# Patient Record
Sex: MALE | Race: WHITE | NOT HISPANIC OR LATINO | Employment: OTHER | URBAN - METROPOLITAN AREA
[De-identification: names, ages, dates, MRNs, and addresses within clinical notes are randomized per-mention and may not be internally consistent; named-entity substitution may affect disease eponyms.]

---

## 2020-11-06 ENCOUNTER — OFFICE VISIT (OUTPATIENT)
Dept: URGENT CARE | Facility: CLINIC | Age: 75
End: 2020-11-06
Payer: MEDICARE

## 2020-11-06 VITALS
OXYGEN SATURATION: 95 % | SYSTOLIC BLOOD PRESSURE: 132 MMHG | DIASTOLIC BLOOD PRESSURE: 68 MMHG | TEMPERATURE: 98.9 F | HEART RATE: 66 BPM | RESPIRATION RATE: 16 BRPM | HEIGHT: 72 IN | BODY MASS INDEX: 23.7 KG/M2 | WEIGHT: 175 LBS

## 2020-11-06 DIAGNOSIS — S16.1XXA STRAIN OF NECK MUSCLE, INITIAL ENCOUNTER: ICD-10-CM

## 2020-11-06 DIAGNOSIS — S39.012A STRAIN OF LUMBAR REGION, INITIAL ENCOUNTER: ICD-10-CM

## 2020-11-06 PROCEDURE — 99204 OFFICE O/P NEW MOD 45 MIN: CPT | Performed by: FAMILY MEDICINE

## 2020-11-06 RX ORDER — POTASSIUM CHLORIDE 600 MG/1
8 CAPSULE, EXTENDED RELEASE ORAL
COMMUNITY
End: 2021-01-26 | Stop reason: SDUPTHER

## 2020-11-06 RX ORDER — OSIMERTINIB 40 1/1
TABLET, FILM COATED ORAL
COMMUNITY
End: 2020-11-25

## 2020-11-06 RX ORDER — LEVOTHYROXINE SODIUM 125 UG/1
CAPSULE ORAL
COMMUNITY
Start: 2020-10-25 | End: 2020-11-25

## 2020-11-06 RX ORDER — TIZANIDINE 4 MG/1
4 TABLET ORAL EVERY 6 HOURS PRN
Qty: 30 TAB | Refills: 0 | Status: SHIPPED | OUTPATIENT
Start: 2020-11-06 | End: 2021-02-23

## 2020-11-06 RX ORDER — VERAPAMIL HYDROCHLORIDE 120 MG/1
120 TABLET, FILM COATED ORAL 3 TIMES DAILY
COMMUNITY
End: 2020-11-25

## 2020-11-06 RX ORDER — PREDNISONE 5 MG/1
10 TABLET ORAL DAILY
COMMUNITY
End: 2020-12-22 | Stop reason: SDUPTHER

## 2020-11-06 SDOH — HEALTH STABILITY: MENTAL HEALTH: HOW OFTEN DO YOU HAVE A DRINK CONTAINING ALCOHOL?: 2-4 TIMES A MONTH
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
## 2020-11-25 ENCOUNTER — OFFICE VISIT (OUTPATIENT)
Dept: INTERNAL MEDICINE | Facility: IMAGING CENTER | Age: 75
End: 2020-11-25
Payer: MEDICARE

## 2020-11-25 VITALS
BODY MASS INDEX: 23.1 KG/M2 | DIASTOLIC BLOOD PRESSURE: 64 MMHG | HEART RATE: 65 BPM | TEMPERATURE: 97.8 F | SYSTOLIC BLOOD PRESSURE: 128 MMHG | HEIGHT: 71 IN | OXYGEN SATURATION: 95 % | RESPIRATION RATE: 14 BRPM | WEIGHT: 165 LBS

## 2020-11-25 DIAGNOSIS — Z85.820 HISTORY OF MALIGNANT MELANOMA: ICD-10-CM

## 2020-11-25 DIAGNOSIS — Z86.19 HISTORY OF SHINGLES: ICD-10-CM

## 2020-11-25 DIAGNOSIS — Z86.73 HISTORY OF STROKE: ICD-10-CM

## 2020-11-25 DIAGNOSIS — Z93.3 COLOSTOMY PRESENT (HCC): ICD-10-CM

## 2020-11-25 DIAGNOSIS — E27.40 ADRENAL INSUFFICIENCY (HCC): ICD-10-CM

## 2020-11-25 DIAGNOSIS — K57.30 DIVERTICULOSIS OF COLON: ICD-10-CM

## 2020-11-25 DIAGNOSIS — Z85.118 HISTORY OF LUNG CANCER: ICD-10-CM

## 2020-11-25 DIAGNOSIS — M54.32 SCIATICA, LEFT SIDE: ICD-10-CM

## 2020-11-25 DIAGNOSIS — Z79.899 LONG TERM USE OF DRUG: ICD-10-CM

## 2020-11-25 DIAGNOSIS — N18.30 STAGE 3 CHRONIC KIDNEY DISEASE, UNSPECIFIED WHETHER STAGE 3A OR 3B CKD: ICD-10-CM

## 2020-11-25 DIAGNOSIS — I65.23 ATHEROSCLEROSIS OF BOTH CAROTID ARTERIES: ICD-10-CM

## 2020-11-25 DIAGNOSIS — N40.1 BPH WITH OBSTRUCTION/LOWER URINARY TRACT SYMPTOMS: ICD-10-CM

## 2020-11-25 DIAGNOSIS — Z86.79 HISTORY OF ATRIAL TACHYCARDIA: ICD-10-CM

## 2020-11-25 DIAGNOSIS — N13.8 BPH WITH OBSTRUCTION/LOWER URINARY TRACT SYMPTOMS: ICD-10-CM

## 2020-11-25 DIAGNOSIS — Z86.39 HISTORY OF VITAMIN D DEFICIENCY: ICD-10-CM

## 2020-11-25 DIAGNOSIS — R97.20 ELEVATED PSA: ICD-10-CM

## 2020-11-25 DIAGNOSIS — E03.9 HYPOTHYROIDISM (ACQUIRED): ICD-10-CM

## 2020-11-25 PROBLEM — N40.0 BPH (BENIGN PROSTATIC HYPERPLASIA): Status: ACTIVE | Noted: 2020-11-25

## 2020-11-25 PROBLEM — K21.9 GERD (GASTROESOPHAGEAL REFLUX DISEASE): Status: ACTIVE | Noted: 2020-11-25

## 2020-11-25 PROBLEM — E55.9 VITAMIN D DEFICIENCY: Status: ACTIVE | Noted: 2020-11-25

## 2020-11-25 PROBLEM — C34.90 LUNG CANCER (HCC): Status: ACTIVE | Noted: 2020-11-25

## 2020-11-25 PROBLEM — G47.33 OBSTRUCTIVE SLEEP APNEA: Status: ACTIVE | Noted: 2020-11-25

## 2020-11-25 PROBLEM — N40.0 BPH (BENIGN PROSTATIC HYPERPLASIA): Status: RESOLVED | Noted: 2020-11-25 | Resolved: 2020-11-25

## 2020-11-25 PROBLEM — E78.5 HYPERLIPIDEMIA: Status: ACTIVE | Noted: 2020-11-25

## 2020-11-25 PROCEDURE — 99204 OFFICE O/P NEW MOD 45 MIN: CPT | Performed by: INTERNAL MEDICINE

## 2020-11-25 RX ORDER — VERAPAMIL HYDROCHLORIDE 120 MG/1
120 TABLET, FILM COATED ORAL 2 TIMES DAILY
COMMUNITY
Start: 2020-10-05 | End: 2020-12-22 | Stop reason: SDUPTHER

## 2020-11-25 RX ORDER — OSIMERTINIB 80 1/1
80 TABLET, FILM COATED ORAL DAILY
COMMUNITY

## 2020-11-25 RX ORDER — AZELASTINE 1 MG/ML
1 SPRAY, METERED NASAL 2 TIMES DAILY PRN
COMMUNITY
Start: 2020-08-20 | End: 2021-02-23

## 2020-11-25 RX ORDER — ESZOPICLONE 1 MG/1
1 TABLET, FILM COATED ORAL NIGHTLY PRN
COMMUNITY
End: 2021-02-10

## 2020-11-25 RX ORDER — MULTIVITAMIN WITH IRON
250 TABLET ORAL DAILY
COMMUNITY
End: 2021-02-23

## 2020-11-25 RX ORDER — TRAZODONE HYDROCHLORIDE 50 MG/1
50 TABLET ORAL NIGHTLY PRN
COMMUNITY
Start: 2020-09-02 | End: 2020-12-22 | Stop reason: SDUPTHER

## 2020-11-25 RX ORDER — PREDNISONE 10 MG/1
10 TABLET ORAL DAILY
COMMUNITY
Start: 2020-10-06 | End: 2020-11-25

## 2020-11-25 RX ORDER — OMEPRAZOLE 20 MG/1
20 CAPSULE, DELAYED RELEASE ORAL PRN
COMMUNITY
End: 2021-06-20

## 2020-11-25 RX ORDER — SILDENAFIL 100 MG/1
100 TABLET, FILM COATED ORAL
COMMUNITY
End: 2022-11-02

## 2020-11-25 RX ORDER — LEVOTHYROXINE SODIUM 137 UG/1
137 CAPSULE ORAL DAILY
COMMUNITY
End: 2021-01-26

## 2020-11-25 RX ORDER — POTASSIUM CHLORIDE 600 MG/1
1 TABLET, FILM COATED, EXTENDED RELEASE ORAL DAILY
COMMUNITY
Start: 2020-11-10 | End: 2020-11-25

## 2020-11-25 SDOH — HEALTH STABILITY: MENTAL HEALTH: HOW OFTEN DO YOU HAVE A DRINK CONTAINING ALCOHOL?: 2-3 TIMES A WEEK
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
## 2020-12-08 ENCOUNTER — NON-PROVIDER VISIT (OUTPATIENT)
Dept: INTERNAL MEDICINE | Facility: IMAGING CENTER | Age: 75
End: 2020-12-08
Payer: MEDICARE

## 2020-12-08 ENCOUNTER — HOSPITAL ENCOUNTER (OUTPATIENT)
Facility: MEDICAL CENTER | Age: 75
End: 2020-12-08
Attending: FAMILY MEDICINE
Payer: MEDICARE

## 2020-12-08 ENCOUNTER — HOSPITAL ENCOUNTER (OUTPATIENT)
Facility: MEDICAL CENTER | Age: 75
End: 2020-12-08
Attending: UROLOGY
Payer: MEDICARE

## 2020-12-08 ENCOUNTER — HOSPITAL ENCOUNTER (OUTPATIENT)
Facility: MEDICAL CENTER | Age: 75
End: 2020-12-08
Attending: INTERNAL MEDICINE
Payer: MEDICARE

## 2020-12-08 DIAGNOSIS — Z01.89 ENCOUNTER FOR ROUTINE LABORATORY TESTING: ICD-10-CM

## 2020-12-08 PROCEDURE — 83615 LACTATE (LD) (LDH) ENZYME: CPT

## 2020-12-08 PROCEDURE — 80053 COMPREHEN METABOLIC PANEL: CPT

## 2020-12-08 PROCEDURE — 80061 LIPID PANEL: CPT

## 2020-12-08 PROCEDURE — 84443 ASSAY THYROID STIM HORMONE: CPT

## 2020-12-08 PROCEDURE — 83036 HEMOGLOBIN GLYCOSYLATED A1C: CPT

## 2020-12-08 PROCEDURE — 80053 COMPREHEN METABOLIC PANEL: CPT | Mod: 91

## 2020-12-08 PROCEDURE — 84439 ASSAY OF FREE THYROXINE: CPT

## 2020-12-08 PROCEDURE — 85025 COMPLETE CBC W/AUTO DIFF WBC: CPT | Mod: 91

## 2020-12-08 PROCEDURE — 82607 VITAMIN B-12: CPT

## 2020-12-08 PROCEDURE — 84153 ASSAY OF PSA TOTAL: CPT

## 2020-12-08 PROCEDURE — 81003 URINALYSIS AUTO W/O SCOPE: CPT

## 2020-12-08 PROCEDURE — 82746 ASSAY OF FOLIC ACID SERUM: CPT

## 2020-12-08 PROCEDURE — 85025 COMPLETE CBC W/AUTO DIFF WBC: CPT

## 2020-12-09 LAB
ALBUMIN SERPL BCP-MCNC: 4.1 G/DL (ref 3.2–4.9)
ALBUMIN SERPL BCP-MCNC: 4.1 G/DL (ref 3.2–4.9)
ALBUMIN/GLOB SERPL: 1.5 G/DL
ALBUMIN/GLOB SERPL: 1.6 G/DL
ALP SERPL-CCNC: 59 U/L (ref 30–99)
ALP SERPL-CCNC: 61 U/L (ref 30–99)
ALT SERPL-CCNC: 6 U/L (ref 2–50)
ALT SERPL-CCNC: 8 U/L (ref 2–50)
ANION GAP SERPL CALC-SCNC: 5 MMOL/L (ref 7–16)
ANION GAP SERPL CALC-SCNC: 9 MMOL/L (ref 7–16)
APPEARANCE UR: CLEAR
AST SERPL-CCNC: 10 U/L (ref 12–45)
AST SERPL-CCNC: 11 U/L (ref 12–45)
BASOPHILS # BLD AUTO: 0.5 % (ref 0–1.8)
BASOPHILS # BLD AUTO: 0.5 % (ref 0–1.8)
BASOPHILS # BLD: 0.03 K/UL (ref 0–0.12)
BASOPHILS # BLD: 0.03 K/UL (ref 0–0.12)
BILIRUB SERPL-MCNC: 0.9 MG/DL (ref 0.1–1.5)
BILIRUB SERPL-MCNC: 0.9 MG/DL (ref 0.1–1.5)
BILIRUB UR QL STRIP.AUTO: NEGATIVE
BUN SERPL-MCNC: 23 MG/DL (ref 8–22)
BUN SERPL-MCNC: 23 MG/DL (ref 8–22)
CALCIUM SERPL-MCNC: 9.4 MG/DL (ref 8.5–10.5)
CALCIUM SERPL-MCNC: 9.6 MG/DL (ref 8.5–10.5)
CHLORIDE SERPL-SCNC: 105 MMOL/L (ref 96–112)
CHLORIDE SERPL-SCNC: 106 MMOL/L (ref 96–112)
CHOLEST SERPL-MCNC: 217 MG/DL (ref 100–199)
CO2 SERPL-SCNC: 27 MMOL/L (ref 20–33)
CO2 SERPL-SCNC: 29 MMOL/L (ref 20–33)
COLOR UR: YELLOW
CREAT SERPL-MCNC: 1.14 MG/DL (ref 0.5–1.4)
CREAT SERPL-MCNC: 1.25 MG/DL (ref 0.5–1.4)
EOSINOPHIL # BLD AUTO: 0.07 K/UL (ref 0–0.51)
EOSINOPHIL # BLD AUTO: 0.09 K/UL (ref 0–0.51)
EOSINOPHIL NFR BLD: 1.1 % (ref 0–6.9)
EOSINOPHIL NFR BLD: 1.5 % (ref 0–6.9)
ERYTHROCYTE [DISTWIDTH] IN BLOOD BY AUTOMATED COUNT: 45.9 FL (ref 35.9–50)
ERYTHROCYTE [DISTWIDTH] IN BLOOD BY AUTOMATED COUNT: 48.5 FL (ref 35.9–50)
EST. AVERAGE GLUCOSE BLD GHB EST-MCNC: 108 MG/DL
FOLATE SERPL-MCNC: 8.2 NG/ML
GLOBULIN SER CALC-MCNC: 2.6 G/DL (ref 1.9–3.5)
GLOBULIN SER CALC-MCNC: 2.7 G/DL (ref 1.9–3.5)
GLUCOSE SERPL-MCNC: 82 MG/DL (ref 65–99)
GLUCOSE SERPL-MCNC: 86 MG/DL (ref 65–99)
GLUCOSE UR STRIP.AUTO-MCNC: NEGATIVE MG/DL
HBA1C MFR BLD: 5.4 % (ref 0–5.6)
HCT VFR BLD AUTO: 44.9 % (ref 42–52)
HCT VFR BLD AUTO: 45.6 % (ref 42–52)
HDLC SERPL-MCNC: 59 MG/DL
HGB BLD-MCNC: 15 G/DL (ref 14–18)
HGB BLD-MCNC: 15.3 G/DL (ref 14–18)
IMM GRANULOCYTES # BLD AUTO: 0.03 K/UL (ref 0–0.11)
IMM GRANULOCYTES # BLD AUTO: 0.04 K/UL (ref 0–0.11)
IMM GRANULOCYTES NFR BLD AUTO: 0.5 % (ref 0–0.9)
IMM GRANULOCYTES NFR BLD AUTO: 0.6 % (ref 0–0.9)
KETONES UR STRIP.AUTO-MCNC: NEGATIVE MG/DL
LDH SERPL L TO P-CCNC: 214 U/L (ref 107–266)
LDLC SERPL CALC-MCNC: 131 MG/DL
LEUKOCYTE ESTERASE UR QL STRIP.AUTO: NEGATIVE
LYMPHOCYTES # BLD AUTO: 1.39 K/UL (ref 1–4.8)
LYMPHOCYTES # BLD AUTO: 1.47 K/UL (ref 1–4.8)
LYMPHOCYTES NFR BLD: 23.2 % (ref 22–41)
LYMPHOCYTES NFR BLD: 23.6 % (ref 22–41)
MCH RBC QN AUTO: 32.3 PG (ref 27–33)
MCH RBC QN AUTO: 33.3 PG (ref 27–33)
MCHC RBC AUTO-ENTMCNC: 33.4 G/DL (ref 33.7–35.3)
MCHC RBC AUTO-ENTMCNC: 33.6 G/DL (ref 33.7–35.3)
MCV RBC AUTO: 96.8 FL (ref 81.4–97.8)
MCV RBC AUTO: 99.1 FL (ref 81.4–97.8)
MICRO URNS: NORMAL
MONOCYTES # BLD AUTO: 0.63 K/UL (ref 0–0.85)
MONOCYTES # BLD AUTO: 0.65 K/UL (ref 0–0.85)
MONOCYTES NFR BLD AUTO: 10.4 % (ref 0–13.4)
MONOCYTES NFR BLD AUTO: 10.5 % (ref 0–13.4)
NEUTROPHILS # BLD AUTO: 3.82 K/UL (ref 1.82–7.42)
NEUTROPHILS # BLD AUTO: 3.98 K/UL (ref 1.82–7.42)
NEUTROPHILS NFR BLD: 63.8 % (ref 44–72)
NEUTROPHILS NFR BLD: 63.8 % (ref 44–72)
NITRITE UR QL STRIP.AUTO: NEGATIVE
NRBC # BLD AUTO: 0 K/UL
NRBC # BLD AUTO: 0 K/UL
NRBC BLD-RTO: 0 /100 WBC
NRBC BLD-RTO: 0 /100 WBC
PH UR STRIP.AUTO: 6 [PH] (ref 5–8)
PLATELET # BLD AUTO: 208 K/UL (ref 164–446)
PLATELET # BLD AUTO: 219 K/UL (ref 164–446)
PMV BLD AUTO: 10.1 FL (ref 9–12.9)
PMV BLD AUTO: 10.3 FL (ref 9–12.9)
POTASSIUM SERPL-SCNC: 3.6 MMOL/L (ref 3.6–5.5)
POTASSIUM SERPL-SCNC: 3.9 MMOL/L (ref 3.6–5.5)
PROT SERPL-MCNC: 6.7 G/DL (ref 6–8.2)
PROT SERPL-MCNC: 6.8 G/DL (ref 6–8.2)
PROT UR QL STRIP: NEGATIVE MG/DL
PSA SERPL-MCNC: 7.45 NG/ML (ref 0–4)
RBC # BLD AUTO: 4.6 M/UL (ref 4.7–6.1)
RBC # BLD AUTO: 4.64 M/UL (ref 4.7–6.1)
RBC UR QL AUTO: NEGATIVE
SODIUM SERPL-SCNC: 139 MMOL/L (ref 135–145)
SODIUM SERPL-SCNC: 142 MMOL/L (ref 135–145)
SP GR UR STRIP.AUTO: 1.02
T4 FREE SERPL-MCNC: 1.91 NG/DL (ref 0.93–1.7)
T4 SERPL-MCNC: 7.5 UG/DL (ref 4–12)
TRIGL SERPL-MCNC: 133 MG/DL (ref 0–149)
TSH SERPL DL<=0.005 MIU/L-ACNC: 3.56 UIU/ML (ref 0.38–5.33)
UROBILINOGEN UR STRIP.AUTO-MCNC: 0.2 MG/DL
VIT B12 SERPL-MCNC: 461 PG/ML (ref 211–911)
WBC # BLD AUTO: 6 K/UL (ref 4.8–10.8)
WBC # BLD AUTO: 6.2 K/UL (ref 4.8–10.8)

## 2020-12-22 RX ORDER — TRAZODONE HYDROCHLORIDE 50 MG/1
100 TABLET ORAL
Qty: 180 TAB | Refills: 3 | Status: SHIPPED | OUTPATIENT
Start: 2020-12-22 | End: 2021-08-31

## 2020-12-22 RX ORDER — PREDNISONE 5 MG/1
10 TABLET ORAL DAILY
Qty: 180 TAB | Refills: 3 | Status: ON HOLD | OUTPATIENT
Start: 2020-12-22 | End: 2021-06-23

## 2020-12-22 RX ORDER — VERAPAMIL HYDROCHLORIDE 120 MG/1
120 TABLET, FILM COATED ORAL 2 TIMES DAILY
Qty: 180 TAB | Refills: 3 | Status: SHIPPED | OUTPATIENT
Start: 2020-12-22 | End: 2021-06-30

## 2020-12-23 DIAGNOSIS — R97.20 ELEVATED PSA: ICD-10-CM

## 2020-12-23 NOTE — PROGRESS NOTES
Due to elevated PSA at 7.45 will repeat in 3 months with percent free and total. He states his last level was around 6 but states a couple years ago he believes it was as high as 9-10.

## 2021-01-06 ENCOUNTER — HOSPITAL ENCOUNTER (OUTPATIENT)
Dept: LAB | Facility: MEDICAL CENTER | Age: 76
End: 2021-01-06
Attending: INTERNAL MEDICINE
Payer: MEDICARE

## 2021-01-06 PROCEDURE — 83735 ASSAY OF MAGNESIUM: CPT | Mod: GA

## 2021-01-06 PROCEDURE — 82306 VITAMIN D 25 HYDROXY: CPT | Mod: GA

## 2021-01-06 PROCEDURE — 84100 ASSAY OF PHOSPHORUS: CPT

## 2021-01-06 PROCEDURE — 80053 COMPREHEN METABOLIC PANEL: CPT

## 2021-01-07 LAB
25(OH)D3 SERPL-MCNC: 62 NG/ML (ref 30–100)
ALBUMIN SERPL BCP-MCNC: 4.2 G/DL (ref 3.2–4.9)
ALBUMIN/GLOB SERPL: 1.9 G/DL
ALP SERPL-CCNC: 70 U/L (ref 30–99)
ALT SERPL-CCNC: <5 U/L (ref 2–50)
ANION GAP SERPL CALC-SCNC: 12 MMOL/L (ref 7–16)
AST SERPL-CCNC: 12 U/L (ref 12–45)
BILIRUB SERPL-MCNC: 1.1 MG/DL (ref 0.1–1.5)
BUN SERPL-MCNC: 22 MG/DL (ref 8–22)
CALCIUM SERPL-MCNC: 9.4 MG/DL (ref 8.5–10.5)
CHLORIDE SERPL-SCNC: 105 MMOL/L (ref 96–112)
CO2 SERPL-SCNC: 24 MMOL/L (ref 20–33)
CREAT SERPL-MCNC: 1.23 MG/DL (ref 0.5–1.4)
GLOBULIN SER CALC-MCNC: 2.2 G/DL (ref 1.9–3.5)
GLUCOSE SERPL-MCNC: 93 MG/DL (ref 65–99)
MAGNESIUM SERPL-MCNC: 1.9 MG/DL (ref 1.5–2.5)
PHOSPHATE SERPL-MCNC: 3.4 MG/DL (ref 2.5–4.5)
POTASSIUM SERPL-SCNC: 4 MMOL/L (ref 3.6–5.5)
PROT SERPL-MCNC: 6.4 G/DL (ref 6–8.2)
SODIUM SERPL-SCNC: 141 MMOL/L (ref 135–145)

## 2021-01-14 ENCOUNTER — HOSPITAL ENCOUNTER (OUTPATIENT)
Dept: RADIOLOGY | Facility: MEDICAL CENTER | Age: 76
End: 2021-01-14
Payer: MEDICARE

## 2021-01-14 ENCOUNTER — APPOINTMENT (OUTPATIENT)
Dept: RADIOLOGY | Facility: MEDICAL CENTER | Age: 76
End: 2021-01-14
Attending: INTERNAL MEDICINE
Payer: MEDICARE

## 2021-01-14 DIAGNOSIS — C79.52 SECONDARY MALIGNANT NEOPLASM OF BONE AND BONE MARROW (HCC): ICD-10-CM

## 2021-01-14 DIAGNOSIS — C34.91 PRIMARY LUNG CANCER WITH METASTASIS FROM LUNG TO OTHER SITE, RIGHT (HCC): ICD-10-CM

## 2021-01-14 DIAGNOSIS — C79.51 SECONDARY MALIGNANT NEOPLASM OF BONE AND BONE MARROW (HCC): ICD-10-CM

## 2021-01-14 PROCEDURE — 700117 HCHG RX CONTRAST REV CODE 255: Performed by: INTERNAL MEDICINE

## 2021-01-14 PROCEDURE — A9576 INJ PROHANCE MULTIPACK: HCPCS | Performed by: INTERNAL MEDICINE

## 2021-01-14 PROCEDURE — 72158 MRI LUMBAR SPINE W/O & W/DYE: CPT

## 2021-01-14 PROCEDURE — 72197 MRI PELVIS W/O & W/DYE: CPT

## 2021-01-14 RX ADMIN — GADOTERIDOL 15 ML: 279.3 INJECTION, SOLUTION INTRAVENOUS at 08:57

## 2021-01-15 ENCOUNTER — TELEPHONE (OUTPATIENT)
Dept: INTERNAL MEDICINE | Facility: IMAGING CENTER | Age: 76
End: 2021-01-15

## 2021-01-15 DIAGNOSIS — Z23 NEED FOR VACCINATION: ICD-10-CM

## 2021-01-15 RX ORDER — CIPROFLOXACIN HYDROCHLORIDE 3.5 MG/ML
1 SOLUTION/ DROPS TOPICAL 3 TIMES DAILY
Qty: 5 ML | Refills: 0 | Status: SHIPPED | OUTPATIENT
Start: 2021-01-15 | End: 2021-02-10

## 2021-01-15 RX ORDER — CEPHALEXIN 500 MG/1
500 CAPSULE ORAL 3 TIMES DAILY
Qty: 21 CAP | Refills: 0 | Status: SHIPPED | OUTPATIENT
Start: 2021-01-15 | End: 2021-01-22

## 2021-01-15 NOTE — TELEPHONE ENCOUNTER
Patient reports stye with redness inner canthus of right eye x 5 days.  He has been doing warm compresses BID.  Unfortunately, he now has redness and puffiness beneath his right eye.  Eye drops & oral antibiotics per Dr Rogers.

## 2021-01-17 PROCEDURE — 99285 EMERGENCY DEPT VISIT HI MDM: CPT

## 2021-01-17 ASSESSMENT — FIBROSIS 4 INDEX: FIB4 SCORE: 1.99

## 2021-01-18 ENCOUNTER — HOSPITAL ENCOUNTER (EMERGENCY)
Facility: MEDICAL CENTER | Age: 76
End: 2021-01-18
Attending: EMERGENCY MEDICINE
Payer: MEDICARE

## 2021-01-18 ENCOUNTER — APPOINTMENT (OUTPATIENT)
Dept: RADIOLOGY | Facility: MEDICAL CENTER | Age: 76
End: 2021-01-18
Attending: EMERGENCY MEDICINE
Payer: MEDICARE

## 2021-01-18 VITALS
DIASTOLIC BLOOD PRESSURE: 83 MMHG | HEIGHT: 72 IN | BODY MASS INDEX: 22.31 KG/M2 | SYSTOLIC BLOOD PRESSURE: 126 MMHG | WEIGHT: 164.68 LBS | HEART RATE: 78 BPM | TEMPERATURE: 98.3 F | RESPIRATION RATE: 17 BRPM | OXYGEN SATURATION: 97 %

## 2021-01-18 DIAGNOSIS — R10.31 RIGHT LOWER QUADRANT ABDOMINAL PAIN: ICD-10-CM

## 2021-01-18 DIAGNOSIS — R10.11 RIGHT UPPER QUADRANT ABDOMINAL PAIN: ICD-10-CM

## 2021-01-18 PROBLEM — K80.20 CHOLELITHIASIS: Status: ACTIVE | Noted: 2021-01-18

## 2021-01-18 LAB
ALBUMIN SERPL BCP-MCNC: 4 G/DL (ref 3.2–4.9)
ALBUMIN/GLOB SERPL: 1.5 G/DL
ALP SERPL-CCNC: 120 U/L (ref 30–99)
ALT SERPL-CCNC: 29 U/L (ref 2–50)
ANION GAP SERPL CALC-SCNC: 12 MMOL/L (ref 7–16)
AST SERPL-CCNC: 118 U/L (ref 12–45)
BASOPHILS # BLD AUTO: 0.2 % (ref 0–1.8)
BASOPHILS # BLD: 0.02 K/UL (ref 0–0.12)
BILIRUB SERPL-MCNC: 0.9 MG/DL (ref 0.1–1.5)
BUN SERPL-MCNC: 24 MG/DL (ref 8–22)
CALCIUM SERPL-MCNC: 8.9 MG/DL (ref 8.4–10.2)
CHLORIDE SERPL-SCNC: 108 MMOL/L (ref 96–112)
CO2 SERPL-SCNC: 26 MMOL/L (ref 20–33)
CREAT SERPL-MCNC: 1.22 MG/DL (ref 0.5–1.4)
EKG IMPRESSION: NORMAL
EOSINOPHIL # BLD AUTO: 0.06 K/UL (ref 0–0.51)
EOSINOPHIL NFR BLD: 0.6 % (ref 0–6.9)
ERYTHROCYTE [DISTWIDTH] IN BLOOD BY AUTOMATED COUNT: 44.7 FL (ref 35.9–50)
GLOBULIN SER CALC-MCNC: 2.6 G/DL (ref 1.9–3.5)
GLUCOSE SERPL-MCNC: 103 MG/DL (ref 65–99)
HCT VFR BLD AUTO: 42 % (ref 42–52)
HGB BLD-MCNC: 14.2 G/DL (ref 14–18)
IMM GRANULOCYTES # BLD AUTO: 0.04 K/UL (ref 0–0.11)
IMM GRANULOCYTES NFR BLD AUTO: 0.4 % (ref 0–0.9)
LYMPHOCYTES # BLD AUTO: 0.91 K/UL (ref 1–4.8)
LYMPHOCYTES NFR BLD: 8.8 % (ref 22–41)
MCH RBC QN AUTO: 32.3 PG (ref 27–33)
MCHC RBC AUTO-ENTMCNC: 33.8 G/DL (ref 33.7–35.3)
MCV RBC AUTO: 95.5 FL (ref 81.4–97.8)
MONOCYTES # BLD AUTO: 0.88 K/UL (ref 0–0.85)
MONOCYTES NFR BLD AUTO: 8.5 % (ref 0–13.4)
NEUTROPHILS # BLD AUTO: 8.41 K/UL (ref 1.82–7.42)
NEUTROPHILS NFR BLD: 81.5 % (ref 44–72)
NRBC # BLD AUTO: 0 K/UL
NRBC BLD-RTO: 0 /100 WBC
PLATELET # BLD AUTO: 200 K/UL (ref 164–446)
PMV BLD AUTO: 9.3 FL (ref 9–12.9)
POTASSIUM SERPL-SCNC: 3.9 MMOL/L (ref 3.6–5.5)
PROT SERPL-MCNC: 6.6 G/DL (ref 6–8.2)
RBC # BLD AUTO: 4.4 M/UL (ref 4.7–6.1)
SODIUM SERPL-SCNC: 146 MMOL/L (ref 135–145)
WBC # BLD AUTO: 10.3 K/UL (ref 4.8–10.8)

## 2021-01-18 PROCEDURE — 85025 COMPLETE CBC W/AUTO DIFF WBC: CPT

## 2021-01-18 PROCEDURE — 700111 HCHG RX REV CODE 636 W/ 250 OVERRIDE (IP): Performed by: EMERGENCY MEDICINE

## 2021-01-18 PROCEDURE — 74177 CT ABD & PELVIS W/CONTRAST: CPT

## 2021-01-18 PROCEDURE — 36415 COLL VENOUS BLD VENIPUNCTURE: CPT

## 2021-01-18 PROCEDURE — 93005 ELECTROCARDIOGRAM TRACING: CPT | Performed by: EMERGENCY MEDICINE

## 2021-01-18 PROCEDURE — 76705 ECHO EXAM OF ABDOMEN: CPT

## 2021-01-18 PROCEDURE — 96374 THER/PROPH/DIAG INJ IV PUSH: CPT

## 2021-01-18 PROCEDURE — 96375 TX/PRO/DX INJ NEW DRUG ADDON: CPT

## 2021-01-18 PROCEDURE — 700117 HCHG RX CONTRAST REV CODE 255: Performed by: EMERGENCY MEDICINE

## 2021-01-18 PROCEDURE — 80053 COMPREHEN METABOLIC PANEL: CPT

## 2021-01-18 RX ORDER — HYDROCODONE BITARTRATE AND ACETAMINOPHEN 5; 325 MG/1; MG/1
1 TABLET ORAL EVERY 4 HOURS PRN
Qty: 14 TAB | Refills: 0 | Status: SHIPPED | OUTPATIENT
Start: 2021-01-18 | End: 2021-01-25

## 2021-01-18 RX ORDER — ONDANSETRON 2 MG/ML
4 INJECTION INTRAMUSCULAR; INTRAVENOUS ONCE
Status: COMPLETED | OUTPATIENT
Start: 2021-01-18 | End: 2021-01-18

## 2021-01-18 RX ORDER — ONDANSETRON 4 MG/1
4 TABLET, ORALLY DISINTEGRATING ORAL EVERY 6 HOURS PRN
Qty: 10 TAB | Refills: 0 | Status: SHIPPED | OUTPATIENT
Start: 2021-01-18 | End: 2021-01-18 | Stop reason: SDUPTHER

## 2021-01-18 RX ORDER — ONDANSETRON 4 MG/1
4 TABLET, ORALLY DISINTEGRATING ORAL EVERY 6 HOURS PRN
Qty: 10 TAB | Refills: 0 | Status: SHIPPED | OUTPATIENT
Start: 2021-01-18 | End: 2021-02-23

## 2021-01-18 RX ORDER — MORPHINE SULFATE 4 MG/ML
4 INJECTION, SOLUTION INTRAMUSCULAR; INTRAVENOUS ONCE
Status: COMPLETED | OUTPATIENT
Start: 2021-01-18 | End: 2021-01-18

## 2021-01-18 RX ORDER — AMOXICILLIN 250 MG
1 CAPSULE ORAL DAILY
Qty: 30 TAB | Refills: 0 | Status: SHIPPED | OUTPATIENT
Start: 2021-01-18 | End: 2021-02-23

## 2021-01-18 RX ORDER — HYDROMORPHONE HYDROCHLORIDE 1 MG/ML
1 INJECTION, SOLUTION INTRAMUSCULAR; INTRAVENOUS; SUBCUTANEOUS ONCE
Status: COMPLETED | OUTPATIENT
Start: 2021-01-18 | End: 2021-01-18

## 2021-01-18 RX ORDER — ALUMINA, MAGNESIA, AND SIMETHICONE 2400; 2400; 240 MG/30ML; MG/30ML; MG/30ML
10 SUSPENSION ORAL 4 TIMES DAILY PRN
Qty: 560 ML | Refills: 0 | Status: SHIPPED | OUTPATIENT
Start: 2021-01-18 | End: 2021-02-10

## 2021-01-18 RX ADMIN — MORPHINE SULFATE 4 MG: 4 INJECTION INTRAVENOUS at 00:36

## 2021-01-18 RX ADMIN — IOHEXOL 100 ML: 350 INJECTION, SOLUTION INTRAVENOUS at 01:18

## 2021-01-18 RX ADMIN — HYDROMORPHONE HYDROCHLORIDE 1 MG: 1 INJECTION, SOLUTION INTRAMUSCULAR; INTRAVENOUS; SUBCUTANEOUS at 03:06

## 2021-01-18 RX ADMIN — ONDANSETRON 4 MG: 2 INJECTION INTRAMUSCULAR; INTRAVENOUS at 00:35

## 2021-01-18 ASSESSMENT — ENCOUNTER SYMPTOMS
FEVER: 0
ABDOMINAL PAIN: 1
NAUSEA: 1
VOMITING: 1

## 2021-01-18 NOTE — ED NOTES
IV started without problem. Blood drawn and sent to lab. ERP to bedside.  Tech at bedside for EKG.  Son at bedside for support.  Plan of care updated and support given.

## 2021-01-18 NOTE — ED NOTES
Medications given as directed. Plan of care updated, warm blankets and support given.   Son remains at bedside.

## 2021-01-18 NOTE — ED NOTES
IV removed without problem. PT & son given d/c instructions. Questions answered and support given. RX called to pharmacy.  VSS.

## 2021-01-18 NOTE — ED PROVIDER NOTES
ED Provider Note    CHIEF COMPLAINT  No chief complaint on file.      HPI  Bartolo Berrios [facility] is a 75 y.o. male who presents with multiple chronic medical problems including adrenal insufficiency, CKD, hemicolectomy and colostomy here with nausea vomiting and abdominal distention.  Patient reports he had multiple episodes of vomiting today, nonbloody nonbilious.  He reports the pain started diffusely but is since become more pronounced in his right upper and right lower quadrant.  Patient reports he is passing flatus but has not had a bowel movement today.  He reports his last movement did not have any blood or melena.  Patient denies any associated dysuria urgency or frequency.  He denies any fevers.  Patient reports he recently moved to [place].  He reports that he walks around 4 miles a day and towards the end starts to have some fatigue but is able to finish his walks.  Denies any associated chest pain.  Patient denies any pleuritic component of his pain.  Patient denies any fevers or chills.    REVIEW OF SYSTEMS  Review of Systems   Constitutional: Negative for fever.   Cardiovascular: Negative for chest pain.   Gastrointestinal: Positive for abdominal pain, nausea and vomiting.   Genitourinary: Negative for dysuria, frequency and urgency.       See HPI for further details. All other systems are negative.     PAST MEDICAL HISTORY   has a past medical history of Adrenal insufficiency (HCC), secondary (11/25/2020), Atherosclerosis of both carotid arteries, mild (11/25/2020), BPH (benign prostatic hyperplasia) (11/25/2020), CKD (chronic kidney disease) stage 3, GFR 30-59 ml/min (11/25/2020), Colostomy present (HCC) (11/25/2020), Diverticulosis of colon (11/25/2020), GERD (gastroesophageal reflux disease) (11/25/2020), History of atrial tachycardia (11/25/2020), History of malignant melanoma, April 2016 (11/25/2020), History of shingles (11/25/2020), History of stroke, subcortical  infarct on MRI April 2019 (11/25/2020), Hyperlipidemia (11/25/2020), Hypothyroid (11/25/2020), Lung cancer (HCC), adenocarcinoma Aug. 2019 (11/25/2020), and Obstructive sleep apnea (11/25/2020).    SOCIAL HISTORY  Social History     Tobacco Use   • Smoking status: Never Smoker   • Smokeless tobacco: Never Used   Substance and Sexual Activity   • Alcohol use: Yes     Alcohol/week: 1.8 oz     Types: 3 Glasses of wine per week     Frequency: 2-3 times a week   • Drug use: Not on file   • Sexual activity: Not on file       SURGICAL HISTORY  patient denies any surgical history    CURRENT MEDICATIONS  Home Medications    **Home medications have not yet been reviewed for this encounter**         ALLERGIES  Allergies   Allergen Reactions   • Gramineae Pollens Itching and Shortness of Breath     Itchy eyes, red face   • Cat Hair Extract    • Horse Allergy Hives   • Other Environmental    • Pollen Extract        PHYSICAL EXAM  Vitals:    01/17/21 2350   BP: 143/81   Pulse: 66   Resp: 14   Temp: 36.8 °C (98.3 °F)       Physical Exam   Constitutional: He is oriented to person, place, and time. He appears well-developed and well-nourished.   HENT:   Head: Normocephalic and atraumatic.   Eyes: Pupils are equal, round, and reactive to light. Conjunctivae are normal.   Neck: Normal range of motion. Neck supple.   Cardiovascular: Normal rate and regular rhythm. Exam reveals no gallop and no friction rub.   No murmur heard.  Pulmonary/Chest: Effort normal and breath sounds normal. No respiratory distress. He has no wheezes.   Abdominal: Soft. Bowel sounds are normal. He exhibits no distension. There is abdominal tenderness. There is no rebound.   Tenderness of right upper and right lower quadrant   Neurological: He is alert and oriented to person, place, and time.   Skin: Skin is warm and dry.   Psychiatric: He has a normal mood and affect. His behavior is normal.       DIAGNOSTIC STUDIES / PROCEDURES    LABS  Results for orders  placed or performed during the hospital encounter of 01/18/21   CBC WITH DIFFERENTIAL   Result Value Ref Range    WBC 10.3 4.8 - 10.8 K/uL    RBC 4.40 (L) 4.70 - 6.10 M/uL    Hemoglobin 14.2 14.0 - 18.0 g/dL    Hematocrit 42.0 42.0 - 52.0 %    MCV 95.5 81.4 - 97.8 fL    MCH 32.3 27.0 - 33.0 pg    MCHC 33.8 33.7 - 35.3 g/dL    RDW 44.7 35.9 - 50.0 fL    Platelet Count 200 164 - 446 K/uL    MPV 9.3 9.0 - 12.9 fL    Neutrophils-Polys 81.50 (H) 44.00 - 72.00 %    Lymphocytes 8.80 (L) 22.00 - 41.00 %    Monocytes 8.50 0.00 - 13.40 %    Eosinophils 0.60 0.00 - 6.90 %    Basophils 0.20 0.00 - 1.80 %    Immature Granulocytes 0.40 0.00 - 0.90 %    Nucleated RBC 0.00 /100 WBC    Neutrophils (Absolute) 8.41 (H) 1.82 - 7.42 K/uL    Lymphs (Absolute) 0.91 (L) 1.00 - 4.80 K/uL    Monos (Absolute) 0.88 (H) 0.00 - 0.85 K/uL    Eos (Absolute) 0.06 0.00 - 0.51 K/uL    Baso (Absolute) 0.02 0.00 - 0.12 K/uL    Immature Granulocytes (abs) 0.04 0.00 - 0.11 K/uL    NRBC (Absolute) 0.00 K/uL   CMP   Result Value Ref Range    Sodium 146 (H) 135 - 145 mmol/L    Potassium 3.9 3.6 - 5.5 mmol/L    Chloride 108 96 - 112 mmol/L    Co2 26 20 - 33 mmol/L    Anion Gap 12.0 7.0 - 16.0    Glucose 103 (H) 65 - 99 mg/dL    Bun 24 (H) 8 - 22 mg/dL    Creatinine 1.22 0.50 - 1.40 mg/dL    Calcium 8.9 8.4 - 10.2 mg/dL    AST(SGOT) 118 (H) 12 - 45 U/L    ALT(SGPT) 29 2 - 50 U/L    Alkaline Phosphatase 120 (H) 30 - 99 U/L    Total Bilirubin 0.9 0.1 - 1.5 mg/dL    Albumin 4.0 3.2 - 4.9 g/dL    Total Protein 6.6 6.0 - 8.2 g/dL    Globulin 2.6 1.9 - 3.5 g/dL    A-G Ratio 1.5 g/dL   ESTIMATED GFR   Result Value Ref Range    GFR If African American >60 >60 mL/min/1.73 m 2    GFR If Non African American 58 (A) >60 mL/min/1.73 m 2   EKG   Result Value Ref Range    Report       [facility] Emergency Dept.    Test Date:  2021-01-18  Pt Name:    [facility]          Department: [facility]  MRN:        [id number]                      Room:        -ROOM 8  Gender:     Male                         Technician: [id number]  :        [id number]                   Requested By:SUSAN HO  Order #:    [id number]                    Reading MD: Ebony Moreno MD    Measurements  Intervals                                Axis  Rate:       78                           P:          25  NV:         247                          QRS:        48  QRSD:       132                          T:          12  QT:         427  QTc:        487    Interpretive Statements  EKG is sinus rhythm with multiple PACs, prolonged QRS consistent with right  bundle branch block morphology, no ST elevation or depression consistent with  acute regional ischemia  Electronically Signed On 2021 0:32:14 PST by Ebony Moreno MD           RADIOLOGY  US-RUQ   Final Result         1.  Echogenic liver compatible with fatty change versus fibrosis.   2.  Cholelithiasis without additional sonographic findings of acute cholecystitis.   3.  Gallbladder polyp or tumefactive sludge ball.      CT-ABDOMEN-PELVIS WITH   Final Result         1.  Fluid-filled distended small bowel in the right lower quadrant, appearance suggests ileus and/or enteritis.   2.  Diverticulosis   3.  Cholelithiasis   4.  Cardiomegaly   5.  Atherosclerosis and atherosclerotic coronary artery disease.          COURSE & MEDICAL DECISION MAKING  Pertinent Labs & Imaging studies reviewed. (See chart for details)    Patient here with abdominal tenderness, nausea and vomiting in the setting of multiple abdominal surgeries.  I believe that bowel obstruction is possible versus surgical pathology otherwise.  We will start with CT scan and if negative will check ultrasound, I did check a bedside ultrasound which did show a gallstone which is known but there was no obvious gallbladder wall thickening.  Given morphine and Zofran for pain.   Patient with obvious tenderness to palpation of his abdomen and right upper quadrant, I believe  cardiopulmonary etiology of patient's symptoms are considered less likely.  Patient CT reveals evidence of enteritis.  Patient with some mild ongoing pain.  Will give Toradol for patient's pain.   Ultrasound reveals evidence of gallstones without evidence of infection.  Will have patient follow-up with his general surgeon for this.  I discussed this with patient.  We will send patient home with a short course of narcotics for his pain and Zofran for his nausea.  I will also give senna docusate to ensure patient remains with normal bowel movements.  Patient vitals are reassuring.     The patient will return for worsening symptoms and is stable at the time of discharge. The patient verbalizes understanding and will comply.    FINAL IMPRESSION    1. Right lower quadrant abdominal pain    2. Right upper quadrant abdominal pain               Electronically signed by: Josh Beck M.D., 1/17/2021 11:56 PM

## 2021-01-18 NOTE — DISCHARGE INSTRUCTIONS
Your CT today showed findings consistent with enteritis which is inflammation of the bowel, this is usually self-limited and should improve over time.  If it worsens or is unbearable or simply not improving please return to the emergency department.  I will give you a short course of pain medication, a stool softener and antinausea medicine to go home with, it is important that if you continue to vomit or your symptoms worsen to return to the emergency department.  If you do not have a bowel movement in the next 24 hours please take the magnesium citrate solution.  Please follow-up with your primary care physician early next week.

## 2021-01-18 NOTE — ED TRIAGE NOTES
"Pt presents with c/o    No chief complaint on file.      /81   Pulse 66   Temp 36.8 °C (98.3 °F) (Temporal)   Resp 14   Ht 1.816 m (5' 11.5\")   Wt 74.7 kg (164 lb 10.9 oz)   BMI 22.65 kg/m²     "

## 2021-01-26 RX ORDER — LEVOTHYROXINE SODIUM 125 UG/1
125 CAPSULE ORAL
Qty: 90 CAP | Refills: 3 | Status: SHIPPED | OUTPATIENT
Start: 2021-01-26 | End: 2022-04-19

## 2021-01-26 RX ORDER — POTASSIUM CHLORIDE 600 MG/1
16 CAPSULE, EXTENDED RELEASE ORAL DAILY
Qty: 180 CAP | Refills: 3 | Status: SHIPPED | OUTPATIENT
Start: 2021-01-26 | End: 2021-11-16

## 2021-02-03 ENCOUNTER — IMMUNIZATION (OUTPATIENT)
Dept: FAMILY PLANNING/WOMEN'S HEALTH CLINIC | Facility: IMMUNIZATION CENTER | Age: 76
End: 2021-02-03
Payer: MEDICARE

## 2021-02-03 DIAGNOSIS — Z23 ENCOUNTER FOR VACCINATION: Primary | ICD-10-CM

## 2021-02-03 PROCEDURE — 91300 PFIZER SARS-COV-2 VACCINE: CPT

## 2021-02-03 PROCEDURE — 0001A PFIZER SARS-COV-2 VACCINE: CPT

## 2021-02-10 ENCOUNTER — OFFICE VISIT (OUTPATIENT)
Dept: INTERNAL MEDICINE | Facility: IMAGING CENTER | Age: 76
End: 2021-02-10
Payer: MEDICARE

## 2021-02-10 VITALS
HEART RATE: 45 BPM | HEIGHT: 71 IN | BODY MASS INDEX: 22.96 KG/M2 | SYSTOLIC BLOOD PRESSURE: 120 MMHG | DIASTOLIC BLOOD PRESSURE: 60 MMHG | TEMPERATURE: 98.2 F | WEIGHT: 164 LBS | OXYGEN SATURATION: 97 % | RESPIRATION RATE: 14 BRPM

## 2021-02-10 DIAGNOSIS — Z85.118 HISTORY OF LUNG CANCER: ICD-10-CM

## 2021-02-10 DIAGNOSIS — Z86.79 HISTORY OF ATRIAL TACHYCARDIA: ICD-10-CM

## 2021-02-10 DIAGNOSIS — R00.1 BRADYCARDIA: ICD-10-CM

## 2021-02-10 DIAGNOSIS — R06.02 SHORTNESS OF BREATH: ICD-10-CM

## 2021-02-10 PROCEDURE — 93000 ELECTROCARDIOGRAM COMPLETE: CPT | Performed by: INTERNAL MEDICINE

## 2021-02-10 PROCEDURE — 99214 OFFICE O/P EST MOD 30 MIN: CPT | Performed by: INTERNAL MEDICINE

## 2021-02-10 ASSESSMENT — FIBROSIS 4 INDEX: FIB4 SCORE: 8

## 2021-02-10 NOTE — PROGRESS NOTES
Established Patient Note   HPI:        Kevin comes in today complaining of shortness of breath with or without exertion for past 3 months. He states he will have occasional dizziness. No chest pains.    Past Medical History:   Diagnosis Date   • Adrenal insufficiency (HCC), secondary 11/25/2020   • Atherosclerosis of both carotid arteries, mild 11/25/2020   • BPH (benign prostatic hyperplasia) 11/25/2020   • Cholelithiasis 1/18/2021    US abdomen Jan. 2021   • CKD (chronic kidney disease) stage 3, GFR 30-59 ml/min 11/25/2020   • Colostomy present (HCC) 11/25/2020   • Diverticulosis of colon 11/25/2020   • GERD (gastroesophageal reflux disease) 11/25/2020   • History of atrial tachycardia 11/25/2020    Ablation 2017   • History of malignant melanoma, April 2016 11/25/2020   • History of shingles 11/25/2020   • History of stroke, subcortical infarct on MRI April 2019 11/25/2020   • Hyperlipidemia 11/25/2020   • Hypothyroid 11/25/2020   • Lung cancer (HCC), adenocarcinoma Aug. 2019 11/25/2020    Metastasis to L5 (biopsy Dec. 2019)   • Obstructive sleep apnea 11/25/2020       Current Outpatient Medications   Medication Sig Dispense Refill   • Potassium Chloride CR (MICRO-K) 8 MEQ Cap CR capsule Take 2 Caps by mouth every day. 180 Cap 3   • TIROSINT 125 MCG Cap Take 125 mcg by mouth Every morning on an empty stomach. 90 Cap 3   • senna-docusate (PERICOLACE OR SENOKOT S) 8.6-50 MG Tab Take 1 Tab by mouth every day. 30 Tab 0   • ondansetron (ZOFRAN ODT) 4 MG TABLET DISPERSIBLE Take 1 Tab by mouth every 6 hours as needed for Nausea. 10 Tab 0   • verapamil (ISOPTIN) 120 MG Tab Take 1 Tab by mouth 2 Times a Day. 180 Tab 3   • predniSONE (DELTASONE) 5 MG Tab Take 2 Tabs by mouth every day. 180 Tab 3   • traZODone (DESYREL) 50 MG Tab Take 2 Tabs by mouth every bedtime. 180 Tab 3   • Psyllium 95 % Pack Take 1 Packet by mouth every day.     • sildenafil citrate (VIAGRA) 100 MG tablet Take 100 mg by mouth 1 time daily as needed for  "Erectile Dysfunction.     • Cholecalciferol 1000 UNIT Cap Take 1,000 Units by mouth every day.     • omeprazole (PRILOSEC) 20 MG delayed-release capsule Take 20 mg by mouth as needed.     • TAGRISSO 80 MG Tab Take 80 mg by mouth every day.     • Magnesium 250 MG Tab Take 250 mg by mouth every day.     • Diclofenac Sodium (VOLTAREN) 1 % Gel Apply 4 g to skin as directed 3 times a day as needed. 100 g 0   • tizanidine (ZANAFLEX) 4 MG Tab Take 1 Tab by mouth every 6 hours as needed. 30 Tab 0   • azelastine (ASTELIN) 137 MCG/SPRAY nasal spray Administer 1 Spray into affected nostril(S) 2 times a day as needed.       No current facility-administered medications for this visit.         Allergies   Allergen Reactions   • Gramineae Pollens Itching and Shortness of Breath     Itchy eyes, red face   • Cat Hair Extract    • Horse Allergy Hives   • Other Environmental    • Pollen Extract          Social History     Tobacco Use   • Smoking status: Never Smoker   • Smokeless tobacco: Never Used   Substance Use Topics   • Alcohol use: Yes     Alcohol/week: 1.8 oz     Types: 3 Glasses of wine per week   • Drug use: Yes     Comment: THC edibles       History reviewed. No pertinent surgical history.     ROS    Ambulatory Vitals  /60 (BP Location: Left arm, Patient Position: Sitting, BP Cuff Size: Adult)   Pulse (!) 45   Temp 36.8 °C (98.2 °F) (Temporal)   Resp 14   Ht 1.803 m (5' 11\")   Wt 74.4 kg (164 lb)   SpO2 97%   BMI 22.87 kg/m²     Physical Exam   Constitutional: He is well-developed, well-nourished, and in no distress. No distress.   Cardiovascular:   Bradycardia with premature beats.   Pulmonary/Chest: Effort normal and breath sounds normal. He has no wheezes. He has no rales.   Musculoskeletal:         General: No edema.   Neurological: He is alert. No cranial nerve deficit. Gait normal. Coordination normal.   Skin: He is not diaphoretic.     EKG: Sinus rhythm averaging 50-60 bpm but with PACs present along " with RBBB.    O2 saturation remained 94-96% with ambulation in office.    Assessment and Plan:     1. Shortness of breath  DX-CHEST-2 VIEWS    EC-ECHOCARDIOGRAM REST/STRESS W/O CONT    REFERRAL TO CARDIOLOGY   2. Bradycardia  EC-ECHOCARDIOGRAM REST/STRESS W/O CONT    REFERRAL TO CARDIOLOGY   3. History of atrial tachycardia  EC-ECHOCARDIOGRAM REST/STRESS W/O CONT    REFERRAL TO CARDIOLOGY   4. History of lung cancer       He states he had PET/CT done at [facility] in January but there is currently no report. Will order stress echocardiogram to evaluate his symptoms and refer to cardiologist due to frequent PACs.    Rian Puente M.D.

## 2021-02-23 ENCOUNTER — PRE-ADMISSION TESTING (OUTPATIENT)
Dept: ADMISSIONS | Facility: MEDICAL CENTER | Age: 76
End: 2021-02-23
Attending: SURGERY
Payer: MEDICARE

## 2021-02-23 DIAGNOSIS — Z01.812 PRE-OPERATIVE LABORATORY EXAMINATION: ICD-10-CM

## 2021-02-23 LAB
ANION GAP SERPL CALC-SCNC: 9 MMOL/L (ref 7–16)
BUN SERPL-MCNC: 22 MG/DL (ref 8–22)
CALCIUM SERPL-MCNC: 9.3 MG/DL (ref 8.4–10.2)
CHLORIDE SERPL-SCNC: 107 MMOL/L (ref 96–112)
CO2 SERPL-SCNC: 27 MMOL/L (ref 20–33)
CREAT SERPL-MCNC: 1.25 MG/DL (ref 0.5–1.4)
ERYTHROCYTE [DISTWIDTH] IN BLOOD BY AUTOMATED COUNT: 47.4 FL (ref 35.9–50)
GLUCOSE SERPL-MCNC: 109 MG/DL (ref 65–99)
HCT VFR BLD AUTO: 43 % (ref 42–52)
HGB BLD-MCNC: 14.4 G/DL (ref 14–18)
MCH RBC QN AUTO: 32.5 PG (ref 27–33)
MCHC RBC AUTO-ENTMCNC: 33.5 G/DL (ref 33.7–35.3)
MCV RBC AUTO: 97.1 FL (ref 81.4–97.8)
PLATELET # BLD AUTO: 223 K/UL (ref 164–446)
PMV BLD AUTO: 9.7 FL (ref 9–12.9)
POTASSIUM SERPL-SCNC: 3.9 MMOL/L (ref 3.6–5.5)
RBC # BLD AUTO: 4.43 M/UL (ref 4.7–6.1)
SODIUM SERPL-SCNC: 143 MMOL/L (ref 135–145)
WBC # BLD AUTO: 7.5 K/UL (ref 4.8–10.8)

## 2021-02-23 PROCEDURE — 85027 COMPLETE CBC AUTOMATED: CPT

## 2021-02-23 PROCEDURE — U0003 INFECTIOUS AGENT DETECTION BY NUCLEIC ACID (DNA OR RNA); SEVERE ACUTE RESPIRATORY SYNDROME CORONAVIRUS 2 (SARS-COV-2) (CORONAVIRUS DISEASE [COVID-19]), AMPLIFIED PROBE TECHNIQUE, MAKING USE OF HIGH THROUGHPUT TECHNOLOGIES AS DESCRIBED BY CMS-2020-01-R: HCPCS

## 2021-02-23 PROCEDURE — 36415 COLL VENOUS BLD VENIPUNCTURE: CPT

## 2021-02-23 PROCEDURE — 80048 BASIC METABOLIC PNL TOTAL CA: CPT

## 2021-02-23 PROCEDURE — C9803 HOPD COVID-19 SPEC COLLECT: HCPCS

## 2021-02-23 PROCEDURE — U0005 INFEC AGEN DETEC AMPLI PROBE: HCPCS

## 2021-02-23 RX ORDER — POTASSIUM CHLORIDE 600 MG/1
16 TABLET, FILM COATED, EXTENDED RELEASE ORAL
COMMUNITY
Start: 2021-01-28 | End: 2021-02-23

## 2021-02-23 RX ORDER — FAMOTIDINE 20 MG/1
20 TABLET, FILM COATED ORAL DAILY
COMMUNITY
Start: 2021-02-11 | End: 2022-09-09 | Stop reason: SDUPTHER

## 2021-02-23 RX ORDER — TAMSULOSIN HYDROCHLORIDE 0.4 MG/1
0.4 CAPSULE ORAL
COMMUNITY
Start: 2021-01-26 | End: 2022-11-02

## 2021-02-23 ASSESSMENT — FIBROSIS 4 INDEX: FIB4 SCORE: 8

## 2021-02-24 ENCOUNTER — IMMUNIZATION (OUTPATIENT)
Dept: FAMILY PLANNING/WOMEN'S HEALTH CLINIC | Facility: IMMUNIZATION CENTER | Age: 76
End: 2021-02-24
Attending: INTERNAL MEDICINE
Payer: MEDICARE

## 2021-02-24 DIAGNOSIS — Z23 ENCOUNTER FOR VACCINATION: Primary | ICD-10-CM

## 2021-02-24 LAB
SARS-COV-2 RNA RESP QL NAA+PROBE: NOTDETECTED
SPECIMEN SOURCE: NORMAL

## 2021-02-24 PROCEDURE — 0002A PFIZER SARS-COV-2 VACCINE: CPT

## 2021-02-24 PROCEDURE — 91300 PFIZER SARS-COV-2 VACCINE: CPT

## 2021-02-24 NOTE — OR NURSING
"Preadmit appointment: \" Preparing for your Procedure information\" sheet given to patient with verbal and written instructions. Patient instructed to continue prescribed medications through the day before surgery, instructed to take the following medications the day of surgery per anesthesia protocol: ANTACID ANTI-GAS, PEPCID, PRILOSEC, DELTASONE, VERAPAMIL    Labs ordered and collected 02/23/21: Covid, BMP, CBC.                                                                    ANTA          Verbal and written instructions on self isolation prior to surgery and covid symptoms to watch for given to patient, pt advised to notify MD if any symptoms develop        "

## 2021-02-26 ENCOUNTER — HOSPITAL ENCOUNTER (OUTPATIENT)
Facility: MEDICAL CENTER | Age: 76
End: 2021-02-26
Attending: SURGERY | Admitting: SURGERY
Payer: MEDICARE

## 2021-02-26 ENCOUNTER — ANESTHESIA (OUTPATIENT)
Dept: SURGERY | Facility: MEDICAL CENTER | Age: 76
End: 2021-02-26
Payer: MEDICARE

## 2021-02-26 ENCOUNTER — ANESTHESIA EVENT (OUTPATIENT)
Dept: SURGERY | Facility: MEDICAL CENTER | Age: 76
End: 2021-02-26
Payer: MEDICARE

## 2021-02-26 VITALS
OXYGEN SATURATION: 97 % | BODY MASS INDEX: 22.19 KG/M2 | SYSTOLIC BLOOD PRESSURE: 150 MMHG | HEIGHT: 72 IN | TEMPERATURE: 96.8 F | HEART RATE: 68 BPM | DIASTOLIC BLOOD PRESSURE: 89 MMHG | RESPIRATION RATE: 16 BRPM | WEIGHT: 163.8 LBS

## 2021-02-26 DIAGNOSIS — G89.18 POST-OPERATIVE PAIN: ICD-10-CM

## 2021-02-26 LAB — PATHOLOGY CONSULT NOTE: NORMAL

## 2021-02-26 PROCEDURE — 501571 HCHG TROCAR, SEPARATOR 12X100: Performed by: SURGERY

## 2021-02-26 PROCEDURE — 160009 HCHG ANES TIME/MIN: Performed by: SURGERY

## 2021-02-26 PROCEDURE — 501570 HCHG TROCAR, SEPARATOR: Performed by: SURGERY

## 2021-02-26 PROCEDURE — 700101 HCHG RX REV CODE 250: Performed by: ANESTHESIOLOGY

## 2021-02-26 PROCEDURE — A9270 NON-COVERED ITEM OR SERVICE: HCPCS | Performed by: ANESTHESIOLOGY

## 2021-02-26 PROCEDURE — 700111 HCHG RX REV CODE 636 W/ 250 OVERRIDE (IP): Performed by: ANESTHESIOLOGY

## 2021-02-26 PROCEDURE — 160046 HCHG PACU - 1ST 60 MINS PHASE II: Performed by: SURGERY

## 2021-02-26 PROCEDURE — 160035 HCHG PACU - 1ST 60 MINS PHASE I: Performed by: SURGERY

## 2021-02-26 PROCEDURE — 700105 HCHG RX REV CODE 258: Performed by: SURGERY

## 2021-02-26 PROCEDURE — A9270 NON-COVERED ITEM OR SERVICE: HCPCS | Performed by: SURGERY

## 2021-02-26 PROCEDURE — 501568 HCHG TROCAR, BLUNTPORT 12MM: Performed by: SURGERY

## 2021-02-26 PROCEDURE — 160036 HCHG PACU - EA ADDL 30 MINS PHASE I: Performed by: SURGERY

## 2021-02-26 PROCEDURE — 700102 HCHG RX REV CODE 250 W/ 637 OVERRIDE(OP): Performed by: ANESTHESIOLOGY

## 2021-02-26 PROCEDURE — 500800 HCHG LAPAROSCOPIC J/L HOOK: Performed by: SURGERY

## 2021-02-26 PROCEDURE — 160002 HCHG RECOVERY MINUTES (STAT): Performed by: SURGERY

## 2021-02-26 PROCEDURE — 160048 HCHG OR STATISTICAL LEVEL 1-5: Performed by: SURGERY

## 2021-02-26 PROCEDURE — 700101 HCHG RX REV CODE 250: Performed by: SURGERY

## 2021-02-26 PROCEDURE — 88304 TISSUE EXAM BY PATHOLOGIST: CPT

## 2021-02-26 PROCEDURE — 501838 HCHG SUTURE GENERAL: Performed by: SURGERY

## 2021-02-26 PROCEDURE — 502571 HCHG PACK, LAP CHOLE: Performed by: SURGERY

## 2021-02-26 PROCEDURE — 160041 HCHG SURGERY MINUTES - EA ADDL 1 MIN LEVEL 4: Performed by: SURGERY

## 2021-02-26 PROCEDURE — 160025 RECOVERY II MINUTES (STATS): Performed by: SURGERY

## 2021-02-26 PROCEDURE — 160029 HCHG SURGERY MINUTES - 1ST 30 MINS LEVEL 4: Performed by: SURGERY

## 2021-02-26 RX ORDER — LIDOCAINE HYDROCHLORIDE 20 MG/ML
INJECTION, SOLUTION EPIDURAL; INFILTRATION; INTRACAUDAL; PERINEURAL PRN
Status: DISCONTINUED | OUTPATIENT
Start: 2021-02-26 | End: 2021-02-26 | Stop reason: SURG

## 2021-02-26 RX ORDER — CEFAZOLIN SODIUM 1 G/3ML
INJECTION, POWDER, FOR SOLUTION INTRAMUSCULAR; INTRAVENOUS PRN
Status: DISCONTINUED | OUTPATIENT
Start: 2021-02-26 | End: 2021-02-26 | Stop reason: SURG

## 2021-02-26 RX ORDER — BUPIVACAINE HYDROCHLORIDE AND EPINEPHRINE 5; 5 MG/ML; UG/ML
INJECTION, SOLUTION EPIDURAL; INTRACAUDAL; PERINEURAL
Status: DISCONTINUED | OUTPATIENT
Start: 2021-02-26 | End: 2021-02-26 | Stop reason: HOSPADM

## 2021-02-26 RX ORDER — SODIUM CHLORIDE, SODIUM LACTATE, POTASSIUM CHLORIDE, CALCIUM CHLORIDE 600; 310; 30; 20 MG/100ML; MG/100ML; MG/100ML; MG/100ML
INJECTION, SOLUTION INTRAVENOUS CONTINUOUS
Status: ACTIVE | OUTPATIENT
Start: 2021-02-26 | End: 2021-02-26

## 2021-02-26 RX ORDER — LABETALOL HYDROCHLORIDE 5 MG/ML
INJECTION, SOLUTION INTRAVENOUS
Status: DISCONTINUED
Start: 2021-02-26 | End: 2021-02-26 | Stop reason: HOSPADM

## 2021-02-26 RX ORDER — ONDANSETRON 2 MG/ML
INJECTION INTRAMUSCULAR; INTRAVENOUS PRN
Status: DISCONTINUED | OUTPATIENT
Start: 2021-02-26 | End: 2021-02-26 | Stop reason: HOSPADM

## 2021-02-26 RX ORDER — ONDANSETRON 4 MG/1
4 TABLET, FILM COATED ORAL EVERY 4 HOURS PRN
Qty: 5 TABLET | Refills: 1 | Status: SHIPPED | OUTPATIENT
Start: 2021-02-26 | End: 2021-03-01

## 2021-02-26 RX ORDER — LABETALOL HYDROCHLORIDE 5 MG/ML
10 INJECTION, SOLUTION INTRAVENOUS
Status: CANCELLED | OUTPATIENT
Start: 2021-02-26

## 2021-02-26 RX ORDER — OXYCODONE HCL 5 MG/5 ML
10 SOLUTION, ORAL ORAL
Status: COMPLETED | OUTPATIENT
Start: 2021-02-26 | End: 2021-02-26

## 2021-02-26 RX ORDER — HYDROMORPHONE HYDROCHLORIDE 1 MG/ML
0.2 INJECTION, SOLUTION INTRAMUSCULAR; INTRAVENOUS; SUBCUTANEOUS
Status: DISCONTINUED | OUTPATIENT
Start: 2021-02-26 | End: 2021-02-26 | Stop reason: HOSPADM

## 2021-02-26 RX ORDER — HYDROMORPHONE HYDROCHLORIDE 1 MG/ML
0.1 INJECTION, SOLUTION INTRAMUSCULAR; INTRAVENOUS; SUBCUTANEOUS
Status: DISCONTINUED | OUTPATIENT
Start: 2021-02-26 | End: 2021-02-26 | Stop reason: HOSPADM

## 2021-02-26 RX ORDER — DEXAMETHASONE SODIUM PHOSPHATE 4 MG/ML
INJECTION, SOLUTION INTRA-ARTICULAR; INTRALESIONAL; INTRAMUSCULAR; INTRAVENOUS; SOFT TISSUE PRN
Status: DISCONTINUED | OUTPATIENT
Start: 2021-02-26 | End: 2021-02-26 | Stop reason: SURG

## 2021-02-26 RX ORDER — ONDANSETRON 2 MG/ML
4 INJECTION INTRAMUSCULAR; INTRAVENOUS
Status: DISCONTINUED | OUTPATIENT
Start: 2021-02-26 | End: 2021-02-26 | Stop reason: HOSPADM

## 2021-02-26 RX ORDER — MEPERIDINE HYDROCHLORIDE 25 MG/ML
12.5 INJECTION INTRAMUSCULAR; INTRAVENOUS; SUBCUTANEOUS
Status: DISCONTINUED | OUTPATIENT
Start: 2021-02-26 | End: 2021-02-26 | Stop reason: HOSPADM

## 2021-02-26 RX ORDER — DIPHENHYDRAMINE HYDROCHLORIDE 50 MG/ML
12.5 INJECTION INTRAMUSCULAR; INTRAVENOUS
Status: DISCONTINUED | OUTPATIENT
Start: 2021-02-26 | End: 2021-02-26 | Stop reason: HOSPADM

## 2021-02-26 RX ORDER — OXYCODONE HYDROCHLORIDE AND ACETAMINOPHEN 5; 325 MG/1; MG/1
1 TABLET ORAL EVERY 4 HOURS PRN
Qty: 24 TABLET | Refills: 0 | Status: SHIPPED | OUTPATIENT
Start: 2021-02-26 | End: 2021-03-03

## 2021-02-26 RX ORDER — HYDROMORPHONE HYDROCHLORIDE 1 MG/ML
0.4 INJECTION, SOLUTION INTRAMUSCULAR; INTRAVENOUS; SUBCUTANEOUS
Status: DISCONTINUED | OUTPATIENT
Start: 2021-02-26 | End: 2021-02-26 | Stop reason: HOSPADM

## 2021-02-26 RX ORDER — LABETALOL HYDROCHLORIDE 5 MG/ML
10 INJECTION, SOLUTION INTRAVENOUS
Status: COMPLETED | OUTPATIENT
Start: 2021-02-26 | End: 2021-02-26

## 2021-02-26 RX ORDER — OXYCODONE HCL 5 MG/5 ML
5 SOLUTION, ORAL ORAL
Status: COMPLETED | OUTPATIENT
Start: 2021-02-26 | End: 2021-02-26

## 2021-02-26 RX ORDER — HALOPERIDOL 5 MG/ML
1 INJECTION INTRAMUSCULAR
Status: DISCONTINUED | OUTPATIENT
Start: 2021-02-26 | End: 2021-02-26 | Stop reason: HOSPADM

## 2021-02-26 RX ADMIN — POVIDONE IODINE 15 ML: 100 SOLUTION TOPICAL at 06:49

## 2021-02-26 RX ADMIN — PROPOFOL 120 MG: 10 INJECTION, EMULSION INTRAVENOUS at 07:39

## 2021-02-26 RX ADMIN — FENTANYL CITRATE 50 MCG: 50 INJECTION, SOLUTION INTRAMUSCULAR; INTRAVENOUS at 08:36

## 2021-02-26 RX ADMIN — LABETALOL HYDROCHLORIDE 10 MG: 5 INJECTION, SOLUTION INTRAVENOUS at 08:53

## 2021-02-26 RX ADMIN — ONDANSETRON 4 MG: 2 INJECTION INTRAMUSCULAR; INTRAVENOUS at 07:39

## 2021-02-26 RX ADMIN — HYDROMORPHONE HYDROCHLORIDE 0.4 MG: 1 INJECTION, SOLUTION INTRAMUSCULAR; INTRAVENOUS; SUBCUTANEOUS at 08:58

## 2021-02-26 RX ADMIN — DEXAMETHASONE SODIUM PHOSPHATE 8 MG: 4 INJECTION, SOLUTION INTRAMUSCULAR; INTRAVENOUS at 07:39

## 2021-02-26 RX ADMIN — LIDOCAINE HYDROCHLORIDE 100 MG: 20 INJECTION, SOLUTION EPIDURAL; INFILTRATION; INTRACAUDAL; PERINEURAL at 07:39

## 2021-02-26 RX ADMIN — CEFAZOLIN 2 G: 1 INJECTION, POWDER, FOR SOLUTION INTRAVENOUS at 07:31

## 2021-02-26 RX ADMIN — FENTANYL CITRATE 50 MCG: 50 INJECTION, SOLUTION INTRAMUSCULAR; INTRAVENOUS at 08:48

## 2021-02-26 RX ADMIN — SODIUM CHLORIDE, POTASSIUM CHLORIDE, SODIUM LACTATE AND CALCIUM CHLORIDE: 600; 310; 30; 20 INJECTION, SOLUTION INTRAVENOUS at 06:49

## 2021-02-26 RX ADMIN — OXYCODONE HYDROCHLORIDE 10 MG: 5 SOLUTION ORAL at 08:37

## 2021-02-26 RX ADMIN — ROCURONIUM BROMIDE 70 MG: 10 INJECTION, SOLUTION INTRAVENOUS at 07:39

## 2021-02-26 ASSESSMENT — PAIN DESCRIPTION - PAIN TYPE
TYPE: SURGICAL PAIN
TYPE: SURGICAL PAIN

## 2021-02-26 ASSESSMENT — FIBROSIS 4 INDEX: FIB4 SCORE: 7.17

## 2021-02-26 NOTE — ANESTHESIA TIME REPORT
Anesthesia Start and Stop Event Times     Date Time Event    2/26/2021 0725 Ready for Procedure     0730 Anesthesia Start        Responsible Staff  02/26/21    Name Role Begin End    Jovanni Santiago M.D. Anesth 0730         Preop Diagnosis (Free Text):  Pre-op Diagnosis     CHOLELITHIASIS WITH CHRONIC CHOLECYSTITIS WITHOUT OBSTRUCTION        Preop Diagnosis (Codes):    Post op Diagnosis  Cholecystitis      Premium Reason  Non-Premium    Comments:

## 2021-02-26 NOTE — ANESTHESIA POSTPROCEDURE EVALUATION
Patient: Bartolo Berrios [facility]    Procedure Summary     Date: 02/26/21 Room / Location:  OR  / SURGERY [facility]    Anesthesia Start: 0730 Anesthesia Stop: 0843    Procedure: CHOLECYSTECTOMY, LAPAROSCOPIC (Abdomen) Diagnosis: (CHOLELITHIASIS WITH CHRONIC CHOLECYSTITIS WITHOUT OBSTRUCTION)    Surgeons: Davey Oneal M.D. Responsible Provider: Jovanni Santiago M.D.    Anesthesia Type: general ASA Status: 3          Final Anesthesia Type: general  Last vitals  BP   Blood Pressure : (!) 201/102    Temp   36.1 °C (97 °F)    Pulse   60   Resp   (!) 30    SpO2   100 %      Anesthesia Post Evaluation    No complications documented.     Nurse Pain Score: 0 (NPRS)

## 2021-02-26 NOTE — ANESTHESIA POSTPROCEDURE EVALUATION
Patient: Bartolo Berrios [facility]    Procedure Summary     Date: 02/26/21 Room / Location:  OR  / SURGERY [facility]    Anesthesia Start: 0730 Anesthesia Stop: 0843    Procedure: CHOLECYSTECTOMY, LAPAROSCOPIC (Abdomen) Diagnosis: (CHOLELITHIASIS WITH CHRONIC CHOLECYSTITIS WITHOUT OBSTRUCTION)    Surgeons: Davey Oneal M.D. Responsible Provider: Jovanni Santiago M.D.    Anesthesia Type: general ASA Status: 3          Final Anesthesia Type: general  Last vitals  BP   Blood Pressure : (!) 201/102    Temp   36.1 °C (97 °F)    Pulse   60   Resp   (!) 30    SpO2   100 %      Anesthesia Post Evaluation    Patient location during evaluation: PACU  Patient participation: complete - patient participated  Level of consciousness: awake and alert    Airway patency: patent  Anesthetic complications: no  Cardiovascular status: hemodynamically stable  Respiratory status: acceptable  Hydration status: balanced    PONV: none          No complications documented.     Nurse Pain Score: 0 (NPRS)

## 2021-02-26 NOTE — OP REPORT
DATE OF SERVICE:  02/26/2021     SURGEON:  Davey Oneal MD     ASSISTANT:  Evelyn Russo MD     PREOPERATIVE DIAGNOSIS:  Chronic cholecystitis.     POSTOPERATIVE DIAGNOSIS:  Chronic cholecystitis.     PROCEDURE PERFORMED:  Laparoscopic cholecystectomy.     ANESTHESIA:  General endotracheal anesthesia.     ANESTHESIOLOGIST:  Jovanni Santiago MD     INDICATIONS:  A 75-year-old man with evidence by history, physical, and   imaging of chronic cholecystitis.  A cholecystectomy is indicated.     PROCEDURE:  The procedure was discussed in detail with the patient including   laparoscopic approach, potential for converting to an open procedure,   associated risk of bleeding, infection, abscess, and hematoma.  I also   discussed the risk of postoperative bile leak, common duct stricture, common   duct transection, and the significance of these complications.  He understood   all the above and wished to proceed.  He was placed under anesthesia by Dr. Santiago.  His abdomen was prepped and draped with chlorhexidine prep and   sterile drapes.  After a timeout, altered entry technique was used.  This was   because the patient had a midline incision from a previous Brittanie's   procedure and subsequent takedown.  A Veress needle was placed in the   subxiphoid area.  Low pressure was confirmed and CO2 insufflation was used to   achieve a pneumoperitoneum of 50 mmHg.  A 5 mm port was then placed in the   right upper quadrant subcostally.  The 5 mm scope was then inserted.  Midline   adhesions were indeed noted.  The Veress needle was converted to a 12 mm port   and it was noted to be in the appropriate location.  A 12 mm port was then   placed off the midline to the right near the umbilicus and a second 5 mm port   was placed subcostally.  The gallbladder was identified and retracted   superiorly and laterally.  There were noted to have significant adhesions.    These were carefully taken down.  The base of the gallbladder  was carefully   dissected and the cystic duct was identified.  It could be seen to clearly   exit the gallbladder and retract laterally along the gallbladder.  In similar   fashion, cystic artery was identified and dissected away from surrounding   connective tissue.  A critical view was obtained and subsequent to this, 3   clips were placed proximally, 1 distally and the duct was divided sharply with   scissors.  In a similar fashion, cystic artery was clipped 3 times proximally   and once distally and divided sharply with scissors.  The gallbladder was   then dissected off the liver bed and placed in a bag retrieval device and   removed.  Hemostasis was assured with cautery.  Peritoneal cavity was   irrigated copiously.  There was a small amount of bleeding from the liver bed,   which was controlled with cautery and Georgina.  Ports were removed.    Pneumoperitoneum was released.  The fascia at the periumbilical incision was   approximated with a 0 Vicryl stitch using the Endoclose device.  The remaining   ports were removed and the skin and all incisions were approximated with 4-0   Vicryl sutures.  Dermabond was placed.  The patient tolerated the procedure   well without apparent complication.  Final counts were reported as correct. Wound class is class 2.        ______________________________  MD PRINCE PYLE/HOANG    DD:  02/26/2021 08:27  DT:  02/26/2021 08:52    Job#:  [id number]

## 2021-02-26 NOTE — DISCHARGE INSTRUCTIONS
ACTIVITY: Rest and take it easy for the first 24 hours.  A responsible adult is recommended to remain with you during that time.  It is normal to feel sleepy.  We encourage you to not do anything that requires balance, judgment or coordination.    MILD FLU-LIKE SYMPTOMS ARE NORMAL. YOU MAY EXPERIENCE GENERALIZED MUSCLE ACHES, THROAT IRRITATION, HEADACHE AND/OR SOME NAUSEA.    FOR 24 HOURS DO NOT:  Drive, operate machinery or run household appliances.  Drink beer or alcoholic beverages.   Make important decisions or sign legal documents.    SPECIAL INSTRUCTIONS: Ice to abdomen for comfort    DIET: To avoid nausea, slowly advance diet as tolerated, avoiding spicy or greasy foods for the first day.  Add more substantial food to your diet according to your physician's instructions.  Babies can be fed formula or breast milk as soon as they are hungry.  INCREASE FLUIDS AND FIBER TO AVOID CONSTIPATION.    SURGICAL DRESSING/BATHING: May shower day 1 post op.  Do not submerge or rub sites    FOLLOW-UP APPOINTMENT:  A follow-up appointment should be arranged with your doctor in 7-10 days; call to schedule.    You should CALL YOUR PHYSICIAN if you develop:  Fever greater than 101 degrees F.  Pain not relieved by medication, or persistent nausea or vomiting.  Excessive bleeding (blood soaking through dressing) or unexpected drainage from the wound.  Extreme redness or swelling around the incision site, drainage of pus or foul smelling drainage.  Inability to urinate or empty your bladder within 8 hours.  Problems with breathing or chest pain.    You should call 911 if you develop problems with breathing or chest pain.  If you are unable to contact your doctor or surgical center, you should go to the nearest emergency room or urgent care center.  Physician's telephone #: [phone]    If any questions arise, call your doctor.  If your doctor is not available, please feel free to call the Surgical Center at Surgical Dept  Numbers:[phone]}. The Contact Center is open Monday through Friday 7AM to 5PM and may speak to a nurse at [phone], or toll free at [phone].     A registered nurse may call you a few days after your surgery to see how you are doing after your procedure.    MEDICATIONS: Resume taking daily medication.  Take prescribed pain medication with food.  If no medication is prescribed, you may take non-aspirin pain medication if needed.  PAIN MEDICATION CAN BE VERY CONSTIPATING.  Take a stool softener or laxative such as senokot, pericolace, or milk of magnesia if needed.    Prescription given for NA.  Last pain medication given a [id number].    If your physician has prescribed pain medication that includes Acetaminophen (Tylenol), do not take additional Acetaminophen (Tylenol) while taking the prescribed medication.    Depression / Suicide Risk    As you are discharged from this Critical access hospital facility, it is important to learn how to keep safe from harming yourself.    Recognize the warning signs:  · Abrupt changes in personality, positive or negative- including increase in energy   · Giving away possessions  · Change in eating patterns- significant weight changes-  positive or negative  · Change in sleeping patterns- unable to sleep or sleeping all the time   · Unwillingness or inability to communicate  · Depression  · Unusual sadness, discouragement and loneliness  · Talk of wanting to die  · Neglect of personal appearance   · Rebelliousness- reckless behavior  · Withdrawal from people/activities they love  · Confusion- inability to concentrate     If you or a loved one observes any of these behaviors or has concerns about self-harm, here's what you can do:  · Talk about it- your feelings and reasons for harming yourself  · Remove any means that you might use to hurt yourself (examples: pills, rope, extension cords, firearm)  · Get professional help from the community (Mental Health, Substance Abuse, psychological  counseling)  · Do not be alone:Call your Safe Contact- someone whom you trust who will be there for you.  · Call your local CRISIS HOTLINE 177-6464 or 754-612-9787  · Call your local Children's Mobile Crisis Response Team [place] (781) 506-2313 or www.ZoeMob  · Call the toll free National Suicide Prevention Hotlines   · National Suicide Prevention Lifeline 520-959-ZCDT (1746)  · National Hope Line Network 800-SUICIDE (217-4168)

## 2021-02-26 NOTE — OR NURSING
0826 Arrived from OR s/p CHOLECYSTECTOMY, LAPAROSCOPIC VSS  Restless.  Complains of pain.    0845 BP remains high.  Spoke with Dr. Santiago.  Orders given.    0900 Starting to feel some relief.  Attempting to use urinal.    0914  Spoke with Mariella, wife.    0919 OOB to BR with assistance.  Unsuccessful with urinal.    0945 Patient was unsuccessful at urinating.  Bladder scan done:  590 ml.  Spoke with Dr. Oneal.  May straight cath if unable to void.      0955  Patient states he wants to try again.  Walked to  with assistance.  BP still elevated.  Spoke with Dr. Santiago.  Repeat order for Labetalol given.    1011 Updated wife.  Patient walked to  again.

## 2021-02-26 NOTE — OR SURGEON
Immediate Post OP Note    PreOp Diagnosis: chronic cholecystits    PostOp Diagnosis: chronic cholecystits    Procedure(s):  CHOLECYSTECTOMY, LAPAROSCOPIC - Wound Class: Contaminated    Surgeon(s):  JORGE ALBERTO Chacko M.D.    Anesthesiologist/Type of Anesthesia:  Anesthesiologist: Jovanni Santiago M.D./General    Surgical Staff:  Circulator: Miguel Morales R.N.; Heather C Cogan, R.N.  Scrub Person: Dickson Quiroz; Norma Uribe    Specimens removed if any:  ID Type Source Tests Collected by Time Destination   A :  Tissue Gallbladder PATHOLOGY SPECIMEN Davey Oneal M.D. 2/26/2021  7:57 AM        Estimated Blood Loss: 25 cc    Findings: adhesions    Complications: none        2/26/2021 8:24 AM Davey Oneal M.D.

## 2021-02-26 NOTE — OR NURSING
0611: Brought patient back to pre-op and assumed care.  0651: Patient allergies and NPO status verified, home medication reconciliation completed and belongings secured. Patient verbalizes understanding of pain scale, expected course of stay and plan of care. Surgical site verified with patient. IV access established. Sequentials placed on legs.

## 2021-02-26 NOTE — ANESTHESIA PROCEDURE NOTES
Airway    Date/Time: 2/26/2021 7:40 AM  Performed by: Jovanni Santiago M.D.  Authorized by: Jovanni Santiago M.D.     Location:  OR  Urgency:  Elective  Indications for Airway Management:  Anesthesia      Spontaneous Ventilation: absent    Sedation Level:  Deep  Preoxygenated: Yes    Patient Position:  Sniffing  Final Airway Type:  Endotracheal airway  Final Endotracheal Airway:  ETT  Cuffed: Yes    Technique Used for Successful ETT Placement:  Direct laryngoscopy    Insertion Site:  Oral  Blade Type:  Afy  Laryngoscope Blade/Videolaryngoscope Blade Size:  3  ETT Size (mm):  7.0  Measured from:  Teeth  ETT to Teeth (cm):  21  Placement Verified by: auscultation and capnometry    Cormack-Lehane Classification:  Grade I - full view of glottis  Number of Attempts at Approach:  1

## 2021-02-26 NOTE — ANESTHESIA PREPROCEDURE EVALUATION
Relevant Problems   ANESTHESIA   (+) Obstructive sleep apnea      PULMONARY   (+) History of shingles      NEURO   (+) History of atrial tachycardia   (+) History of malignant melanoma, April 2016   (+) History of shingles   (+) History of stroke, subcortical infarct on MRI April 2019      CARDIAC   (+) Atherosclerosis of both carotid arteries, mild      GI   (+) GERD (gastroesophageal reflux disease)         (+) CKD (chronic kidney disease) stage 3, GFR 30-59 ml/min      ENDO   (+) Hypothyroid       Physical Exam    Airway   Mallampati: II  TM distance: >3 FB  Neck ROM: full       Cardiovascular - normal exam  Rhythm: regular  Rate: normal  (-) murmur     Dental - normal exam           Pulmonary - normal exam  Breath sounds clear to auscultation     Abdominal    Neurological - normal exam                 Anesthesia Plan    ASA 3       Plan - general               Induction: intravenous    Postoperative Plan: Postoperative administration of opioids is intended.    Pertinent diagnostic labs and testing reviewed    Informed Consent:    Anesthetic plan and risks discussed with patient.    Use of blood products discussed with: patient whom consented to blood products.

## 2021-03-01 PROBLEM — K80.20 CHOLELITHIASIS: Status: RESOLVED | Noted: 2021-01-18 | Resolved: 2021-03-01

## 2021-06-01 DIAGNOSIS — Z85.118 HISTORY OF LUNG CANCER: ICD-10-CM

## 2021-06-01 DIAGNOSIS — Z85.820 HISTORY OF MALIGNANT MELANOMA: ICD-10-CM

## 2021-06-01 DIAGNOSIS — R74.01 ELEVATED AST (SGOT): ICD-10-CM

## 2021-06-01 DIAGNOSIS — R74.8 ELEVATED SERUM ALKALINE PHOSPHATASE LEVEL: ICD-10-CM

## 2021-06-02 ENCOUNTER — HOSPITAL ENCOUNTER (OUTPATIENT)
Facility: MEDICAL CENTER | Age: 76
End: 2021-06-02
Attending: INTERNAL MEDICINE
Payer: MEDICARE

## 2021-06-02 ENCOUNTER — NON-PROVIDER VISIT (OUTPATIENT)
Dept: INTERNAL MEDICINE | Facility: IMAGING CENTER | Age: 76
End: 2021-06-02
Payer: MEDICARE

## 2021-06-02 DIAGNOSIS — R74.01 ELEVATED AST (SGOT): ICD-10-CM

## 2021-06-02 DIAGNOSIS — R97.20 ELEVATED PSA: ICD-10-CM

## 2021-06-02 DIAGNOSIS — R74.8 ELEVATED SERUM ALKALINE PHOSPHATASE LEVEL: ICD-10-CM

## 2021-06-02 DIAGNOSIS — Z85.118 HISTORY OF LUNG CANCER: ICD-10-CM

## 2021-06-02 DIAGNOSIS — Z85.820 HISTORY OF MALIGNANT MELANOMA: ICD-10-CM

## 2021-06-02 DIAGNOSIS — Z01.89 ENCOUNTER FOR ROUTINE LABORATORY TESTING: ICD-10-CM

## 2021-06-02 LAB
ALBUMIN SERPL BCP-MCNC: 4.1 G/DL (ref 3.2–4.9)
ALBUMIN/GLOB SERPL: 1.5 G/DL
ALP SERPL-CCNC: 58 U/L (ref 30–99)
ALT SERPL-CCNC: 6 U/L (ref 2–50)
ANION GAP SERPL CALC-SCNC: 11 MMOL/L (ref 7–16)
AST SERPL-CCNC: 13 U/L (ref 12–45)
BASOPHILS # BLD AUTO: 0.6 % (ref 0–1.8)
BASOPHILS # BLD: 0.04 K/UL (ref 0–0.12)
BILIRUB SERPL-MCNC: 1.5 MG/DL (ref 0.1–1.5)
BUN SERPL-MCNC: 20 MG/DL (ref 8–22)
CALCIUM SERPL-MCNC: 9.2 MG/DL (ref 8.5–10.5)
CHLORIDE SERPL-SCNC: 109 MMOL/L (ref 96–112)
CO2 SERPL-SCNC: 23 MMOL/L (ref 20–33)
CREAT SERPL-MCNC: 1.41 MG/DL (ref 0.5–1.4)
EOSINOPHIL # BLD AUTO: 0.13 K/UL (ref 0–0.51)
EOSINOPHIL NFR BLD: 2.1 % (ref 0–6.9)
ERYTHROCYTE [DISTWIDTH] IN BLOOD BY AUTOMATED COUNT: 46.8 FL (ref 35.9–50)
GLOBULIN SER CALC-MCNC: 2.8 G/DL (ref 1.9–3.5)
GLUCOSE SERPL-MCNC: 86 MG/DL (ref 65–99)
HCT VFR BLD AUTO: 44.2 % (ref 42–52)
HGB BLD-MCNC: 15.4 G/DL (ref 14–18)
IMM GRANULOCYTES # BLD AUTO: 0.03 K/UL (ref 0–0.11)
IMM GRANULOCYTES NFR BLD AUTO: 0.5 % (ref 0–0.9)
LYMPHOCYTES # BLD AUTO: 1.66 K/UL (ref 1–4.8)
LYMPHOCYTES NFR BLD: 26.6 % (ref 22–41)
MCH RBC QN AUTO: 33.7 PG (ref 27–33)
MCHC RBC AUTO-ENTMCNC: 34.8 G/DL (ref 33.7–35.3)
MCV RBC AUTO: 96.7 FL (ref 81.4–97.8)
MONOCYTES # BLD AUTO: 0.7 K/UL (ref 0–0.85)
MONOCYTES NFR BLD AUTO: 11.2 % (ref 0–13.4)
NEUTROPHILS # BLD AUTO: 3.67 K/UL (ref 1.82–7.42)
NEUTROPHILS NFR BLD: 59 % (ref 44–72)
NRBC # BLD AUTO: 0 K/UL
NRBC BLD-RTO: 0 /100 WBC
PLATELET # BLD AUTO: 234 K/UL (ref 164–446)
PMV BLD AUTO: 10.2 FL (ref 9–12.9)
POTASSIUM SERPL-SCNC: 3.9 MMOL/L (ref 3.6–5.5)
PROT SERPL-MCNC: 6.9 G/DL (ref 6–8.2)
RBC # BLD AUTO: 4.57 M/UL (ref 4.7–6.1)
SODIUM SERPL-SCNC: 143 MMOL/L (ref 135–145)
WBC # BLD AUTO: 6.2 K/UL (ref 4.8–10.8)

## 2021-06-02 PROCEDURE — 85025 COMPLETE CBC W/AUTO DIFF WBC: CPT

## 2021-06-02 PROCEDURE — 84154 ASSAY OF PSA FREE: CPT

## 2021-06-02 PROCEDURE — 80053 COMPREHEN METABOLIC PANEL: CPT

## 2021-06-02 PROCEDURE — 84153 ASSAY OF PSA TOTAL: CPT

## 2021-06-04 LAB
PSA FREE MFR SERPL: 13 %
PSA FREE SERPL-MCNC: 1.5 NG/ML
PSA SERPL-MCNC: 11.5 NG/ML (ref 0–4)

## 2021-06-07 DIAGNOSIS — Z86.79 HISTORY OF ATRIAL TACHYCARDIA: ICD-10-CM

## 2021-06-07 DIAGNOSIS — I65.23 ATHEROSCLEROSIS OF BOTH CAROTID ARTERIES: ICD-10-CM

## 2021-06-07 DIAGNOSIS — R06.02 SHORTNESS OF BREATH: ICD-10-CM

## 2021-06-20 ENCOUNTER — HOSPITAL ENCOUNTER (INPATIENT)
Facility: MEDICAL CENTER | Age: 76
LOS: 1 days | DRG: 392 | End: 2021-06-23
Attending: EMERGENCY MEDICINE | Admitting: STUDENT IN AN ORGANIZED HEALTH CARE EDUCATION/TRAINING PROGRAM
Payer: MEDICARE

## 2021-06-20 ENCOUNTER — APPOINTMENT (OUTPATIENT)
Dept: RADIOLOGY | Facility: MEDICAL CENTER | Age: 76
DRG: 392 | End: 2021-06-20
Attending: EMERGENCY MEDICINE
Payer: MEDICARE

## 2021-06-20 ENCOUNTER — APPOINTMENT (OUTPATIENT)
Dept: RADIOLOGY | Facility: MEDICAL CENTER | Age: 76
DRG: 392 | End: 2021-06-20
Payer: MEDICARE

## 2021-06-20 DIAGNOSIS — R19.00 INTRAABDOMINAL MASS: ICD-10-CM

## 2021-06-20 DIAGNOSIS — R53.1 GENERALIZED WEAKNESS: ICD-10-CM

## 2021-06-20 DIAGNOSIS — R51.9 ACUTE NONINTRACTABLE HEADACHE, UNSPECIFIED HEADACHE TYPE: ICD-10-CM

## 2021-06-20 DIAGNOSIS — R06.02 SHORTNESS OF BREATH: ICD-10-CM

## 2021-06-20 DIAGNOSIS — R55 NEAR SYNCOPE: ICD-10-CM

## 2021-06-20 DIAGNOSIS — R06.09 DYSPNEA ON EXERTION: ICD-10-CM

## 2021-06-20 PROBLEM — I48.91 AF (ATRIAL FIBRILLATION) (HCC): Status: ACTIVE | Noted: 2021-06-20

## 2021-06-20 LAB
ALBUMIN SERPL BCP-MCNC: 4.1 G/DL (ref 3.2–4.9)
ALBUMIN/GLOB SERPL: 1.6 G/DL
ALP SERPL-CCNC: 59 U/L (ref 30–99)
ALT SERPL-CCNC: 6 U/L (ref 2–50)
ANION GAP SERPL CALC-SCNC: 10 MMOL/L (ref 7–16)
AST SERPL-CCNC: 13 U/L (ref 12–45)
BASOPHILS # BLD AUTO: 0.2 % (ref 0–1.8)
BASOPHILS # BLD: 0.01 K/UL (ref 0–0.12)
BILIRUB SERPL-MCNC: 1 MG/DL (ref 0.1–1.5)
BUN SERPL-MCNC: 24 MG/DL (ref 8–22)
CALCIUM SERPL-MCNC: 9 MG/DL (ref 8.4–10.2)
CHLORIDE SERPL-SCNC: 107 MMOL/L (ref 96–112)
CO2 SERPL-SCNC: 23 MMOL/L (ref 20–33)
CREAT SERPL-MCNC: 1.58 MG/DL (ref 0.5–1.4)
EKG IMPRESSION: NORMAL
EOSINOPHIL # BLD AUTO: 0 K/UL (ref 0–0.51)
EOSINOPHIL NFR BLD: 0 % (ref 0–6.9)
ERYTHROCYTE [DISTWIDTH] IN BLOOD BY AUTOMATED COUNT: 45.2 FL (ref 35.9–50)
GLOBULIN SER CALC-MCNC: 2.6 G/DL (ref 1.9–3.5)
GLUCOSE SERPL-MCNC: 102 MG/DL (ref 65–99)
HCT VFR BLD AUTO: 41.6 % (ref 42–52)
HGB BLD-MCNC: 14.2 G/DL (ref 14–18)
IMM GRANULOCYTES # BLD AUTO: 0.03 K/UL (ref 0–0.11)
IMM GRANULOCYTES NFR BLD AUTO: 0.5 % (ref 0–0.9)
LYMPHOCYTES # BLD AUTO: 0.69 K/UL (ref 1–4.8)
LYMPHOCYTES NFR BLD: 11.1 % (ref 22–41)
MCH RBC QN AUTO: 33.4 PG (ref 27–33)
MCHC RBC AUTO-ENTMCNC: 34.1 G/DL (ref 33.7–35.3)
MCV RBC AUTO: 97.9 FL (ref 81.4–97.8)
MONOCYTES # BLD AUTO: 0.61 K/UL (ref 0–0.85)
MONOCYTES NFR BLD AUTO: 9.8 % (ref 0–13.4)
NEUTROPHILS # BLD AUTO: 4.86 K/UL (ref 1.82–7.42)
NEUTROPHILS NFR BLD: 78.4 % (ref 44–72)
NRBC # BLD AUTO: 0 K/UL
NRBC BLD-RTO: 0 /100 WBC
NT-PROBNP SERPL IA-MCNC: 182 PG/ML (ref 0–125)
PLATELET # BLD AUTO: 216 K/UL (ref 164–446)
PMV BLD AUTO: 9 FL (ref 9–12.9)
POTASSIUM SERPL-SCNC: 4.3 MMOL/L (ref 3.6–5.5)
PROT SERPL-MCNC: 6.7 G/DL (ref 6–8.2)
RBC # BLD AUTO: 4.25 M/UL (ref 4.7–6.1)
SODIUM SERPL-SCNC: 140 MMOL/L (ref 135–145)
TROPONIN T SERPL-MCNC: 11 NG/L (ref 6–19)
TROPONIN T SERPL-MCNC: 12 NG/L (ref 6–19)
TSH SERPL DL<=0.005 MIU/L-ACNC: 4.02 UIU/ML (ref 0.38–5.33)
WBC # BLD AUTO: 6.2 K/UL (ref 4.8–10.8)

## 2021-06-20 PROCEDURE — U0005 INFEC AGEN DETEC AMPLI PROBE: HCPCS

## 2021-06-20 PROCEDURE — 84443 ASSAY THYROID STIM HORMONE: CPT

## 2021-06-20 PROCEDURE — 99285 EMERGENCY DEPT VISIT HI MDM: CPT

## 2021-06-20 PROCEDURE — G0378 HOSPITAL OBSERVATION PER HR: HCPCS

## 2021-06-20 PROCEDURE — 93005 ELECTROCARDIOGRAM TRACING: CPT

## 2021-06-20 PROCEDURE — 84484 ASSAY OF TROPONIN QUANT: CPT

## 2021-06-20 PROCEDURE — 71045 X-RAY EXAM CHEST 1 VIEW: CPT

## 2021-06-20 PROCEDURE — 85025 COMPLETE CBC W/AUTO DIFF WBC: CPT

## 2021-06-20 PROCEDURE — U0003 INFECTIOUS AGENT DETECTION BY NUCLEIC ACID (DNA OR RNA); SEVERE ACUTE RESPIRATORY SYNDROME CORONAVIRUS 2 (SARS-COV-2) (CORONAVIRUS DISEASE [COVID-19]), AMPLIFIED PROBE TECHNIQUE, MAKING USE OF HIGH THROUGHPUT TECHNOLOGIES AS DESCRIBED BY CMS-2020-01-R: HCPCS

## 2021-06-20 PROCEDURE — A9270 NON-COVERED ITEM OR SERVICE: HCPCS | Performed by: STUDENT IN AN ORGANIZED HEALTH CARE EDUCATION/TRAINING PROGRAM

## 2021-06-20 PROCEDURE — 80053 COMPREHEN METABOLIC PANEL: CPT

## 2021-06-20 PROCEDURE — 74177 CT ABD & PELVIS W/CONTRAST: CPT | Mod: ME

## 2021-06-20 PROCEDURE — 93005 ELECTROCARDIOGRAM TRACING: CPT | Performed by: EMERGENCY MEDICINE

## 2021-06-20 PROCEDURE — 99220 PR INITIAL OBSERVATION CARE,LEVL III: CPT | Performed by: STUDENT IN AN ORGANIZED HEALTH CARE EDUCATION/TRAINING PROGRAM

## 2021-06-20 PROCEDURE — 700117 HCHG RX CONTRAST REV CODE 255: Performed by: EMERGENCY MEDICINE

## 2021-06-20 PROCEDURE — 83880 ASSAY OF NATRIURETIC PEPTIDE: CPT

## 2021-06-20 PROCEDURE — 700102 HCHG RX REV CODE 250 W/ 637 OVERRIDE(OP): Performed by: STUDENT IN AN ORGANIZED HEALTH CARE EDUCATION/TRAINING PROGRAM

## 2021-06-20 RX ORDER — ACETAMINOPHEN 325 MG/1
650 TABLET ORAL EVERY 6 HOURS PRN
Status: DISCONTINUED | OUTPATIENT
Start: 2021-06-20 | End: 2021-06-23 | Stop reason: HOSPADM

## 2021-06-20 RX ORDER — LABETALOL HYDROCHLORIDE 5 MG/ML
10 INJECTION, SOLUTION INTRAVENOUS EVERY 4 HOURS PRN
Status: DISCONTINUED | OUTPATIENT
Start: 2021-06-20 | End: 2021-06-23 | Stop reason: HOSPADM

## 2021-06-20 RX ORDER — ONDANSETRON 4 MG/1
4 TABLET, ORALLY DISINTEGRATING ORAL EVERY 4 HOURS PRN
Status: DISCONTINUED | OUTPATIENT
Start: 2021-06-20 | End: 2021-06-23 | Stop reason: HOSPADM

## 2021-06-20 RX ORDER — ENALAPRILAT 1.25 MG/ML
1.25 INJECTION INTRAVENOUS EVERY 6 HOURS PRN
Status: DISCONTINUED | OUTPATIENT
Start: 2021-06-20 | End: 2021-06-23 | Stop reason: HOSPADM

## 2021-06-20 RX ORDER — BISACODYL 10 MG
10 SUPPOSITORY, RECTAL RECTAL
Status: DISCONTINUED | OUTPATIENT
Start: 2021-06-20 | End: 2021-06-23 | Stop reason: HOSPADM

## 2021-06-20 RX ORDER — ONDANSETRON 2 MG/ML
4 INJECTION INTRAMUSCULAR; INTRAVENOUS EVERY 4 HOURS PRN
Status: DISCONTINUED | OUTPATIENT
Start: 2021-06-20 | End: 2021-06-23 | Stop reason: HOSPADM

## 2021-06-20 RX ORDER — MULTIVITAMIN WITH IRON
250 TABLET ORAL DAILY
COMMUNITY
End: 2021-08-31

## 2021-06-20 RX ORDER — TRAZODONE HYDROCHLORIDE 50 MG/1
100 TABLET ORAL
Status: DISCONTINUED | OUTPATIENT
Start: 2021-06-20 | End: 2021-06-23 | Stop reason: HOSPADM

## 2021-06-20 RX ORDER — PREDNISONE 10 MG/1
10 TABLET ORAL DAILY
Status: DISCONTINUED | OUTPATIENT
Start: 2021-06-21 | End: 2021-06-22

## 2021-06-20 RX ORDER — POLYETHYLENE GLYCOL 3350 17 G/17G
1 POWDER, FOR SOLUTION ORAL
Status: DISCONTINUED | OUTPATIENT
Start: 2021-06-20 | End: 2021-06-23 | Stop reason: HOSPADM

## 2021-06-20 RX ORDER — LEVOTHYROXINE SODIUM 0.12 MG/1
125 TABLET ORAL
Status: DISCONTINUED | OUTPATIENT
Start: 2021-06-21 | End: 2021-06-23 | Stop reason: HOSPADM

## 2021-06-20 RX ORDER — VERAPAMIL HYDROCHLORIDE 80 MG/1
120 TABLET ORAL 2 TIMES DAILY
Status: DISCONTINUED | OUTPATIENT
Start: 2021-06-20 | End: 2021-06-23 | Stop reason: HOSPADM

## 2021-06-20 RX ORDER — FAMOTIDINE 20 MG/1
20 TABLET, FILM COATED ORAL 2 TIMES DAILY
Status: DISCONTINUED | OUTPATIENT
Start: 2021-06-20 | End: 2021-06-22

## 2021-06-20 RX ORDER — TAMSULOSIN HYDROCHLORIDE 0.4 MG/1
0.4 CAPSULE ORAL EVERY EVENING
Status: DISCONTINUED | OUTPATIENT
Start: 2021-06-20 | End: 2021-06-23 | Stop reason: HOSPADM

## 2021-06-20 RX ADMIN — ACETAMINOPHEN 650 MG: 325 TABLET, FILM COATED ORAL at 23:39

## 2021-06-20 RX ADMIN — VERAPAMIL HYDROCHLORIDE 120 MG: 80 TABLET ORAL at 23:15

## 2021-06-20 RX ADMIN — FAMOTIDINE 20 MG: 20 TABLET ORAL at 23:15

## 2021-06-20 RX ADMIN — IOHEXOL 100 ML: 350 INJECTION, SOLUTION INTRAVENOUS at 20:06

## 2021-06-20 RX ADMIN — TAMSULOSIN HYDROCHLORIDE 0.4 MG: 0.4 CAPSULE ORAL at 23:15

## 2021-06-20 RX ADMIN — TRAZODONE HYDROCHLORIDE 100 MG: 50 TABLET ORAL at 23:15

## 2021-06-20 ASSESSMENT — COGNITIVE AND FUNCTIONAL STATUS - GENERAL
DAILY ACTIVITIY SCORE: 24
MOBILITY SCORE: 24
SUGGESTED CMS G CODE MODIFIER MOBILITY: CH
SUGGESTED CMS G CODE MODIFIER DAILY ACTIVITY: CH

## 2021-06-20 ASSESSMENT — LIFESTYLE VARIABLES
ALCOHOL_USE: YES
TOTAL SCORE: 0
AVERAGE NUMBER OF DAYS PER WEEK YOU HAVE A DRINK CONTAINING ALCOHOL: 3
HOW MANY TIMES IN THE PAST YEAR HAVE YOU HAD 5 OR MORE DRINKS IN A DAY: 0
EVER FELT BAD OR GUILTY ABOUT YOUR DRINKING: NO
TOTAL SCORE: 0
EVER HAD A DRINK FIRST THING IN THE MORNING TO STEADY YOUR NERVES TO GET RID OF A HANGOVER: NO
CONSUMPTION TOTAL: NEGATIVE
ON A TYPICAL DAY WHEN YOU DRINK ALCOHOL HOW MANY DRINKS DO YOU HAVE: 1
TOTAL SCORE: 0
HAVE YOU EVER FELT YOU SHOULD CUT DOWN ON YOUR DRINKING: NO
HAVE PEOPLE ANNOYED YOU BY CRITICIZING YOUR DRINKING: NO

## 2021-06-20 ASSESSMENT — ENCOUNTER SYMPTOMS
NAUSEA: 0
VOMITING: 0
DIARRHEA: 0
LOSS OF CONSCIOUSNESS: 1
CHILLS: 1
COUGH: 0
PALPITATIONS: 1
FEVER: 0
ABDOMINAL PAIN: 0
CONSTIPATION: 0
DIZZINESS: 1
SHORTNESS OF BREATH: 1

## 2021-06-20 ASSESSMENT — PATIENT HEALTH QUESTIONNAIRE - PHQ9
2. FEELING DOWN, DEPRESSED, IRRITABLE, OR HOPELESS: NOT AT ALL
SUM OF ALL RESPONSES TO PHQ9 QUESTIONS 1 AND 2: 0
1. LITTLE INTEREST OR PLEASURE IN DOING THINGS: NOT AT ALL

## 2021-06-20 ASSESSMENT — FIBROSIS 4 INDEX: FIB4 SCORE: 1.68

## 2021-06-21 ENCOUNTER — APPOINTMENT (OUTPATIENT)
Dept: CARDIOLOGY | Facility: MEDICAL CENTER | Age: 76
DRG: 392 | End: 2021-06-21
Attending: STUDENT IN AN ORGANIZED HEALTH CARE EDUCATION/TRAINING PROGRAM
Payer: MEDICARE

## 2021-06-21 PROBLEM — C79.9 METASTASIS (HCC): Status: ACTIVE | Noted: 2021-06-21

## 2021-06-21 LAB
ANION GAP SERPL CALC-SCNC: 9 MMOL/L (ref 7–16)
BASOPHILS # BLD AUTO: 0.3 % (ref 0–1.8)
BASOPHILS # BLD: 0.02 K/UL (ref 0–0.12)
BUN SERPL-MCNC: 22 MG/DL (ref 8–22)
CALCIUM SERPL-MCNC: 8.4 MG/DL (ref 8.4–10.2)
CHLORIDE SERPL-SCNC: 105 MMOL/L (ref 96–112)
CO2 SERPL-SCNC: 22 MMOL/L (ref 20–33)
CORTIS SERPL-MCNC: 0.2 UG/DL (ref 0–23)
CREAT SERPL-MCNC: 1.29 MG/DL (ref 0.5–1.4)
EOSINOPHIL # BLD AUTO: 0.03 K/UL (ref 0–0.51)
EOSINOPHIL NFR BLD: 0.5 % (ref 0–6.9)
ERYTHROCYTE [DISTWIDTH] IN BLOOD BY AUTOMATED COUNT: 45 FL (ref 35.9–50)
GLUCOSE SERPL-MCNC: 134 MG/DL (ref 65–99)
HCT VFR BLD AUTO: 39.7 % (ref 42–52)
HGB BLD-MCNC: 13.4 G/DL (ref 14–18)
IMM GRANULOCYTES # BLD AUTO: 0.03 K/UL (ref 0–0.11)
IMM GRANULOCYTES NFR BLD AUTO: 0.5 % (ref 0–0.9)
INR PPP: 0.96 (ref 0.87–1.13)
LV EJECT FRACT  99904: 65
LV EJECT FRACT MOD 2C 99903: 65.5
LV EJECT FRACT MOD 4C 99902: 61.86
LV EJECT FRACT MOD BP 99901: 63.19
LYMPHOCYTES # BLD AUTO: 1.42 K/UL (ref 1–4.8)
LYMPHOCYTES NFR BLD: 22.3 % (ref 22–41)
MCH RBC QN AUTO: 32.8 PG (ref 27–33)
MCHC RBC AUTO-ENTMCNC: 33.8 G/DL (ref 33.7–35.3)
MCV RBC AUTO: 97.1 FL (ref 81.4–97.8)
MONOCYTES # BLD AUTO: 0.73 K/UL (ref 0–0.85)
MONOCYTES NFR BLD AUTO: 11.5 % (ref 0–13.4)
NEUTROPHILS # BLD AUTO: 4.14 K/UL (ref 1.82–7.42)
NEUTROPHILS NFR BLD: 64.9 % (ref 44–72)
NRBC # BLD AUTO: 0 K/UL
NRBC BLD-RTO: 0 /100 WBC
PLATELET # BLD AUTO: 213 K/UL (ref 164–446)
PMV BLD AUTO: 9.4 FL (ref 9–12.9)
POTASSIUM SERPL-SCNC: 3.3 MMOL/L (ref 3.6–5.5)
PROTHROMBIN TIME: 12.5 SEC (ref 12–14.6)
RBC # BLD AUTO: 4.09 M/UL (ref 4.7–6.1)
SARS-COV-2 RNA RESP QL NAA+PROBE: NOTDETECTED
SODIUM SERPL-SCNC: 136 MMOL/L (ref 135–145)
SPECIMEN SOURCE: NORMAL
TROPONIN T SERPL-MCNC: 12 NG/L (ref 6–19)
WBC # BLD AUTO: 6.4 K/UL (ref 4.8–10.8)

## 2021-06-21 PROCEDURE — A9270 NON-COVERED ITEM OR SERVICE: HCPCS | Performed by: STUDENT IN AN ORGANIZED HEALTH CARE EDUCATION/TRAINING PROGRAM

## 2021-06-21 PROCEDURE — 82533 TOTAL CORTISOL: CPT

## 2021-06-21 PROCEDURE — 84484 ASSAY OF TROPONIN QUANT: CPT

## 2021-06-21 PROCEDURE — 99226 PR SUBSEQUENT OBSERVATION CARE,LEVEL III: CPT | Performed by: STUDENT IN AN ORGANIZED HEALTH CARE EDUCATION/TRAINING PROGRAM

## 2021-06-21 PROCEDURE — G0378 HOSPITAL OBSERVATION PER HR: HCPCS

## 2021-06-21 PROCEDURE — 93306 TTE W/DOPPLER COMPLETE: CPT | Mod: 26 | Performed by: INTERNAL MEDICINE

## 2021-06-21 PROCEDURE — 80048 BASIC METABOLIC PNL TOTAL CA: CPT

## 2021-06-21 PROCEDURE — 85610 PROTHROMBIN TIME: CPT

## 2021-06-21 PROCEDURE — 93306 TTE W/DOPPLER COMPLETE: CPT

## 2021-06-21 PROCEDURE — 85025 COMPLETE CBC W/AUTO DIFF WBC: CPT

## 2021-06-21 PROCEDURE — 700111 HCHG RX REV CODE 636 W/ 250 OVERRIDE (IP): Performed by: STUDENT IN AN ORGANIZED HEALTH CARE EDUCATION/TRAINING PROGRAM

## 2021-06-21 PROCEDURE — 700102 HCHG RX REV CODE 250 W/ 637 OVERRIDE(OP): Performed by: HOSPITALIST

## 2021-06-21 PROCEDURE — 700102 HCHG RX REV CODE 250 W/ 637 OVERRIDE(OP): Performed by: STUDENT IN AN ORGANIZED HEALTH CARE EDUCATION/TRAINING PROGRAM

## 2021-06-21 PROCEDURE — A9270 NON-COVERED ITEM OR SERVICE: HCPCS | Performed by: HOSPITALIST

## 2021-06-21 RX ORDER — DIPHENHYDRAMINE HYDROCHLORIDE 50 MG/ML
25 INJECTION INTRAMUSCULAR; INTRAVENOUS ONCE
Status: ACTIVE | OUTPATIENT
Start: 2021-06-21 | End: 2021-06-22

## 2021-06-21 RX ORDER — POTASSIUM CHLORIDE 20 MEQ/1
20 TABLET, EXTENDED RELEASE ORAL ONCE
Status: COMPLETED | OUTPATIENT
Start: 2021-06-21 | End: 2021-06-21

## 2021-06-21 RX ORDER — POTASSIUM CHLORIDE 20 MEQ/1
20 TABLET, EXTENDED RELEASE ORAL EVERY 4 HOURS
Status: COMPLETED | OUTPATIENT
Start: 2021-06-21 | End: 2021-06-21

## 2021-06-21 RX ORDER — PROCHLORPERAZINE EDISYLATE 5 MG/ML
10 INJECTION INTRAMUSCULAR; INTRAVENOUS ONCE
Status: ACTIVE | OUTPATIENT
Start: 2021-06-21 | End: 2021-06-22

## 2021-06-21 RX ORDER — OMEPRAZOLE 20 MG/1
20 CAPSULE, DELAYED RELEASE ORAL DAILY
Status: DISCONTINUED | OUTPATIENT
Start: 2021-06-21 | End: 2021-06-23 | Stop reason: HOSPADM

## 2021-06-21 RX ORDER — KETOROLAC TROMETHAMINE 30 MG/ML
15 INJECTION, SOLUTION INTRAMUSCULAR; INTRAVENOUS ONCE
Status: ACTIVE | OUTPATIENT
Start: 2021-06-21 | End: 2021-06-22

## 2021-06-21 RX ADMIN — VERAPAMIL HYDROCHLORIDE 120 MG: 80 TABLET ORAL at 17:29

## 2021-06-21 RX ADMIN — FAMOTIDINE 20 MG: 20 TABLET ORAL at 09:42

## 2021-06-21 RX ADMIN — LEVOTHYROXINE SODIUM 125 MCG: 0.12 TABLET ORAL at 05:41

## 2021-06-21 RX ADMIN — TAMSULOSIN HYDROCHLORIDE 0.4 MG: 0.4 CAPSULE ORAL at 17:29

## 2021-06-21 RX ADMIN — POTASSIUM CHLORIDE 20 MEQ: 1500 TABLET, EXTENDED RELEASE ORAL at 09:42

## 2021-06-21 RX ADMIN — VERAPAMIL HYDROCHLORIDE 120 MG: 80 TABLET ORAL at 05:38

## 2021-06-21 RX ADMIN — ACETAMINOPHEN 650 MG: 325 TABLET, FILM COATED ORAL at 14:25

## 2021-06-21 RX ADMIN — POTASSIUM CHLORIDE 20 MEQ: 1500 TABLET, EXTENDED RELEASE ORAL at 14:03

## 2021-06-21 RX ADMIN — OMEPRAZOLE 20 MG: 20 CAPSULE, DELAYED RELEASE ORAL at 11:43

## 2021-06-21 RX ADMIN — TRAZODONE HYDROCHLORIDE 100 MG: 50 TABLET ORAL at 21:43

## 2021-06-21 RX ADMIN — ACETAMINOPHEN, ASPIRIN AND CAFFEINE 1 TABLET: 250; 250; 65 TABLET, FILM COATED ORAL at 14:03

## 2021-06-21 RX ADMIN — ACETAMINOPHEN, ASPIRIN AND CAFFEINE 1 TABLET: 250; 250; 65 TABLET, FILM COATED ORAL at 21:43

## 2021-06-21 RX ADMIN — PREDNISONE 10 MG: 10 TABLET ORAL at 05:41

## 2021-06-21 RX ADMIN — ACETAMINOPHEN 650 MG: 325 TABLET, FILM COATED ORAL at 21:43

## 2021-06-21 RX ADMIN — FAMOTIDINE 20 MG: 20 TABLET ORAL at 21:43

## 2021-06-21 RX ADMIN — POTASSIUM CHLORIDE 20 MEQ: 1500 TABLET, EXTENDED RELEASE ORAL at 06:03

## 2021-06-21 ASSESSMENT — ENCOUNTER SYMPTOMS
VOMITING: 0
MYALGIAS: 0
DIZZINESS: 1
PALPITATIONS: 0
DEPRESSION: 0
SHORTNESS OF BREATH: 1
BRUISES/BLEEDS EASILY: 0
COUGH: 0
NAUSEA: 0
ABDOMINAL PAIN: 1
FEVER: 0
BLURRED VISION: 0

## 2021-06-21 ASSESSMENT — PAIN DESCRIPTION - PAIN TYPE
TYPE: ACUTE PAIN

## 2021-06-21 NOTE — ASSESSMENT & PLAN NOTE
CT abdomen pelvis noted with new hypodense mass likely peritoneal in location just inferior to the liver.  CT scan finding discussed with Dr. Frias, Dr. Leo  Plan to get  biopsy, MRI brain  IR Dr. Mueller consulted will be doing CT guided biopsy at [facility] tomorrow    6/22: Pending MRI brain and biopsy.

## 2021-06-21 NOTE — ASSESSMENT & PLAN NOTE
History adrenal insufficiency.  Continue home steroids.    6/22: I spoke to his physician Dr Avendaño (Endocrinologist), to see if it were appropriate to start the patient on stress dose steroids however he mentioned that this is unlikely and he would recommend only continuing prednisone 10 mg daily as his home dose for now.

## 2021-06-21 NOTE — ED TRIAGE NOTES
Presents accompanied by his spouse.  Pt reports experiencing exertional dyspnea, lightheadedness, and frontal headache persisting for the past 10 days after returning from a trip from [place].  He sojourned there for approximately 3 months.   Chief Complaint   Patient presents with   • Shortness of Breath   • Lightheadedness   • Headache     /79   Pulse 60   Temp 36.2 °C (97.1 °F) (Temporal)   Resp 18   Ht 1.829 m (6')   Wt 71 kg (156 lb 8.4 oz)   SpO2 97%   BMI 21.23 kg/m²

## 2021-06-21 NOTE — ASSESSMENT & PLAN NOTE
Patient had a syncopal episode is had multiple episodes of lightheadedness.  Symptoms are worse with exertion and with standing.  Patient does not notice the symptoms coming on.  Suspect may be a cardiac etiology versus orthostatic hypotension versus adrenal insufficiency symptoms  Admit to telemetry  Trend troponin  Echocardiogram ordered    6/22: Echocardiogram grossly normal. Pending MRI of brain.

## 2021-06-21 NOTE — PROGRESS NOTES
Hospital Medicine Daily Progress Note    Date of Service  6/21/2021    Chief Complaint  76 y.o. male admitted 6/20/2021 with syncope     Hospital Course  76 y.o. male who presented 6/20/2021 with a history of melanoma, lung adenocarcinoma, CKD 3, diverticulosis who presents with shortness of breath, headache and syncope.  Patient reports that he has had the symptoms for almost a year but they have worsened over the last 10 days.  He reports that it is making difficult to perform routine tasks such as golfing, walking around the house.  He reports that at his baseline he normally works out, goes golfing.  Now he reports that even when he swings a golf club he begins to feel lightheaded and weak and nearly passes out.  He reports that once his week he did pass out.  The wife denies that he had any episodes of shaking/seizure-like activity.  He reports that his symptoms are often associated with exercise and standing.  He denies fevers, chest pain, abdominal pain, burning with urination.  He does report headaches, shortness of breath with exertion, diarrhea, urinary difficulty.  He reports he has a history of adrenal insufficiency and takes 10 mg of prednisone daily.  For the patient's history of melanoma and lung adenocarcinoma he follows with oncologist at [facility], [place], [place], and locally with Dr. Rodgers    Interval Problem Update  6/21/2021    Vitals remained stable.  No events on telemetry  VIC improving.  KCl replaced for hypokalemia  Echocardiogram pending.    CT abdomen pelvis noted with new hypodense mass likely peritoneal in location just inferior to the liver.  CT scan finding discussed with Dr. Frias, Dr. Leo  Plan to get  biopsy, MRI brain  CT angios PE if kidney function continue to improve  IR Dr. Mueller consulted will be doing CT guided biopsy at [facility] tomorrow    Updates given and plan of care discussed with patient's spouse who was at  bedside.        Consultants/Specialty  Oncology Dr. Altagracia Meza    Code Status  Full Code    Disposition  home    Review of Systems  Review of Systems   Constitutional: Negative for fever.   HENT: Negative for hearing loss.    Eyes: Negative for blurred vision.   Respiratory: Positive for shortness of breath. Negative for cough.    Cardiovascular: Negative for chest pain and palpitations.   Gastrointestinal: Positive for abdominal pain. Negative for nausea and vomiting.   Genitourinary: Negative for dysuria.   Musculoskeletal: Negative for myalgias.   Skin: Negative for rash.   Neurological: Positive for dizziness.   Endo/Heme/Allergies: Does not bruise/bleed easily.   Psychiatric/Behavioral: Negative for depression.        Physical Exam  Temp:  [36.2 °C (97.1 °F)-36.7 °C (98 °F)] 36.6 °C (97.8 °F)  Pulse:  [47-67] 53  Resp:  [14-19] 18  BP: (110-185)/(52-99) 133/73  SpO2:  [94 %-98 %] 97 %    Physical Exam  Constitutional:       Appearance: Normal appearance.   HENT:      Head: Normocephalic.      Right Ear: Tympanic membrane normal.      Mouth/Throat:      Mouth: Mucous membranes are dry.   Eyes:      Pupils: Pupils are equal, round, and reactive to light.   Cardiovascular:      Rate and Rhythm: Regular rhythm. Tachycardia present.      Pulses: Normal pulses.      Heart sounds: Normal heart sounds.   Pulmonary:      Effort: Pulmonary effort is normal. No respiratory distress.      Breath sounds: Normal breath sounds.   Abdominal:      General: Abdomen is flat. There is no distension.      Palpations: Abdomen is soft.   Musculoskeletal:         General: No swelling or tenderness.      Cervical back: No rigidity.   Skin:     General: Skin is warm.      Coloration: Skin is not jaundiced.   Neurological:      General: No focal deficit present.      Mental Status: He is alert and oriented to person, place, and time.      Cranial Nerves: No cranial nerve deficit.         Fluids  No intake or output data in the  24 hours ending 06/21/21 1509    Laboratory  Recent Labs     06/20/21  1901 06/21/21  0320   WBC 6.2 6.4   RBC 4.25* 4.09*   HEMOGLOBIN 14.2 13.4*   HEMATOCRIT 41.6* 39.7*   MCV 97.9* 97.1   MCH 33.4* 32.8   MCHC 34.1 33.8   RDW 45.2 45.0   PLATELETCT 216 213   MPV 9.0 9.4     Recent Labs     06/20/21  1901 06/21/21  0320   SODIUM 140 136   POTASSIUM 4.3 3.3*   CHLORIDE 107 105   CO2 23 22   GLUCOSE 102* 134*   BUN 24* 22   CREATININE 1.58* 1.29   CALCIUM 9.0 8.4     Recent Labs     06/21/21  1410   INR 0.96               Imaging  CT-ABDOMEN-PELVIS WITH   Final Result      1.  New hyperdense 10 mm mass likely peritoneal in location just inferior to the liver      2.  Given history of melanoma this is potentially metastatic melanoma.      3.  No other acute finding      4.  Left renal scarring and cysts      5.  Prior cholecystectomy and probably appendectomy      6.  Aortic atherosclerotic plaque      DX-CHEST-PORTABLE (1 VIEW)   Final Result      No acute cardiopulmonary abnormality identified.      EC-ECHOCARDIOGRAM COMPLETE W/O CONT    (Results Pending)   IR-CONSULT AND TREAT    (Results Pending)   MR-BRAIN-W/O    (Results Pending)        Assessment/Plan  * Metastasis (HCC)  Assessment & Plan  History of melanoma, adenocarcinoma of lung status post resection  CT abdomen pelvis noted with new hypodense mass likely peritoneal in location just inferior to the liver.  CT scan finding discussed with Dr. Frias, Dr. Leo  Plan to get  biopsy, MRI brain  CT angios PE if kidney function continue to improve  IR Dr. Mueller consulted will be doing CT guided biopsy at [facility] tomorrow    AF (atrial fibrillation) (HCC)  Assessment & Plan  Patient reports a history of atrial fibrillation but reports it resolved after atrial ablation.   monitor on telemetry    Syncope  Assessment & Plan  Patient had a syncopal episode is had multiple episodes of lightheadedness.  Symptoms are worse with exertion and with standing.   Patient does not notice the symptoms coming on.  Suspect may be a cardiac etiology versus orthostatic hypotension versus adrenal insufficiency symptoms  Admit to telemetry  Trend troponin  Echocardiogram ordered    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)  Assessment & Plan  Continue home med tamsulosin.    History of malignant melanoma, April 2016- (present on admission)  Assessment & Plan  History of melanoma.  Abnormal CT patient will follow up with oncology.     Lung cancer (HCC), adenocarcinoma Aug. 2019- (present on admission)  Assessment & Plan  CT abdomen pelvis noted with new hypodense mass likely peritoneal in location just inferior to the liver.  CT scan finding discussed with Dr. Frias, Dr. Leo  Plan to get  biopsy, MRI brain  CT angios PE if kidney function continue to improve  IR Dr. Mueller consulted will be doing CT guided biopsy [facility] tomorrow    Adrenal insufficiency (HCC), secondary- (present on admission)  Assessment & Plan  History adrenal insufficiency.  Continue home steroids.    CKD (chronic kidney disease) stage 3, GFR 30-59 ml/min (HCC)- (present on admission)  Assessment & Plan  Creatinine 1.5 patient is at his baseline.  Minimize nephrotoxic agents.    Hypothyroid- (present on admission)  Assessment & Plan  Continue home medication levothyroxine 125 MCG.  TSH 4.020.       VTE prophylaxis: hold lovenox for biopsy in AM

## 2021-06-21 NOTE — ED PROVIDER NOTES
"ED Provider Note    CHIEF COMPLAINT  Chief Complaint   Patient presents with   • Shortness of Breath   • Lightheadedness   • Headache       HPI  Bartolo Berrios [facility] is a 76 y.o. male who presents for evaluation of near syncope, generalized weakness, exertion intolerance, chest pain, shortness of breath and abdominal pain with diarrhea.  This is been slowly progressive for the past 8 or 9 months but over the past 10 days has become so rapidly progressive and severe that he is unable to do even routine tasks around the house.  He states that he normally works out frequently and plays golf multiple times a week but he states even a single swing of a golf club will make him feel so lightheaded and weak that he nearly passes out.  He states that he has passed out one time in the past week.  He feels an intermittent light pressure in his chest.  His shortness of breath is persistent, it is constant and worsened with any sort of exertion.  He is also had generalized abdominal discomfort with frequent diarrhea.  The patient is a extensive and complicated past medical history including lung cancer and melanoma.  He had a brief period of time with a colostomy bag after being diagnosed with an intestinal perforation from his medications, that has been reversed.  His history is also significant for chronic adrenal insufficiency for which he takes 10 mg of prednisone on a daily basis, chronic kidney disease, diverticulosis, CVA, hypothyroidism and hyperlipidemia.  He has had a persistent headache over this timeframe as well.    REVIEW OF SYSTEMS  Positive for chest pain, dyspnea, abdominal pain, nausea, vomiting, diarrhea, headache. All other systems are negative.     PAST MEDICAL HISTORY  Past Medical History:   Diagnosis Date   • Adrenal insufficiency (HCC), secondary 11/25/2020   • Atherosclerosis of both carotid arteries, mild 11/25/2020   • BPH (benign prostatic hyperplasia) 11/25/2020   • Bronchitis     a\" very " "long time ago\"   • Cholelithiasis 1/18/2021    US abdomen Jan. 2021   • CKD (chronic kidney disease) stage 3, GFR 30-59 ml/min (HCC) 11/25/2020   • Colostomy present (HCC) 11/25/2020   • Diverticulosis of colon 11/25/2020   • GERD (gastroesophageal reflux disease) 11/25/2020   • High cholesterol    • History of atrial tachycardia 11/25/2020    Ablation 2017   • History of malignant melanoma, April 2016 11/25/2020   • History of shingles 11/25/2020   • History of stroke, subcortical infarct on MRI April 2019 11/25/2020   • Hyperlipidemia 11/25/2020   • Hypothyroid 11/25/2020   • Indigestion    • Lung cancer (HCC), adenocarcinoma Aug. 2019 11/25/2020    Metastasis to L5 (biopsy Dec. 2019)       FAMILY HISTORY  History reviewed. No pertinent family history.    SOCIAL HISTORY  Social History     Tobacco Use   • Smoking status: Never Smoker   • Smokeless tobacco: Never Used   Vaping Use   • Vaping Use: Never used   Substance Use Topics   • Alcohol use: Yes     Alcohol/week: 1.8 oz     Types: 3 Glasses of wine per week     Comment: Occasionally   • Drug use: Not Currently     Comment: THC edibles       SURGICAL HISTORY  Past Surgical History:   Procedure Laterality Date   • CECILE BY LAPAROSCOPY  2/26/2021    Procedure: CHOLECYSTECTOMY, LAPAROSCOPIC;  Surgeon: Davey Oneal M.D.;  Location: SURGERY [facility];  Service: General   • OTHER Right 2019    LOWER LOBE OF LUNG REMOVED   • OTHER Left 2017    GROIN   • OTHER Left 2016    SHIN       CURRENT MEDICATIONS  I personally reviewed the medication list in the charting documentation.     ALLERGIES  Allergies   Allergen Reactions   • Gramineae Pollens Itching and Shortness of Breath     Itchy eyes, red face   • Cat Hair Extract    • Horse Allergy Hives   • Other Environmental    • Pollen Extract        MEDICAL RECORD  I have reviewed patient's medical record and pertinent results are listed above.      PHYSICAL EXAM  VITAL SIGNS: /93   Pulse (!) 58   Temp 36.2 °C " (97.1 °F) (Temporal)   Resp 19   Ht 1.829 m (6')   Wt 71 kg (156 lb 8.4 oz)   SpO2 95%   BMI 21.23 kg/m²    Constitutional: Well appearing patient in no acute distress.  Awake and alert, not toxic nor ill in appearance.  HENT: Normocephalic, no obvious evidence of acute trauma.  Eyes: No scleral icterus. Normal conjunctiva   Neck: Comfortable movement without any obvious restriction in the range of motion.  Cardiovascular: Upon ascultation I appreciate a regular heart rhythm and a normal rate.   Thorax & Lungs: Normal nonlabored respirations.  Upon application of the stethoscope for auscultation I find there to be no associated chest wall tenderness.  I appreciate no wheezing, rhonchi or rales. There is normal air movement.    Abdomen: The abdomen is not visibly distended.  Mild left lower and mid abdominal tenderness, no rebound, no guarding  Skin: The exposed portions of skin reveal no obvious rash or other abnormalities.  Extremities/Musculoskeletal: No obvious sign of acute trauma. No asymmetric calf tenderness or edema.   Neurologic: Alert & oriented. No focal deficits observed.   Psychiatric: Normal affect appropriate for the clinical situation.    DIAGNOSTIC STUDIES / PROCEDURES    LABS/EKGs  Results for orders placed or performed during the hospital encounter of 06/20/21   CBC with Differential   Result Value Ref Range    WBC 6.2 4.8 - 10.8 K/uL    RBC 4.25 (L) 4.70 - 6.10 M/uL    Hemoglobin 14.2 14.0 - 18.0 g/dL    Hematocrit 41.6 (L) 42.0 - 52.0 %    MCV 97.9 (H) 81.4 - 97.8 fL    MCH 33.4 (H) 27.0 - 33.0 pg    MCHC 34.1 33.7 - 35.3 g/dL    RDW 45.2 35.9 - 50.0 fL    Platelet Count 216 164 - 446 K/uL    MPV 9.0 9.0 - 12.9 fL    Neutrophils-Polys 78.40 (H) 44.00 - 72.00 %    Lymphocytes 11.10 (L) 22.00 - 41.00 %    Monocytes 9.80 0.00 - 13.40 %    Eosinophils 0.00 0.00 - 6.90 %    Basophils 0.20 0.00 - 1.80 %    Immature Granulocytes 0.50 0.00 - 0.90 %    Nucleated RBC 0.00 /100 WBC    Neutrophils  (Absolute) 4.86 1.82 - 7.42 K/uL    Lymphs (Absolute) 0.69 (L) 1.00 - 4.80 K/uL    Monos (Absolute) 0.61 0.00 - 0.85 K/uL    Eos (Absolute) 0.00 0.00 - 0.51 K/uL    Baso (Absolute) 0.01 0.00 - 0.12 K/uL    Immature Granulocytes (abs) 0.03 0.00 - 0.11 K/uL    NRBC (Absolute) 0.00 K/uL   Comp Metabolic Panel   Result Value Ref Range    Sodium 140 135 - 145 mmol/L    Potassium 4.3 3.6 - 5.5 mmol/L    Chloride 107 96 - 112 mmol/L    Co2 23 20 - 33 mmol/L    Anion Gap 10.0 7.0 - 16.0    Glucose 102 (H) 65 - 99 mg/dL    Bun 24 (H) 8 - 22 mg/dL    Creatinine 1.58 (H) 0.50 - 1.40 mg/dL    Calcium 9.0 8.4 - 10.2 mg/dL    AST(SGOT) 13 12 - 45 U/L    ALT(SGPT) 6 2 - 50 U/L    Alkaline Phosphatase 59 30 - 99 U/L    Total Bilirubin 1.0 0.1 - 1.5 mg/dL    Albumin 4.1 3.2 - 4.9 g/dL    Total Protein 6.7 6.0 - 8.2 g/dL    Globulin 2.6 1.9 - 3.5 g/dL    A-G Ratio 1.6 g/dL   TROPONIN   Result Value Ref Range    Troponin T 12 6 - 19 ng/L   ESTIMATED GFR   Result Value Ref Range    GFR If  52 (A) >60 mL/min/1.73 m 2    GFR If Non  43 (A) >60 mL/min/1.73 m 2   proBrain Natriuretic Peptide, NT   Result Value Ref Range    NT-proBNP 182 (H) 0 - 125 pg/mL   TSH   Result Value Ref Range    TSH 4.020 0.380 - 5.330 uIU/mL   Troponin   Result Value Ref Range    Troponin T 11 6 - 19 ng/L   SARS-CoV-2 PCR (24 hour In-House): Collect NP swab in VTM    Specimen: Nasopharyngeal; Respirate   Result Value Ref Range    SARS-CoV-2 Source NP Swab    EKG   Result Value Ref Range    Report       [facility] Emergency Dept.    Test Date:  2021  Pt Name:    [facility]          Department: [facility]  MRN:        [id number]                      Room:  Gender:     Male                         Technician: NGUYEN  :        1945                   Requested By:ER TRIAGE PROTOCOL  Order #:    [id number]                    Reading MD: ARSALAN BHATIA MD    Measurements  Intervals                                 Axis  Rate:       60                           P:          51  OH:         226                          QRS:        69  QRSD:       140                          T:          48  QT:         455  QTc:        455    Interpretive Statements  12 Lead EKG interpreted by me to show: -- Rate 60 -- Rhythm: Normal sinus  rhythm  -- Axis: Normal -- OH and QRS Intervals: RBBB-- T waves: No acute changes --  ST  segments: No acute changes -- Ectopy: PAC. My impression of this EKG: Does  not  indicate acute ischemia at this time.  No significant change compar ed to  1/18/2021  Electronically Signed On 6- 21:12:00 PDT by ARSALAN BHATIA MD          RADIOLOGY  CT-ABDOMEN-PELVIS WITH   Final Result      1.  New hyperdense 10 mm mass likely peritoneal in location just inferior to the liver      2.  Given history of melanoma this is potentially metastatic melanoma.      3.  No other acute finding      4.  Left renal scarring and cysts      5.  Prior cholecystectomy and probably appendectomy      6.  Aortic atherosclerotic plaque      DX-CHEST-PORTABLE (1 VIEW)   Final Result      No acute cardiopulmonary abnormality identified.      EC-ECHOCARDIOGRAM COMPLETE W/O CONT    (Results Pending)         COURSE & MEDICAL DECISION MAKING  I have reviewed any medical record information, laboratory studies and radiographic results as noted above.   Differential diagnoses includes: Dehydration, electrolyte abnormalities, anemia, perforated viscus, thyroid dysregulation, lung mass, CHF and ACS    Encounter Summary: This is a very pleasant 76 y.o. male who unfortunately required evaluation in the emergency department today with progressive exertional weakness and near syncope, symptoms have been present for 8 months or so but over the past 10 days he has become unable to do even basic tasks, he has experienced one episode of syncope but states many times he has to go down to his knees prevent himself from passing out.  He has  been short of breath, he said chest pressure, he said abdominal pain and diarrhea.  He has a complicated medical history including lung cancer and melanoma, has subsequent adrenal insufficiency on chronic prednisone.  Also has some thyroid dysregulation.  He has a basically reassuring examination with some abdominal tenderness, he has a noted history of diverticulosis and does have tenderness in the left lower quadrant.  Will obtain blood work, imaging of his chest with a plain film and CT of his abdomen, he will be reevaluated ------ blood work is actually quite reassuring, no indication of significant anemia, his chronic renal insufficiency, otherwise unremarkable.  His CT scan unfortunately reveals a new intra-abdominal mass that is concerning for malignancy.  At this point, the patient has had a rapid deterioration in his state of health, his ability to be discharged safely at this point is compromised with he will be admitted to the hospital for further evaluation and care      DISPOSITION: Admit in guarded condition      FINAL IMPRESSION  1. Near syncope    2. Dyspnea on exertion    3. Acute nonintractable headache, unspecified headache type    4. Generalized weakness    5. Intraabdominal mass           This dictation was created using voice recognition software. The accuracy of the dictation is limited to the abilities of the software. I expect there may be some errors of grammar and possibly content. The nursing notes were reviewed and certain aspects of this information were incorporated into this note.    Electronically signed by: Aniceto Aquino M.D., 6/20/2021 7:33 PM

## 2021-06-21 NOTE — PROGRESS NOTES
Tele strip at 0309 shows SR.      Measurements from am strip were as follows:  MO=0.24  QRS=0.14  QT=0.52    Tele Shift Summary:    Rhythm : SB-SR  Rate : 48-91  Ectopy : Per CCT [place], pt had F PACs.     Telemetry monitoring strips placed in pt's chart.

## 2021-06-21 NOTE — ED NOTES
Medication history updated via interview and review of medication list with patient.    Mr. Forrest is on a non-formulary oral chemo medication Tagrisso that is not stocked by pharmacy.  Once oncology approves continuation in the hospital the patient can have his family bring a supply to the hospital to be identified, labeled, and administered.    No additional OTC medications or recent antibiotics reported.

## 2021-06-21 NOTE — PROGRESS NOTES
2 RN skin check complete  Device in place:    telebox  Skin assessed under devices:   intact  Confirmed pressure ulcers found on:   n/a  New potential pressure ulcers noted on:   n/a  wound consult placed:   n/a  The following interventions in place:   pillows for comfort      (R) Arm/Elbow/Hand generalized bruising  (L) Arm/Elbow/Hand generalized bruising

## 2021-06-21 NOTE — ASSESSMENT & PLAN NOTE
Patient reports a history of atrial fibrillation but reports it resolved after atrial ablation.   monitor on telemetry

## 2021-06-21 NOTE — DIETARY
Nutrition services: Day 0 of admit.  Bartolo Berrios [facility] is a 76 y.o. male with admitting DX of syncope    Consult received for MST of 3 on nutrition screen due to report of 24 to 33 lb wt loss x > 1 year. No report of poor PO.      Assessment:  Height: 182.9 cm (6')  Weight: 71 kg (156 lb 8.4 oz)(stand up scale)  Body mass index is 21.23 kg/m²., BMI classification: WNL  Diet/Intake: cardiac/50-75% at breakfast this morning.     Evaluation:   1. DX includes atrial fibrillation. HX of melanoma (2016), lung adenocarcinoma (2019), CKD3, diverticulosis, adrenal insufficiency.  2. RD met with pt, his wife and dtr in pt's room. Pt lost weight when he was treated for melanoma in 2016. He was not able to gain the weight back after his cancer treatments were completed. More recently he was treated for lung cancer in 2019. He has lost ~ 10 lb (6%) over the past year. Weight loss is not significant but worth noting. His goal is to weigh > 160 lbs.   3. Pt said that his PO has been less than normal x 5 years. Wife said that he is eating 60-70% of his meals. Pt's wife prepares high protein, high kcal smoothies at home with berries, kale, Greek yogurt, a lot of almond butter and some pumpkin seeds. Pt also eats 3 meals/day. Pt did not eat much of his lunch provided by dietary today. He was able to eat 1/2 of a [employer] type sandwich and some chips provided by his wife. He and his wife are aware of pt's sodium restriction. Wife apologized for providing some higher sodium foods. While sodium restriction may be beneficial for this pt, adequate PO intake is a priority. Per wife, pt does not care for supplements such as Boost or Ensure. He agreed to Chobani Smoothie TID with meals.  4. Labs: 6/21/21: potassium=3.3 (L), glucose=134 (H-on steroid)   5. Meds: potassium chloride, prednisone      Malnutrition Risk: pt does not meet criteria.    Recommendations/Plan:  1. Add Chobani Smoothies to meals TID.   2. Encourage intake of  >50%  3. Document intake of all meals as % taken in ADL's to provide interdisciplinary communication across all shifts.   4. Monitor weight.  5. Nutrition rep will continue to see patient for ongoing meal and snack preferences.     RD will follow.

## 2021-06-21 NOTE — ASSESSMENT & PLAN NOTE
History of melanoma.  Abnormal CT patient will follow up with oncology.     6/22: Pending brain MRI and biopsy

## 2021-06-21 NOTE — CARE PLAN
Problem: Pain - Standard  Goal: Alleviation of pain or a reduction in pain to the patient’s comfort goal  Outcome: Progressing  Pt complains of headache. Tylenol administered.    The patient is Watcher - Medium risk of patient condition declining or worsening         Progress made toward(s) clinical / shift goals:  patient resting comfortably.     Patient is not progressing towards the following goals:

## 2021-06-21 NOTE — ASSESSMENT & PLAN NOTE
History of melanoma, adenocarcinoma of lung status post resection  CT abdomen pelvis noted with new hypodense mass likely peritoneal in location just inferior to the liver.  CT scan finding discussed with Dr. Frias, Dr. Leo  Plan to get  biopsy, MRI brain  IR Dr. Mueller consulted will be doing CT guided biopsy at [facility] tomorrow    6/22: Pending MRI and biopsy.

## 2021-06-21 NOTE — H&P
Hospital Medicine History & Physical Note    Date of Service  6/20/2021    Primary Care Physician  Rian Puente M.D.    Consultants  none    Code Status  Full Code    Chief Complaint  Chief Complaint   Patient presents with   • Shortness of Breath   • Lightheadedness   • Headache       History of Presenting Illness  76 y.o. male who presented 6/20/2021 with a history of melanoma, lung adenocarcinoma, CKD 3, diverticulosis who presents with shortness of breath, headache and syncope.  Patient reports that he has had the symptoms for almost a year but they have worsened over the last 10 days.  He reports that it is making difficult to perform routine tasks such as golfing, walking around the house.  He reports that at his baseline he normally works out, goes golfing.  Now he reports that even when he swings a golf club he begins to feel lightheaded and weak and nearly passes out.  He reports that once his week he did pass out.  The wife denies that he had any episodes of shaking/seizure-like activity.  He reports that his symptoms are often associated with exercise and standing.  He denies fevers, chest pain, abdominal pain, burning with urination.  He does report headaches, shortness of breath with exertion, diarrhea, urinary difficulty.  He reports he has a history of adrenal insufficiency and takes 10 mg of prednisone daily.  For the patient's history of melanoma and lung adenocarcinoma he follows with oncologist at [facility], [facility], [place], [place], and locally with Dr. Rodgers.    Review of Systems  Review of Systems   Constitutional: Positive for chills and malaise/fatigue. Negative for fever.   Respiratory: Positive for shortness of breath. Negative for cough.    Cardiovascular: Positive for palpitations. Negative for chest pain and leg swelling.   Gastrointestinal: Negative for abdominal pain, constipation, diarrhea, nausea and vomiting.   Genitourinary: Positive for frequency. Negative for  dysuria.   Neurological: Positive for dizziness and loss of consciousness.   All other systems reviewed and are negative.      Past Medical History   has a past medical history of Adrenal insufficiency ([facility]), secondary (11/25/2020), Atherosclerosis of both carotid arteries, mild (11/25/2020), BPH (benign prostatic hyperplasia) (11/25/2020), Bronchitis, Cholelithiasis (1/18/2021), CKD (chronic kidney disease) stage 3, GFR 30-59 ml/min ([facility]) (11/25/2020), Colostomy present ([facility]) (11/25/2020), Diverticulosis of colon (11/25/2020), GERD (gastroesophageal reflux disease) (11/25/2020), High cholesterol, History of atrial tachycardia (11/25/2020), History of malignant melanoma, April 2016 (11/25/2020), History of shingles (11/25/2020), History of stroke, subcortical infarct on MRI April 2019 (11/25/2020), Hyperlipidemia (11/25/2020), Hypothyroid (11/25/2020), Indigestion, and Lung cancer ([facility]), adenocarcinoma Aug. 2019 (11/25/2020).    Surgical History   has a past surgical history that includes other (Left, 2016); other (Left, 2017); other (Right, 2019); and reid by laparoscopy (2/26/2021).     Family History  Family history reviewed nonpertinent to patient's syncope    Social History   reports that he has never smoked. He has never used smokeless tobacco. He reports current alcohol use of about 1.8 oz of alcohol per week. He reports previous drug use.    Allergies  Allergies   Allergen Reactions   • Gramineae Pollens Itching and Shortness of Breath     Itchy eyes, red face   • Cat Hair Extract    • Horse Allergy Hives   • Other Environmental    • Pollen Extract        Medications  Prior to Admission Medications   Prescriptions Last Dose Informant Patient Reported? Taking?   ANTACID ANTI-GAS MAX STRENGTH 400-400-40 MG/5ML Suspension   Yes No   Sig: TAKE 10 ML BY MOUTH FOUR TIMES DAILY AS NEEDED   CALCIUM GLUCEPTATE INJ   Yes No   Sig: Inject  as directed. EVERY THREE WEEKS   Potassium Chloride CR (MICRO-K) 8 MEQ Cap CR  capsule   No No   Sig: Take 2 Caps by mouth every day.   TAGRISSO 80 MG Tab   Yes No   Sig: Take 80 mg by mouth every day.   TIROSINT 125 MCG Cap   No No   Sig: Take 125 mcg by mouth Every morning on an empty stomach.   famotidine (PEPCID) 20 MG Tab   Yes No   omeprazole (PRILOSEC) 20 MG delayed-release capsule   Yes No   Sig: Take 20 mg by mouth as needed.   predniSONE (DELTASONE) 5 MG Tab   No No   Sig: Take 2 Tabs by mouth every day.   sildenafil citrate (VIAGRA) 100 MG tablet   Yes No   Sig: Take 100 mg by mouth 1 time daily as needed for Erectile Dysfunction.   tamsulosin (FLOMAX) 0.4 MG capsule   Yes No   Sig: Take 0.4 mg by mouth every day.   traZODone (DESYREL) 50 MG Tab   No No   Sig: Take 2 Tabs by mouth every bedtime.   verapamil (ISOPTIN) 120 MG Tab   No No   Sig: Take 1 Tab by mouth 2 Times a Day.      Facility-Administered Medications: None       Physical Exam  Temp:  [36.2 °C (97.1 °F)] 36.2 °C (97.1 °F)  Pulse:  [47-64] 47  Resp:  [14-19] 18  BP: (130-185)/(77-99) 185/99  SpO2:  [94 %-97 %] 96 %    Physical Exam  Vitals and nursing note reviewed.   Constitutional:       General: He is not in acute distress.     Appearance: Normal appearance.   HENT:      Head: Normocephalic and atraumatic.   Eyes:      General: No scleral icterus.        Right eye: No discharge.         Left eye: No discharge.   Cardiovascular:      Rate and Rhythm: Normal rate and regular rhythm.      Heart sounds: Normal heart sounds. No murmur heard.     Pulmonary:      Effort: Pulmonary effort is normal. No respiratory distress.      Breath sounds: No wheezing or rales.      Comments: Coarse breath sounds  Abdominal:      General: Abdomen is flat. Bowel sounds are normal. There is no distension.      Tenderness: There is no abdominal tenderness. There is no guarding.   Musculoskeletal:         General: No tenderness. Normal range of motion.      Right lower leg: No edema.      Left lower leg: No edema.   Skin:     General: Skin  is warm and dry.      Coloration: Skin is not jaundiced.   Neurological:      Mental Status: He is alert and oriented to person, place, and time.      Cranial Nerves: No cranial nerve deficit.   Psychiatric:         Mood and Affect: Mood normal.         Behavior: Behavior normal.         Judgment: Judgment normal.         Laboratory:  Recent Labs     06/20/21  1901   WBC 6.2   RBC 4.25*   HEMOGLOBIN 14.2   HEMATOCRIT 41.6*   MCV 97.9*   MCH 33.4*   MCHC 34.1   RDW 45.2   PLATELETCT 216   MPV 9.0     Recent Labs     06/20/21  1901   SODIUM 140   POTASSIUM 4.3   CHLORIDE 107   CO2 23   GLUCOSE 102*   BUN 24*   CREATININE 1.58*   CALCIUM 9.0     Recent Labs     06/20/21  1901   ALTSGPT 6   ASTSGOT 13   ALKPHOSPHAT 59   TBILIRUBIN 1.0   GLUCOSE 102*         Recent Labs     06/20/21  1901   NTPROBNP 182*         Recent Labs     06/20/21 1901   TROPONINT 12       Imaging:  CT-ABDOMEN-PELVIS WITH   Final Result      1.  New hyperdense 10 mm mass likely peritoneal in location just inferior to the liver      2.  Given history of melanoma this is potentially metastatic melanoma.      3.  No other acute finding      4.  Left renal scarring and cysts      5.  Prior cholecystectomy and probably appendectomy      6.  Aortic atherosclerotic plaque      DX-CHEST-PORTABLE (1 VIEW)   Final Result      No acute cardiopulmonary abnormality identified.      EC-ECHOCARDIOGRAM COMPLETE W/O CONT    (Results Pending)         Assessment/Plan:  I anticipate this patient is appropriate for observation status at this time.    * Syncope  Assessment & Plan  Patient had a syncopal episode is had multiple episodes of lightheadedness.  Symptoms are worse with exertion and with standing.  Patient does not notice the symptoms coming on.  Suspect may be a cardiac etiology versus orthostatic hypotension versus adrenal insufficiency symptoms  Admit to telemetry  Trend troponin  Echocardiogram ordered    AF (atrial fibrillation) ([facility])  Assessment &  Plan  Patient reports a history of atrial fibrillation but reports it resolved after atrial ablation.   monitor on telemetry    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)  Assessment & Plan  Continue home med tamsulosin.    History of malignant melanoma, April 2016- (present on admission)  Assessment & Plan  History of melanoma.  Abnormal CT patient will follow up with oncology.     Lung cancer (HCC), adenocarcinoma Aug. 2019- (present on admission)  Assessment & Plan  History of lung adenocarcinoma with mets.  CT showing possible mets and he will need a follow-up.  Results of CT were discussed with the patient.    Adrenal insufficiency (HCC), secondary- (present on admission)  Assessment & Plan  History adrenal insufficiency.  Continue home steroids.    CKD (chronic kidney disease) stage 3, GFR 30-59 ml/min ([facility])- (present on admission)  Assessment & Plan  Creatinine 1.5 patient is at his baseline.  Minimize nephrotoxic agents.    Hypothyroid- (present on admission)  Assessment & Plan  Continue home medication levothyroxine 125 MCG.  TSH 4.020.

## 2021-06-22 ENCOUNTER — APPOINTMENT (OUTPATIENT)
Dept: RADIOLOGY | Facility: MEDICAL CENTER | Age: 76
End: 2021-06-22
Attending: INTERNAL MEDICINE
Payer: MEDICARE

## 2021-06-22 ENCOUNTER — APPOINTMENT (OUTPATIENT)
Dept: RADIOLOGY | Facility: MEDICAL CENTER | Age: 76
DRG: 392 | End: 2021-06-22
Attending: INTERNAL MEDICINE
Payer: MEDICARE

## 2021-06-22 ENCOUNTER — APPOINTMENT (OUTPATIENT)
Dept: RADIOLOGY | Facility: MEDICAL CENTER | Age: 76
DRG: 392 | End: 2021-06-22
Attending: STUDENT IN AN ORGANIZED HEALTH CARE EDUCATION/TRAINING PROGRAM
Payer: MEDICARE

## 2021-06-22 PROBLEM — R06.02 SHORTNESS OF BREATH: Status: ACTIVE | Noted: 2021-06-22

## 2021-06-22 LAB
ALBUMIN SERPL BCP-MCNC: 3.7 G/DL (ref 3.2–4.9)
ALBUMIN/GLOB SERPL: 1.6 G/DL
ALP SERPL-CCNC: 54 U/L (ref 30–99)
ALT SERPL-CCNC: <5 U/L (ref 2–50)
ANION GAP SERPL CALC-SCNC: 10 MMOL/L (ref 7–16)
AST SERPL-CCNC: 10 U/L (ref 12–45)
BASOPHILS # BLD AUTO: 0.5 % (ref 0–1.8)
BASOPHILS # BLD: 0.03 K/UL (ref 0–0.12)
BILIRUB SERPL-MCNC: 1 MG/DL (ref 0.1–1.5)
BUN SERPL-MCNC: 24 MG/DL (ref 8–22)
CALCIUM SERPL-MCNC: 8.3 MG/DL (ref 8.4–10.2)
CHLORIDE SERPL-SCNC: 109 MMOL/L (ref 96–112)
CO2 SERPL-SCNC: 23 MMOL/L (ref 20–33)
CREAT SERPL-MCNC: 1.3 MG/DL (ref 0.5–1.4)
EOSINOPHIL # BLD AUTO: 0.06 K/UL (ref 0–0.51)
EOSINOPHIL NFR BLD: 1 % (ref 0–6.9)
ERYTHROCYTE [DISTWIDTH] IN BLOOD BY AUTOMATED COUNT: 45.8 FL (ref 35.9–50)
GLOBULIN SER CALC-MCNC: 2.3 G/DL (ref 1.9–3.5)
GLUCOSE SERPL-MCNC: 81 MG/DL (ref 65–99)
HCT VFR BLD AUTO: 41.3 % (ref 42–52)
HGB BLD-MCNC: 13.6 G/DL (ref 14–18)
IMM GRANULOCYTES # BLD AUTO: 0.02 K/UL (ref 0–0.11)
IMM GRANULOCYTES NFR BLD AUTO: 0.3 % (ref 0–0.9)
LYMPHOCYTES # BLD AUTO: 1.59 K/UL (ref 1–4.8)
LYMPHOCYTES NFR BLD: 27.5 % (ref 22–41)
MAGNESIUM SERPL-MCNC: 2.2 MG/DL (ref 1.5–2.5)
MCH RBC QN AUTO: 32.3 PG (ref 27–33)
MCHC RBC AUTO-ENTMCNC: 32.9 G/DL (ref 33.7–35.3)
MCV RBC AUTO: 98.1 FL (ref 81.4–97.8)
MONOCYTES # BLD AUTO: 0.79 K/UL (ref 0–0.85)
MONOCYTES NFR BLD AUTO: 13.7 % (ref 0–13.4)
NEUTROPHILS # BLD AUTO: 3.29 K/UL (ref 1.82–7.42)
NEUTROPHILS NFR BLD: 57 % (ref 44–72)
NRBC # BLD AUTO: 0 K/UL
NRBC BLD-RTO: 0 /100 WBC
PATHOLOGY CONSULT NOTE: NORMAL
PLATELET # BLD AUTO: 214 K/UL (ref 164–446)
PMV BLD AUTO: 9.4 FL (ref 9–12.9)
POTASSIUM SERPL-SCNC: 4.1 MMOL/L (ref 3.6–5.5)
PROT SERPL-MCNC: 6 G/DL (ref 6–8.2)
RBC # BLD AUTO: 4.21 M/UL (ref 4.7–6.1)
SODIUM SERPL-SCNC: 142 MMOL/L (ref 135–145)
WBC # BLD AUTO: 5.8 K/UL (ref 4.8–10.8)

## 2021-06-22 PROCEDURE — 700102 HCHG RX REV CODE 250 W/ 637 OVERRIDE(OP): Performed by: STUDENT IN AN ORGANIZED HEALTH CARE EDUCATION/TRAINING PROGRAM

## 2021-06-22 PROCEDURE — 99232 SBSQ HOSP IP/OBS MODERATE 35: CPT | Performed by: INTERNAL MEDICINE

## 2021-06-22 PROCEDURE — 700111 HCHG RX REV CODE 636 W/ 250 OVERRIDE (IP): Performed by: STUDENT IN AN ORGANIZED HEALTH CARE EDUCATION/TRAINING PROGRAM

## 2021-06-22 PROCEDURE — 700102 HCHG RX REV CODE 250 W/ 637 OVERRIDE(OP): Performed by: INTERNAL MEDICINE

## 2021-06-22 PROCEDURE — A9270 NON-COVERED ITEM OR SERVICE: HCPCS | Performed by: INTERNAL MEDICINE

## 2021-06-22 PROCEDURE — A9576 INJ PROHANCE MULTIPACK: HCPCS | Performed by: INTERNAL MEDICINE

## 2021-06-22 PROCEDURE — 700111 HCHG RX REV CODE 636 W/ 250 OVERRIDE (IP)

## 2021-06-22 PROCEDURE — 700111 HCHG RX REV CODE 636 W/ 250 OVERRIDE (IP): Performed by: INTERNAL MEDICINE

## 2021-06-22 PROCEDURE — A9270 NON-COVERED ITEM OR SERVICE: HCPCS | Performed by: STUDENT IN AN ORGANIZED HEALTH CARE EDUCATION/TRAINING PROGRAM

## 2021-06-22 PROCEDURE — 770020 HCHG ROOM/CARE - TELE (206)

## 2021-06-22 PROCEDURE — 80053 COMPREHEN METABOLIC PANEL: CPT

## 2021-06-22 PROCEDURE — 4410208 CT-BIOPSY-LIVER PERCUTANEOUS

## 2021-06-22 PROCEDURE — 0WBH3ZX EXCISION OF RETROPERITONEUM, PERCUTANEOUS APPROACH, DIAGNOSTIC: ICD-10-PCS | Performed by: RADIOLOGY

## 2021-06-22 PROCEDURE — 85025 COMPLETE CBC W/AUTO DIFF WBC: CPT

## 2021-06-22 PROCEDURE — 700105 HCHG RX REV CODE 258: Performed by: INTERNAL MEDICINE

## 2021-06-22 PROCEDURE — 700117 HCHG RX CONTRAST REV CODE 255: Performed by: INTERNAL MEDICINE

## 2021-06-22 PROCEDURE — 83735 ASSAY OF MAGNESIUM: CPT

## 2021-06-22 PROCEDURE — 88305 TISSUE EXAM BY PATHOLOGIST: CPT

## 2021-06-22 PROCEDURE — 700111 HCHG RX REV CODE 636 W/ 250 OVERRIDE (IP): Performed by: RADIOLOGY

## 2021-06-22 PROCEDURE — 96374 THER/PROPH/DIAG INJ IV PUSH: CPT

## 2021-06-22 PROCEDURE — 70553 MRI BRAIN STEM W/O & W/DYE: CPT | Mod: MG

## 2021-06-22 RX ORDER — HYDROCORTISONE 10 MG/1
20 TABLET ORAL 2 TIMES DAILY
Status: DISCONTINUED | OUTPATIENT
Start: 2021-06-22 | End: 2021-06-22

## 2021-06-22 RX ORDER — HYDROCORTISONE 10 MG/1
20 TABLET ORAL EVERY EVENING
Status: DISCONTINUED | OUTPATIENT
Start: 2021-06-22 | End: 2021-06-23

## 2021-06-22 RX ORDER — MIDAZOLAM HYDROCHLORIDE 1 MG/ML
.5-2 INJECTION INTRAMUSCULAR; INTRAVENOUS PRN
Status: ACTIVE | OUTPATIENT
Start: 2021-06-22 | End: 2021-06-22

## 2021-06-22 RX ORDER — ONDANSETRON 2 MG/ML
4 INJECTION INTRAMUSCULAR; INTRAVENOUS PRN
Status: ACTIVE | OUTPATIENT
Start: 2021-06-22 | End: 2021-06-22

## 2021-06-22 RX ORDER — MIDAZOLAM HYDROCHLORIDE 1 MG/ML
INJECTION INTRAMUSCULAR; INTRAVENOUS
Status: COMPLETED
Start: 2021-06-22 | End: 2021-06-22

## 2021-06-22 RX ORDER — FAMOTIDINE 20 MG/1
20 TABLET, FILM COATED ORAL 2 TIMES DAILY
Status: DISCONTINUED | OUTPATIENT
Start: 2021-06-22 | End: 2021-06-23 | Stop reason: HOSPADM

## 2021-06-22 RX ORDER — SODIUM CHLORIDE 9 MG/ML
500 INJECTION, SOLUTION INTRAVENOUS
Status: ACTIVE | OUTPATIENT
Start: 2021-06-22 | End: 2021-06-22

## 2021-06-22 RX ORDER — SODIUM CHLORIDE, SODIUM LACTATE, POTASSIUM CHLORIDE, CALCIUM CHLORIDE 600; 310; 30; 20 MG/100ML; MG/100ML; MG/100ML; MG/100ML
INJECTION, SOLUTION INTRAVENOUS CONTINUOUS
Status: DISCONTINUED | OUTPATIENT
Start: 2021-06-22 | End: 2021-06-23 | Stop reason: HOSPADM

## 2021-06-22 RX ORDER — HYDROCORTISONE 10 MG/1
40 TABLET ORAL EVERY MORNING
Status: DISCONTINUED | OUTPATIENT
Start: 2021-06-23 | End: 2021-06-23

## 2021-06-22 RX ORDER — MIDAZOLAM HYDROCHLORIDE 1 MG/ML
INJECTION INTRAMUSCULAR; INTRAVENOUS
Status: ACTIVE
Start: 2021-06-22 | End: 2021-06-23

## 2021-06-22 RX ORDER — NALOXONE HYDROCHLORIDE 0.4 MG/ML
INJECTION, SOLUTION INTRAMUSCULAR; INTRAVENOUS; SUBCUTANEOUS
Status: COMPLETED
Start: 2021-06-22 | End: 2021-06-23

## 2021-06-22 RX ADMIN — MIDAZOLAM HYDROCHLORIDE 2 MG: 1 INJECTION, SOLUTION INTRAMUSCULAR; INTRAVENOUS at 16:00

## 2021-06-22 RX ADMIN — MIDAZOLAM HYDROCHLORIDE 1 MG: 1 INJECTION, SOLUTION INTRAMUSCULAR; INTRAVENOUS at 16:07

## 2021-06-22 RX ADMIN — VERAPAMIL HYDROCHLORIDE 120 MG: 80 TABLET ORAL at 05:53

## 2021-06-22 RX ADMIN — HYDROCORTISONE SODIUM SUCCINATE 50 MG: 100 INJECTION, POWDER, FOR SOLUTION INTRAMUSCULAR; INTRAVENOUS at 14:47

## 2021-06-22 RX ADMIN — FAMOTIDINE 20 MG: 20 TABLET ORAL at 11:21

## 2021-06-22 RX ADMIN — FENTANYL CITRATE 25 MCG: 50 INJECTION, SOLUTION INTRAMUSCULAR; INTRAVENOUS at 16:07

## 2021-06-22 RX ADMIN — FENTANYL CITRATE 50 MCG: 50 INJECTION, SOLUTION INTRAMUSCULAR; INTRAVENOUS at 16:00

## 2021-06-22 RX ADMIN — TRAZODONE HYDROCHLORIDE 100 MG: 50 TABLET ORAL at 21:19

## 2021-06-22 RX ADMIN — VERAPAMIL HYDROCHLORIDE 120 MG: 80 TABLET ORAL at 17:50

## 2021-06-22 RX ADMIN — SODIUM CHLORIDE, POTASSIUM CHLORIDE, SODIUM LACTATE AND CALCIUM CHLORIDE: 600; 310; 30; 20 INJECTION, SOLUTION INTRAVENOUS at 11:22

## 2021-06-22 RX ADMIN — FAMOTIDINE 20 MG: 20 TABLET ORAL at 21:26

## 2021-06-22 RX ADMIN — TAMSULOSIN HYDROCHLORIDE 0.4 MG: 0.4 CAPSULE ORAL at 17:49

## 2021-06-22 RX ADMIN — GADOTERIDOL 15 ML: 279.3 INJECTION, SOLUTION INTRAVENOUS at 16:59

## 2021-06-22 RX ADMIN — HYDROCORTISONE 20 MG: 10 TABLET ORAL at 17:49

## 2021-06-22 RX ADMIN — PREDNISONE 10 MG: 10 TABLET ORAL at 05:53

## 2021-06-22 RX ADMIN — OMEPRAZOLE 20 MG: 20 CAPSULE, DELAYED RELEASE ORAL at 05:53

## 2021-06-22 RX ADMIN — LEVOTHYROXINE SODIUM 125 MCG: 0.12 TABLET ORAL at 05:53

## 2021-06-22 ASSESSMENT — ENCOUNTER SYMPTOMS
BRUISES/BLEEDS EASILY: 0
ABDOMINAL PAIN: 1
SHORTNESS OF BREATH: 1
PALPITATIONS: 0
NAUSEA: 0
DEPRESSION: 0
BLURRED VISION: 0
FEVER: 0
DIZZINESS: 1
VOMITING: 0
MYALGIAS: 0
COUGH: 0

## 2021-06-22 NOTE — PROGRESS NOTES
Telemetry Shift Summary    Rhythm SB/SR with 1st and BBB  HR Range 49-74  Ectopy r-fPAC, rPVC  Measurements 0.24/0.14/0.44        Normal Values  Rhythm SR  HR Range    Measurements 0.12-0.20 / 0.06-0.10  / 0.30-0.52

## 2021-06-22 NOTE — PROGRESS NOTES
Received report from RHODA Sharma. Pt is alert and oriented, no acute events overnight. Plan is for a MRI of the brain and a lung biopsy. Safety measures in place, care assumed.

## 2021-06-22 NOTE — PROGRESS NOTES
Hospital Medicine Daily Progress Note    Date of Service  6/22/2021    Chief Complaint  76 y.o. male admitted 6/20/2021 with syncope     Hospital Course  76 y.o. male who presented 6/20/2021 with a history of melanoma, lung adenocarcinoma, CKD 3, diverticulosis who presents with shortness of breath, headache and syncope.  Patient reports that he has had the symptoms for almost a year but they have worsened over the last 10 days.  He reports that it is making difficult to perform routine tasks such as golfing, walking around the house.  He reports that at his baseline he normally works out, goes golfing.  Now he reports that even when he swings a golf club he begins to feel lightheaded and weak and nearly passes out.  He reports that once his week he did pass out.  The wife denies that he had any episodes of shaking/seizure-like activity.  He reports that his symptoms are often associated with exercise and standing.  He denies fevers, chest pain, abdominal pain, burning with urination.  He does report headaches, shortness of breath with exertion, diarrhea, urinary difficulty.  He reports he has a history of adrenal insufficiency and takes 10 mg of prednisone daily.  For the patient's history of melanoma and lung adenocarcinoma he follows with oncologist at [facility], [facility], [place], [place], and locally with Dr. Rodgers    Interval Problem Update  6/22: Patient seen at bedside this morning.  Patient currently laying in bed comfortably.  We are pending MRI of the brain and biopsy.  As per nursing no other overnight events reported.        Consultants/Specialty  Oncology Dr. Frias  IR Dr. Meza    Code Status  Full Code    Disposition  Home once medically cleared.    Review of Systems  Review of Systems   Constitutional: Negative for fever.   HENT: Negative for hearing loss.    Eyes: Negative for blurred vision.   Respiratory: Positive for shortness of breath. Negative for cough.    Cardiovascular:  Negative for chest pain and palpitations.   Gastrointestinal: Positive for abdominal pain. Negative for nausea and vomiting.   Genitourinary: Negative for dysuria.   Musculoskeletal: Negative for myalgias.   Skin: Negative for rash.   Neurological: Positive for dizziness.   Endo/Heme/Allergies: Does not bruise/bleed easily.   Psychiatric/Behavioral: Negative for depression.        Physical Exam  Temp:  [36.4 °C (97.5 °F)-36.9 °C (98.4 °F)] 36.6 °C (97.8 °F)  Pulse:  [53-74] 53  Resp:  [18] 18  BP: (126-136)/(57-87) 135/67  SpO2:  [95 %-97 %] 95 %    Physical Exam  Constitutional:       General: He is not in acute distress.     Appearance: Normal appearance. He is not ill-appearing.   HENT:      Head: Normocephalic.      Right Ear: Tympanic membrane normal.      Mouth/Throat:      Mouth: Mucous membranes are dry.   Eyes:      General:         Right eye: No discharge.         Left eye: No discharge.      Pupils: Pupils are equal, round, and reactive to light.   Cardiovascular:      Rate and Rhythm: Regular rhythm. Tachycardia present.      Pulses: Normal pulses.      Heart sounds: Normal heart sounds.   Pulmonary:      Effort: Pulmonary effort is normal. No respiratory distress.      Breath sounds: Normal breath sounds.   Abdominal:      General: Abdomen is flat. There is no distension.      Palpations: Abdomen is soft.   Musculoskeletal:         General: No swelling or tenderness.      Cervical back: No rigidity.   Skin:     General: Skin is warm.      Coloration: Skin is not jaundiced.   Neurological:      General: No focal deficit present.      Mental Status: He is alert and oriented to person, place, and time.      Cranial Nerves: No cranial nerve deficit.   Psychiatric:         Mood and Affect: Mood normal.         Behavior: Behavior normal.         Thought Content: Thought content normal.         Judgment: Judgment normal.         Fluids  No intake or output data in the 24 hours ending 06/22/21  1442    Laboratory  Recent Labs     06/20/21  1901 06/21/21  0320 06/22/21  0431   WBC 6.2 6.4 5.8   RBC 4.25* 4.09* 4.21*   HEMOGLOBIN 14.2 13.4* 13.6*   HEMATOCRIT 41.6* 39.7* 41.3*   MCV 97.9* 97.1 98.1*   MCH 33.4* 32.8 32.3   MCHC 34.1 33.8 32.9*   RDW 45.2 45.0 45.8   PLATELETCT 216 213 214   MPV 9.0 9.4 9.4     Recent Labs     06/20/21  1901 06/21/21  0320 06/22/21  0431   SODIUM 140 136 142   POTASSIUM 4.3 3.3* 4.1   CHLORIDE 107 105 109   CO2 23 22 23   GLUCOSE 102* 134* 81   BUN 24* 22 24*   CREATININE 1.58* 1.29 1.30   CALCIUM 9.0 8.4 8.3*     Recent Labs     06/21/21  1410   INR 0.96               Imaging  EC-ECHOCARDIOGRAM COMPLETE W/O CONT   Final Result      CT-ABDOMEN-PELVIS WITH   Final Result      1.  New hyperdense 10 mm mass likely peritoneal in location just inferior to the liver      2.  Given history of melanoma this is potentially metastatic melanoma.      3.  No other acute finding      4.  Left renal scarring and cysts      5.  Prior cholecystectomy and probably appendectomy      6.  Aortic atherosclerotic plaque      DX-CHEST-PORTABLE (1 VIEW)   Final Result      No acute cardiopulmonary abnormality identified.      IR-CONSULT AND TREAT    (Results Pending)   MR-BRAIN-WITH & W/O    (Results Pending)        Assessment/Plan  * Metastasis (HCC)  Assessment & Plan  History of melanoma, adenocarcinoma of lung status post resection  CT abdomen pelvis noted with new hypodense mass likely peritoneal in location just inferior to the liver.  CT scan finding discussed with Dr. Felipa Davidson  Plan to get  biopsy, MRI brain  IR Dr. Mueller consulted will be doing CT guided biopsy at [facility] tomorrow    6/22: Pending MRI and biopsy.    Lung cancer (HCC), adenocarcinoma Aug. 2019- (present on admission)  Assessment & Plan  CT abdomen pelvis noted with new hypodense mass likely peritoneal in location just inferior to the liver.  CT scan finding discussed with Dr. Felipa Davidson  Plan to  get  biopsy, MRI brain  IR Dr. Mueller consulted will be doing CT guided biopsy at [facility] tomorrow    6/22: Pending MRI brain and biopsy.    Adrenal insufficiency ([facility]), secondary- (present on admission)  Assessment & Plan  History adrenal insufficiency.  Continue home steroids.    6/22: I spoke to his physician Dr Avendaño (Endocrinologist), to see if it were appropriate to start the patient on stress dose steroids however he mentioned that this is unlikely and he would recommend only continuing prednisone 10 mg daily as his home dose for now.    Shortness of breath  Assessment & Plan  Patient complaining of episodes of shortness of breath that have been going on for over a year.  Patient not requiring oxygen supplementation, on room air.  Patient is not tachycardic.  Chest x-ray did not report any acute abnormalities.  Patient would probably benefit from pulmonary function test that should be done outpatient.    AF (atrial fibrillation) ([facility])  Assessment & Plan  Patient reports a history of atrial fibrillation but reports it resolved after atrial ablation.   monitor on telemetry    Syncope  Assessment & Plan  Patient had a syncopal episode is had multiple episodes of lightheadedness.  Symptoms are worse with exertion and with standing.  Patient does not notice the symptoms coming on.  Suspect may be a cardiac etiology versus orthostatic hypotension versus adrenal insufficiency symptoms  Admit to telemetry  Trend troponin  Echocardiogram ordered    6/22: Echocardiogram grossly normal. Pending MRI of brain.    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)  Assessment & Plan  Continue home med tamsulosin.    History of malignant melanoma, April 2016- (present on admission)  Assessment & Plan  History of melanoma.  Abnormal CT patient will follow up with oncology.     6/22: Pending brain MRI and biopsy    CKD (chronic kidney disease) stage 3, GFR 30-59 ml/min ([facility])- (present on  admission)  Assessment & Plan  Creatinine 1.5 patient is at his baseline.  Minimize nephrotoxic agents.    Hypothyroid- (present on admission)  Assessment & Plan  Continue home medication levothyroxine 125 MCG.  TSH 4.020.       VTE prophylaxis: hold lovenox for biopsy

## 2021-06-22 NOTE — HOSPITAL COURSE
76 y.o. male who presented 6/20/2021 with a history of melanoma, lung adenocarcinoma, CKD 3, diverticulosis who presents with shortness of breath, headache and syncope.  Patient reports that he has had the symptoms for almost a year but they have worsened over the last 10 days.  He reports that it is making difficult to perform routine tasks such as golfing, walking around the house.  He reports that at his baseline he normally works out, goes golfing.  Now he reports that even when he swings a golf club he begins to feel lightheaded and weak and nearly passes out.  He reports that once his week he did pass out.  The wife denies that he had any episodes of shaking/seizure-like activity.  He reports that his symptoms are often associated with exercise and standing.  He denies fevers, chest pain, abdominal pain, burning with urination.  He does report headaches, shortness of breath with exertion, diarrhea, urinary difficulty.  He reports he has a history of adrenal insufficiency and takes 10 mg of prednisone daily.  For the patient's history of melanoma and lung adenocarcinoma he follows with oncologist at [facility], [facility], [place], [place], and locally with Dr. Rodgers.CT abdomen pelvis noted with new hypodense mass likely peritoneal in location just inferior to the liver.IR consulted for biopsy .

## 2021-06-22 NOTE — PROGRESS NOTES
Tele strip at 0300 shows SB-SR with 1st degree and BBB.      Measurements from am strip were as follows:  LA=0.24  QRS=0.14  QT=0.42    Tele Shift Summary:    Rhythm : SB-SR  Rate : 58-73  Ectopy : Per CCT Marce, pt had F PAC and R PAC.     Telemetry monitoring strips placed in pt's chart.

## 2021-06-22 NOTE — OR SURGEON
Immediate Post- Operative Note        PostOp Diagnosis: Abdominal mass. Hx melanoma and lung ca      Procedure(s): CT abdominal mass bx.      Estimated Blood Loss: Less than 5 ml        Complications: None            6/22/2021     1624 PM     Derrick Mueller M.D.

## 2021-06-22 NOTE — ASSESSMENT & PLAN NOTE
Patient complaining of episodes of shortness of breath that have been going on for over a year.  Patient not requiring oxygen supplementation, on room air.  Patient is not tachycardic.  Chest x-ray did not report any acute abnormalities.  Patient would probably benefit from pulmonary function test that should be done outpatient.

## 2021-06-22 NOTE — CONSULTS
Consult Note: Oncology    Date of consultation: 6/22/2021 10:03 AM    Referring provider: Sr Parsons  Reason for consultation: Newly diagnosed liver mass in a patient with metastatic melanoma and EGFR mutated lung carcinoma    History of presenting illness:      76 y.o. year old male with the following oncological history-    1. 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  · 11/2017 - started nivolumab treatment.  · 01/2018 - PD in the in-transit cutaneous lesions in the left upper thigh and the right lung  · 02/2018 - started the nivolumab / ipilimumab regimen  · 03/2018 - intralesional T-VEC was added to the nivolumab/ipilimumab combination; he received a total of 3 doses of T-VEC injection while on nivolumab / ipilimumab >> CR; Continued nivolumab treatment single agent  · 07/2018 - PET/CT scan showed slowly enlarging right middle lobe nodule measuring 20 mm.  · 08/17/2018 - biopsy of the lung nodule showed benign anthracotic lymph node, no neoplastic infiltrate. nivolumab treatment was held.  · 09/2018 - started infliximab treatment; The skin toxicity had improved, but occasional exacerbation occurred  · 11/2018 - treatment of mycophenylate was added while on infliximab and prednisone.  · 12/14/2018 - suffered bowel perforation possibly due to diverticulitis. He underwent exploratory lapartomy and sigmoidectomy (no melanoma  cells present)  · 04/30/2019 - underwent colostomy takedown surgery  · 08/20/2019 - underwent wedge resection of a growing right lower lobe mass; path showed 2.1 cm pulmonary adenocarcinoma, 0/12 LNs positive.  · 02/2020 - PD in the L5 spine; treated with stereotactic radiosurgery  · 04/2020 - PD in the T11 spine and sacral bones  · 04/20/2020 - started osimertinib treatment  Subsequent imaging showed good disease control.  Last seen in the office 2/2021.  He apparently went to [place] in the interim.  Last available imaging from 12/20/2020 and 1/20/2021 showed no gross evidence of disease.  Currently admitted with shortness of breath along with some abdominal pain and dizziness.  CT abdomen shows a 10 mm new hyperdense mass thought to be peritoneal inferior to the liver.  Labs shows low cortisol level.  MRI brain and CT chest has been ordered.  He has been compliant with osimertinib.  He is supposed to be on chronic prednisone at 10 mg. Patient has noticed worsening dyspnea on exertion, fatigue and few episodes of pre syncope .   ECHo ok, No Afib   Low cortisol of 0.2 despite being on prednisone 10mg     Past Medical History   has a past medical history of Adrenal insufficiency (HCC), secondary (11/25/2020), Atherosclerosis of both carotid arteries, mild (11/25/2020), BPH (benign prostatic hyperplasia) (11/25/2020), Bronchitis, Cholelithiasis (1/18/2021), CKD (chronic kidney disease) stage 3, GFR 30-59 ml/min (HCC) (11/25/2020), Colostomy present (HCC) (11/25/2020), Diverticulosis of colon (11/25/2020), GERD (gastroesophageal reflux disease) (11/25/2020), High cholesterol, History of atrial tachycardia (11/25/2020), History of malignant melanoma, April 2016 (11/25/2020), History of shingles (11/25/2020), History of stroke, subcortical infarct on MRI April 2019 (11/25/2020), Hyperlipidemia (11/25/2020), Hypothyroid (11/25/2020), Indigestion, and Lung cancer (HCC), adenocarcinoma Aug. 2019 (11/25/2020).     Surgical  History   has a past surgical history that includes other (Left, 2016); other (Left, 2017); other (Right, 2019); and reid by laparoscopy (2/26/2021).      Family History  Family history reviewed nonpertinent to patient's syncope     Social History   reports that he has never smoked. He has never used smokeless tobacco. He reports current alcohol use of about 1.8 oz of alcohol per week. He reports previous drug use  Allergies:  Gramineae pollens, Cat hair extract, Horse allergy, Other environmental, and Pollen extract    Medications:    Current Facility-Administered Medications   Medication Dose Route Frequency Provider Last Rate Last Admin   • omeprazole (PRILOSEC) capsule 20 mg  20 mg Oral DAILY Naresh Parsons M.D.   20 mg at 06/22/21 0507   • asa/apap/caffeine (EXCEDRIN) 250-250-65 MG per tablet 1 tablet  1 tablet Oral Q6HRS PRN Naresh Parsons M.D.   1 tablet at 06/21/21 2143   • ketorolac (TORADOL) injection 15 mg  15 mg Intravenous Once Josh Ramon D.O.       • diphenhydrAMINE (BENADRYL) injection 25 mg  25 mg Intravenous Once Josh Ramon D.O.       • prochlorperazine (COMPAZINE) injection 10 mg  10 mg Intravenous Once Josh Ramon D.O.       • polyethylene glycol/lytes (MIRALAX) PACKET 1 Packet  1 Packet Oral QDAY PRN Josh Ramon D.O.        And   • magnesium hydroxide (MILK OF MAGNESIA) suspension 30 mL  30 mL Oral QDAY PRN Josh Ramon D.O.        And   • bisacodyl (DULCOLAX) suppository 10 mg  10 mg Rectal QDAY PRN Josh Ramon D.O.       • [Held by provider] enoxaparin (LOVENOX) inj 40 mg  40 mg Subcutaneous DAILY Josh Ramon D.O.       • acetaminophen (Tylenol) tablet 650 mg  650 mg Oral Q6HRS PRN Josh Ramon D.O.   650 mg at 06/21/21 2143   • enalaprilat (VASOTEC) injection 1.25 mg  1.25 mg Intravenous Q6HRS PRN Josh Ramon D.O.       • labetalol (NORMODYNE/TRANDATE) injection 10 mg  10 mg Intravenous Q4HRS PRN Josh Ramon D.O.       • ondansetron (ZOFRAN) syringe/vial  injection 4 mg  4 mg Intravenous Q4HRS PRN Josh Ramon D.O.       • ondansetron (ZOFRAN ODT) dispertab 4 mg  4 mg Oral Q4HRS PRN Josh Ramon D.O.       • verapamil (ISOPTIN) tablet 120 mg  120 mg Oral BID Josh Ramon D.O.   120 mg at 06/22/21 0553   • traZODone (DESYREL) tablet 100 mg  100 mg Oral QHS Josh Ramon D.O.   100 mg at 06/21/21 2143   • levothyroxine (SYNTHROID) tablet 125 mcg  125 mcg Oral AM ES Josh Ramon D.O.   125 mcg at 06/22/21 0553   • tamsulosin (FLOMAX) capsule 0.4 mg  0.4 mg Oral Q EVENING Josh Ramon D.O.   0.4 mg at 06/21/21 1729   • predniSONE (DELTASONE) tablet 10 mg  10 mg Oral DAILY Josh Ramon D.O.   10 mg at 06/22/21 0553   • famotidine (PEPCID) tablet 20 mg  20 mg Oral BID CHARLEEN FordeO.   20 mg at 06/21/21 2143           Family History:   History reviewed. No pertinent family history.    Review of Systems:  All other review of systems are negative except what was mentioned above in the HPI.    Constitutional: No fever, chills, weight loss , + malaise/fatigue.    HEENT: No new auditory or visual complaints. No sore throat and neck pain.     Respiratory:No new cough, sputum production, +  shortness of breath and wheezing.    Cardiovascular: No new chest pain, palpitations, orthopnea and leg swelling.    Gastrointestinal: No heartburn, nausea, vomiting ,abdominal pain, hematochezia or melena     Genitourinary: Negative for dysuria, hematuria    Musculoskeletal: No new arthralgias or myalgias   Skin: Negative for rash and itching.    Neurological: Negative for focal weakness or headaches.  Positive for dizziness and syncopal event   Endo/Heme/Allergies: No abnormal bleed/bruise.    Psychiatric/Behavioral: No new depression/anxiety.    Physical Exam:  Vitals:   /87   Pulse 60   Temp 36.4 °C (97.5 °F) (Oral)   Resp 18   Ht 1.829 m (6')   Wt 71 kg (156 lb 8.4 oz)   SpO2 96%   BMI 21.23 kg/m²     General: Not in acute distress, alert and oriented  x 3  HEENT: No pallor, icterus. Oropharynx clear.   Neck: Supple, no palpable masses.  Lymph nodes: No palpable cervical, supraclavicular, axillary or inguinal lymphadenopathy.    CVS: regular rate and rhythm, no rubs or gallops  RESP: Clear to auscultate bilaterally, no wheezing or crackles.   ABD: Soft, non tender, non distended, positive bowel sounds, no palpable organomegaly  EXT: No edema or cyanosis  CNS: Alert and oriented x3, No focal deficits.  Skin- No rash      Labs:   Recent Labs     06/20/21  1901 06/21/21  0320 06/21/21  1410 06/22/21  0431   RBC 4.25* 4.09*  --  4.21*   HEMOGLOBIN 14.2 13.4*  --  13.6*   HEMATOCRIT 41.6* 39.7*  --  41.3*   PLATELETCT 216 213  --  214   PROTHROMBTM  --   --  12.5  --    INR  --   --  0.96  --      Lab Results   Component Value Date/Time    SODIUM 142 06/22/2021 04:31 AM    POTASSIUM 4.1 06/22/2021 04:31 AM    CHLORIDE 109 06/22/2021 04:31 AM    CO2 23 06/22/2021 04:31 AM    GLUCOSE 81 06/22/2021 04:31 AM    BUN 24 (H) 06/22/2021 04:31 AM    CREATININE 1.30 06/22/2021 04:31 AM        Assessment and Plan:    #1-syncope in the setting of adrenal insufficiency-most probably this is related to adrenal insufficiency as the  a.m. cortisol level is low despite being onn prednisone.  MRI brain currently pending.  Primary team also ruling out any cardiac etiology.  He will need steroid replacement with hydrocortisone,   Given his significant steroid sufficiency syndrome will give hydrocortisone 50 mg x 1.  Will also start p.o. 40 mg in the morning and 20 in the evening.  This can be subsequently dose adjusted as outpt    Important to get brain MRI as he also has some occasional vision complaints and is at high risk of developing brain metastasis from EGFR mutated lung carcinoma.    #2 metastatic melanoma-in remission since 2018 after undergoing immunotherapy with ipilimumab/nivolumab and T Vec.    #3 EGFR mutated lung carcinoma-currently on osimertinib.    Given the presence of  new small peritoneal nodule for [facility] biopsy is sheduled .He already have an upcoming PET scan appointment in the next few days. If stable he an be Dd after these . He should follow-up with Dr. Corral  Next week or so       He agreed and verbalized his agreement and understanding with the current plan.  I answered all questions and concerns he has at this time.              Thank you for allowing me to participate in his care.    Please note that this dictation was created using voice recognition software. I have made every reasonable attempt to correct obvious errors, but I expect that there are errors of grammar and possibly content that I did not discover before finalizing the note.      SIGNATURES:  Bernard Frias M.D.    CC:  Rian Puente M.D.  No ref. provider found

## 2021-06-22 NOTE — CARE PLAN
Problem: Pain - Standard  Goal: Alleviation of pain or a reduction in pain to the patient’s comfort goal  Outcome: Progressing  Contacting provider for any other possible pain control for headache.    The patient is Stable - Low risk of patient condition declining or worsening    Shift Goals  Clinical Goals: sleep   Patient Goals: MRI and biopsy complete  Family Goals: find answers    Progress made toward(s) clinical / shift goals:  pt comfortable in bed.     Patient is not progressing towards the following goals:

## 2021-06-22 NOTE — CARE PLAN
The patient is Stable - Low risk of patient condition declining or worsening    Shift Goals  Clinical Goals: complete diagnostics  Patient Goals: discharge  Family Goals: find answers    Progress made toward(s) clinical / shift goals:  pending times for MRI & biopsy    Patient is not progressing towards the following goals: still waiting for results      Problem: Pain - Standard  Goal: Alleviation of pain or a reduction in pain to the patient’s comfort goal  Outcome: Progressing     Problem: Knowledge Deficit - Standard  Goal: Patient and family/care givers will demonstrate understanding of plan of care, disease process/condition, diagnostic tests and medications  Outcome: Progressing     Problem: Fall Risk  Goal: Patient will remain free from falls  Outcome: Progressing

## 2021-06-23 ENCOUNTER — APPOINTMENT (OUTPATIENT)
Dept: RADIOLOGY | Facility: MEDICAL CENTER | Age: 76
DRG: 392 | End: 2021-06-23
Attending: INTERNAL MEDICINE
Payer: MEDICARE

## 2021-06-23 ENCOUNTER — PATIENT OUTREACH (OUTPATIENT)
Dept: HEALTH INFORMATION MANAGEMENT | Facility: OTHER | Age: 76
End: 2021-06-23

## 2021-06-23 VITALS
TEMPERATURE: 97.4 F | HEIGHT: 72 IN | HEART RATE: 60 BPM | DIASTOLIC BLOOD PRESSURE: 68 MMHG | RESPIRATION RATE: 18 BRPM | SYSTOLIC BLOOD PRESSURE: 132 MMHG | BODY MASS INDEX: 21.2 KG/M2 | WEIGHT: 156.53 LBS | OXYGEN SATURATION: 97 %

## 2021-06-23 LAB
ALBUMIN SERPL BCP-MCNC: 3.5 G/DL (ref 3.2–4.9)
ALBUMIN/GLOB SERPL: 1.5 G/DL
ALP SERPL-CCNC: 56 U/L (ref 30–99)
ALT SERPL-CCNC: <5 U/L (ref 2–50)
ANION GAP SERPL CALC-SCNC: 10 MMOL/L (ref 7–16)
AST SERPL-CCNC: 8 U/L (ref 12–45)
BASOPHILS # BLD AUTO: 0.1 % (ref 0–1.8)
BASOPHILS # BLD: 0.01 K/UL (ref 0–0.12)
BILIRUB SERPL-MCNC: 0.7 MG/DL (ref 0.1–1.5)
BUN SERPL-MCNC: 25 MG/DL (ref 8–22)
CALCIUM SERPL-MCNC: 7.9 MG/DL (ref 8.4–10.2)
CHLORIDE SERPL-SCNC: 106 MMOL/L (ref 96–112)
CO2 SERPL-SCNC: 22 MMOL/L (ref 20–33)
CREAT SERPL-MCNC: 1.44 MG/DL (ref 0.5–1.4)
D DIMER PPP IA.FEU-MCNC: 1.37 UG/ML (FEU) (ref 0–0.5)
EOSINOPHIL # BLD AUTO: 0 K/UL (ref 0–0.51)
EOSINOPHIL NFR BLD: 0 % (ref 0–6.9)
ERYTHROCYTE [DISTWIDTH] IN BLOOD BY AUTOMATED COUNT: 45 FL (ref 35.9–50)
GLOBULIN SER CALC-MCNC: 2.4 G/DL (ref 1.9–3.5)
GLUCOSE SERPL-MCNC: 125 MG/DL (ref 65–99)
HCT VFR BLD AUTO: 40.3 % (ref 42–52)
HGB BLD-MCNC: 13.7 G/DL (ref 14–18)
IMM GRANULOCYTES # BLD AUTO: 0.03 K/UL (ref 0–0.11)
IMM GRANULOCYTES NFR BLD AUTO: 0.3 % (ref 0–0.9)
LYMPHOCYTES # BLD AUTO: 0.75 K/UL (ref 1–4.8)
LYMPHOCYTES NFR BLD: 8.2 % (ref 22–41)
MAGNESIUM SERPL-MCNC: 2.3 MG/DL (ref 1.5–2.5)
MCH RBC QN AUTO: 32.7 PG (ref 27–33)
MCHC RBC AUTO-ENTMCNC: 34 G/DL (ref 33.7–35.3)
MCV RBC AUTO: 96.2 FL (ref 81.4–97.8)
MONOCYTES # BLD AUTO: 0.83 K/UL (ref 0–0.85)
MONOCYTES NFR BLD AUTO: 9.1 % (ref 0–13.4)
NEUTROPHILS # BLD AUTO: 7.5 K/UL (ref 1.82–7.42)
NEUTROPHILS NFR BLD: 82.3 % (ref 44–72)
NRBC # BLD AUTO: 0 K/UL
NRBC BLD-RTO: 0 /100 WBC
PLATELET # BLD AUTO: 212 K/UL (ref 164–446)
PMV BLD AUTO: 9.2 FL (ref 9–12.9)
POTASSIUM SERPL-SCNC: 4.1 MMOL/L (ref 3.6–5.5)
PROT SERPL-MCNC: 5.9 G/DL (ref 6–8.2)
RBC # BLD AUTO: 4.19 M/UL (ref 4.7–6.1)
SODIUM SERPL-SCNC: 138 MMOL/L (ref 135–145)
WBC # BLD AUTO: 9.1 K/UL (ref 4.8–10.8)

## 2021-06-23 PROCEDURE — 99239 HOSP IP/OBS DSCHRG MGMT >30: CPT | Performed by: INTERNAL MEDICINE

## 2021-06-23 PROCEDURE — A9540 TC99M MAA: HCPCS

## 2021-06-23 PROCEDURE — 700102 HCHG RX REV CODE 250 W/ 637 OVERRIDE(OP): Performed by: STUDENT IN AN ORGANIZED HEALTH CARE EDUCATION/TRAINING PROGRAM

## 2021-06-23 PROCEDURE — 71045 X-RAY EXAM CHEST 1 VIEW: CPT

## 2021-06-23 PROCEDURE — 85379 FIBRIN DEGRADATION QUANT: CPT

## 2021-06-23 PROCEDURE — A9270 NON-COVERED ITEM OR SERVICE: HCPCS | Performed by: INTERNAL MEDICINE

## 2021-06-23 PROCEDURE — A9270 NON-COVERED ITEM OR SERVICE: HCPCS | Performed by: STUDENT IN AN ORGANIZED HEALTH CARE EDUCATION/TRAINING PROGRAM

## 2021-06-23 PROCEDURE — 80053 COMPREHEN METABOLIC PANEL: CPT

## 2021-06-23 PROCEDURE — 700102 HCHG RX REV CODE 250 W/ 637 OVERRIDE(OP): Performed by: INTERNAL MEDICINE

## 2021-06-23 PROCEDURE — 83735 ASSAY OF MAGNESIUM: CPT

## 2021-06-23 PROCEDURE — 85025 COMPLETE CBC W/AUTO DIFF WBC: CPT

## 2021-06-23 RX ORDER — HYDROCORTISONE 10 MG/1
20 TABLET ORAL EVERY MORNING
Status: DISCONTINUED | OUTPATIENT
Start: 2021-06-24 | End: 2021-06-23 | Stop reason: HOSPADM

## 2021-06-23 RX ORDER — OMEPRAZOLE 20 MG/1
20 CAPSULE, DELAYED RELEASE ORAL DAILY
Qty: 30 CAPSULE | Refills: 0 | Status: SHIPPED | OUTPATIENT
Start: 2021-06-24 | End: 2021-06-23

## 2021-06-23 RX ORDER — HYDROCORTISONE 10 MG/1
10 TABLET ORAL EVERY EVENING
Qty: 21 TABLET | Refills: 0 | Status: ON HOLD | OUTPATIENT
Start: 2021-06-23 | End: 2021-10-15

## 2021-06-23 RX ORDER — HYDROCORTISONE 10 MG/1
10 TABLET ORAL EVERY EVENING
Status: DISCONTINUED | OUTPATIENT
Start: 2021-06-23 | End: 2021-06-23 | Stop reason: HOSPADM

## 2021-06-23 RX ORDER — HYDROCORTISONE 20 MG/1
20 TABLET ORAL EVERY MORNING
Qty: 21 TABLET | Refills: 0 | Status: SHIPPED | OUTPATIENT
Start: 2021-06-24 | End: 2021-08-31

## 2021-06-23 RX ADMIN — POLYETHYLENE GLYCOL 3350 1 PACKET: 17 POWDER, FOR SOLUTION ORAL at 09:01

## 2021-06-23 RX ADMIN — VERAPAMIL HYDROCHLORIDE 120 MG: 80 TABLET ORAL at 05:49

## 2021-06-23 RX ADMIN — LEVOTHYROXINE SODIUM 125 MCG: 0.12 TABLET ORAL at 05:49

## 2021-06-23 RX ADMIN — FAMOTIDINE 20 MG: 20 TABLET ORAL at 08:50

## 2021-06-23 RX ADMIN — OMEPRAZOLE 20 MG: 20 CAPSULE, DELAYED RELEASE ORAL at 05:49

## 2021-06-23 RX ADMIN — HYDROCORTISONE 40 MG: 10 TABLET ORAL at 05:51

## 2021-06-23 NOTE — PROGRESS NOTES
Report received from RHODA Vizcarra. Plan of care discussed at patient's bedside with pt and spouse. Whiteboard has been updated. Pt is resting comfortably in bed and declines any further needs at this time. Safety precautions in place, bed alarm on, and call light within reach.

## 2021-06-23 NOTE — DISCHARGE SUMMARY
"Discharge Summary    CHIEF COMPLAINT ON ADMISSION  Chief Complaint   Patient presents with   • Shortness of Breath   • Lightheadedness   • Headache       Reason for Admission  Shortness of Breath; Near Syncopal     Admission Date  6/20/2021    CODE STATUS  Full Code    HPI & HOSPITAL COURSE  As per chart review:  \"  76 y.o. male who presented 6/20/2021 with a history of melanoma, lung adenocarcinoma, CKD 3, diverticulosis who presents with shortness of breath, headache and syncope.  Patient reports that he has had the symptoms for almost a year but they have worsened over the last 10 days.  He reports that it is making difficult to perform routine tasks such as golfing, walking around the house.  He reports that at his baseline he normally works out, goes golfing.  Now he reports that even when he swings a golf club he begins to feel lightheaded and weak and nearly passes out.  He reports that once his week he did pass out.  The wife denies that he had any episodes of shaking/seizure-like activity.  He reports that his symptoms are often associated with exercise and standing.  He denies fevers, chest pain, abdominal pain, burning with urination.  He does report headaches, shortness of breath with exertion, diarrhea, urinary difficulty.  He reports he has a history of adrenal insufficiency and takes 10 mg of prednisone daily.  For the patient's history of melanoma and lung adenocarcinoma he follows with oncologist at [facility], [facility], [place], [place], and locally with Dr. Rodgers.CT abdomen pelvis noted with new hypodense mass likely peritoneal in location just inferior to the liver.IR consulted for biopsy .  \"    The patient was admitted for further management and care.  The patient was found to have an elevated D-dimer.  Due to the patient's creatinine we opted to do a VQ scan which was reported as normal.    The patient also underwent CT abdominal mass biopsy by IR, we are pending results.  The " patient will follow up with oncology as an outpatient for results.    The patient also had an MRI of the brain which did not report any acute abnormalities.    The patient also had an echocardiogram done, which was grossly normal.    The patient mentioned that his shortness of breath and his headaches have greatly improved during hospitalization.    Oncology was consulted during this hospitalization and they recommended switching his daily dose of prednisone of 10 mg to hydrocortisone, which he will be discharged on 20 mg in the morning and 10 mg at night and oncology will follow-up as an outpatient.    The VQ scan was done today and it was reported as normal, the patient is feeling better.  He is ready to be discharged home.  After speaking to oncology it is safe to discharge home with close follow-up.  It appears the patient has a PET scan scheduled for this Friday and follow-up appointment with oncology next Monday.      Therefore, he is discharged in fair and stable condition to home with close outpatient follow-up.    The patient met 2-midnight criteria for an inpatient stay at the time of discharge.    Discharge Date  6/23/21    FOLLOW UP ITEMS POST DISCHARGE  Patient will require close follow-up with PCP, oncology as outpatient.    DISCHARGE DIAGNOSES  Principal Problem:    Metastasis (HCC) POA: Unknown  Active Problems:    Lung cancer (HCC), adenocarcinoma Aug. 2019 POA: Yes      Overview: History of metastasis to L5 Dec. 2019    Adrenal insufficiency (HCC), secondary POA: Yes      Overview: Due to high dose steroid use when treated with immunotherapy for melanoma       2016    Hypothyroid POA: Yes    CKD (chronic kidney disease) stage 3, GFR 30-59 ml/min (HCC) POA: Yes    History of malignant melanoma, April 2016 POA: Yes      Overview: Treated with optiva and yourvoy but later treated with tvac.    BPH with obstruction/lower urinary tract symptoms POA: Yes    Syncope POA: Unknown    AF (atrial  fibrillation) (HCC) POA: Unknown    Shortness of breath POA: Unknown  Resolved Problems:    * No resolved hospital problems. *      FOLLOW UP  Future Appointments   Date Time Provider Department Center   6/25/2021  1:30 PM [address] 1 [id number] [address]   6/30/2021 10:00 AM Deng Carpenter M.D. [facility] None   6/30/2021  1:00 PM Rian Puente M.D. [facility] None   7/1/2021  9:30 AM [facility] MRI 1 MR [facility] [place]   8/5/2021  9:15 AM [facility] EXAM 12 ECHO [facility] [address]     Luzma Corral M.D.  [address] , [place] [address]  Go on 6/28/2021  Please go to your appointment on 6/28 at 4pm. Thank you.    Rian Puente M.D.  [address]  [place] [id number]  [phone]    In 1 week        MEDICATIONS ON DISCHARGE     Medication List      START taking these medications      Instructions   * hydrocortisone 10 MG Tabs  Commonly known as: CORTEF   Take 1 tablet by mouth every evening.  Dose: 10 mg     * hydrocortisone 20 MG Tabs  Start taking on: June 24, 2021  Commonly known as: CORTEF   Take 1 tablet by mouth every morning.  Dose: 20 mg         * This list has 2 medication(s) that are the same as other medications prescribed for you. Read the directions carefully, and ask your doctor or other care provider to review them with you.            CONTINUE taking these medications      Instructions   Antacid Anti-Gas Max Strength 400-400-40 MG/5ML Susp  Generic drug: mag hydrox-al hydrox-simeth   TAKE 10 ML BY MOUTH FOUR TIMES DAILY AS NEEDED     CALCIUM GLUCEPTATE INJ   Inject  as directed. EVERY THREE WEEKS     famotidine 20 MG Tabs  Commonly known as: PEPCID   Take 20 mg by mouth 2 times a day.  Dose: 20 mg     Magnesium 250 MG Tabs   Take 250 mg by mouth every day.  Dose: 250 mg     Potassium Chloride CR 8 MEQ Cpcr capsule  Commonly known as: MICRO-K   Take 2 Caps by mouth every day.  Dose: 16 mEq     sildenafil citrate 100 MG tablet  Commonly known as: VIAGRA   Take 100 mg by mouth 1 time daily as needed for  Erectile Dysfunction.  Dose: 100 mg     Tagrisso 80 MG Tabs  Generic drug: Osimertinib Mesylate   Take 80 mg by mouth every day.  Dose: 80 mg     tamsulosin 0.4 MG capsule  Commonly known as: FLOMAX   Take 0.4 mg by mouth every day.  Dose: 0.4 mg     Tirosint 125 MCG Caps  Generic drug: Levothyroxine Sodium   Take 125 mcg by mouth Every morning on an empty stomach.  Dose: 125 mcg     traZODone 50 MG Tabs  Commonly known as: DESYREL   Take 2 Tabs by mouth every bedtime.  Dose: 100 mg     verapamil 120 MG Tabs  Commonly known as: ISOPTIN   Take 1 Tab by mouth 2 Times a Day.  Dose: 120 mg        STOP taking these medications    predniSONE 5 MG Tabs  Commonly known as: DELTASONE            Allergies  Allergies   Allergen Reactions   • Gramineae Pollens Itching and Shortness of Breath     Itchy eyes, red face   • Cat Hair Extract    • Horse Allergy Hives   • Other Environmental    • Pollen Extract        DIET  Orders Placed This Encounter   Procedures   • Diet Order Diet: Cardiac     Standing Status:   Standing     Number of Occurrences:   1     Order Specific Question:   Diet:     Answer:   Cardiac [6]       ACTIVITY  As tolerated.  Weight bearing as tolerated    CONSULTATIONS  Oncology    PROCEDURES  CT abdominal mass bx done on 6/22/21.    NM-LUNG VENT/PERF IMAGING   Final Result      Normal exam      DX-CHEST-PORTABLE (1 VIEW)   Final Result      No acute cardiopulmonary abnormality identified.      MR-BRAIN-WITH & W/O   Final Result      1.  Diffuse atrophy.   2.  No acute stroke or evidence for hemorrhage.   3.  No mass or abnormal enhancement within the brain.      CT-NEEDLE BX-LIVER   Final Result      CT GUIDED INTRAPERITONEAL NODULE BIOPSY.      EC-ECHOCARDIOGRAM COMPLETE W/O CONT   Final Result      CT-ABDOMEN-PELVIS WITH   Final Result      1.  New hyperdense 10 mm mass likely peritoneal in location just inferior to the liver      2.  Given history of melanoma this is potentially metastatic melanoma.      3.   No other acute finding      4.  Left renal scarring and cysts      5.  Prior cholecystectomy and probably appendectomy      6.  Aortic atherosclerotic plaque      DX-CHEST-PORTABLE (1 VIEW)   Final Result      No acute cardiopulmonary abnormality identified.          LABORATORY  Lab Results   Component Value Date    SODIUM 138 06/23/2021    POTASSIUM 4.1 06/23/2021    CHLORIDE 106 06/23/2021    CO2 22 06/23/2021    GLUCOSE 125 (H) 06/23/2021    BUN 25 (H) 06/23/2021    CREATININE 1.44 (H) 06/23/2021        Lab Results   Component Value Date    WBC 9.1 06/23/2021    HEMOGLOBIN 13.7 (L) 06/23/2021    HEMATOCRIT 40.3 (L) 06/23/2021    PLATELETCT 212 06/23/2021        Total time of the discharge process exceeds 35 minutes.

## 2021-06-23 NOTE — PROGRESS NOTES
Telemetry Shift Summary    Rhythm SB/SR/SA with 1st and BBB  HR Range 54-90  Ectopy rPAC  Measurements 0.28/0.14/0.42        Normal Values  Rhythm SR  HR Range    Measurements 0.12-0.20 / 0.06-0.10  / 0.30-0.52

## 2021-06-23 NOTE — CARE PLAN
The patient is Stable - Low risk of patient condition declining or worsening    Shift Goals  Clinical Goals: Monitor for diagnostic results, rest  Patient Goals: Rest, discharge  Family Goals: find answers    Progress made toward(s) clinical / shift goals:  Pt's results from MRI and biopsy are still pending. Pt's other labs remain WNL. Pt was able to get rest with no acute overnight events.    Patient is not progressing towards the following goals:      Problem: Knowledge Deficit - Standard  Goal: Patient and family/care givers will demonstrate understanding of plan of care, disease process/condition, diagnostic tests and medications  Outcome: Progressing     POC was discussed with both pt and pt's spouse. Both verbalized understanding.    Problem: Fall Risk  Goal: Patient will remain free from falls  Outcome: Progressing     Pt has not experienced a syncopal event while admitted and has remained steady on his feet.

## 2021-06-23 NOTE — CARE PLAN
The patient is Stable - Low risk of patient condition declining or worsening    Shift Goals  Clinical Goals: symptom improvement  Patient Goals: discharge  Family Goals: discharge    Progress made toward(s) clinical / shift goals: No c/o feeling dizzy or near syncopal events    Patient is not progressing towards the following goals: still pending d/c      Problem: Pain - Standard  Goal: Alleviation of pain or a reduction in pain to the patient’s comfort goal  Outcome: Progressing     Problem: Knowledge Deficit - Standard  Goal: Patient and family/care givers will demonstrate understanding of plan of care, disease process/condition, diagnostic tests and medications  Outcome: Progressing     Problem: Fall Risk  Goal: Patient will remain free from falls  Outcome: Progressing

## 2021-06-23 NOTE — PROGRESS NOTES
Telemetry Shift Summary      Rhythm: SB/SR/SA w/ 1st AVB and BBB  HR Range: 56-81  Ectopy: F PAC  Measurements: .24/.16/.44    Normal Values:  Rhythm: SR  HR Range:   Measurements: 0.12-0.20/ 0.06-0.10/ 0.30-0.52

## 2021-06-23 NOTE — PROGRESS NOTES
Patient presented for a PERITONEAL MASS BIOPSY performed by Dr. DIAZ. Procedure site marked by MD using CT guidance. Patient was placed in PRONE position. Vital signs including CO2 capnography taken every 5 minutes and remained stable during procedure. (see IR narrator for readings). BANDAID dressing placed over procedure site. Report called to RHODA RÍOS. Patient transported via BED with RN escort to UNIT. Specimens hand delivered to lab.

## 2021-06-23 NOTE — DISCHARGE INSTRUCTIONS
Discharge Instructions    David's Disease    [place]'s disease, which is also called primary adrenal insufficiency, is a condition in which the adrenal glands do not make enough of the hormones cortisol and aldosterone.  The disease causes blood pressure to drop and causes potassium to build up to dangerous levels. If David's disease is not treated, it can suddenly get worse and become life-threatening. This is called an adrenal crisis (Addisonian crisis).  What are the causes?  This condition may be caused by:  · A disease in which the body's immune system damages the adrenal glands.  · An infection of the adrenal glands.  · Bleeding (hemorrhage) in the adrenal glands.  · A tumor.  What are the signs or symptoms?  Common symptoms of this condition include:  · Severe fatigue.  · Muscle weakness.  · Loss of appetite.  · Weight loss.  · Darkening of the skin.  · Low blood pressure (hypotension).  Other symptoms include:  · Nausea or vomiting.  · Diarrhea.  · Dizziness or fainting.  · Irritability  · Depression.  · Salt cravings.  · Low blood sugar (hypoglycemia).  · Irregular or no menstrual periods.  Symptoms usually develop slowly and get worse gradually.  How is this diagnosed?  This condition may be diagnosed based on your:  · Medical history.  · Symptoms.  · Lab test results. Lab tests include a measurement of your blood cortisol levels.  · Imaging tests results. You may have a CT scan of the adrenal glands.  How is this treated?  This condition cannot be cured, but it can be managed with medicines that replace cortisol and aldosterone. You may need to take these medicines:  · Several times a day, by mouth.  · Through an injection if you become so sick that you are unable to take these medicines by mouth or you are unable to keep them down.  Illness, stress, and surgery can increase your body's need for cortisol. It is very important that you talk with your health care provider and understand how to adjust  your medicine dosages if you become ill, stressed, or if you are going to have surgery.  Follow these instructions at home:  · Keep all follow-up visits as told by your health care provider. This is important.  Medicines  · Know how to increase your medicine dosage during periods of stress, mild illness, or surgery.  · Take over-the-counter and prescription medicines only as told by your health care provider.  General instructions    · When you travel, carry a needle, a syringe, and an injectable form of cortisol in case of an emergency.  · In case of an emergency, wear a medical alert bracelet or neck chain so that others understand that you have Lima's disease.  · Carry an ID card that states that you have Lima's disease. The card should include:  ? Instructions to inject a certain amount of medicine if you are severely hurt or cannot respond.  ? The name and phone number of your health care provider.  ? The name and phone number of your closest relative.  Contact a health care provider if:  · You get sick with another illness.  · You develop new symptoms.  Get help right away if:  · You have a severe infection or other illness.  · You have severe vomiting or diarrhea.  · You find it necessary to give yourself injectable medicine.  · You have symptoms of an Addisoniancrisis. These symptoms include:  ? Sudden, severe pain in the lower back, abdomen, or legs.  ? Severe vomiting and diarrhea.  ? Dehydration.  ? Low blood pressure.  ? Loss of consciousness.  Summary  · Lima's disease is the inability of the adrenal glands to make hormones that regulate everyday functions of the body.  · If untreated, this condition can lead to life-threatening problems.  · It is very important that you talk with your health care provider and understand how to adjust your medicine dosages if you become ill, stressed, or if you are going to have surgery.  · Take over-the-counter and prescription medicines only as told by your  health care provider.  · Keep all follow-up visits as told by your health care provider. This is important.  This information is not intended to replace advice given to you by your health care provider. Make sure you discuss any questions you have with your health care provider.  Document Released: 12/18/2006 Document Revised: 09/12/2019 Document Reviewed: 09/12/2019  [employer] Patient Education © 2020 [employer]      Discharged to home by car with relative. Discharged via wheelchair, hospital escort: Yes.  Special equipment needed: Not Applicable    Be sure to schedule a follow-up appointment with your primary care doctor or any specialists as instructed.     Discharge Plan:   Diet Plan: Discussed  Activity Level: Discussed  Confirmed Follow up Appointment: Patient to Call and Schedule Appointment  Confirmed Symptoms Management: Discussed  Medication Reconciliation Updated: Yes    I understand that a diet low in cholesterol, fat, and sodium is recommended for good health. Unless I have been given specific instructions below for another diet, I accept this instruction as my diet prescription.   Other diet: cardiac    Special Instructions: None    · Is patient discharged on Warfarin / Coumadin?   No     Depression / Suicide Risk    As you are discharged from this [facility] facility, it is important to learn how to keep safe from harming yourself.    Recognize the warning signs:  · Abrupt changes in personality, positive or negative- including increase in energy   · Giving away possessions  · Change in eating patterns- significant weight changes-  positive or negative  · Change in sleeping patterns- unable to sleep or sleeping all the time   · Unwillingness or inability to communicate  · Depression  · Unusual sadness, discouragement and loneliness  · Talk of wanting to die  · Neglect of personal appearance   · Rebelliousness- reckless behavior  · Withdrawal from people/activities they love  · Confusion-  inability to concentrate     If you or a loved one observes any of these behaviors or has concerns about self-harm, here's what you can do:  · Talk about it- your feelings and reasons for harming yourself  · Remove any means that you might use to hurt yourself (examples: pills, rope, extension cords, firearm)  · Get professional help from the community (Mental Health, Substance Abuse, psychological counseling)  · Do not be alone:Call your Safe Contact- someone whom you trust who will be there for you.  · Call your local CRISIS HOTLINE 478-4513 or 407-282-8105  · Call your local Children's Mobile Crisis Response Team [place] (182) 904-2198 or www.[place]  · Call the toll free National Suicide Prevention Hotlines   · National Suicide Prevention Lifeline 143-422-ZGEG (3706)  · [employer] Line Network 800-SUICIDE (316-8886)      - Discharge Instructions to Pt:  · Follow up with your Primary Care Physician in 3 to 5 days   · Follow up with your Oncologist  · Be compliant with your follow up appointments.  · Please be compliant with your medications, including new ones.  · A new medication has been given please take as advised.  · Keep blood pressure controlled and be compliant to keep systolic blood pressure <140.  · Please adhere to a healthy diet, that consist of low fat, low salt, low calorie.  · Weight loss and physical activity as tolerated is encouraged.   · Ambulate with assistance.  ·  If there any signs or symptoms of worsening or symptoms recurring, please call your Primary Care Physician or return to Emergency Department for further assessment.  · Avoid sudden changes in position, like standing up quickly.  · Do not drive until cleared by PCP.  · Refrain from drinking alcohol and smoking.     Recommendation to PCP:  · Would recommend a CBC, BMP in 3-5 days.  · Your pt will need close monitoring.  · If  failure to respond to therapy, we suggest having patient return for further care, or if  cannot be treated as an outpatient, return to ED if worsening of symptoms.  · Please make certain your pt follows up with specialist appointments  · Ensure patient adheres to diet and lifestyle modifications and reconcile.  · Please note the change to medication and reconcile.  · Patient without progression of symptoms. Prognosis and risk factors discussed in detail with patient and he understands.

## 2021-06-24 NOTE — PROGRESS NOTES
Telemetry Shift Summary    Rhythm SR with 1st degree and BBB  HR Range 66-72  Ectopy fPAC, rPVC  Measurements 0.24/0.14/0.40        Normal Values  Rhythm SR  HR Range    Measurements 0.12-0.20 / 0.06-0.10  / 0.30-0.52.      1515 Pt discharged to home. Discharge instructions provided to pt. Pt verbalizes understanding. Pt states all questions have been answered. Signed copy in chart. Prescriptions sent to pharmacy. Pt states that all personal belongings are in possession. Pt off unit via walking, escorted by nursing staff.

## 2021-06-25 ENCOUNTER — TELEPHONE (OUTPATIENT)
Dept: CARDIOLOGY | Facility: MEDICAL CENTER | Age: 76
End: 2021-06-25

## 2021-06-25 ENCOUNTER — HOSPITAL ENCOUNTER (OUTPATIENT)
Dept: RADIOLOGY | Facility: MEDICAL CENTER | Age: 76
End: 2021-06-25
Attending: INTERNAL MEDICINE
Payer: MEDICARE

## 2021-06-25 DIAGNOSIS — C77.9 MALIGNANT NEOPLASM METASTATIC TO LYMPH NODES, UNSPECIFIED LYMPH NODE REGION (HCC): ICD-10-CM

## 2021-06-25 DIAGNOSIS — C43.72 MALIGNANT MELANOMA OF LEFT LOWER EXTREMITY INCLUDING HIP (HCC): ICD-10-CM

## 2021-06-25 PROCEDURE — A9552 F18 FDG: HCPCS

## 2021-06-25 NOTE — TELEPHONE ENCOUNTER
Called patient to request records for NP appointment with [facility]. Called to confirm if this will be first time patient is seeing a cardiologist. No answer, left voicemail to call back.    Per chart review pt has cardio records under [facility]where.    Pending call back.

## 2021-06-30 ENCOUNTER — OFFICE VISIT (OUTPATIENT)
Dept: CARDIOLOGY | Facility: MEDICAL CENTER | Age: 76
End: 2021-06-30
Attending: INTERNAL MEDICINE
Payer: MEDICARE

## 2021-06-30 VITALS
HEART RATE: 69 BPM | SYSTOLIC BLOOD PRESSURE: 110 MMHG | HEIGHT: 71 IN | OXYGEN SATURATION: 98 % | RESPIRATION RATE: 12 BRPM | BODY MASS INDEX: 23.66 KG/M2 | WEIGHT: 169 LBS | DIASTOLIC BLOOD PRESSURE: 64 MMHG

## 2021-06-30 DIAGNOSIS — R42 LIGHTHEADEDNESS: ICD-10-CM

## 2021-06-30 PROBLEM — K80.20 CALCULUS OF GALLBLADDER WITHOUT CHOLECYSTITIS WITHOUT OBSTRUCTION: Status: ACTIVE | Noted: 2018-12-14

## 2021-06-30 PROBLEM — Z98.890 H/O CARDIAC RADIOFREQUENCY ABLATION: Status: ACTIVE | Noted: 2019-06-11

## 2021-06-30 PROBLEM — I63.9 CEREBRAL INFARCT (HCC): Status: ACTIVE | Noted: 2019-04-09

## 2021-06-30 PROBLEM — D48.9 NEOPLASM OF UNCERTAIN BEHAVIOR: Status: ACTIVE | Noted: 2018-02-08

## 2021-06-30 PROBLEM — E06.3 HYPOTHYROIDISM DUE TO HASHIMOTO'S THYROIDITIS: Status: ACTIVE | Noted: 2018-12-11

## 2021-06-30 PROBLEM — I95.1 ORTHOSTATIC HYPOTENSION: Status: ACTIVE | Noted: 2019-06-11

## 2021-06-30 PROBLEM — G89.29 CHRONIC PAIN: Status: ACTIVE | Noted: 2021-06-30

## 2021-06-30 PROBLEM — I49.1 PREMATURE ATRIAL CONTRACTIONS: Status: ACTIVE | Noted: 2019-09-19

## 2021-06-30 PROBLEM — K57.20 DIVERTICULITIS OF LARGE INTESTINE WITH PERFORATION WITHOUT BLEEDING: Status: ACTIVE | Noted: 2018-12-14

## 2021-06-30 PROBLEM — C34.90 ADENOCARCINOMA OF LUNG (HCC): Status: ACTIVE | Noted: 2019-08-26

## 2021-06-30 PROBLEM — N26.1 LEFT RENAL ATROPHY: Status: ACTIVE | Noted: 2018-12-14

## 2021-06-30 PROBLEM — Z92.3 HISTORY OF RADIATION THERAPY: Status: ACTIVE | Noted: 2020-01-30

## 2021-06-30 PROBLEM — G93.9 BRAIN LESION: Status: ACTIVE | Noted: 2019-06-26

## 2021-06-30 PROBLEM — G57.31 RIGHT PERONEAL NERVE PALSY: Status: ACTIVE | Noted: 2018-11-16

## 2021-06-30 PROBLEM — E03.8 HYPOTHYROIDISM DUE TO HASHIMOTO'S THYROIDITIS: Status: ACTIVE | Noted: 2018-12-11

## 2021-06-30 PROBLEM — M21.371 RIGHT FOOT DROP: Status: ACTIVE | Noted: 2018-11-16

## 2021-06-30 PROBLEM — Z79.52 LONG TERM (CURRENT) USE OF SYSTEMIC STEROIDS: Status: ACTIVE | Noted: 2018-12-11

## 2021-06-30 PROBLEM — R91.8 LUNG MASS: Status: ACTIVE | Noted: 2019-08-20

## 2021-06-30 PROBLEM — R07.82 INTERCOSTAL PAIN: Status: ACTIVE | Noted: 2019-02-10

## 2021-06-30 PROBLEM — Z23 NEED FOR IMMUNIZATION AGAINST INFLUENZA: Status: ACTIVE | Noted: 2018-11-01

## 2021-06-30 PROBLEM — N28.9 RENAL INSUFFICIENCY SYNDROME: Status: ACTIVE | Noted: 2021-06-30

## 2021-06-30 PROBLEM — I48.91 AF (ATRIAL FIBRILLATION) (HCC): Status: RESOLVED | Noted: 2021-06-20 | Resolved: 2021-06-30

## 2021-06-30 PROBLEM — L27.0 DRUG-INDUCED SKIN RASH: Status: ACTIVE | Noted: 2018-09-28

## 2021-06-30 PROBLEM — H90.3 SENSORINEURAL HEARING LOSS OF BOTH EARS: Status: ACTIVE | Noted: 2020-06-08

## 2021-06-30 PROCEDURE — 99204 OFFICE O/P NEW MOD 45 MIN: CPT | Performed by: INTERNAL MEDICINE

## 2021-06-30 RX ORDER — ATORVASTATIN CALCIUM 20 MG/1
20 TABLET, FILM COATED ORAL DAILY
Qty: 30 TABLET | Refills: 11 | Status: SHIPPED | OUTPATIENT
Start: 2021-06-30 | End: 2022-04-20

## 2021-06-30 ASSESSMENT — FIBROSIS 4 INDEX: FIB4 SCORE: 1.32

## 2021-06-30 NOTE — PROGRESS NOTES
"    Cardiology Initial Consultation Note    Date of note:    6/30/2021    Primary Care Provider: Rian Puente M.D.  Referring Provider: Rian Puente M.D.     Patient Name: Bartolo Forrest   YOB: 1945  MRN:              [id number]    Chief Complaint: Establish care, history of atrial tachycardia ablation    Bartolo Forrest is a 76 y.o. male  patient presented today to [facility].  He had history of melanoma, underwent immunotherapy, few years later he had lung cancer had resection, radiation to bone metastasis.  Fortunately both cancers are in remission now.  However he has adrenal insufficiency.  Recently admitted to the hospital with lightheadedness, syncope.  He was started on hydrocortisone, he feels better.  He has an appointment with endocrinologist next week.    ROS    No chest pain, shortness of breath.  All other systems reviewed and discussed using a comprehensive questionnaire and are negative.     Past medical history, family history, social history, allergies and labs are reviewed and updated as needed as documented below.    Past Medical History:   Diagnosis Date   • Adrenal insufficiency (HCC), secondary 11/25/2020   • Atherosclerosis of both carotid arteries, mild 11/25/2020   • BPH (benign prostatic hyperplasia) 11/25/2020   • Bronchitis     a\" very long time ago\"   • Cholelithiasis 1/18/2021    US abdomen Jan. 2021   • CKD (chronic kidney disease) stage 3, GFR 30-59 ml/min ([facility]) 11/25/2020   • Colostomy present ([facility]) 11/25/2020   • Diverticulosis of colon 11/25/2020   • GERD (gastroesophageal reflux disease) 11/25/2020   • High cholesterol    • History of atrial tachycardia 11/25/2020    Ablation 2017   • History of malignant melanoma, April 2016 11/25/2020   • History of shingles 11/25/2020   • History of stroke, subcortical infarct on MRI April 2019 11/25/2020   • Hyperlipidemia 11/25/2020   • Hypothyroid 11/25/2020   • Indigestion    • Lung cancer " (HCC), adenocarcinoma Aug. 2019 11/25/2020    Metastasis to L5 (biopsy Dec. 2019)         Past Surgical History:   Procedure Laterality Date   • CECILE BY LAPAROSCOPY  2/26/2021    Procedure: CHOLECYSTECTOMY, LAPAROSCOPIC;  Surgeon: Davey Oneal M.D.;  Location: SURGERY [facility];  Service: General   • OTHER Right 2019    LOWER LOBE OF LUNG REMOVED   • OTHER Left 2017    GROIN   • OTHER Left 2016    TAMAYO         Current Outpatient Medications   Medication Sig Dispense Refill   • atorvastatin (LIPITOR) 20 MG Tab Take 1 tablet by mouth every day. 30 tablet 11   • aspirin EC (ECOTRIN) 81 MG Tablet Delayed Response Take 1 tablet by mouth every day. 100 tablet 3   • hydrocortisone (CORTEF) 20 MG Tab Take 1 tablet by mouth every morning. 21 tablet 0   • hydrocortisone (CORTEF) 10 MG Tab Take 1 tablet by mouth every evening. 21 tablet 0   • Magnesium 250 MG Tab Take 250 mg by mouth every day.     • ANTACID ANTI-GAS MAX STRENGTH 400-400-40 MG/5ML Suspension TAKE 10 ML BY MOUTH FOUR TIMES DAILY AS NEEDED     • famotidine (PEPCID) 20 MG Tab Take 20 mg by mouth 2 times a day.     • tamsulosin (FLOMAX) 0.4 MG capsule Take 0.4 mg by mouth every day.     • CALCIUM GLUCEPTATE INJ Inject  as directed. EVERY THREE WEEKS     • Potassium Chloride CR (MICRO-K) 8 MEQ Cap CR capsule Take 2 Caps by mouth every day. 180 Cap 3   • TIROSINT 125 MCG Cap Take 125 mcg by mouth Every morning on an empty stomach. 90 Cap 3   • traZODone (DESYREL) 50 MG Tab Take 2 Tabs by mouth every bedtime. 180 Tab 3   • sildenafil citrate (VIAGRA) 100 MG tablet Take 100 mg by mouth 1 time daily as needed for Erectile Dysfunction.     • TAGRISSO 80 MG Tab Take 80 mg by mouth every day.       No current facility-administered medications for this visit.         Allergies   Allergen Reactions   • Gramineae Pollens Itching and Shortness of Breath     Itchy eyes, red face   • Cat Hair Extract    • Horse Allergy Hives   • Other Environmental    • Pollen Extract   "        History reviewed. No pertinent family history.      Social History     Socioeconomic History   • Marital status:      Spouse name: Not on file   • Number of children: Not on file   • Years of education: Not on file   • Highest education level: Not on file   Occupational History   • Not on file   Tobacco Use   • Smoking status: Never Smoker   • Smokeless tobacco: Never Used   Vaping Use   • Vaping Use: Never used   Substance and Sexual Activity   • Alcohol use: Yes     Alcohol/week: 1.8 oz     Types: 3 Glasses of wine per week     Comment: Occasionally   • Drug use: Not Currently     Comment: THC edibles   • Sexual activity: Not on file   Other Topics Concern   • Not on file   Social History Narrative   • Not on file     Social Determinants of Health     Financial Resource Strain:    • Difficulty of Paying Living Expenses:    Food Insecurity:    • Worried About Running Out of Food in the Last Year:    • Ran Out of Food in the Last Year:    Transportation Needs:    • Lack of Transportation (Medical):    • Lack of Transportation (Non-Medical):    Physical Activity:    • Days of Exercise per Week:    • Minutes of Exercise per Session:    Stress:    • Feeling of Stress :    Social Connections:    • Frequency of Communication with Friends and Family:    • Frequency of Social Gatherings with Friends and Family:    • Attends [employer] Services:    • Active Member of Clubs or Organizations:    • Attends Club or Organization Meetings:    • Marital Status:    Intimate Partner Violence:    • Fear of Current or Ex-Partner:    • Emotionally Abused:    • Physically Abused:    • Sexually Abused:          Physical Exam:  Ambulatory Vitals  /64 (BP Location: Left arm, Patient Position: Sitting, BP Cuff Size: Adult)   Pulse 69   Resp 12   Ht 1.803 m (5' 11\")   Wt 76.7 kg (169 lb)   SpO2 98%    Oxygen Therapy:  Pulse Oximetry: 98 %  BP Readings from Last 4 Encounters:   06/30/21 110/64   06/23/21 132/68 "   21 150/89   02/10/21 120/60       Weight/BMI: Body mass index is 23.57 kg/m².  Wt Readings from Last 4 Encounters:   21 76.7 kg (169 lb)   21 71 kg (156 lb 8.4 oz)   21 74.3 kg (163 lb 12.8 oz)   02/10/21 74.4 kg (164 lb)           General: Well appearing and in no apparent distress  Head: atrumatic  Eyes: No conjunctival pallor   ENT: normal external appearance of nose and ears  Neck: JVD absent, carotid bruits absent  Lungs: respiratory sounds  normal, additional breath sounds absent  Heart: Regular rhythm,   No palpable thrills on palpation, murmurs absent, no rubs,   Lower extremity edema absent.   Pedal pulses normal  Abdomen: soft, non tender, non distended.  Extremities/MSK: no clubbing, no cyanosis  Neurological: normal orientation, Gait normal   Psychiatric: Appropriate affect, intact judgement and insight  Skin: Warm extremities        Lab Data Review:  Lab Results   Component Value Date/Time    CHOLSTRLTOT 217 (H) 2020 09:00 AM     (H) 2020 09:00 AM    HDL 59 2020 09:00 AM    TRIGLYCERIDE 133 2020 09:00 AM       Lab Results   Component Value Date/Time    SODIUM 138 2021 01:50 AM    POTASSIUM 4.1 2021 01:50 AM    CHLORIDE 106 2021 01:50 AM    CO2 22 2021 01:50 AM    GLUCOSE 125 (H) 2021 01:50 AM    BUN 25 (H) 2021 01:50 AM    CREATININE 1.44 (H) 2021 01:50 AM     Lab Results   Component Value Date/Time    ALKPHOSPHAT 56 2021 01:50 AM    ASTSGOT 8 (L) 2021 01:50 AM    ALTSGPT <5 2021 01:50 AM    TBILIRUBIN 0.7 2021 01:50 AM      Lab Results   Component Value Date/Time    WBC 9.1 2021 01:50 AM     Prior cardiologist notes reviewed.  Atrial tachycardia ablation in 2017.  Echocardiogram done few days ago reviewed, personally interpreted shows normal ejection fraction.    Medical Decision Makin-year-old male patient with hypothyroidism, obstructive sleep apnea, hyperlipidemia,  adrenal insufficiency, history of lung cancer, malignant melanoma atherosclerosis, history of atrial tachycardia ablation, right bundle branch block here to establish care.    His current problem is lightheadedness, falls.  Recommend weaning off verapamil to improve his blood pressure.  If he has recurrence of tachycardia, will try metoprolol or low-dose verapamil.  Recommend talking with endocrinologist, if hydrocortisone not enough to control symptoms, might need to add mineralocorticoid.  Recommend aspirin, statin therapy, his ASCVD risk score is 20%.  Will start Lipitor 20 mg, baby aspirin 81 mg.  Goal LDL is less than 70, his most recent     Return in about 2 months (around 8/30/2021).    This note was dictated using Dragon speech recognition software.    Deng VALLADARES  Interventional cardiologist  [facility], [facility]  [address] [address]  Phone: [phone]  Fax: [phone]

## 2021-06-30 NOTE — PATIENT INSTRUCTIONS
Wean yourself off verapamil.  Your ASCVD risk is high 20%- we will need to get your LDL <70, start statin, aspirin therapy.

## 2021-07-01 ENCOUNTER — APPOINTMENT (OUTPATIENT)
Dept: RADIOLOGY | Facility: MEDICAL CENTER | Age: 76
End: 2021-07-01
Attending: INTERNAL MEDICINE
Payer: MEDICARE

## 2021-07-07 ENCOUNTER — HOSPITAL ENCOUNTER (OUTPATIENT)
Facility: MEDICAL CENTER | Age: 76
End: 2021-07-07
Attending: INTERNAL MEDICINE
Payer: MEDICARE

## 2021-07-07 ENCOUNTER — NON-PROVIDER VISIT (OUTPATIENT)
Dept: INTERNAL MEDICINE | Facility: IMAGING CENTER | Age: 76
End: 2021-07-07
Payer: MEDICARE

## 2021-07-07 LAB
25(OH)D3 SERPL-MCNC: 64 NG/ML (ref 30–100)
ALBUMIN SERPL BCP-MCNC: 3.9 G/DL (ref 3.2–4.9)
ALBUMIN/GLOB SERPL: 1.5 G/DL
ALP SERPL-CCNC: 62 U/L (ref 30–99)
ALT SERPL-CCNC: <5 U/L (ref 2–50)
ANION GAP SERPL CALC-SCNC: 11 MMOL/L (ref 7–16)
AST SERPL-CCNC: 18 U/L (ref 12–45)
BILIRUB SERPL-MCNC: 1.1 MG/DL (ref 0.1–1.5)
BUN SERPL-MCNC: 18 MG/DL (ref 8–22)
CA-I SERPL-SCNC: 1.2 MMOL/L (ref 1.1–1.3)
CALCIUM SERPL-MCNC: 8.5 MG/DL (ref 8.5–10.5)
CHLORIDE SERPL-SCNC: 110 MMOL/L (ref 96–112)
CO2 SERPL-SCNC: 24 MMOL/L (ref 20–33)
CREAT SERPL-MCNC: 1.29 MG/DL (ref 0.5–1.4)
FSH SERPL-ACNC: 19 MIU/ML (ref 1.5–12.4)
GLOBULIN SER CALC-MCNC: 2.6 G/DL (ref 1.9–3.5)
GLUCOSE SERPL-MCNC: 80 MG/DL (ref 65–99)
HCT VFR BLD AUTO: 42.8 % (ref 42–52)
HGB BLD-MCNC: 14.4 G/DL (ref 14–18)
LH SERPL-ACNC: 8.2 IU/L (ref 1.7–8.6)
MAGNESIUM SERPL-MCNC: 2 MG/DL (ref 1.5–2.5)
PHOSPHATE SERPL-MCNC: 2.2 MG/DL (ref 2.5–4.5)
POTASSIUM SERPL-SCNC: 3.4 MMOL/L (ref 3.6–5.5)
PROLACTIN SERPL-MCNC: 17.8 NG/ML (ref 2.1–17.7)
PROT SERPL-MCNC: 6.5 G/DL (ref 6–8.2)
PTH-INTACT SERPL-MCNC: 40.9 PG/ML (ref 14–72)
SODIUM SERPL-SCNC: 145 MMOL/L (ref 135–145)
T3 SERPL-MCNC: 63.9 NG/DL (ref 60–181)
T3FREE SERPL-MCNC: 1.89 PG/ML (ref 2–4.4)
T4 FREE SERPL-MCNC: 1.51 NG/DL (ref 0.93–1.7)
TESTOST SERPL-MCNC: 448 NG/DL (ref 175–781)
TSH SERPL DL<=0.005 MIU/L-ACNC: 4.35 UIU/ML (ref 0.38–5.33)
VIT B12 SERPL-MCNC: 246 PG/ML (ref 211–911)

## 2021-07-07 PROCEDURE — 84439 ASSAY OF FREE THYROXINE: CPT

## 2021-07-07 PROCEDURE — 83001 ASSAY OF GONADOTROPIN (FSH): CPT

## 2021-07-07 PROCEDURE — 85014 HEMATOCRIT: CPT

## 2021-07-07 PROCEDURE — 84443 ASSAY THYROID STIM HORMONE: CPT

## 2021-07-07 PROCEDURE — 84146 ASSAY OF PROLACTIN: CPT

## 2021-07-07 PROCEDURE — 82607 VITAMIN B-12: CPT

## 2021-07-07 PROCEDURE — 84403 ASSAY OF TOTAL TESTOSTERONE: CPT

## 2021-07-07 PROCEDURE — 80053 COMPREHEN METABOLIC PANEL: CPT

## 2021-07-07 PROCEDURE — 82306 VITAMIN D 25 HYDROXY: CPT

## 2021-07-07 PROCEDURE — 83002 ASSAY OF GONADOTROPIN (LH): CPT

## 2021-07-07 PROCEDURE — 84270 ASSAY OF SEX HORMONE GLOBUL: CPT

## 2021-07-07 PROCEDURE — 85018 HEMOGLOBIN: CPT

## 2021-07-07 PROCEDURE — 83970 ASSAY OF PARATHORMONE: CPT

## 2021-07-07 PROCEDURE — 84481 FREE ASSAY (FT-3): CPT

## 2021-07-07 PROCEDURE — 84480 ASSAY TRIIODOTHYRONINE (T3): CPT

## 2021-07-07 PROCEDURE — 83735 ASSAY OF MAGNESIUM: CPT

## 2021-07-07 PROCEDURE — 82330 ASSAY OF CALCIUM: CPT

## 2021-07-07 PROCEDURE — 84100 ASSAY OF PHOSPHORUS: CPT

## 2021-07-10 LAB — SHBG SERPL-SCNC: 63 NMOL/L (ref 11–80)

## 2021-08-31 ENCOUNTER — OFFICE VISIT (OUTPATIENT)
Dept: CARDIOLOGY | Facility: MEDICAL CENTER | Age: 76
End: 2021-08-31
Payer: MEDICARE

## 2021-08-31 VITALS
HEART RATE: 45 BPM | SYSTOLIC BLOOD PRESSURE: 110 MMHG | DIASTOLIC BLOOD PRESSURE: 62 MMHG | OXYGEN SATURATION: 96 % | RESPIRATION RATE: 14 BRPM | BODY MASS INDEX: 23.94 KG/M2 | WEIGHT: 171 LBS | HEIGHT: 71 IN

## 2021-08-31 DIAGNOSIS — R00.2 PALPITATIONS: ICD-10-CM

## 2021-08-31 DIAGNOSIS — I15.9 SECONDARY HYPERTENSION: ICD-10-CM

## 2021-08-31 DIAGNOSIS — E78.5 HYPERLIPIDEMIA, UNSPECIFIED HYPERLIPIDEMIA TYPE: ICD-10-CM

## 2021-08-31 LAB — EKG IMPRESSION: NORMAL

## 2021-08-31 PROCEDURE — 99213 OFFICE O/P EST LOW 20 MIN: CPT | Mod: 25 | Performed by: INTERNAL MEDICINE

## 2021-08-31 PROCEDURE — 93000 ELECTROCARDIOGRAM COMPLETE: CPT | Performed by: INTERNAL MEDICINE

## 2021-08-31 RX ORDER — LIOTHYRONINE SODIUM 5 UG/1
TABLET ORAL
COMMUNITY
Start: 2021-07-13 | End: 2022-04-19

## 2021-08-31 ASSESSMENT — FIBROSIS 4 INDEX: FIB4 SCORE: 2.96

## 2021-08-31 NOTE — PROGRESS NOTES
"      Cardiology Follow-up Consultation Note    Date of note:    8/31/2021    Primary Care Provider: Rian Puente M.D.    Name:             Bartolo Forrest   YOB: 1945  MRN:               [id number]    CC: Follow-up orthostatic hypotension    Patient ID/HPI:   76-year-old male patient here for early follow-up for blood pressure, orthostatic hypotension.  Last time we discussed dietary changes, water intake, verapamil was stopped.  With modifications he feels significantly better, shortness of breath improved, dizziness with standing improved significantly.  No particular complaints today.        Past Medical History:   Diagnosis Date   • Adrenal insufficiency (HCC), secondary 11/25/2020   • Atherosclerosis of both carotid arteries, mild 11/25/2020   • BPH (benign prostatic hyperplasia) 11/25/2020   • Bronchitis     a\" very long time ago\"   • Cholelithiasis 1/18/2021    US abdomen Jan. 2021   • CKD (chronic kidney disease) stage 3, GFR 30-59 ml/min ([facility]) 11/25/2020   • Colostomy present ([facility]) 11/25/2020   • Diverticulosis of colon 11/25/2020   • GERD (gastroesophageal reflux disease) 11/25/2020   • High cholesterol    • History of atrial tachycardia 11/25/2020    Ablation 2017   • History of malignant melanoma, April 2016 11/25/2020   • History of shingles 11/25/2020   • History of stroke, subcortical infarct on MRI April 2019 11/25/2020   • Hyperlipidemia 11/25/2020   • Hypothyroid 11/25/2020   • Indigestion    • Lung cancer (HCC), adenocarcinoma Aug. 2019 11/25/2020    Metastasis to L5 (biopsy Dec. 2019)         Past Surgical History:   Procedure Laterality Date   • CECILE BY LAPAROSCOPY  2/26/2021    Procedure: CHOLECYSTECTOMY, LAPAROSCOPIC;  Surgeon: Davey Oneal M.D.;  Location: SURGERY [facility];  Service: General   • OTHER Right 2019    LOWER LOBE OF LUNG REMOVED   • OTHER Left 2017    GROIN   • OTHER Left 2016    TAMAYO         Current Outpatient Medications   Medication Sig " Dispense Refill   • traZODone (DESYREL) 50 MG Tab TAKE 2 TABLETS BY MOUTH EVERY NIGHT AT BEDTIME 180 Tablet 3   • atorvastatin (LIPITOR) 20 MG Tab Take 1 tablet by mouth every day. 30 tablet 11   • aspirin EC (ECOTRIN) 81 MG Tablet Delayed Response Take 1 tablet by mouth every day. 100 tablet 3   • hydrocortisone (CORTEF) 10 MG Tab Take 1 tablet by mouth every evening. (Patient taking differently: Take 10 mg by mouth 2 times a day.) 21 tablet 0   • ANTACID ANTI-GAS MAX STRENGTH 400-400-40 MG/5ML Suspension TAKE 10 ML BY MOUTH FOUR TIMES DAILY AS NEEDED     • famotidine (PEPCID) 20 MG Tab Take 20 mg by mouth 2 times a day.     • tamsulosin (FLOMAX) 0.4 MG capsule Take 0.4 mg by mouth every day.     • CALCIUM GLUCEPTATE INJ Inject  as directed. EVERY THREE WEEKS     • Potassium Chloride CR (MICRO-K) 8 MEQ Cap CR capsule Take 2 Caps by mouth every day. 180 Cap 3   • TIROSINT 125 MCG Cap Take 125 mcg by mouth Every morning on an empty stomach. 90 Cap 3   • sildenafil citrate (VIAGRA) 100 MG tablet Take 100 mg by mouth 1 time daily as needed for Erectile Dysfunction.     • TAGRISSO 80 MG Tab Take 80 mg by mouth every day.     • liothyronine (CYTOMEL) 5 MCG Tab TAKE 2 TABLETS BY MOUTH EVERY DAY ON AN EMPTY STOMACH       No current facility-administered medications for this visit.         Allergies   Allergen Reactions   • Gramineae Pollens Itching and Shortness of Breath     Itchy eyes, red face   • Cat Hair Extract    • Horse Allergy Hives   • Other Environmental    • Pollen Extract          Social History     Socioeconomic History   • Marital status:      Spouse name: Not on file   • Number of children: Not on file   • Years of education: Not on file   • Highest education level: Not on file   Occupational History   • Not on file   Tobacco Use   • Smoking status: Never Smoker   • Smokeless tobacco: Never Used   Vaping Use   • Vaping Use: Never used   Substance and Sexual Activity   • Alcohol use: Yes      "Alcohol/week: 1.8 oz     Types: 3 Glasses of wine per week     Comment: Occasionally   • Drug use: Not Currently     Comment: THC edibles   • Sexual activity: Not on file   Other Topics Concern   • Not on file   Social History Narrative   • Not on file     Social Determinants of Health     Financial Resource Strain:    • Difficulty of Paying Living Expenses:    Food Insecurity:    • Worried About Running Out of Food in the Last Year:    • Ran Out of Food in the Last Year:    Transportation Needs:    • Lack of Transportation (Medical):    • Lack of Transportation (Non-Medical):    Physical Activity:    • Days of Exercise per Week:    • Minutes of Exercise per Session:    Stress:    • Feeling of Stress :    Social Connections:    • Frequency of Communication with Friends and Family:    • Frequency of Social Gatherings with Friends and Family:    • Attends [employer] Services:    • Active Member of Clubs or Organizations:    • Attends Club or Organization Meetings:    • Marital Status:    Intimate Partner Violence:    • Fear of Current or Ex-Partner:    • Emotionally Abused:    • Physically Abused:    • Sexually Abused:          Physical Exam:  Ambulatory Vitals  /62 (BP Location: Left arm, Patient Position: Sitting, BP Cuff Size: Adult)   Pulse (!) 45   Resp 14   Ht 1.803 m (5' 11\")   Wt 77.6 kg (171 lb)   SpO2 96%    Oxygen Therapy:  Pulse Oximetry: 96 %  BP Readings from Last 4 Encounters:   08/31/21 110/62   06/30/21 110/64   06/23/21 132/68   02/26/21 150/89       Weight/BMI: Body mass index is 23.85 kg/m².  Wt Readings from Last 4 Encounters:   08/31/21 77.6 kg (171 lb)   06/30/21 76.7 kg (169 lb)   06/20/21 71 kg (156 lb 8.4 oz)   02/26/21 74.3 kg (163 lb 12.8 oz)       General: Well appearing and in no apparent distress  Head: atrumatic  Eyes: No conjunctival pallor   ENT: normal external appearance of nose and ears  Neck: JVD absent, carotid bruits absent  Lungs: respiratory sounds  normal, " additional breath sounds absent  Heart: Irregular rhythm,   No palpable thrills on palpation, murmurs absent, no rubs,   Lower extremity edema absent.   Pedal pulses normal  Abdomen: soft, non tender, non distended.  Extremities/MSK: no clubbing, no cyanosis  Neurological: normal orientation, Gait normal   Psychiatric: Appropriate affect, intact judgement and insight  Skin: Warm extremities        Lab Data Review:  Lab Results   Component Value Date/Time    CHOLSTRLTOT 217 (H) 2020 09:00 AM     (H) 2020 09:00 AM    HDL 59 2020 09:00 AM    TRIGLYCERIDE 133 2020 09:00 AM       Lab Results   Component Value Date/Time    SODIUM 145 2021 10:00 AM    POTASSIUM 3.4 (L) 2021 10:00 AM    CHLORIDE 110 2021 10:00 AM    CO2 24 2021 10:00 AM    GLUCOSE 80 2021 10:00 AM    BUN 18 2021 10:00 AM    CREATININE 1.29 2021 10:00 AM     Lab Results   Component Value Date/Time    ALKPHOSPHAT 62 2021 10:00 AM    ASTSGOT 18 2021 10:00 AM    ALTSGPT <5 2021 10:00 AM    TBILIRUBIN 1.1 2021 10:00 AM      Lab Results   Component Value Date/Time    WBC 9.1 2021 01:50 AM     Prior cardiologist notes reviewed.  Atrial tachycardia ablation in 2017.  Echocardiogram 21shows normal ejection fraction.    EKG today shows sinus rhythm, supraventricular bigeminy, right bundle branch block     Medical Decision Makin-year-old male patient with hypothyroidism, obstructive sleep apnea, hyperlipidemia, adrenal insufficiency, history of lung cancer, malignant melanoma atherosclerosis, history of atrial tachycardia ablation, right bundle branch block here for follow-up.  He feels significantly better with less fatigue, shortness of breath, orthostatic hypotension after stopping verapamil.  His EKG showed supraventricular bigeminy but he is asymptomatic, heart rate is 80, given his orthostatic hypotension I am reluctant to start any medications for  this.  1 year follow-up with me.    Return in about 1 year (around 8/31/2022).      Deng VALLADARES  Interventional cardiologist  [facility], [facility]  [address] [address]  Phone: [phone]  Fax: [phone]

## 2021-09-07 ENCOUNTER — HOSPITAL ENCOUNTER (OUTPATIENT)
Dept: RADIOLOGY | Facility: MEDICAL CENTER | Age: 76
End: 2021-09-07
Attending: INTERNAL MEDICINE
Payer: MEDICARE

## 2021-09-07 DIAGNOSIS — C77.9 MALIGNANT NEOPLASM METASTATIC TO LYMPH NODES, UNSPECIFIED LYMPH NODE REGION (HCC): ICD-10-CM

## 2021-09-07 DIAGNOSIS — C79.52 SECONDARY MALIGNANT NEOPLASM OF BONE AND BONE MARROW (HCC): ICD-10-CM

## 2021-09-07 DIAGNOSIS — C79.51 SECONDARY MALIGNANT NEOPLASM OF BONE AND BONE MARROW (HCC): ICD-10-CM

## 2021-09-07 DIAGNOSIS — C34.91 PRIMARY LUNG CANCER WITH METASTASIS FROM LUNG TO OTHER SITE, RIGHT (HCC): ICD-10-CM

## 2021-09-07 PROCEDURE — A9552 F18 FDG: HCPCS

## 2021-09-08 ENCOUNTER — HOSPITAL ENCOUNTER (OUTPATIENT)
Facility: MEDICAL CENTER | Age: 76
End: 2021-09-08
Attending: INTERNAL MEDICINE
Payer: MEDICARE

## 2021-09-08 ENCOUNTER — NON-PROVIDER VISIT (OUTPATIENT)
Dept: INTERNAL MEDICINE | Facility: IMAGING CENTER | Age: 76
End: 2021-09-08
Payer: MEDICARE

## 2021-09-08 DIAGNOSIS — Z85.118 HISTORY OF LUNG CANCER: ICD-10-CM

## 2021-09-08 DIAGNOSIS — K57.30 DIVERTICULOSIS OF COLON: ICD-10-CM

## 2021-09-08 DIAGNOSIS — R97.20 ELEVATED PSA: ICD-10-CM

## 2021-09-08 DIAGNOSIS — N18.30 STAGE 3 CHRONIC KIDNEY DISEASE, UNSPECIFIED WHETHER STAGE 3A OR 3B CKD: ICD-10-CM

## 2021-09-08 DIAGNOSIS — Z01.89 ENCOUNTER FOR ROUTINE LABORATORY TESTING: ICD-10-CM

## 2021-09-08 DIAGNOSIS — I65.23 ATHEROSCLEROSIS OF BOTH CAROTID ARTERIES: ICD-10-CM

## 2021-09-08 PROCEDURE — 84439 ASSAY OF FREE THYROXINE: CPT

## 2021-09-08 PROCEDURE — 84153 ASSAY OF PSA TOTAL: CPT

## 2021-09-08 PROCEDURE — 84481 FREE ASSAY (FT-3): CPT

## 2021-09-08 PROCEDURE — 84480 ASSAY TRIIODOTHYRONINE (T3): CPT

## 2021-09-08 PROCEDURE — 84443 ASSAY THYROID STIM HORMONE: CPT

## 2021-09-08 PROCEDURE — 84154 ASSAY OF PSA FREE: CPT

## 2021-09-08 PROCEDURE — 81001 URINALYSIS AUTO W/SCOPE: CPT

## 2021-09-08 PROCEDURE — 80061 LIPID PANEL: CPT

## 2021-09-08 PROCEDURE — 85025 COMPLETE CBC W/AUTO DIFF WBC: CPT

## 2021-09-08 PROCEDURE — 82607 VITAMIN B-12: CPT

## 2021-09-09 LAB
APPEARANCE UR: CLEAR
BACTERIA #/AREA URNS HPF: NEGATIVE /HPF
BASOPHILS # BLD AUTO: 0.8 % (ref 0–1.8)
BASOPHILS # BLD: 0.04 K/UL (ref 0–0.12)
BILIRUB UR QL STRIP.AUTO: NEGATIVE
CAOX CRY #/AREA URNS HPF: ABNORMAL /HPF
CHOLEST SERPL-MCNC: 130 MG/DL (ref 100–199)
COLOR UR: YELLOW
EOSINOPHIL # BLD AUTO: 0.18 K/UL (ref 0–0.51)
EOSINOPHIL NFR BLD: 3.5 % (ref 0–6.9)
EPI CELLS #/AREA URNS HPF: NEGATIVE /HPF
ERYTHROCYTE [DISTWIDTH] IN BLOOD BY AUTOMATED COUNT: 45.7 FL (ref 35.9–50)
GLUCOSE UR STRIP.AUTO-MCNC: NEGATIVE MG/DL
HCT VFR BLD AUTO: 44.1 % (ref 42–52)
HDLC SERPL-MCNC: 56 MG/DL
HGB BLD-MCNC: 14.9 G/DL (ref 14–18)
HYALINE CASTS #/AREA URNS LPF: ABNORMAL /LPF
IMM GRANULOCYTES # BLD AUTO: 0.01 K/UL (ref 0–0.11)
IMM GRANULOCYTES NFR BLD AUTO: 0.2 % (ref 0–0.9)
KETONES UR STRIP.AUTO-MCNC: NEGATIVE MG/DL
LDLC SERPL CALC-MCNC: 54 MG/DL
LEUKOCYTE ESTERASE UR QL STRIP.AUTO: NEGATIVE
LYMPHOCYTES # BLD AUTO: 1.24 K/UL (ref 1–4.8)
LYMPHOCYTES NFR BLD: 24 % (ref 22–41)
MCH RBC QN AUTO: 33.4 PG (ref 27–33)
MCHC RBC AUTO-ENTMCNC: 33.8 G/DL (ref 33.7–35.3)
MCV RBC AUTO: 98.9 FL (ref 81.4–97.8)
MICRO URNS: ABNORMAL
MONOCYTES # BLD AUTO: 0.53 K/UL (ref 0–0.85)
MONOCYTES NFR BLD AUTO: 10.3 % (ref 0–13.4)
MUCOUS THREADS #/AREA URNS HPF: ABNORMAL /HPF
NEUTROPHILS # BLD AUTO: 3.17 K/UL (ref 1.82–7.42)
NEUTROPHILS NFR BLD: 61.2 % (ref 44–72)
NITRITE UR QL STRIP.AUTO: NEGATIVE
NRBC # BLD AUTO: 0 K/UL
NRBC BLD-RTO: 0 /100 WBC
PH UR STRIP.AUTO: 5.5 [PH] (ref 5–8)
PLATELET # BLD AUTO: 208 K/UL (ref 164–446)
PMV BLD AUTO: 10.6 FL (ref 9–12.9)
PROT UR QL STRIP: 30 MG/DL
RBC # BLD AUTO: 4.46 M/UL (ref 4.7–6.1)
RBC # URNS HPF: ABNORMAL /HPF
RBC UR QL AUTO: NEGATIVE
SP GR UR STRIP.AUTO: 1.02
T3 SERPL-MCNC: 126 NG/DL (ref 60–181)
T3FREE SERPL-MCNC: 3.77 PG/ML (ref 2–4.4)
T4 FREE SERPL-MCNC: 1.35 NG/DL (ref 0.93–1.7)
TRIGL SERPL-MCNC: 102 MG/DL (ref 0–149)
TSH SERPL DL<=0.005 MIU/L-ACNC: 1.28 UIU/ML (ref 0.38–5.33)
UROBILINOGEN UR STRIP.AUTO-MCNC: 0.2 MG/DL
VIT B12 SERPL-MCNC: 2718 PG/ML (ref 211–911)
WBC # BLD AUTO: 5.2 K/UL (ref 4.8–10.8)
WBC #/AREA URNS HPF: ABNORMAL /HPF

## 2021-09-10 LAB
PSA FREE MFR SERPL: 22 %
PSA FREE SERPL-MCNC: 1.4 NG/ML
PSA SERPL-MCNC: 6.3 NG/ML (ref 0–4)

## 2021-09-14 ENCOUNTER — HOSPITAL ENCOUNTER (OUTPATIENT)
Dept: RADIOLOGY | Facility: MEDICAL CENTER | Age: 76
End: 2021-09-14
Attending: INTERNAL MEDICINE
Payer: MEDICARE

## 2021-09-14 DIAGNOSIS — C34.91 PRIMARY LUNG CANCER WITH METASTASIS FROM LUNG TO OTHER SITE, RIGHT (HCC): ICD-10-CM

## 2021-09-14 DIAGNOSIS — C79.51 SECONDARY MALIGNANT NEOPLASM OF BONE AND BONE MARROW (HCC): ICD-10-CM

## 2021-09-14 DIAGNOSIS — C77.9 MALIGNANT NEOPLASM METASTATIC TO LYMPH NODES, UNSPECIFIED LYMPH NODE REGION (HCC): ICD-10-CM

## 2021-09-14 DIAGNOSIS — C79.52 SECONDARY MALIGNANT NEOPLASM OF BONE AND BONE MARROW (HCC): ICD-10-CM

## 2021-09-14 PROCEDURE — 700117 HCHG RX CONTRAST REV CODE 255: Performed by: INTERNAL MEDICINE

## 2021-09-14 PROCEDURE — 70553 MRI BRAIN STEM W/O & W/DYE: CPT | Mod: MH

## 2021-09-14 PROCEDURE — A9576 INJ PROHANCE MULTIPACK: HCPCS | Performed by: INTERNAL MEDICINE

## 2021-09-14 RX ADMIN — GADOTERIDOL 15 ML: 279.3 INJECTION, SOLUTION INTRAVENOUS at 10:29

## 2021-09-19 ENCOUNTER — HOSPITAL ENCOUNTER (OUTPATIENT)
Dept: RADIOLOGY | Facility: MEDICAL CENTER | Age: 76
End: 2021-09-19
Attending: INTERNAL MEDICINE
Payer: MEDICARE

## 2021-09-19 DIAGNOSIS — C43.72 MALIGNANT MELANOMA OF LEFT LOWER EXTREMITY INCLUDING HIP (HCC): ICD-10-CM

## 2021-09-19 DIAGNOSIS — C34.11 MALIGNANT NEOPLASM OF UPPER LOBE OF RIGHT LUNG (HCC): ICD-10-CM

## 2021-09-19 PROCEDURE — A9576 INJ PROHANCE MULTIPACK: HCPCS | Performed by: INTERNAL MEDICINE

## 2021-09-19 PROCEDURE — 700117 HCHG RX CONTRAST REV CODE 255: Performed by: INTERNAL MEDICINE

## 2021-09-19 PROCEDURE — 72197 MRI PELVIS W/O & W/DYE: CPT | Mod: MH

## 2021-09-19 RX ADMIN — GADOTERIDOL 15 ML: 279.3 INJECTION, SOLUTION INTRAVENOUS at 15:06

## 2021-09-27 ENCOUNTER — HOSPITAL ENCOUNTER (OUTPATIENT)
Facility: MEDICAL CENTER | Age: 76
End: 2021-09-27
Attending: INTERNAL MEDICINE
Payer: MEDICARE

## 2021-09-27 PROCEDURE — 84153 ASSAY OF PSA TOTAL: CPT

## 2021-09-27 PROCEDURE — 80053 COMPREHEN METABOLIC PANEL: CPT

## 2021-09-28 ENCOUNTER — APPOINTMENT (OUTPATIENT)
Dept: ADMISSIONS | Facility: MEDICAL CENTER | Age: 76
End: 2021-09-28
Payer: MEDICARE

## 2021-09-28 LAB
ALBUMIN SERPL BCP-MCNC: 4.3 G/DL (ref 3.2–4.9)
ALBUMIN/GLOB SERPL: 1.9 G/DL
ALP SERPL-CCNC: 84 U/L (ref 30–99)
ALT SERPL-CCNC: 14 U/L (ref 2–50)
ANION GAP SERPL CALC-SCNC: 13 MMOL/L (ref 7–16)
AST SERPL-CCNC: 21 U/L (ref 12–45)
BILIRUB SERPL-MCNC: 1 MG/DL (ref 0.1–1.5)
BUN SERPL-MCNC: 25 MG/DL (ref 8–22)
CALCIUM SERPL-MCNC: 9.6 MG/DL (ref 8.5–10.5)
CHLORIDE SERPL-SCNC: 105 MMOL/L (ref 96–112)
CO2 SERPL-SCNC: 24 MMOL/L (ref 20–33)
CREAT SERPL-MCNC: 1.16 MG/DL (ref 0.5–1.4)
GLOBULIN SER CALC-MCNC: 2.3 G/DL (ref 1.9–3.5)
GLUCOSE SERPL-MCNC: 125 MG/DL (ref 65–99)
POTASSIUM SERPL-SCNC: 4.1 MMOL/L (ref 3.6–5.5)
PROT SERPL-MCNC: 6.6 G/DL (ref 6–8.2)
PSA SERPL-MCNC: 5.9 NG/ML (ref 0–4)
SODIUM SERPL-SCNC: 142 MMOL/L (ref 135–145)

## 2021-10-04 ENCOUNTER — HOSPITAL ENCOUNTER (OUTPATIENT)
Dept: RADIATION ONCOLOGY | Facility: MEDICAL CENTER | Age: 76
End: 2021-10-31
Attending: RADIOLOGY
Payer: MEDICARE

## 2021-10-04 VITALS
TEMPERATURE: 97.4 F | HEART RATE: 73 BPM | HEIGHT: 72 IN | DIASTOLIC BLOOD PRESSURE: 90 MMHG | SYSTOLIC BLOOD PRESSURE: 167 MMHG | WEIGHT: 167 LBS | BODY MASS INDEX: 22.62 KG/M2 | OXYGEN SATURATION: 97 %

## 2021-10-04 DIAGNOSIS — C43.72 MALIGNANT MELANOMA OF LEFT LOWER LEG (HCC): ICD-10-CM

## 2021-10-04 DIAGNOSIS — Z23 NEED FOR IMMUNIZATION AGAINST INFLUENZA: ICD-10-CM

## 2021-10-04 DIAGNOSIS — C34.90 MALIGNANT NEOPLASM OF LUNG, UNSPECIFIED LATERALITY, UNSPECIFIED PART OF LUNG (HCC): ICD-10-CM

## 2021-10-04 PROCEDURE — 99214 OFFICE O/P EST MOD 30 MIN: CPT | Performed by: RADIOLOGY

## 2021-10-04 PROCEDURE — 99205 OFFICE O/P NEW HI 60 MIN: CPT | Performed by: RADIOLOGY

## 2021-10-04 RX ORDER — NEEDLES, FILTER 19GX1 1/2"
NEEDLE, DISPOSABLE MISCELLANEOUS
COMMUNITY
Start: 2021-09-13 | End: 2024-03-06

## 2021-10-04 RX ORDER — TESTOSTERONE CYPIONATE 200 MG/ML
INJECTION, SOLUTION INTRAMUSCULAR
Status: ON HOLD | COMMUNITY
Start: 2021-09-13 | End: 2021-10-15

## 2021-10-04 RX ORDER — SYRINGE WITH NEEDLE, 1 ML 25GX5/8"
SYRINGE, EMPTY DISPOSABLE MISCELLANEOUS
Status: ON HOLD | COMMUNITY
Start: 2021-09-13 | End: 2021-10-15

## 2021-10-04 ASSESSMENT — PAIN SCALES - GENERAL: PAINLEVEL: 6=MODERATE PAIN

## 2021-10-04 ASSESSMENT — FIBROSIS 4 INDEX: FIB4 SCORE: 2

## 2021-10-04 NOTE — CONSULTS
RADIATION ONCOLOGY CONSULT    DATE OF SERVICE: 10/4/2021    IDENTIFICATION: A 76 y.o. male with   Lung cancer (HCC), adenocarcinoma Aug. 2019  Staging form: Lung, AJCC 8th Edition  - Pathologic stage from 10/4/2021: Stage EDENILSON (pT1, pN0, cM1b) - Signed by Emanuel Villarreal M.D. on 10/4/2021  Type of lung cancer: Locally advanced or metastatic non-small cell lung cancer  EGFR mutation: Positive    He is here at the kind request of Dr. Corral    HISTORY OF PRESENT ILLNESS:   Patient is a 76-year-old male with history of metastatic melanoma as well as metastatic lung cancer.  His history dates back to 2016 when he was diagnosed with left lower leg melanoma and had 1 sentinel lymph node positive and denied further lymph node dissection and was found in September 2017 to have recurrent disease in the left inguinal and pelvic region and received nivolumab and later nivolumab and ipilimumab with intralesional T Vec being co-managed by Dr. Vu Phillips and Dr. Corral.  Patient subsequently developed a right lower lobe lung lesion and underwent resection August 20, 2019 with pathology revealing grade 2 adenocarcinoma 0/21 lymph nodes EGFR mutated.  Patient was followed with PET scan which showed L5 spinal lesion with biopsy December 27, 2019 revealing lung cancer adenocarcinoma EGFR positive.  Patient underwent SBRT 30 in 3 fractions completed 1/27/2020 at [facility] with Dr. Martinez.  In April 2020 the patient was found to progression disease in bones including T11 and sacrum and Tagrisso was started which yielded a complete response.  However PET December 16, 2020 returned activity to the left sacral lesion and then resolved on future pets however eventually PET activity returned September 7, 2021 MRI brain was normal.  Patient was presented in multidisciplinary melanoma tumor board with consensus to consider radiation with SBRT to the left sacral lesion in addition MRI pelvis September 19, 2021 showed an  adjacent left iliac bone lesion suspicious for metastasis.  Patient was seen with Dr. Martinez and plan was for repeat stereotactic body radiation therapy.  He is having pain 6 out of 10 in the lower back for which he uses occasional Norco for.  But denies any neuropathic symptoms.    PROBLEM LIST:  Patient Active Problem List   Diagnosis   • Hypothyroid   • CKD (chronic kidney disease) stage 3, GFR 30-59 ml/min (HCC)   • Obstructive sleep apnea   • GERD (gastroesophageal reflux disease)   • Hyperlipidemia   • Adrenal insufficiency (HCC), secondary   • Lung cancer (HCC), adenocarcinoma Aug. 2019   • History of stroke, subcortical infarct on MRI April 2019   • Diverticulosis of colon   • History of shingles   • Vitamin D deficiency   • History of malignant melanoma, April 2016   • History of atrial tachycardia   • BPH with obstruction/lower urinary tract symptoms   • Elevated PSA   • Atherosclerosis of both carotid arteries, mild   • Syncope   • Metastasis (HCC)   • Shortness of breath   • Anxiety   • Brain lesion   • Calculus of gallbladder without cholecystitis without obstruction   • Cerebral infarct (HCC)   • Chronic pain   • Cellulitis   • Diverticulitis of large intestine with perforation without bleeding   • Facial basal cell cancer   • Family history of coronary artery disease   • Family history of early CAD   • H/O cardiac radiofrequency ablation   • Hypertension   • Hypothyroidism due to Hashimoto's thyroiditis   • Left renal atrophy   • Long term (current) use of systemic steroids   • Lung mass   • Lymphedema of lower extremity   • Malignant melanoma of left lower leg (HCC)   • Need for immunization against influenza   • Neoplasm of uncertain behavior   • Orthostatic hypotension   • Palpitations   • Premature atrial contractions   • Adenocarcinoma of lung (HCC)   • Drug-induced skin rash   • Renal insufficiency syndrome   • Intercostal pain   • Right foot drop   • Right peroneal nerve palsy   • History of  "radiation therapy   • Sensorineural hearing loss of both ears   • 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;  Service: General   • OTHER Right 2019    LOWER LOBE OF LUNG REMOVED   • OTHER Left 2017    GROIN   • OTHER Left 2016    SHIN       CURRENT MEDICATIONS:  Current Outpatient Medications   Medication Sig Dispense Refill   • MAGNESIUM CITRATE PO Take  by mouth.     • traZODone (DESYREL) 50 MG Tab TAKE 2 TABLETS BY MOUTH EVERY NIGHT AT BEDTIME 180 Tablet 3   • liothyronine (CYTOMEL) 5 MCG Tab TAKE 2 TABLETS BY MOUTH EVERY DAY ON AN EMPTY STOMACH     • aspirin EC (ECOTRIN) 81 MG Tablet Delayed Response Take 1 tablet by mouth every day. 100 tablet 3   • hydrocortisone (CORTEF) 10 MG Tab Take 1 tablet by mouth every evening. (Patient taking differently: Take 10 mg by mouth 2 times a day.) 21 tablet 0   • ANTACID ANTI-GAS MAX STRENGTH 400-400-40 MG/5ML Suspension TAKE 10 ML BY MOUTH FOUR TIMES DAILY AS NEEDED     • famotidine (PEPCID) 20 MG Tab Take 20 mg by mouth 2 times a day.     • tamsulosin (FLOMAX) 0.4 MG capsule Take 0.4 mg by mouth every day.     • CALCIUM GLUCEPTATE INJ Inject  as directed. EVERY THREE WEEKS     • Potassium Chloride CR (MICRO-K) 8 MEQ Cap CR capsule Take 2 Caps by mouth every day. 180 Cap 3   • TIROSINT 125 MCG Cap Take 125 mcg by mouth Every morning on an empty stomach. 90 Cap 3   • sildenafil citrate (VIAGRA) 100 MG tablet Take 100 mg by mouth 1 time daily as needed for Erectile Dysfunction.     • TAGRISSO 80 MG Tab Take 80 mg by mouth every day.     • B-D 3CC LUER-JAY SYR 10PA7-1/2 18G X 1-1/2\" 3 ML Misc USE TO DRAW TESTOSTERONE EVERY 7 DAYS (Patient not taking: Reported on 10/4/2021)     • testosterone cypionate " (DEPO-TESTOSTERONE) 200 MG/ML Solution injection  (Patient not taking: Reported on 10/4/2021)     • atorvastatin (LIPITOR) 20 MG Tab Take 1 tablet by mouth every day. (Patient not taking: Reported on 10/4/2021) 30 tablet 11     No current facility-administered medications for this encounter.     PAIN:  Pain Score: 6=Moderate Pain  Pain Scale: 0-10  Pain Assessement: Initial  Pain Location, Orientation and Scale: Back: Lower : Acute : 6  What makes the pain better: Pt . Not currently on pain medication  What makes the pain worse: Movement     ALLERGIES:    Gramineae pollens, Cat hair extract, Horse allergy, Other environmental, and Pollen extract    FAMILY HISTORY:    family history includes Cancer in his brother, brother, father, mother, and sister.    SOCIAL HISTORY:     reports that he has never smoked. He has never used smokeless tobacco. He reports current alcohol use of about 1.8 oz of alcohol per week. He reports previous drug use.  Pt. Is a 76 year old male, he resides in [place] with his wife. He is a self employed executive.     REVIEW OF SYSTEMS:    A complete review of systems taken. Pertinent items in HPI.     PHYSICAL EXAM:    PERFORMANCE STATUS:  ECOG Performance Review 10/4/2021   ECOG Performance Status Fully active, able to carry on all pre-disease performance without restriction   Some recent data might be hidden     Karnofsky Score 10/4/2021   Karnofsky Score 100   Some recent data might be hidden     BP (!) 167/90 (BP Location: Right arm, Patient Position: Sitting, BP Cuff Size: Adult)   Pulse 73   Temp 36.3 °C (97.4 °F) (Temporal)   Ht 1.829 m (6')   Wt 75.8 kg (167 lb)   SpO2 97%   BMI 22.65 kg/m²   Physical Exam  Vitals reviewed.   HENT:      Head: Normocephalic and atraumatic.      Mouth/Throat:      Mouth: Mucous membranes are moist.   Eyes:      Pupils: Pupils are equal, round, and reactive to light.   Cardiovascular:      Rate and Rhythm: Normal rate.   Pulmonary:      Effort: Pulmonary  effort is normal.   Abdominal:      General: Abdomen is flat.   Musculoskeletal:         General: Normal range of motion.      Cervical back: Normal range of motion.   Skin:     General: Skin is warm.   Neurological:      General: No focal deficit present.      Mental Status: He is alert.   Psychiatric:         Mood and Affect: Mood normal.          LABORATORY DATA:   Lab Results   Component Value Date/Time    WBC 5.2 09/08/2021 09:30 AM    RBC 4.46 (L) 09/08/2021 09:30 AM    HEMOGLOBIN 14.9 09/08/2021 09:30 AM    HEMATOCRIT 44.1 09/08/2021 09:30 AM    MCV 98.9 (H) 09/08/2021 09:30 AM    MCH 33.4 (H) 09/08/2021 09:30 AM    MCHC 33.8 09/08/2021 09:30 AM    RDW 45.7 09/08/2021 09:30 AM    PLATELETCT 208 09/08/2021 09:30 AM    MPV 10.6 09/08/2021 09:30 AM    NEUTSPOLYS 61.20 09/08/2021 09:30 AM    LYMPHOCYTES 24.00 09/08/2021 09:30 AM    MONOCYTES 10.30 09/08/2021 09:30 AM    EOSINOPHILS 3.50 09/08/2021 09:30 AM    BASOPHILS 0.80 09/08/2021 09:30 AM      Lab Results   Component Value Date/Time    SODIUM 142 09/27/2021 04:41 PM    POTASSIUM 4.1 09/27/2021 04:41 PM    CHLORIDE 105 09/27/2021 04:41 PM    CO2 24 09/27/2021 04:41 PM    GLUCOSE 125 (H) 09/27/2021 04:41 PM    BUN 25 (H) 09/27/2021 04:41 PM    CREATININE 1.16 09/27/2021 04:41 PM           RADIOLOGY DATA:  MR-BRAIN-WITH & W/O    Result Date: 9/14/2021  1.  There is no intracranial metastasis. 2.  A few nonspecific subcortical T2 hyperintensities likely representing minimal chronic ischemic foci. 3.  Moderate cerebral volume loss. 4.  There has been no significant interval change.    MR-PELVIS-WITH & W/O AND SEQUENCES    Result Date: 9/19/2021  1.  Left sacral 20 mm sclerosis with rim edema and enhancement 2.  Left medial ileal 12 mm focus of sclerosis with rim edema and enhancement 3.  Both are highly suspicious for metastatic disease 4.  The appearance of sclerotic metastases is atypical for both melanoma and lung cancer unless there has been treatment and  "there is residual disease. Other etiology such as prostate cancer should be considered 5.  No other finding suspicious for metastatic disease 6.  Facet arthropathy lumbar spine 7.  Enlarged prostate 8.  Diffuse bladder wall thickening consistent with muscular hypertrophy    CT-PETCT-WHOLE BODY    Result Date: 9/7/2021  1. Mild increased uptake in the sclerotic lesion in the left sacrum compared to prior (SUV max 3.7, previously 2.5). This is suspicious for recurrent osseous metastatic lesion. 2. Unchanged mild diffuse uptake surrounding the right knee, likely inflammation. 3. No hypermetabolic pulmonary nodules seen.      IMPRESSION:    A 76 y.o. with  Lung cancer (HCC), adenocarcinoma Aug. 2019  Staging form: Lung, AJCC 8th Edition  - Pathologic stage from 10/4/2021: Stage EDENILSON (pT1, pN0, cM1b) - Signed by Emanuel Villarreal M.D. on 10/4/2021  Type of lung cancer: Locally advanced or metastatic non-small cell lung cancer  EGFR mutation: Positive    RECOMMENDATIONS:   Patient is going to have a CT-guided biopsy of the sacral lesion on October 15, 2021.  We will set him up for CT mapping of the radiation in the near future and will plan to start radiation to a dose of 30Gy in 3 fractions using stereotactic body radiation therapy set up given oligometastatic lung cancer.  We will plan to start treatment after pathology returns.  We discussed risks and benefits and patient is amenable to treatment.  Specifically mention the possibility of fatigue and bone flare possibly requiring steroids.    Thank you for the opportunity to participate in his care.  If any questions or comments, please do not hesitate in calling.    Orders Placed This Encounter   • REFERRAL TO ONCOLOGY NURSE NAVIGATOR   • B-D 3CC LUER-JAY SYR 51GQ4-7/2 18G X 1-1/2\" 3 ML Misc   • testosterone cypionate (DEPO-TESTOSTERONE) 200 MG/ML Solution injection   • MAGNESIUM CITRATE PO     CC: Dr. Corral, Dr. Phillips      "

## 2021-10-04 NOTE — NON-PROVIDER
Patient was seen today in clinic with Dr. Villarreal for consultation.  Vitals signs and weight were obtained and pain assessment was completed.  Allergies and medications were reviewed with the patient.     Vitals/Pain:  Vitals:    10/04/21 1313   BP: (!) 167/90   BP Location: Right arm   Patient Position: Sitting   BP Cuff Size: Adult   Pulse: 73   Temp: 36.3 °C (97.4 °F)   TempSrc: Temporal   SpO2: 97%   Weight: 75.8 kg (167 lb)   Height: 1.829 m (6')     PAIN:  Pain Score: 6=Moderate Pain  Pain Scale: 0-10  Pain Assessement: Initial  Pain Location, Orientation and Scale: Back: Lower : Acute : 6  What makes the pain better: Pt . not currently on pain medication  What makes the pain worse: Movement     Allergies:   Gramineae pollens, Cat hair extract, Horse allergy, Other environmental, and Pollen extract    Current Medications:  Current Outpatient Medications   Medication Sig Dispense Refill   • MAGNESIUM CITRATE PO Take  by mouth.     • traZODone (DESYREL) 50 MG Tab TAKE 2 TABLETS BY MOUTH EVERY NIGHT AT BEDTIME 180 Tablet 3   • liothyronine (CYTOMEL) 5 MCG Tab TAKE 2 TABLETS BY MOUTH EVERY DAY ON AN EMPTY STOMACH     • aspirin EC (ECOTRIN) 81 MG Tablet Delayed Response Take 1 tablet by mouth every day. 100 tablet 3   • hydrocortisone (CORTEF) 10 MG Tab Take 1 tablet by mouth every evening. (Patient taking differently: Take 10 mg by mouth 2 times a day.) 21 tablet 0   • ANTACID ANTI-GAS MAX STRENGTH 400-400-40 MG/5ML Suspension TAKE 10 ML BY MOUTH FOUR TIMES DAILY AS NEEDED     • famotidine (PEPCID) 20 MG Tab Take 20 mg by mouth 2 times a day.     • tamsulosin (FLOMAX) 0.4 MG capsule Take 0.4 mg by mouth every day.     • CALCIUM GLUCEPTATE INJ Inject  as directed. EVERY THREE WEEKS     • Potassium Chloride CR (MICRO-K) 8 MEQ Cap CR capsule Take 2 Caps by mouth every day. 180 Cap 3   • TIROSINT 125 MCG Cap Take 125 mcg by mouth Every morning on an empty stomach. 90 Cap 3   • sildenafil citrate (VIAGRA) 100 MG tablet  "Take 100 mg by mouth 1 time daily as needed for Erectile Dysfunction.     • TAGRISSO 80 MG Tab Take 80 mg by mouth every day.     • B-D 3CC LUER-JAY SYR 70ZB7-3/2 18G X 1-1/2\" 3 ML Misc USE TO DRAW TESTOSTERONE EVERY 7 DAYS (Patient not taking: Reported on 10/4/2021)     • testosterone cypionate (DEPO-TESTOSTERONE) 200 MG/ML Solution injection  (Patient not taking: Reported on 10/4/2021)     • atorvastatin (LIPITOR) 20 MG Tab Take 1 tablet by mouth every day. (Patient not taking: Reported on 10/4/2021) 30 tablet 11     No current facility-administered medications for this encounter.         PCP:  Cindi Rosenberg R.N.  "

## 2021-10-06 ENCOUNTER — HOSPITAL ENCOUNTER (OUTPATIENT)
Dept: RADIATION ONCOLOGY | Facility: MEDICAL CENTER | Age: 76
End: 2021-10-06
Payer: MEDICARE

## 2021-10-06 DIAGNOSIS — C79.51 BONE METASTASIS: ICD-10-CM

## 2021-10-06 PROCEDURE — 77290 THER RAD SIMULAJ FIELD CPLX: CPT | Performed by: RADIOLOGY

## 2021-10-06 PROCEDURE — 77290 THER RAD SIMULAJ FIELD CPLX: CPT | Mod: 26 | Performed by: RADIOLOGY

## 2021-10-06 PROCEDURE — 77263 THER RADIOLOGY TX PLNG CPLX: CPT | Performed by: RADIOLOGY

## 2021-10-06 PROCEDURE — 77334 RADIATION TREATMENT AID(S): CPT | Mod: 26 | Performed by: RADIOLOGY

## 2021-10-06 PROCEDURE — 77334 RADIATION TREATMENT AID(S): CPT | Performed by: RADIOLOGY

## 2021-10-06 PROCEDURE — 77470 SPECIAL RADIATION TREATMENT: CPT | Mod: 26 | Performed by: RADIOLOGY

## 2021-10-06 PROCEDURE — 77470 SPECIAL RADIATION TREATMENT: CPT | Performed by: RADIOLOGY

## 2021-10-06 NOTE — CT SIMULATION
PATIENT NAME Bartolo Berrios [facility]   PRIMARY PHYSICIAN Rian Puente [phone]   REFERRING PHYSICIAN No ref. provider found 1945     Lung cancer (HCC), adenocarcinoma Aug. 2019  Staging form: Lung, AJCC 8th Edition  - Pathologic stage from 10/4/2021: Stage EDENILSON (pT1, pN0, cM1b) - Signed by Emanuel Villarreal M.D. on 10/4/2021  Type of lung cancer: Locally advanced or metastatic non-small cell lung cancer  EGFR mutation: Positive         Treatment Planning CT Simulation      Order Questions     Question Answer Comment    Is this for a new course of treatment? Yes     Is this an Addendum? No     Implanted Device/Pacemaker No     Simulation Status Initial     Planned Start Date 10/13/2021     Treatment Site Bone     Laterality Axial     Treatment Technique SBRT     Other Technique(s)  sacrum     Treatment Pattern/Frequency Q OD 3 tx    Concurrent Chemotherapy Yes     Ordering Provider  tagrisso    CT Technique 3D     Slice Thickness 1mm     Scan Extent Chest     Bowel Preparation No     Treatment Device(s) Body Fix     Patient Attire Gown     Patient Position Supine     Patient Orientation Head First     Arm Position Up     Treatment Machine TB1 - STx     Treatment Image Guidance CBCT     Frequency (CBCT) Daily     Image Guidance Match Bone     Treatment Planning Image Fusion CT/PET/MR     Special Physics Consult Stereotactic     Other Orders Special Tx Procedure

## 2021-10-07 ENCOUNTER — PATIENT OUTREACH (OUTPATIENT)
Dept: OTHER | Facility: MEDICAL CENTER | Age: 76
End: 2021-10-07

## 2021-10-07 NOTE — PROGRESS NOTES
Call placed to patient, introduced self and explained role of cancer nurse navigator.  Pt denies current questions or concerns.  Has transportation, support and no current financial barrier.  Let him know of additional resources through [facility] and provided navigator's contact information.  Available as needed.

## 2021-10-08 PROCEDURE — 77370 RADIATION PHYSICS CONSULT: CPT | Performed by: RADIOLOGY

## 2021-10-13 PROCEDURE — 77334 RADIATION TREATMENT AID(S): CPT | Performed by: RADIOLOGY

## 2021-10-13 PROCEDURE — 77300 RADIATION THERAPY DOSE PLAN: CPT | Performed by: RADIOLOGY

## 2021-10-13 PROCEDURE — 77295 3-D RADIOTHERAPY PLAN: CPT | Performed by: RADIOLOGY

## 2021-10-13 PROCEDURE — 77334 RADIATION TREATMENT AID(S): CPT | Mod: 26 | Performed by: RADIOLOGY

## 2021-10-13 PROCEDURE — 77295 3-D RADIOTHERAPY PLAN: CPT | Mod: 26 | Performed by: RADIOLOGY

## 2021-10-13 PROCEDURE — 77300 RADIATION THERAPY DOSE PLAN: CPT | Mod: 26 | Performed by: RADIOLOGY

## 2021-10-14 NOTE — OR NURSING
COVID-19 Pre-surgery screening:  ?  1. Do you have an undiagnosed respiratory illness or symptoms such as coughing or sneezing, SOB, loss of taste or smell? (No)      2. Do you have an unexplained fever greater than 100.4 degrees Fahrenheit or 38 degrees Celsius? (No)     3. Have you had direct exposure to a patient who tested positive for Covid-19? (No)  ?  4. Have you traveled within the last 14 days? (No)  ?  Informed of visitor policy and mask use requirement    YES

## 2021-10-15 ENCOUNTER — HOSPITAL ENCOUNTER (OUTPATIENT)
Facility: MEDICAL CENTER | Age: 76
End: 2021-10-15
Attending: INTERNAL MEDICINE | Admitting: INTERNAL MEDICINE
Payer: MEDICARE

## 2021-10-15 ENCOUNTER — APPOINTMENT (OUTPATIENT)
Dept: RADIOLOGY | Facility: MEDICAL CENTER | Age: 76
End: 2021-10-15
Attending: INTERNAL MEDICINE
Payer: MEDICARE

## 2021-10-15 VITALS
WEIGHT: 163.14 LBS | SYSTOLIC BLOOD PRESSURE: 143 MMHG | HEIGHT: 72 IN | BODY MASS INDEX: 22.1 KG/M2 | TEMPERATURE: 96.5 F | RESPIRATION RATE: 16 BRPM | DIASTOLIC BLOOD PRESSURE: 65 MMHG | OXYGEN SATURATION: 98 % | HEART RATE: 60 BPM

## 2021-10-15 DIAGNOSIS — C34.11 MALIGNANT NEOPLASM OF UPPER LOBE OF RIGHT LUNG (HCC): ICD-10-CM

## 2021-10-15 DIAGNOSIS — C43.9 CUTANEOUS MELANOMA (HCC): ICD-10-CM

## 2021-10-15 LAB
ERYTHROCYTE [DISTWIDTH] IN BLOOD BY AUTOMATED COUNT: 44.2 FL (ref 35.9–50)
HCT VFR BLD AUTO: 42.1 % (ref 42–52)
HGB BLD-MCNC: 14.5 G/DL (ref 14–18)
INR PPP: 0.96 (ref 0.87–1.13)
MCH RBC QN AUTO: 32.7 PG (ref 27–33)
MCHC RBC AUTO-ENTMCNC: 34.4 G/DL (ref 33.7–35.3)
MCV RBC AUTO: 95 FL (ref 81.4–97.8)
PATHOLOGY CONSULT NOTE: NORMAL
PLATELET # BLD AUTO: 203 K/UL (ref 164–446)
PMV BLD AUTO: 9.9 FL (ref 9–12.9)
PROTHROMBIN TIME: 12.5 SEC (ref 12–14.6)
RBC # BLD AUTO: 4.43 M/UL (ref 4.7–6.1)
WBC # BLD AUTO: 7.1 K/UL (ref 4.8–10.8)

## 2021-10-15 PROCEDURE — 88307 TISSUE EXAM BY PATHOLOGIST: CPT

## 2021-10-15 PROCEDURE — 85027 COMPLETE CBC AUTOMATED: CPT

## 2021-10-15 PROCEDURE — 88311 DECALCIFY TISSUE: CPT

## 2021-10-15 PROCEDURE — 88342 IMHCHEM/IMCYTCHM 1ST ANTB: CPT

## 2021-10-15 PROCEDURE — 700111 HCHG RX REV CODE 636 W/ 250 OVERRIDE (IP): Performed by: RADIOLOGY

## 2021-10-15 PROCEDURE — A4550 SURGICAL TRAYS: HCPCS

## 2021-10-15 PROCEDURE — 85610 PROTHROMBIN TIME: CPT

## 2021-10-15 PROCEDURE — 88341 IMHCHEM/IMCYTCHM EA ADD ANTB: CPT

## 2021-10-15 PROCEDURE — 160002 HCHG RECOVERY MINUTES (STAT)

## 2021-10-15 PROCEDURE — 700111 HCHG RX REV CODE 636 W/ 250 OVERRIDE (IP)

## 2021-10-15 RX ORDER — SODIUM CHLORIDE 9 MG/ML
500 INJECTION, SOLUTION INTRAVENOUS
Status: DISCONTINUED | OUTPATIENT
Start: 2021-10-15 | End: 2021-10-15 | Stop reason: HOSPADM

## 2021-10-15 RX ORDER — OXYCODONE HYDROCHLORIDE 5 MG/1
10 TABLET ORAL
Status: DISCONTINUED | OUTPATIENT
Start: 2021-10-15 | End: 2021-10-15 | Stop reason: HOSPADM

## 2021-10-15 RX ORDER — SODIUM CHLORIDE 9 MG/ML
1000 INJECTION, SOLUTION INTRAVENOUS
Status: DISCONTINUED | OUTPATIENT
Start: 2021-10-15 | End: 2021-10-15 | Stop reason: HOSPADM

## 2021-10-15 RX ORDER — NALOXONE HYDROCHLORIDE 0.4 MG/ML
INJECTION, SOLUTION INTRAMUSCULAR; INTRAVENOUS; SUBCUTANEOUS
Status: DISCONTINUED
Start: 2021-10-15 | End: 2021-10-15 | Stop reason: HOSPADM

## 2021-10-15 RX ORDER — OXYCODONE HYDROCHLORIDE 5 MG/1
5 TABLET ORAL
Status: DISCONTINUED | OUTPATIENT
Start: 2021-10-15 | End: 2021-10-15 | Stop reason: HOSPADM

## 2021-10-15 RX ORDER — FLUMAZENIL 0.1 MG/ML
INJECTION INTRAVENOUS
Status: COMPLETED
Start: 2021-10-15 | End: 2021-10-15

## 2021-10-15 RX ORDER — ONDANSETRON 2 MG/ML
4 INJECTION INTRAMUSCULAR; INTRAVENOUS PRN
Status: DISCONTINUED | OUTPATIENT
Start: 2021-10-15 | End: 2021-10-15 | Stop reason: HOSPADM

## 2021-10-15 RX ORDER — ACETAMINOPHEN 500 MG
1000 TABLET ORAL
COMMUNITY
End: 2022-04-27

## 2021-10-15 RX ORDER — HYDROMORPHONE HYDROCHLORIDE 1 MG/ML
0.5 INJECTION, SOLUTION INTRAMUSCULAR; INTRAVENOUS; SUBCUTANEOUS
Status: DISCONTINUED | OUTPATIENT
Start: 2021-10-15 | End: 2021-10-15 | Stop reason: HOSPADM

## 2021-10-15 RX ORDER — LIDOCAINE HYDROCHLORIDE 10 MG/ML
INJECTION, SOLUTION EPIDURAL; INFILTRATION; INTRACAUDAL; PERINEURAL
Status: DISCONTINUED
Start: 2021-10-15 | End: 2021-10-15 | Stop reason: HOSPADM

## 2021-10-15 RX ORDER — MIDAZOLAM HYDROCHLORIDE 1 MG/ML
INJECTION INTRAMUSCULAR; INTRAVENOUS
Status: COMPLETED
Start: 2021-10-15 | End: 2021-10-15

## 2021-10-15 RX ORDER — HYDROCORTISONE 10 MG/1
20 TABLET ORAL 2 TIMES DAILY
COMMUNITY

## 2021-10-15 RX ORDER — MIDAZOLAM HYDROCHLORIDE 1 MG/ML
.5-2 INJECTION INTRAMUSCULAR; INTRAVENOUS PRN
Status: DISCONTINUED | OUTPATIENT
Start: 2021-10-15 | End: 2021-10-15 | Stop reason: HOSPADM

## 2021-10-15 RX ADMIN — MIDAZOLAM HYDROCHLORIDE 2 MG: 1 INJECTION, SOLUTION INTRAMUSCULAR; INTRAVENOUS at 12:23

## 2021-10-15 RX ADMIN — MIDAZOLAM HYDROCHLORIDE 2 MG: 1 INJECTION, SOLUTION INTRAMUSCULAR; INTRAVENOUS at 12:18

## 2021-10-15 RX ADMIN — FENTANYL CITRATE 50 MCG: 50 INJECTION INTRAMUSCULAR; INTRAVENOUS at 12:18

## 2021-10-15 RX ADMIN — FENTANYL CITRATE 50 MCG: 50 INJECTION, SOLUTION INTRAMUSCULAR; INTRAVENOUS at 12:18

## 2021-10-15 RX ADMIN — FENTANYL CITRATE 50 MCG: 50 INJECTION, SOLUTION INTRAMUSCULAR; INTRAVENOUS at 12:23

## 2021-10-15 ASSESSMENT — PAIN DESCRIPTION - PAIN TYPE
TYPE: ACUTE PAIN;SURGICAL PAIN
TYPE: ACUTE PAIN
TYPE: ACUTE PAIN;SURGICAL PAIN
TYPE: CHRONIC PAIN

## 2021-10-15 ASSESSMENT — FIBROSIS 4 INDEX: FIB4 SCORE: 2

## 2021-10-15 NOTE — OR NURSING
Patient arrived to unit at 1404. Patient is AOx4. Transferred to chair appropriately. Patient's pain is 0/10. Patient's dressing to lower back is CDI. Patient voiding appropriately. Tolerating liquids at this time. Discharge instructions discussed with the patient. Patient denies any further questions at this time. Patient discharged home to responsible adult at 1453.

## 2021-10-15 NOTE — DISCHARGE INSTRUCTIONS
ACTIVITY: Rest and take it easy for the first 24 hours.  A responsible adult is recommended to remain with you during that time.  It is normal to feel sleepy.  We encourage you to not do anything that requires balance, judgment or coordination.    MILD FLU-LIKE SYMPTOMS ARE NORMAL. YOU MAY EXPERIENCE GENERALIZED MUSCLE ACHES, THROAT IRRITATION, HEADACHE AND/OR SOME NAUSEA.    FOR 24 HOURS DO NOT:  Drive, operate machinery or run household appliances.  Drink beer or alcoholic beverages.   Make important decisions or sign legal documents.    SPECIAL INSTRUCTIONS:   Needle Biopsy, Care After  These instructions tell you how to care for yourself after your procedure. Your doctor may also give you more specific instructions. Call your doctor if you have any problems or questions.  What can I expect after the procedure?  After the procedure, it is common to have:  · Soreness.  · Bruising.  · Mild pain.  Follow these instructions at home:  · Return to your normal activities as told by your doctor. Ask your doctor what activities are safe for you.  · Take over-the-counter and prescription medicines only as told by your doctor.  · Wash your hands with soap and water before you change your bandage (dressing). If you cannot use soap and water, use hand .  · Follow instructions from your doctor about:  ? How to take care of your puncture site.  ? When and how to change your bandage.  ? When to remove your bandage.  · Check your puncture site every day for signs of infection. Watch for:  ? Redness, swelling, or pain.  ? Fluid or blood.   ? Pus or a bad smell.  ? Warmth.  · Do not take baths, swim, or use a hot tub until your doctor approves. Ask your doctor if you may take showers. You may only be allowed to take sponge baths.  · Keep all follow-up visits as told by your doctor. This is important.  Contact a doctor if you have:  · A fever.  · Redness, swelling, or pain at the puncture site, and it lasts longer than a  few days.  · Fluid, blood, or pus coming from the puncture site.  · Warmth coming from the puncture site.  Get help right away if:  · You have a lot of bleeding from the puncture site.  Summary  · After the procedure, it is common to have soreness, bruising, or mild pain at the puncture site.  · Check your puncture site every day for signs of infection, such as redness, swelling, or pain.  · Get help right away if you have severe bleeding from your puncture site.  This information is not intended to replace advice given to you by your health care provider. Make sure you discuss any questions you have with your health care provider.  Document Released: 11/30/2009 Document Revised: 12/31/2018 Document Reviewed: 12/31/2018  [employer] Patient Education © 2020 [employer]      DIET: To avoid nausea, slowly advance diet as tolerated, avoiding spicy or greasy foods for the first day.  Add more substantial food to your diet according to your physician's instructions.  INCREASE FLUIDS AND FIBER TO AVOID CONSTIPATION.    SURGICAL DRESSING/BATHING: Maintain dressing for 48 hours. Okay to remove and shower afterwards. Do not submerge in water (hot tubs, baths, swimming pools).     FOLLOW-UP APPOINTMENT:  A follow-up appointment should be arranged with your doctor in 1-2 weeks; call to schedule.    You should CALL YOUR PHYSICIAN if you develop:  Fever greater than 101 degrees F.  Pain not relieved by medication, or persistent nausea or vomiting.  Excessive bleeding (blood soaking through dressing) or unexpected drainage from the wound.  Extreme redness or swelling around the incision site, drainage of pus or foul smelling drainage.  Inability to urinate or empty your bladder within 8 hours.  Problems with breathing or chest pain.    You should call 911 if you develop problems with breathing or chest pain.  If you are unable to contact your doctor or surgical center, you should go to the nearest emergency room or urgent care  center.    Physician's telephone #: [phone] Dr Corral    If any questions arise, call your doctor.  If your doctor is not available, please feel free to call the Surgical Center at [phone]. The Contact Center is open Monday through Friday 7AM to 5PM and may speak to a nurse at [phone], or toll free at [phone].     A registered nurse may call you a few days after your surgery to see how you are doing after your procedure.    MEDICATIONS: Resume taking daily medication.  Take prescribed pain medication with food.  If no medication is prescribed, you may take non-aspirin pain medication if needed.  PAIN MEDICATION CAN BE VERY CONSTIPATING.  Take a stool softener or laxative such as senokot, pericolace, or milk of magnesia if needed.    If your physician has prescribed pain medication that includes Acetaminophen (Tylenol), do not take additional Acetaminophen (Tylenol) while taking the prescribed medication.    Depression / Suicide Risk    As you are discharged from this [facility] facility, it is important to learn how to keep safe from harming yourself.    Recognize the warning signs:  · Abrupt changes in personality, positive or negative- including increase in energy   · Giving away possessions  · Change in eating patterns- significant weight changes-  positive or negative  · Change in sleeping patterns- unable to sleep or sleeping all the time   · Unwillingness or inability to communicate  · Depression  · Unusual sadness, discouragement and loneliness  · Talk of wanting to die  · Neglect of personal appearance   · Rebelliousness- reckless behavior  · Withdrawal from people/activities they love  · Confusion- inability to concentrate     If you or a loved one observes any of these behaviors or has concerns about self-harm, here's what you can do:  · Talk about it- your feelings and reasons for harming yourself  · Remove any means that you might use to hurt yourself (examples: pills, rope,  extension cords, firearm)  · Get professional help from the community (Mental Health, Substance Abuse, psychological counseling)  · Do not be alone:Call your Safe Contact- someone whom you trust who will be there for you.  · Call your local CRISIS HOTLINE 551-2538 or 742-334-3194  · Call your local Children's Mobile Crisis Response Team [place] (615) 223-0305 or www.[employer]  · Call the toll free National Suicide Prevention Hotlines   · National Suicide Prevention Lifeline 238-724-VDLS (8197)  · National Hope Line Network 800-SUICIDE (710-8732)

## 2021-10-15 NOTE — OR SURGEON
Immediate Post- Operative Note        Findings: Blastic Lesion Left Sacrum    Procedure(s): CT guided Core Biopsy of Left Sacrum    Estimated Blood Loss: Less than 5 ml    Complications: None      10/15/2021     12:40 PM     Ruben Momin M.D.

## 2021-10-15 NOTE — PROGRESS NOTES
1248 Patient arrived to PACU.  Awake and alert, denies pain.  Sacral biopsy site is clean, dry and soft.  Updated on plan of care, verbalized understanding.  1300 Wife Mariella called and updated on patient status.  1315 patient sleeping at this time.  1345 Patient arouses to voice, denies pain.  Access site is clean, dry and soft.  1352 Report to Shanna DUONG.  1359 Patient transferred to Phase 2.

## 2021-10-18 ENCOUNTER — HOSPITAL ENCOUNTER (OUTPATIENT)
Dept: RADIATION ONCOLOGY | Facility: MEDICAL CENTER | Age: 76
End: 2021-10-18
Payer: MEDICARE

## 2021-10-20 ENCOUNTER — HOSPITAL ENCOUNTER (OUTPATIENT)
Dept: RADIATION ONCOLOGY | Facility: MEDICAL CENTER | Age: 76
End: 2021-10-20
Payer: MEDICARE

## 2021-10-20 DIAGNOSIS — C79.51 BONE METASTASIS: ICD-10-CM

## 2021-10-20 LAB
CHEMOTHERAPY INFUSION START DATE: NORMAL
CHEMOTHERAPY RECORDS: 10
CHEMOTHERAPY RECORDS: 3000
CHEMOTHERAPY RECORDS: NORMAL
CHEMOTHERAPY RX CANCER: NORMAL
DATE 1ST CHEMO CANCER: NORMAL
RAD ONC ARIA COURSE LAST TREATMENT DATE: NORMAL
RAD ONC ARIA COURSE TREATMENT ELAPSED DAYS: NORMAL
RAD ONC ARIA REFERENCE POINT DOSAGE GIVEN TO DATE: 10
RAD ONC ARIA REFERENCE POINT DOSAGE GIVEN TO DATE: 10.4
RAD ONC ARIA REFERENCE POINT ID: NORMAL
RAD ONC ARIA REFERENCE POINT ID: NORMAL
RAD ONC ARIA REFERENCE POINT SESSION DOSAGE GIVEN: 10
RAD ONC ARIA REFERENCE POINT SESSION DOSAGE GIVEN: 10.4

## 2021-10-20 PROCEDURE — 77280 THER RAD SIMULAJ FIELD SMPL: CPT | Mod: 26 | Performed by: RADIOLOGY

## 2021-10-20 PROCEDURE — 77435 SBRT MANAGEMENT: CPT | Performed by: RADIOLOGY

## 2021-10-20 PROCEDURE — 77373 STRTCTC BDY RAD THER TX DLVR: CPT | Performed by: RADIOLOGY

## 2021-10-20 PROCEDURE — 77280 THER RAD SIMULAJ FIELD SMPL: CPT | Performed by: RADIOLOGY

## 2021-10-20 RX ORDER — DEXAMETHASONE 4 MG/1
4 TABLET ORAL DAILY
Qty: 5 TABLET | Refills: 0 | Status: SHIPPED | OUTPATIENT
Start: 2021-10-20 | End: 2021-10-25

## 2021-10-20 NOTE — ON TREATMENT VISIT
"  ON TREATMENT  NOTE  RADIATION ONCOLOGY DEPARTMENT    Patient name:  Bartolo Berrios [facility]    Primary Physician:  Rian Puente M.D. MRN: [id number]  CSN: [id number]   Referring physician:  Luzma Corral M.D. : 1945, 76 y.o.     ENCOUNTER DATE:  10/20/21    DIAGNOSIS:  Visit Diagnoses     ICD-10-CM   1. Bone metastasis (HCC)  C79.51     Lung cancer (HCC), adenocarcinoma Aug. 2019  Staging form: Lung, AJCC 8th Edition  - Pathologic stage from 10/4/2021: Stage EDENILSON (pT1, pN0, cM1b) - Signed by Emanuel Villarreal M.D. on 10/4/2021  Type of lung cancer: Locally advanced or metastatic non-small cell lung cancer  EGFR mutation: Positive      TREATMENT SUMMARY:  [facility]ria Treatment Information        Some values may be hidden. Unless noted otherwise, only the newest values recorded on each date are displayed.         [facility]ria Treatment Summary   No data to display.             SUBJECTIVE:  No pain, pathology returned as lung carcinoma      VITAL SIGNS:  KPS: 100, Fully active, able to carry on all pre-disease performed without restriction (ECOG equivalent 0)     Pain Assessment 10/4/2021   Pain Score 6   Pain Loc Back   Some recent data might be hidden          PHYSICAL EXAM:    Physical Exam  Neurological:      General: No focal deficit present.          No flowsheet data found.    CURRENT MEDICATIONS:    Current Outpatient Medications:   •  dexamethasone (DECADRON) 4 MG Tab, Take 1 Tablet by mouth every day for 5 days., Disp: 5 Tablet, Rfl: 0  •  hydrocortisone (CORTEF) 10 MG Tab, Take 20 mg by mouth 2 times a day., Disp: , Rfl:   •  acetaminophen (TYLENOL) 500 MG Tab, Take 1,000 mg by mouth one time as needed for Mild Pain., Disp: , Rfl:   •  MAGNESIUM CITRATE PO, Take 1 Tablet by mouth every day., Disp: , Rfl:   •  B-D INTEGRA SYRINGE 25G X 5/8\" 3 ML Misc, USE TO INJECT TESTOSTERONE EVERY 7 DAYS, Disp: , Rfl:   •  traZODone (DESYREL) 50 MG Tab, TAKE 2 TABLETS BY MOUTH EVERY NIGHT AT BEDTIME, Disp: 180 Tablet, Rfl: " 3  •  liothyronine (CYTOMEL) 5 MCG Tab, TAKE 2 TABLETS BY MOUTH EVERY DAY ON AN EMPTY STOMACH, Disp: , Rfl:   •  atorvastatin (LIPITOR) 20 MG Tab, Take 1 tablet by mouth every day., Disp: 30 tablet, Rfl: 11  •  aspirin EC (ECOTRIN) 81 MG Tablet Delayed Response, Take 1 tablet by mouth every day., Disp: 100 tablet, Rfl: 3  •  famotidine (PEPCID) 20 MG Tab, Take 20 mg by mouth every day., Disp: , Rfl:   •  tamsulosin (FLOMAX) 0.4 MG capsule, Take 0.4 mg by mouth every day., Disp: , Rfl:   •  CALCIUM GLUCEPTATE INJ, Inject 1 Dose as directed every 21 days., Disp: , Rfl:   •  Potassium Chloride CR (MICRO-K) 8 MEQ Cap CR capsule, Take 2 Caps by mouth every day., Disp: 180 Cap, Rfl: 3  •  TIROSINT 125 MCG Cap, Take 125 mcg by mouth Every morning on an empty stomach., Disp: 90 Cap, Rfl: 3  •  sildenafil citrate (VIAGRA) 100 MG tablet, Take 100 mg by mouth 1 time daily as needed for Erectile Dysfunction., Disp: , Rfl:   •  TAGRISSO 80 MG Tab, Take 80 mg by mouth every day., Disp: , Rfl:     LABORATORY DATA:   Lab Results   Component Value Date/Time    SODIUM 142 09/27/2021 04:41 PM    POTASSIUM 4.1 09/27/2021 04:41 PM    CHLORIDE 105 09/27/2021 04:41 PM    CO2 24 09/27/2021 04:41 PM    GLUCOSE 125 (H) 09/27/2021 04:41 PM    BUN 25 (H) 09/27/2021 04:41 PM    CREATININE 1.16 09/27/2021 04:41 PM         Lab Results   Component Value Date/Time    WBC 7.1 10/15/2021 10:16 AM    HEMOGLOBIN 14.5 10/15/2021 10:16 AM    HEMATOCRIT 42.1 10/15/2021 10:16 AM    MCV 95.0 10/15/2021 10:16 AM    PLATELETCT 203 10/15/2021 10:16 AM        RADIOLOGY DATA:  CT-NEEDLE BX-DEEP BONE    Result Date: 10/15/2021  10/15/2021 12:02 PM HISTORY/REASON FOR EXAM:  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 Blastic lesion in left hemisacrum TECHNIQUE/EXAM DESCRIPTION AND NUMBER OF VIEWS:  CT-guided core biopsy of blastic lesion in the left hemisacrum Moderate sedation was provided. Pulse oximetry and continuous cardiac monitoring by the nurse was  performed throughout the exam. Intraservice time was 30 minutes. PROCEDURE: Following informed consent patient was prepped and draped in the usual fashion.  10 cc 1% lidocaine was utilized for local and subcutaneous anesthesia. Utilizing CT guidance a 17-gauge introducer needle was advanced via a posterior approach into a heterogeneous blastic lesion in the left side of the sacrum. Utilizing a 16-gauge bone biopsy cannula 3 core biopsies were obtained placed in formalin and sent to lab for analysis. Patient tolerated procedure well was sent to the floor in good condition.     1.  Successful CT-guided core biopsy of blastic lesion in the left side of the sacrum.      IMPRESSION:  Lung cancer (HCC), adenocarcinoma Aug. 2019  Staging form: Lung, AJCC 8th Edition  - Pathologic stage from 10/4/2021: Stage EDENILSON (pT1, pN0, cM1b) - Signed by Emanuel Villarreal M.D. on 10/4/2021  Type of lung cancer: Locally advanced or metastatic non-small cell lung cancer  EGFR mutation: Positive      PLAN:  No change in treatment plan    Disposition:  Treatment plan reviewed. Questions answered. Continue therapy outlined.     Emanuel Villarreal M.D.    Orders Placed This Encounter   • dexamethasone (DECADRON) 4 MG Tab

## 2021-10-22 ENCOUNTER — HOSPITAL ENCOUNTER (OUTPATIENT)
Dept: RADIATION ONCOLOGY | Facility: MEDICAL CENTER | Age: 76
End: 2021-10-22
Payer: MEDICARE

## 2021-10-22 LAB
CHEMOTHERAPY INFUSION START DATE: NORMAL
CHEMOTHERAPY RECORDS: 10
CHEMOTHERAPY RECORDS: 3000
CHEMOTHERAPY RECORDS: NORMAL
CHEMOTHERAPY RX CANCER: NORMAL
DATE 1ST CHEMO CANCER: NORMAL
RAD ONC ARIA COURSE LAST TREATMENT DATE: NORMAL
RAD ONC ARIA COURSE TREATMENT ELAPSED DAYS: NORMAL
RAD ONC ARIA REFERENCE POINT DOSAGE GIVEN TO DATE: 20
RAD ONC ARIA REFERENCE POINT DOSAGE GIVEN TO DATE: 20.8
RAD ONC ARIA REFERENCE POINT ID: NORMAL
RAD ONC ARIA REFERENCE POINT ID: NORMAL
RAD ONC ARIA REFERENCE POINT SESSION DOSAGE GIVEN: 10
RAD ONC ARIA REFERENCE POINT SESSION DOSAGE GIVEN: 10.4

## 2021-10-22 PROCEDURE — 77336 RADIATION PHYSICS CONSULT: CPT | Mod: XU | Performed by: RADIOLOGY

## 2021-10-22 PROCEDURE — 77373 STRTCTC BDY RAD THER TX DLVR: CPT | Performed by: RADIOLOGY

## 2021-10-22 PROCEDURE — 77280 THER RAD SIMULAJ FIELD SMPL: CPT | Mod: 26 | Performed by: RADIOLOGY

## 2021-10-22 PROCEDURE — 77280 THER RAD SIMULAJ FIELD SMPL: CPT | Performed by: RADIOLOGY

## 2021-10-22 RX ORDER — OXYCODONE HYDROCHLORIDE 5 MG/1
5 TABLET ORAL
Qty: 30 TABLET | Refills: 0 | Status: SHIPPED | OUTPATIENT
Start: 2021-10-22 | End: 2021-11-05

## 2021-10-22 NOTE — ADDENDUM NOTE
Encounter addended by: Emanuel Villarreal M.D. on: 10/22/2021 10:57 AM   Actions taken: Actions taken from a BestPractice Advisory, Order list changed, Diagnosis association updated

## 2021-10-25 ENCOUNTER — HOSPITAL ENCOUNTER (OUTPATIENT)
Dept: RADIATION ONCOLOGY | Facility: MEDICAL CENTER | Age: 76
End: 2021-10-25
Payer: MEDICARE

## 2021-10-25 LAB
CHEMOTHERAPY INFUSION START DATE: NORMAL
CHEMOTHERAPY RECORDS: 10
CHEMOTHERAPY RECORDS: 3000
CHEMOTHERAPY RECORDS: NORMAL
CHEMOTHERAPY RX CANCER: NORMAL
DATE 1ST CHEMO CANCER: NORMAL
RAD ONC ARIA COURSE LAST TREATMENT DATE: NORMAL
RAD ONC ARIA COURSE TREATMENT ELAPSED DAYS: NORMAL
RAD ONC ARIA REFERENCE POINT DOSAGE GIVEN TO DATE: 30
RAD ONC ARIA REFERENCE POINT DOSAGE GIVEN TO DATE: 31.19
RAD ONC ARIA REFERENCE POINT ID: NORMAL
RAD ONC ARIA REFERENCE POINT ID: NORMAL
RAD ONC ARIA REFERENCE POINT SESSION DOSAGE GIVEN: 10
RAD ONC ARIA REFERENCE POINT SESSION DOSAGE GIVEN: 10.4

## 2021-10-25 PROCEDURE — 77373 STRTCTC BDY RAD THER TX DLVR: CPT | Performed by: RADIOLOGY

## 2021-10-25 PROCEDURE — 77280 THER RAD SIMULAJ FIELD SMPL: CPT | Mod: 26 | Performed by: RADIOLOGY

## 2021-10-25 PROCEDURE — 77280 THER RAD SIMULAJ FIELD SMPL: CPT | Performed by: RADIOLOGY

## 2021-11-01 DIAGNOSIS — E03.9 HYPOTHYROIDISM, UNSPECIFIED TYPE: ICD-10-CM

## 2021-11-01 DIAGNOSIS — E78.2 HYPERLIPIDEMIA, MIXED: ICD-10-CM

## 2021-11-01 DIAGNOSIS — R80.9 PROTEINURIA, UNSPECIFIED TYPE: ICD-10-CM

## 2021-11-01 DIAGNOSIS — I65.23 ATHEROSCLEROSIS OF BOTH CAROTID ARTERIES: ICD-10-CM

## 2021-11-01 DIAGNOSIS — Z79.899 LONG TERM USE OF DRUG: ICD-10-CM

## 2021-11-01 DIAGNOSIS — E55.9 VITAMIN D DEFICIENCY: ICD-10-CM

## 2021-11-01 DIAGNOSIS — Z86.39 HISTORY OF NON ANEMIC VITAMIN B12 DEFICIENCY: ICD-10-CM

## 2021-11-02 NOTE — PROGRESS NOTES
Reviewed lab results with Don on phone. Discussed he remain on atorvastatin for his cholesterol since he does have documented vascular disease in carotid arteries on US in past. Discussed presence of small amount of protein in urinalysis; will recheck urinalysis in 6 months. Will check CMP, lipid panel, TSH, urinalysis in 6 months.

## 2021-11-16 RX ORDER — POTASSIUM CHLORIDE 600 MG/1
TABLET, FILM COATED, EXTENDED RELEASE ORAL
Qty: 180 TABLET | Refills: 3 | Status: SHIPPED | OUTPATIENT
Start: 2021-11-16 | End: 2022-07-07

## 2021-11-22 ENCOUNTER — HOSPITAL ENCOUNTER (OUTPATIENT)
Dept: RADIATION ONCOLOGY | Facility: MEDICAL CENTER | Age: 76
End: 2021-11-30
Attending: RADIOLOGY
Payer: MEDICARE

## 2021-11-22 VITALS
HEART RATE: 65 BPM | WEIGHT: 169 LBS | TEMPERATURE: 97.4 F | SYSTOLIC BLOOD PRESSURE: 138 MMHG | OXYGEN SATURATION: 96 % | DIASTOLIC BLOOD PRESSURE: 75 MMHG | BODY MASS INDEX: 22.92 KG/M2

## 2021-11-22 DIAGNOSIS — C79.51 BONE METASTASIS: ICD-10-CM

## 2021-11-22 PROCEDURE — 99212 OFFICE O/P EST SF 10 MIN: CPT | Performed by: RADIOLOGY

## 2021-11-22 ASSESSMENT — PAIN SCALES - GENERAL: PAINLEVEL: NO PAIN

## 2021-11-22 ASSESSMENT — FIBROSIS 4 INDEX: FIB4 SCORE: 2.05

## 2021-11-22 NOTE — PROGRESS NOTES
RADIATION ONCOLOGY FOLLOW-UP    DATE OF SERVICE: 11/22/2021    IDENTIFICATION:   A 76 y.o. male with  Lung cancer (HCC), adenocarcinoma Aug. 2019  Staging form: Lung, AJCC 8th Edition  - Pathologic stage from 10/4/2021: Stage EDENILSON (pT1, pN0, cM1b) - Signed by Emanuel Villarreal M.D. on 10/4/2021  Type of lung cancer: Locally advanced or metastatic non-small cell lung cancer  EGFR mutation: Positive      RADIATION SUMMARY:  [facility] Treatment Information        Some values may be hidden. Unless noted otherwise, only the newest values recorded on each date are displayed.         [facility] Treatment Summary 10/25/21   Course First Treatment Date 10/20/2021   Course Last Treatment Date 10/25/2021   SBRT_L_Sacral Plan from Course C1_SBRT_Pelvis   Fraction 3 of 3   Elapsed Course Days 5 @ [id number]   Prescribed Fraction Dose 1,000 cGy   Prescribed Total Dose 3,000 cGy   L_Sacral Reference Point from Course C1_SBRT_Pelvis   Elapsed Course Days 5 @ [id number]   Session Dose 1,000 cGy   Total Dose 3,000 cGy   L_Sacral CP Reference Point from Course C1_SBRT_Pelvis   Elapsed Course Days 5 @ [id number]   Session Dose 1,040 cGy   Total Dose 3,119 cGy             HISTORY OF PRESENT ILLNESS:   Patient is a 76-year-old male with history of metastatic melanoma as well as metastatic lung cancer.  His history dates back to 2016 when he was diagnosed with left lower leg melanoma and had 1 sentinel lymph node positive and denied further lymph node dissection and was found in September 2017 to have recurrent disease in the left inguinal and pelvic region and received nivolumab and later nivolumab and ipilimumab with intralesional T Vec being co-managed by Dr. Vu Phillips and Dr. Corral.  Patient subsequently developed a right lower lobe lung lesion and underwent resection August 20, 2019 with pathology revealing grade 2 adenocarcinoma 0/21 lymph nodes EGFR mutated.  Patient was followed with PET scan which showed L5 spinal lesion with biopsy  December 27, 2019 revealing lung cancer adenocarcinoma EGFR positive.  Patient underwent SBRT 30 in 3 fractions completed 1/27/2020 at [facility] with Dr. Martinez.  In April 2020 the patient was found to progression disease in bones including T11 and sacrum and Tagrisso was started which yielded a complete response.  However PET December 16, 2020 returned activity to the left sacral lesion and then resolved on future pets however eventually PET activity returned September 7, 2021 MRI brain was normal.  Patient was presented in multidisciplinary melanoma tumor board with consensus to consider radiation with SBRT to the left sacral lesion in addition MRI pelvis September 19, 2021 showed an adjacent left iliac bone lesion suspicious for metastasis.  Patient was seen with Dr. Martinez and plan was for repeat stereotactic body radiation therapy.  He is having pain 6 out of 10 in the lower back for which he uses occasional Norco for.  But denies any neuropathic symptoms.    Interval History:  Patient is doing well after his SBRT to his left pelvis bone lesions.  He had initial bone flare which responded well to oxycodone and dexamethasone.  His pain is subsided at this point.  He is back on his Tagrisso and is scheduled with PET CT scan for restaging with Dr. Corral.    PROBLEM LIST:  Patient Active Problem List   Diagnosis   • Hypothyroid   • CKD (chronic kidney disease) stage 3, GFR 30-59 ml/min (HCC)   • Obstructive sleep apnea   • GERD (gastroesophageal reflux disease)   • Hyperlipidemia   • Adrenal insufficiency (HCC), secondary   • Lung cancer (HCC), adenocarcinoma Aug. 2019   • History of stroke, subcortical infarct on MRI April 2019   • Diverticulosis of colon   • History of shingles   • Vitamin D deficiency   • History of malignant melanoma, April 2016   • History of atrial tachycardia   • BPH with obstruction/lower urinary tract symptoms   • Elevated PSA   • Atherosclerosis of both carotid  "arteries, mild   • Syncope   • Metastasis (HCC)   • Shortness of breath   • Anxiety   • Brain lesion   • Calculus of gallbladder without cholecystitis without obstruction   • Cerebral infarct (HCC)   • Chronic pain   • Cellulitis   • Diverticulitis of large intestine with perforation without bleeding   • Facial basal cell cancer   • Family history of coronary artery disease   • Family history of early CAD   • H/O cardiac radiofrequency ablation   • Hypertension   • Hypothyroidism due to Hashimoto's thyroiditis   • Left renal atrophy   • Long term (current) use of systemic steroids   • Lung mass   • Lymphedema of lower extremity   • Malignant melanoma of left lower leg (HCC)   • Need for immunization against influenza   • Neoplasm of uncertain behavior   • Orthostatic hypotension   • Palpitations   • Premature atrial contractions   • Adenocarcinoma of lung (HCC), stage 4 ([facility] Oct. 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     • acetaminophen (TYLENOL) 500 MG Tab Take 1,000 mg by mouth one time as needed for Mild Pain.     • MAGNESIUM CITRATE PO Take 1 Tablet by mouth every day.     • B-D INTEGRA SYRINGE 25G X 5/8\" 3 ML Misc USE TO INJECT TESTOSTERONE EVERY 7 DAYS     • traZODone (DESYREL) 50 MG Tab TAKE 2 TABLETS BY MOUTH EVERY NIGHT AT BEDTIME 180 Tablet 3   • liothyronine (CYTOMEL) 5 MCG Tab TAKE 2 TABLETS BY MOUTH EVERY DAY ON AN EMPTY STOMACH     • atorvastatin (LIPITOR) 20 " MG Tab Take 1 tablet by mouth every day. 30 tablet 11   • aspirin EC (ECOTRIN) 81 MG Tablet Delayed Response Take 1 tablet by mouth every day. 100 tablet 3   • famotidine (PEPCID) 20 MG Tab Take 20 mg by mouth every day.     • tamsulosin (FLOMAX) 0.4 MG capsule Take 0.4 mg by mouth every day.     • CALCIUM GLUCEPTATE INJ Inject 1 Dose as directed every 21 days.     • TIROSINT 125 MCG Cap Take 125 mcg by mouth Every morning on an empty stomach. 90 Cap 3   • sildenafil citrate (VIAGRA) 100 MG tablet Take 100 mg by mouth 1 time daily as needed for Erectile Dysfunction.     • TAGRISSO 80 MG Tab Take 80 mg by mouth every day.       No current facility-administered medications for this encounter.       ALLERGIES:  Gramineae pollens, Cat hair extract, Horse allergy, Other environmental, and Pollen extract    REVIEW OF SYSTEMS:  A review of systems for today's date of service was reviewed and uploaded into the electronic medical record.    PHYSICAL EXAM:  PERFORMANCE STATUS:  ECOG Performance Review 11/22/2021 10/4/2021   ECOG Performance Status Fully active, able to carry on all pre-disease performance without restriction Fully active, able to carry on all pre-disease performance without restriction   Some recent data might be hidden     Karnofsky Score 11/22/2021 10/4/2021   Karnofsky Score 100 100   Some recent data might be hidden     /75 (BP Location: Left arm, Patient Position: Sitting, BP Cuff Size: Adult)   Pulse 65   Temp 36.3 °C (97.4 °F) (Temporal)   Wt 76.7 kg (169 lb)   SpO2 96%   BMI 22.92 kg/m²   Physical Exam  Neurological:      General: No focal deficit present.         LABORATORY DATA:   Lab Results   Component Value Date/Time    WBC 7.1 10/15/2021 1016    WBC 5.2 09/08/2021 0930    WBC 9.1 06/23/2021 0150    HEMOGLOBIN 14.5 10/15/2021 1016    HEMOGLOBIN 14.9 09/08/2021 0930    HEMOGLOBIN 14.4 07/07/2021 1000    HEMATOCRIT 42.1 10/15/2021 1016    HEMATOCRIT 44.1 09/08/2021 0930    HEMATOCRIT  42.8 07/07/2021 1000    MCV 95.0 10/15/2021 1016    MCV 98.9 (H) 09/08/2021 0930    MCV 96.2 06/23/2021 0150    PLATELETCT 203 10/15/2021 1016    PLATELETCT 208 09/08/2021 0930    PLATELETCT 212 06/23/2021 0150    NEUTS 3.17 09/08/2021 0930    NEUTS 7.50 (H) 06/23/2021 0150    NEUTS 3.29 06/22/2021 0431      Lab Results   Component Value Date/Time    SODIUM 142 09/27/2021 1641    SODIUM 145 07/07/2021 1000    SODIUM 138 06/23/2021 0150    POTASSIUM 4.1 09/27/2021 1641    POTASSIUM 3.4 (L) 07/07/2021 1000    POTASSIUM 4.1 06/23/2021 0150    BUN 25 (H) 09/27/2021 1641    BUN 18 07/07/2021 1000    BUN 25 (H) 06/23/2021 0150    CREATININE 1.16 09/27/2021 1641    CREATININE 1.29 07/07/2021 1000    CREATININE 1.44 (H) 06/23/2021 0150    CALCIUM 9.6 09/27/2021 1641    CALCIUM 8.5 07/07/2021 1000    CALCIUM 7.9 (L) 06/23/2021 0150    ALBUMIN 4.3 09/27/2021 1641    ALBUMIN 3.9 07/07/2021 1000    ALBUMIN 3.5 06/23/2021 0150    ASTSGOT 21 09/27/2021 1641    ASTSGOT 18 07/07/2021 1000    ASTSGOT 8 (L) 06/23/2021 0150    ALKPHOSPHAT 84 09/27/2021 1641    ALKPHOSPHAT 62 07/07/2021 1000    ALKPHOSPHAT 56 06/23/2021 0150    IFNOTAFR >60 09/27/2021 1641    IFNOTAFR 54 (A) 07/07/2021 1000    IFNOTAFR 48 (A) 06/23/2021 0150       RADIOLOGY DATA:  No results found.    IMPRESSION:    A 76 y.o. with   Lung cancer (HCC), adenocarcinoma Aug. 2019  Staging form: Lung, AJCC 8th Edition  - Pathologic stage from 10/4/2021: Stage EDENILSON (pT1, pN0, cM1b) - Signed by Emanuel Villarreal M.D. on 10/4/2021  Type of lung cancer: Locally advanced or metastatic non-small cell lung cancer  EGFR mutation: Positive      CANCER STATUS:  No Evidence of Disease    RECOMMENDATIONS:   Patient recovering well from this surgery evaluation therapy to the bone lesions.  I have ordered repeat MRI pelvis for 2 months with telephone follow-up afterwards.    Thank you for the opportunity to participate in his care.  If any questions or comments, please do not hesitate in  calling.    Orders Placed This Encounter   • MR-PELVIS-WITH & W/O AND SEQUENCES

## 2021-11-22 NOTE — NON-PROVIDER
"Patient was seen today in clinic with Dr. Villarreal for follow-up.  Vitals signs and weight were obtained and pain assessment was completed.  Allergies and medications were reviewed with the patient.       Vitals/Pain:  Vitals:    11/22/21 1329   BP: 138/75   BP Location: Left arm   Patient Position: Sitting   BP Cuff Size: Adult   Pulse: 65   Temp: 36.3 °C (97.4 °F)   TempSrc: Temporal   SpO2: 96%   Weight: 76.7 kg (169 lb)   Pain Score: No pain    Allergies:   Gramineae pollens, Cat hair extract, Horse allergy, Other environmental, and Pollen extract    Current Medications:  Current Outpatient Medications   Medication Sig Dispense Refill   • potassium chloride (KLOR-CON) 8 MEQ tablet TAKE 2 TABLETS BY MOUTH EVERY  Tablet 3   • hydrocortisone (CORTEF) 10 MG Tab Take 20 mg by mouth 2 times a day.     • acetaminophen (TYLENOL) 500 MG Tab Take 1,000 mg by mouth one time as needed for Mild Pain.     • MAGNESIUM CITRATE PO Take 1 Tablet by mouth every day.     • B-D INTEGRA SYRINGE 25G X 5/8\" 3 ML Misc USE TO INJECT TESTOSTERONE EVERY 7 DAYS     • traZODone (DESYREL) 50 MG Tab TAKE 2 TABLETS BY MOUTH EVERY NIGHT AT BEDTIME 180 Tablet 3   • liothyronine (CYTOMEL) 5 MCG Tab TAKE 2 TABLETS BY MOUTH EVERY DAY ON AN EMPTY STOMACH     • atorvastatin (LIPITOR) 20 MG Tab Take 1 tablet by mouth every day. 30 tablet 11   • aspirin EC (ECOTRIN) 81 MG Tablet Delayed Response Take 1 tablet by mouth every day. 100 tablet 3   • famotidine (PEPCID) 20 MG Tab Take 20 mg by mouth every day.     • tamsulosin (FLOMAX) 0.4 MG capsule Take 0.4 mg by mouth every day.     • CALCIUM GLUCEPTATE INJ Inject 1 Dose as directed every 21 days.     • TIROSINT 125 MCG Cap Take 125 mcg by mouth Every morning on an empty stomach. 90 Cap 3   • sildenafil citrate (VIAGRA) 100 MG tablet Take 100 mg by mouth 1 time daily as needed for Erectile Dysfunction.     • TAGRISSO 80 MG Tab Take 80 mg by mouth every day.       No current facility-administered " medications for this encounter.         PCP:  Cindi Rosenberg R.N.

## 2021-12-01 ENCOUNTER — NON-PROVIDER VISIT (OUTPATIENT)
Dept: INTERNAL MEDICINE | Facility: IMAGING CENTER | Age: 76
End: 2021-12-01
Payer: MEDICARE

## 2021-12-01 ENCOUNTER — HOSPITAL ENCOUNTER (OUTPATIENT)
Facility: MEDICAL CENTER | Age: 76
End: 2021-12-01
Attending: INTERNAL MEDICINE
Payer: MEDICARE

## 2021-12-01 DIAGNOSIS — Z01.89 ENCOUNTER FOR ROUTINE LABORATORY TESTING: ICD-10-CM

## 2021-12-01 PROCEDURE — 85018 HEMOGLOBIN: CPT

## 2021-12-01 PROCEDURE — 84270 ASSAY OF SEX HORMONE GLOBUL: CPT

## 2021-12-01 PROCEDURE — 84403 ASSAY OF TOTAL TESTOSTERONE: CPT

## 2021-12-01 PROCEDURE — 80053 COMPREHEN METABOLIC PANEL: CPT

## 2021-12-02 LAB
ALBUMIN SERPL BCP-MCNC: 4.4 G/DL (ref 3.2–4.9)
ALBUMIN/GLOB SERPL: 2.1 G/DL
ALP SERPL-CCNC: 72 U/L (ref 30–99)
ALT SERPL-CCNC: 13 U/L (ref 2–50)
ANION GAP SERPL CALC-SCNC: 10 MMOL/L (ref 7–16)
AST SERPL-CCNC: 17 U/L (ref 12–45)
BILIRUB SERPL-MCNC: 1.4 MG/DL (ref 0.1–1.5)
BUN SERPL-MCNC: 27 MG/DL (ref 8–22)
CALCIUM SERPL-MCNC: 9.1 MG/DL (ref 8.5–10.5)
CHLORIDE SERPL-SCNC: 106 MMOL/L (ref 96–112)
CO2 SERPL-SCNC: 27 MMOL/L (ref 20–33)
CREAT SERPL-MCNC: 1.68 MG/DL (ref 0.5–1.4)
GLOBULIN SER CALC-MCNC: 2.1 G/DL (ref 1.9–3.5)
GLUCOSE SERPL-MCNC: 85 MG/DL (ref 65–99)
HGB BLD-MCNC: 14.4 G/DL (ref 14–18)
POTASSIUM SERPL-SCNC: 4.2 MMOL/L (ref 3.6–5.5)
PROT SERPL-MCNC: 6.5 G/DL (ref 6–8.2)
SODIUM SERPL-SCNC: 143 MMOL/L (ref 135–145)
TESTOST SERPL-MCNC: 385 NG/DL (ref 175–781)

## 2021-12-04 LAB — SHBG SERPL-SCNC: 83 NMOL/L (ref 11–80)

## 2021-12-30 ENCOUNTER — HOSPITAL ENCOUNTER (EMERGENCY)
Facility: MEDICAL CENTER | Age: 76
End: 2021-12-30
Attending: EMERGENCY MEDICINE
Payer: MEDICARE

## 2021-12-30 ENCOUNTER — APPOINTMENT (OUTPATIENT)
Dept: RADIOLOGY | Facility: MEDICAL CENTER | Age: 76
End: 2021-12-30
Attending: EMERGENCY MEDICINE
Payer: MEDICARE

## 2021-12-30 VITALS
SYSTOLIC BLOOD PRESSURE: 118 MMHG | RESPIRATION RATE: 18 BRPM | OXYGEN SATURATION: 96 % | WEIGHT: 165.34 LBS | DIASTOLIC BLOOD PRESSURE: 80 MMHG | BODY MASS INDEX: 22.4 KG/M2 | TEMPERATURE: 98.8 F | HEART RATE: 89 BPM | HEIGHT: 72 IN

## 2021-12-30 DIAGNOSIS — R05.9 COUGH: ICD-10-CM

## 2021-12-30 DIAGNOSIS — U07.1 COVID-19: ICD-10-CM

## 2021-12-30 LAB
FLUAV RNA SPEC QL NAA+PROBE: NEGATIVE
FLUBV RNA SPEC QL NAA+PROBE: NEGATIVE
RSV RNA SPEC QL NAA+PROBE: NEGATIVE
SARS-COV-2 RNA RESP QL NAA+PROBE: DETECTED
SPECIMEN SOURCE: ABNORMAL

## 2021-12-30 PROCEDURE — 700102 HCHG RX REV CODE 250 W/ 637 OVERRIDE(OP): Performed by: EMERGENCY MEDICINE

## 2021-12-30 PROCEDURE — 99284 EMERGENCY DEPT VISIT MOD MDM: CPT

## 2021-12-30 PROCEDURE — C9803 HOPD COVID-19 SPEC COLLECT: HCPCS | Performed by: EMERGENCY MEDICINE

## 2021-12-30 PROCEDURE — 71045 X-RAY EXAM CHEST 1 VIEW: CPT

## 2021-12-30 PROCEDURE — A9270 NON-COVERED ITEM OR SERVICE: HCPCS | Performed by: EMERGENCY MEDICINE

## 2021-12-30 PROCEDURE — 700111 HCHG RX REV CODE 636 W/ 250 OVERRIDE (IP): Performed by: EMERGENCY MEDICINE

## 2021-12-30 PROCEDURE — M0243 CASIRIVI AND IMDEVI INFUSION: HCPCS

## 2021-12-30 PROCEDURE — 0241U HCHG SARS-COV-2 COVID-19 NFCT DS RESP RNA 4 TRGT MIC: CPT

## 2021-12-30 RX ORDER — ACETAMINOPHEN 325 MG/1
650 TABLET ORAL ONCE
Status: COMPLETED | OUTPATIENT
Start: 2021-12-30 | End: 2021-12-30

## 2021-12-30 RX ADMIN — CASIRIVIMAB AND IMDEVIMAB 300 MG: 600; 600 INJECTION, SOLUTION, CONCENTRATE INTRAVENOUS at 14:30

## 2021-12-30 RX ADMIN — ACETAMINOPHEN 650 MG: 325 TABLET, FILM COATED ORAL at 15:28

## 2021-12-30 ASSESSMENT — FIBROSIS 4 INDEX: FIB4 SCORE: 1.72

## 2021-12-30 NOTE — ED NOTES
Pt ambulated into ED c/o fever and cough . Pt stated PCP sent pt to ED for Covid rule out. Symptoms started aprox 2 days ago and have progressively been getting worse.

## 2021-12-30 NOTE — ED PROVIDER NOTES
ED Provider Note    Scribed for Patel Harrington M.D. by Lianna Lindsay. 12/30/2021  1:03 PM    Primary care provider: Rian Puente M.D.  Means of arrival: Walk-in  History obtained from: Patient  History limited by: None    CHIEF COMPLAINT  Chief Complaint   Patient presents with    Cough     cough, headache, nasal congestion, chest heaviness started 2 days ago. Oncologist told him to come to ER     PPE Note: I personally donned full PPE for all patient encounters during this visit, including wearing an N95 respirator mask. Scribe remained outside the patient's room and did not have any contact with the patient for the duration of patient encounter.      HPI  Bartolo Berrios [facility] is a 76 y.o. male who presents to the Emergency Department for dry cough onset 2 days ago. He is additionally experiencing headache, nasal congestion, and chest tightness. He denies fever, chills, nausea, and vomiting. He has received both COVID-19 vaccinations and a booster. He reports he has been diagnosed with lung cancer and is scheduled for a PET scan.     REVIEW OF SYSTEMS  Pertinent positives include cough, headache, nasal congestion, and chest tightness.   Pertinent negatives include no fever, chills, nausea, and vomiting.    All other systems reviewed and negative. See [facility] for further details.     PAST MEDICAL HISTORY   has a past medical history of Adrenal insufficiency (HCC), secondary (11/25/2020), Atherosclerosis of both carotid arteries, mild (11/25/2020), BPH (benign prostatic hyperplasia) (11/25/2020), Bronchitis, Cholelithiasis (01/18/2021), CKD (chronic kidney disease) stage 3, GFR 30-59 ml/min (HCC) (11/25/2020), Colostomy present (HCC) (11/25/2020), Diverticulosis of colon (11/25/2020), GERD (gastroesophageal reflux disease) (11/25/2020), High cholesterol, History of atrial tachycardia (11/25/2020), History of malignant melanoma, April 2016 (11/25/2020), History of shingles (11/25/2020), History of stroke,  "subcortical infarct on MRI April 2019 (11/25/2020), Hyperlipidemia (11/25/2020), Hypothyroid (11/25/2020), Indigestion, and Lung cancer (HCC), adenocarcinoma Aug. 2019 (11/25/2020).    SURGICAL HISTORY   has a past surgical history that includes other (Left, 2016); other (Left, 2017); other (Right, 2019); and reid by laparoscopy (2/26/2021).    SOCIAL HISTORY  Social History     Tobacco Use    Smoking status: Never Smoker    Smokeless tobacco: Never Used   Vaping Use    Vaping Use: Never used   Substance Use Topics    Alcohol use: Yes     Alcohol/week: 1.8 oz     Types: 3 Glasses of wine per week     Comment: Occasionally    Drug use: Not Currently     Comment: THC edibles      Social History     Substance and Sexual Activity   Drug Use Not Currently    Comment: THC edibles       FAMILY HISTORY  Family History   Problem Relation Age of Onset    Cancer Mother         Lung- bone    Cancer Father         Colon?    Cancer Sister         Lung    Cancer Brother         Lung    Cancer Brother         Brain       CURRENT MEDICATIONS  Current Outpatient Medications   Medication Instructions    acetaminophen (TYLENOL) 1,000 mg, Oral, ONCE PRN    aspirin EC (ECOTRIN) 81 mg, Oral, DAILY    atorvastatin (LIPITOR) 20 mg, Oral, DAILY    B-D INTEGRA SYRINGE 25G X 5/8\" 3 ML Misc USE TO INJECT TESTOSTERONE EVERY 7 DAYS    CALCIUM GLUCEPTATE INJ 1 Dose, Injection, EVERY 21 DAYS    famotidine (PEPCID) 20 mg, Oral, DAILY    hydrocortisone (CORTEF) 20 mg, Oral, 2 TIMES DAILY    liothyronine (CYTOMEL) 5 MCG Tab TAKE 2 TABLETS BY MOUTH EVERY DAY ON AN EMPTY STOMACH    MAGNESIUM CITRATE PO 1 Tablet, Oral, DAILY    potassium chloride (KLOR-CON) 8 MEQ tablet TAKE 2 TABLETS BY MOUTH EVERY DAY    sildenafil citrate (VIAGRA) 100 mg, Oral, 1 TIME DAILY PRN    Tagrisso 80 mg, Oral, DAILY    tamsulosin (FLOMAX) 0.4 mg, Oral, EVERY DAY    Tirosint 125 mcg, Oral, EACH MORNING ON EMPTY STOMACH    traZODone (DESYREL) 50 MG Tab TAKE 2 TABLETS BY MOUTH " EVERY NIGHT AT BEDTIME       ALLERGIES  Allergies   Allergen Reactions    Gramineae Pollens Itching and Shortness of Breath     Itchy eyes, red face    Cat Hair Extract     Horse Allergy Hives    Other Environmental     Pollen Extract        PHYSICAL EXAM  VITAL SIGNS: /77   Pulse 98   Temp 37.8 °C (100.1 °F) (Temporal)   Resp 16   Ht 1.829 m (6')   Wt 75 kg (165 lb 5.5 oz)   SpO2 98%   BMI 22.42 kg/m²     Nursing note and vitals reviewed.  Constitutional: Well-developed and well-nourished. No distress.   HENT: Head is normocephalic and atraumatic. Oropharynx is clear and moist without exudate or erythema.   Eyes: Pupils are equal, round, and reactive to light. Conjunctiva are normal.   Cardiovascular: Normal rate and regular rhythm. No murmur heard. Normal radial pulses.  Pulmonary/Chest: Prominent dry cough. Breath sounds normal. No wheezes or rales.   Abdominal: Soft and non-tender. No distention    Musculoskeletal: Extremities exhibit normal range of motion without edema or tenderness.   Neurological: Awake, alert and oriented to person, place, and time. No focal deficits noted.  Skin: Skin is warm and dry. No rash.   Psychiatric: Normal mood and affect. Appropriate for clinical situation.    DIAGNOSTIC STUDIES / PROCEDURES    LABS  Results for orders placed or performed during the hospital encounter of 12/30/21   COV-2, FLU A/B, AND RSV BY PCR (2-4 HOURS CEPHEID): Collect NP swab in VTM    Specimen: Respirate   Result Value Ref Range    Influenza virus A RNA Negative Negative    Influenza virus B, PCR Negative Negative    RSV, PCR Negative Negative    SARS-CoV-2 by PCR DETECTED (AA)     SARS-CoV-2 Source NP Swab      All labs reviewed by me.    RADIOLOGY  DX-CHEST-PORTABLE (1 VIEW)    (Results Pending)     The radiologist's interpretation of all radiological studies have been reviewed by me.    COURSE & MEDICAL DECISION MAKING  Nursing notes, VS, PMSFHx reviewed in chart.     Review of past medical  records shows the patient has been diagnosed with lung cancer.     1:03 PM - Patient seen and examined at bedside. Ordered DX-Chest and COV-2, Flu A/B, and RSV by PCR to evaluate his symptoms. The differential diagnoses include but are not limited to: COVID-19, viral upper respiratory infection, pneumonia    2:20 PM - Patient was reevaluated at bedside. Discussed lab results with the patient and informed them about the plan to treat him with Regeneron. I discussed the plan for discharge after the Regeneron treatment. Patient verbalizes understanding and agreement to this plan of care.     Monoclonal antibody infusion EUA progress note    This patient is considered a candidate for monoclonal antibody infusion to treat/prevent high risk SARS-CoV-2 virus infection.      Criteria for use (treatment):  -Mild to moderate illness for less than 10 days and is high risk for progressing to severe COVID-19 and/or hospitalization.    -Not hospitalized.   -Does not require oxygen therapy due to COVID-19.    ->40 kg and 12 years old or greater    Criteria for use (post-exposure prophylaxis):  -High risk for progressing to severe COVID-19 and/or hospitalization  -Exposed (close contact per CDC) to COVID-19 positive individual OR high risk of exposure to COVID-19 because of positive individual in institutional setting   -Not fully vaccinated or not expected to mount an adequate immune response due to immunocompromising condition or immunosuppressive medication  ->40 kg and 12 years old or greater    Date of positive COVID-19 test (treatment) or known exposure (post-exposure prophylaxis): 12/30/2021 Treatment    Vaccinated for COVID-19 (yes/no): Yes     Symptoms of COVID-19 (if being used for treatment): cough, headache, nasal congestion, and chest tightness    High risk criteria:   -Age of 65 or greater  -Body mass index of 25 kg/m2or greater  -12-17 years of age and have  -BMI greater than or equal to 85% of their age and gender  based CDC growth chart  -Chronic kidney disease  -Diabetes  -Pregnancy  -Immunosuppressive disease  -Receiving immunosuppressive treatment  -Chronic lung disease  -65 years of age or greater  -Cardiovascular disease  -Hypertension  -Medical related technological dependence  -Sickle cell disease  -Congenital or acquired heart disease  -Neurodevelopmental disorder  -Other medical conditions or factors that place individual patients at high risk for progression to severe COVID-19 and authorization of REGEN-COV under the EUA is not limited to the medical conditions or factors listed above. Healthcare providers should consider the benefit-risk for an individual patient.    This patient has been given the EUA patient fact sheet and consents to receiving this medication.      The patient will return for new or worsening symptoms and is stable at the time of discharge.    The patient is referred to a primary physician for blood pressure management, diabetic screening, and for all other preventative health concerns.    DISPOSITION:  Patient will be discharged home in stable condition.    FOLLOW UP:  Rian Puente M.D.  [address] [facility] [id number] [phone]    Schedule an appointment as soon as possible for a visit       [facility], Emergency Dept  [address] [facility] [id number] [phone]    If symptoms worsen      OUTPATIENT MEDICATIONS:  New Prescriptions    No medications on file         FINAL IMPRESSION  1. Cough    2. COVID-19          Lianna MCDANIEL (Scribe), am scribing for, and in the presence of, Patel Harrington M.D..    Electronically signed by: Lianna Lindsay ([email or web address]), 12/30/2021    Patel MCDANIEL M.D. personally performed the services described in this documentation, as scribed by Lianna Lindsay in my presence, and it is both accurate and complete.    The note accurately reflects work and decisions made by me.  Patel Harrington M.D.  12/30/2021   4:13 PM

## 2021-12-30 NOTE — ED NOTES
No allergic reaction seen. Discharged to home with instructions to follow up with his doctor and/or return for worsening condition.

## 2022-01-11 ENCOUNTER — HOSPITAL ENCOUNTER (OUTPATIENT)
Dept: RADIOLOGY | Facility: MEDICAL CENTER | Age: 77
End: 2022-01-11
Attending: RADIOLOGY
Payer: MEDICARE

## 2022-01-11 DIAGNOSIS — C79.51 BONE METASTASIS: ICD-10-CM

## 2022-01-11 PROCEDURE — A9576 INJ PROHANCE MULTIPACK: HCPCS | Performed by: RADIOLOGY

## 2022-01-11 PROCEDURE — 700117 HCHG RX CONTRAST REV CODE 255: Performed by: RADIOLOGY

## 2022-01-11 PROCEDURE — 72197 MRI PELVIS W/O & W/DYE: CPT | Mod: MG

## 2022-01-11 RX ADMIN — GADOTERIDOL 15 ML: 279.3 INJECTION, SOLUTION INTRAVENOUS at 11:05

## 2022-01-13 ENCOUNTER — HOSPITAL ENCOUNTER (OUTPATIENT)
Dept: RADIATION ONCOLOGY | Facility: MEDICAL CENTER | Age: 77
End: 2022-01-31
Attending: RADIOLOGY
Payer: MEDICARE

## 2022-01-13 VITALS
WEIGHT: 162.7 LBS | OXYGEN SATURATION: 97 % | HEART RATE: 56 BPM | TEMPERATURE: 98.2 F | SYSTOLIC BLOOD PRESSURE: 153 MMHG | DIASTOLIC BLOOD PRESSURE: 81 MMHG | BODY MASS INDEX: 22.07 KG/M2

## 2022-01-13 PROCEDURE — 99212 OFFICE O/P EST SF 10 MIN: CPT | Performed by: RADIOLOGY

## 2022-01-13 ASSESSMENT — FIBROSIS 4 INDEX: FIB4 SCORE: 1.72

## 2022-01-13 ASSESSMENT — PAIN SCALES - GENERAL: PAINLEVEL: NO PAIN

## 2022-01-13 NOTE — NON-PROVIDER
"Patient was seen today in clinic with Dr. Villarreal for Follow Up.  Vitals signs and weight were obtained and pain assessment was completed.  Allergies and medications were reviewed with the patient.  Toxicities of treatment assessed.     Vitals/Pain:  Vitals:    01/13/22 1137   BP: 153/81   Pulse: (!) 56   Temp: 36.8 °C (98.2 °F)   SpO2: 97%   Weight: 73.8 kg (162 lb 11.2 oz)   Pain Score: No pain        Allergies:   Gramineae pollens, Cat hair extract, Horse allergy, Other environmental, and Pollen extract    Current Medications:  Current Outpatient Medications   Medication Sig Dispense Refill   • potassium chloride (KLOR-CON) 8 MEQ tablet TAKE 2 TABLETS BY MOUTH EVERY  Tablet 3   • hydrocortisone (CORTEF) 10 MG Tab Take 20 mg by mouth 2 times a day.     • acetaminophen (TYLENOL) 500 MG Tab Take 1,000 mg by mouth one time as needed for Mild Pain.     • MAGNESIUM CITRATE PO Take 1 Tablet by mouth every day.     • B-D INTEGRA SYRINGE 25G X 5/8\" 3 ML Misc USE TO INJECT TESTOSTERONE EVERY 7 DAYS     • traZODone (DESYREL) 50 MG Tab TAKE 2 TABLETS BY MOUTH EVERY NIGHT AT BEDTIME 180 Tablet 3   • liothyronine (CYTOMEL) 5 MCG Tab TAKE 2 TABLETS BY MOUTH EVERY DAY ON AN EMPTY STOMACH     • aspirin EC (ECOTRIN) 81 MG Tablet Delayed Response Take 1 tablet by mouth every day. 100 tablet 3   • famotidine (PEPCID) 20 MG Tab Take 20 mg by mouth every day.     • CALCIUM GLUCEPTATE INJ Inject 1 Dose as directed every 21 days.     • TIROSINT 125 MCG Cap Take 125 mcg by mouth Every morning on an empty stomach. 90 Cap 3   • sildenafil citrate (VIAGRA) 100 MG tablet Take 100 mg by mouth 1 time daily as needed for Erectile Dysfunction.     • TAGRISSO 80 MG Tab Take 80 mg by mouth every day.     • atorvastatin (LIPITOR) 20 MG Tab Take 1 tablet by mouth every day. (Patient not taking: Reported on 1/13/2022) 30 tablet 11   • tamsulosin (FLOMAX) 0.4 MG capsule Take 0.4 mg by mouth every day. (Patient not taking: Reported on 1/13/2022)   "     No current facility-administered medications for this encounter.         PCP:  Cindi Talbert, Rhett Ass't

## 2022-01-13 NOTE — PROGRESS NOTES
RADIATION ONCOLOGY FOLLOW-UP    DATE OF SERVICE: 1/13/2022    IDENTIFICATION:   A 76 y.o. male with     Lung cancer (HCC), adenocarcinoma Aug. 2019  Staging form: Lung, AJCC 8th Edition  - Pathologic stage from 10/4/2021: Stage EDENILSON (pT1, pN0, cM1b) - Signed by Emanuel Villarreal M.D. on 10/4/2021  Type of lung cancer: Locally advanced or metastatic non-small cell lung cancer  EGFR mutation: Positive      RADIATION SUMMARY:  [facility] Treatment Information        Some values may be hidden. Unless noted otherwise, only the newest values recorded on each date are displayed.         [facility] Treatment Summary 10/25/21   Course First Treatment Date 10/20/2021   Course Last Treatment Date 10/25/2021   SBRT_L_Sacral Plan from Course C1_SBRT_Pelvis   Fraction 3 of 3   Elapsed Course Days 5 @ [id number]   Prescribed Fraction Dose 1,000 cGy   Prescribed Total Dose 3,000 cGy   L_Sacral Reference Point from Course C1_SBRT_Pelvis   Elapsed Course Days 5 @ [id number]   Session Dose 1,000 cGy   Total Dose 3,000 cGy   L_Sacral CP Reference Point from Course C1_SBRT_Pelvis   Elapsed Course Days 5 @ [id number]   Session Dose 1,040 cGy   Total Dose 3,119 cGy             HISTORY OF PRESENT ILLNESS:   Patient is a 76-year-old male with history of metastatic melanoma as well as metastatic lung cancer.  His history dates back to 2016 when he was diagnosed with left lower leg melanoma and had 1 sentinel lymph node positive and denied further lymph node dissection and was found in September 2017 to have recurrent disease in the left inguinal and pelvic region and received nivolumab and later nivolumab and ipilimumab with intralesional T Vec being co-managed by Dr. Vu Phillips and Dr. Corral.  Patient subsequently developed a right lower lobe lung lesion and underwent resection August 20, 2019 with pathology revealing grade 2 adenocarcinoma 0/21 lymph nodes EGFR mutated.  Patient was followed with PET scan which showed L5 spinal lesion with biopsy  December 27, 2019 revealing lung cancer adenocarcinoma EGFR positive.  Patient underwent SBRT 30 in 3 fractions completed 1/27/2020 at [facility] with Dr. Martinez.  In April 2020 the patient was found to progression disease in bones including T11 and sacrum and Tagrisso was started which yielded a complete response.  However PET December 16, 2020 returned activity to the left sacral lesion and then resolved on future pets however eventually PET activity returned September 7, 2021 MRI brain was normal.  Patient was presented in multidisciplinary melanoma tumor board with consensus to consider radiation with SBRT to the left sacral lesion in addition MRI pelvis September 19, 2021 showed an adjacent left iliac bone lesion suspicious for metastasis.  Patient was seen with Dr. Martinez and plan was for repeat stereotactic body radiation therapy.  He is having pain 6 out of 10 in the lower back for which he uses occasional Norco for.  But denies any neuropathic symptoms.     11/22/21    Patient is doing well after his SBRT to his left pelvis bone lesions.  He had initial bone flare which responded well to oxycodone and dexamethasone.  His pain is subsided at this point.  He is back on his Tagrisso and is scheduled with PET CT scan for restaging with Dr. Corral.    Interval History:  Patient completed his stereotactic body radiation therapy to left sacrum October 25, 2021.  He had interval MRI pelvis January 11, 2022 which showed 2 cm left sacral bone lesion unchanged in appearance from comparison and again left ilium 1 cm lesion unchanged.  No new bone lesions identified.  Patient has continued on his Tagrisso and is scheduled for a PET CT scan next week.  Patient denies any pain.    PROBLEM LIST:  Patient Active Problem List   Diagnosis   • Hypothyroid   • CKD (chronic kidney disease) stage 3, GFR 30-59 ml/min ([facility])   • Obstructive sleep apnea   • GERD (gastroesophageal reflux disease)   •  Hyperlipidemia   • Adrenal insufficiency (HCC), secondary   • Lung cancer (HCC), adenocarcinoma Aug. 2019   • History of stroke, subcortical infarct on MRI April 2019   • Diverticulosis of colon   • History of shingles   • Vitamin D deficiency   • History of malignant melanoma, April 2016   • History of atrial tachycardia   • BPH with obstruction/lower urinary tract symptoms   • Elevated PSA   • Atherosclerosis of both carotid arteries, mild   • Syncope   • Metastasis (HCC)   • Shortness of breath   • Anxiety   • Brain lesion   • Calculus of gallbladder without cholecystitis without obstruction   • Cerebral infarct (HCC)   • Chronic pain   • Cellulitis   • Diverticulitis of large intestine with perforation without bleeding   • Facial basal cell cancer   • Family history of coronary artery disease   • Family history of early CAD   • H/O cardiac radiofrequency ablation   • Hypertension   • Hypothyroidism due to Hashimoto's thyroiditis   • Left renal atrophy   • Long term (current) use of systemic steroids   • Lung mass   • Lymphedema of lower extremity   • Malignant melanoma of left lower leg (HCC)   • Need for immunization against influenza   • Neoplasm of uncertain behavior   • Orthostatic hypotension   • Palpitations   • Premature atrial contractions   • Adenocarcinoma of lung (HCC), stage 4 ([facility] Oct. 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     • acetaminophen (TYLENOL) 500  "MG Tab Take 1,000 mg by mouth one time as needed for Mild Pain.     • MAGNESIUM CITRATE PO Take 1 Tablet by mouth every day.     • B-D INTEGRA SYRINGE 25G X 5/8\" 3 ML Misc USE TO INJECT TESTOSTERONE EVERY 7 DAYS     • traZODone (DESYREL) 50 MG Tab TAKE 2 TABLETS BY MOUTH EVERY NIGHT AT BEDTIME 180 Tablet 3   • liothyronine (CYTOMEL) 5 MCG Tab TAKE 2 TABLETS BY MOUTH EVERY DAY ON AN EMPTY STOMACH     • aspirin EC (ECOTRIN) 81 MG Tablet Delayed Response Take 1 tablet by mouth every day. 100 tablet 3   • famotidine (PEPCID) 20 MG Tab Take 20 mg by mouth every day.     • CALCIUM GLUCEPTATE INJ Inject 1 Dose as directed every 21 days.     • TIROSINT 125 MCG Cap Take 125 mcg by mouth Every morning on an empty stomach. 90 Cap 3   • sildenafil citrate (VIAGRA) 100 MG tablet Take 100 mg by mouth 1 time daily as needed for Erectile Dysfunction.     • TAGRISSO 80 MG Tab Take 80 mg by mouth every day.     • atorvastatin (LIPITOR) 20 MG Tab Take 1 tablet by mouth every day. (Patient not taking: Reported on 1/13/2022) 30 tablet 11   • tamsulosin (FLOMAX) 0.4 MG capsule Take 0.4 mg by mouth every day. (Patient not taking: Reported on 1/13/2022)       No current facility-administered medications for this encounter.       ALLERGIES:  Gramineae pollens, Cat hair extract, Horse allergy, Other environmental, and Pollen extract    REVIEW OF SYSTEMS:  A review of systems for today's date of service was reviewed and uploaded into the electronic medical record.    PHYSICAL EXAM:  PERFORMANCE STATUS:  ECOG Performance Review 11/22/2021 10/4/2021   ECOG Performance Status Fully active, able to carry on all pre-disease performance without restriction Fully active, able to carry on all pre-disease performance without restriction   Some recent data might be hidden     Karnofsky Score 11/22/2021 10/4/2021   Karnofsky Score 100 100   Some recent data might be hidden     /81   Pulse (!) 56   Temp 36.8 °C (98.2 °F)   Wt 73.8 kg (162 lb " 11.2 oz)   SpO2 97%   BMI 22.07 kg/m²   Physical Exam  Vitals reviewed.   HENT:      Head: Normocephalic.      Mouth/Throat:      Mouth: Mucous membranes are moist.   Eyes:      Pupils: Pupils are equal, round, and reactive to light.   Cardiovascular:      Rate and Rhythm: Normal rate.   Pulmonary:      Effort: Pulmonary effort is normal.   Abdominal:      General: Abdomen is flat.   Musculoskeletal:         General: Normal range of motion.      Cervical back: Normal range of motion.   Neurological:      General: No focal deficit present.      Mental Status: He is alert.   Psychiatric:         Mood and Affect: Mood normal.         LABORATORY DATA:   Lab Results   Component Value Date/Time    WBC 7.1 10/15/2021 1016    WBC 5.2 09/08/2021 0930    WBC 9.1 06/23/2021 0150    HEMOGLOBIN 14.4 12/01/2021 1330    HEMOGLOBIN 14.5 10/15/2021 1016    HEMOGLOBIN 14.9 09/08/2021 0930    HEMATOCRIT 42.1 10/15/2021 1016    HEMATOCRIT 44.1 09/08/2021 0930    HEMATOCRIT 42.8 07/07/2021 1000    MCV 95.0 10/15/2021 1016    MCV 98.9 (H) 09/08/2021 0930    MCV 96.2 06/23/2021 0150    PLATELETCT 203 10/15/2021 1016    PLATELETCT 208 09/08/2021 0930    PLATELETCT 212 06/23/2021 0150    NEUTS 3.17 09/08/2021 0930    NEUTS 7.50 (H) 06/23/2021 0150    NEUTS 3.29 06/22/2021 0431      Lab Results   Component Value Date/Time    SODIUM 143 12/01/2021 1330    SODIUM 142 09/27/2021 1641    SODIUM 145 07/07/2021 1000    POTASSIUM 4.2 12/01/2021 1330    POTASSIUM 4.1 09/27/2021 1641    POTASSIUM 3.4 (L) 07/07/2021 1000    BUN 27 (H) 12/01/2021 1330    BUN 25 (H) 09/27/2021 1641    BUN 18 07/07/2021 1000    CREATININE 1.68 (H) 12/01/2021 1330    CREATININE 1.16 09/27/2021 1641    CREATININE 1.29 07/07/2021 1000    CALCIUM 9.1 12/01/2021 1330    CALCIUM 9.6 09/27/2021 1641    CALCIUM 8.5 07/07/2021 1000    ALBUMIN 4.4 12/01/2021 1330    ALBUMIN 4.3 09/27/2021 1641    ALBUMIN 3.9 07/07/2021 1000    ASTSGOT 17 12/01/2021 1330    ASTSGOT 21 09/27/2021  1641    ASTSGOT 18 07/07/2021 1000    ALKPHOSPHAT 72 12/01/2021 1330    ALKPHOSPHAT 84 09/27/2021 1641    ALKPHOSPHAT 62 07/07/2021 1000    IFNOTAFR 40 (A) 12/01/2021 1330    IFNOTAFR >60 09/27/2021 1641    IFNOTAFR 54 (A) 07/07/2021 1000       RADIOLOGY DATA:  DX-CHEST-PORTABLE (1 VIEW)    Result Date: 12/30/2021 12/30/2021 2:10 PM HISTORY/REASON FOR EXAM: Cough. TECHNIQUE/EXAM DESCRIPTION AND NUMBER OF VIEWS: Single AP view of the chest. COMPARISON: June 23 FINDINGS: Lungs: Stable linear right basilar opacity most likely representing subsegmental atelectasis or scarring. Pleura:  No pleural space process is seen. Heart and mediastinum: There is stable cardiac silhouette enlargement.     Stable chest with cardiac silhouette enlargement and right basilar scarring or atelectasis    MR-PELVIS-WITH & W/O AND SEQUENCES    Result Date: 1/11/2022 1/11/2022 10:24 AM HISTORY/REASON FOR EXAM:  Follow-up bone metastases.. TECHNIQUE/EXAM DESCRIPTION: MRI of the pelvis with contrast. The study was performed on a Kay 1.5 Ana MRI scanner. T1 axial, T1 coronal, T2 fast spin-echo fat-suppressed axial, and T2 fast spin-echo fat-suppressed coronal images were obtained of the pelvis precontrast administration. Postcontrast T1 coronal and axial fat-suppressed images are obtained following intravenous administration of 15 mL ProHance. COMPARISON: 9/19/2021 FINDINGS: Osseous structures/Cartilaginous surfaces: There is again seen a left sacral bone lesion measures 2 cm in size and is characterized by low T1 and T2 signal with some faint surrounding edema and enhancement. This is unchanged in size from comparison. The previously seen similar appearing focus within the left ilium is barely perceptible on the current study and measures 10 mm in size. This is similar signal characteristics. There are no new bone lesions seen. There is degenerative change of the lumbar spine as well as mild degenerative change of the hips.  Miscellaneous: No evidence of soft tissue mass or evidence of inguinal adenopathy. No pelvic fluid collections.     1.  Again seen 2 cm left sacral bone lesion, unchanged in appearance from comparison. 2.  Again seen similar signal characteristics lesion within the left ilium measuring 1 cm in size, unchanged. 3.  No new bone lesions seen. 4.  Degenerative change of the hips and lumbar spine.      IMPRESSION:    A 76 y.o. with   Lung cancer (HCC), adenocarcinoma Aug. 2019  Staging form: Lung, AJCC 8th Edition  - Pathologic stage from 10/4/2021: Stage EDENILSON (pT1, pN0, cM1b) - Signed by Emanuel Villarreal M.D. on 10/4/2021  Type of lung cancer: Locally advanced or metastatic non-small cell lung cancer  EGFR mutation: Positive      CANCER STATUS:  Disease Stable    RECOMMENDATIONS:   I reviewed most recent MRI images with patient and feel that this is consistent with treatment change with central necrosis and will await for upcoming PET CT scan to ensure no increased uptake in the spots of disease that were radiated.  However typically after radiation we do see stable appearing size of the radiated areas.  Patient continues on his Tagrisso and will follow on an as-needed basis.    Thank you for the opportunity to participate in his care.  If any questions or comments, please do not hesitate in calling.    CC: Dr. Corral    No orders of the defined types were placed in this encounter.

## 2022-01-17 ENCOUNTER — HOSPITAL ENCOUNTER (OUTPATIENT)
Dept: RADIOLOGY | Facility: MEDICAL CENTER | Age: 77
End: 2022-01-17
Attending: INTERNAL MEDICINE
Payer: MEDICARE

## 2022-01-17 DIAGNOSIS — C43.72 MALIGNANT MELANOMA OF LEFT LOWER EXTREMITY INCLUDING HIP (HCC): ICD-10-CM

## 2022-01-17 DIAGNOSIS — C34.11 MALIGNANT NEOPLASM OF UPPER LOBE OF RIGHT LUNG (HCC): ICD-10-CM

## 2022-01-17 PROCEDURE — A9552 F18 FDG: HCPCS

## 2022-01-21 RX ORDER — AZELASTINE 1 MG/ML
1 SPRAY, METERED NASAL 2 TIMES DAILY PRN
Qty: 30 ML | Refills: 5 | Status: SHIPPED | OUTPATIENT
Start: 2022-01-21 | End: 2022-11-21

## 2022-03-01 ENCOUNTER — HOSPITAL ENCOUNTER (OUTPATIENT)
Facility: MEDICAL CENTER | Age: 77
End: 2022-03-01
Attending: INTERNAL MEDICINE
Payer: MEDICARE

## 2022-03-01 ENCOUNTER — NON-PROVIDER VISIT (OUTPATIENT)
Dept: INTERNAL MEDICINE | Facility: IMAGING CENTER | Age: 77
End: 2022-03-01
Payer: MEDICARE

## 2022-03-01 DIAGNOSIS — Z01.89 ENCOUNTER FOR ROUTINE LABORATORY TESTING: ICD-10-CM

## 2022-03-01 PROCEDURE — 84439 ASSAY OF FREE THYROXINE: CPT

## 2022-03-01 PROCEDURE — 84443 ASSAY THYROID STIM HORMONE: CPT

## 2022-03-01 PROCEDURE — 84480 ASSAY TRIIODOTHYRONINE (T3): CPT

## 2022-03-01 PROCEDURE — 80053 COMPREHEN METABOLIC PANEL: CPT

## 2022-03-02 LAB
ALBUMIN SERPL BCP-MCNC: 4.1 G/DL (ref 3.2–4.9)
ALBUMIN/GLOB SERPL: 1.5 G/DL
ALP SERPL-CCNC: 65 U/L (ref 30–99)
ALT SERPL-CCNC: 10 U/L (ref 2–50)
ANION GAP SERPL CALC-SCNC: 10 MMOL/L (ref 7–16)
AST SERPL-CCNC: 15 U/L (ref 12–45)
BILIRUB SERPL-MCNC: 1.6 MG/DL (ref 0.1–1.5)
BUN SERPL-MCNC: 20 MG/DL (ref 8–22)
CALCIUM SERPL-MCNC: 9.7 MG/DL (ref 8.5–10.5)
CHLORIDE SERPL-SCNC: 107 MMOL/L (ref 96–112)
CO2 SERPL-SCNC: 23 MMOL/L (ref 20–33)
CREAT SERPL-MCNC: 1.2 MG/DL (ref 0.5–1.4)
GLOBULIN SER CALC-MCNC: 2.7 G/DL (ref 1.9–3.5)
GLUCOSE SERPL-MCNC: 90 MG/DL (ref 65–99)
POTASSIUM SERPL-SCNC: 4.3 MMOL/L (ref 3.6–5.5)
PROT SERPL-MCNC: 6.8 G/DL (ref 6–8.2)
SODIUM SERPL-SCNC: 140 MMOL/L (ref 135–145)
T3 SERPL-MCNC: 202 NG/DL (ref 60–181)
T4 FREE SERPL-MCNC: 2.21 NG/DL (ref 0.93–1.7)
TSH SERPL DL<=0.005 MIU/L-ACNC: 0.09 UIU/ML (ref 0.38–5.33)

## 2022-04-13 ENCOUNTER — HOSPITAL ENCOUNTER (OUTPATIENT)
Dept: RADIOLOGY | Facility: MEDICAL CENTER | Age: 77
End: 2022-04-13
Attending: INTERNAL MEDICINE
Payer: MEDICARE

## 2022-04-13 DIAGNOSIS — C34.11 MALIGNANT NEOPLASM OF UPPER LOBE OF RIGHT LUNG (HCC): ICD-10-CM

## 2022-04-13 DIAGNOSIS — C43.72 MALIGNANT MELANOMA OF LEFT LOWER EXTREMITY INCLUDING HIP (HCC): ICD-10-CM

## 2022-04-13 PROCEDURE — 78816 PET IMAGE W/CT FULL BODY: CPT | Mod: MH

## 2022-04-19 ENCOUNTER — TELEPHONE (OUTPATIENT)
Dept: INTERNAL MEDICINE | Facility: IMAGING CENTER | Age: 77
End: 2022-04-19
Payer: MEDICARE

## 2022-04-19 RX ORDER — LEVOTHYROXINE SODIUM 125 UG/1
125 CAPSULE ORAL
Qty: 90 CAPSULE | Refills: 3 | COMMUNITY
Start: 2022-04-19

## 2022-04-20 ENCOUNTER — HOSPITAL ENCOUNTER (OUTPATIENT)
Facility: MEDICAL CENTER | Age: 77
End: 2022-04-20
Attending: INTERNAL MEDICINE
Payer: MEDICARE

## 2022-04-20 ENCOUNTER — NON-PROVIDER VISIT (OUTPATIENT)
Dept: INTERNAL MEDICINE | Facility: IMAGING CENTER | Age: 77
End: 2022-04-20
Payer: MEDICARE

## 2022-04-20 DIAGNOSIS — Z01.89 ENCOUNTER FOR ROUTINE LABORATORY TESTING: ICD-10-CM

## 2022-04-20 DIAGNOSIS — R97.20 ELEVATED PSA: ICD-10-CM

## 2022-04-20 DIAGNOSIS — E78.2 HYPERLIPIDEMIA, MIXED: ICD-10-CM

## 2022-04-20 DIAGNOSIS — R80.9 PROTEINURIA, UNSPECIFIED TYPE: ICD-10-CM

## 2022-04-20 DIAGNOSIS — I65.23 ATHEROSCLEROSIS OF BOTH CAROTID ARTERIES: ICD-10-CM

## 2022-04-20 DIAGNOSIS — E55.9 VITAMIN D DEFICIENCY: ICD-10-CM

## 2022-04-20 DIAGNOSIS — E03.9 HYPOTHYROIDISM, UNSPECIFIED TYPE: ICD-10-CM

## 2022-04-20 DIAGNOSIS — Z79.899 LONG TERM USE OF DRUG: ICD-10-CM

## 2022-04-20 DIAGNOSIS — Z86.39 HISTORY OF NON ANEMIC VITAMIN B12 DEFICIENCY: ICD-10-CM

## 2022-04-20 PROCEDURE — 82306 VITAMIN D 25 HYDROXY: CPT

## 2022-04-20 PROCEDURE — 84443 ASSAY THYROID STIM HORMONE: CPT

## 2022-04-20 PROCEDURE — 84480 ASSAY TRIIODOTHYRONINE (T3): CPT

## 2022-04-20 PROCEDURE — 82607 VITAMIN B-12: CPT

## 2022-04-20 PROCEDURE — 80061 LIPID PANEL: CPT

## 2022-04-20 PROCEDURE — 80053 COMPREHEN METABOLIC PANEL: CPT

## 2022-04-20 PROCEDURE — 81001 URINALYSIS AUTO W/SCOPE: CPT

## 2022-04-20 PROCEDURE — 84153 ASSAY OF PSA TOTAL: CPT

## 2022-04-20 RX ORDER — ATORVASTATIN CALCIUM 20 MG/1
20 TABLET, FILM COATED ORAL DAILY
Qty: 90 TABLET | Refills: 0 | Status: SHIPPED | OUTPATIENT
Start: 2022-04-20 | End: 2022-09-09

## 2022-04-21 LAB
25(OH)D3 SERPL-MCNC: 77 NG/ML (ref 30–100)
ALBUMIN SERPL BCP-MCNC: 4.3 G/DL (ref 3.2–4.9)
ALBUMIN/GLOB SERPL: 1.8 G/DL
ALP SERPL-CCNC: 69 U/L (ref 30–99)
ALT SERPL-CCNC: 15 U/L (ref 2–50)
ANION GAP SERPL CALC-SCNC: 8 MMOL/L (ref 7–16)
APPEARANCE UR: CLEAR
AST SERPL-CCNC: 24 U/L (ref 12–45)
BACTERIA #/AREA URNS HPF: NEGATIVE /HPF
BILIRUB SERPL-MCNC: 1.7 MG/DL (ref 0.1–1.5)
BILIRUB UR QL STRIP.AUTO: NEGATIVE
BUN SERPL-MCNC: 22 MG/DL (ref 8–22)
CALCIUM SERPL-MCNC: 8.9 MG/DL (ref 8.5–10.5)
CHLORIDE SERPL-SCNC: 108 MMOL/L (ref 96–112)
CHOLEST SERPL-MCNC: 152 MG/DL (ref 100–199)
CO2 SERPL-SCNC: 27 MMOL/L (ref 20–33)
COLOR UR: YELLOW
CREAT SERPL-MCNC: 1.31 MG/DL (ref 0.5–1.4)
EPI CELLS #/AREA URNS HPF: NEGATIVE /HPF
GFR SERPLBLD CREATININE-BSD FMLA CKD-EPI: 57 ML/MIN/1.73 M 2
GLOBULIN SER CALC-MCNC: 2.4 G/DL (ref 1.9–3.5)
GLUCOSE SERPL-MCNC: 82 MG/DL (ref 65–99)
GLUCOSE UR STRIP.AUTO-MCNC: NEGATIVE MG/DL
HDLC SERPL-MCNC: 68 MG/DL
HYALINE CASTS #/AREA URNS LPF: ABNORMAL /LPF
KETONES UR STRIP.AUTO-MCNC: NEGATIVE MG/DL
LDLC SERPL CALC-MCNC: 69 MG/DL
LEUKOCYTE ESTERASE UR QL STRIP.AUTO: NEGATIVE
MICRO URNS: ABNORMAL
NITRITE UR QL STRIP.AUTO: NEGATIVE
PH UR STRIP.AUTO: 6 [PH] (ref 5–8)
POTASSIUM SERPL-SCNC: 3.7 MMOL/L (ref 3.6–5.5)
PROT SERPL-MCNC: 6.7 G/DL (ref 6–8.2)
PROT UR QL STRIP: 30 MG/DL
PSA SERPL-MCNC: 4.45 NG/ML (ref 0–4)
RBC # URNS HPF: ABNORMAL /HPF
RBC UR QL AUTO: NEGATIVE
SODIUM SERPL-SCNC: 143 MMOL/L (ref 135–145)
SP GR UR STRIP.AUTO: 1.02
T3 SERPL-MCNC: 239 NG/DL (ref 60–181)
TRIGL SERPL-MCNC: 77 MG/DL (ref 0–149)
TSH SERPL DL<=0.005 MIU/L-ACNC: 0.05 UIU/ML (ref 0.38–5.33)
UROBILINOGEN UR STRIP.AUTO-MCNC: 0.2 MG/DL
VIT B12 SERPL-MCNC: 1527 PG/ML (ref 211–911)
WBC #/AREA URNS HPF: ABNORMAL /HPF

## 2022-04-27 ENCOUNTER — OFFICE VISIT (OUTPATIENT)
Dept: INTERNAL MEDICINE | Facility: IMAGING CENTER | Age: 77
End: 2022-04-27
Payer: MEDICARE

## 2022-04-27 VITALS
HEART RATE: 64 BPM | BODY MASS INDEX: 23.1 KG/M2 | SYSTOLIC BLOOD PRESSURE: 126 MMHG | RESPIRATION RATE: 14 BRPM | WEIGHT: 165 LBS | HEIGHT: 71 IN | TEMPERATURE: 98.3 F | DIASTOLIC BLOOD PRESSURE: 66 MMHG | OXYGEN SATURATION: 95 %

## 2022-04-27 DIAGNOSIS — R97.20 ELEVATED PSA: ICD-10-CM

## 2022-04-27 DIAGNOSIS — E29.1 ANDROGEN DEFICIENCY: ICD-10-CM

## 2022-04-27 DIAGNOSIS — Z86.39 HISTORY OF NON ANEMIC VITAMIN B12 DEFICIENCY: ICD-10-CM

## 2022-04-27 DIAGNOSIS — R80.9 PROTEINURIA, UNSPECIFIED TYPE: ICD-10-CM

## 2022-04-27 DIAGNOSIS — Z86.73 HISTORY OF STROKE: ICD-10-CM

## 2022-04-27 DIAGNOSIS — Z00.00 MEDICARE ANNUAL WELLNESS VISIT, SUBSEQUENT: ICD-10-CM

## 2022-04-27 DIAGNOSIS — Z86.79 HISTORY OF ATRIAL TACHYCARDIA: ICD-10-CM

## 2022-04-27 DIAGNOSIS — Z11.59 ENCOUNTER FOR HEPATITIS C SCREENING TEST FOR LOW RISK PATIENT: ICD-10-CM

## 2022-04-27 DIAGNOSIS — N18.31 STAGE 3A CHRONIC KIDNEY DISEASE: ICD-10-CM

## 2022-04-27 DIAGNOSIS — E03.9 HYPOTHYROIDISM (ACQUIRED): ICD-10-CM

## 2022-04-27 DIAGNOSIS — E78.00 HYPERCHOLESTEROLEMIA: ICD-10-CM

## 2022-04-27 DIAGNOSIS — Z86.39 HISTORY OF VITAMIN D DEFICIENCY: ICD-10-CM

## 2022-04-27 PROBLEM — E06.3 HYPOTHYROIDISM DUE TO HASHIMOTO'S THYROIDITIS: Status: RESOLVED | Noted: 2018-12-11 | Resolved: 2022-04-27

## 2022-04-27 PROBLEM — R06.02 SHORTNESS OF BREATH: Status: RESOLVED | Noted: 2021-06-22 | Resolved: 2022-04-27

## 2022-04-27 PROBLEM — K57.20 DIVERTICULITIS OF LARGE INTESTINE WITH PERFORATION WITHOUT BLEEDING: Status: RESOLVED | Noted: 2018-12-14 | Resolved: 2022-04-27

## 2022-04-27 PROBLEM — N28.9 RENAL INSUFFICIENCY SYNDROME: Status: RESOLVED | Noted: 2021-06-30 | Resolved: 2022-04-27

## 2022-04-27 PROBLEM — E03.8 HYPOTHYROIDISM DUE TO HASHIMOTO'S THYROIDITIS: Status: RESOLVED | Noted: 2018-12-11 | Resolved: 2022-04-27

## 2022-04-27 PROBLEM — K80.20 CALCULUS OF GALLBLADDER WITHOUT CHOLECYSTITIS WITHOUT OBSTRUCTION: Status: RESOLVED | Noted: 2018-12-14 | Resolved: 2022-04-27

## 2022-04-27 PROBLEM — R91.8 LUNG MASS: Status: RESOLVED | Noted: 2019-08-20 | Resolved: 2022-04-27

## 2022-04-27 PROCEDURE — G0439 PPPS, SUBSEQ VISIT: HCPCS | Performed by: INTERNAL MEDICINE

## 2022-04-27 RX ORDER — TESTOSTERONE CYPIONATE 200 MG/ML
120 INJECTION, SOLUTION INTRAMUSCULAR
COMMUNITY

## 2022-04-27 ASSESSMENT — ACTIVITIES OF DAILY LIVING (ADL): BATHING_REQUIRES_ASSISTANCE: 0

## 2022-04-27 ASSESSMENT — FIBROSIS 4 INDEX: FIB4 SCORE: 2.29

## 2022-04-27 ASSESSMENT — ENCOUNTER SYMPTOMS: GENERAL WELL-BEING: GOOD

## 2022-04-27 ASSESSMENT — PATIENT HEALTH QUESTIONNAIRE - PHQ9: CLINICAL INTERPRETATION OF PHQ2 SCORE: 0

## 2022-04-27 NOTE — PROGRESS NOTES
"Established Patient Note   HPI:        Kevin comes in today for annual medicare wellness and to follow up hypercholesterolemia. He has done recent lab work. Endocrinologist has been managing his thyroid medication and just lowered the tirosint from 7 days a week to 6 days a week. He states he will bruise easily on places he will bump such as hands; he is taking low dose aspirin daily.    Past Medical History:   Diagnosis Date   • Adrenal insufficiency (HCC), secondary 11/25/2020   • Atherosclerosis of both carotid arteries, mild 11/25/2020   • BPH (benign prostatic hyperplasia) 11/25/2020   • Bronchitis     a\" very long time ago\"   • CKD (chronic kidney disease) stage 3, GFR 30-59 ml/min ([facility]) 11/25/2020   • Colostomy present ([facility]) 11/25/2020   • Diverticulosis of colon 11/25/2020   • GERD (gastroesophageal reflux disease) 11/25/2020   • History of atrial tachycardia 11/25/2020    Ablation 2017   • History of malignant melanoma, April 2016 11/25/2020   • History of shingles 11/25/2020   • History of stroke, subcortical infarct on MRI April 2019 11/25/2020   • Hyperlipidemia 11/25/2020   • Hypothyroid 11/25/2020   • Indigestion    • Lung cancer (HCC), adenocarcinoma Aug. 2019 11/25/2020    Metastasis to L5 (biopsy Dec. 2019)       Current Outpatient Medications   Medication Sig Dispense Refill   • testosterone cypionate (DEPO-TESTOSTERONE) 200 MG/ML Solution injection Inject 125 mg into the shoulder, thigh, or buttocks every 7 days.     • aspirin EC (ECOTRIN) 81 MG Tablet Delayed Response Take 1 Tablet by mouth every 48 hours. 100 Tablet 3   • atorvastatin (LIPITOR) 20 MG Tab TAKE 1 TABLET BY MOUTH EVERY DAY 90 Tablet 0   • TIROSINT 125 MCG Cap Take 125 mcg by mouth every morning on an empty stomach. 6 days/week 90 Capsule 3   • azelastine (ASTELIN) 137 MCG/SPRAY nasal spray Administer 1 Spray into affected nostril(S) 2 times a day as needed. 30 mL 5   • potassium chloride (KLOR-CON) 8 MEQ tablet TAKE 2 TABLETS BY " "MOUTH EVERY  Tablet 3   • hydrocortisone (CORTEF) 10 MG Tab Take 20 mg by mouth 2 times a day.     • B-D INTEGRA SYRINGE 25G X 5/8\" 3 ML Misc USE TO INJECT TESTOSTERONE EVERY 7 DAYS     • traZODone (DESYREL) 50 MG Tab TAKE 2 TABLETS BY MOUTH EVERY NIGHT AT BEDTIME 180 Tablet 3   • famotidine (PEPCID) 20 MG Tab Take 20 mg by mouth every day.     • tamsulosin (FLOMAX) 0.4 MG capsule Take 0.4 mg by mouth every day.     • CALCIUM GLUCEPTATE INJ Inject 1 Dose as directed every 21 days.     • sildenafil citrate (VIAGRA) 100 MG tablet Take 100 mg by mouth 1 time daily as needed for Erectile Dysfunction.     • TAGRISSO 80 MG Tab Take 80 mg by mouth every day.       No current facility-administered medications for this visit.         Allergies   Allergen Reactions   • Gramineae Pollens Itching and Shortness of Breath     Itchy eyes, red face   • Cat Hair Extract    • Horse Allergy Hives   • Other Environmental    • Pollen Extract          Social History     Tobacco Use   • Smoking status: Never Smoker   • Smokeless tobacco: Never Used   Vaping Use   • Vaping Use: Never used   Substance Use Topics   • Alcohol use: Yes     Alcohol/week: 1.8 oz     Types: 3 Glasses of wine per week     Comment: Occasionally   • Drug use: Not Currently     Comment: THC edibles       Past Surgical History:   Procedure Laterality Date   • CECILE BY LAPAROSCOPY  2/26/2021    Procedure: CHOLECYSTECTOMY, LAPAROSCOPIC;  Surgeon: Davey Oneal M.D.;  Location: SURGERY [facility];  Service: General   • OTHER Right 2019    LOWER LOBE OF LUNG REMOVED   • OTHER Left 2017    GROIN   • OTHER Left 2016    SHIN        ROS  Depression Screening  Little interest or pleasure in doing things?  0 - not at all  Feeling down, depressed , or hopeless? 0 - not at all  Patient Health Questionnaire Score: 0     If depressive symptoms identified deferred to follow up visit unless specifically addressed in assessment and plan.    Interpretation of PHQ-9 Total " Score   Score Severity   1-4 No Depression   5-9 Mild Depression   10-14 Moderate Depression   15-19 Moderately Severe Depression   20-27 Severe Depression    Screening for Cognitive Impairment  Three Minute Recall (daughter, heaven, mountain) 3/3    Omkar clock face with all 12 numbers and set the hands to show 10 past 11.  Yes    Cognitive concerns identified deferred for follow up unless specifically addressed in assessment and plan.    Fall Risk Assessment  Has the patient had two or more falls in the last year or any fall with injury in the last year?  No    Safety Assessment  Throw rugs on floor.  No  Handrails on all stairs.  Yes  Good lighting in all hallways.  Yes  Difficulty hearing.  Yes  Patient counseled about all safety risks that were identified.    Functional Assessment ADLs  Are there any barriers preventing you from cooking for yourself or meeting nutritional needs?  No.    Are there any barriers preventing you from driving safely or obtaining transportation?  No.    Are there any barriers preventing you from using a telephone or calling for help?  No.    Are there any barriers preventing you from shopping?  No.    Are there any barriers preventing you from taking care of your own finances?  No.    Are there any barriers preventing you from managing your medications?  No.    Are there any barriers preventing you from showering, bathing or dressing yourself?  No.    Are you currently engaging in any exercise or physical activity?  Yes.     What is your perception of your health?  Good.      Health Maintenance Summary          Ordered - HEPATITIS C SCREENING (Once) Ordered on 4/27/2022    No completion history exists for this topic.          Overdue - IMM ZOSTER VACCINES (2 of 3) Overdue since 1/4/2013 11/09/2012  Imm Admin: Zoster Vaccine Live (ZVL) (Zostavax) - HISTORICAL DATA    01/19/2010  Imm Admin: Zoster Vaccine Live (ZVL) (Zostavax) - HISTORICAL DATA          Overdue - IMM PNEUMOCOCCAL  "VACCINE: 65+ Years (2 - PPSV23 or PCV20) Overdue since 5/8/2018 05/08/2017  Imm Admin: Pneumococcal Conjugate Vaccine (Prevnar/PCV-13)    01/19/2010  Imm Admin: Pneumococcal polysaccharide vaccine (PPSV-23)          Overdue - IMM DTaP/Tdap/Td Vaccine (2 - Td or Tdap) Overdue since 9/7/2019 09/07/2009  Imm Admin: Tdap Vaccine          COLORECTAL CANCER SCREENING (COLONOSCOPY - Every 10 Years) Next due on 11/15/2026    11/15/2016  COLONOSCOPY (Done - documented CareEverywhere)          IMM INFLUENZA (Series Information) Completed    11/11/2021  Imm Admin: Influenza, Unspecified - HISTORICAL DATA    10/06/2020  Imm Admin: Influenza Vaccine Adult HD    09/25/2019  Imm Admin: Influenza Seasonal Injectable - Historical Data    11/01/2018  Imm Admin: Influenza Seasonal Injectable - Historical Data    11/19/2016  Imm Admin: Influenza (IM) Preservative Free - HISTORICAL DATA    Only the first 5 history entries have been loaded, but more history exists.          COVID-19 Vaccine (Series Information) Completed    11/11/2021  Imm Admin: Pfizer SARS-CoV-2 Vaccine 12+    02/24/2021  Imm Admin: Pfizer SARS-CoV-2 Vaccine    02/03/2021  Imm Admin: Pfizer SARS-CoV-2 Vaccine          Annual Wellness Visit  Completed    04/27/2022  Visit Dx: Medicare annual wellness visit, subsequent          IMM HEP B VACCINE (Series Information) Aged Out    No completion history exists for this topic.          IMM MENINGOCOCCAL VACCINE (MCV4) (Series Information) Aged Out    No completion history exists for this topic.                Patient Care Team:  Rian Puente M.D. as PCP - General (Internal Medicine)  Emanuel Villarreal M.D. (Radiation Oncology)  Luzma Corral M.D. (Medical Oncology)     Ambulatory Vitals  /66 (BP Location: Left arm, Patient Position: Sitting, BP Cuff Size: Adult)   Pulse 64   Temp 36.8 °C (98.3 °F) (Temporal)   Resp 14   Ht 1.803 m (5' 11\")   Wt 74.8 kg (165 lb)   SpO2 95%   BMI 23.01 kg/m²     Physical " Exam  Constitutional:       Appearance: Normal appearance.   Cardiovascular:      Rate and Rhythm: Normal rate and regular rhythm.      Heart sounds: Normal heart sounds. No murmur heard.    No friction rub. No gallop.   Pulmonary:      Effort: Pulmonary effort is normal.      Breath sounds: Normal breath sounds. No wheezing or rales.           Component      Latest Ref Rng & Units 3/1/2022 4/20/2022   WBC      4.8 - 10.8 K/uL     RBC      4.70 - 6.10 M/uL     Hemoglobin      14.0 - 18.0 g/dL     Hematocrit      42.0 - 52.0 %     MCV      81.4 - 97.8 fL     MCH      27.0 - 33.0 pg     MCHC      33.7 - 35.3 g/dL     RDW      35.9 - 50.0 fL     Platelet Count      164 - 446 K/uL     MPV      9.0 - 12.9 fL     Neutrophils-Polys      44.00 - 72.00 %     Lymphocytes      22.00 - 41.00 %     Monocytes      0.00 - 13.40 %     Eosinophils      0.00 - 6.90 %     Basophils      0.00 - 1.80 %     Immature Granulocytes      0.00 - 0.90 %     Nucleated RBC      /100 WBC     Neutrophils (Absolute)      1.82 - 7.42 K/uL     Lymphs (Absolute)      1.00 - 4.80 K/uL     Monos (Absolute)      0.00 - 0.85 K/uL     Eos (Absolute)      0.00 - 0.51 K/uL     Baso (Absolute)      0.00 - 0.12 K/uL     Immature Granulocytes (abs)      0.00 - 0.11 K/uL     NRBC (Absolute)      K/uL     Sodium      135 - 145 mmol/L 140 143   Potassium      3.6 - 5.5 mmol/L 4.3 3.7   Chloride      96 - 112 mmol/L 107 108   Co2      20 - 33 mmol/L 23 27   Anion Gap      7.0 - 16.0 10.0 8.0   Glucose      65 - 99 mg/dL 90 82   Bun      8 - 22 mg/dL 20 22   Creatinine      0.50 - 1.40 mg/dL 1.20 1.31   Calcium      8.5 - 10.5 mg/dL 9.7 8.9   AST(SGOT)      12 - 45 U/L 15 24   ALT(SGPT)      2 - 50 U/L 10 15   Alkaline Phosphatase      30 - 99 U/L 65 69   Total Bilirubin      0.1 - 1.5 mg/dL 1.6 (H) 1.7 (H)   Albumin      3.2 - 4.9 g/dL 4.1 4.3   Total Protein      6.0 - 8.2 g/dL 6.8 6.7   Globulin      1.9 - 3.5 g/dL 2.7 2.4   A-G Ratio      g/dL 1.5 1.8   Color         Yellow   Character        Clear   Specific Gravity      <1.035  1.018   Ph      5.0 - 8.0  6.0   Glucose      Negative mg/dL  Negative   Ketones      Negative mg/dL  Negative   Protein      Negative mg/dL  30 (A)   Bilirubin      Negative  Negative   Urobilinogen, Urine      Negative  0.2   Nitrite      Negative  Negative   Leukocyte Esterase      Negative  Negative   Occult Blood      Negative  Negative   Micro Urine Req        Microscopic   WBC      /hpf  0-2 (A)   RBC      /hpf  0-2 (A)   Bacteria      None /hpf  Negative   Epithelial Cells      /hpf  Negative   Mucous Threads      /hpf     Ca Oxalate Crystal      /hpf     Hyaline Cast      /lpf  0-2   Influenza virus A RNA      Negative     Influenza virus B, PCR      Negative     RSV, PCR      Negative     SARS-CoV-2 by PCR           SARS-CoV-2 Source           Cholesterol,Tot      100 - 199 mg/dL  152   Triglycerides      0 - 149 mg/dL  77   HDL      >=40 mg/dL  68   LDL      <100 mg/dL  69   PSA Total      0.0 - 4.0 ng/mL     Free Psa      ng/mL     % Free Psa      %     GFR If African American      >60 mL/min/1.73 m 2 >60    GFR If Non African American      >60 mL/min/1.73 m 2 59 (A)    PT      12.0 - 14.6 sec     INR      0.87 - 1.13     Vitamin B12 -True Cobalamin      211 - 911 pg/mL  1527 (H)   T3      60.0 - 181.0 ng/dL 202.0 (H) 239.0 (H)   T3,Free      2.00 - 4.40 pg/mL     TSH      0.380 - 5.330 uIU/mL 0.090 (L) 0.050 (L)   Free T-4      0.93 - 1.70 ng/dL 2.21 (H)    Prostatic Specific Antigen Tot      0.00 - 4.00 ng/mL  4.45 (H)   Pathology Request           Testosterone,Total      175 - 781 ng/dL     Sex Hormone Bind Globulin      11 - 80 nmol/L     25-Hydroxy   Vitamin D 25      30 - 100 ng/mL  77   GFR (CKD-EPI)      >60 mL/min/1.73 m 2  57 (A)     Component Ref Range & Units 7 d ago 7 mo ago 1 yr ago   Prostatic Specific Antigen Tot 0.00 - 4.00 ng/mL 4.45 High   5.90 High  CM  7.45 High  CM      Component Ref Range & Units 7 d ago    (4/20/22) 1 mo ago   (3/1/22) 4 mo ago   (12/1/21) 7 mo ago   (9/27/21) 9 mo ago   (7/7/21) 10 mo ago   (6/23/21) 10 mo ago   (6/22/21)   Sodium 135 - 145 mmol/L 143  140  143  142  145  138  142    Potassium 3.6 - 5.5 mmol/L 3.7  4.3  4.2  4.1  3.4 Low   4.1  4.1    Chloride 96 - 112 mmol/L 108  107  106  105  110  106  109    Co2 20 - 33 mmol/L 27  23  27  24  24  22  23    Anion Gap 7.0 - 16.0 8.0  10.0  10.0  13.0  11.0  10.0  10.0    Glucose 65 - 99 mg/dL 82  90  85  125 High   80  125 High   81    Bun 8 - 22 mg/dL 22  20  27 High   25 High   18  25 High   24 High     Creatinine 0.50 - 1.40 mg/dL 1.31  1.20  1.68 High   1.16  1.29  1.44 High   1.30    Calcium 8.5 - 10.5 mg/dL 8.9  9.7  9.1  9.6  8.5  7.9 Low  R  8.3 Low  R    AST(SGOT) 12 - 45 U/L 24  15  17  21  18  8 Low   10 Low     ALT(SGPT) 2 - 50 U/L 15  10  13  14  <5  <5  <5    Alkaline Phosphatase 30 - 99 U/L 69  65  72  84  62  56  54    Total Bilirubin 0.1 - 1.5 mg/dL 1.7 High   1.6 High   1.4  1.0  1.1  0.7  1.0    Albumin 3.2 - 4.9 g/dL 4.3  4.1  4.4  4.3  3.9  3.5  3.7    Total Protein 6.0 - 8.2 g/dL 6.7  6.8  6.5  6.6  6.5  5.9 Low   6.0    Globulin 1.9 - 3.5 g/dL 2.4  2.7  2.1  2.3  2.6  2.4  2.3    A-G Ratio g/dL 1.8  1.5  2.1  1.9  1.5  1.5  1.6      Component Ref Range & Units 7 d ago 7 mo ago 1 yr ago   Cholesterol,Tot 100 - 199 mg/dL 152  130  217 High     Triglycerides 0 - 149 mg/dL 77  102  133    HDL >=40 mg/dL 68  56  59    LDL <100 mg/dL 69  54  131 High       Component Ref Range & Units 7 d ago 1 mo ago 7 mo ago 9 mo ago 10 mo ago 1 yr ago   TSH 0.380 - 5.330 uIU/mL 0.050 Low   0.090 Low  CM  1.280 CM  4.350 CM  4.020 CM  3.560 CM     Component Ref Range & Units 7 d ago 1 mo ago 7 mo ago 9 mo ago   T3 60.0 - 181.0 ng/dL 239.0 High   202.0 High   126.0  63.9        Assessment and Plan:     1. Medicare annual wellness visit, subsequent     2. Hypothyroidism (acquired)  TSH    TRIIDOTHYRONINE    TSH 0.050 uIU/ml with T3 239 ng/dl  taking tirosint 125 mcg qam   3. Hypercholesterolemia  Comp Metabolic Panel    Lipid Profile    LDL-C 69 mg/dl taking atorvastatin 20 mg qd   4. Elevated PSA  PROSTATE SPECIFIC AG DIAGNOSTIC    Comp Metabolic Panel    URINALYSIS    PSA level at 4.45 ng/ml actually better than one year ago that measured 7.45 ng/ml   5. Stage 3a chronic kidney disease (HCC)  CBC WITH DIFFERENTIAL    Comp Metabolic Panel    URINALYSIS    PROTEIN/CREAT RATIO URINE    eGFR (CKD-EPI) with serum creatinine level same as 10 months ago   6. History of stroke, subcortical infarct on MRI April 2019  aspirin EC (ECOTRIN) 81 MG Tablet Delayed Response   7. History of atrial tachycardia     8. Androgen deficiency  CBC WITH DIFFERENTIAL    Testosterone, Free & Total, Adult Male (w/SHBG)   9. History of non anemic vitamin B12 deficiency  VITAMIN B12    CBC WITH DIFFERENTIAL   10. History of vitamin D deficiency  VITAMIN D,25 HYDROXY   11. Encounter for hepatitis C screening test for low risk patient  HEP C VIRUS ANTIBODY   12. Proteinuria, unspecified type  PROTEIN/CREAT RATIO URINE     Due to bruising will have him decrease aspirin 81 mg from daily to every other day; he is moderate to high risk due to prior infarct seen on MRI. Check CBC, CMP, lipid panel, TSH/T3, free and total testosterone, PSA, urinalysis with random protein/creatinine ratio, B12, vitamin D.    Rian Puente M.D.

## 2022-06-28 ENCOUNTER — APPOINTMENT (RX ONLY)
Dept: URBAN - METROPOLITAN AREA CLINIC 4 | Facility: CLINIC | Age: 77
Setting detail: DERMATOLOGY
End: 2022-06-28

## 2022-06-28 DIAGNOSIS — L82.0 INFLAMED SEBORRHEIC KERATOSIS: ICD-10-CM

## 2022-06-28 DIAGNOSIS — L82.1 OTHER SEBORRHEIC KERATOSIS: ICD-10-CM

## 2022-06-28 PROCEDURE — ? COUNSELING

## 2022-06-28 PROCEDURE — 99202 OFFICE O/P NEW SF 15 MIN: CPT

## 2022-06-28 PROCEDURE — ? ADDITIONAL NOTES

## 2022-06-28 ASSESSMENT — LOCATION DETAILED DESCRIPTION DERM
LOCATION DETAILED: RIGHT CENTRAL FRONTAL SCALP
LOCATION DETAILED: LEFT CENTRAL PARIETAL SCALP

## 2022-06-28 ASSESSMENT — LOCATION SIMPLE DESCRIPTION DERM
LOCATION SIMPLE: RIGHT SCALP
LOCATION SIMPLE: SCALP

## 2022-06-28 ASSESSMENT — LOCATION ZONE DERM: LOCATION ZONE: SCALP

## 2022-06-28 NOTE — PROCEDURE: ADDITIONAL NOTES
Detail Level: Simple
Additional Notes: Will treat at f/u [place].
Render Risk Assessment In Note?: no

## 2022-06-28 NOTE — PROCEDURE: MIPS QUALITY
Quality 110: Preventive Care And Screening: Influenza Immunization: Influenza Immunization Administered during Influenza season
Detail Level: Detailed
Quality 111:Pneumonia Vaccination Status For Older Adults: Pneumococcal vaccine was not administered on or after patient’s 60th birthday and before the end of the measurement period, reason not otherwise specified
Quality 226: Preventive Care And Screening: Tobacco Use: Screening And Cessation Intervention: Patient screened for tobacco use and is an ex/non-smoker
Quality 130: Documentation Of Current Medications In The Medical Record: Current Medications Documented
Quality 431: Preventive Care And Screening: Unhealthy Alcohol Use - Screening: Patient not identified as an unhealthy alcohol user when screened for unhealthy alcohol use using a systematic screening method

## 2022-07-06 PROBLEM — S62.011K: Status: ACTIVE | Noted: 2022-07-06

## 2022-07-06 PROBLEM — M19.031 CMC DJD(CARPOMETACARPAL DEGENERATIVE JOINT DISEASE), LOCALIZED PRIMARY, RIGHT: Status: ACTIVE | Noted: 2022-07-06

## 2022-07-07 RX ORDER — POTASSIUM CHLORIDE 600 MG/1
TABLET, FILM COATED, EXTENDED RELEASE ORAL
Qty: 180 TABLET | Refills: 3 | Status: SHIPPED | OUTPATIENT
Start: 2022-07-07 | End: 2023-06-26 | Stop reason: SDUPTHER

## 2022-07-26 ENCOUNTER — APPOINTMENT (RX ONLY)
Dept: URBAN - METROPOLITAN AREA CLINIC 4 | Facility: CLINIC | Age: 77
Setting detail: DERMATOLOGY
End: 2022-07-26

## 2022-07-26 DIAGNOSIS — D49.2 NEOPLASM OF UNSPECIFIED BEHAVIOR OF BONE, SOFT TISSUE, AND SKIN: ICD-10-CM

## 2022-07-26 DIAGNOSIS — L57.0 ACTINIC KERATOSIS: ICD-10-CM

## 2022-07-26 PROCEDURE — ? LIQUID NITROGEN

## 2022-07-26 PROCEDURE — 11102 TANGNTL BX SKIN SINGLE LES: CPT

## 2022-07-26 PROCEDURE — ? COUNSELING

## 2022-07-26 PROCEDURE — 17000 DESTRUCT PREMALG LESION: CPT | Mod: 59

## 2022-07-26 PROCEDURE — ? BIOPSY BY SHAVE METHOD

## 2022-07-26 ASSESSMENT — LOCATION SIMPLE DESCRIPTION DERM
LOCATION SIMPLE: LEFT SCALP
LOCATION SIMPLE: SCALP

## 2022-07-26 ASSESSMENT — LOCATION DETAILED DESCRIPTION DERM: LOCATION DETAILED: LEFT CENTRAL FRONTAL SCALP

## 2022-07-26 ASSESSMENT — LOCATION ZONE DERM: LOCATION ZONE: SCALP

## 2022-07-26 NOTE — PROCEDURE: LIQUID NITROGEN
Detail Level: Detailed
Duration Of Freeze Thaw-Cycle (Seconds): 3
Show Applicator Variable?: Yes
Number Of Freeze-Thaw Cycles: 1 freeze-thaw cycle
Render Post-Care Instructions In Note?: no
Consent: The patient's consent was obtained including but not limited to risks of crusting, scabbing, blistering, scarring, darker or lighter pigmentary change, recurrence, incomplete removal and infection.
Post-Care Instructions: I reviewed with the patient in detail post-care instructions. Patient is to wear sunprotection, and avoid picking at any of the treated lesions. Pt may apply Vaseline to crusted or scabbing areas.

## 2022-08-09 ENCOUNTER — NON-PROVIDER VISIT (OUTPATIENT)
Dept: INTERNAL MEDICINE | Facility: IMAGING CENTER | Age: 77
End: 2022-08-09
Payer: MEDICARE

## 2022-08-09 ENCOUNTER — HOSPITAL ENCOUNTER (OUTPATIENT)
Facility: MEDICAL CENTER | Age: 77
End: 2022-08-09
Attending: INTERNAL MEDICINE
Payer: MEDICARE

## 2022-08-09 DIAGNOSIS — Z01.89 ENCOUNTER FOR ROUTINE LABORATORY TESTING: ICD-10-CM

## 2022-08-09 PROCEDURE — 84403 ASSAY OF TOTAL TESTOSTERONE: CPT

## 2022-08-09 PROCEDURE — 80053 COMPREHEN METABOLIC PANEL: CPT

## 2022-08-09 PROCEDURE — 84481 FREE ASSAY (FT-3): CPT

## 2022-08-09 PROCEDURE — 84443 ASSAY THYROID STIM HORMONE: CPT

## 2022-08-09 PROCEDURE — 84439 ASSAY OF FREE THYROXINE: CPT

## 2022-08-09 PROCEDURE — 84270 ASSAY OF SEX HORMONE GLOBUL: CPT

## 2022-08-09 PROCEDURE — 85025 COMPLETE CBC W/AUTO DIFF WBC: CPT

## 2022-08-10 LAB
ALBUMIN SERPL BCP-MCNC: 4 G/DL (ref 3.2–4.9)
ALBUMIN/GLOB SERPL: 1.4 G/DL
ALP SERPL-CCNC: 67 U/L (ref 30–99)
ALT SERPL-CCNC: 12 U/L (ref 2–50)
ANION GAP SERPL CALC-SCNC: 9 MMOL/L (ref 7–16)
AST SERPL-CCNC: 22 U/L (ref 12–45)
BASOPHILS # BLD AUTO: 0.4 % (ref 0–1.8)
BASOPHILS # BLD: 0.04 K/UL (ref 0–0.12)
BILIRUB SERPL-MCNC: 1.5 MG/DL (ref 0.1–1.5)
BUN SERPL-MCNC: 20 MG/DL (ref 8–22)
CALCIUM SERPL-MCNC: 9 MG/DL (ref 8.5–10.5)
CHLORIDE SERPL-SCNC: 106 MMOL/L (ref 96–112)
CO2 SERPL-SCNC: 27 MMOL/L (ref 20–33)
CREAT SERPL-MCNC: 1.25 MG/DL (ref 0.5–1.4)
EOSINOPHIL # BLD AUTO: 0.07 K/UL (ref 0–0.51)
EOSINOPHIL NFR BLD: 0.6 % (ref 0–6.9)
ERYTHROCYTE [DISTWIDTH] IN BLOOD BY AUTOMATED COUNT: 48.4 FL (ref 35.9–50)
GFR SERPLBLD CREATININE-BSD FMLA CKD-EPI: 60 ML/MIN/1.73 M 2
GLOBULIN SER CALC-MCNC: 2.9 G/DL (ref 1.9–3.5)
GLUCOSE SERPL-MCNC: 84 MG/DL (ref 65–99)
HCT VFR BLD AUTO: 47.5 % (ref 42–52)
HGB BLD-MCNC: 16.1 G/DL (ref 14–18)
IMM GRANULOCYTES # BLD AUTO: 0.06 K/UL (ref 0–0.11)
IMM GRANULOCYTES NFR BLD AUTO: 0.5 % (ref 0–0.9)
LYMPHOCYTES # BLD AUTO: 0.76 K/UL (ref 1–4.8)
LYMPHOCYTES NFR BLD: 7 % (ref 22–41)
MCH RBC QN AUTO: 31.9 PG (ref 27–33)
MCHC RBC AUTO-ENTMCNC: 33.9 G/DL (ref 33.7–35.3)
MCV RBC AUTO: 94.1 FL (ref 81.4–97.8)
MONOCYTES # BLD AUTO: 1.01 K/UL (ref 0–0.85)
MONOCYTES NFR BLD AUTO: 9.2 % (ref 0–13.4)
NEUTROPHILS # BLD AUTO: 8.98 K/UL (ref 1.82–7.42)
NEUTROPHILS NFR BLD: 82.3 % (ref 44–72)
NRBC # BLD AUTO: 0 K/UL
NRBC BLD-RTO: 0 /100 WBC
PLATELET # BLD AUTO: 212 K/UL (ref 164–446)
PMV BLD AUTO: 10.2 FL (ref 9–12.9)
POTASSIUM SERPL-SCNC: 4 MMOL/L (ref 3.6–5.5)
PROT SERPL-MCNC: 6.9 G/DL (ref 6–8.2)
RBC # BLD AUTO: 5.05 M/UL (ref 4.7–6.1)
SODIUM SERPL-SCNC: 142 MMOL/L (ref 135–145)
T3FREE SERPL-MCNC: 3.66 PG/ML (ref 2–4.4)
T4 FREE SERPL-MCNC: 1.51 NG/DL (ref 0.93–1.7)
TESTOST SERPL-MCNC: >1500 NG/DL (ref 175–781)
TSH SERPL DL<=0.005 MIU/L-ACNC: 0.95 UIU/ML (ref 0.38–5.33)
WBC # BLD AUTO: 10.9 K/UL (ref 4.8–10.8)

## 2022-08-11 LAB — SHBG SERPL-SCNC: 66 NMOL/L (ref 19–76)

## 2022-08-16 ENCOUNTER — HOSPITAL ENCOUNTER (OUTPATIENT)
Dept: RADIOLOGY | Facility: MEDICAL CENTER | Age: 77
End: 2022-08-16
Attending: INTERNAL MEDICINE
Payer: MEDICARE

## 2022-08-16 DIAGNOSIS — C34.11 MALIGNANT NEOPLASM OF UPPER LOBE OF RIGHT LUNG (HCC): ICD-10-CM

## 2022-08-16 DIAGNOSIS — C43.72 MALIGNANT MELANOMA OF LEFT LOWER EXTREMITY INCLUDING HIP (HCC): ICD-10-CM

## 2022-08-16 PROCEDURE — A9552 F18 FDG: HCPCS

## 2022-08-18 ENCOUNTER — HOSPITAL ENCOUNTER (OUTPATIENT)
Dept: RADIOLOGY | Facility: MEDICAL CENTER | Age: 77
End: 2022-08-18
Attending: INTERNAL MEDICINE
Payer: MEDICARE

## 2022-08-18 DIAGNOSIS — C34.11 MALIGNANT NEOPLASM OF UPPER LOBE OF RIGHT LUNG (HCC): ICD-10-CM

## 2022-08-18 DIAGNOSIS — C43.72 MALIGNANT MELANOMA OF LEFT LOWER EXTREMITY INCLUDING HIP (HCC): ICD-10-CM

## 2022-08-18 PROCEDURE — 76870 US EXAM SCROTUM: CPT

## 2022-09-07 RX ORDER — TRAZODONE HYDROCHLORIDE 50 MG/1
TABLET ORAL
Qty: 180 TABLET | Refills: 3 | Status: SHIPPED | OUTPATIENT
Start: 2022-09-07 | End: 2023-05-30 | Stop reason: SDUPTHER

## 2022-09-07 NOTE — TELEPHONE ENCOUNTER
Is the patient due for a refill? Yes    Was the patient seen the past year? No    Date of last office visit: 8/31/21    Does the patient have an upcoming appointment?  No   If yes, When?     Provider to refill:AK    Does the patients insurance require a 100 day supply?  No

## 2022-09-09 ENCOUNTER — TELEPHONE (OUTPATIENT)
Dept: INTERNAL MEDICINE | Facility: IMAGING CENTER | Age: 77
End: 2022-09-09
Payer: COMMERCIAL

## 2022-09-09 DIAGNOSIS — Z85.820 HISTORY OF MALIGNANT MELANOMA: ICD-10-CM

## 2022-09-09 DIAGNOSIS — C34.90 MALIGNANT NEOPLASM OF LUNG, UNSPECIFIED LATERALITY, UNSPECIFIED PART OF LUNG (HCC): ICD-10-CM

## 2022-09-09 DIAGNOSIS — E27.40 ADRENAL INSUFFICIENCY (HCC): ICD-10-CM

## 2022-09-09 DIAGNOSIS — K21.9 GASTROESOPHAGEAL REFLUX DISEASE, UNSPECIFIED WHETHER ESOPHAGITIS PRESENT: ICD-10-CM

## 2022-09-09 RX ORDER — FAMOTIDINE 20 MG/1
20 TABLET, FILM COATED ORAL NIGHTLY
Qty: 90 TABLET | Refills: 3 | Status: SHIPPED | OUTPATIENT
Start: 2022-09-09 | End: 2023-08-23 | Stop reason: SDUPTHER

## 2022-09-09 RX ORDER — ATORVASTATIN CALCIUM 20 MG/1
20 TABLET, FILM COATED ORAL DAILY
Qty: 90 TABLET | Refills: 0 | Status: SHIPPED | OUTPATIENT
Start: 2022-09-09 | End: 2022-12-15

## 2022-10-19 ENCOUNTER — HOSPITAL ENCOUNTER (OUTPATIENT)
Facility: MEDICAL CENTER | Age: 77
End: 2022-10-19
Attending: INTERNAL MEDICINE
Payer: MEDICARE

## 2022-10-19 ENCOUNTER — NON-PROVIDER VISIT (OUTPATIENT)
Dept: INTERNAL MEDICINE | Facility: IMAGING CENTER | Age: 77
End: 2022-10-19
Payer: MEDICARE

## 2022-10-19 DIAGNOSIS — Z86.39 HISTORY OF VITAMIN D DEFICIENCY: ICD-10-CM

## 2022-10-19 DIAGNOSIS — Z01.89 ENCOUNTER FOR ROUTINE LABORATORY TESTING: ICD-10-CM

## 2022-10-19 DIAGNOSIS — R97.20 ELEVATED PSA: ICD-10-CM

## 2022-10-19 DIAGNOSIS — N18.31 STAGE 3A CHRONIC KIDNEY DISEASE: ICD-10-CM

## 2022-10-19 DIAGNOSIS — Z11.59 ENCOUNTER FOR HEPATITIS C SCREENING TEST FOR LOW RISK PATIENT: ICD-10-CM

## 2022-10-19 DIAGNOSIS — Z86.39 HISTORY OF NON ANEMIC VITAMIN B12 DEFICIENCY: ICD-10-CM

## 2022-10-19 DIAGNOSIS — Z23 NEED FOR INFLUENZA VACCINATION: ICD-10-CM

## 2022-10-19 DIAGNOSIS — E78.00 HYPERCHOLESTEROLEMIA: ICD-10-CM

## 2022-10-19 DIAGNOSIS — E03.9 HYPOTHYROIDISM (ACQUIRED): ICD-10-CM

## 2022-10-19 DIAGNOSIS — E29.1 ANDROGEN DEFICIENCY: ICD-10-CM

## 2022-10-19 DIAGNOSIS — R80.9 PROTEINURIA, UNSPECIFIED TYPE: ICD-10-CM

## 2022-10-19 PROCEDURE — G0008 ADMIN INFLUENZA VIRUS VAC: HCPCS | Performed by: INTERNAL MEDICINE

## 2022-10-19 PROCEDURE — 84480 ASSAY TRIIODOTHYRONINE (T3): CPT

## 2022-10-19 PROCEDURE — 81001 URINALYSIS AUTO W/SCOPE: CPT

## 2022-10-19 PROCEDURE — 84153 ASSAY OF PSA TOTAL: CPT

## 2022-10-19 PROCEDURE — 84270 ASSAY OF SEX HORMONE GLOBUL: CPT

## 2022-10-19 PROCEDURE — 82570 ASSAY OF URINE CREATININE: CPT

## 2022-10-19 PROCEDURE — 84156 ASSAY OF PROTEIN URINE: CPT

## 2022-10-19 PROCEDURE — 84403 ASSAY OF TOTAL TESTOSTERONE: CPT

## 2022-10-19 PROCEDURE — 84443 ASSAY THYROID STIM HORMONE: CPT

## 2022-10-19 PROCEDURE — 82306 VITAMIN D 25 HYDROXY: CPT

## 2022-10-19 PROCEDURE — 80053 COMPREHEN METABOLIC PANEL: CPT

## 2022-10-19 PROCEDURE — 86803 HEPATITIS C AB TEST: CPT

## 2022-10-19 PROCEDURE — 80061 LIPID PANEL: CPT

## 2022-10-19 PROCEDURE — 84402 ASSAY OF FREE TESTOSTERONE: CPT

## 2022-10-19 PROCEDURE — 85025 COMPLETE CBC W/AUTO DIFF WBC: CPT

## 2022-10-19 PROCEDURE — 90662 IIV NO PRSV INCREASED AG IM: CPT | Performed by: INTERNAL MEDICINE

## 2022-10-19 PROCEDURE — 82607 VITAMIN B-12: CPT

## 2022-10-20 LAB
25(OH)D3 SERPL-MCNC: 103 NG/ML (ref 30–100)
ALBUMIN SERPL BCP-MCNC: 3.6 G/DL (ref 3.2–4.9)
ALBUMIN/GLOB SERPL: 1.3 G/DL
ALP SERPL-CCNC: 68 U/L (ref 30–99)
ALT SERPL-CCNC: 9 U/L (ref 2–50)
ANION GAP SERPL CALC-SCNC: 9 MMOL/L (ref 7–16)
APPEARANCE UR: CLEAR
AST SERPL-CCNC: 19 U/L (ref 12–45)
BACTERIA #/AREA URNS HPF: NEGATIVE /HPF
BASOPHILS # BLD AUTO: 0.5 % (ref 0–1.8)
BASOPHILS # BLD: 0.04 K/UL (ref 0–0.12)
BILIRUB SERPL-MCNC: 1.5 MG/DL (ref 0.1–1.5)
BILIRUB UR QL STRIP.AUTO: NEGATIVE
BUN SERPL-MCNC: 17 MG/DL (ref 8–22)
CALCIUM SERPL-MCNC: 8.8 MG/DL (ref 8.5–10.5)
CHLORIDE SERPL-SCNC: 106 MMOL/L (ref 96–112)
CHOLEST SERPL-MCNC: 138 MG/DL (ref 100–199)
CO2 SERPL-SCNC: 26 MMOL/L (ref 20–33)
COLOR UR: ABNORMAL
CREAT SERPL-MCNC: 1.34 MG/DL (ref 0.5–1.4)
CREAT UR-MCNC: 106.53 MG/DL
EOSINOPHIL # BLD AUTO: 0.06 K/UL (ref 0–0.51)
EOSINOPHIL NFR BLD: 0.7 % (ref 0–6.9)
EPI CELLS #/AREA URNS HPF: NEGATIVE /HPF
ERYTHROCYTE [DISTWIDTH] IN BLOOD BY AUTOMATED COUNT: 54.9 FL (ref 35.9–50)
GFR SERPLBLD CREATININE-BSD FMLA CKD-EPI: 55 ML/MIN/1.73 M 2
GLOBULIN SER CALC-MCNC: 2.8 G/DL (ref 1.9–3.5)
GLUCOSE SERPL-MCNC: 82 MG/DL (ref 65–99)
GLUCOSE UR STRIP.AUTO-MCNC: NEGATIVE MG/DL
HCT VFR BLD AUTO: 46.1 % (ref 42–52)
HCV AB SER QL: NORMAL
HDLC SERPL-MCNC: 54 MG/DL
HGB BLD-MCNC: 15.3 G/DL (ref 14–18)
HYALINE CASTS #/AREA URNS LPF: ABNORMAL /LPF
IMM GRANULOCYTES # BLD AUTO: 0.06 K/UL (ref 0–0.11)
IMM GRANULOCYTES NFR BLD AUTO: 0.7 % (ref 0–0.9)
KETONES UR STRIP.AUTO-MCNC: NEGATIVE MG/DL
LDLC SERPL CALC-MCNC: 69 MG/DL
LEUKOCYTE ESTERASE UR QL STRIP.AUTO: NEGATIVE
LYMPHOCYTES # BLD AUTO: 0.57 K/UL (ref 1–4.8)
LYMPHOCYTES NFR BLD: 6.8 % (ref 22–41)
MCH RBC QN AUTO: 31.5 PG (ref 27–33)
MCHC RBC AUTO-ENTMCNC: 33.2 G/DL (ref 33.7–35.3)
MCV RBC AUTO: 94.9 FL (ref 81.4–97.8)
MICRO URNS: ABNORMAL
MONOCYTES # BLD AUTO: 0.75 K/UL (ref 0–0.85)
MONOCYTES NFR BLD AUTO: 8.9 % (ref 0–13.4)
NEUTROPHILS # BLD AUTO: 6.92 K/UL (ref 1.82–7.42)
NEUTROPHILS NFR BLD: 82.4 % (ref 44–72)
NITRITE UR QL STRIP.AUTO: NEGATIVE
NRBC # BLD AUTO: 0 K/UL
NRBC BLD-RTO: 0 /100 WBC
PH UR STRIP.AUTO: 6 [PH] (ref 5–8)
PLATELET # BLD AUTO: 202 K/UL (ref 164–446)
PMV BLD AUTO: 10.3 FL (ref 9–12.9)
POTASSIUM SERPL-SCNC: 3.8 MMOL/L (ref 3.6–5.5)
PROT SERPL-MCNC: 6.4 G/DL (ref 6–8.2)
PROT UR QL STRIP: 30 MG/DL
PROT UR-MCNC: 40 MG/DL (ref 0–15)
PROT/CREAT UR: 375 MG/G (ref 15–68)
PSA SERPL-MCNC: 7.36 NG/ML (ref 0–4)
RBC # BLD AUTO: 4.86 M/UL (ref 4.7–6.1)
RBC # URNS HPF: ABNORMAL /HPF
RBC UR QL AUTO: NEGATIVE
SODIUM SERPL-SCNC: 141 MMOL/L (ref 135–145)
SP GR UR STRIP.AUTO: 1.02
T3 SERPL-MCNC: 132 NG/DL (ref 60–181)
TRIGL SERPL-MCNC: 73 MG/DL (ref 0–149)
TSH SERPL DL<=0.005 MIU/L-ACNC: 0.72 UIU/ML (ref 0.38–5.33)
UROBILINOGEN UR STRIP.AUTO-MCNC: 0.2 MG/DL
VIT B12 SERPL-MCNC: 326 PG/ML (ref 211–911)
WBC # BLD AUTO: 8.4 K/UL (ref 4.8–10.8)
WBC #/AREA URNS HPF: ABNORMAL /HPF

## 2022-10-23 LAB
SHBG SERPL-SCNC: 50 NMOL/L (ref 19–76)
TESTOST FREE MFR SERPL: ABNORMAL % (ref 1.6–2.9)
TESTOST FREE SERPL-MCNC: ABNORMAL PG/ML (ref 47–244)
TESTOST SERPL-MCNC: >1500 NG/DL (ref 300–720)

## 2022-11-02 ENCOUNTER — OFFICE VISIT (OUTPATIENT)
Dept: INTERNAL MEDICINE | Facility: IMAGING CENTER | Age: 77
End: 2022-11-02
Payer: MEDICARE

## 2022-11-02 VITALS
SYSTOLIC BLOOD PRESSURE: 174 MMHG | OXYGEN SATURATION: 97 % | DIASTOLIC BLOOD PRESSURE: 80 MMHG | HEART RATE: 64 BPM | BODY MASS INDEX: 22.82 KG/M2 | TEMPERATURE: 97.9 F | WEIGHT: 163 LBS | HEIGHT: 71 IN | RESPIRATION RATE: 14 BRPM

## 2022-11-02 DIAGNOSIS — R79.89 HIGH SERUM VITAMIN D: ICD-10-CM

## 2022-11-02 DIAGNOSIS — E29.1 ANDROGEN DEFICIENCY: ICD-10-CM

## 2022-11-02 DIAGNOSIS — E03.9 HYPOTHYROIDISM (ACQUIRED): ICD-10-CM

## 2022-11-02 DIAGNOSIS — R97.20 ELEVATED PSA: ICD-10-CM

## 2022-11-02 DIAGNOSIS — E78.00 HYPERCHOLESTEROLEMIA: ICD-10-CM

## 2022-11-02 DIAGNOSIS — R80.9 PROTEINURIA, UNSPECIFIED TYPE: ICD-10-CM

## 2022-11-02 DIAGNOSIS — N18.31 STAGE 3A CHRONIC KIDNEY DISEASE: ICD-10-CM

## 2022-11-02 PROCEDURE — 99214 OFFICE O/P EST MOD 30 MIN: CPT | Performed by: INTERNAL MEDICINE

## 2022-11-02 RX ORDER — TADALAFIL 20 MG/1
20 TABLET ORAL
COMMUNITY
Start: 2022-10-11

## 2022-11-02 RX ORDER — LIOTHYRONINE SODIUM 5 UG/1
10 TABLET ORAL DAILY
COMMUNITY
Start: 2022-09-20

## 2022-11-02 RX ORDER — OMEPRAZOLE 40 MG/1
40 CAPSULE, DELAYED RELEASE ORAL
COMMUNITY
Start: 2022-10-30 | End: 2024-02-28

## 2022-11-02 ASSESSMENT — FIBROSIS 4 INDEX: FIB4 SCORE: 2.35

## 2022-11-02 NOTE — PROGRESS NOTES
"Established Patient Note   HPI:        Kevin returns today to follow up hypothyroid and hypercholesterolemia; he has done recent lab work. He states his systolic measured 125 last week at oncologist office. He has not been checking BP at home.    Past Medical History:   Diagnosis Date    Adrenal insufficiency (HCC), secondary 11/25/2020    Atherosclerosis of both carotid arteries, mild 11/25/2020    BPH (benign prostatic hyperplasia) 11/25/2020    Bronchitis     a\" very long time ago\"    CKD (chronic kidney disease) stage 3, GFR 30-59 ml/min (HCC) 11/25/2020    Colostomy present (HCC) 11/25/2020    Diverticulosis of colon 11/25/2020    GERD (gastroesophageal reflux disease) 11/25/2020    History of atrial tachycardia 11/25/2020    Ablation 2017    History of malignant melanoma, April 2016 11/25/2020    History of shingles 11/25/2020    History of stroke, subcortical infarct on MRI April 2019 11/25/2020    Hyperlipidemia 11/25/2020    Hypothyroid 11/25/2020    Indigestion     Lung cancer (HCC), adenocarcinoma Aug. 2019 11/25/2020    Metastasis to L5 (biopsy Dec. 2019)       Current Outpatient Medications   Medication Sig Dispense Refill    liothyronine (CYTOMEL) 5 MCG Tab TAKE 2 TABLETS BY MOUTH EVERY DAY ON AN EMPTY STOMACH      omeprazole (PRILOSEC) 40 MG delayed-release capsule Take 40 mg by mouth every day.      tadalafil (CIALIS) 20 MG tablet Take 20 mg by mouth 1 time a day as needed. for erectile dysfunction      atorvastatin (LIPITOR) 20 MG Tab TAKE 1 TABLET BY MOUTH EVERY DAY 90 Tablet 0    famotidine (PEPCID) 20 MG Tab Take 1 Tablet by mouth every evening. 90 Tablet 3    traZODone (DESYREL) 50 MG Tab TAKE 2 TABLETS BY MOUTH EVERY NIGHT AT BEDTIME 180 Tablet 3    potassium chloride (KLOR-CON) 8 MEQ tablet TAKE 2 TABLETS BY MOUTH EVERY  Tablet 3    meloxicam (MOBIC) 15 MG tablet Take 1 Tablet by mouth every day. 30 Tablet 0    TIROSINT 125 MCG Cap Take 125 mcg by mouth every morning on an empty stomach. " "6 days/week 90 Capsule 3    azelastine (ASTELIN) 137 MCG/SPRAY nasal spray Administer 1 Spray into affected nostril(S) 2 times a day as needed. 30 mL 5    hydrocortisone (CORTEF) 10 MG Tab Take 20 mg by mouth 2 times a day.      B-D INTEGRA SYRINGE 25G X 5/8\" 3 ML Misc USE TO INJECT TESTOSTERONE EVERY 7 DAYS      CALCIUM GLUCEPTATE INJ Inject 1 Dose as directed every 21 days. Administered at [facility]      TAGRISSO 80 MG Tab Take 80 mg by mouth every day.      testosterone cypionate (DEPO-TESTOSTERONE) 200 MG/ML Solution injection Inject 125 mg into the shoulder, thigh, or buttocks every 7 days.      aspirin EC (ECOTRIN) 81 MG Tablet Delayed Response Take 1 Tablet by mouth every 48 hours. (Patient not taking: Reported on 11/2/2022) 100 Tablet 3     No current facility-administered medications for this visit.         Allergies   Allergen Reactions    Gramineae Pollens Itching and Shortness of Breath     Itchy eyes, red face    Cat Hair Extract     Horse Allergy Hives    Other Environmental     Pollen Extract          Social History     Tobacco Use    Smoking status: Never    Smokeless tobacco: Never   Vaping Use    Vaping Use: Never used   Substance Use Topics    Alcohol use: Yes     Alcohol/week: 1.8 oz     Types: 3 Glasses of wine per week     Comment: Occasionally    Drug use: Not Currently     Comment: THC edibles       Past Surgical History:   Procedure Laterality Date    CECILE BY LAPAROSCOPY  2/26/2021    Procedure: CHOLECYSTECTOMY, LAPAROSCOPIC;  Surgeon: Davey Oneal M.D.;  Location: SURGERY [facility];  Service: General    OTHER Right 2019    LOWER LOBE OF LUNG REMOVED    OTHER Left 2017    GROIN    OTHER Left 2016    SHIN        ROS    Ambulatory Vitals  BP (!) 174/80 (BP Location: Left arm, Patient Position: Sitting, BP Cuff Size: Adult)   Pulse 64   Temp 36.6 °C (97.9 °F) (Temporal)   Resp 14   Ht 1.803 m (5' 11\")   Wt 73.9 kg (163 lb)   SpO2 97%   BMI 22.73 kg/m²     Physical " Exam  Constitutional:       Appearance: Normal appearance.   Cardiovascular:      Rate and Rhythm: Normal rate and regular rhythm.      Heart sounds: Normal heart sounds. No murmur heard.    No friction rub. No gallop.   Pulmonary:      Effort: Pulmonary effort is normal.      Breath sounds: Normal breath sounds. No wheezing or rales.   Musculoskeletal:      Right lower leg: No edema.      Left lower leg: No edema.       Component      Latest Ref Rng & Units 4/20/2022 8/9/2022 10/19/2022   WBC      4.8 - 10.8 K/uL  10.9 (H) 8.4   RBC      4.70 - 6.10 M/uL  5.05 4.86   Hemoglobin      14.0 - 18.0 g/dL  16.1 15.3   Hematocrit      42.0 - 52.0 %  47.5 46.1   MCV      81.4 - 97.8 fL  94.1 94.9   MCH      27.0 - 33.0 pg  31.9 31.5   MCHC      33.7 - 35.3 g/dL  33.9 33.2 (L)   RDW      35.9 - 50.0 fL  48.4 54.9 (H)   Platelet Count      164 - 446 K/uL  212 202   MPV      9.0 - 12.9 fL  10.2 10.3   Neutrophils-Polys      44.00 - 72.00 %  82.30 (H) 82.40 (H)   Lymphocytes      22.00 - 41.00 %  7.00 (L) 6.80 (L)   Monocytes      0.00 - 13.40 %  9.20 8.90   Eosinophils      0.00 - 6.90 %  0.60 0.70   Basophils      0.00 - 1.80 %  0.40 0.50   Immature Granulocytes      0.00 - 0.90 %  0.50 0.70   Nucleated RBC      /100 WBC  0.00 0.00   Neutrophils (Absolute)      1.82 - 7.42 K/uL  8.98 (H) 6.92   Lymphs (Absolute)      1.00 - 4.80 K/uL  0.76 (L) 0.57 (L)   Monos (Absolute)      0.00 - 0.85 K/uL  1.01 (H) 0.75   Eos (Absolute)      0.00 - 0.51 K/uL  0.07 0.06   Baso (Absolute)      0.00 - 0.12 K/uL  0.04 0.04   Immature Granulocytes (abs)      0.00 - 0.11 K/uL  0.06 0.06   NRBC (Absolute)      K/uL  0.00 0.00   Sodium      135 - 145 mmol/L 143 142 141   Potassium      3.6 - 5.5 mmol/L 3.7 4.0 3.8   Chloride      96 - 112 mmol/L 108 106 106   Co2      20 - 33 mmol/L 27 27 26   Anion Gap      7.0 - 16.0 8.0 9.0 9.0   Glucose      65 - 99 mg/dL 82 84 82   Bun      8 - 22 mg/dL 22 20 17   Creatinine      0.50 - 1.40 mg/dL 1.31 1.25  1.34   Calcium      8.5 - 10.5 mg/dL 8.9 9.0 8.8   AST(SGOT)      12 - 45 U/L 24 22 19   ALT(SGPT)      2 - 50 U/L 15 12 9   Alkaline Phosphatase      30 - 99 U/L 69 67 68   Total Bilirubin      0.1 - 1.5 mg/dL 1.7 (H) 1.5 1.5   Albumin      3.2 - 4.9 g/dL 4.3 4.0 3.6   Total Protein      6.0 - 8.2 g/dL 6.7 6.9 6.4   Globulin      1.9 - 3.5 g/dL 2.4 2.9 2.8   A-G Ratio      g/dL 1.8 1.4 1.3   Color       Yellow  Orange (A)   Character       Clear  Clear   Specific Gravity      <1.035 1.018  1.017   Ph      5.0 - 8.0 6.0  6.0   Glucose      Negative mg/dL Negative  Negative   Ketones      Negative mg/dL Negative  Negative   Protein      Negative mg/dL 30 (A)  30 (A)   Bilirubin      Negative Negative  Negative   Urobilinogen, Urine      Negative 0.2  0.2   Nitrite      Negative Negative  Negative   Leukocyte Esterase      Negative Negative  Negative   Occult Blood      Negative Negative  Negative   Micro Urine Req       Microscopic  Microscopic   WBC      /hpf 0-2 (A)  0-2 (A)   RBC      /hpf 0-2 (A)  0-2 (A)   Bacteria      None /hpf Negative  Negative   Epithelial Cells      /hpf Negative  Negative   Hyaline Cast      /lpf 0-2  0-2   Cholesterol,Tot      100 - 199 mg/dL 152  138   Triglycerides      0 - 149 mg/dL 77  73   HDL      >=40 mg/dL 68  54   LDL      <100 mg/dL 69  69   Testosterone,Total      300 - 720 ng/dL  >1500 (H) >1500 (H)   Sex Hormone Bind Globulin      19 - 76 nmol/L  66 50   Free Testosterone      47 - 244 pg/mL   See Note   Testosterone % Free      1.6 - 2.9 %   See Note   Total Protein, Urine      0.0 - 15.0 mg/dL   40.0 (H)   Creatinine, Random Urine      mg/dL   106.53   Protein Creatinine Ratio      15 - 68 mg/g   375 (H)   Vitamin B12 -True Cobalamin      211 - 911 pg/mL 1527 (H)  326   25-Hydroxy   Vitamin D 25      30 - 100 ng/mL 77  103 (H)   TSH      0.380 - 5.330 uIU/mL 0.050 (L) 0.950 0.720   T3      60.0 - 181.0 ng/dL 239.0 (H)  132.0   Prostatic Specific Antigen Tot      0.00 -  4.00 ng/mL 4.45 (H)  7.36 (H)   GFR (CKD-EPI)      >60 mL/min/1.73 m 2 57 (A) 60 55 (A)   Free T-4      0.93 - 1.70 ng/dL  1.51    T3,Free      2.00 - 4.40 pg/mL  3.66    Hepatitis C Antibody      Non-Reactive   Non-Reactive     Assessment and Plan:     1. Hypothyroidism (acquired)      TSH 0.720 uIU/ml with T3 132 ng/ml takin tirosint 125 mcg qam      2. Hypercholesterolemia  Comp Metabolic Panel    Lipid Profile    LDL-C 69 mg/dl taking atorvastatin 20 mg qd      3. Stage 3a chronic kidney disease (HCC)  Referral to Nephrology    Comp Metabolic Panel    GFR (CKD-EPI) 55 ml/min      4. Proteinuria, unspecified type  Referral to Nephrology      5. High serum vitamin D  VITAMIN D,25 HYDROXY (DEFICIENCY)    Vitamin D 103 ng/ml      6. Elevated PSA  PSA TOTAL + %FREE    Comp Metabolic Panel    PSA 7.36 ng/ml has increased from 4.45 ng/ml in April 2022 but is equal to level in Dec. 2020 that measured 7.45 ng/ml; he is followed by urologist      7. Androgen deficiency      Managed by endocrinologist        Advised he decrease vitamin D from 5000 IU qd to 2000 IU qd. Encouraged him to restart aspirin 81 mg every other day due to CV risk factors. Advised he start monitoring BP at home daily but alternate am and pm. Check free and total PSA in 3 months. Check CMP, vitamin D and lipid panel in 6 months.    Rian Puente M.D.

## 2022-11-03 ENCOUNTER — TELEPHONE (OUTPATIENT)
Dept: INTERNAL MEDICINE | Facility: IMAGING CENTER | Age: 77
End: 2022-11-03
Payer: COMMERCIAL

## 2022-11-03 RX ORDER — AMLODIPINE BESYLATE 5 MG/1
5 TABLET ORAL EVERY MORNING
Qty: 30 TABLET | Refills: 2 | Status: SHIPPED | OUTPATIENT
Start: 2022-11-03 | End: 2023-01-03

## 2022-11-07 ENCOUNTER — OFFICE VISIT (OUTPATIENT)
Dept: CARDIOLOGY | Facility: MEDICAL CENTER | Age: 77
End: 2022-11-07
Payer: MEDICARE

## 2022-11-07 VITALS
BODY MASS INDEX: 23.1 KG/M2 | RESPIRATION RATE: 18 BRPM | WEIGHT: 165 LBS | HEART RATE: 72 BPM | DIASTOLIC BLOOD PRESSURE: 80 MMHG | HEIGHT: 71 IN | SYSTOLIC BLOOD PRESSURE: 150 MMHG | OXYGEN SATURATION: 96 %

## 2022-11-07 DIAGNOSIS — E78.5 HYPERLIPIDEMIA, UNSPECIFIED HYPERLIPIDEMIA TYPE: ICD-10-CM

## 2022-11-07 DIAGNOSIS — I10 ESSENTIAL HYPERTENSION, BENIGN: ICD-10-CM

## 2022-11-07 DIAGNOSIS — I15.9 SECONDARY HYPERTENSION: ICD-10-CM

## 2022-11-07 DIAGNOSIS — C34.90 ADENOCARCINOMA OF LUNG, UNSPECIFIED LATERALITY (HCC): ICD-10-CM

## 2022-11-07 DIAGNOSIS — N18.31 STAGE 3A CHRONIC KIDNEY DISEASE: ICD-10-CM

## 2022-11-07 PROCEDURE — 99214 OFFICE O/P EST MOD 30 MIN: CPT | Performed by: NURSE PRACTITIONER

## 2022-11-07 RX ORDER — SOD PHOS DI, MONO/K PHOS MONO 250 MG
1 TABLET ORAL
COMMUNITY
Start: 2022-10-11 | End: 2024-03-06

## 2022-11-07 RX ORDER — LOSARTAN POTASSIUM 50 MG/1
50 TABLET ORAL DAILY
Qty: 30 TABLET | Refills: 3 | Status: SHIPPED | OUTPATIENT
Start: 2022-11-07 | End: 2022-11-23

## 2022-11-07 ASSESSMENT — ENCOUNTER SYMPTOMS
PND: 0
LOSS OF CONSCIOUSNESS: 0
ABDOMINAL PAIN: 0
NAUSEA: 0
HEADACHES: 1
INSOMNIA: 0
BRUISES/BLEEDS EASILY: 0
COUGH: 0
SHORTNESS OF BREATH: 0
DIZZINESS: 0
FEVER: 0
MYALGIAS: 0
PALPITATIONS: 0
CHILLS: 0
ORTHOPNEA: 0

## 2022-11-07 ASSESSMENT — FIBROSIS 4 INDEX: FIB4 SCORE: 2.35

## 2022-11-07 NOTE — PROGRESS NOTES
"Chief Complaint   Patient presents with    Follow-Up    HTN (Controlled)    Hyperlipidemia    Lung Cancer       Subjective     [facility] 77 [facility] who presents today for progressively elevated BP.    Kevin is a 77 year old male with history of hypertension, hyperlipidemia, adrenal insufficiency, history of atrial tachycardia with ablation in 2017 and CKD, normally followed by Dr. Carpenter, and last seen in August 2021.    He also has a history of lung cancer, treated with surgery and radiation, followed by Dr. Corral.    More recently, BP has been going up, running 150-210 systolic over  diastolic. He was recently placed on Amlodipine 5mg once daily and BP is coming down slowly.    He denies any overt chest pain, pressure or discomfort; no palpitations; no shortness of breath, orthopnea or PND; no dizziness or syncope; very mild/stable LE edema. He does exercise and tolerates well. He does have a mild headaches, no visual changes.    Past Medical History:   Diagnosis Date    Adrenal insufficiency (HCC), secondary 11/25/2020    Atherosclerosis of both carotid arteries, mild 11/25/2020    BPH (benign prostatic hyperplasia) 11/25/2020    Bronchitis     a\" very long time ago\"    CKD (chronic kidney disease) stage 3, GFR 30-59 ml/min (HCC) 11/25/2020    Colostomy present (HCC) 11/25/2020    Diverticulosis of colon 11/25/2020    GERD (gastroesophageal reflux disease) 11/25/2020    History of atrial tachycardia 11/25/2020    Ablation 2017    History of malignant melanoma, April 2016 11/25/2020    History of shingles 11/25/2020    History of stroke, subcortical infarct on MRI April 2019 11/25/2020    Hyperlipidemia 11/25/2020    Hypothyroid 11/25/2020    Indigestion     Lung cancer (HCC), adenocarcinoma Aug. 2019 11/25/2020    Metastasis to L5 (biopsy Dec. 2019)     Past Surgical History:   Procedure Laterality Date    CECILE BY LAPAROSCOPY  2/26/2021    Procedure: CHOLECYSTECTOMY, LAPAROSCOPIC; "  Surgeon: Davey Oneal M.D.;  Location: SURGERY [facility];  Service: General    OTHER Right 2019    LOWER LOBE OF LUNG REMOVED    OTHER Left 2017    GROIN    OTHER Left 2016    SHIN     Family History   Problem Relation Age of Onset    Cancer Mother         Lung- bone    Cancer Father         Colon?    Cancer Sister         Lung    Cancer Brother         Lung    Cancer Brother         Brain     Social History     Socioeconomic History    Marital status:      Spouse name: Not on file    Number of children: Not on file    Years of education: Not on file    Highest education level: Not on file   Occupational History    Not on file   Tobacco Use    Smoking status: Never    Smokeless tobacco: Never   Vaping Use    Vaping Use: Never used   Substance and Sexual Activity    Alcohol use: Yes     Alcohol/week: 1.8 oz     Types: 3 Glasses of wine per week     Comment: Occasionally    Drug use: Not Currently     Comment: THC edibles    Sexual activity: Not on file   Other Topics Concern    Not on file   Social History Narrative    Not on file     Social Determinants of Health     Financial Resource Strain: Not on file   Food Insecurity: Not on file   Transportation Needs: Not on file   Physical Activity: Not on file   Stress: Not on file   Social Connections: Not on file   Intimate Partner Violence: Not on file   Housing Stability: Not on file     Allergies   Allergen Reactions    Gramineae Pollens Itching and Shortness of Breath     Itchy eyes, red face    Cat Hair Extract     Horse Allergy Hives    Other Environmental     Pollen Extract      Outpatient Encounter Medications as of 11/7/2022   Medication Sig Dispense Refill    K Phos Winn-Sod Phos Di & Mono (PHOSPHOROUS) 155-852-130 MG Tab Take 1 Tablet by mouth every day.      losartan (COZAAR) 50 MG Tab Take 1 Tablet by mouth every day. 30 Tablet 3    amLODIPine (NORVASC) 5 MG Tab Take 1 Tablet by mouth every morning. 30 Tablet 2    liothyronine (CYTOMEL) 5 MCG  "Tab TAKE 2 TABLETS BY MOUTH EVERY DAY ON AN EMPTY STOMACH      omeprazole (PRILOSEC) 40 MG delayed-release capsule Take 1 Capsule by mouth every day.      tadalafil (CIALIS) 20 MG tablet Take 1 Tablet by mouth 1 time a day as needed. for erectile dysfunction      atorvastatin (LIPITOR) 20 MG Tab TAKE 1 TABLET BY MOUTH EVERY DAY 90 Tablet 0    famotidine (PEPCID) 20 MG Tab Take 1 Tablet by mouth every evening. 90 Tablet 3    traZODone (DESYREL) 50 MG Tab TAKE 2 TABLETS BY MOUTH EVERY NIGHT AT BEDTIME 180 Tablet 3    potassium chloride (KLOR-CON) 8 MEQ tablet TAKE 2 TABLETS BY MOUTH EVERY  Tablet 3    meloxicam (MOBIC) 15 MG tablet Take 1 Tablet by mouth every day. 30 Tablet 0    testosterone cypionate (DEPO-TESTOSTERONE) 200 MG/ML Solution injection Inject 125 mg into the shoulder, thigh, or buttocks every 7 days.      aspirin EC (ECOTRIN) 81 MG Tablet Delayed Response Take 1 Tablet by mouth every 48 hours. 100 Tablet 3    TIROSINT 125 MCG Cap Take 125 mcg by mouth every morning on an empty stomach. 6 days/week 90 Capsule 3    azelastine (ASTELIN) 137 MCG/SPRAY nasal spray Administer 1 Spray into affected nostril(S) 2 times a day as needed. 30 mL 5    hydrocortisone (CORTEF) 10 MG Tab Take 2 Tablets by mouth 2 times a day.      B-D INTEGRA SYRINGE 25G X 5/8\" 3 ML Misc USE TO INJECT TESTOSTERONE EVERY 7 DAYS      CALCIUM GLUCEPTATE INJ Inject 1 Dose as directed every 21 days. Administered at [facility]      TAGRISSO 80 MG Tab Take 80 mg by mouth every day.       No facility-administered encounter medications on file as of 11/7/2022.     Review of Systems   Constitutional:  Negative for chills and fever.   HENT:  Negative for congestion.    Respiratory:  Negative for cough and shortness of breath.    Cardiovascular:  Negative for chest pain, palpitations, orthopnea, leg swelling and PND.   Gastrointestinal:  Negative for abdominal pain and nausea.   Musculoskeletal:  Negative for myalgias.   Skin:  " "Negative for rash.   Neurological:  Positive for headaches. Negative for dizziness and loss of consciousness.   Endo/Heme/Allergies:  Does not bruise/bleed easily.   Psychiatric/Behavioral:  The patient does not have insomnia.             Objective     BP (!) 150/80 (BP Location: Left arm, Patient Position: Sitting, BP Cuff Size: Adult)   Pulse 72   Resp 18   Ht 1.803 m (5' 11\")   Wt 74.8 kg (165 lb)   SpO2 96%   BMI 23.01 kg/m²     Physical Exam  Constitutional:       Appearance: He is well-developed.   HENT:      Head: Normocephalic.   Neck:      Vascular: No JVD.   Cardiovascular:      Rate and Rhythm: Normal rate and regular rhythm.      Heart sounds: Normal heart sounds.   Pulmonary:      Effort: Pulmonary effort is normal. No respiratory distress.      Breath sounds: Normal breath sounds. No wheezing or rales.   Abdominal:      General: Bowel sounds are normal. There is no distension.      Palpations: Abdomen is soft.      Tenderness: There is no abdominal tenderness.   Musculoskeletal:         General: Normal range of motion.      Cervical back: Normal range of motion and neck supple.   Skin:     General: Skin is warm and dry.      Findings: No rash.   Neurological:      Mental Status: He is alert and oriented to person, place, and time.   Psychiatric:         Mood and Affect: Mood normal.         Behavior: Behavior normal.     CONCLUSIONS OF ECHOCARDIOGRAM OF 6/21/2021:  Normal left ventricular size, wall thickness, and systolic function.  The right ventricle was normal in size and systolic function.  The left atrium is normal in size.  No significant valvular pathology.  Normal pericardium without effusion.  No prior study is available for comparison.     Lab Results   Component Value Date/Time    CHOLSTRLTOT 138 10/19/2022 11:15 AM    LDL 69 10/19/2022 11:15 AM    HDL 54 10/19/2022 11:15 AM    TRIGLYCERIDE 73 10/19/2022 11:15 AM       Lab Results   Component Value Date/Time    SODIUM 141 10/19/2022 " 11:15 AM    POTASSIUM 3.8 10/19/2022 11:15 AM    CHLORIDE 106 10/19/2022 11:15 AM    CO2 26 10/19/2022 11:15 AM    GLUCOSE 82 10/19/2022 11:15 AM    BUN 17 10/19/2022 11:15 AM    CREATININE 1.34 10/19/2022 11:15 AM    BUNCREATRAT 17 11/05/2020 10:00 AM     Lab Results   Component Value Date/Time    ALKPHOSPHAT 68 10/19/2022 11:15 AM    ASTSGOT 19 10/19/2022 11:15 AM    ALTSGPT 9 10/19/2022 11:15 AM    TBILIRUBIN 1.5 10/19/2022 11:15 AM              Assessment & Plan     1. Essential hypertension, benign  losartan (COZAAR) 50 MG Tab      2. Secondary hypertension        3. Hyperlipidemia, unspecified hyperlipidemia type        4. Adenocarcinoma of lung, unspecified laterality (HCC)        5. Stage 3a chronic kidney disease (HCC)            Medical Decision Making: Today's Assessment/Status/Plan:        Hypertension, treated with Amlodipine 5mg once daily. Given some mild LE edema, will not titrate further. Will add Losartan 50mg once daily. Monitor BP at home. Send me a [employer] message in 1-2 weeks with BP report; in necessary, will titrate to 100mg.    2. Hyperlipidemia, with coronary calcification seen on CT/PET scan, treated with Lipitor 20mg. Recent LDL was 69.    3. Lung cancer, treated with surgery and radiation, followed by oncology.    4. CKD, stage 3a, GFR 55.    Plan as above: add Losartan 50mg once daily. Follow-up in 4 weeks for BP recheck. Send me a [employer] message in the meantime.

## 2022-11-08 ENCOUNTER — TELEPHONE (OUTPATIENT)
Dept: CARDIOLOGY | Facility: MEDICAL CENTER | Age: 77
End: 2022-11-08
Payer: COMMERCIAL

## 2022-11-08 ENCOUNTER — PATIENT MESSAGE (OUTPATIENT)
Dept: HEALTH INFORMATION MANAGEMENT | Facility: OTHER | Age: 77
End: 2022-11-08

## 2022-11-08 NOTE — TELEPHONE ENCOUNTER
Is the patient due for a refill? No    Was the patient seen the past year? Yes    Date of last office visit: 11/7/2022    Does the patient have an upcoming appointment?  Yes   If yes, When? 12/12/2022    Provider to refill:AB    Does the patients insurance require a 100 day supply?  No

## 2022-11-08 NOTE — TELEPHONE ENCOUNTER
"Called pt's [facility], on hold for 25 min, s/w pharmacist who was asking to change losartan to 90 day, but then when she tried to process it, wouldn't work. She was able to \"incorporate refills\" to make it a 90 day script to complete the order and fill for pt and confirmed they will contact pt to .     Tried to call pt, no answer, informed pharmacy contacted and Rx to be filled per pharmacist. Advised ER for urgent symptoms such as chest pain or severe headache, as I can see BP remaining high, AB advised he give this some time to work. Nothing further needed.  "

## 2022-11-08 NOTE — TELEPHONE ENCOUNTER
AB    Caller: Bartolo Padillawayne [facility]     Blood pressure/HR reading last 2-3 days:   reading today is 197/100-     Pt states the pharmacy has his medication losartan (COZAAR) 50 MG Tab [[id number]    but will not release it to him until our office calls them directly. Pt concerned since his BP is so high and would like to start the medication asap.     Symptoms: N/A-     Callback Number: [phone] (home)      Thank you,   Latoya BRIDGES

## 2022-11-09 ENCOUNTER — HOSPITAL ENCOUNTER (OUTPATIENT)
Facility: MEDICAL CENTER | Age: 77
End: 2022-11-09
Attending: INTERNAL MEDICINE
Payer: MEDICARE

## 2022-11-09 ENCOUNTER — NON-PROVIDER VISIT (OUTPATIENT)
Dept: INTERNAL MEDICINE | Facility: IMAGING CENTER | Age: 77
End: 2022-11-09
Payer: MEDICARE

## 2022-11-09 DIAGNOSIS — Z01.89 ENCOUNTER FOR ROUTINE LABORATORY TESTING: ICD-10-CM

## 2022-11-09 PROCEDURE — 85018 HEMOGLOBIN: CPT

## 2022-11-09 PROCEDURE — 84403 ASSAY OF TOTAL TESTOSTERONE: CPT

## 2022-11-09 PROCEDURE — 84402 ASSAY OF FREE TESTOSTERONE: CPT

## 2022-11-09 PROCEDURE — 84480 ASSAY TRIIODOTHYRONINE (T3): CPT

## 2022-11-09 PROCEDURE — 84443 ASSAY THYROID STIM HORMONE: CPT

## 2022-11-09 PROCEDURE — 84439 ASSAY OF FREE THYROXINE: CPT

## 2022-11-09 PROCEDURE — 85014 HEMATOCRIT: CPT

## 2022-11-09 PROCEDURE — 84270 ASSAY OF SEX HORMONE GLOBUL: CPT

## 2022-11-10 LAB
HCT VFR BLD AUTO: 52.3 % (ref 42–52)
HGB BLD-MCNC: 17.3 G/DL (ref 14–18)
T3 SERPL-MCNC: 130 NG/DL (ref 60–181)
T4 FREE SERPL-MCNC: 1.75 NG/DL (ref 0.93–1.7)
TSH SERPL DL<=0.005 MIU/L-ACNC: 0.23 UIU/ML (ref 0.38–5.33)

## 2022-11-14 RX ORDER — LOSARTAN POTASSIUM 50 MG/1
50 TABLET ORAL DAILY
Qty: 90 TABLET | Refills: 3 | OUTPATIENT
Start: 2022-11-14

## 2022-11-15 LAB
SHBG SERPL-SCNC: 62 NMOL/L (ref 19–76)
TESTOST FREE SERPL-MCNC: 169.2 PG/ML (ref 47–244)
TESTOST SERPL-MCNC: 1123 NG/DL (ref 300–720)
TESTOSTERONE.FREE+WB SERPL-MCNC: 461.6 NG/DL (ref 130–680)

## 2022-11-17 ENCOUNTER — HOSPITAL ENCOUNTER (OUTPATIENT)
Dept: RADIOLOGY | Facility: MEDICAL CENTER | Age: 77
End: 2022-11-17
Attending: INTERNAL MEDICINE
Payer: MEDICARE

## 2022-11-17 DIAGNOSIS — C34.11 MALIGNANT NEOPLASM OF UPPER LOBE OF RIGHT LUNG (HCC): ICD-10-CM

## 2022-11-17 DIAGNOSIS — C43.72 MALIGNANT MELANOMA OF LEFT LOWER EXTREMITY INCLUDING HIP (HCC): ICD-10-CM

## 2022-11-17 PROCEDURE — A9552 F18 FDG: HCPCS

## 2022-11-21 ENCOUNTER — TELEPHONE (OUTPATIENT)
Dept: CARDIOLOGY | Facility: MEDICAL CENTER | Age: 77
End: 2022-11-21
Payer: COMMERCIAL

## 2022-11-21 DIAGNOSIS — I15.9 SECONDARY HYPERTENSION: ICD-10-CM

## 2022-11-21 RX ORDER — AZELASTINE 1 MG/ML
SPRAY, METERED NASAL
Qty: 30 ML | Refills: 5 | Status: SHIPPED | OUTPATIENT
Start: 2022-11-21 | End: 2023-11-27 | Stop reason: SDUPTHER

## 2022-11-21 NOTE — TELEPHONE ENCOUNTER
AB          Caller: Bartolo Berrios [facility]      Topic/issue: Patient was calling about his losartan (COZAAR) 50 MG Tab and amLODIPine (NORVASC) 5 MG Tab medications and there was no change in his blood pressure and was asking for a call back      Callback Number: [phone]        Thank you      -Haja BUSBY

## 2022-11-23 RX ORDER — LOSARTAN POTASSIUM 100 MG/1
100 TABLET ORAL DAILY
Qty: 90 TABLET | Refills: 2 | Status: SHIPPED | OUTPATIENT
Start: 2022-11-23 | End: 2023-05-24

## 2022-11-23 NOTE — TELEPHONE ENCOUNTER
Reviewed chart, pt last saw AB on 11/7/22, pt was taking amlodipine 5mg once daily for hypertension and was advised to add losartan 50mg once daily. BP at that visit was 150/80.  S/W pt, he states his BP is usually around 165/85, he takes twice daily after medication dosing, we discussed taking 2 hours after for most accurate readings and he will follow this direction. Pt is hydrating, no diet changes. Denies h/a, dizziness, and other symptoms, but was instructed to report back if BP not 140/80 or less.   Please advise.

## 2022-11-23 NOTE — TELEPHONE ENCOUNTER
S/W pt, relayed plan as stated by AB. Pt agrees to increase losartan and call back in 2-3 weeks with BP readings. New Rx sent for losartan 100mg QD.

## 2022-11-23 NOTE — TELEPHONE ENCOUNTER
Continue Amlodipine.  Can increase Losartan from 50mg to 100mg once daily (or can take 50mg BID).  Monitor BP and report back to us in 2-3 weeks.  Thanks, AB

## 2022-12-06 ENCOUNTER — HOSPITAL ENCOUNTER (OUTPATIENT)
Facility: MEDICAL CENTER | Age: 77
End: 2022-12-06
Attending: INTERNAL MEDICINE
Payer: MEDICARE

## 2022-12-06 LAB
ALBUMIN SERPL BCP-MCNC: 4.1 G/DL (ref 3.2–4.9)
ALBUMIN/GLOB SERPL: 1.5 G/DL
ALP SERPL-CCNC: 86 U/L (ref 30–99)
ALT SERPL-CCNC: 18 U/L (ref 2–50)
AMBIGUOUS DTTM AMBI4: NORMAL
ANION GAP SERPL CALC-SCNC: 8 MMOL/L (ref 7–16)
AST SERPL-CCNC: 24 U/L (ref 12–45)
BILIRUB SERPL-MCNC: 1 MG/DL (ref 0.1–1.5)
BUN SERPL-MCNC: 23 MG/DL (ref 8–22)
CALCIUM SERPL-MCNC: 9.2 MG/DL (ref 8.5–10.5)
CHLORIDE SERPL-SCNC: 108 MMOL/L (ref 96–112)
CO2 SERPL-SCNC: 29 MMOL/L (ref 20–33)
CREAT SERPL-MCNC: 1.31 MG/DL (ref 0.5–1.4)
GFR SERPLBLD CREATININE-BSD FMLA CKD-EPI: 57 ML/MIN/1.73 M 2
GLOBULIN SER CALC-MCNC: 2.7 G/DL (ref 1.9–3.5)
GLUCOSE SERPL-MCNC: 83 MG/DL (ref 65–99)
MAGNESIUM SERPL-MCNC: 2 MG/DL (ref 1.5–2.5)
PHOSPHATE SERPL-MCNC: 3.7 MG/DL (ref 2.5–4.5)
POTASSIUM SERPL-SCNC: 4 MMOL/L (ref 3.6–5.5)
PROT SERPL-MCNC: 6.8 G/DL (ref 6–8.2)
SODIUM SERPL-SCNC: 145 MMOL/L (ref 135–145)

## 2022-12-06 PROCEDURE — 84100 ASSAY OF PHOSPHORUS: CPT

## 2022-12-06 PROCEDURE — 80053 COMPREHEN METABOLIC PANEL: CPT

## 2022-12-06 PROCEDURE — 83735 ASSAY OF MAGNESIUM: CPT

## 2022-12-09 DIAGNOSIS — E78.5 HYPERLIPIDEMIA, UNSPECIFIED HYPERLIPIDEMIA TYPE: ICD-10-CM

## 2022-12-12 ENCOUNTER — OFFICE VISIT (OUTPATIENT)
Dept: CARDIOLOGY | Facility: MEDICAL CENTER | Age: 77
End: 2022-12-12
Payer: MEDICARE

## 2022-12-12 VITALS
BODY MASS INDEX: 22.82 KG/M2 | HEIGHT: 71 IN | WEIGHT: 163 LBS | HEART RATE: 72 BPM | SYSTOLIC BLOOD PRESSURE: 110 MMHG | DIASTOLIC BLOOD PRESSURE: 80 MMHG | OXYGEN SATURATION: 95 % | RESPIRATION RATE: 14 BRPM

## 2022-12-12 DIAGNOSIS — E78.5 HYPERLIPIDEMIA, UNSPECIFIED HYPERLIPIDEMIA TYPE: ICD-10-CM

## 2022-12-12 DIAGNOSIS — N18.31 STAGE 3A CHRONIC KIDNEY DISEASE: ICD-10-CM

## 2022-12-12 DIAGNOSIS — I15.9 SECONDARY HYPERTENSION: ICD-10-CM

## 2022-12-12 DIAGNOSIS — C34.90 ADENOCARCINOMA OF LUNG, UNSPECIFIED LATERALITY (HCC): ICD-10-CM

## 2022-12-12 DIAGNOSIS — Z86.79 HISTORY OF ATRIAL TACHYCARDIA: ICD-10-CM

## 2022-12-12 PROBLEM — R07.82 INTERCOSTAL PAIN: Status: RESOLVED | Noted: 2019-02-10 | Resolved: 2022-12-12

## 2022-12-12 PROBLEM — L27.0 DRUG-INDUCED SKIN RASH: Status: RESOLVED | Noted: 2018-09-28 | Resolved: 2022-12-12

## 2022-12-12 PROBLEM — I95.1 ORTHOSTATIC HYPOTENSION: Status: RESOLVED | Noted: 2019-06-11 | Resolved: 2022-12-12

## 2022-12-12 PROBLEM — R55 SYNCOPE: Status: RESOLVED | Noted: 2021-06-20 | Resolved: 2022-12-12

## 2022-12-12 PROCEDURE — 99214 OFFICE O/P EST MOD 30 MIN: CPT | Performed by: NURSE PRACTITIONER

## 2022-12-12 ASSESSMENT — ENCOUNTER SYMPTOMS
PND: 0
BRUISES/BLEEDS EASILY: 0
DIZZINESS: 0
LOSS OF CONSCIOUSNESS: 0
INSOMNIA: 0
ORTHOPNEA: 0
ABDOMINAL PAIN: 0
FEVER: 0
SHORTNESS OF BREATH: 0
CHILLS: 0
MYALGIAS: 0
COUGH: 0
NAUSEA: 0
PALPITATIONS: 0
HEADACHES: 0

## 2022-12-12 ASSESSMENT — FIBROSIS 4 INDEX: FIB4 SCORE: 2.1

## 2022-12-13 NOTE — TELEPHONE ENCOUNTER
Is the patient due for a refill? Yes    Was the patient seen the past year? Yes    Date of last office visit: 12/12/2022    Does the patient have an upcoming appointment?  Yes   If yes, When? 2/8/2023    Provider to refill:AB    Does the patients insurance require a 100 day supply?  No

## 2022-12-13 NOTE — PROGRESS NOTES
"Chief Complaint   Patient presents with    Follow-Up    Hypertension Follow-up    Evaluation Of Medication Change       Subjective     Kevin Berrios [facility] is a 77 y.o. male who presents today for one month follow-up of elevated BP.    Kevin is a 77 year old male with history of hypertension, hyperlipidemia, adrenal insufficiency, history of atrial tachycardia with ablation in 2017 and CKD, normally followed by Dr. Carpenter, and last seen in August 2021.     He also has a history of lung cancer, treated with surgery and radiation, followed by Dr. Corral. Recent PET scan showed no cancer.    I saw him a month ago, as BP was going up. Losartan was added and titrated to 100mg once daily.    He is here today for one month follow-up. BP is much better, running 110-135 systolic. He denies any overt chest pain, pressure or discomfort; no palpitations; no shortness of breath, orthopnea or PND; no dizziness or syncope; very mild/stable LE edema. No headaches or visual changes.       Past Medical History:   Diagnosis Date    Adrenal insufficiency (HCC), secondary 11/25/2020    Atherosclerosis of both carotid arteries, mild 11/25/2020    BPH (benign prostatic hyperplasia) 11/25/2020    Bronchitis     a\" very long time ago\"    CKD (chronic kidney disease) stage 3, GFR 30-59 ml/min (HCC) 11/25/2020    Colostomy present (HCC) 11/25/2020    Diverticulosis of colon 11/25/2020    GERD (gastroesophageal reflux disease) 11/25/2020    History of atrial tachycardia 11/25/2020    Ablation 2017    History of malignant melanoma, April 2016 11/25/2020    History of shingles 11/25/2020    History of stroke, subcortical infarct on MRI April 2019 11/25/2020    Hyperlipidemia 11/25/2020    Hypothyroid 11/25/2020    Indigestion     Lung cancer (HCC), adenocarcinoma Aug. 2019 11/25/2020    Metastasis to L5 (biopsy Dec. 2019)     Past Surgical History:   Procedure Laterality Date    CECILE BY LAPAROSCOPY  2/26/2021    Procedure: " CHOLECYSTECTOMY, LAPAROSCOPIC;  Surgeon: Davey Oneal M.D.;  Location: SURGERY [facility];  Service: General    OTHER Right 2019    LOWER LOBE OF LUNG REMOVED    OTHER Left 2017    GROIN    OTHER Left 2016    SHIN     Family History   Problem Relation Age of Onset    Cancer Mother         Lung- bone    Cancer Father         Colon?    Cancer Sister         Lung    Cancer Brother         Lung    Cancer Brother         Brain     Social History     Socioeconomic History    Marital status:      Spouse name: Not on file    Number of children: Not on file    Years of education: Not on file    Highest education level: Not on file   Occupational History    Not on file   Tobacco Use    Smoking status: Never    Smokeless tobacco: Never   Vaping Use    Vaping Use: Never used   Substance and Sexual Activity    Alcohol use: Yes     Alcohol/week: 1.8 oz     Types: 3 Glasses of wine per week     Comment: Occasionally    Drug use: Not Currently     Comment: THC edibles    Sexual activity: Not on file   Other Topics Concern    Not on file   Social History Narrative    Not on file     Social Determinants of Health     Financial Resource Strain: Not on file   Food Insecurity: Not on file   Transportation Needs: Not on file   Physical Activity: Not on file   Stress: Not on file   Social Connections: Not on file   Intimate Partner Violence: Not on file   Housing Stability: Not on file     Allergies   Allergen Reactions    Gramineae Pollens Itching and Shortness of Breath     Itchy eyes, red face    Cat Hair Extract     Horse Allergy Hives    Other Environmental     Pollen Extract      Outpatient Encounter Medications as of 12/12/2022   Medication Sig Dispense Refill    losartan (COZAAR) 100 MG Tab Take 1 Tablet by mouth every day. 90 Tablet 2    azelastine (ASTELIN) 137 MCG/SPRAY nasal spray USE 1 SPRAY INTO AFFECTED NOSTRIL(S) TWICE DAILY AS NEEDED 30 mL 5    K Phos [place]-Sod Phos Di & Mono (PHOSPHOROUS) 155-852-130 MG Tab  "Take 1 Tablet by mouth every day.      amLODIPine (NORVASC) 5 MG Tab Take 1 Tablet by mouth every morning. 30 Tablet 2    liothyronine (CYTOMEL) 5 MCG Tab TAKE 2 TABLETS BY MOUTH EVERY DAY ON AN EMPTY STOMACH      omeprazole (PRILOSEC) 40 MG delayed-release capsule Take 1 Capsule by mouth every day.      tadalafil (CIALIS) 20 MG tablet Take 1 Tablet by mouth 1 time a day as needed. for erectile dysfunction      atorvastatin (LIPITOR) 20 MG Tab TAKE 1 TABLET BY MOUTH EVERY DAY 90 Tablet 0    famotidine (PEPCID) 20 MG Tab Take 1 Tablet by mouth every evening. 90 Tablet 3    traZODone (DESYREL) 50 MG Tab TAKE 2 TABLETS BY MOUTH EVERY NIGHT AT BEDTIME 180 Tablet 3    potassium chloride (KLOR-CON) 8 MEQ tablet TAKE 2 TABLETS BY MOUTH EVERY  Tablet 3    meloxicam (MOBIC) 15 MG tablet Take 1 Tablet by mouth every day. 30 Tablet 0    testosterone cypionate (DEPO-TESTOSTERONE) 200 MG/ML Solution injection Inject 125 mg into the shoulder, thigh, or buttocks every 7 days.      aspirin EC (ECOTRIN) 81 MG Tablet Delayed Response Take 1 Tablet by mouth every 48 hours. 100 Tablet 3    TIROSINT 125 MCG Cap Take 125 mcg by mouth every morning on an empty stomach. 6 days/week 90 Capsule 3    hydrocortisone (CORTEF) 10 MG Tab Take 2 Tablets by mouth 2 times a day.      B-D INTEGRA SYRINGE 25G X 5/8\" 3 ML Misc USE TO INJECT TESTOSTERONE EVERY 7 DAYS      CALCIUM GLUCEPTATE INJ Inject 1 Dose as directed every 21 days. Administered at [facility]      TAGRISSO 80 MG Tab Take 80 mg by mouth every day.       No facility-administered encounter medications on file as of 12/12/2022.     Review of Systems   Constitutional:  Negative for chills and fever.   HENT:  Negative for congestion.    Respiratory:  Negative for cough and shortness of breath.    Cardiovascular:  Negative for chest pain, palpitations, orthopnea, leg swelling and PND.   Gastrointestinal:  Negative for abdominal pain and nausea.   Musculoskeletal:  " "Negative for myalgias.   Skin:  Negative for rash.   Neurological:  Negative for dizziness, loss of consciousness and headaches.   Endo/Heme/Allergies:  Does not bruise/bleed easily.   Psychiatric/Behavioral:  The patient does not have insomnia.             Objective     /80 (BP Location: Left arm, Patient Position: Sitting, BP Cuff Size: Adult)   Pulse 72   Resp 14   Ht 1.803 m (5' 11\")   Wt 73.9 kg (163 lb)   SpO2 95%   BMI 22.73 kg/m²     Physical Exam  Constitutional:       Appearance: He is well-developed.   HENT:      Head: Normocephalic.   Neck:      Vascular: No JVD.   Cardiovascular:      Rate and Rhythm: Normal rate and regular rhythm.      Heart sounds: Normal heart sounds.   Pulmonary:      Effort: Pulmonary effort is normal. No respiratory distress.      Breath sounds: Normal breath sounds. No wheezing or rales.   Abdominal:      General: Bowel sounds are normal. There is no distension.      Palpations: Abdomen is soft.      Tenderness: There is no abdominal tenderness.   Musculoskeletal:         General: Normal range of motion.      Cervical back: Normal range of motion and neck supple.   Skin:     General: Skin is warm and dry.      Findings: No rash.   Neurological:      Mental Status: He is alert and oriented to person, place, and time.   Psychiatric:         Mood and Affect: Mood normal.         Behavior: Behavior normal.     CONCLUSIONS OF ECHOCARDIOGRAM OF 6/21/2021:  Normal left ventricular size, wall thickness, and systolic function.  The right ventricle was normal in size and systolic function.  The left atrium is normal in size.  No significant valvular pathology.  Normal pericardium without effusion.  No prior study is available for comparison.      Lab Results   Component Value Date/Time    SODIUM 145 12/06/2022 12:00 PM    POTASSIUM 4.0 12/06/2022 12:00 PM    CHLORIDE 108 12/06/2022 12:00 PM    CO2 29 12/06/2022 12:00 PM    GLUCOSE 83 12/06/2022 12:00 PM    BUN 23 (H) " 12/06/2022 12:00 PM    CREATININE 1.31 12/06/2022 12:00 PM    BUNCREATRAT 17 11/05/2020 10:00 AM      Lab Results   Component Value Date/Time    ASTSGOT 24 12/06/2022 12:00 PM    ALTSGPT 18 12/06/2022 12:00 PM       Lab Results   Component Value Date/Time    WBC 8.4 10/19/2022 11:15 AM    RBC 4.86 10/19/2022 11:15 AM    HEMOGLOBIN 17.3 11/09/2022 01:45 PM    HEMATOCRIT 52.3 (H) 11/09/2022 01:45 PM    MCV 94.9 10/19/2022 11:15 AM    MCH 31.5 10/19/2022 11:15 AM    MCHC 33.2 (L) 10/19/2022 11:15 AM    MPV 10.3 10/19/2022 11:15 AM         Assessment & Plan     1. Secondary hypertension        2. Hyperlipidemia, unspecified hyperlipidemia type        3. Adenocarcinoma of lung, unspecified laterality (HCC)        4. Stage 3a chronic kidney disease (HCC)        5. History of atrial tachycardia            Medical Decision Making: Today's Assessment/Status/Plan:      1. Hypertension, treated with Amlodipine and Losartan, improved. BP is much better.    2. Hyperlipidemia, treated with Lipitor 10mg.    3. Lung cancer, followed by oncology/Dr. Corral.    4. CKD, stable.    5. History of atrial tachycardia, status post ablation, stable.    BP is much better. Same medications for now. Follow-up in 6 months, sooner if clinical condition changes.

## 2022-12-15 RX ORDER — ATORVASTATIN CALCIUM 20 MG/1
20 TABLET, FILM COATED ORAL DAILY
Qty: 90 TABLET | Refills: 3 | Status: SHIPPED | OUTPATIENT
Start: 2022-12-15 | End: 2023-08-21 | Stop reason: SDUPTHER

## 2022-12-15 NOTE — TELEPHONE ENCOUNTER
Per AB last OV notes 12/12/22:  Medical Decision Making: Today's Assessment/Status/Plan:            1. Hypertension, treated with Amlodipine and Losartan, improved. BP is much better.     2. Hyperlipidemia, treated with Lipitor 10mg.    To Brianda, noted dose is 20mg per MAR. Please review and confirm. Thank you!

## 2023-01-03 ENCOUNTER — TELEPHONE (OUTPATIENT)
Dept: INTERNAL MEDICINE | Facility: IMAGING CENTER | Age: 78
End: 2023-01-03
Payer: COMMERCIAL

## 2023-01-03 RX ORDER — AMLODIPINE BESYLATE 5 MG/1
5 TABLET ORAL EVERY MORNING
Qty: 90 TABLET | Refills: 1 | Status: SHIPPED | OUTPATIENT
Start: 2023-01-03 | End: 2023-04-13 | Stop reason: SDUPTHER

## 2023-01-03 RX ORDER — CEFDINIR 300 MG/1
300 CAPSULE ORAL 2 TIMES DAILY
Qty: 14 CAPSULE | Refills: 0 | Status: SHIPPED | OUTPATIENT
Start: 2023-01-03 | End: 2023-01-10

## 2023-01-10 ENCOUNTER — HOSPITAL ENCOUNTER (OUTPATIENT)
Facility: MEDICAL CENTER | Age: 78
End: 2023-01-10
Attending: INTERNAL MEDICINE
Payer: MEDICARE

## 2023-01-10 PROCEDURE — 84100 ASSAY OF PHOSPHORUS: CPT

## 2023-01-10 PROCEDURE — 80053 COMPREHEN METABOLIC PANEL: CPT

## 2023-01-11 LAB
ALBUMIN SERPL BCP-MCNC: 4 G/DL (ref 3.2–4.9)
ALBUMIN/GLOB SERPL: 1.6 G/DL
ALP SERPL-CCNC: 72 U/L (ref 30–99)
ALT SERPL-CCNC: 11 U/L (ref 2–50)
ANION GAP SERPL CALC-SCNC: 8 MMOL/L (ref 7–16)
AST SERPL-CCNC: 20 U/L (ref 12–45)
BILIRUB SERPL-MCNC: 1 MG/DL (ref 0.1–1.5)
BUN SERPL-MCNC: 19 MG/DL (ref 8–22)
CALCIUM ALBUM COR SERPL-MCNC: 8.4 MG/DL (ref 8.5–10.5)
CALCIUM SERPL-MCNC: 8.4 MG/DL (ref 8.5–10.5)
CHLORIDE SERPL-SCNC: 107 MMOL/L (ref 96–112)
CO2 SERPL-SCNC: 27 MMOL/L (ref 20–33)
CREAT SERPL-MCNC: 1.47 MG/DL (ref 0.5–1.4)
GFR SERPLBLD CREATININE-BSD FMLA CKD-EPI: 49 ML/MIN/1.73 M 2
GLOBULIN SER CALC-MCNC: 2.5 G/DL (ref 1.9–3.5)
GLUCOSE SERPL-MCNC: 91 MG/DL (ref 65–99)
PHOSPHATE SERPL-MCNC: 2.2 MG/DL (ref 2.5–4.5)
POTASSIUM SERPL-SCNC: 4 MMOL/L (ref 3.6–5.5)
PROT SERPL-MCNC: 6.5 G/DL (ref 6–8.2)
SODIUM SERPL-SCNC: 142 MMOL/L (ref 135–145)

## 2023-02-14 DIAGNOSIS — R97.20 ELEVATED PSA: ICD-10-CM

## 2023-02-14 DIAGNOSIS — Z00.00 HEALTH CARE MAINTENANCE: ICD-10-CM

## 2023-02-14 DIAGNOSIS — E78.2 HYPERLIPIDEMIA, MIXED: ICD-10-CM

## 2023-02-14 DIAGNOSIS — N18.31 STAGE 3A CHRONIC KIDNEY DISEASE: ICD-10-CM

## 2023-02-14 DIAGNOSIS — E55.9 VITAMIN D DEFICIENCY: ICD-10-CM

## 2023-02-15 ENCOUNTER — TELEPHONE (OUTPATIENT)
Dept: INTERNAL MEDICINE | Facility: IMAGING CENTER | Age: 78
End: 2023-02-15
Payer: COMMERCIAL

## 2023-02-15 NOTE — TELEPHONE ENCOUNTER
----- Message from Lesly Munoz M.D. sent at 2/15/2023 11:20 AM PST -----  Regarding: RE: Lab Orders  PSA recheck next week as scheduled and the remainder of the orders (fasting) in May will be great.  Thank you.  Dr. HELLER  ----- Message -----  From: Coleen Fuentes R.N.  Sent: 2/15/2023  11:11 AM PST  To: Lesly Munoz M.D.  Subject: Lab Orders                                       Kevin De La Paz has 2 pending lab appointments with me -   1. 2/21/2023 (per Dr CRUZ to recheck PSA)  2. 5/9/2023  You have also placed lab orders for Kevin.  When do you want those labs drawn?  Thank you,  Janee

## 2023-02-18 ENCOUNTER — HOSPITAL ENCOUNTER (OUTPATIENT)
Dept: RADIOLOGY | Facility: MEDICAL CENTER | Age: 78
End: 2023-02-18
Attending: INTERNAL MEDICINE
Payer: COMMERCIAL

## 2023-02-21 ENCOUNTER — APPOINTMENT (OUTPATIENT)
Dept: RADIOLOGY | Facility: MEDICAL CENTER | Age: 78
End: 2023-02-21
Attending: NURSE PRACTITIONER
Payer: MEDICARE

## 2023-02-21 ENCOUNTER — HOSPITAL ENCOUNTER (OUTPATIENT)
Dept: RADIOLOGY | Facility: MEDICAL CENTER | Age: 78
End: 2023-02-21
Attending: INTERNAL MEDICINE
Payer: MEDICARE

## 2023-02-21 DIAGNOSIS — C34.11 MALIGNANT NEOPLASM OF UPPER LOBE OF RIGHT LUNG (HCC): ICD-10-CM

## 2023-02-21 DIAGNOSIS — C43.72 MALIGNANT MELANOMA OF LEFT LOWER EXTREMITY INCLUDING HIP (HCC): ICD-10-CM

## 2023-02-21 DIAGNOSIS — I12.9 HYPERTENSION, RENAL: ICD-10-CM

## 2023-02-21 DIAGNOSIS — I65.29 STENOSIS OF CAROTID ARTERY, UNSPECIFIED LATERALITY: ICD-10-CM

## 2023-02-21 DIAGNOSIS — N18.31 CHRONIC KIDNEY DISEASE, STAGE 3A: ICD-10-CM

## 2023-02-21 DIAGNOSIS — C34.90 MALIGNANT NEOPLASM OF LUNG, UNSPECIFIED LATERALITY, UNSPECIFIED PART OF LUNG (HCC): ICD-10-CM

## 2023-02-21 PROCEDURE — A9579 GAD-BASE MR CONTRAST NOS,1ML: HCPCS | Performed by: INTERNAL MEDICINE

## 2023-02-21 PROCEDURE — 70553 MRI BRAIN STEM W/O & W/DYE: CPT

## 2023-02-21 PROCEDURE — 76775 US EXAM ABDO BACK WALL LIM: CPT

## 2023-02-21 PROCEDURE — 700117 HCHG RX CONTRAST REV CODE 255: Performed by: INTERNAL MEDICINE

## 2023-02-21 RX ADMIN — GADOTERIDOL 15 ML: 279.3 INJECTION, SOLUTION INTRAVENOUS at 16:34

## 2023-02-23 ENCOUNTER — APPOINTMENT (OUTPATIENT)
Dept: RADIOLOGY | Facility: MEDICAL CENTER | Age: 78
End: 2023-02-23
Attending: INTERNAL MEDICINE
Payer: MEDICARE

## 2023-02-24 ENCOUNTER — HOSPITAL ENCOUNTER (OUTPATIENT)
Facility: MEDICAL CENTER | Age: 78
End: 2023-02-24
Attending: FAMILY MEDICINE
Payer: MEDICARE

## 2023-02-24 ENCOUNTER — OFFICE VISIT (OUTPATIENT)
Dept: INTERNAL MEDICINE | Facility: IMAGING CENTER | Age: 78
End: 2023-02-24
Payer: MEDICARE

## 2023-02-24 VITALS
HEART RATE: 63 BPM | BODY MASS INDEX: 22.82 KG/M2 | DIASTOLIC BLOOD PRESSURE: 62 MMHG | WEIGHT: 163 LBS | OXYGEN SATURATION: 97 % | RESPIRATION RATE: 17 BRPM | TEMPERATURE: 98.2 F | HEIGHT: 71 IN | SYSTOLIC BLOOD PRESSURE: 136 MMHG

## 2023-02-24 DIAGNOSIS — M15.9 PRIMARY OSTEOARTHRITIS INVOLVING MULTIPLE JOINTS: ICD-10-CM

## 2023-02-24 DIAGNOSIS — N18.31 CHRONIC KIDNEY DISEASE, STAGE 3A: ICD-10-CM

## 2023-02-24 DIAGNOSIS — R97.20 ELEVATED PSA: ICD-10-CM

## 2023-02-24 DIAGNOSIS — E27.40 ADRENAL INSUFFICIENCY (HCC): ICD-10-CM

## 2023-02-24 PROBLEM — M19.031 CMC DJD(CARPOMETACARPAL DEGENERATIVE JOINT DISEASE), LOCALIZED PRIMARY, RIGHT: Status: RESOLVED | Noted: 2022-07-06 | Resolved: 2023-02-24

## 2023-02-24 PROBLEM — D48.9 NEOPLASM OF UNCERTAIN BEHAVIOR: Status: RESOLVED | Noted: 2018-02-08 | Resolved: 2023-02-24

## 2023-02-24 PROBLEM — C79.9 METASTASIS (HCC): Status: RESOLVED | Noted: 2021-06-21 | Resolved: 2023-02-24

## 2023-02-24 PROBLEM — S62.011K: Status: RESOLVED | Noted: 2022-07-06 | Resolved: 2023-02-24

## 2023-02-24 PROCEDURE — 84153 ASSAY OF PSA TOTAL: CPT

## 2023-02-24 PROCEDURE — 99214 OFFICE O/P EST MOD 30 MIN: CPT | Performed by: FAMILY MEDICINE

## 2023-02-24 ASSESSMENT — PATIENT HEALTH QUESTIONNAIRE - PHQ9: CLINICAL INTERPRETATION OF PHQ2 SCORE: 0

## 2023-02-24 ASSESSMENT — FIBROSIS 4 INDEX: FIB4 SCORE: 2.24

## 2023-02-25 LAB — PSA SERPL-MCNC: 8.35 NG/ML (ref 0–4)

## 2023-02-26 NOTE — PROGRESS NOTES
Chief Complaint   Patient presents with    Establish Care    Arthritis     Possible? Right hand is worse than left but both are affected. Also right knee and ankle. Has tried tylenol as well for pain but minimally effective.          HPI:  Patient is a 77 y.o. male established patient who presents today to transfer care from Dr. Puente to myself and discuss ongoing arthritis pain and associated joint swelling of hands (R>L), right knee, and right ankle at times. He enjoys playing golf and has noticed increased symptoms after playing. He has been evaluated for this condition in the past at [facility] and has tested negative for RF/RICH multiple times in the past. He has used Tylenol PRN for symptom management without relief and is seeking other treatment options.     Patient Active Problem List    Diagnosis Date Noted    Androgen deficiency 04/27/2022    Bone metastasis (HCC) 10/06/2021    Chronic pain 06/30/2021    Hypothyroid 11/25/2020    Obstructive sleep apnea 11/25/2020    GERD (gastroesophageal reflux disease) 11/25/2020    Hyperlipidemia 11/25/2020    Adrenal insufficiency (HCC), secondary 11/25/2020    Lung cancer (HCC), adenocarcinoma Aug. 2019 11/25/2020    History of stroke, subcortical infarct on MRI April 2019 11/25/2020    Diverticulosis of colon 11/25/2020    History of shingles 11/25/2020    Vitamin D deficiency 11/25/2020    History of malignant melanoma, April 2016 11/25/2020    History of atrial tachycardia 11/25/2020    BPH with obstruction/lower urinary tract symptoms 11/25/2020    Elevated PSA 11/25/2020    Atherosclerosis of both carotid arteries, mild 11/25/2020    Chronic kidney disease, stage 3a (HCC)     Sensorineural hearing loss of both ears 06/08/2020    History of radiation therapy 01/30/2020    Premature atrial contractions 09/19/2019    Adenocarcinoma of lung (HCC), stage 4 ([facility] Oct. 2021) 08/26/2019    Brain lesion 06/26/2019    H/O cardiac radiofrequency ablation 06/11/2019     "Cerebral infarct (HCC) 04/09/2019    Left renal atrophy 12/14/2018    Long term (current) use of systemic steroids 12/11/2018    Right foot drop 11/16/2018    Right peroneal nerve palsy 11/16/2018    Need for immunization against influenza 11/01/2018    Palpitations 11/20/2016    Facial basal cell cancer 08/05/2016    Squamous cell cancer of scalp and skin of neck 08/05/2016    Anxiety 07/28/2016    Malignant melanoma of left lower leg (HCC) 06/23/2016    Family history of coronary artery disease 01/08/2016    Family history of early CAD 01/08/2016    Hypertension 01/08/2016       Past medical, surgical, family, and social history was reviewed and updated in Epic chart by me today.     Medications and allergies reviewed and updated in Epic chart by me today.     ROS:  Pertinent positives listed above in HPI. All other systems have been reviewed and are negative.    PE:   /62 (BP Location: Left arm, Patient Position: Sitting, BP Cuff Size: Adult)   Pulse 63   Temp 36.8 °C (98.2 °F) (Temporal)   Resp 17   Ht 1.803 m (5' 11\")   Wt 73.9 kg (163 lb)   SpO2 97%   BMI 22.73 kg/m²   Vital signs reviewed with patient.     Gen: Well developed; well nourished; no acute distress; age appropriate appearance   Upper extremities: mild right first finger edema and joint changes c/w arthritis/ left hand intact  Lower extremities: No peripheral edema b/l LE extremities/ no clubbing nor cyanosis noted  Skin: Warm and dry; no rashes noted   Neuro: No focal deficits noted   Psych: AAOx4; mood and affect are appropriate    A/P:  1. Primary osteoarthritis involving multiple joints  Ongoing issue affecting hands (R>L), right knee and right ankle joints. Patient has tested negative multiple times in the past for RF/RICH and recommend supportive care measures for initial treatment: Voltaren gel to affected joints TID PRN, ice affected joints after activity (20 minutes on 60 minutes off), Tylenol PRN for pain management, and " prevent joint exposure to extremes of cold temperatures (wear gloves/keep hands warm as able). Due to CKD history, discussed need to avoid NSAID unless absolutely required.     2. Chronic kidney disease, stage 3a (HCC)  Stable/ discussed need to avoid NSAID use for OA management unless absolutely necessary. Will follow.     3. Adrenal insufficiency (HCC)  Stable without recent reported crisis, and patient is compliant is daily Cortef use.

## 2023-03-08 ENCOUNTER — HOSPITAL ENCOUNTER (OUTPATIENT)
Facility: MEDICAL CENTER | Age: 78
End: 2023-03-08
Attending: INTERNAL MEDICINE
Payer: MEDICARE

## 2023-03-08 LAB
ALBUMIN SERPL BCP-MCNC: 4.1 G/DL (ref 3.2–4.9)
ALBUMIN/GLOB SERPL: 1.5 G/DL
ALP SERPL-CCNC: 64 U/L (ref 30–99)
ALT SERPL-CCNC: 10 U/L (ref 2–50)
ANION GAP SERPL CALC-SCNC: 8 MMOL/L (ref 7–16)
AST SERPL-CCNC: 15 U/L (ref 12–45)
BILIRUB SERPL-MCNC: 1.3 MG/DL (ref 0.1–1.5)
BUN SERPL-MCNC: 17 MG/DL (ref 8–22)
CALCIUM ALBUM COR SERPL-MCNC: 9.2 MG/DL (ref 8.5–10.5)
CALCIUM SERPL-MCNC: 9.3 MG/DL (ref 8.5–10.5)
CHLORIDE SERPL-SCNC: 103 MMOL/L (ref 96–112)
CO2 SERPL-SCNC: 29 MMOL/L (ref 20–33)
CREAT SERPL-MCNC: 1.57 MG/DL (ref 0.5–1.4)
GFR SERPLBLD CREATININE-BSD FMLA CKD-EPI: 45 ML/MIN/1.73 M 2
GLOBULIN SER CALC-MCNC: 2.8 G/DL (ref 1.9–3.5)
GLUCOSE SERPL-MCNC: 85 MG/DL (ref 65–99)
MAGNESIUM SERPL-MCNC: 1.9 MG/DL (ref 1.5–2.5)
PHOSPHATE SERPL-MCNC: 2.9 MG/DL (ref 2.5–4.5)
POTASSIUM SERPL-SCNC: 4 MMOL/L (ref 3.6–5.5)
PROT SERPL-MCNC: 6.9 G/DL (ref 6–8.2)
SODIUM SERPL-SCNC: 140 MMOL/L (ref 135–145)

## 2023-03-08 PROCEDURE — 83735 ASSAY OF MAGNESIUM: CPT

## 2023-03-08 PROCEDURE — 84100 ASSAY OF PHOSPHORUS: CPT

## 2023-03-08 PROCEDURE — 80053 COMPREHEN METABOLIC PANEL: CPT

## 2023-03-13 DIAGNOSIS — E78.2 HYPERLIPIDEMIA, MIXED: ICD-10-CM

## 2023-03-13 DIAGNOSIS — I65.23 ATHEROSCLEROSIS OF BOTH CAROTID ARTERIES: ICD-10-CM

## 2023-04-13 DIAGNOSIS — I10 ESSENTIAL HYPERTENSION: ICD-10-CM

## 2023-04-13 RX ORDER — AMLODIPINE BESYLATE 5 MG/1
5 TABLET ORAL EVERY MORNING
Qty: 90 TABLET | Refills: 3 | Status: SHIPPED | OUTPATIENT
Start: 2023-04-13 | End: 2023-08-21 | Stop reason: SDUPTHER

## 2023-04-14 DIAGNOSIS — R09.82 POST-NASAL DRIP: ICD-10-CM

## 2023-04-14 DIAGNOSIS — J32.8 OTHER CHRONIC SINUSITIS: ICD-10-CM

## 2023-05-05 ENCOUNTER — APPOINTMENT (OUTPATIENT)
Dept: INTERNAL MEDICINE | Facility: IMAGING CENTER | Age: 78
End: 2023-05-05
Payer: MEDICARE

## 2023-05-10 ENCOUNTER — HOSPITAL ENCOUNTER (OUTPATIENT)
Facility: MEDICAL CENTER | Age: 78
End: 2023-05-10
Attending: INTERNAL MEDICINE
Payer: MEDICARE

## 2023-05-10 PROCEDURE — 80053 COMPREHEN METABOLIC PANEL: CPT

## 2023-05-10 PROCEDURE — 84100 ASSAY OF PHOSPHORUS: CPT

## 2023-05-10 PROCEDURE — 83735 ASSAY OF MAGNESIUM: CPT

## 2023-05-11 LAB
ALBUMIN SERPL BCP-MCNC: 3.9 G/DL (ref 3.2–4.9)
ALBUMIN/GLOB SERPL: 1.3 G/DL
ALP SERPL-CCNC: 59 U/L (ref 30–99)
ALT SERPL-CCNC: 6 U/L (ref 2–50)
ANION GAP SERPL CALC-SCNC: 9 MMOL/L (ref 7–16)
AST SERPL-CCNC: 15 U/L (ref 12–45)
BILIRUB SERPL-MCNC: 1.5 MG/DL (ref 0.1–1.5)
BUN SERPL-MCNC: 24 MG/DL (ref 8–22)
CALCIUM ALBUM COR SERPL-MCNC: 9.6 MG/DL (ref 8.5–10.5)
CALCIUM SERPL-MCNC: 9.5 MG/DL (ref 8.5–10.5)
CHLORIDE SERPL-SCNC: 106 MMOL/L (ref 96–112)
CO2 SERPL-SCNC: 27 MMOL/L (ref 20–33)
CREAT SERPL-MCNC: 1.96 MG/DL (ref 0.5–1.4)
GFR SERPLBLD CREATININE-BSD FMLA CKD-EPI: 35 ML/MIN/1.73 M 2
GLOBULIN SER CALC-MCNC: 3 G/DL (ref 1.9–3.5)
GLUCOSE SERPL-MCNC: 92 MG/DL (ref 65–99)
MAGNESIUM SERPL-MCNC: 1.9 MG/DL (ref 1.5–2.5)
PHOSPHATE SERPL-MCNC: 2.7 MG/DL (ref 2.5–4.5)
POTASSIUM SERPL-SCNC: 4.3 MMOL/L (ref 3.6–5.5)
PROT SERPL-MCNC: 6.9 G/DL (ref 6–8.2)
SODIUM SERPL-SCNC: 142 MMOL/L (ref 135–145)

## 2023-05-17 ENCOUNTER — HOSPITAL ENCOUNTER (OUTPATIENT)
Facility: MEDICAL CENTER | Age: 78
End: 2023-05-17
Attending: INTERNAL MEDICINE
Payer: MEDICARE

## 2023-05-17 ENCOUNTER — NON-PROVIDER VISIT (OUTPATIENT)
Dept: INTERNAL MEDICINE | Facility: IMAGING CENTER | Age: 78
End: 2023-05-17
Payer: MEDICARE

## 2023-05-17 ENCOUNTER — HOSPITAL ENCOUNTER (OUTPATIENT)
Facility: MEDICAL CENTER | Age: 78
End: 2023-05-17
Attending: PHYSICIAN ASSISTANT
Payer: MEDICARE

## 2023-05-17 PROCEDURE — 85018 HEMOGLOBIN: CPT | Mod: XU

## 2023-05-17 PROCEDURE — 84481 FREE ASSAY (FT-3): CPT

## 2023-05-17 PROCEDURE — 80069 RENAL FUNCTION PANEL: CPT

## 2023-05-17 PROCEDURE — 84270 ASSAY OF SEX HORMONE GLOBUL: CPT

## 2023-05-17 PROCEDURE — 84403 ASSAY OF TOTAL TESTOSTERONE: CPT

## 2023-05-17 PROCEDURE — 81001 URINALYSIS AUTO W/SCOPE: CPT

## 2023-05-17 PROCEDURE — 84443 ASSAY THYROID STIM HORMONE: CPT

## 2023-05-17 PROCEDURE — 83735 ASSAY OF MAGNESIUM: CPT

## 2023-05-17 PROCEDURE — 82306 VITAMIN D 25 HYDROXY: CPT

## 2023-05-17 PROCEDURE — 83970 ASSAY OF PARATHORMONE: CPT

## 2023-05-17 PROCEDURE — 80053 COMPREHEN METABOLIC PANEL: CPT

## 2023-05-17 PROCEDURE — 84439 ASSAY OF FREE THYROXINE: CPT

## 2023-05-17 PROCEDURE — 84156 ASSAY OF PROTEIN URINE: CPT

## 2023-05-17 PROCEDURE — 85014 HEMATOCRIT: CPT | Mod: XU

## 2023-05-17 PROCEDURE — 85025 COMPLETE CBC W/AUTO DIFF WBC: CPT

## 2023-05-17 PROCEDURE — 84153 ASSAY OF PSA TOTAL: CPT

## 2023-05-17 PROCEDURE — 82570 ASSAY OF URINE CREATININE: CPT

## 2023-05-17 PROCEDURE — 84550 ASSAY OF BLOOD/URIC ACID: CPT

## 2023-05-18 LAB
25(OH)D3 SERPL-MCNC: 99 NG/ML (ref 30–100)
ALBUMIN SERPL BCP-MCNC: 4 G/DL (ref 3.2–4.9)
ALBUMIN SERPL BCP-MCNC: 4.2 G/DL (ref 3.2–4.9)
ALBUMIN/GLOB SERPL: 1.3 G/DL
ALP SERPL-CCNC: 63 U/L (ref 30–99)
ALT SERPL-CCNC: 9 U/L (ref 2–50)
ANION GAP SERPL CALC-SCNC: 11 MMOL/L (ref 7–16)
APPEARANCE UR: CLEAR
AST SERPL-CCNC: 18 U/L (ref 12–45)
BACTERIA #/AREA URNS HPF: NEGATIVE /HPF
BASOPHILS # BLD AUTO: 0.3 % (ref 0–1.8)
BASOPHILS # BLD: 0.02 K/UL (ref 0–0.12)
BILIRUB SERPL-MCNC: 1.1 MG/DL (ref 0.1–1.5)
BILIRUB UR QL STRIP.AUTO: NEGATIVE
BUN SERPL-MCNC: 16 MG/DL (ref 8–22)
BUN SERPL-MCNC: 16 MG/DL (ref 8–22)
CALCIUM ALBUM COR SERPL-MCNC: 8.4 MG/DL (ref 8.5–10.5)
CALCIUM ALBUM COR SERPL-MCNC: 8.5 MG/DL (ref 8.5–10.5)
CALCIUM SERPL-MCNC: 8.5 MG/DL (ref 8.5–10.5)
CALCIUM SERPL-MCNC: 8.6 MG/DL (ref 8.5–10.5)
CHLORIDE SERPL-SCNC: 105 MMOL/L (ref 96–112)
CHLORIDE SERPL-SCNC: 105 MMOL/L (ref 96–112)
CO2 SERPL-SCNC: 25 MMOL/L (ref 20–33)
CO2 SERPL-SCNC: 27 MMOL/L (ref 20–33)
COLOR UR: YELLOW
CREAT SERPL-MCNC: 1.26 MG/DL (ref 0.5–1.4)
CREAT SERPL-MCNC: 1.48 MG/DL (ref 0.5–1.4)
CREAT UR-MCNC: 69.16 MG/DL
EOSINOPHIL # BLD AUTO: 0 K/UL (ref 0–0.51)
EOSINOPHIL NFR BLD: 0 % (ref 0–6.9)
EPI CELLS #/AREA URNS HPF: NEGATIVE /HPF
ERYTHROCYTE [DISTWIDTH] IN BLOOD BY AUTOMATED COUNT: 50.2 FL (ref 35.9–50)
GFR SERPLBLD CREATININE-BSD FMLA CKD-EPI: 49 ML/MIN/1.73 M 2
GFR SERPLBLD CREATININE-BSD FMLA CKD-EPI: 59 ML/MIN/1.73 M 2
GLOBULIN SER CALC-MCNC: 3.2 G/DL (ref 1.9–3.5)
GLUCOSE SERPL-MCNC: 91 MG/DL (ref 65–99)
GLUCOSE SERPL-MCNC: 95 MG/DL (ref 65–99)
GLUCOSE UR STRIP.AUTO-MCNC: 100 MG/DL
HCT VFR BLD AUTO: 47.4 % (ref 42–52)
HCT VFR BLD AUTO: 47.5 % (ref 42–52)
HGB BLD-MCNC: 15.2 G/DL (ref 14–18)
HGB BLD-MCNC: 15.4 G/DL (ref 14–18)
HYALINE CASTS #/AREA URNS LPF: ABNORMAL /LPF
IMM GRANULOCYTES # BLD AUTO: 0.05 K/UL (ref 0–0.11)
IMM GRANULOCYTES NFR BLD AUTO: 0.7 % (ref 0–0.9)
KETONES UR STRIP.AUTO-MCNC: NEGATIVE MG/DL
LEUKOCYTE ESTERASE UR QL STRIP.AUTO: NEGATIVE
LYMPHOCYTES # BLD AUTO: 0.87 K/UL (ref 1–4.8)
LYMPHOCYTES NFR BLD: 11.6 % (ref 22–41)
MAGNESIUM SERPL-MCNC: 2 MG/DL (ref 1.5–2.5)
MCH RBC QN AUTO: 31.7 PG (ref 27–33)
MCHC RBC AUTO-ENTMCNC: 32 G/DL (ref 33.7–35.3)
MCV RBC AUTO: 99 FL (ref 81.4–97.8)
MICRO URNS: ABNORMAL
MONOCYTES # BLD AUTO: 0.5 K/UL (ref 0–0.85)
MONOCYTES NFR BLD AUTO: 6.7 % (ref 0–13.4)
NEUTROPHILS # BLD AUTO: 6.07 K/UL (ref 1.82–7.42)
NEUTROPHILS NFR BLD: 80.7 % (ref 44–72)
NITRITE UR QL STRIP.AUTO: NEGATIVE
NRBC # BLD AUTO: 0 K/UL
NRBC BLD-RTO: 0 /100 WBC
PH UR STRIP.AUTO: 6 [PH] (ref 5–8)
PHOSPHATE SERPL-MCNC: 2.5 MG/DL (ref 2.5–4.5)
PLATELET # BLD AUTO: 234 K/UL (ref 164–446)
PMV BLD AUTO: 10 FL (ref 9–12.9)
POTASSIUM SERPL-SCNC: 4.1 MMOL/L (ref 3.6–5.5)
POTASSIUM SERPL-SCNC: 4.1 MMOL/L (ref 3.6–5.5)
PROT SERPL-MCNC: 7.4 G/DL (ref 6–8.2)
PROT UR QL STRIP: NEGATIVE MG/DL
PROT UR-MCNC: 19 MG/DL (ref 0–15)
PSA SERPL-MCNC: 8.98 NG/ML (ref 0–4)
PTH-INTACT SERPL-MCNC: 27.7 PG/ML (ref 14–72)
RBC # BLD AUTO: 4.8 M/UL (ref 4.7–6.1)
RBC # URNS HPF: ABNORMAL /HPF
RBC UR QL AUTO: ABNORMAL
SODIUM SERPL-SCNC: 140 MMOL/L (ref 135–145)
SODIUM SERPL-SCNC: 141 MMOL/L (ref 135–145)
SP GR UR STRIP.AUTO: 1.01
T3FREE SERPL-MCNC: 3.25 PG/ML (ref 2–4.4)
T4 FREE SERPL-MCNC: 1.58 NG/DL (ref 0.93–1.7)
TESTOST SERPL-MCNC: >1500 NG/DL (ref 175–781)
TSH SERPL DL<=0.005 MIU/L-ACNC: 1.05 UIU/ML (ref 0.38–5.33)
URATE SERPL-MCNC: 4.3 MG/DL (ref 2.5–8.3)
UROBILINOGEN UR STRIP.AUTO-MCNC: 0.2 MG/DL
WBC # BLD AUTO: 7.5 K/UL (ref 4.8–10.8)
WBC #/AREA URNS HPF: ABNORMAL /HPF

## 2023-05-19 LAB — SHBG SERPL-SCNC: 63 NMOL/L (ref 19–76)

## 2023-05-23 DIAGNOSIS — I15.9 SECONDARY HYPERTENSION: ICD-10-CM

## 2023-05-24 RX ORDER — LOSARTAN POTASSIUM 100 MG/1
100 TABLET ORAL DAILY
Qty: 90 TABLET | Refills: 2 | Status: SHIPPED | OUTPATIENT
Start: 2023-05-24 | End: 2023-08-21 | Stop reason: SDUPTHER

## 2023-05-24 NOTE — TELEPHONE ENCOUNTER
Is the patient due for a refill? Yes    Was the patient seen the past year? Yes    Date of last office visit: 12/12/2022    Does the patient have an upcoming appointment?  Yes   If yes, When? 8/21/2023    Provider to refill:AB    Does the patients insurance require a 100 day supply?  No

## 2023-05-30 ENCOUNTER — NON-PROVIDER VISIT (OUTPATIENT)
Dept: INTERNAL MEDICINE | Facility: IMAGING CENTER | Age: 78
End: 2023-05-30
Payer: MEDICARE

## 2023-05-30 ENCOUNTER — HOSPITAL ENCOUNTER (OUTPATIENT)
Facility: MEDICAL CENTER | Age: 78
End: 2023-05-30
Attending: INTERNAL MEDICINE
Payer: MEDICARE

## 2023-05-30 DIAGNOSIS — E29.1 ANDROGEN DEFICIENCY: ICD-10-CM

## 2023-05-30 DIAGNOSIS — Z01.89 ENCOUNTER FOR ROUTINE LABORATORY TESTING: ICD-10-CM

## 2023-05-30 LAB
25(OH)D3 SERPL-MCNC: 90 NG/ML (ref 30–100)
ALBUMIN SERPL BCP-MCNC: 3.8 G/DL (ref 3.2–4.9)
APPEARANCE UR: CLEAR
BASOPHILS # BLD AUTO: 0.6 % (ref 0–1.8)
BASOPHILS # BLD: 0.04 K/UL (ref 0–0.12)
BILIRUB UR QL STRIP.AUTO: NEGATIVE
BUN SERPL-MCNC: 22 MG/DL (ref 8–22)
CALCIUM ALBUM COR SERPL-MCNC: 9.2 MG/DL (ref 8.5–10.5)
CALCIUM SERPL-MCNC: 9 MG/DL (ref 8.5–10.5)
CHLORIDE SERPL-SCNC: 107 MMOL/L (ref 96–112)
CO2 SERPL-SCNC: 25 MMOL/L (ref 20–33)
COLOR UR: YELLOW
CREAT SERPL-MCNC: 1.69 MG/DL (ref 0.5–1.4)
CREAT UR-MCNC: 105.51 MG/DL
CREAT UR-MCNC: 106.04 MG/DL
EOSINOPHIL # BLD AUTO: 0.02 K/UL (ref 0–0.51)
EOSINOPHIL NFR BLD: 0.3 % (ref 0–6.9)
ERYTHROCYTE [DISTWIDTH] IN BLOOD BY AUTOMATED COUNT: 46.9 FL (ref 35.9–50)
GFR SERPLBLD CREATININE-BSD FMLA CKD-EPI: 41 ML/MIN/1.73 M 2
GLUCOSE SERPL-MCNC: 98 MG/DL (ref 65–99)
GLUCOSE UR STRIP.AUTO-MCNC: NEGATIVE MG/DL
HCT VFR BLD AUTO: 42.7 % (ref 42–52)
HGB BLD-MCNC: 14.4 G/DL (ref 14–18)
IMM GRANULOCYTES # BLD AUTO: 0.04 K/UL (ref 0–0.11)
IMM GRANULOCYTES NFR BLD AUTO: 0.6 % (ref 0–0.9)
KETONES UR STRIP.AUTO-MCNC: NEGATIVE MG/DL
LEUKOCYTE ESTERASE UR QL STRIP.AUTO: NEGATIVE
LYMPHOCYTES # BLD AUTO: 0.77 K/UL (ref 1–4.8)
LYMPHOCYTES NFR BLD: 10.7 % (ref 22–41)
MAGNESIUM SERPL-MCNC: 2 MG/DL (ref 1.5–2.5)
MCH RBC QN AUTO: 31.5 PG (ref 27–33)
MCHC RBC AUTO-ENTMCNC: 33.7 G/DL (ref 32.3–36.5)
MCV RBC AUTO: 93.4 FL (ref 81.4–97.8)
MICRO URNS: NORMAL
MICROALBUMIN UR-MCNC: 4.8 MG/DL
MICROALBUMIN/CREAT UR: 45 MG/G (ref 0–30)
MONOCYTES # BLD AUTO: 0.7 K/UL (ref 0–0.85)
MONOCYTES NFR BLD AUTO: 9.7 % (ref 0–13.4)
NEUTROPHILS # BLD AUTO: 5.64 K/UL (ref 1.82–7.42)
NEUTROPHILS NFR BLD: 78.1 % (ref 44–72)
NITRITE UR QL STRIP.AUTO: NEGATIVE
NRBC # BLD AUTO: 0 K/UL
NRBC BLD-RTO: 0 /100 WBC (ref 0–0.2)
PH UR STRIP.AUTO: 6 [PH] (ref 5–8)
PHOSPHATE SERPL-MCNC: 2.3 MG/DL (ref 2.5–4.5)
PLATELET # BLD AUTO: 205 K/UL (ref 164–446)
PMV BLD AUTO: 9.6 FL (ref 9–12.9)
POTASSIUM SERPL-SCNC: 4.2 MMOL/L (ref 3.6–5.5)
PROT UR QL STRIP: NEGATIVE MG/DL
PROT UR-MCNC: 17 MG/DL (ref 0–15)
PROT/CREAT UR: 160 MG/G (ref 15–68)
PTH-INTACT SERPL-MCNC: 16 PG/ML (ref 14–72)
RBC # BLD AUTO: 4.57 M/UL (ref 4.7–6.1)
RBC UR QL AUTO: NEGATIVE
SODIUM SERPL-SCNC: 140 MMOL/L (ref 135–145)
SP GR UR STRIP.AUTO: 1.01
URATE SERPL-MCNC: 5.2 MG/DL (ref 2.5–8.3)
UROBILINOGEN UR STRIP.AUTO-MCNC: 0.2 MG/DL
WBC # BLD AUTO: 7.2 K/UL (ref 4.8–10.8)

## 2023-05-30 PROCEDURE — 82306 VITAMIN D 25 HYDROXY: CPT

## 2023-05-30 PROCEDURE — 85025 COMPLETE CBC W/AUTO DIFF WBC: CPT

## 2023-05-30 PROCEDURE — 83970 ASSAY OF PARATHORMONE: CPT

## 2023-05-30 PROCEDURE — 83735 ASSAY OF MAGNESIUM: CPT

## 2023-05-30 PROCEDURE — 82570 ASSAY OF URINE CREATININE: CPT

## 2023-05-30 PROCEDURE — 80069 RENAL FUNCTION PANEL: CPT

## 2023-05-30 PROCEDURE — 82043 UR ALBUMIN QUANTITATIVE: CPT

## 2023-05-30 PROCEDURE — 84550 ASSAY OF BLOOD/URIC ACID: CPT

## 2023-05-30 PROCEDURE — 84156 ASSAY OF PROTEIN URINE: CPT

## 2023-05-30 PROCEDURE — 81003 URINALYSIS AUTO W/O SCOPE: CPT

## 2023-05-30 RX ORDER — NEEDLES, DISPOSABLE 25GX5/8"
NEEDLE, DISPOSABLE MISCELLANEOUS
Qty: 12 EACH | Refills: 0 | Status: SHIPPED | OUTPATIENT
Start: 2023-05-30 | End: 2023-10-04 | Stop reason: SDUPTHER

## 2023-05-30 NOTE — PROGRESS NOTES
Health Maintenance Due   Topic Date Due   • DTaP/Tdap/Td Vaccine (1 - Tdap) Never done   • Shingles Vaccine (1 of 2) Never done       Patient is due for topics as listed above but is not proceeding with Immunization(s) Dtap/Tdap/Td and Shingles at this time.            Lab draw for [facility].

## 2023-05-31 ENCOUNTER — HOSPITAL ENCOUNTER (OUTPATIENT)
Dept: RADIOLOGY | Facility: MEDICAL CENTER | Age: 78
End: 2023-05-31
Attending: INTERNAL MEDICINE

## 2023-05-31 ENCOUNTER — HOSPITAL ENCOUNTER (OUTPATIENT)
Dept: RADIOLOGY | Facility: MEDICAL CENTER | Age: 78
End: 2023-05-31
Attending: FAMILY MEDICINE
Payer: COMMERCIAL

## 2023-05-31 DIAGNOSIS — I65.23 ATHEROSCLEROSIS OF BOTH CAROTID ARTERIES: ICD-10-CM

## 2023-05-31 DIAGNOSIS — E78.2 HYPERLIPIDEMIA, MIXED: ICD-10-CM

## 2023-05-31 PROCEDURE — 4410556 CT-CARDIAC SCORING (SELF PAY ONLY)

## 2023-05-31 RX ORDER — TRAZODONE HYDROCHLORIDE 50 MG/1
TABLET ORAL
Qty: 180 TABLET | Refills: 0 | Status: SHIPPED | OUTPATIENT
Start: 2023-05-31 | End: 2024-03-06

## 2023-06-02 ENCOUNTER — APPOINTMENT (OUTPATIENT)
Dept: CARDIOLOGY | Facility: MEDICAL CENTER | Age: 78
End: 2023-06-02
Payer: MEDICARE

## 2023-06-26 DIAGNOSIS — E87.8 ELECTROLYTE IMBALANCE: ICD-10-CM

## 2023-06-26 RX ORDER — POTASSIUM CHLORIDE 600 MG/1
16 TABLET, FILM COATED, EXTENDED RELEASE ORAL
Qty: 180 TABLET | Refills: 3 | Status: SHIPPED | OUTPATIENT
Start: 2023-06-26

## 2023-06-28 ENCOUNTER — OFFICE VISIT (OUTPATIENT)
Dept: INTERNAL MEDICINE | Facility: IMAGING CENTER | Age: 78
End: 2023-06-28
Payer: MEDICARE

## 2023-06-28 ENCOUNTER — HOSPITAL ENCOUNTER (OUTPATIENT)
Facility: MEDICAL CENTER | Age: 78
End: 2023-06-28
Attending: FAMILY MEDICINE
Payer: MEDICARE

## 2023-06-28 VITALS
HEIGHT: 71 IN | OXYGEN SATURATION: 97 % | SYSTOLIC BLOOD PRESSURE: 120 MMHG | DIASTOLIC BLOOD PRESSURE: 62 MMHG | HEART RATE: 71 BPM | BODY MASS INDEX: 22.81 KG/M2 | WEIGHT: 162.92 LBS | TEMPERATURE: 98.7 F | RESPIRATION RATE: 17 BRPM

## 2023-06-28 DIAGNOSIS — R25.2 CRAMPING OF HANDS: ICD-10-CM

## 2023-06-28 LAB
ALBUMIN SERPL BCP-MCNC: 3.8 G/DL (ref 3.2–4.9)
ALBUMIN/GLOB SERPL: 1.2 G/DL
ALP SERPL-CCNC: 57 U/L (ref 30–99)
ALT SERPL-CCNC: 9 U/L (ref 2–50)
ANION GAP SERPL CALC-SCNC: 7 MMOL/L (ref 7–16)
AST SERPL-CCNC: 23 U/L (ref 12–45)
BILIRUB SERPL-MCNC: 0.9 MG/DL (ref 0.1–1.5)
BUN SERPL-MCNC: 19 MG/DL (ref 8–22)
CALCIUM ALBUM COR SERPL-MCNC: 9.5 MG/DL (ref 8.5–10.5)
CALCIUM SERPL-MCNC: 9.3 MG/DL (ref 8.5–10.5)
CHLORIDE SERPL-SCNC: 104 MMOL/L (ref 96–112)
CO2 SERPL-SCNC: 27 MMOL/L (ref 20–33)
CREAT SERPL-MCNC: 1.5 MG/DL (ref 0.5–1.4)
GFR SERPLBLD CREATININE-BSD FMLA CKD-EPI: 48 ML/MIN/1.73 M 2
GLOBULIN SER CALC-MCNC: 3.3 G/DL (ref 1.9–3.5)
GLUCOSE SERPL-MCNC: 99 MG/DL (ref 65–99)
MAGNESIUM SERPL-MCNC: 1.9 MG/DL (ref 1.5–2.5)
PHOSPHATE SERPL-MCNC: 2.6 MG/DL (ref 2.5–4.5)
POTASSIUM SERPL-SCNC: 4.6 MMOL/L (ref 3.6–5.5)
PROT SERPL-MCNC: 7.1 G/DL (ref 6–8.2)
SODIUM SERPL-SCNC: 138 MMOL/L (ref 135–145)

## 2023-06-28 PROCEDURE — 80053 COMPREHEN METABOLIC PANEL: CPT

## 2023-06-28 PROCEDURE — 3074F SYST BP LT 130 MM HG: CPT | Performed by: FAMILY MEDICINE

## 2023-06-28 PROCEDURE — 3078F DIAST BP <80 MM HG: CPT | Performed by: FAMILY MEDICINE

## 2023-06-28 PROCEDURE — 99213 OFFICE O/P EST LOW 20 MIN: CPT | Performed by: FAMILY MEDICINE

## 2023-06-28 PROCEDURE — 84100 ASSAY OF PHOSPHORUS: CPT

## 2023-06-28 PROCEDURE — 83735 ASSAY OF MAGNESIUM: CPT

## 2023-06-28 ASSESSMENT — FIBROSIS 4 INDEX: FIB4 SCORE: 2.22

## 2023-06-29 ENCOUNTER — TELEPHONE (OUTPATIENT)
Dept: INTERNAL MEDICINE | Facility: IMAGING CENTER | Age: 78
End: 2023-06-29
Payer: COMMERCIAL

## 2023-06-29 PROBLEM — Z23 NEED FOR IMMUNIZATION AGAINST INFLUENZA: Status: RESOLVED | Noted: 2018-11-01 | Resolved: 2023-06-29

## 2023-06-29 NOTE — PROGRESS NOTES
Chief Complaint   Patient presents with    Cramping     Bilateral hands        HPI:  Patient is a 78 y.o. male established patient who presents today for evaluation of new bilateral hand cramping and associated pain that mainly affects thumb/first and second fingers on both hands. These symptoms have become more noticeable over the past couple of weeks and are sometimes triggered when he is using utensils/knives to cook meals for his family. He denies recent repetitive gripping of exercise equipment nor yasmeen related items, and he also notices that his fingers become extended during the cramping events. He denies recent trauma and has made concerted efforts to maintain adequate daily hydration and has increased potassium and magnesium supplementation since Friday. He and his wife are currently mourning the unexpected death of their 19 year old grandson and navigating the current resultant mental health crisis of their daughter (20 yr history of mental health and addiction issues).     Patient Active Problem List    Diagnosis Date Noted    Androgen deficiency 04/27/2022    Malignant neoplasm metastatic to bone (HCC) 10/06/2021    Chronic pain 06/30/2021    Hypothyroid 11/25/2020    Obstructive sleep apnea 11/25/2020    GERD (gastroesophageal reflux disease) 11/25/2020    Hyperlipidemia 11/25/2020    Adrenal insufficiency (HCC), secondary 11/25/2020    Lung cancer (HCC), adenocarcinoma Aug. 2019 11/25/2020    History of stroke, subcortical infarct on MRI April 2019 11/25/2020    Diverticulosis of colon 11/25/2020    History of shingles 11/25/2020    Vitamin D deficiency 11/25/2020    History of malignant melanoma, April 2016 11/25/2020    History of atrial tachycardia 11/25/2020    BPH with obstruction/lower urinary tract symptoms 11/25/2020    Elevated PSA 11/25/2020    Atherosclerosis of both carotid arteries, mild 11/25/2020    Chronic kidney disease, stage 3a (HCC)     Sensorineural hearing loss of both ears  "06/08/2020    History of radiation therapy 01/30/2020    Premature atrial contractions 09/19/2019    Adenocarcinoma of lung (HCC), stage 4 ([facility] Oct. 2021) 08/26/2019    Brain lesion 06/26/2019    H/O cardiac radiofrequency ablation 06/11/2019    Cerebral infarct (HCC) 04/09/2019    Left renal atrophy 12/14/2018    Long term (current) use of systemic steroids 12/11/2018    Right foot drop 11/16/2018    Right peroneal nerve palsy 11/16/2018    Palpitations 11/20/2016    Facial basal cell cancer 08/05/2016    Squamous cell cancer of scalp and skin of neck 08/05/2016    Anxiety 07/28/2016    Malignant melanoma of left lower leg (HCC) 06/23/2016    Family history of coronary artery disease 01/08/2016    Family history of early CAD 01/08/2016    Hypertension 01/08/2016       Past medical, surgical, family, and social history was reviewed and updated in Epic chart by me today.     Medications and allergies reviewed and updated in Epic chart by me today.     ROS:  Pertinent positives listed above in HPI. All other systems have been reviewed and are negative.    PE:   /62 (BP Location: Left arm, Patient Position: Sitting, BP Cuff Size: Adult)   Pulse 71   Temp 37.1 °C (98.7 °F) (Temporal)   Resp 17   Ht 1.803 m (5' 11\")   Wt 73.9 kg (162 lb 14.7 oz)   SpO2 97%   BMI 22.72 kg/m²   Vital signs reviewed with patient.     Gen: Well developed; well nourished; no acute distress; age appropriate appearance   CV: Regular rate and rhythm; S1/ S2 present; no murmur, gallop or rub noted  Pulm: No respiratory distress; clear to ascultation b/l; no wheezing or stridor noted b/l  Upper extremities: thin/intact/no gross deformities nor swelling; full ROM observed   Lower extremities: No new peripheral edema b/l LE extremities/ no clubbing nor cyanosis noted  Skin: Warm and dry; no rashes noted   Neuro: No focal deficits noted   Psych: AAOx4; mood and affect are appropriate    A/P:  1. Cramping of hands  New condition as " described above in HPI. Recommend checking electrolytes today and I will call patient tomorrow with lab results/ further management options.   - Comp Metabolic Panel; Future  - MAGNESIUM; Future  - PHOSPHORUS; Future

## 2023-08-07 ENCOUNTER — HOSPITAL ENCOUNTER (OUTPATIENT)
Dept: RADIOLOGY | Facility: MEDICAL CENTER | Age: 78
End: 2023-08-07
Attending: INTERNAL MEDICINE
Payer: MEDICARE

## 2023-08-21 ENCOUNTER — OFFICE VISIT (OUTPATIENT)
Dept: CARDIOLOGY | Facility: MEDICAL CENTER | Age: 78
End: 2023-08-21
Attending: NURSE PRACTITIONER
Payer: MEDICARE

## 2023-08-21 VITALS
SYSTOLIC BLOOD PRESSURE: 126 MMHG | BODY MASS INDEX: 23.24 KG/M2 | HEART RATE: 65 BPM | DIASTOLIC BLOOD PRESSURE: 70 MMHG | OXYGEN SATURATION: 95 % | WEIGHT: 166 LBS | HEIGHT: 71 IN | RESPIRATION RATE: 14 BRPM

## 2023-08-21 DIAGNOSIS — I65.23 ATHEROSCLEROSIS OF BOTH CAROTID ARTERIES: ICD-10-CM

## 2023-08-21 DIAGNOSIS — E78.2 HYPERLIPIDEMIA, MIXED: ICD-10-CM

## 2023-08-21 DIAGNOSIS — N18.31 CHRONIC KIDNEY DISEASE, STAGE 3A: ICD-10-CM

## 2023-08-21 DIAGNOSIS — E03.9 ACQUIRED HYPOTHYROIDISM: ICD-10-CM

## 2023-08-21 DIAGNOSIS — C34.90 MALIGNANT NEOPLASM OF LUNG, UNSPECIFIED LATERALITY, UNSPECIFIED PART OF LUNG (HCC): ICD-10-CM

## 2023-08-21 DIAGNOSIS — E78.5 HYPERLIPIDEMIA, UNSPECIFIED HYPERLIPIDEMIA TYPE: ICD-10-CM

## 2023-08-21 DIAGNOSIS — Z00.00 HEALTH CARE MAINTENANCE: ICD-10-CM

## 2023-08-21 DIAGNOSIS — Z86.79 HISTORY OF ATRIAL TACHYCARDIA: ICD-10-CM

## 2023-08-21 DIAGNOSIS — K21.9 GASTROESOPHAGEAL REFLUX DISEASE WITHOUT ESOPHAGITIS: ICD-10-CM

## 2023-08-21 DIAGNOSIS — I10 ESSENTIAL HYPERTENSION: ICD-10-CM

## 2023-08-21 DIAGNOSIS — I15.9 SECONDARY HYPERTENSION: ICD-10-CM

## 2023-08-21 DIAGNOSIS — R73.9 HYPERGLYCEMIA: ICD-10-CM

## 2023-08-21 DIAGNOSIS — G47.33 OBSTRUCTIVE SLEEP APNEA: ICD-10-CM

## 2023-08-21 DIAGNOSIS — C34.90 ADENOCARCINOMA OF LUNG, UNSPECIFIED LATERALITY (HCC): ICD-10-CM

## 2023-08-21 DIAGNOSIS — R00.2 PALPITATIONS: ICD-10-CM

## 2023-08-21 PROCEDURE — 3074F SYST BP LT 130 MM HG: CPT | Performed by: NURSE PRACTITIONER

## 2023-08-21 PROCEDURE — 99214 OFFICE O/P EST MOD 30 MIN: CPT | Performed by: NURSE PRACTITIONER

## 2023-08-21 PROCEDURE — 99213 OFFICE O/P EST LOW 20 MIN: CPT | Performed by: NURSE PRACTITIONER

## 2023-08-21 PROCEDURE — 93005 ELECTROCARDIOGRAM TRACING: CPT | Performed by: NURSE PRACTITIONER

## 2023-08-21 PROCEDURE — 3078F DIAST BP <80 MM HG: CPT | Performed by: NURSE PRACTITIONER

## 2023-08-21 RX ORDER — LOSARTAN POTASSIUM 100 MG/1
100 TABLET ORAL DAILY
Qty: 100 TABLET | Refills: 3 | Status: SHIPPED | OUTPATIENT
Start: 2023-08-21

## 2023-08-21 RX ORDER — AMLODIPINE BESYLATE 5 MG/1
5 TABLET ORAL EVERY MORNING
Qty: 100 TABLET | Refills: 3 | Status: SHIPPED | OUTPATIENT
Start: 2023-08-21

## 2023-08-21 RX ORDER — ATORVASTATIN CALCIUM 20 MG/1
20 TABLET, FILM COATED ORAL DAILY
Qty: 100 TABLET | Refills: 3 | Status: SHIPPED | OUTPATIENT
Start: 2023-08-21

## 2023-08-21 ASSESSMENT — ENCOUNTER SYMPTOMS
INSOMNIA: 0
SHORTNESS OF BREATH: 0
NAUSEA: 0
LOSS OF CONSCIOUSNESS: 0
ORTHOPNEA: 0
MYALGIAS: 0
BRUISES/BLEEDS EASILY: 0
DIZZINESS: 0
PND: 0
CHILLS: 0
HEADACHES: 0
FEVER: 0
ABDOMINAL PAIN: 0
COUGH: 0
PALPITATIONS: 0

## 2023-08-21 ASSESSMENT — FIBROSIS 4 INDEX: FIB4 SCORE: 2.84

## 2023-08-21 NOTE — PROGRESS NOTES
"Chief Complaint   Patient presents with    Hyperlipidemia    Follow-Up    HTN (Controlled)    Gastrophageal Reflux    Lung Cancer       Subjective     [facility] 78 y.[facility] male who presents today for annual follow-up of HTN, hyperlipidemia, history of atrial tachycardia and CKD.    Kevin is a 78 year old male with history of hypertension, hyperlipidemia, adrenal insufficiency, history of atrial tachycardia with ablation in 2017 and CKD, last seen by me in December 2022.     He also has a history of lung cancer, treated with surgery and radiation, followed by Dr. Corral. Recent PET scan in June 2023 showed no cancer.     He is here today for one year follow-up. BP is running 115-125 systolic at home.. He denies any overt chest pain, pressure, tightness or discomfort; no palpitations; no shortness of breath, orthopnea or PND; no dizziness or syncope; very mild/stable LE edema.     He did have a coronary CT calcium scan done in May 2023, which showed a total score of 566.2. He remains on ASA and statin.       Past Medical History:   Diagnosis Date    Adrenal insufficiency (HCC), secondary 11/25/2020    Atherosclerosis of both carotid arteries, mild 11/25/2020    BPH (benign prostatic hyperplasia) 11/25/2020    Bronchitis     a\" very long time ago\"    CKD (chronic kidney disease) stage 3, GFR 30-59 ml/min ([facility]) 11/25/2020    Colostomy present (HCC) 11/25/2020    Diverticulosis of colon 11/25/2020    GERD (gastroesophageal reflux disease) 11/25/2020    History of atrial tachycardia 11/25/2020    Ablation 2017    History of malignant melanoma, April 2016 11/25/2020    History of shingles 11/25/2020    History of stroke, subcortical infarct on MRI April 2019 11/25/2020    Hyperlipidemia 11/25/2020    Hypothyroid 11/25/2020    Indigestion     Lung cancer (HCC), adenocarcinoma Aug. 2019 11/25/2020    Metastasis to L5 (biopsy Dec. 2019)     Past Surgical History:   Procedure Laterality Date    CECILE BY " LAPAROSCOPY  2/26/2021    Procedure: CHOLECYSTECTOMY, LAPAROSCOPIC;  Surgeon: Davey Oneal M.D.;  Location: SURGERY [facility];  Service: General    OTHER Right 2019    LOWER LOBE OF LUNG REMOVED    OTHER Left 2017    GROIN    OTHER Left 2016    SHIN     Family History   Problem Relation Age of Onset    Cancer Mother         Lung- bone    Cancer Father         Colon?    Cancer Sister         Lung    Cancer Brother         Lung    Cancer Brother         Brain     Social History     Socioeconomic History    Marital status:      Spouse name: Not on file    Number of children: Not on file    Years of education: Not on file    Highest education level: Not on file   Occupational History    Not on file   Tobacco Use    Smoking status: Never    Smokeless tobacco: Never   Vaping Use    Vaping Use: Never used   Substance and Sexual Activity    Alcohol use: Yes     Alcohol/week: 1.8 oz     Types: 3 Glasses of wine per week     Comment: Occasionally    Drug use: Not Currently     Comment: THC edibles    Sexual activity: Not on file   Other Topics Concern    Not on file   Social History Narrative    Not on file     Social Determinants of Health     Financial Resource Strain: Not on file   Food Insecurity: Not on file   Transportation Needs: Not on file   Physical Activity: Not on file   Stress: Not on file   Social Connections: Not on file   Intimate Partner Violence: Not on file   Housing Stability: Not on file     Allergies   Allergen Reactions    Gramineae Pollens Itching and Shortness of Breath     Itchy eyes, red face    Cat Hair Extract     Horse Allergy Hives    Other Environmental     Pollen Extract      Outpatient Encounter Medications as of 8/21/2023   Medication Sig Dispense Refill    AZELASTINE-FLUTICASONE NA Administer 1 Dose into affected nostril(S) 2 times a day.      potassium chloride (KLOR-CON) 8 MEQ tablet Take 2 Tablets by mouth every day. 180 Tablet 3    traZODone (DESYREL) 50 MG Tab TAKE 2  "TABLETS BY MOUTH EVERY NIGHT AT BEDTIME 180 Tablet 0    NEEDLE, DISP, 25 G (BD DISP NEEDLES) 25G X 5/8\" Misc Use as directed for testosterone injections every 7 days 12 Each 0    losartan (COZAAR) 100 MG Tab TAKE 1 TABLET BY MOUTH EVERY DAY 90 Tablet 2    amLODIPine (NORVASC) 5 MG Tab Take 1 Tablet by mouth every morning. 90 Tablet 3    Multiple Vitamins-Minerals (ZINC PO) Take  by mouth.      MAGNESIUM PO Take  by mouth.      atorvastatin (LIPITOR) 20 MG Tab TAKE 1 TABLET BY MOUTH EVERY DAY 90 Tablet 3    azelastine (ASTELIN) 137 MCG/SPRAY nasal spray USE 1 SPRAY INTO AFFECTED NOSTRIL(S) TWICE DAILY AS NEEDED 30 mL 5    K Phos [place]-Sod Phos Di & Mono (PHOSPHOROUS) 155-852-130 MG Tab Take 1 Tablet by mouth every day.      liothyronine (CYTOMEL) 5 MCG Tab TAKE 2 TABLETS BY MOUTH EVERY DAY ON AN EMPTY STOMACH      omeprazole (PRILOSEC) 40 MG delayed-release capsule Take 1 Capsule by mouth every day.      tadalafil (CIALIS) 20 MG tablet Take 1 Tablet by mouth 1 time a day as needed. for erectile dysfunction      famotidine (PEPCID) 20 MG Tab Take 1 Tablet by mouth every evening. 90 Tablet 3    testosterone cypionate (DEPO-TESTOSTERONE) 200 MG/ML Solution injection Inject 125 mg into the shoulder, thigh, or buttocks every 7 days.      aspirin EC (ECOTRIN) 81 MG Tablet Delayed Response Take 1 Tablet by mouth every 48 hours. 100 Tablet 3    TIROSINT 125 MCG Cap Take 125 mcg by mouth every morning on an empty stomach. 6 days/week 90 Capsule 3    hydrocortisone (CORTEF) 10 MG Tab Take 2 Tablets by mouth 2 times a day.      B-D INTEGRA SYRINGE 25G X 5/8\" 3 ML Misc USE TO INJECT TESTOSTERONE EVERY 7 DAYS      CALCIUM GLUCEPTATE INJ Inject 1 Dose as directed every 21 days. Administered at [facility]      TAGRISSO 80 MG Tab Take 80 mg by mouth every day.      meloxicam (MOBIC) 15 MG tablet Take 1 Tablet by mouth every day. (Patient not taking: Reported on 2/24/2023) 30 Tablet 0     No facility-administered " "encounter medications on file as of 8/21/2023.     Review of Systems   Constitutional:  Negative for chills and fever.   HENT:  Negative for congestion.    Respiratory:  Negative for cough and shortness of breath.    Cardiovascular:  Negative for chest pain, palpitations, orthopnea, leg swelling and PND.   Gastrointestinal:  Negative for abdominal pain and nausea.   Musculoskeletal:  Negative for myalgias.   Skin:  Negative for rash.   Neurological:  Negative for dizziness, loss of consciousness and headaches.   Endo/Heme/Allergies:  Does not bruise/bleed easily.   Psychiatric/Behavioral:  The patient does not have insomnia.               Objective     /70 (BP Location: Left arm, Patient Position: Sitting, BP Cuff Size: Adult)   Pulse 65   Resp 14   Ht 1.803 m (5' 11\")   Wt 75.3 kg (166 lb)   SpO2 95%   BMI 23.15 kg/m²     Physical Exam  Constitutional:       Appearance: He is well-developed.   HENT:      Head: Normocephalic.   Neck:      Vascular: No JVD.   Cardiovascular:      Rate and Rhythm: Normal rate and regular rhythm.      Heart sounds: Normal heart sounds.   Pulmonary:      Effort: Pulmonary effort is normal. No respiratory distress.      Breath sounds: Normal breath sounds. No wheezing or rales.   Abdominal:      General: Bowel sounds are normal. There is no distension.      Palpations: Abdomen is soft.      Tenderness: There is no abdominal tenderness.   Musculoskeletal:         General: Normal range of motion.      Cervical back: Normal range of motion and neck supple.   Skin:     General: Skin is warm and dry.      Findings: No rash.   Neurological:      Mental Status: He is alert and oriented to person, place, and time.   Psychiatric:         Mood and Affect: Mood normal.         Behavior: Behavior normal.     EKG ordered and interpreted by me today reveals sinus rhythm at 67bpm with supraventricular bigeminy, unchanged from previous, asymptomatic.     FINDINGS OF CTCS OF " 5/31/2023:  Coronary calcification:  LMA - 10.0  LCX - 32.4  LAD - 504.3  RCA - 19.6  PDA - 0.0  Total Calcium Score: 566.2     CONCLUSIONS OF ECHOCARDIOGRAM OF 6/21/2021:  Normal left ventricular size, wall thickness, and systolic function.  The right ventricle was normal in size and systolic function.  The left atrium is normal in size.  No significant valvular pathology.  Normal pericardium without effusion.  No prior study is available for comparison.     Lab Results   Component Value Date/Time    CHOLSTRLTOT 138 10/19/2022 11:15 AM    LDL 69 10/19/2022 11:15 AM    HDL 54 10/19/2022 11:15 AM    TRIGLYCERIDE 73 10/19/2022 11:15 AM        Lab Results   Component Value Date/Time    SODIUM 138 06/28/2023 04:00 PM    POTASSIUM 4.6 06/28/2023 04:00 PM    CHLORIDE 104 06/28/2023 04:00 PM    CO2 27 06/28/2023 04:00 PM    GLUCOSE 99 06/28/2023 04:00 PM    BUN 19 06/28/2023 04:00 PM    CREATININE 1.50 (H) 06/28/2023 04:00 PM    BUNCREATRAT 17 11/05/2020 10:00 AM      Lab Results   Component Value Date/Time    ASTSGOT 23 06/28/2023 04:00 PM    ALTSGPT 9 06/28/2023 04:00 PM         Assessment & Plan     1. History of atrial tachycardia        2. Palpitations  EKG      3. Essential hypertension        4. Hyperlipidemia, unspecified hyperlipidemia type        5. Secondary hypertension        6. Atherosclerosis of both carotid arteries, mild        7. Chronic kidney disease, stage 3a (HCC)        8. Adenocarcinoma of lung, unspecified laterality (HCC)        9. Malignant neoplasm of lung, unspecified laterality, unspecified part of lung (HCC)        10. Obstructive sleep apnea        11. Gastroesophageal reflux disease without esophagitis            Medical Decision Making: Today's Assessment/Status/Plan:          History of atrial tachycardia, status post ablation. EKG is stable today.    2. Hypertension, treated with Amlodipine 5mg and Losartan 100mg once daily. BP is well controlled.    3. Hyperlipidemia, with coronary CT  calcification, on Lipitor 20mg. LDL is 69 (at goal).    4. Mild carotid stenosis, on ASA and statin.    5. CKD, stage 3a, stable.    6. Lung cancer, followed by oncology. No disease noted on CT-PET scan in June 2023.    7. BINDU, treated/stable.    8. GERD, treated with PPI and Pepcid.    Same medications for now. Labs per PCP; imaging per oncology. Follow-up with us annually, sooner if clinical condition changes.

## 2023-08-22 ENCOUNTER — NON-PROVIDER VISIT (OUTPATIENT)
Dept: INTERNAL MEDICINE | Facility: IMAGING CENTER | Age: 78
End: 2023-08-22
Payer: MEDICARE

## 2023-08-22 ENCOUNTER — HOSPITAL ENCOUNTER (OUTPATIENT)
Facility: MEDICAL CENTER | Age: 78
End: 2023-08-22
Attending: FAMILY MEDICINE
Payer: MEDICARE

## 2023-08-22 DIAGNOSIS — R30.0 DYSURIA: ICD-10-CM

## 2023-08-22 DIAGNOSIS — R73.9 HYPERGLYCEMIA: ICD-10-CM

## 2023-08-22 DIAGNOSIS — E78.2 HYPERLIPIDEMIA, MIXED: ICD-10-CM

## 2023-08-22 DIAGNOSIS — E03.9 ACQUIRED HYPOTHYROIDISM: ICD-10-CM

## 2023-08-22 DIAGNOSIS — Z00.00 HEALTH CARE MAINTENANCE: ICD-10-CM

## 2023-08-22 LAB
ALBUMIN SERPL BCP-MCNC: 3.9 G/DL (ref 3.2–4.9)
ALBUMIN/GLOB SERPL: 1 G/DL
ALP SERPL-CCNC: 58 U/L (ref 30–99)
ALT SERPL-CCNC: 8 U/L (ref 2–50)
ANION GAP SERPL CALC-SCNC: 7 MMOL/L (ref 7–16)
APPEARANCE UR: CLEAR
AST SERPL-CCNC: 20 U/L (ref 12–45)
BASOPHILS # BLD AUTO: 0.5 % (ref 0–1.8)
BASOPHILS # BLD: 0.03 K/UL (ref 0–0.12)
BILIRUB SERPL-MCNC: 1.3 MG/DL (ref 0.1–1.5)
BILIRUB UR QL STRIP.AUTO: NEGATIVE
BUN SERPL-MCNC: 21 MG/DL (ref 8–22)
CALCIUM ALBUM COR SERPL-MCNC: 9.3 MG/DL (ref 8.5–10.5)
CALCIUM SERPL-MCNC: 9.2 MG/DL (ref 8.5–10.5)
CHLORIDE SERPL-SCNC: 106 MMOL/L (ref 96–112)
CHOLEST SERPL-MCNC: 155 MG/DL (ref 100–199)
CO2 SERPL-SCNC: 27 MMOL/L (ref 20–33)
COLOR UR: YELLOW
CREAT SERPL-MCNC: 1.73 MG/DL (ref 0.5–1.4)
EKG IMPRESSION: NORMAL
EOSINOPHIL # BLD AUTO: 0.12 K/UL (ref 0–0.51)
EOSINOPHIL NFR BLD: 1.8 % (ref 0–6.9)
ERYTHROCYTE [DISTWIDTH] IN BLOOD BY AUTOMATED COUNT: 52.8 FL (ref 35.9–50)
EST. AVERAGE GLUCOSE BLD GHB EST-MCNC: 111 MG/DL
GFR SERPLBLD CREATININE-BSD FMLA CKD-EPI: 40 ML/MIN/1.73 M 2
GLOBULIN SER CALC-MCNC: 4.1 G/DL (ref 1.9–3.5)
GLUCOSE SERPL-MCNC: 79 MG/DL (ref 65–99)
GLUCOSE UR STRIP.AUTO-MCNC: NEGATIVE MG/DL
HBA1C MFR BLD: 5.5 % (ref 4–5.6)
HCT VFR BLD AUTO: 45.6 % (ref 42–52)
HDLC SERPL-MCNC: 62 MG/DL
HGB BLD-MCNC: 15.1 G/DL (ref 14–18)
IMM GRANULOCYTES # BLD AUTO: 0.04 K/UL (ref 0–0.11)
IMM GRANULOCYTES NFR BLD AUTO: 0.6 % (ref 0–0.9)
KETONES UR STRIP.AUTO-MCNC: NEGATIVE MG/DL
LDLC SERPL CALC-MCNC: 75 MG/DL
LEUKOCYTE ESTERASE UR QL STRIP.AUTO: NEGATIVE
LYMPHOCYTES # BLD AUTO: 1.53 K/UL (ref 1–4.8)
LYMPHOCYTES NFR BLD: 23.5 % (ref 22–41)
MCH RBC QN AUTO: 31.9 PG (ref 27–33)
MCHC RBC AUTO-ENTMCNC: 33.1 G/DL (ref 32.3–36.5)
MCV RBC AUTO: 96.2 FL (ref 81.4–97.8)
MICRO URNS: NORMAL
MONOCYTES # BLD AUTO: 0.84 K/UL (ref 0–0.85)
MONOCYTES NFR BLD AUTO: 12.9 % (ref 0–13.4)
NEUTROPHILS # BLD AUTO: 3.95 K/UL (ref 1.82–7.42)
NEUTROPHILS NFR BLD: 60.7 % (ref 44–72)
NITRITE UR QL STRIP.AUTO: NEGATIVE
NRBC # BLD AUTO: 0 K/UL
NRBC BLD-RTO: 0 /100 WBC (ref 0–0.2)
PH UR STRIP.AUTO: 6.5 [PH] (ref 5–8)
PLATELET # BLD AUTO: 233 K/UL (ref 164–446)
PMV BLD AUTO: 9.4 FL (ref 9–12.9)
POTASSIUM SERPL-SCNC: 4.1 MMOL/L (ref 3.6–5.5)
PROT SERPL-MCNC: 8 G/DL (ref 6–8.2)
PROT UR QL STRIP: NEGATIVE MG/DL
RBC # BLD AUTO: 4.74 M/UL (ref 4.7–6.1)
RBC UR QL AUTO: NEGATIVE
SODIUM SERPL-SCNC: 140 MMOL/L (ref 135–145)
SP GR UR STRIP.AUTO: 1.01
T4 FREE SERPL-MCNC: 1.59 NG/DL (ref 0.93–1.7)
TRIGL SERPL-MCNC: 90 MG/DL (ref 0–149)
TSH SERPL DL<=0.005 MIU/L-ACNC: 2.27 UIU/ML (ref 0.38–5.33)
UROBILINOGEN UR STRIP.AUTO-MCNC: 0.2 MG/DL
WBC # BLD AUTO: 6.5 K/UL (ref 4.8–10.8)

## 2023-08-22 PROCEDURE — 93010 ELECTROCARDIOGRAM REPORT: CPT | Performed by: STUDENT IN AN ORGANIZED HEALTH CARE EDUCATION/TRAINING PROGRAM

## 2023-08-22 PROCEDURE — 81003 URINALYSIS AUTO W/O SCOPE: CPT

## 2023-08-22 PROCEDURE — 80061 LIPID PANEL: CPT

## 2023-08-22 PROCEDURE — 85025 COMPLETE CBC W/AUTO DIFF WBC: CPT

## 2023-08-22 PROCEDURE — 84439 ASSAY OF FREE THYROXINE: CPT

## 2023-08-22 PROCEDURE — 80053 COMPREHEN METABOLIC PANEL: CPT

## 2023-08-22 PROCEDURE — 84443 ASSAY THYROID STIM HORMONE: CPT

## 2023-08-22 PROCEDURE — 83036 HEMOGLOBIN GLYCOSYLATED A1C: CPT | Mod: GA

## 2023-08-23 DIAGNOSIS — K21.9 GASTROESOPHAGEAL REFLUX DISEASE, UNSPECIFIED WHETHER ESOPHAGITIS PRESENT: ICD-10-CM

## 2023-08-23 RX ORDER — FAMOTIDINE 20 MG/1
20 TABLET, FILM COATED ORAL NIGHTLY
Qty: 90 TABLET | Refills: 3 | Status: SHIPPED | OUTPATIENT
Start: 2023-08-23

## 2023-08-24 ENCOUNTER — HOSPITAL ENCOUNTER (OUTPATIENT)
Dept: RADIOLOGY | Facility: MEDICAL CENTER | Age: 78
End: 2023-08-24
Payer: COMMERCIAL

## 2023-08-25 ENCOUNTER — APPOINTMENT (OUTPATIENT)
Dept: ADMISSIONS | Facility: MEDICAL CENTER | Age: 78
End: 2023-08-25
Attending: INTERNAL MEDICINE
Payer: MEDICARE

## 2023-08-31 ENCOUNTER — PRE-ADMISSION TESTING (OUTPATIENT)
Dept: ADMISSIONS | Facility: MEDICAL CENTER | Age: 78
End: 2023-08-31
Attending: INTERNAL MEDICINE
Payer: MEDICARE

## 2023-08-31 DIAGNOSIS — Z01.812 PRE-OPERATIVE LABORATORY EXAMINATION: ICD-10-CM

## 2023-09-01 ENCOUNTER — APPOINTMENT (OUTPATIENT)
Dept: ADMISSIONS | Facility: MEDICAL CENTER | Age: 78
End: 2023-09-01
Attending: INTERNAL MEDICINE
Payer: MEDICARE

## 2023-09-01 DIAGNOSIS — Z01.812 PRE-OPERATIVE LABORATORY EXAMINATION: ICD-10-CM

## 2023-09-01 LAB
ERYTHROCYTE [DISTWIDTH] IN BLOOD BY AUTOMATED COUNT: 53.1 FL (ref 35.9–50)
HCT VFR BLD AUTO: 43.5 % (ref 42–52)
HGB BLD-MCNC: 14.3 G/DL (ref 14–18)
INR PPP: 1 (ref 0.87–1.13)
MCH RBC QN AUTO: 31.8 PG (ref 27–33)
MCHC RBC AUTO-ENTMCNC: 32.9 G/DL (ref 32.3–36.5)
MCV RBC AUTO: 96.7 FL (ref 81.4–97.8)
PLATELET # BLD AUTO: 220 K/UL (ref 164–446)
PMV BLD AUTO: 9.3 FL (ref 9–12.9)
PROTHROMBIN TIME: 13.3 SEC (ref 12–14.6)
RBC # BLD AUTO: 4.5 M/UL (ref 4.7–6.1)
WBC # BLD AUTO: 7.4 K/UL (ref 4.8–10.8)

## 2023-09-01 PROCEDURE — 36415 COLL VENOUS BLD VENIPUNCTURE: CPT

## 2023-09-01 PROCEDURE — 85610 PROTHROMBIN TIME: CPT

## 2023-09-01 PROCEDURE — 85027 COMPLETE CBC AUTOMATED: CPT

## 2023-09-07 ENCOUNTER — APPOINTMENT (OUTPATIENT)
Dept: RADIOLOGY | Facility: MEDICAL CENTER | Age: 78
End: 2023-09-07
Attending: INTERNAL MEDICINE
Payer: MEDICARE

## 2023-09-07 ENCOUNTER — HOSPITAL ENCOUNTER (OUTPATIENT)
Facility: MEDICAL CENTER | Age: 78
End: 2023-09-07
Attending: INTERNAL MEDICINE | Admitting: INTERNAL MEDICINE
Payer: MEDICARE

## 2023-09-07 VITALS
WEIGHT: 164.9 LBS | OXYGEN SATURATION: 96 % | BODY MASS INDEX: 22.34 KG/M2 | HEIGHT: 72 IN | SYSTOLIC BLOOD PRESSURE: 151 MMHG | DIASTOLIC BLOOD PRESSURE: 72 MMHG | RESPIRATION RATE: 18 BRPM | HEART RATE: 66 BPM | TEMPERATURE: 97.1 F

## 2023-09-07 DIAGNOSIS — Z79.899 LONG TERM USE OF DRUG: ICD-10-CM

## 2023-09-07 DIAGNOSIS — C43.72 MALIGNANT MELANOMA OF LEFT LOWER EXTREMITY INCLUDING HIP (HCC): ICD-10-CM

## 2023-09-07 DIAGNOSIS — C34.11 MALIGNANT NEOPLASM OF UPPER LOBE OF RIGHT LUNG (HCC): ICD-10-CM

## 2023-09-07 LAB — PATHOLOGY CONSULT NOTE: NORMAL

## 2023-09-07 PROCEDURE — 4410012 CT-BIOPSY-LUNG/MEDIASTINUM W/ GUIDE

## 2023-09-07 PROCEDURE — 88360 TUMOR IMMUNOHISTOCHEM/MANUAL: CPT | Mod: 91

## 2023-09-07 PROCEDURE — 88305 TISSUE EXAM BY PATHOLOGIST: CPT

## 2023-09-07 PROCEDURE — 160002 HCHG RECOVERY MINUTES (STAT)

## 2023-09-07 PROCEDURE — 700111 HCHG RX REV CODE 636 W/ 250 OVERRIDE (IP): Mod: JZ

## 2023-09-07 PROCEDURE — 160036 HCHG PACU - EA ADDL 30 MINS PHASE I

## 2023-09-07 PROCEDURE — 160035 HCHG PACU - 1ST 60 MINS PHASE I

## 2023-09-07 PROCEDURE — 160046 HCHG PACU - 1ST 60 MINS PHASE II

## 2023-09-07 PROCEDURE — 700105 HCHG RX REV CODE 258: Performed by: RADIOLOGY

## 2023-09-07 RX ORDER — HYDROMORPHONE HYDROCHLORIDE 1 MG/ML
0.5 INJECTION, SOLUTION INTRAMUSCULAR; INTRAVENOUS; SUBCUTANEOUS
Status: DISCONTINUED | OUTPATIENT
Start: 2023-09-07 | End: 2023-09-07 | Stop reason: HOSPADM

## 2023-09-07 RX ORDER — MIDAZOLAM HYDROCHLORIDE 1 MG/ML
.5-2 INJECTION INTRAMUSCULAR; INTRAVENOUS PRN
Status: DISCONTINUED | OUTPATIENT
Start: 2023-09-07 | End: 2023-09-07 | Stop reason: HOSPADM

## 2023-09-07 RX ORDER — SODIUM CHLORIDE 9 MG/ML
1000 INJECTION, SOLUTION INTRAVENOUS CONTINUOUS
Status: DISCONTINUED | OUTPATIENT
Start: 2023-09-07 | End: 2023-09-07 | Stop reason: HOSPADM

## 2023-09-07 RX ORDER — OXYCODONE HYDROCHLORIDE 5 MG/1
5 TABLET ORAL
Status: DISCONTINUED | OUTPATIENT
Start: 2023-09-07 | End: 2023-09-07 | Stop reason: HOSPADM

## 2023-09-07 RX ORDER — ONDANSETRON 2 MG/ML
4 INJECTION INTRAMUSCULAR; INTRAVENOUS EVERY 6 HOURS PRN
Status: DISCONTINUED | OUTPATIENT
Start: 2023-09-07 | End: 2023-09-07 | Stop reason: HOSPADM

## 2023-09-07 RX ORDER — SODIUM CHLORIDE 9 MG/ML
500 INJECTION, SOLUTION INTRAVENOUS
Status: DISCONTINUED | OUTPATIENT
Start: 2023-09-07 | End: 2023-09-07 | Stop reason: HOSPADM

## 2023-09-07 RX ORDER — MIDAZOLAM HYDROCHLORIDE 1 MG/ML
INJECTION INTRAMUSCULAR; INTRAVENOUS
Status: COMPLETED
Start: 2023-09-07 | End: 2023-09-07

## 2023-09-07 RX ADMIN — MIDAZOLAM HYDROCHLORIDE 1 MG: 1 INJECTION INTRAMUSCULAR; INTRAVENOUS at 08:37

## 2023-09-07 RX ADMIN — SODIUM CHLORIDE 1000 ML: 9 INJECTION, SOLUTION INTRAVENOUS at 07:23

## 2023-09-07 RX ADMIN — FENTANYL CITRATE 50 MCG: 50 INJECTION, SOLUTION INTRAMUSCULAR; INTRAVENOUS at 08:37

## 2023-09-07 RX ADMIN — MIDAZOLAM 1 MG: 1 INJECTION, SOLUTION INTRAMUSCULAR; INTRAVENOUS at 08:37

## 2023-09-07 ASSESSMENT — PAIN DESCRIPTION - PAIN TYPE
TYPE: SURGICAL PAIN
TYPE: SURGICAL PAIN

## 2023-09-07 ASSESSMENT — FIBROSIS 4 INDEX: FIB4 SCORE: 2.44

## 2023-09-07 NOTE — DISCHARGE INSTRUCTIONS
HOME CARE INSTRUCTIONS    ACTIVITY: Rest and take it easy for the first 24 hours.  A responsible adult is recommended to remain with you during that time.  It is normal to feel sleepy.  We encourage you to not do anything that requires balance, judgment or coordination.    FOR 24 HOURS DO NOT:  Drive, operate machinery or run household appliances.  Drink beer or alcoholic beverages.  Make important decisions or sign legal documents.    SPECIAL INSTRUCTIONS: Needle Biopsy, Care After  The following information offers guidance on how to care for yourself after your procedure. Your health care provider may also give you more specific instructions. If you have problems or questions, contact your health care provider.  What can I expect after the procedure?  After the procedure, it is common to have:  Soreness, pain, and tenderness where a tissue sample was taken (biopsy site).  Bruising or mild pain at the biopsy site.  These symptoms should go away after a few days.  Follow these instructions at home:  Biopsy site care  Follow instructions from your health care provider about how to take care of your biopsy site. Make sure you:  Wash your hands with soap and water for at least 20 seconds before and after you change your bandage (dressing). If soap and water are not available, use hand .  Know when and how to change your dressing.  Know when to remove your dressing.  Check your puncture site every day for signs of infection. Check for:  More redness, swelling, or pain.  More drainage of fluid or blood.  More warmth.  Pus or a bad smell.  General instructions  Rest as told by your health care provider.  Do not take baths, swim, or use a hot tub until your health care provider approves. Ask your health care provider if you may take showers. You may only be allowed to take sponge baths.  Take over-the-counter and prescription medicines only as told by your health care provider.  Return to your normal activities as  told by your health care provider. Ask your health care provider what activities are safe for you.  If you have airplane travel scheduled, talk with your health care provider about when it is safe for you to travel by airplane. This is specific to certain biopsy procedures.  It is up to you to get the results of your procedure. Ask your health care provider, or the department that is doing the procedure, when your results will be ready.  Keep all follow-up visits. You may need to make an appointment to get your biopsy results.  Contact a health care provider if:  You have a fever.  You have more redness, swelling, or pain at the puncture site that lasts longer than a few days.  You have more fluid or blood coming from your puncture site.  You have pus or a bad smell coming from your puncture site.  Your puncture site feels warm to the touch.  You have pain that does not get better with medicine.  Get help right away if:  You have severe bleeding from the puncture site.  You have chest pain.  You have problems breathing.  You cough up blood.  You faint.  You have a very fast heart rate.  These symptoms may be an emergency. Get help right away. Call 911.  Do not wait to see if the symptoms will go away.  Do not drive yourself to the hospital.  Summary  After the procedure, it is common to have soreness, bruising, tenderness, or mild pain at the biopsy site. These symptoms should go away in a few days.  Check your biopsy site every day for signs of infection, such as more redness, swelling, or pain.  Do not take baths, swim, or use a hot tub until your health care provider approves. Ask your health care provider if you may take showers.  Contact a lise care provider if you have more redness, swelling, or pain at the puncture site that lasts longer than a few days.  This information is not intended to replace advice given to you by your health care provider. Make sure you discuss any questions you have with your health  care provider.    DIET: To avoid nausea, slowly advance diet as tolerated, avoiding spicy or greasy foods for the first day.  Add more substantial food to your diet according to your physician's instructions.  Babies can be fed formula or breast milk as soon as they are hungry.  INCREASE FLUIDS AND FIBER TO AVOID CONSTIPATION.    SURGICAL DRESSING/BATHING: You may remove your dressings in 48 hours and shower, avoid submerging in water for 1 week.    MEDICATIONS: Resume taking daily medication.  Take prescribed pain medication with food.  If no medication is prescribed, you may take non-aspirin pain medication if needed.  PAIN MEDICATION CAN BE VERY CONSTIPATING.  Take a stool softener or laxative such as senokot, pericolace, or milk of magnesia if needed.    Last pain medication given at ______________.    A follow-up appointment should be arranged with your doctor in 1-2 Weeks; call to schedule.    You should CALL YOUR PHYSICIAN if you develop:  Fever greater than 101 degrees F.  Pain not relieved by medication, or persistent nausea or vomiting.  Excessive bleeding (blood soaking through dressing) or unexpected drainage from the wound.  Extreme redness or swelling around the incision site, drainage of pus or foul smelling drainage.  Inability to urinate or empty your bladder within 8 hours.  Problems with breathing or chest pain.    You should call 911 if you develop problems with breathing or chest pain.  If you are unable to contact your doctor or surgical center, you should go to the nearest emergency room or urgent care center.      Physician's telephone #: [phone]     MILD FLU-LIKE SYMPTOMS ARE NORMAL.  YOU MAY EXPERIENCE GENERALIZED MUSCLE ACHES, THROAT IRRITATION, HEADACHE AND/OR SOME NAUSEA.    If any questions arise, call your doctor.  If your doctor is not available, please feel free to call the Surgical Center at [phone].  The Center is open Monday through Friday from 7AM to 7PM.      A  registered nurse may call you a few days after your surgery to see how you are doing after your procedure.    You may also receive a survey in the mail within the next two weeks and we ask that you take a few moments to complete the survey and return it to us.  Our goal is to provide you with very good care and we value your comments.     Depression / Suicide Risk    As you are discharged from this [facility] facility, it is important to learn how to keep safe from harming yourself.    Recognize the warning signs:  Abrupt changes in personality, positive or negative- including increase in energy   Giving away possessions  Change in eating patterns- significant weight changes-  positive or negative  Change in sleeping patterns- unable to sleep or sleeping all the time   Unwillingness or inability to communicate  Depression  Unusual sadness, discouragement and loneliness  Talk of wanting to die  Neglect of personal appearance   Rebelliousness- reckless behavior  Withdrawal from people/activities they love  Confusion- inability to concentrate     If you or a loved one observes any of these behaviors or has concerns about self-harm, here's what you can do:  Talk about it- your feelings and reasons for harming yourself  Remove any means that you might use to hurt yourself (examples: pills, rope, extension cords, firearm)  Get professional help from the community (Mental Health, Substance Abuse, psychological counseling)  Do not be alone:Call your Safe Contact- someone whom you trust who will be there for you.  Call your local CRISIS HOTLINE 047-2637 or 061-824-6937  Call your local Children's Mobile Crisis Response Team [place] (115) 013-3486 or www.Shoutly  Call the toll free National Suicide Prevention Hotlines   National Suicide Prevention Lifeline 802-136-WWQO (1062)  National Hope Line Network 800-SUICIDE (534-5304)    I acknowledge receipt and understanding of these Home Care instructions.

## 2023-09-07 NOTE — OR SURGEON
Immediate Post- Operative Note        Findings: h/o lung cancer and melanoma; L lumbar paraspinal intramuscular mass      Procedure(s): CT biopsy of same      Estimated Blood Loss: Less than 5 ml        Complications: None            9/7/2023     8:42 AM     Dipak Roman M.D.

## 2023-09-07 NOTE — OR NURSING
1017- Pt to phase 2. Resting in gurney, no signs of pain or nausea, pt is on bedrest until 11am. VSS on RA. Dressing to L flank CDI, gauze and tegaderm in place.     1030- Reviewed discharge instructions with pt and pt's wife. All instructions acknowledged, all questions answered.     1100- Pt up to bathroom, IV dc'd, dressed, steady on his feet. Pt preferred to ambulate off the unit, escorted out by RN, wife carrying belongings.

## 2023-09-07 NOTE — PROGRESS NOTES
Patient to Phase II with CNA in stable condition. VSS. Surgical dressing clean dry intact. Aox4 and on RA. No belongings. Family updated. No further needs.

## 2023-09-07 NOTE — PROGRESS NOTES
IR Nursing Note    Left paraspinous mass biopsy by MD Roman assisted by RT Lauren, left flank access site.  Procedure site was marked by MD and verified using imaging guidance.  Patient was placed in a prone position. Vitals were taken every 5 minutes and remained stable during procedure (see doc flow sheet for results).  CO2 waveform capnography was monitored and remained stable throughout procedure.  A gauze and Tegaderm dressing was placed over surgical site.     X4 cores collected by Dr Roman, x3 in Formalin, x1 in RPMI. Specimen delivered to lab by Lauren.    Report given to RHODA Bazzi; patient transported to PACU via IR RN monitored, transferred care to report RN.

## 2023-09-18 ENCOUNTER — NON-PROVIDER VISIT (OUTPATIENT)
Dept: INTERNAL MEDICINE | Facility: IMAGING CENTER | Age: 78
End: 2023-09-18
Payer: MEDICARE

## 2023-09-18 ENCOUNTER — APPOINTMENT (OUTPATIENT)
Dept: INTERNAL MEDICINE | Facility: IMAGING CENTER | Age: 78
End: 2023-09-18
Payer: MEDICARE

## 2023-09-18 DIAGNOSIS — Z20.822 ENCOUNTER FOR LABORATORY TESTING FOR COVID-19 VIRUS: ICD-10-CM

## 2023-09-18 LAB
FLUAV RNA SPEC QL NAA+PROBE: NEGATIVE
FLUBV RNA SPEC QL NAA+PROBE: NEGATIVE
RSV RNA SPEC QL NAA+PROBE: NEGATIVE
SARS-COV-2 RNA RESP QL NAA+PROBE: NEGATIVE

## 2023-09-18 PROCEDURE — 0241U POCT CEPHEID COV-2, FLU A/B, RSV - PCR: CPT | Performed by: FAMILY MEDICINE

## 2023-10-04 DIAGNOSIS — E29.1 ANDROGEN DEFICIENCY: ICD-10-CM

## 2023-10-04 RX ORDER — NEEDLES, DISPOSABLE 25GX5/8"
NEEDLE, DISPOSABLE MISCELLANEOUS
Qty: 12 EACH | Refills: 3 | Status: SHIPPED | OUTPATIENT
Start: 2023-10-04

## 2023-11-27 DIAGNOSIS — R09.82 POST-NASAL DRIP: ICD-10-CM

## 2023-11-27 RX ORDER — AZELASTINE 1 MG/ML
SPRAY, METERED NASAL
Qty: 30 ML | Refills: 5 | Status: SHIPPED | OUTPATIENT
Start: 2023-11-27 | End: 2024-03-06

## 2023-12-14 ENCOUNTER — NON-PROVIDER VISIT (OUTPATIENT)
Dept: INTERNAL MEDICINE | Facility: IMAGING CENTER | Age: 78
End: 2023-12-14
Payer: MEDICARE

## 2023-12-14 ENCOUNTER — OFFICE VISIT (OUTPATIENT)
Dept: INTERNAL MEDICINE | Facility: IMAGING CENTER | Age: 78
End: 2023-12-14
Payer: MEDICARE

## 2023-12-14 VITALS
WEIGHT: 164.9 LBS | RESPIRATION RATE: 17 BRPM | TEMPERATURE: 97.9 F | OXYGEN SATURATION: 95 % | HEART RATE: 74 BPM | BODY MASS INDEX: 23.09 KG/M2 | DIASTOLIC BLOOD PRESSURE: 68 MMHG | SYSTOLIC BLOOD PRESSURE: 132 MMHG | HEIGHT: 71 IN

## 2023-12-14 DIAGNOSIS — Z20.822 ENCOUNTER FOR LABORATORY TESTING FOR COVID-19 VIRUS: ICD-10-CM

## 2023-12-14 DIAGNOSIS — J06.9 UPPER RESPIRATORY TRACT INFECTION, UNSPECIFIED TYPE: ICD-10-CM

## 2023-12-14 PROCEDURE — 3078F DIAST BP <80 MM HG: CPT | Performed by: FAMILY MEDICINE

## 2023-12-14 PROCEDURE — 99213 OFFICE O/P EST LOW 20 MIN: CPT | Performed by: FAMILY MEDICINE

## 2023-12-14 PROCEDURE — 0241U POCT CEPHEID COV-2, FLU A/B, RSV - PCR: CPT | Performed by: FAMILY MEDICINE

## 2023-12-14 PROCEDURE — 3075F SYST BP GE 130 - 139MM HG: CPT | Performed by: FAMILY MEDICINE

## 2023-12-14 RX ORDER — DOXYCYCLINE HYCLATE 100 MG
100 TABLET ORAL 2 TIMES DAILY
Qty: 14 TABLET | Refills: 0 | Status: SHIPPED | OUTPATIENT
Start: 2023-12-14 | End: 2023-12-19 | Stop reason: SDUPTHER

## 2023-12-14 RX ORDER — CODEINE PHOSPHATE AND GUAIFENESIN 10; 100 MG/5ML; MG/5ML
5 SOLUTION ORAL EVERY 6 HOURS PRN
Qty: 140 ML | Refills: 0 | Status: SHIPPED | OUTPATIENT
Start: 2023-12-14 | End: 2023-12-21

## 2023-12-14 RX ORDER — METHYLPREDNISOLONE 4 MG/1
TABLET ORAL
Qty: 21 TABLET | Refills: 0 | Status: SHIPPED | OUTPATIENT
Start: 2023-12-14 | End: 2023-12-19 | Stop reason: SDUPTHER

## 2023-12-14 ASSESSMENT — FIBROSIS 4 INDEX: FIB4 SCORE: 2.44

## 2023-12-15 NOTE — PROGRESS NOTES
Chief Complaint   Patient presents with    Cough     Congestion and cough since the weekend.       HPI:  Patient is a 78 y.o. male established patient who presents today for evaluation of new illness symptoms that started last weekend. He developed cough with sputum production, sinus congestion, sore throat, and general malaise without associated N/V/D/F. He was on a business trip to [place] and returned last evening. He has been taking OTC medication without symptom relief, and swab for RSV/COVID/Influenza were negative today prior to our visit.   Patient Active Problem List    Diagnosis Date Noted    Androgen deficiency 04/27/2022    Malignant neoplasm metastatic to bone (HCC) 10/06/2021    Chronic pain 06/30/2021    Hypothyroid 11/25/2020    Obstructive sleep apnea 11/25/2020    GERD (gastroesophageal reflux disease) 11/25/2020    Hyperlipidemia 11/25/2020    Adrenal insufficiency (HCC), secondary 11/25/2020    Lung cancer (HCC), adenocarcinoma Aug. 2019 11/25/2020    History of stroke, subcortical infarct on MRI April 2019 11/25/2020    Diverticulosis of colon 11/25/2020    History of shingles 11/25/2020    Vitamin D deficiency 11/25/2020    History of malignant melanoma, April 2016 11/25/2020    History of atrial tachycardia 11/25/2020    BPH with obstruction/lower urinary tract symptoms 11/25/2020    Elevated PSA 11/25/2020    Atherosclerosis of both carotid arteries, mild 11/25/2020    Chronic kidney disease, stage 3a (HCC)     Sensorineural hearing loss of both ears 06/08/2020    History of radiation therapy 01/30/2020    Premature atrial contractions 09/19/2019    Adenocarcinoma of lung (HCC), stage 4 ([facility] Oct. 2021) 08/26/2019    Brain lesion 06/26/2019    H/O cardiac radiofrequency ablation 06/11/2019    Cerebral infarct (HCC) 04/09/2019    Left renal atrophy 12/14/2018    Long term (current) use of systemic steroids 12/11/2018    Right foot drop 11/16/2018    Right peroneal nerve palsy 11/16/2018     "Palpitations 11/20/2016    Facial basal cell cancer 08/05/2016    Squamous cell cancer of scalp and skin of neck 08/05/2016    Anxiety 07/28/2016    Malignant melanoma of left lower leg (HCC) 06/23/2016    Family history of coronary artery disease 01/08/2016    Family history of early CAD 01/08/2016    Hypertension 01/08/2016       Past medical, surgical, family, and social history was reviewed and updated in Epic chart by me today.     Medications and allergies reviewed and updated in Epic chart by me today.     ROS:  Pertinent positives listed above in HPI. All other systems have been reviewed and are negative.    PE:   /68 (BP Location: Left arm, Patient Position: Sitting, BP Cuff Size: Adult)   Pulse 74   Temp 36.6 °C (97.9 °F) (Temporal)   Resp 17   Ht 1.803 m (5' 11\")   Wt 74.8 kg (164 lb 14.5 oz)   SpO2 95%   BMI 23.00 kg/m²   Vital signs reviewed with patient.     Gen: Well developed; well nourished; no acute distress; tired appearance   HEENT: Normocephalic; atraumatic; PEERLA b/l; sclera clear b/l; b/l external auditory canals WNL; b/l TM WNL; nares patent; oropharynx clear; posterior aspect bright red; oral mucosa moist; tongue midline; dentition adequate; congested  Neck: No adenopathy; no thyromegaly  CV: Regular rate and rhythm; S1/ S2 present; no murmur, gallop or rub noted  Pulm: No respiratory distress; clear to ascultation b/l; no wheezing or stridor noted b/l; intermittent cough  Abd: Adequate bowel sounds noted; soft and nontender; no rebound, rigidity, nor distention  Extremities: No peripheral edema b/l LE extremities/ no clubbing nor cyanosis noted  Skin: Warm and dry; no rashes noted   Neuro: No focal deficits noted   Psych: AAOx4; mood and affect are appropriate    A/P:  1. Upper respiratory tract infection, unspecified type  New illness as described above in HPI, and swab negative for RSV/Influenza/COVID. Recommend patient start Doxycycline and Medrol today, use Cheratussin PRN " for cough management, rest, hydrate, and follow clinical response.  reviewed today and no concerns noted. Pt is well versed in safe use of controlled medication, and benefit of use outweighs risk at this time. New RX sent to pharmacy, and patient encouraged to contact me if symptoms do not resolve accordingly.   - doxycycline (VIBRAMYCIN) 100 MG Tab; Take 1 Tablet by mouth 2 times a day for 7 days.  Dispense: 14 Tablet; Refill: 0  - methylPREDNISolone (MEDROL DOSEPAK) 4 MG Tablet Therapy Pack; As directed on the packaging label.  Dispense: 21 Tablet; Refill: 0  - guaifenesin-codeine (CHERATUSSIN AC) Solution oral solution; Take 5 mL by mouth every 6 hours as needed for Cough for up to 7 days.  Dispense: 140 mL; Refill: 0

## 2023-12-19 DIAGNOSIS — J06.9 UPPER RESPIRATORY TRACT INFECTION, UNSPECIFIED TYPE: ICD-10-CM

## 2023-12-19 RX ORDER — DOXYCYCLINE HYCLATE 100 MG
100 TABLET ORAL 2 TIMES DAILY
Qty: 14 TABLET | Refills: 0 | Status: SHIPPED | OUTPATIENT
Start: 2023-12-19 | End: 2023-12-26

## 2023-12-19 RX ORDER — METHYLPREDNISOLONE 4 MG/1
TABLET ORAL
Qty: 21 TABLET | Refills: 0 | Status: SHIPPED | OUTPATIENT
Start: 2023-12-19 | End: 2024-03-06

## 2023-12-22 ENCOUNTER — HOSPITAL ENCOUNTER (OUTPATIENT)
Facility: MEDICAL CENTER | Age: 78
End: 2023-12-22
Payer: MEDICARE

## 2023-12-22 LAB
ALBUMIN SERPL BCP-MCNC: 3.5 G/DL (ref 3.2–4.9)
ALBUMIN/GLOB SERPL: 0.8 G/DL
ALP SERPL-CCNC: 63 U/L (ref 30–99)
ALT SERPL-CCNC: 10 U/L (ref 2–50)
ANION GAP SERPL CALC-SCNC: 9 MMOL/L (ref 7–16)
AST SERPL-CCNC: 14 U/L (ref 12–45)
BILIRUB SERPL-MCNC: 1.1 MG/DL (ref 0.1–1.5)
BUN SERPL-MCNC: 37 MG/DL (ref 8–22)
CALCIUM ALBUM COR SERPL-MCNC: 9.2 MG/DL (ref 8.5–10.5)
CALCIUM SERPL-MCNC: 8.8 MG/DL (ref 8.5–10.5)
CHLORIDE SERPL-SCNC: 106 MMOL/L (ref 96–112)
CO2 SERPL-SCNC: 23 MMOL/L (ref 20–33)
CREAT SERPL-MCNC: 1.64 MG/DL (ref 0.5–1.4)
GFR SERPLBLD CREATININE-BSD FMLA CKD-EPI: 43 ML/MIN/1.73 M 2
GLOBULIN SER CALC-MCNC: 4.3 G/DL (ref 1.9–3.5)
GLUCOSE SERPL-MCNC: 142 MG/DL (ref 65–99)
MAGNESIUM SERPL-MCNC: 2 MG/DL (ref 1.5–2.5)
PHOSPHATE SERPL-MCNC: 2.7 MG/DL (ref 2.5–4.5)
POTASSIUM SERPL-SCNC: 4 MMOL/L (ref 3.6–5.5)
PROT SERPL-MCNC: 7.8 G/DL (ref 6–8.2)
SODIUM SERPL-SCNC: 138 MMOL/L (ref 135–145)

## 2023-12-22 PROCEDURE — 84100 ASSAY OF PHOSPHORUS: CPT

## 2023-12-22 PROCEDURE — 83735 ASSAY OF MAGNESIUM: CPT

## 2023-12-22 PROCEDURE — 80053 COMPREHEN METABOLIC PANEL: CPT

## 2023-12-28 ENCOUNTER — HOSPITAL ENCOUNTER (OUTPATIENT)
Dept: RADIOLOGY | Facility: MEDICAL CENTER | Age: 78
End: 2023-12-28
Payer: COMMERCIAL

## 2024-02-07 ENCOUNTER — HOSPITAL ENCOUNTER (OUTPATIENT)
Facility: MEDICAL CENTER | Age: 79
End: 2024-02-07
Attending: NURSE PRACTITIONER
Payer: MEDICARE

## 2024-02-07 PROCEDURE — 87340 HEPATITIS B SURFACE AG IA: CPT | Mod: GA

## 2024-02-07 PROCEDURE — 86803 HEPATITIS C AB TEST: CPT

## 2024-02-07 PROCEDURE — 86704 HEP B CORE ANTIBODY TOTAL: CPT | Mod: GA

## 2024-02-07 PROCEDURE — 86706 HEP B SURFACE ANTIBODY: CPT | Mod: GA

## 2024-02-08 LAB
HBV CORE AB SERPL QL IA: NONREACTIVE
HBV SURFACE AB SERPL IA-ACNC: <3.5 MIU/ML (ref 0–10)
HBV SURFACE AG SER QL: NORMAL
HCV AB SER QL: NORMAL

## 2024-02-25 ENCOUNTER — HOSPITAL ENCOUNTER (OUTPATIENT)
Dept: RADIOLOGY | Facility: MEDICAL CENTER | Age: 79
End: 2024-02-25
Attending: INTERNAL MEDICINE
Payer: MEDICARE

## 2024-02-25 DIAGNOSIS — C43.72 MALIGNANT MELANOMA OF LEFT LOWER EXTREMITY INCLUDING HIP (HCC): ICD-10-CM

## 2024-02-25 DIAGNOSIS — C34.11 MALIGNANT NEOPLASM OF UPPER LOBE OF RIGHT LUNG (HCC): ICD-10-CM

## 2024-02-25 PROCEDURE — 70553 MRI BRAIN STEM W/O & W/DYE: CPT

## 2024-02-25 PROCEDURE — 700117 HCHG RX CONTRAST REV CODE 255: Performed by: INTERNAL MEDICINE

## 2024-02-25 PROCEDURE — A9579 GAD-BASE MR CONTRAST NOS,1ML: HCPCS | Performed by: INTERNAL MEDICINE

## 2024-02-25 RX ADMIN — GADOTERIDOL 15 ML: 279.3 INJECTION, SOLUTION INTRAVENOUS at 17:35

## 2024-02-27 ENCOUNTER — APPOINTMENT (OUTPATIENT)
Dept: RADIOLOGY | Facility: MEDICAL CENTER | Age: 79
End: 2024-02-27
Attending: EMERGENCY MEDICINE
Payer: MEDICARE

## 2024-02-27 ENCOUNTER — HOSPITAL ENCOUNTER (EMERGENCY)
Facility: MEDICAL CENTER | Age: 79
End: 2024-02-28
Attending: EMERGENCY MEDICINE
Payer: MEDICARE

## 2024-02-27 VITALS
RESPIRATION RATE: 27 BRPM | OXYGEN SATURATION: 97 % | DIASTOLIC BLOOD PRESSURE: 86 MMHG | BODY MASS INDEX: 21.67 KG/M2 | HEIGHT: 72 IN | TEMPERATURE: 100 F | WEIGHT: 160 LBS | SYSTOLIC BLOOD PRESSURE: 171 MMHG | HEART RATE: 84 BPM

## 2024-02-27 DIAGNOSIS — R33.9 URINARY RETENTION: ICD-10-CM

## 2024-02-27 DIAGNOSIS — E86.0 DEHYDRATION: ICD-10-CM

## 2024-02-27 DIAGNOSIS — R10.13 ABDOMINAL PAIN, ACUTE, EPIGASTRIC: ICD-10-CM

## 2024-02-27 DIAGNOSIS — K29.00 ACUTE GASTRITIS WITHOUT HEMORRHAGE, UNSPECIFIED GASTRITIS TYPE: ICD-10-CM

## 2024-02-27 DIAGNOSIS — R11.0 NAUSEA: ICD-10-CM

## 2024-02-27 LAB
ALBUMIN SERPL BCP-MCNC: 3.5 G/DL (ref 3.2–4.9)
ALBUMIN/GLOB SERPL: 0.8 G/DL
ALP SERPL-CCNC: 127 U/L (ref 30–99)
ALT SERPL-CCNC: 26 U/L (ref 2–50)
ANION GAP SERPL CALC-SCNC: 9 MMOL/L (ref 7–16)
APPEARANCE UR: CLEAR
AST SERPL-CCNC: 50 U/L (ref 12–45)
BACTERIA #/AREA URNS HPF: NEGATIVE /HPF
BASOPHILS # BLD AUTO: 0.4 % (ref 0–1.8)
BASOPHILS # BLD: 0.01 K/UL (ref 0–0.12)
BILIRUB SERPL-MCNC: 1.2 MG/DL (ref 0.1–1.5)
BILIRUB UR QL STRIP.AUTO: NEGATIVE
BUN SERPL-MCNC: 16 MG/DL (ref 8–22)
CALCIUM ALBUM COR SERPL-MCNC: 8.7 MG/DL (ref 8.5–10.5)
CALCIUM SERPL-MCNC: 8.3 MG/DL (ref 8.4–10.2)
CHLORIDE SERPL-SCNC: 104 MMOL/L (ref 96–112)
CO2 SERPL-SCNC: 22 MMOL/L (ref 20–33)
COLOR UR: YELLOW
CREAT SERPL-MCNC: 1.41 MG/DL (ref 0.5–1.4)
EKG IMPRESSION: NORMAL
EOSINOPHIL # BLD AUTO: 0.02 K/UL (ref 0–0.51)
EOSINOPHIL NFR BLD: 0.8 % (ref 0–6.9)
EPI CELLS #/AREA URNS HPF: NEGATIVE /HPF
ERYTHROCYTE [DISTWIDTH] IN BLOOD BY AUTOMATED COUNT: 51.1 FL (ref 35.9–50)
GFR SERPLBLD CREATININE-BSD FMLA CKD-EPI: 51 ML/MIN/1.73 M 2
GLOBULIN SER CALC-MCNC: 4.5 G/DL (ref 1.9–3.5)
GLUCOSE SERPL-MCNC: 131 MG/DL (ref 65–99)
GLUCOSE UR STRIP.AUTO-MCNC: NEGATIVE MG/DL
HCT VFR BLD AUTO: 34.7 % (ref 42–52)
HGB BLD-MCNC: 11.7 G/DL (ref 14–18)
IMM GRANULOCYTES # BLD AUTO: 0.02 K/UL (ref 0–0.11)
IMM GRANULOCYTES NFR BLD AUTO: 0.8 % (ref 0–0.9)
KETONES UR STRIP.AUTO-MCNC: NEGATIVE MG/DL
LACTATE SERPL-SCNC: 0.9 MMOL/L (ref 0.5–2)
LEUKOCYTE ESTERASE UR QL STRIP.AUTO: NEGATIVE
LIPASE SERPL-CCNC: 50 U/L (ref 11–82)
LYMPHOCYTES # BLD AUTO: 0.35 K/UL (ref 1–4.8)
LYMPHOCYTES NFR BLD: 14.3 % (ref 22–41)
MCH RBC QN AUTO: 33.6 PG (ref 27–33)
MCHC RBC AUTO-ENTMCNC: 33.7 G/DL (ref 32.3–36.5)
MCV RBC AUTO: 99.7 FL (ref 81.4–97.8)
MICRO URNS: ABNORMAL
MONOCYTES # BLD AUTO: 0.28 K/UL (ref 0–0.85)
MONOCYTES NFR BLD AUTO: 11.5 % (ref 0–13.4)
NEUTROPHILS # BLD AUTO: 1.76 K/UL (ref 1.82–7.42)
NEUTROPHILS NFR BLD: 72.2 % (ref 44–72)
NITRITE UR QL STRIP.AUTO: NEGATIVE
NRBC # BLD AUTO: 0 K/UL
NRBC BLD-RTO: 0 /100 WBC (ref 0–0.2)
PH UR STRIP.AUTO: 6.5 [PH] (ref 5–8)
PLATELET # BLD AUTO: 185 K/UL (ref 164–446)
PMV BLD AUTO: 9.1 FL (ref 9–12.9)
POTASSIUM SERPL-SCNC: 4.1 MMOL/L (ref 3.6–5.5)
PROT SERPL-MCNC: 8 G/DL (ref 6–8.2)
PROT UR QL STRIP: NEGATIVE MG/DL
RBC # BLD AUTO: 3.48 M/UL (ref 4.7–6.1)
RBC # URNS HPF: ABNORMAL /HPF
RBC UR QL AUTO: ABNORMAL
SODIUM SERPL-SCNC: 135 MMOL/L (ref 135–145)
SP GR UR STRIP.AUTO: 1.01
TROPONIN T SERPL-MCNC: 15 NG/L (ref 6–19)
WBC # BLD AUTO: 2.4 K/UL (ref 4.8–10.8)
WBC #/AREA URNS HPF: ABNORMAL /HPF

## 2024-02-27 PROCEDURE — 84484 ASSAY OF TROPONIN QUANT: CPT

## 2024-02-27 PROCEDURE — 96374 THER/PROPH/DIAG INJ IV PUSH: CPT

## 2024-02-27 PROCEDURE — 81001 URINALYSIS AUTO W/SCOPE: CPT

## 2024-02-27 PROCEDURE — 83690 ASSAY OF LIPASE: CPT

## 2024-02-27 PROCEDURE — 93005 ELECTROCARDIOGRAM TRACING: CPT | Performed by: EMERGENCY MEDICINE

## 2024-02-27 PROCEDURE — 87086 URINE CULTURE/COLONY COUNT: CPT

## 2024-02-27 PROCEDURE — 85025 COMPLETE CBC W/AUTO DIFF WBC: CPT

## 2024-02-27 PROCEDURE — 80053 COMPREHEN METABOLIC PANEL: CPT

## 2024-02-27 PROCEDURE — 87040 BLOOD CULTURE FOR BACTERIA: CPT

## 2024-02-27 PROCEDURE — 83605 ASSAY OF LACTIC ACID: CPT

## 2024-02-27 PROCEDURE — 87077 CULTURE AEROBIC IDENTIFY: CPT

## 2024-02-27 PROCEDURE — 87150 DNA/RNA AMPLIFIED PROBE: CPT

## 2024-02-27 PROCEDURE — 99285 EMERGENCY DEPT VISIT HI MDM: CPT

## 2024-02-27 PROCEDURE — 700117 HCHG RX CONTRAST REV CODE 255: Performed by: EMERGENCY MEDICINE

## 2024-02-27 PROCEDURE — 96375 TX/PRO/DX INJ NEW DRUG ADDON: CPT

## 2024-02-27 PROCEDURE — 74177 CT ABD & PELVIS W/CONTRAST: CPT

## 2024-02-27 PROCEDURE — 36415 COLL VENOUS BLD VENIPUNCTURE: CPT

## 2024-02-27 PROCEDURE — 700111 HCHG RX REV CODE 636 W/ 250 OVERRIDE (IP): Mod: JZ | Performed by: EMERGENCY MEDICINE

## 2024-02-27 PROCEDURE — 700105 HCHG RX REV CODE 258: Performed by: EMERGENCY MEDICINE

## 2024-02-27 RX ORDER — SODIUM CHLORIDE 9 MG/ML
1000 INJECTION, SOLUTION INTRAVENOUS ONCE
Status: COMPLETED | OUTPATIENT
Start: 2024-02-27 | End: 2024-02-28

## 2024-02-27 RX ORDER — HYDROMORPHONE HYDROCHLORIDE 1 MG/ML
1 INJECTION, SOLUTION INTRAMUSCULAR; INTRAVENOUS; SUBCUTANEOUS ONCE
Status: COMPLETED | OUTPATIENT
Start: 2024-02-28 | End: 2024-02-28

## 2024-02-27 RX ORDER — ONDANSETRON 2 MG/ML
4 INJECTION INTRAMUSCULAR; INTRAVENOUS ONCE
Status: COMPLETED | OUTPATIENT
Start: 2024-02-27 | End: 2024-02-27

## 2024-02-27 RX ORDER — MORPHINE SULFATE 4 MG/ML
4 INJECTION INTRAVENOUS ONCE
Status: COMPLETED | OUTPATIENT
Start: 2024-02-27 | End: 2024-02-27

## 2024-02-27 RX ADMIN — FAMOTIDINE 20 MG: 10 INJECTION, SOLUTION INTRAVENOUS at 22:46

## 2024-02-27 RX ADMIN — ONDANSETRON 4 MG: 2 INJECTION INTRAMUSCULAR; INTRAVENOUS at 22:44

## 2024-02-27 RX ADMIN — MORPHINE SULFATE 4 MG: 4 INJECTION, SOLUTION INTRAMUSCULAR; INTRAVENOUS at 22:46

## 2024-02-27 RX ADMIN — SODIUM CHLORIDE 1000 ML: 9 INJECTION, SOLUTION INTRAVENOUS at 22:45

## 2024-02-27 RX ADMIN — IOHEXOL 100 ML: 350 INJECTION, SOLUTION INTRAVENOUS at 23:49

## 2024-02-27 ASSESSMENT — FIBROSIS 4 INDEX: FIB4 SCORE: 1.53

## 2024-02-27 NOTE — LETTER
2/29/2024               Bartolo Berrios [facility]  [address] [place] [address]        Dear Bartolo (MR#[id number])    As we have been unable to contact you by phone, this letter is sent in regards to your recent visit to the [facility] Emergency Department on 2/27/2024. During the visit, tests were performed to assist the physician in a medical diagnosis. A review of those tests requires that we notify you of the following:    Your blood culture and sensitivity was POSITIVE for a bacteria called coagulase-negative Staphylococcus species. This bacteria is found on the skin as part of normal skin bacterial charlotte and is rarely a concern for infection. Since your symptoms improved prior to leaving the emergency department this result indicates likely contamination of the blood culture with skin cells. However, if you are not feeling better or have had continued fever or chills please contact me at the number below or contact your primary care provider.       Thank you for your cooperation in the matter.    Sincerely,  ED Culture Follow-Up Staff  Ronal Mandel, PharmD    [facility], Emergency Department  [address] [place] [id number] [phone] (ED Culture Line)

## 2024-02-28 PROCEDURE — 303105 HCHG CATHETER EXTRA

## 2024-02-28 PROCEDURE — 51798 US URINE CAPACITY MEASURE: CPT

## 2024-02-28 PROCEDURE — A9270 NON-COVERED ITEM OR SERVICE: HCPCS | Performed by: EMERGENCY MEDICINE

## 2024-02-28 PROCEDURE — 700102 HCHG RX REV CODE 250 W/ 637 OVERRIDE(OP): Performed by: EMERGENCY MEDICINE

## 2024-02-28 PROCEDURE — 51702 INSERT TEMP BLADDER CATH: CPT

## 2024-02-28 PROCEDURE — 700111 HCHG RX REV CODE 636 W/ 250 OVERRIDE (IP): Performed by: EMERGENCY MEDICINE

## 2024-02-28 PROCEDURE — 96375 TX/PRO/DX INJ NEW DRUG ADDON: CPT

## 2024-02-28 RX ORDER — DICYCLOMINE HCL 20 MG
20 TABLET ORAL ONCE
Status: COMPLETED | OUTPATIENT
Start: 2024-02-28 | End: 2024-02-28

## 2024-02-28 RX ORDER — DICYCLOMINE HCL 20 MG
20 TABLET ORAL EVERY 6 HOURS PRN
Qty: 12 TABLET | Refills: 0 | Status: SHIPPED | OUTPATIENT
Start: 2024-02-28

## 2024-02-28 RX ORDER — ONDANSETRON 4 MG/1
4 TABLET, ORALLY DISINTEGRATING ORAL EVERY 6 HOURS PRN
Qty: 10 TABLET | Refills: 0 | Status: SHIPPED | OUTPATIENT
Start: 2024-02-28

## 2024-02-28 RX ORDER — TAMSULOSIN HYDROCHLORIDE 0.4 MG/1
0.4 CAPSULE ORAL DAILY
Qty: 30 CAPSULE | Refills: 0 | Status: SHIPPED | OUTPATIENT
Start: 2024-02-28

## 2024-02-28 RX ORDER — OMEPRAZOLE 40 MG/1
40 CAPSULE, DELAYED RELEASE ORAL DAILY
Qty: 30 CAPSULE | Refills: 0 | Status: SHIPPED | OUTPATIENT
Start: 2024-02-28 | End: 2024-03-18 | Stop reason: SDUPTHER

## 2024-02-28 RX ADMIN — DICYCLOMINE HYDROCHLORIDE 20 MG: 20 TABLET ORAL at 02:00

## 2024-02-28 RX ADMIN — HYDROMORPHONE HYDROCHLORIDE 1 MG: 1 INJECTION, SOLUTION INTRAMUSCULAR; INTRAVENOUS; SUBCUTANEOUS at 00:00

## 2024-02-28 NOTE — ED PROVIDER NOTES
ED Provider Note    CHIEF COMPLAINT  Chief Complaint   Patient presents with    Abdominal Pain    N/V       EXTERNAL RECORDS REVIEWED  Outpatient Notes reviewed office visit progress note by Dr. Munoz dated December 14, 2023.  Patient seen for evaluation of congestion and cough.  Diagnosed with URI, started on Medrol Dosepak and doxycycline.    HPI/ROS  LIMITATION TO HISTORY   Select: : None  OUTSIDE HISTORIAN(S):  Significant other relates pain began after eating dinner yesterday at 5 PM, she herself has no symptoms    Bartolo Berrios [facility] is a 78 y.o. male who presents for evaluation of upper abdominal pain.  Patient has a complicated medical history including history of melanoma, history of non-small cell lung cancer, and currently on chemotherapy as of earlier this month.  Patient presents today for evaluation of acute abdominal pain starting yesterday evening after eating dinner.  Patient relates pain localized to the epigastrium and the right upper quadrant, described as pressure-like, occasionally sharp with radiation to the back.  Associated nausea and numerous episodes of belching but patient notes no emesis.  He also relates loose stools.  No hematemesis, no hematochezia.  Endorses chills but no known fever.  No particular ill contacts, patient's wife ate at the restaurant and is asymptomatic.  He does also have a history of previous colostomy status post reversal in 2018.  No dysuria nor hematuria.    PAST MEDICAL HISTORY   has a past medical history of Adrenal insufficiency ([facility]), secondary (11/25/2020), Arrhythmia, Arthritis, Atherosclerosis of both carotid arteries, mild (11/25/2020), BPH (benign prostatic hyperplasia) (11/25/2020), Bronchitis, Cancer ([facility]), Carcinoma in situ of respiratory system, CKD (chronic kidney disease) stage 3, GFR 30-59 ml/min ([facility]) (11/25/2020), Colostomy present ([facility]) (11/25/2020), Diverticulosis of colon (11/25/2020), GERD (gastroesophageal reflux disease)  (11/25/2020), High cholesterol, History of atrial tachycardia (11/25/2020), History of malignant melanoma, April 2016 (11/25/2020), History of shingles (11/25/2020), History of stroke, subcortical infarct on MRI April 2019 (11/25/2020), Hyperlipidemia (11/25/2020), Hypertension, Hypothyroid (11/25/2020), Indigestion, Lung cancer (HCC), adenocarcinoma Aug. 2019 (11/25/2020), and Pain.    SURGICAL HISTORY   has a past surgical history that includes other (Left, 2016); other (Left, 2017); other (Right, 2019); and reid by laparoscopy (2/26/2021).    FAMILY HISTORY  Family History   Problem Relation Age of Onset    Cancer Mother         Lung- bone    Cancer Father         Colon?    Cancer Sister         Lung    Cancer Brother         Lung    Cancer Brother         Brain       SOCIAL HISTORY  Social History     Tobacco Use    Smoking status: Never    Smokeless tobacco: Never   Vaping Use    Vaping Use: Never used   Substance and Sexual Activity    Alcohol use: Yes     Alcohol/week: 1.8 oz     Types: 3 Glasses of wine per week     Comment: Occasionally    Drug use: Not Currently     Comment: THC edibles    Sexual activity: Not on file       CURRENT MEDICATIONS  Home Medications    **Home medications have not yet been reviewed for this encounter**         ALLERGIES  Allergies   Allergen Reactions    Gramineae Pollens Itching and Shortness of Breath     Itchy eyes, red face    Cat Hair Extract     Horse Allergy Hives    Other Environmental     Pollen Extract        PHYSICAL EXAM  VITAL SIGNS: BP (!) 171/86   Pulse 84   Temp 37.8 °C (100 °F)   Resp (!) 27   Ht 1.829 m (6')   Wt 72.6 kg (160 lb)   SpO2 97%   BMI 21.70 kg/m²    General: Alert, no acute distress  Skin: Warm, dry, mildly pale, otherwise normal for ethnicity  Head: Normocephalic, atraumatic  Neck: Trachea midline, no tenderness  Eye: PERRL, normal conjunctiva, sclera anicteric  ENMT: Oral mucosa moist  Cardiovascular: Regular rate and rhythm, No murmur,  Normal peripheral perfusion  Respiratory: Lungs CTA, respirations are non-labored, breath sounds are equal  Gastrointestinal: Soft, tender to right lower quadrant and left upper quadrant and epigastrium, no guarding, no rebound, no rigidity.  Bowel sounds are hyperactive, abdomen is nondistended.  Musculoskeletal: No swelling, no deformity  Neurological: Alert and oriented to person, place, time, and situation  Lymphatics: No cervical lymphadenopathy  Psychiatric: Cooperative, appropriate mood & affect     DIAGNOSTIC STUDIES / PROCEDURES  EKG  I have independently interpreted this EKG  EKG Interpretation    Interpreted by emergency department physician    Rhythm: normal sinus   Rate: 80  Axis: right  Ectopy: none  Conduction: right bundle branch block (complete)  ST Segments: no acute change  T Waves: non specific changes  Q Waves: none    Clinical Impression: no acute changes, no pathologic change compared to previous EKG dated August 21, 2023    LABS  Results for orders placed or performed during the hospital encounter of 02/27/24   CBC WITH DIFFERENTIAL   Result Value Ref Range    WBC 2.4 (L) 4.8 - 10.8 K/uL    RBC 3.48 (L) 4.70 - 6.10 M/uL    Hemoglobin 11.7 (L) 14.0 - 18.0 g/dL    Hematocrit 34.7 (L) 42.0 - 52.0 %    MCV 99.7 (H) 81.4 - 97.8 fL    MCH 33.6 (H) 27.0 - 33.0 pg    MCHC 33.7 32.3 - 36.5 g/dL    RDW 51.1 (H) 35.9 - 50.0 fL    Platelet Count 185 164 - 446 K/uL    MPV 9.1 9.0 - 12.9 fL    Neutrophils-Polys 72.20 (H) 44.00 - 72.00 %    Lymphocytes 14.30 (L) 22.00 - 41.00 %    Monocytes 11.50 0.00 - 13.40 %    Eosinophils 0.80 0.00 - 6.90 %    Basophils 0.40 0.00 - 1.80 %    Immature Granulocytes 0.80 0.00 - 0.90 %    Nucleated RBC 0.00 0.00 - 0.20 /100 WBC    Neutrophils (Absolute) 1.76 (L) 1.82 - 7.42 K/uL    Lymphs (Absolute) 0.35 (L) 1.00 - 4.80 K/uL    Monos (Absolute) 0.28 0.00 - 0.85 K/uL    Eos (Absolute) 0.02 0.00 - 0.51 K/uL    Baso (Absolute) 0.01 0.00 - 0.12 K/uL    Immature Granulocytes  (abs) 0.02 0.00 - 0.11 K/uL    NRBC (Absolute) 0.00 K/uL   COMP METABOLIC PANEL   Result Value Ref Range    Sodium 135 135 - 145 mmol/L    Potassium 4.1 3.6 - 5.5 mmol/L    Chloride 104 96 - 112 mmol/L    Co2 22 20 - 33 mmol/L    Anion Gap 9.0 7.0 - 16.0    Glucose 131 (H) 65 - 99 mg/dL    Bun 16 8 - 22 mg/dL    Creatinine 1.41 (H) 0.50 - 1.40 mg/dL    Calcium 8.3 (L) 8.4 - 10.2 mg/dL    Correct Calcium 8.7 8.5 - 10.5 mg/dL    AST(SGOT) 50 (H) 12 - 45 U/L    ALT(SGPT) 26 2 - 50 U/L    Alkaline Phosphatase 127 (H) 30 - 99 U/L    Total Bilirubin 1.2 0.1 - 1.5 mg/dL    Albumin 3.5 3.2 - 4.9 g/dL    Total Protein 8.0 6.0 - 8.2 g/dL    Globulin 4.5 (H) 1.9 - 3.5 g/dL    A-G Ratio 0.8 g/dL   LIPASE   Result Value Ref Range    Lipase 50 11 - 82 U/L   URINALYSIS    Specimen: Urine   Result Value Ref Range    Color Yellow     Character Clear     Specific Gravity 1.010 <1.035    Ph 6.5 5.0 - 8.0    Glucose Negative Negative mg/dL    Ketones Negative Negative mg/dL    Protein Negative Negative mg/dL    Bilirubin Negative Negative    Nitrite Negative Negative    Leukocyte Esterase Negative Negative    Occult Blood Trace (A) Negative    Micro Urine Req Microscopic    Troponin   Result Value Ref Range    Troponin T 15 6 - 19 ng/L   URINE MICROSCOPIC (W/UA)   Result Value Ref Range    WBC 0-2 (A) /hpf    RBC 2-5 (A) /hpf    Bacteria Negative None /hpf    Epithelial Cells Negative Few /hpf   LACTIC ACID   Result Value Ref Range    Lactic Acid 0.9 0.5 - 2.0 mmol/L   ESTIMATED GFR   Result Value Ref Range    GFR (CKD-EPI) 51 (A) >60 mL/min/1.73 m 2   EKG   Result Value Ref Range    Report       [facility] Emergency Dept.    Test Date:  2024  Pt Name:    J[facility]US [facility]          Department: [facility]  MRN:        [id number]                      Room:       SM-ROOM 9  Gender:     Male                         Technician: earl  :        [id number]                   Requested By:ER TRIAGE PROTOCOL  Order #:     [id number]                    Reading MD: TENISHA RICHTER MD    Measurements  Intervals                                Axis  Rate:       80                           P:          46  VA:         212                          QRS:        64  QRSD:       141                          T:          14  QT:         429  QTc:        495    Interpretive Statements  Sinus arrhythmia  Borderline prolonged VA interval  Right bundle branch block  Compared to ECG 08/21/2023 15:48:52  Sinus rhythm no longer present  Atrial premature complex(es) no longer present  Left posterior fascicular block no longer present  Electronically Signed On 02- 22:50:59  PST by TENISHA RICHTER MD          RADIOLOGY  I have independently interpreted the diagnostic imaging associated with this visit and am waiting the final reading from the radiologist.   My preliminary interpretation is as follows: Distended urinary bladder, otherwise unremarkable  Radiologist interpretation:   CT-ABDOMEN-PELVIS WITH   Final Result         1. No acute inflammatory change in the abdomen or pelvis.      2. There is a 1 cm nodule inferior to the liver, indeterminate, similar to 2021.      3. Mildly prominent CBD and intrahepatic bile ducts could relate to postcholecystectomy status.      4. Right pleural effusion with right basilar opacities..           COURSE & MEDICAL DECISION MAKING    ED Observation Status? Yes; I am placing the patient in to an observation status due to a diagnostic uncertainty as well as therapeutic intensity. Patient placed in observation status at 10:01 PM, 2/27/2024.     Observation plan is as follows: Patient medicated morphine 4 mg IV, Zofran 4 mg IV, 1 L normal saline.  Septic/metabolic workup with lipase and lactic acid and CT imaging of abdomen pelvis will be obtained.  Differential diagnosis this point includes but not restricted to small bowel obstruction, pancreatitis, gastritis, diverticulitis, colitis,  sepsis    2341: Patient reassessed, pain unfortunately unchanged after morphine, have ordered hydromorphone 1 mg IV.  Going to imaging at this time.    0050:  patient reassessed, updated with reassuring imaging.  He is in no acute distress though unfortunately has developed inability to urinate.  He notes he has had this happen to him before.  Like the pain medications we have given have exacerbated his urinary retention.  Indeed he has a distended bladder on my impression of the CT.  He has tried multiple times to urinate without success and is obviously very uncomfortable.  Will proceed with Payne catheterization.    0133: Patient reassessed, he is feeling much better after Payne catheterization, 450 mL of urine nearly immediately produced.  Upper abdominal pain also improved, he is able to tolerate p.o. at this time.  Patient is updated with workup results, he thankfully is in no acute distress and otherwise doing much better.    Patient Vitals for the past 24 hrs:   BP Temp Pulse Resp SpO2 Height Weight   02/27/24 2357 (!) 171/86 -- 84 (!) 27 97 % -- --   02/27/24 2249 (!) 155/67 -- 85 20 97 % -- --   02/27/24 2144 (!) 185/78 37.8 °C (100 °F) 82 13 96 % 1.829 m (6') 72.6 kg (160 lb)        Upon Reevaluation, the patient's condition has: Improved; and will be discharged.    Patient discharged from ED Observation status at 0134 (Time) 2/28/24 (Date).     INITIAL ASSESSMENT, COURSE AND PLAN  Care Narrative: 78-year-old gentleman with unfortunate medical history of non-small cell lung cancer and history of previous partial colectomy with colostomy status post reversal presents for evaluation of acute upper abdominal pain with nausea and belching.  Given his surgical history is certainly obstructive pathology must be considered given his acute pain and nausea.  Reassuringly there is no evidence of obstructive process.  He does have low leukocytes but thankfully no neutropenia.  This is consistent with his  chemotherapy.  Thankfully there is no evidence of intra-abdominal infectious nor inflammatory process, urine is also unremarkable with no evidence of UTI/pyelonephritis.  Given no lactic acidosis, no fever, no tachycardia, no leukocytosis, no hypotension this is not consistent with sepsis per SIRS criteria.  Given thankfully reassuring imaging patient's upper abdominal discomfort likely secondary to acute gastritis.  He is feeling better with IV famotidine and Zofran and is able to tolerate p.o.  Patient does develop urinary retention while in the ED as discussed above.  He has a history of BPH and has had this problem in the past.  Given significance of his urinary retention he has required Payne catheterization and will be sent home with leg bag and referred to urology, will start Flomax for this reason.  HYDRATION: Based on the patient's presentation of Dehydration the patient was given IV fluids. IV Hydration was used because oral hydration was not as rapid as required. Upon recheck following hydration, the patient was doing better, skin tone is improved with IV fluids.      ADDITIONAL PROBLEM LIST  Upper abdominal pain, nausea, belching, urinary retention, history of malignancy  DISPOSITION AND DISCUSSIONS  I have discussed management of the patient with the following physicians and GERA's:  NA    Discussion of management with other QHP or appropriate source(s): None     Escalation of care considered, and ultimately not performed:acute inpatient care management, however at this time, the patient is most appropriate for outpatient management    Barriers to care at this time, including but not limited to:  NA .     Decision tools and prescription drugs considered including, but not limited to:  SIRS criteria for sepsis not met .    The patient will return for new or worsening symptoms and is stable at the time of discharge.    Patient has had high blood pressure while in the emergency department, felt likely  secondary to medical condition. Counseled patient to monitor blood pressure at home and follow up with primary care physician.      DISPOSITION:  Patient will be discharged home in stable condition.    FOLLOW UP:  Lesly Munoz M.D.  [address] [facility]  [address]  [facility] [address]  [phone]    Schedule an appointment as soon as possible for a visit       Josh Mitchell M.D.  [address] [address] [facility] [address]  [phone]    Schedule an appointment as soon as possible for a visit         OUTPATIENT MEDICATIONS:  Discharge Medication List as of 2/28/2024  1:43 AM        START taking these medications    Details   ondansetron (ZOFRAN ODT) 4 MG TABLET DISPERSIBLE Take 1 Tablet by mouth every 6 hours as needed for Nausea/Vomiting., Disp-10 Tablet, R-0, Normal      dicyclomine (BENTYL) 20 MG Tab Take 1 Tablet by mouth every 6 hours as needed (Abdominal Bloating)., Disp-12 Tablet, R-0, Normal      tamsulosin (FLOMAX) 0.4 MG capsule Take 1 Capsule by mouth every day., Disp-30 Capsule, R-0, Normal                FINAL DIAGNOSIS  1. Acute gastritis without hemorrhage, unspecified gastritis type    2. Dehydration    3. Nausea    4. Abdominal pain, acute, epigastric    5. Urinary retention           Electronically signed by: Romeo Ba M.D., 2/27/2024 10:00 PM

## 2024-02-28 NOTE — ED TRIAGE NOTES
Epigastric pain started last night after eating dinner +N/+V pt also had generalized abd pain all over. Pt started chemo for stage 4 Lung CA that has metastasized to other areas  EMS gave 4zofran & 4morphine PTA

## 2024-02-28 NOTE — ED NOTES
Patient cleared for DC home, no further questions or concerns pt verbalized understanding regarding f/u with pcp. Education for new RX provided, pt in stable conditio advised to return to closest ED for any worsening/concerning symptoms   Pt and wife provided with urine cath teaching

## 2024-02-29 ENCOUNTER — HOSPITAL ENCOUNTER (EMERGENCY)
Facility: MEDICAL CENTER | Age: 79
End: 2024-02-29
Attending: STUDENT IN AN ORGANIZED HEALTH CARE EDUCATION/TRAINING PROGRAM
Payer: MEDICARE

## 2024-02-29 VITALS
TEMPERATURE: 98 F | HEIGHT: 72 IN | DIASTOLIC BLOOD PRESSURE: 70 MMHG | SYSTOLIC BLOOD PRESSURE: 136 MMHG | HEART RATE: 82 BPM | BODY MASS INDEX: 22.54 KG/M2 | RESPIRATION RATE: 18 BRPM | OXYGEN SATURATION: 97 % | WEIGHT: 166.45 LBS

## 2024-02-29 DIAGNOSIS — T83.9XXA PROBLEM WITH FOLEY CATHETER, INITIAL ENCOUNTER (HCC): ICD-10-CM

## 2024-02-29 PROCEDURE — 99284 EMERGENCY DEPT VISIT MOD MDM: CPT

## 2024-02-29 ASSESSMENT — FIBROSIS 4 INDEX: FIB4 SCORE: 4.03

## 2024-02-29 NOTE — ED NOTES
"ED Positive Culture Follow-up/Notification Note:    Date: 2/29/24     Patient seen in the ED on 2/27/2024 for evaluation of upper abdominal pain. Per ED provider note, \"Patient has a complicated medical history including history of melanoma, history of non-small cell lung cancer, and currently on chemotherapy as of earlier this month.  Patient presents today for evaluation of acute abdominal pain starting yesterday evening after eating dinner.  Patient relates pain localized to the epigastrium and the right upper quadrant, described as pressure-like, occasionally sharp with radiation to the back.  Associated nausea and numerous episodes of belching but patient notes no emesis.  He also relates loose stools.  No hematemesis, no hematochezia.  Endorses chills but no known fever.  No particular ill contacts, patient's wife ate at the restaurant and is asymptomatic.  He does also have a history of previous colostomy status post reversal in 2018.  No dysuria nor hematuria.\"  1. Acute gastritis without hemorrhage, unspecified gastritis type    2. Dehydration    3. Nausea    4. Abdominal pain, acute, epigastric    5. Urinary retention       Discharge Medication List as of 2/28/2024  1:43 AM        START taking these medications    Details   ondansetron (ZOFRAN ODT) 4 MG TABLET DISPERSIBLE Take 1 Tablet by mouth every 6 hours as needed for Nausea/Vomiting., Disp-10 Tablet, R-0, Normal      dicyclomine (BENTYL) 20 MG Tab Take 1 Tablet by mouth every 6 hours as needed (Abdominal Bloating)., Disp-12 Tablet, R-0, Normal      tamsulosin (FLOMAX) 0.4 MG capsule Take 1 Capsule by mouth every day., Disp-30 Capsule, R-0, Normal             Allergies: Gramineae pollens, Cat hair extract, Horse allergy, Other environmental, and Pollen extract     Vitals:    02/27/24 2144 02/27/24 2249 02/27/24 2357   BP: (!) 185/78 (!) 155/67 (!) 171/86   Pulse: 82 85 84   Resp: 13 20 (!) 27   Temp: 37.8 °C (100 °F)     SpO2: 96% 97% 97%   Weight: " 72.6 kg (160 lb)     Height: 1.829 m (6')         Final cultures:   Results       Procedure Component Value Units Date/Time    Blood Culture - Draw one from central line and one from peripheral site [[id number]]  (Abnormal) Collected: 02/27/24 2150    Order Status: Completed Specimen: Blood from Peripheral Updated: 02/29/24 0529     Significant Indicator POS     Source BLD     Site PERIPHERAL     Culture Result Growth detected by Bactec instrument. 02/29/2024  05:28  Gram Stain: Gram positive cocci: Possible Staphylococcus sp.  Negative for Staphylococcus aureus and MRSA by PCR. Correlate  ongoing need for antibiotics with clinical condition.  Further report to follow.      Narrative:      Blood Cultures X2. Draw one blood culture from central line  and one blood culture peripherally. If no line present, draw  blood cultures times two peripherally from different sites.  Left AC    Urine Culture (New) [[id number]] Collected: 02/27/24 2150    Order Status: Sent Specimen: Urine Updated: 02/28/24 0856    Narrative:      Indication for culture:->Emergency Room Patient    URINALYSIS [[id number]]  (Abnormal) Collected: 02/27/24 2150    Order Status: Completed Specimen: Urine Updated: 02/27/24 2221     Color Yellow     Character Clear     Specific Gravity 1.010     Ph 6.5     Glucose Negative mg/dL      Ketones Negative mg/dL      Protein Negative mg/dL      Bilirubin Negative     Nitrite Negative     Leukocyte Esterase Negative     Occult Blood Trace     Micro Urine Req Microscopic    Blood Culture - Draw one from central line and one from peripheral site [[id number]] Collected: 02/27/24 0000    Order Status: Canceled Specimen: Other from Line             Plan:   Blood culture positive in 1 of 1 sets for coagulase-negative Staphylococcus species. Although the patient presented with chills his intraabdominal discomfort and nausea was resolved with hydration and famotidine. This bacteria is often a contaminant as it is found in  abundance on the skin and is unlikely a pathogen. However, will call patient to see how he is doing and will have low threshold to recommend return for blood cultures if patient has continued fever or chills.  Left voicemail for patient to call back.    Ronal Mandel, PharmD

## 2024-03-01 LAB
BACTERIA BLD CULT: ABNORMAL
BACTERIA BLD CULT: ABNORMAL
BACTERIA UR CULT: NORMAL
SIGNIFICANT IND 70042: ABNORMAL
SIGNIFICANT IND 70042: NORMAL
SITE SITE: ABNORMAL
SITE SITE: NORMAL
SOURCE SOURCE: ABNORMAL
SOURCE SOURCE: NORMAL

## 2024-03-01 NOTE — ED NOTES
ERP at bedside. Pt agrees with plan of care discussed by ERP. Maryuri in low position, side rail up for pt safety. Call light within reach. Plan of care on-going

## 2024-03-01 NOTE — ED NOTES
Discharge instructions provided. Pt verbalized understanding of discharge instructions to follow up with urology as scheduled and to return to ER if condition worsens. Pt ambulated out of ER without difficulty.

## 2024-03-01 NOTE — DISCHARGE INSTRUCTIONS
Continue taking the Flomax as prescribed, return immediately if you develop lower abdominal pain, fever nausea flank/side pain, other new concerning symptoms.  Continue taking your other medications as prescribed.

## 2024-03-01 NOTE — ED NOTES
Leg anchor for catheter placed by Jasmyn DUONG. Pt ambulates with no complaint of pain or leakage. ERP at bedside for re-eval

## 2024-03-01 NOTE — ED PROVIDER NOTES
ED Provider Note    CHIEF COMPLAINT  Chief Complaint   Patient presents with    Penis Pain     Pt had haines placed 2 d ago for urinary retention  C/u catheter leaking urine at meatus  Penile pain/burning  Worried about an infection and wants catheter removed  Denies fever or abd pain             HPI/ROS  LIMITATION TO HISTORY   Select: : None  OUTSIDE HISTORIAN(S):  Spouse is at bedside, agrees his symptoms have significantly improved, reports he was an extremis on the 27th    [facility] 78 y[facility] with a significant and complicated past medical history, further delineated below, who was seen here 2 days ago after he developed acute epigastric pain nausea and bloating, he had a extensive workup at that time with imaging showing a distended bladder, but was otherwise reassuring, noted to have 450 cc of urinary retention despite trial of voiding.  He has known history of BPH per the patient he for years has had urinary retention, and they have weighed the risks and benefits of a catheter at that time.  He reports since the catheter was placed, he has had pain in the penis,  which is worsening.   He reports some leakage of urine around the sides of the catheter requiring him to change his pants, denies any abdominal pain, fever he reports the symptoms that he presented to the emergency department with on the 27th have largely resolved.  He has scheduled outpatient follow-up with urology at the beginning of next week.  He denies infectious symptoms.    PAST MEDICAL HISTORY   has a past medical history of Adrenal insufficiency ([facility]), secondary (11/25/2020), Arrhythmia, Arthritis, Atherosclerosis of both carotid arteries, mild (11/25/2020), BPH (benign prostatic hyperplasia) (11/25/2020), Bronchitis, Cancer ([facility]), Carcinoma in situ of respiratory system, CKD (chronic kidney disease) stage 3, GFR 30-59 ml/min ([facility]) (11/25/2020), Colostomy present ([facility]) (11/25/2020), Diverticulosis of colon  (11/25/2020), GERD (gastroesophageal reflux disease) (11/25/2020), High cholesterol, History of atrial tachycardia (11/25/2020), History of malignant melanoma, April 2016 (11/25/2020), History of shingles (11/25/2020), History of stroke, subcortical infarct on MRI April 2019 (11/25/2020), Hyperlipidemia (11/25/2020), Hypertension, Hypothyroid (11/25/2020), Indigestion, Lung cancer (HCC), adenocarcinoma Aug. 2019 (11/25/2020), and Pain.    SURGICAL HISTORY   has a past surgical history that includes other (Left, 2016); other (Left, 2017); other (Right, 2019); and reid by laparoscopy (2/26/2021).    FAMILY HISTORY  Family History   Problem Relation Age of Onset    Cancer Mother         Lung- bone    Cancer Father         Colon?    Cancer Sister         Lung    Cancer Brother         Lung    Cancer Brother         Brain       SOCIAL HISTORY  Social History     Tobacco Use    Smoking status: Never    Smokeless tobacco: Never   Vaping Use    Vaping Use: Never used   Substance and Sexual Activity    Alcohol use: Yes     Alcohol/week: 1.8 oz     Types: 3 Glasses of wine per week     Comment: Occasionally    Drug use: Not Currently     Comment: THC edibles    Sexual activity: Not on file       CURRENT MEDICATIONS  Home Medications    **Home medications have not yet been reviewed for this encounter**         ALLERGIES  Allergies   Allergen Reactions    Gramineae Pollens Itching and Shortness of Breath     Itchy eyes, red face    Cat Hair Extract     Horse Allergy Hives    Other Environmental     Pollen Extract        PHYSICAL EXAM  VITAL SIGNS: /70   Pulse 82   Temp 36.7 °C (98 °F) (Temporal)   Resp 18   Ht 1.829 m (6')   Wt 75.5 kg (166 lb 7.2 oz)   SpO2 97%   BMI 22.57 kg/m²    General: Pleasant male, nontoxic-appearing, chronically ill, alert, spouse at bedside  Head: Normocephalic atraumatic  Eyes: Extraocular motion intact  Neck: Supple, no rigidity  Cardiovascular: Regular rate and rhythm via peripheral  pulses   Respiratory: equal chest rise and fall, no increased work of breathing  Abdomen: Soft nontender no guarding  Musculoskeletal: Warm and well perfused, no peripheral edema  Neuro: Alert, no focal deficits  Integumentary: No wounds or rashes   exam: There is a Payne catheter in place, there is trace erythema around the urethral meatus without discharge, purulence.  The Payne catheter is connected directly to a leg bag, and is not anchored to the patient's thigh.            COURSE & MEDICAL DECISION MAKING        INITIAL ASSESSMENT, COURSE AND PLAN  Care Narrative: 78-year-old male with adrenal insufficiency malignancy on chemotherapy presenting with penile discomfort as Payne catheter placement.  He denies any systemic symptoms of illness and his previous symptoms have largely resolved.  His vital signs here are stable without tachycardia or hypotension.  He is nontoxic-appearing, Payne catheter is in place but is not currently anchored, the leg bag is hanging from the catheter.  He also has long pubic hair which is becoming wrapped around the catheter tubing, and entering his urethra.    Thankfully, the patient has no systemic symptoms of infection has a benign abdominal exam, he is clear non-malodorous urine in the Payne catheter collection bag.    I do not believe the patient would benefit from laboratory testing or imaging given the nature of his complaints today, improvement of systemic symptoms of illness, I also believe that the patient would likely not pass a trial of void given acute inflammation of his urethra, is in addition to what ever initial insult was causing his urinary retention, whether BPH or other pathology.    We discussed trimming of pubic hair so does not irritate the urethra when it becomes wrapped around the catheter, and anchoring the bag to his thigh, as well as the Payne to his thigh, we will conduct an ambulatory trial to ensure the catheter is not leaking.        Ambulatory  trial was performed, patient reports significant improvement of comfort, urinary catheter appears to be functioning and is no longer leaking.  Given his acute symptoms have resolved, he is having no systemic symptoms of illness, he had a thorough workup 2 days ago and his symptoms significantly improved since then, I do not believe he would benefit from further laboratory evaluation at this time, deferred any additional laboratory testing or any imaging.  Patient is aware of the need to follow-up with his urologist on Monday, return precautions for symptoms of urinary tract infection were discussed, patient was advised to drink lots of fluids, and he was discharged in no acute distress, all questions were answered.  We discussed risks of Flomax, need to stop if he has near syncopal symptoms, lightheadedness      ADDITIONAL PROBLEM LIST  Adenocarcinoma    DISPOSITION AND DISCUSSIONS  Discharged home with outpatient follow-up, strict return precautions.  FINAL DIAGNOSIS  1. Problem with Payne catheter, initial encounter (HCC)           Electronically signed by: Kody Kelly M.D., 2/29/2024 7:19 PM

## 2024-03-06 ENCOUNTER — APPOINTMENT (OUTPATIENT)
Dept: RADIOLOGY | Facility: MEDICAL CENTER | Age: 79
DRG: 699 | End: 2024-03-06
Attending: EMERGENCY MEDICINE
Payer: MEDICARE

## 2024-03-06 ENCOUNTER — HOSPITAL ENCOUNTER (INPATIENT)
Facility: MEDICAL CENTER | Age: 79
LOS: 4 days | DRG: 699 | End: 2024-03-10
Attending: EMERGENCY MEDICINE | Admitting: HOSPITALIST
Payer: MEDICARE

## 2024-03-06 DIAGNOSIS — J30.2 SEASONAL ALLERGIES: ICD-10-CM

## 2024-03-06 DIAGNOSIS — K56.609 SBO (SMALL BOWEL OBSTRUCTION) (HCC): ICD-10-CM

## 2024-03-06 DIAGNOSIS — N39.0 ACUTE UTI: ICD-10-CM

## 2024-03-06 PROBLEM — K56.7 ILEUS (HCC): Status: ACTIVE | Noted: 2024-03-06

## 2024-03-06 PROBLEM — E87.6 HYPOKALEMIA: Status: ACTIVE | Noted: 2024-03-06

## 2024-03-06 PROBLEM — E87.1 HYPONATREMIA: Status: ACTIVE | Noted: 2024-03-06

## 2024-03-06 PROBLEM — D53.9 MACROCYTIC ANEMIA: Status: ACTIVE | Noted: 2024-03-06

## 2024-03-06 LAB
ALBUMIN SERPL BCP-MCNC: 3.2 G/DL (ref 3.2–4.9)
ALBUMIN/GLOB SERPL: 0.7 G/DL
ALP SERPL-CCNC: 77 U/L (ref 30–99)
ALT SERPL-CCNC: 8 U/L (ref 2–50)
ANION GAP SERPL CALC-SCNC: 9 MMOL/L (ref 7–16)
APPEARANCE UR: ABNORMAL
AST SERPL-CCNC: 13 U/L (ref 12–45)
BACTERIA #/AREA URNS HPF: ABNORMAL /HPF
BASOPHILS # BLD AUTO: 0.5 % (ref 0–1.8)
BASOPHILS # BLD: 0.04 K/UL (ref 0–0.12)
BILIRUB SERPL-MCNC: 0.7 MG/DL (ref 0.1–1.5)
BILIRUB UR QL STRIP.AUTO: NEGATIVE
BUN SERPL-MCNC: 19 MG/DL (ref 8–22)
CALCIUM ALBUM COR SERPL-MCNC: 8.7 MG/DL (ref 8.5–10.5)
CALCIUM SERPL-MCNC: 8.1 MG/DL (ref 8.4–10.2)
CHLORIDE SERPL-SCNC: 103 MMOL/L (ref 96–112)
CO2 SERPL-SCNC: 22 MMOL/L (ref 20–33)
COLOR UR: YELLOW
CREAT SERPL-MCNC: 1.71 MG/DL (ref 0.5–1.4)
EOSINOPHIL # BLD AUTO: 0.09 K/UL (ref 0–0.51)
EOSINOPHIL NFR BLD: 1.1 % (ref 0–6.9)
EPI CELLS #/AREA URNS HPF: ABNORMAL /HPF
ERYTHROCYTE [DISTWIDTH] IN BLOOD BY AUTOMATED COUNT: 53.2 FL (ref 35.9–50)
FLUAV RNA SPEC QL NAA+PROBE: NEGATIVE
FLUBV RNA SPEC QL NAA+PROBE: NEGATIVE
GFR SERPLBLD CREATININE-BSD FMLA CKD-EPI: 41 ML/MIN/1.73 M 2
GLOBULIN SER CALC-MCNC: 4.4 G/DL (ref 1.9–3.5)
GLUCOSE SERPL-MCNC: 80 MG/DL (ref 65–99)
GLUCOSE UR STRIP.AUTO-MCNC: NEGATIVE MG/DL
HCT VFR BLD AUTO: 37.6 % (ref 42–52)
HGB BLD-MCNC: 13 G/DL (ref 14–18)
IMM GRANULOCYTES # BLD AUTO: 0.08 K/UL (ref 0–0.11)
IMM GRANULOCYTES NFR BLD AUTO: 1 % (ref 0–0.9)
KETONES UR STRIP.AUTO-MCNC: NEGATIVE MG/DL
LACTATE SERPL-SCNC: 0.9 MMOL/L (ref 0.5–2)
LEUKOCYTE ESTERASE UR QL STRIP.AUTO: ABNORMAL
LIPASE SERPL-CCNC: 48 U/L (ref 11–82)
LYMPHOCYTES # BLD AUTO: 0.96 K/UL (ref 1–4.8)
LYMPHOCYTES NFR BLD: 12 % (ref 22–41)
MCH RBC QN AUTO: 34.7 PG (ref 27–33)
MCHC RBC AUTO-ENTMCNC: 34.6 G/DL (ref 32.3–36.5)
MCV RBC AUTO: 100.3 FL (ref 81.4–97.8)
MICRO URNS: ABNORMAL
MONOCYTES # BLD AUTO: 1.06 K/UL (ref 0–0.85)
MONOCYTES NFR BLD AUTO: 13.2 % (ref 0–13.4)
NEUTROPHILS # BLD AUTO: 5.8 K/UL (ref 1.82–7.42)
NEUTROPHILS NFR BLD: 72.2 % (ref 44–72)
NITRITE UR QL STRIP.AUTO: NEGATIVE
NRBC # BLD AUTO: 0 K/UL
NRBC BLD-RTO: 0 /100 WBC (ref 0–0.2)
PH UR STRIP.AUTO: 6 [PH] (ref 5–8)
PLATELET # BLD AUTO: 143 K/UL (ref 164–446)
PLATELET BLD QL SMEAR: NORMAL
PMV BLD AUTO: 9.6 FL (ref 9–12.9)
POTASSIUM SERPL-SCNC: 3.5 MMOL/L (ref 3.6–5.5)
PROT SERPL-MCNC: 7.6 G/DL (ref 6–8.2)
PROT UR QL STRIP: NEGATIVE MG/DL
RBC # BLD AUTO: 3.75 M/UL (ref 4.7–6.1)
RBC # URNS HPF: ABNORMAL /HPF
RBC BLD AUTO: NORMAL
RBC UR QL AUTO: ABNORMAL
RSV RNA SPEC QL NAA+PROBE: NEGATIVE
SARS-COV-2 RNA RESP QL NAA+PROBE: NOTDETECTED
SODIUM SERPL-SCNC: 134 MMOL/L (ref 135–145)
SP GR UR STRIP.AUTO: <=1.005
SPECIMEN SOURCE: NORMAL
WBC # BLD AUTO: 8 K/UL (ref 4.8–10.8)
WBC #/AREA URNS HPF: ABNORMAL /HPF

## 2024-03-06 PROCEDURE — 700105 HCHG RX REV CODE 258: Performed by: EMERGENCY MEDICINE

## 2024-03-06 PROCEDURE — 96374 THER/PROPH/DIAG INJ IV PUSH: CPT

## 2024-03-06 PROCEDURE — 700117 HCHG RX CONTRAST REV CODE 255: Performed by: EMERGENCY MEDICINE

## 2024-03-06 PROCEDURE — 87147 CULTURE TYPE IMMUNOLOGIC: CPT

## 2024-03-06 PROCEDURE — 0241U HCHG SARS-COV-2 COVID-19 NFCT DS RESP RNA 4 TRGT MIC: CPT

## 2024-03-06 PROCEDURE — 74177 CT ABD & PELVIS W/CONTRAST: CPT

## 2024-03-06 PROCEDURE — 99223 1ST HOSP IP/OBS HIGH 75: CPT | Mod: AI | Performed by: HOSPITALIST

## 2024-03-06 PROCEDURE — 700102 HCHG RX REV CODE 250 W/ 637 OVERRIDE(OP): Performed by: EMERGENCY MEDICINE

## 2024-03-06 PROCEDURE — 83690 ASSAY OF LIPASE: CPT

## 2024-03-06 PROCEDURE — 85025 COMPLETE CBC W/AUTO DIFF WBC: CPT

## 2024-03-06 PROCEDURE — 96375 TX/PRO/DX INJ NEW DRUG ADDON: CPT

## 2024-03-06 PROCEDURE — 87077 CULTURE AEROBIC IDENTIFY: CPT

## 2024-03-06 PROCEDURE — 87040 BLOOD CULTURE FOR BACTERIA: CPT

## 2024-03-06 PROCEDURE — 700111 HCHG RX REV CODE 636 W/ 250 OVERRIDE (IP): Mod: JZ | Performed by: EMERGENCY MEDICINE

## 2024-03-06 PROCEDURE — A9270 NON-COVERED ITEM OR SERVICE: HCPCS | Performed by: HOSPITALIST

## 2024-03-06 PROCEDURE — 700111 HCHG RX REV CODE 636 W/ 250 OVERRIDE (IP): Performed by: HOSPITALIST

## 2024-03-06 PROCEDURE — 700105 HCHG RX REV CODE 258: Performed by: HOSPITALIST

## 2024-03-06 PROCEDURE — 71045 X-RAY EXAM CHEST 1 VIEW: CPT

## 2024-03-06 PROCEDURE — 87186 SC STD MICRODIL/AGAR DIL: CPT

## 2024-03-06 PROCEDURE — 83605 ASSAY OF LACTIC ACID: CPT

## 2024-03-06 PROCEDURE — A9270 NON-COVERED ITEM OR SERVICE: HCPCS | Performed by: EMERGENCY MEDICINE

## 2024-03-06 PROCEDURE — 36415 COLL VENOUS BLD VENIPUNCTURE: CPT

## 2024-03-06 PROCEDURE — 81001 URINALYSIS AUTO W/SCOPE: CPT

## 2024-03-06 PROCEDURE — 96376 TX/PRO/DX INJ SAME DRUG ADON: CPT

## 2024-03-06 PROCEDURE — 700102 HCHG RX REV CODE 250 W/ 637 OVERRIDE(OP): Performed by: HOSPITALIST

## 2024-03-06 PROCEDURE — 80053 COMPREHEN METABOLIC PANEL: CPT

## 2024-03-06 PROCEDURE — 770020 HCHG ROOM/CARE - TELE (206)

## 2024-03-06 PROCEDURE — 87086 URINE CULTURE/COLONY COUNT: CPT

## 2024-03-06 PROCEDURE — 99285 EMERGENCY DEPT VISIT HI MDM: CPT

## 2024-03-06 RX ORDER — ACETAMINOPHEN 325 MG/1
650 TABLET ORAL ONCE
Status: COMPLETED | OUTPATIENT
Start: 2024-03-06 | End: 2024-03-06

## 2024-03-06 RX ORDER — MORPHINE SULFATE 4 MG/ML
2 INJECTION INTRAVENOUS
Status: DISCONTINUED | OUTPATIENT
Start: 2024-03-06 | End: 2024-03-10 | Stop reason: HOSPADM

## 2024-03-06 RX ORDER — CHOLECALCIFEROL (VITAMIN D3) 125 MCG
5 CAPSULE ORAL NIGHTLY
Status: DISCONTINUED | OUTPATIENT
Start: 2024-03-06 | End: 2024-03-10 | Stop reason: HOSPADM

## 2024-03-06 RX ORDER — LOSARTAN POTASSIUM 50 MG/1
100 TABLET ORAL DAILY
Status: DISCONTINUED | OUTPATIENT
Start: 2024-03-07 | End: 2024-03-07

## 2024-03-06 RX ORDER — MORPHINE SULFATE 4 MG/ML
4 INJECTION INTRAVENOUS ONCE
Status: COMPLETED | OUTPATIENT
Start: 2024-03-06 | End: 2024-03-06

## 2024-03-06 RX ORDER — ACETAMINOPHEN 325 MG/1
650 TABLET ORAL EVERY 4 HOURS PRN
Status: DISCONTINUED | OUTPATIENT
Start: 2024-03-06 | End: 2024-03-10 | Stop reason: HOSPADM

## 2024-03-06 RX ORDER — SENNOSIDES 8.6 MG
1 TABLET ORAL PRN
COMMUNITY

## 2024-03-06 RX ORDER — SODIUM CHLORIDE 9 MG/ML
INJECTION, SOLUTION INTRAVENOUS CONTINUOUS
Status: DISCONTINUED | OUTPATIENT
Start: 2024-03-06 | End: 2024-03-08

## 2024-03-06 RX ORDER — HYDROCORTISONE 10 MG/1
20 TABLET ORAL 2 TIMES DAILY
Status: DISCONTINUED | OUTPATIENT
Start: 2024-03-06 | End: 2024-03-10 | Stop reason: HOSPADM

## 2024-03-06 RX ORDER — ONDANSETRON 2 MG/ML
4 INJECTION INTRAMUSCULAR; INTRAVENOUS ONCE
Status: COMPLETED | OUTPATIENT
Start: 2024-03-06 | End: 2024-03-06

## 2024-03-06 RX ORDER — AMLODIPINE BESYLATE 5 MG/1
5 TABLET ORAL EVERY MORNING
Status: DISCONTINUED | OUTPATIENT
Start: 2024-03-07 | End: 2024-03-07

## 2024-03-06 RX ORDER — ONDANSETRON 4 MG/1
4 TABLET, ORALLY DISINTEGRATING ORAL EVERY 4 HOURS PRN
Status: DISCONTINUED | OUTPATIENT
Start: 2024-03-06 | End: 2024-03-10 | Stop reason: HOSPADM

## 2024-03-06 RX ORDER — ONDANSETRON 2 MG/ML
4 INJECTION INTRAMUSCULAR; INTRAVENOUS EVERY 4 HOURS PRN
Status: DISCONTINUED | OUTPATIENT
Start: 2024-03-06 | End: 2024-03-10 | Stop reason: HOSPADM

## 2024-03-06 RX ORDER — OXYCODONE HYDROCHLORIDE 5 MG/1
5 TABLET ORAL
Status: DISCONTINUED | OUTPATIENT
Start: 2024-03-06 | End: 2024-03-10 | Stop reason: HOSPADM

## 2024-03-06 RX ORDER — HEPARIN SODIUM 5000 [USP'U]/ML
5000 INJECTION, SOLUTION INTRAVENOUS; SUBCUTANEOUS EVERY 8 HOURS
Status: DISCONTINUED | OUTPATIENT
Start: 2024-03-06 | End: 2024-03-07

## 2024-03-06 RX ORDER — LIOTHYRONINE SODIUM 5 UG/1
10 TABLET ORAL DAILY
Status: DISCONTINUED | OUTPATIENT
Start: 2024-03-07 | End: 2024-03-10 | Stop reason: HOSPADM

## 2024-03-06 RX ORDER — PROCHLORPERAZINE EDISYLATE 5 MG/ML
10 INJECTION INTRAMUSCULAR; INTRAVENOUS EVERY 6 HOURS PRN
Status: DISCONTINUED | OUTPATIENT
Start: 2024-03-06 | End: 2024-03-10 | Stop reason: HOSPADM

## 2024-03-06 RX ORDER — CEFEPIME HYDROCHLORIDE 2 G/1
2 INJECTION, POWDER, FOR SOLUTION INTRAVENOUS ONCE
Status: COMPLETED | OUTPATIENT
Start: 2024-03-06 | End: 2024-03-06

## 2024-03-06 RX ORDER — LEVOTHYROXINE SODIUM 0.12 MG/1
125 TABLET ORAL
Status: DISCONTINUED | OUTPATIENT
Start: 2024-03-07 | End: 2024-03-10 | Stop reason: HOSPADM

## 2024-03-06 RX ORDER — LABETALOL HYDROCHLORIDE 5 MG/ML
10 INJECTION, SOLUTION INTRAVENOUS EVERY 4 HOURS PRN
Status: DISCONTINUED | OUTPATIENT
Start: 2024-03-06 | End: 2024-03-10 | Stop reason: HOSPADM

## 2024-03-06 RX ORDER — OXYCODONE HYDROCHLORIDE 5 MG/1
2.5 TABLET ORAL
Status: DISCONTINUED | OUTPATIENT
Start: 2024-03-06 | End: 2024-03-10 | Stop reason: HOSPADM

## 2024-03-06 RX ORDER — CHOLECALCIFEROL (VITAMIN D3) 125 MCG
5000 CAPSULE ORAL DAILY
COMMUNITY

## 2024-03-06 RX ORDER — SODIUM CHLORIDE, SODIUM LACTATE, POTASSIUM CHLORIDE, AND CALCIUM CHLORIDE .6; .31; .03; .02 G/100ML; G/100ML; G/100ML; G/100ML
1000 INJECTION, SOLUTION INTRAVENOUS
Status: COMPLETED | OUTPATIENT
Start: 2024-03-06 | End: 2024-03-06

## 2024-03-06 RX ADMIN — MORPHINE SULFATE 4 MG: 4 INJECTION, SOLUTION INTRAMUSCULAR; INTRAVENOUS at 14:23

## 2024-03-06 RX ADMIN — Medication 5 MG: at 22:49

## 2024-03-06 RX ADMIN — ONDANSETRON 4 MG: 2 INJECTION INTRAMUSCULAR; INTRAVENOUS at 15:55

## 2024-03-06 RX ADMIN — IOHEXOL 100 ML: 350 INJECTION, SOLUTION INTRAVENOUS at 15:35

## 2024-03-06 RX ADMIN — CEFEPIME 2 G: 2 INJECTION, POWDER, FOR SOLUTION INTRAVENOUS at 14:25

## 2024-03-06 RX ADMIN — ACETAMINOPHEN 650 MG: 325 TABLET ORAL at 22:49

## 2024-03-06 RX ADMIN — ONDANSETRON 4 MG: 2 INJECTION INTRAMUSCULAR; INTRAVENOUS at 14:05

## 2024-03-06 RX ADMIN — HYDROCORTISONE 20 MG: 10 TABLET ORAL at 18:46

## 2024-03-06 RX ADMIN — ONDANSETRON 4 MG: 2 INJECTION INTRAMUSCULAR; INTRAVENOUS at 22:02

## 2024-03-06 RX ADMIN — SODIUM CHLORIDE: 9 INJECTION, SOLUTION INTRAVENOUS at 19:01

## 2024-03-06 RX ADMIN — ACETAMINOPHEN 650 MG: 325 TABLET ORAL at 14:05

## 2024-03-06 RX ADMIN — HEPARIN SODIUM 5000 UNITS: 5000 INJECTION, SOLUTION INTRAVENOUS; SUBCUTANEOUS at 22:02

## 2024-03-06 RX ADMIN — SODIUM CHLORIDE, POTASSIUM CHLORIDE, SODIUM LACTATE AND CALCIUM CHLORIDE 1000 ML: 600; 310; 30; 20 INJECTION, SOLUTION INTRAVENOUS at 15:55

## 2024-03-06 RX ADMIN — SODIUM CHLORIDE: 9 INJECTION, SOLUTION INTRAVENOUS at 22:07

## 2024-03-06 ASSESSMENT — COGNITIVE AND FUNCTIONAL STATUS - GENERAL
SUGGESTED CMS G CODE MODIFIER MOBILITY: CH
DAILY ACTIVITIY SCORE: 24
SUGGESTED CMS G CODE MODIFIER DAILY ACTIVITY: CH
MOBILITY SCORE: 24

## 2024-03-06 ASSESSMENT — ENCOUNTER SYMPTOMS
HEARTBURN: 0
CONSTIPATION: 1
DOUBLE VISION: 0
FOCAL WEAKNESS: 0
PALPITATIONS: 0
RESPIRATORY NEGATIVE: 1
HEMOPTYSIS: 0
COUGH: 0
DIARRHEA: 0
WEAKNESS: 1
VOMITING: 1
NAUSEA: 1
SEIZURES: 0
WHEEZING: 0
DEPRESSION: 1
FEVER: 1
HEADACHES: 1
NERVOUS/ANXIOUS: 0
BLOOD IN STOOL: 0
DIAPHORESIS: 0
EYES NEGATIVE: 1
BRUISES/BLEEDS EASILY: 0
MUSCULOSKELETAL NEGATIVE: 1
CARDIOVASCULAR NEGATIVE: 1
DIZZINESS: 0
ABDOMINAL PAIN: 1
CHILLS: 1
WEIGHT LOSS: 1
LOSS OF CONSCIOUSNESS: 0

## 2024-03-06 ASSESSMENT — PATIENT HEALTH QUESTIONNAIRE - PHQ9
SUM OF ALL RESPONSES TO PHQ9 QUESTIONS 1 AND 2: 0
1. LITTLE INTEREST OR PLEASURE IN DOING THINGS: NOT AT ALL
2. FEELING DOWN, DEPRESSED, IRRITABLE, OR HOPELESS: NOT AT ALL

## 2024-03-06 ASSESSMENT — COPD QUESTIONNAIRES
HAVE YOU SMOKED AT LEAST 100 CIGARETTES IN YOUR ENTIRE LIFE: NO/DON'T KNOW
COPD SCREENING SCORE: 2
DURING THE PAST 4 WEEKS HOW MUCH DID YOU FEEL SHORT OF BREATH: NONE/LITTLE OF THE TIME
DO YOU EVER COUGH UP ANY MUCUS OR PHLEGM?: NO/ONLY WITH OCCASIONAL COLDS OR INFECTIONS

## 2024-03-06 ASSESSMENT — PAIN DESCRIPTION - PAIN TYPE: TYPE: ACUTE PAIN

## 2024-03-06 ASSESSMENT — LIFESTYLE VARIABLES
EVER HAD A DRINK FIRST THING IN THE MORNING TO STEADY YOUR NERVES TO GET RID OF A HANGOVER: NO
HOW MANY TIMES IN THE PAST YEAR HAVE YOU HAD 5 OR MORE DRINKS IN A DAY: 0
TOTAL SCORE: 0
TOTAL SCORE: 0
EVER FELT BAD OR GUILTY ABOUT YOUR DRINKING: NO
AVERAGE NUMBER OF DAYS PER WEEK YOU HAVE A DRINK CONTAINING ALCOHOL: 0
ALCOHOL_USE: NO
HAVE PEOPLE ANNOYED YOU BY CRITICIZING YOUR DRINKING: NO
HAVE YOU EVER FELT YOU SHOULD CUT DOWN ON YOUR DRINKING: NO
TOTAL SCORE: 0
ON A TYPICAL DAY WHEN YOU DRINK ALCOHOL HOW MANY DRINKS DO YOU HAVE: 0
CONSUMPTION TOTAL: NEGATIVE

## 2024-03-06 ASSESSMENT — FIBROSIS 4 INDEX
FIB4 SCORE: 2.44
FIB4 SCORE: 4.03

## 2024-03-06 NOTE — ED PROVIDER NOTES
ED Provider Note    CHIEF COMPLAINT  Chief Complaint   Patient presents with    Constipation     States he was constipated for 3 days, Had BM right when EMS arrives, started formed then diarrhea.     Headache     Headache after BM, resolved after medicated with EMS, NS, 100 mcg Fentanyl, and 4 mg zofran    N/V     Felt nauseous and vomited do to being constipated.     Urinary Retention     Had difficulty urinating,  was able to urinate after BM.  Had urinary retention with haines cath placed that was removed on Monday.        EXTERNAL RECORDS REVIEWED  Outpatient Notes   cancer care, imaging support    HPI/ROS  LIMITATION TO HISTORY   Select: : None  OUTSIDE HISTORIAN(S):  none    Bartolo [facility] is a 78 y.o. male who presents here for evaluation of headache, nausea vomiting, intermittent urinary retention.  Patient states that he is currently undergoing chemotherapy, and was here because he was having some lower abdominal pain with the vomiting.  Patient has fevers at home, and here.  He has been taking medicines over-the-counter for the same.  Patient has no back pain, neck pain, or shortness of breath.    PAST MEDICAL HISTORY   has a past medical history of Adrenal insufficiency ([facility]), secondary (11/25/2020), Arrhythmia, Arthritis, Atherosclerosis of both carotid arteries, mild (11/25/2020), BPH (benign prostatic hyperplasia) (11/25/2020), Bronchitis, Cancer ([facility]), Carcinoma in situ of respiratory system, CKD (chronic kidney disease) stage 3, GFR 30-59 ml/min ([facility]) (11/25/2020), Colostomy present ([facility]) (11/25/2020), Diverticulosis of colon (11/25/2020), GERD (gastroesophageal reflux disease) (11/25/2020), High cholesterol, History of atrial tachycardia (11/25/2020), History of malignant melanoma, April 2016 (11/25/2020), History of shingles (11/25/2020), History of stroke, subcortical infarct on MRI April 2019 (11/25/2020), Hyperlipidemia (11/25/2020), Hypertension, Hypothyroid (11/25/2020),  "Indigestion, Lung cancer (HCC), adenocarcinoma Aug. 2019 (11/25/2020), and Pain.    SURGICAL HISTORY   has a past surgical history that includes other (Left, 2016); other (Left, 2017); other (Right, 2019); and reid by laparoscopy (2/26/2021).    FAMILY HISTORY  Family History   Problem Relation Age of Onset    Cancer Mother         Lung- bone    Cancer Father         Colon?    Cancer Sister         Lung    Cancer Brother         Lung    Cancer Brother         Brain       SOCIAL HISTORY  Social History     Tobacco Use    Smoking status: Never    Smokeless tobacco: Never   Vaping Use    Vaping Use: Never used   Substance and Sexual Activity    Alcohol use: Yes     Alcohol/week: 1.8 oz     Types: 3 Glasses of wine per week     Comment: Occasionally    Drug use: Not Currently     Comment: THC edibles    Sexual activity: Not on file       CURRENT MEDICATIONS  Home Medications       Reviewed by Trent Wheeler (Pharmacy Tech) on 03/06/24 at 1402  Med List Status: Complete     Medication Last Dose Status   amLODIPine (NORVASC) 5 MG Tab 3/5/2024 Active   atorvastatin (LIPITOR) 20 MG Tab 3/5/2024 Active   BISACODYL PO 3/6/2024 Active   cholecalciferol (D-3-5) 5000 UNIT Cap 3/5/2024 Active   dicyclomine (BENTYL) 20 MG Tab 3/5/2024 Active   famotidine (PEPCID) 20 MG Tab 3/5/2024 Active   hydrocortisone (CORTEF) 10 MG Tab 3/5/2024 Active   liothyronine (CYTOMEL) 5 MCG Tab 3/6/2024 Active   losartan (COZAAR) 100 MG Tab 3/5/2024 Active   NEEDLE, DISP, 25 G (BD DISP NEEDLES) 25G X 5/8\" Misc Unknown Active   omeprazole (PRILOSEC) 40 MG delayed-release capsule 3/5/2024 Active   ondansetron (ZOFRAN ODT) 4 MG TABLET DISPERSIBLE 3/6/2024 Active   potassium chloride (KLOR-CON) 8 MEQ tablet 3/5/2024 Active   Sennosides (SENNA) 8.6 MG Tab 3/6/2024 Active   tadalafil (CIALIS) 20 MG tablet > 2 days Active   TAGRISSO 80 MG Tab 3/6/2024 Active   tamsulosin (FLOMAX) 0.4 MG capsule 3/6/2024 Active   testosterone cypionate " (DEPO-TESTOSTERONE) 200 MG/ML Solution injection 2/25/2024 Active   TIROSINT 125 MCG Cap 3/6/2024 Active                    ALLERGIES  Allergies   Allergen Reactions    Gramineae Pollens Itching and Shortness of Breath     Itchy eyes, red face    Cat Hair Extract Hives and Itching    Horse Allergy Hives    Other Environmental     Pollen Extract Runny Nose and Itching     .       PHYSICAL EXAM  VITAL SIGNS: BP 93/47   Pulse 78   Temp 36.9 °C (98.4 °F) (Oral)   Resp 18   Ht 1.829 m (6')   Wt 75.3 kg (166 lb)   SpO2 95%   BMI 22.51 kg/m²    Constitutional: Well developed, well nourished. No acute distress.  HEENT: Normocephalic, atraumatic. Posterior pharynx clear and moist.  Eyes:  EOMI. Normal sclera.  Neck: Supple, Full range of motion, nontender. No meningeal signs.   Chest/Pulmonary: clear to ausculation. Symmetrical expansion.   Cardio: Regular rate and rhythm with no murmur.   Abdomen: Soft, nontender. No peritoneal signs. No guarding. No palpable masses.  Back: No CVA tenderness, nontender midline, no step offs.  Musculoskeletal: No deformity, no edema, neurovascular intact.   Neuro: Clear speech, appropriate, cooperative, cranial nerves II-XII grossly intact.  Psych: Normal mood and affect      DIAGNOSTIC STUDIES / PROCEDURES  Results for orders placed or performed during the hospital encounter of 03/06/24   CBC WITH DIFFERENTIAL   Result Value Ref Range    WBC 8.0 4.8 - 10.8 K/uL    RBC 3.75 (L) 4.70 - 6.10 M/uL    Hemoglobin 13.0 (L) 14.0 - 18.0 g/dL    Hematocrit 37.6 (L) 42.0 - 52.0 %    .3 (H) 81.4 - 97.8 fL    MCH 34.7 (H) 27.0 - 33.0 pg    MCHC 34.6 32.3 - 36.5 g/dL    RDW 53.2 (H) 35.9 - 50.0 fL    Platelet Count 143 (L) 164 - 446 K/uL    MPV 9.6 9.0 - 12.9 fL    Neutrophils-Polys 72.20 (H) 44.00 - 72.00 %    Lymphocytes 12.00 (L) 22.00 - 41.00 %    Monocytes 13.20 0.00 - 13.40 %    Eosinophils 1.10 0.00 - 6.90 %    Basophils 0.50 0.00 - 1.80 %    Immature Granulocytes 1.00 (H) 0.00 -  0.90 %    Nucleated RBC 0.00 0.00 - 0.20 /100 WBC    Neutrophils (Absolute) 5.80 1.82 - 7.42 K/uL    Lymphs (Absolute) 0.96 (L) 1.00 - 4.80 K/uL    Monos (Absolute) 1.06 (H) 0.00 - 0.85 K/uL    Eos (Absolute) 0.09 0.00 - 0.51 K/uL    Baso (Absolute) 0.04 0.00 - 0.12 K/uL    Immature Granulocytes (abs) 0.08 0.00 - 0.11 K/uL    NRBC (Absolute) 0.00 K/uL   COMP METABOLIC PANEL   Result Value Ref Range    Sodium 134 (L) 135 - 145 mmol/L    Potassium 3.5 (L) 3.6 - 5.5 mmol/L    Chloride 103 96 - 112 mmol/L    Co2 22 20 - 33 mmol/L    Anion Gap 9.0 7.0 - 16.0    Glucose 80 65 - 99 mg/dL    Bun 19 8 - 22 mg/dL    Creatinine 1.71 (H) 0.50 - 1.40 mg/dL    Calcium 8.1 (L) 8.4 - 10.2 mg/dL    Correct Calcium 8.7 8.5 - 10.5 mg/dL    AST(SGOT) 13 12 - 45 U/L    ALT(SGPT) 8 2 - 50 U/L    Alkaline Phosphatase 77 30 - 99 U/L    Total Bilirubin 0.7 0.1 - 1.5 mg/dL    Albumin 3.2 3.2 - 4.9 g/dL    Total Protein 7.6 6.0 - 8.2 g/dL    Globulin 4.4 (H) 1.9 - 3.5 g/dL    A-G Ratio 0.7 g/dL   LIPASE   Result Value Ref Range    Lipase 48 11 - 82 U/L   URINALYSIS,CULTURE IF INDICATED    Specimen: Urine, Clean Catch   Result Value Ref Range    Color Yellow     Character Hazy (A)     Specific Gravity <=1.005 <1.035    Ph 6.0 5.0 - 8.0    Glucose Negative Negative mg/dL    Ketones Negative Negative mg/dL    Protein Negative Negative mg/dL    Bilirubin Negative Negative    Nitrite Negative Negative    Leukocyte Esterase Small (A) Negative    Occult Blood Large (A) Negative    Micro Urine Req Microscopic    ESTIMATED GFR   Result Value Ref Range    GFR (CKD-EPI) 41 (A) >60 mL/min/1.73 m 2   CoV-2, FLU A/B, and RSV by PCR (2-4 Hours CEPHEID) : Collect NP swab in VTM    Specimen: Respirate   Result Value Ref Range    Influenza virus A RNA Negative Negative    Influenza virus B, PCR Negative Negative    RSV, PCR Negative Negative    SARS-CoV-2 by PCR NotDetected     SARS-CoV-2 Source NP Swab    PLATELET ESTIMATE   Result Value Ref Range    Plt  Estimation Normal    MORPHOLOGY   Result Value Ref Range    RBC Morphology Normal    LACTIC ACID   Result Value Ref Range    Lactic Acid 0.9 0.5 - 2.0 mmol/L   URINE MICROSCOPIC (W/UA)   Result Value Ref Range    WBC 10-20 (A) /hpf    RBC  (A) /hpf    Bacteria Few (A) None /hpf    Epithelial Cells Few Few /hpf   URINE CULTURE(NEW)    Specimen: Urine   Result Value Ref Range    Significant Indicator NEG     Source UR     Site -     Culture Result -          RADIOLOGY  I have independently interpreted the diagnostic imaging associated with this visit and am waiting the final reading from the radiologist.   My preliminary interpretation is as follows: see below  Radiologist interpretation:   CT-ABDOMEN-PELVIS WITH   Final Result      1.  Multiple mildly dilated loops of fluid-filled small intestine with scattered air/fluid levels likely representing partial small bowel obstruction versus ileus.      2.  Fatty liver.      3.  Small right pleural effusion with right lung base atelectasis.      4.  Prior cholecystectomy.      5.  Enlarged prostate gland. There are surgical changes involving the sigmoid.      6.  Multiple sclerotic foci involving the pelvis and lumbar spine likely representing bony metastases, unchanged from comparison.      DX-CHEST-PORTABLE (1 VIEW)   Final Result      Linear atelectasis within the right lung base with a small right pleural effusion.            COURSE & MEDICAL DECISION MAKING    Patient will be admitted to the hospital service    INITIAL ASSESSMENT, COURSE AND PLAN  Care Narrative: This is a 78-year-old male here for evaluation of fever and dysuria.  Is noted patient will be admitted to the hospital service for IV antibiotics.  Patient also has likely ileus, as he has not had any further vomiting, and does have bowel movement earlier today.    DISPOSITION AND DISCUSSIONS  I have discussed management of the patient with the following physicians and GERA's: Hospitalist      FINAL  DIAGNOSIS  UTI  Ileus versus SBO       Electronically signed by: Sherif Patterson D.O., 3/6/2024 3:22 PM

## 2024-03-06 NOTE — ED NOTES
Pt stated that his nausea is back, Pt medicated per MAR, Pt given another cup of ice chips, Pt and spouse both denied having any other needs at this time, no distress noted;

## 2024-03-06 NOTE — ED NOTES
Medication history reviewed with pt. Med rec is complete.  Allergies reviewed, per pt    Pts wife had a list of medications on her phone, went over list of mediciaotns with wife an pt.    Pt reports that he is not using his ASTELIN 137MCG/Spray in the last 30 days or longer.    Pt receives his chemo treatment at [facility] ([phone]) every Wednesday for 2 weeks then off on one Wednesday.  Pt reports that he missed his chemo treatment today.     Patient has not had any outpatient antibiotics in the last 30 days.    Pt is not on any anticoagulants

## 2024-03-06 NOTE — ED NOTES
Pt given another cup of ice chips, Pt denied having any further needs at this time, no distress noted;

## 2024-03-06 NOTE — ED TRIAGE NOTES
Chief Complaint   Patient presents with    Constipation     States he was constipated for 3 days, Had BM right when EMS arrives, started formed then diarrhea.     Headache     Headache after BM, resolved after medicated with EMS, NS, 100 mcg Fentanyl, and 4 mg zofran    N/V     Felt nauseous and vomited do to being constipated.     Urinary Retention     Had difficulty urinating,  was able to urinate after BM.  Had urinary retention with haines cath placed that was removed on Monday.      /70   Pulse 89   Temp 37.7 °C (99.9 °F) (Temporal)   Resp 18   Ht 1.829 m (6')   Wt 75.3 kg (166 lb)   SpO2 90% Comment: plaed on 2L satting 96%  BMI 22.51 kg/m²     BIB [facility] from home    Pt is actively receiving Chemo for Stage 4 Lung cancer, was suppose to have today nut did not feel well enough.

## 2024-03-07 PROBLEM — R31.9 HEMATURIA: Status: ACTIVE | Noted: 2024-03-07

## 2024-03-07 LAB
ANION GAP SERPL CALC-SCNC: 11 MMOL/L (ref 7–16)
BUN SERPL-MCNC: 21 MG/DL (ref 8–22)
CALCIUM SERPL-MCNC: 7.7 MG/DL (ref 8.4–10.2)
CHLORIDE SERPL-SCNC: 102 MMOL/L (ref 96–112)
CO2 SERPL-SCNC: 21 MMOL/L (ref 20–33)
CREAT SERPL-MCNC: 2.07 MG/DL (ref 0.5–1.4)
ERYTHROCYTE [DISTWIDTH] IN BLOOD BY AUTOMATED COUNT: 54.5 FL (ref 35.9–50)
GFR SERPLBLD CREATININE-BSD FMLA CKD-EPI: 32 ML/MIN/1.73 M 2
GLUCOSE SERPL-MCNC: 88 MG/DL (ref 65–99)
HCT VFR BLD AUTO: 35.3 % (ref 42–52)
HGB BLD-MCNC: 11.8 G/DL (ref 14–18)
MCH RBC QN AUTO: 33.8 PG (ref 27–33)
MCHC RBC AUTO-ENTMCNC: 33.4 G/DL (ref 32.3–36.5)
MCV RBC AUTO: 101.1 FL (ref 81.4–97.8)
PLATELET # BLD AUTO: 173 K/UL (ref 164–446)
PMV BLD AUTO: 8.6 FL (ref 9–12.9)
POTASSIUM SERPL-SCNC: 4.6 MMOL/L (ref 3.6–5.5)
RBC # BLD AUTO: 3.49 M/UL (ref 4.7–6.1)
SODIUM SERPL-SCNC: 134 MMOL/L (ref 135–145)
WBC # BLD AUTO: 14.4 K/UL (ref 4.8–10.8)

## 2024-03-07 PROCEDURE — 700111 HCHG RX REV CODE 636 W/ 250 OVERRIDE (IP): Performed by: HOSPITALIST

## 2024-03-07 PROCEDURE — 51798 US URINE CAPACITY MEASURE: CPT

## 2024-03-07 PROCEDURE — 85027 COMPLETE CBC AUTOMATED: CPT

## 2024-03-07 PROCEDURE — 700102 HCHG RX REV CODE 250 W/ 637 OVERRIDE(OP): Performed by: HOSPITALIST

## 2024-03-07 PROCEDURE — 36415 COLL VENOUS BLD VENIPUNCTURE: CPT

## 2024-03-07 PROCEDURE — 99233 SBSQ HOSP IP/OBS HIGH 50: CPT | Performed by: INTERNAL MEDICINE

## 2024-03-07 PROCEDURE — A9270 NON-COVERED ITEM OR SERVICE: HCPCS | Performed by: INTERNAL MEDICINE

## 2024-03-07 PROCEDURE — 700105 HCHG RX REV CODE 258: Performed by: HOSPITALIST

## 2024-03-07 PROCEDURE — 700102 HCHG RX REV CODE 250 W/ 637 OVERRIDE(OP): Performed by: INTERNAL MEDICINE

## 2024-03-07 PROCEDURE — A9270 NON-COVERED ITEM OR SERVICE: HCPCS | Performed by: HOSPITALIST

## 2024-03-07 PROCEDURE — 80048 BASIC METABOLIC PNL TOTAL CA: CPT

## 2024-03-07 PROCEDURE — 94760 N-INVAS EAR/PLS OXIMETRY 1: CPT

## 2024-03-07 PROCEDURE — 770020 HCHG ROOM/CARE - TELE (206)

## 2024-03-07 RX ORDER — TAMSULOSIN HYDROCHLORIDE 0.4 MG/1
0.4 CAPSULE ORAL
Status: DISCONTINUED | OUTPATIENT
Start: 2024-03-07 | End: 2024-03-10 | Stop reason: HOSPADM

## 2024-03-07 RX ADMIN — SODIUM CHLORIDE: 9 INJECTION, SOLUTION INTRAVENOUS at 08:52

## 2024-03-07 RX ADMIN — LIOTHYRONINE SODIUM 10 MCG: 5 TABLET ORAL at 05:10

## 2024-03-07 RX ADMIN — HEPARIN SODIUM 5000 UNITS: 5000 INJECTION, SOLUTION INTRAVENOUS; SUBCUTANEOUS at 05:10

## 2024-03-07 RX ADMIN — HYDROCORTISONE 20 MG: 10 TABLET ORAL at 05:10

## 2024-03-07 RX ADMIN — ONDANSETRON 4 MG: 2 INJECTION INTRAMUSCULAR; INTRAVENOUS at 19:41

## 2024-03-07 RX ADMIN — Medication 5 MG: at 21:05

## 2024-03-07 RX ADMIN — PROCHLORPERAZINE EDISYLATE 10 MG: 5 INJECTION INTRAMUSCULAR; INTRAVENOUS at 21:07

## 2024-03-07 RX ADMIN — TAMSULOSIN HYDROCHLORIDE 0.4 MG: 0.4 CAPSULE ORAL at 13:18

## 2024-03-07 RX ADMIN — LEVOTHYROXINE SODIUM 125 MCG: 0.12 TABLET ORAL at 05:10

## 2024-03-07 RX ADMIN — PROCHLORPERAZINE EDISYLATE 10 MG: 5 INJECTION INTRAMUSCULAR; INTRAVENOUS at 04:04

## 2024-03-07 RX ADMIN — ACETAMINOPHEN 650 MG: 325 TABLET ORAL at 19:37

## 2024-03-07 RX ADMIN — HYDROCORTISONE 20 MG: 10 TABLET ORAL at 18:14

## 2024-03-07 RX ADMIN — ACETAMINOPHEN 650 MG: 325 TABLET ORAL at 13:04

## 2024-03-07 RX ADMIN — SODIUM CHLORIDE: 9 INJECTION, SOLUTION INTRAVENOUS at 18:19

## 2024-03-07 RX ADMIN — CEFTRIAXONE SODIUM 1000 MG: 1 INJECTION, POWDER, FOR SOLUTION INTRAMUSCULAR; INTRAVENOUS at 05:16

## 2024-03-07 ASSESSMENT — ENCOUNTER SYMPTOMS
FALLS: 0
NAUSEA: 0
SORE THROAT: 0
HEADACHES: 0
SEIZURES: 0
COUGH: 0
SHORTNESS OF BREATH: 0
CHILLS: 0
FEVER: 0
HALLUCINATIONS: 0
PALPITATIONS: 0
ABDOMINAL PAIN: 0
BLURRED VISION: 0
VOMITING: 0
DOUBLE VISION: 0
BACK PAIN: 0

## 2024-03-07 ASSESSMENT — PAIN DESCRIPTION - PAIN TYPE
TYPE: ACUTE PAIN

## 2024-03-07 ASSESSMENT — FIBROSIS 4 INDEX: FIB4 SCORE: 2.05

## 2024-03-07 ASSESSMENT — LIFESTYLE VARIABLES: SUBSTANCE_ABUSE: 0

## 2024-03-07 NOTE — PROGRESS NOTES
Telemetry Shift Summary     Rhythm SR with BBB  HR Range 65-70  Ectopy fPAC;rPVC  Measurements 0.20/0.12/0.42           Normal Values  Rhythm SR  HR Range    Measurements 0.12-0.20 / 0.06-0.10  / 0.30-0.52

## 2024-03-07 NOTE — H&P
Hospital Medicine History & Physical Note    Date of Service  3/6/2024    Primary Care Physician  Lesly Munoz M.D.    Consultants  None    Specialist Names: None    Code Status  Full Code    Chief Complaint  Chief Complaint   Patient presents with    Constipation     States he was constipated for 3 days, Had BM right when EMS arrives, started formed then diarrhea.     Headache     Headache after BM, resolved after medicated with EMS, NS, 100 mcg Fentanyl, and 4 mg zofran    N/V     Felt nauseous and vomited do to being constipated.     Urinary Retention     Had difficulty urinating,  was able to urinate after BM.  Had urinary retention with haines cath placed that was removed on Monday.        History of Presenting Illness  Bartolo Berrios [facility] is a 78 y.o. male who presented 3/6/2024 with complaints of fevers, chills, generalized abdominal pain, constipation, headache,.  The patient says that last Thursday he came into the emergency room because he could not urinate.  At that point they did a straight cath on him.  He went home and afterwards the next day he was started to have urinary tract problems.  He started to have pain every time he urinated and he was also having fevers and chills that started to come on slowly but today was the worst.  The patient thus came into the emergency room for evaluation is found to have a complicated urinary tract infection along with an ileus.  The patient does have an adenocarcinoma that was treated at [facility] in the past which may be metastatic to his intestinal tract causing some early ileus.  He does not have a specific site for an obstruction.  We will keep him n.p.o. for now give fluid resuscitation he will be able to take medications by mouth.  He will need to be on IV antibiotics and pain management.    I discussed the plan of care with patient, family, bedside RN, and emergency room physician Dr. Patterson .    Review of Systems  Review of Systems    Constitutional:  Positive for chills, fever, malaise/fatigue and weight loss. Negative for diaphoresis.   HENT: Negative.     Eyes: Negative.  Negative for double vision.   Respiratory: Negative.  Negative for cough, hemoptysis and wheezing.    Cardiovascular: Negative.  Negative for chest pain, palpitations and leg swelling.   Gastrointestinal:  Positive for abdominal pain, constipation, nausea and vomiting. Negative for blood in stool, diarrhea and heartburn.   Genitourinary:  Positive for dysuria, frequency and urgency. Negative for hematuria.   Musculoskeletal: Negative.  Negative for joint pain.   Skin: Negative.  Negative for itching and rash.   Neurological:  Positive for weakness and headaches. Negative for dizziness, focal weakness, seizures and loss of consciousness.   Endo/Heme/Allergies: Negative.  Does not bruise/bleed easily.   Psychiatric/Behavioral:  Positive for depression. Negative for suicidal ideas. The patient is not nervous/anxious.    All other systems reviewed and are negative.      Past Medical History   has a past medical history of Adrenal insufficiency ([facility]), secondary (11/25/2020), Arrhythmia, Arthritis, Atherosclerosis of both carotid arteries, mild (11/25/2020), BPH (benign prostatic hyperplasia) (11/25/2020), Bronchitis, Cancer ([facility]), Carcinoma in situ of respiratory system, CKD (chronic kidney disease) stage 3, GFR 30-59 ml/min ([facility]) (11/25/2020), Colostomy present ([facility]) (11/25/2020), Diverticulosis of colon (11/25/2020), GERD (gastroesophageal reflux disease) (11/25/2020), High cholesterol, History of atrial tachycardia (11/25/2020), History of malignant melanoma, April 2016 (11/25/2020), History of shingles (11/25/2020), History of stroke, subcortical infarct on MRI April 2019 (11/25/2020), Hyperlipidemia (11/25/2020), Hypertension, Hypothyroid (11/25/2020), Indigestion, Lung cancer ([facility]), adenocarcinoma Aug. 2019 (11/25/2020), and Pain.    Surgical History   has a past surgical  "history that includes other (Left, 2016); other (Left, 2017); other (Right, 2019); and reid by laparoscopy (2/26/2021).     Family History  family history includes Cancer in his brother, brother, father, mother, and sister.   Family history reviewed with patient. There is no family history that is pertinent to the chief complaint.     Social History   reports that he has never smoked. He has never used smokeless tobacco. He reports current alcohol use of about 1.8 oz of alcohol per week. He reports that he does not currently use drugs.    Allergies  Allergies   Allergen Reactions    Gramineae Pollens Itching and Shortness of Breath     Itchy eyes, red face    Cat Hair Extract Hives and Itching    Horse Allergy Hives    Other Environmental     Pollen Extract Runny Nose and Itching     .       Medications  Prior to Admission Medications   Prescriptions Last Dose Informant Patient Reported? Taking?   BISACODYL PO 3/6/2024 at 0800 Patient, Significant Other Yes Yes   Sig: Take 2 Tablets by mouth as needed. (OTC)  Indications: Constipation   NEEDLE, DISP, 25 G (BD DISP NEEDLES) 25G X 5/8\" Misc Unknown at unknown Patient, Significant Other No No   Sig: Use as directed for testosterone injections every 7 days   Sennosides (SENNA) 8.6 MG Tab 3/6/2024 at 0800 Patient, Significant Other Yes Yes   Sig: Take 1 Tablet by mouth as needed. Pts wife RX  Indications: Constipation   TAGRISSO 80 MG Tab 3/6/2024 at 0800 Patient, Significant Other Yes Yes   Sig: Take 80 mg by mouth every day.   TIROSINT 125 MCG Cap 3/6/2024 at 0800 Patient, Significant Other Yes Yes   Sig: Take 125 mcg by mouth every morning on an empty stomach.   amLODIPine (NORVASC) 5 MG Tab 3/5/2024 at 0830 Patient, Significant Other No Yes   Sig: Take 1 Tablet by mouth every morning.   atorvastatin (LIPITOR) 20 MG Tab 3/5/2024 at 0830 Patient, Significant Other No Yes   Sig: Take 1 Tablet by mouth every day.   cholecalciferol (D-3-5) 5000 UNIT Cap 3/5/2024 at 0830 " Patient, Significant Other Yes Yes   Sig: Take 5,000 Units by mouth every day.   dicyclomine (BENTYL) 20 MG Tab 3/5/2024 at 0900 Patient, Significant Other No Yes   Sig: Take 1 Tablet by mouth every 6 hours as needed (Abdominal Bloating).   famotidine (PEPCID) 20 MG Tab 3/5/2024 at 1500 Patient, Significant Other No Yes   Sig: Take 1 Tablet by mouth every evening.   hydrocortisone (CORTEF) 10 MG Tab 3/5/2024 at 1500 Patient, Significant Other Yes Yes   Sig: Take 2 Tablets by mouth 2 times a day.   liothyronine (CYTOMEL) 5 MCG Tab 3/6/2024 at 0800 Patient, Significant Other Yes Yes   Sig: Take 10 mcg by mouth every day.   losartan (COZAAR) 100 MG Tab 3/5/2024 at 0830 Patient, Significant Other No Yes   Sig: Take 1 Tablet by mouth every day.   omeprazole (PRILOSEC) 40 MG delayed-release capsule 3/5/2024 at 0830 Patient, Significant Other No Yes   Sig: Take 1 Capsule by mouth every day.   ondansetron (ZOFRAN ODT) 4 MG TABLET DISPERSIBLE 3/6/2024 at 0800 Patient, Significant Other No Yes   Sig: Take 1 Tablet by mouth every 6 hours as needed for Nausea/Vomiting.   potassium chloride (KLOR-CON) 8 MEQ tablet 3/5/2024 at 0830 Patient, Significant Other No Yes   Sig: Take 2 Tablets by mouth every day.   tadalafil (CIALIS) 20 MG tablet > 2 days at Unknown Patient, Significant Other Yes No   Sig: Take 20 mg by mouth 1 time a day as needed for Erectile Dysfunction. for erectile dysfunction   tamsulosin (FLOMAX) 0.4 MG capsule 3/6/2024 at 0800 Patient, Significant Other No Yes   Sig: Take 1 Capsule by mouth every day.   testosterone cypionate (DEPO-TESTOSTERONE) 200 MG/ML Solution injection 2/25/2024 at Unknown Patient, Significant Other Yes No   Sig: Inject 120 mg into the shoulder, thigh, or buttocks every 7 days. On Sunday      Facility-Administered Medications: None       Physical Exam  Temp:  [36.9 °C (98.4 °F)-39.3 °C (102.8 °F)] 36.9 °C (98.4 °F)  Pulse:  [78-89] 80  Resp:  [18] 18  BP: ()/(44-70) 119/56  SpO2:  [90  %-96 %] 92 %  Blood Pressure : 119/56   Temperature: 36.9 °C (98.4 °F)   Pulse: 80   Respiration: 18   Pulse Oximetry: 92 %       Physical Exam  Vitals and nursing note reviewed. Exam conducted with a chaperone present.   Constitutional:       General: He is not in acute distress.     Appearance: Normal appearance. He is well-developed and normal weight. He is ill-appearing. He is not toxic-appearing or diaphoretic.   HENT:      Head: Normocephalic and atraumatic.      Right Ear: External ear normal.      Left Ear: External ear normal.      Nose: Nose normal.      Mouth/Throat:      Mouth: Mucous membranes are dry.      Pharynx: Oropharynx is clear.   Eyes:      Extraocular Movements: Extraocular movements intact.      Conjunctiva/sclera: Conjunctivae normal.      Pupils: Pupils are equal, round, and reactive to light.   Neck:      Thyroid: No thyromegaly.      Vascular: No JVD.   Cardiovascular:      Rate and Rhythm: Normal rate and regular rhythm.      Pulses: Normal pulses.      Heart sounds: Normal heart sounds.   Pulmonary:      Effort: Pulmonary effort is normal.      Breath sounds: Normal breath sounds.   Chest:      Chest wall: No tenderness.   Abdominal:      General: Abdomen is flat. Bowel sounds are normal. There is no distension.      Palpations: Abdomen is soft. There is no mass.      Tenderness: There is generalized abdominal tenderness. There is no guarding or rebound.   Musculoskeletal:         General: Normal range of motion.      Cervical back: Normal range of motion and neck supple.      Right lower leg: No edema.      Left lower leg: No edema.   Lymphadenopathy:      Cervical: No cervical adenopathy.   Skin:     General: Skin is warm and dry.      Capillary Refill: Capillary refill takes more than 3 seconds.      Findings: No rash.   Neurological:      General: No focal deficit present.      Mental Status: He is alert and oriented to person, place, and time. Mental status is at baseline.       "GCS: GCS eye subscore is 4. GCS verbal subscore is 5. GCS motor subscore is 6.      Cranial Nerves: No cranial nerve deficit.      Motor: Weakness present.      Deep Tendon Reflexes: Reflexes are normal and symmetric.   Psychiatric:         Attention and Perception: He is inattentive.         Mood and Affect: Mood is anxious. Affect is flat.         Speech: Speech is delayed.         Behavior: Behavior is slowed.         Thought Content: Thought content normal.         Judgment: Judgment normal.         Laboratory:  Recent Labs     03/06/24  1245   WBC 8.0   RBC 3.75*   HEMOGLOBIN 13.0*   HEMATOCRIT 37.6*   .3*   MCH 34.7*   MCHC 34.6   RDW 53.2*   PLATELETCT 143*   MPV 9.6     Recent Labs     03/06/24  1245   SODIUM 134*   POTASSIUM 3.5*   CHLORIDE 103   CO2 22   GLUCOSE 80   BUN 19   CREATININE 1.71*   CALCIUM 8.1*     Recent Labs     03/06/24  1245   ALTSGPT 8   ASTSGOT 13   ALKPHOSPHAT 77   TBILIRUBIN 0.7   LIPASE 48   GLUCOSE 80         No results for input(s): \"NTPROBNP\" in the last 72 hours.      No results for input(s): \"TROPONINT\" in the last 72 hours.    Imaging:  CT-ABDOMEN-PELVIS WITH   Final Result      1.  Multiple mildly dilated loops of fluid-filled small intestine with scattered air/fluid levels likely representing partial small bowel obstruction versus ileus.      2.  Fatty liver.      3.  Small right pleural effusion with right lung base atelectasis.      4.  Prior cholecystectomy.      5.  Enlarged prostate gland. There are surgical changes involving the sigmoid.      6.  Multiple sclerotic foci involving the pelvis and lumbar spine likely representing bony metastases, unchanged from comparison.      DX-CHEST-PORTABLE (1 VIEW)   Final Result      Linear atelectasis within the right lung base with a small right pleural effusion.          X-Ray:  I have personally reviewed the images and compared with prior images.  EKG:  I have personally reviewed the images and compared with prior " images.    Assessment/Plan:  Justification for Admission Status  I anticipate this patient will require at least two midnights for appropriate medical management, necessitating inpatient admission because patient has a complicated urinary tract infection with an ileus will require antibiotics and fluid administration n.p.o. status and will need to be here at least 48 hours.    Patient will need a Telemetry bed on MEDICAL service .  The need is secondary to ileus with urinary tract infection.    * Complicated urinary tract infection- (present on admission)  Assessment & Plan  Patient secondary to benign prostatic hypertrophy has developed worsening symptoms and last Thursday was unable to urinate.  At that point he came to the emergency room and he had to be straight catheter or a sample obstruction.  Next day the patient went home and started to have worsening urinary tract issues with burning and frequency  This continued to escalate until today and this the patient came back and at this point he is found to have a full-blown urinary tract infection.  Patient also has an ileus the ileus may be related to the urinary tract infection or to the metastatic adenocarcinoma of the lung.  Will treat the infection of the bladder with IV Rocephin and monitor blood and urine cultures  Give fluid resuscitation as well    Ileus (HCC)  Assessment & Plan  Patient has developed an ileus which may be secondary to metastatic spread from the adenocarcinoma with carcinomatosis.  Continue to monitor ileus  N.p.o. except for medications  Fluid resuscitation  Pain management    Hyponatremia- (present on admission)  Assessment & Plan  Secondary to the ileus with dehydration  Give fluid resuscitation and monitor    Hypokalemia- (present on admission)  Assessment & Plan  Potassium is 3.5  Continue with potassium supplementation    Adenocarcinoma of lung (HCC), stage 4 ([facility] Oct. 2021)- (present on admission)  Assessment &  Plan  Metastatic adenocarcinoma the lung  Was treated at [facility]  He continues to have chemotherapy on a daily basis but right now with the ileus may not be able to tolerate that so we are holding that temporarily especially with a urinary tract infection    Hypertension- (present on admission)  Assessment & Plan  Optimize blood pressure management keep stop blood pressure less than 140 diastolic under 90  Continue with Norvasc 5 mg daily, and losartan 100 mg daily.    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  Continue to monitor    Adrenal insufficiency (HCC), secondary- (present on admission)  Assessment & Plan  Continue with adrenal supplementation the patient in the past did have melanoma in 2016 and was treated with immunotherapy from which she developed complications and had to be treated with high-dose of steroids and since that he has been adrenal insufficiency.    Macrocytic anemia- (present on admission)  Assessment & Plan  Check B12 level and folic acid level    Chronic pain- (present on admission)  Assessment & Plan  Continue with aggressive pain management    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)  Assessment & Plan  Did have to have a Payne catheter placed  This may be the cause of his infection  Continue with Flomax    Vitamin D deficiency- (present on admission)  Assessment & Plan  Vitamin D supplementation    GERD (gastroesophageal reflux disease)- (present on admission)  Assessment & Plan  PPI therapy    Chronic kidney disease, stage 3a (HCC)- (present on admission)  Assessment & Plan  Monitor renal functions avoid nephrotoxic medications  Give fluid resuscitation    Hypothyroid- (present on admission)  Assessment & Plan  Continue with Synthroid supplementation with Cytomel 10 mcg daily also give levothyroxine 125 mcg daily  Most recent TSH 3.270          VTE prophylaxis:  SCDs/TEDs

## 2024-03-07 NOTE — ASSESSMENT & PLAN NOTE
Metastatic adenocarcinoma the lung  Was treated at [facility]  He continues to have chemotherapy on a daily basis but right now with the ileus may not be able to tolerate that so we are holding that temporarily especially with a urinary tract infection

## 2024-03-07 NOTE — ASSESSMENT & PLAN NOTE
Continue with Synthroid supplementation with Cytomel 10 mcg daily also give levothyroxine 125 mcg daily  Most recent TSH 3.270

## 2024-03-07 NOTE — PROGRESS NOTES
This RN expressed concern regarding soft Bps despite pt not taking BP meds in two days. Dr. Hart made aware. Order received to D/C amlodipine and losartan.

## 2024-03-07 NOTE — PROGRESS NOTES
Hospital Medicine Daily Progress Note    Date of Service  3/7/2024    Chief Complaint  Bartolo Berrios [facility] is a 79 y.o. male admitted 3/6/2024 with abdominal pain    Hospital Course  No notes on file    Interval Problem Update  Patient was seen and examined at bedside.  No acute events overnight. Patient is resting comfortably in bed and in no acute distress. Wife updated bedside, daughter updated via phone.     IV antibiotics for UTI  Ileus improved - clear liquid diet  Hematuria and retention - place haines    I have discussed this patient's plan of care and discharge plan at IDT rounds today with Case Management, Nursing, Nursing leadership, and other members of the IDT team.    Code Status  Full Code    Disposition  The patient is not medically cleared for discharge to home or a post-acute facility.      I have placed the appropriate orders for post-discharge needs.    Review of Systems  Review of Systems   Constitutional:  Positive for malaise/fatigue. Negative for chills and fever.   HENT:  Negative for congestion and sore throat.    Eyes:  Negative for blurred vision and double vision.   Respiratory:  Negative for cough and shortness of breath.    Cardiovascular:  Negative for chest pain and palpitations.   Gastrointestinal:  Negative for abdominal pain, nausea and vomiting.   Genitourinary:  Negative for dysuria and frequency.   Musculoskeletal:  Negative for back pain and falls.   Skin:  Negative for rash.   Neurological:  Negative for seizures and headaches.   Psychiatric/Behavioral:  Negative for hallucinations and substance abuse.         Physical Exam  Temp:  [36.5 °C (97.7 °F)-37.2 °C (98.9 °F)] 36.5 °C (97.7 °F)  Pulse:  [71-88] 85  Resp:  [16-20] 18  BP: ()/(38-63) 142/63  SpO2:  [92 %-96 %] 96 %    Physical Exam  Vitals and nursing note reviewed.   Constitutional:       General: He is not in acute distress.     Appearance: He is not toxic-appearing.   HENT:      Right Ear: External ear  normal.      Left Ear: External ear normal.      Nose: No congestion.      Mouth/Throat:      Mouth: Mucous membranes are dry.      Pharynx: No oropharyngeal exudate.   Eyes:      General: No scleral icterus.     Pupils: Pupils are equal, round, and reactive to light.   Cardiovascular:      Rate and Rhythm: Normal rate and regular rhythm.      Heart sounds: No murmur heard.  Pulmonary:      Breath sounds: No wheezing.   Abdominal:      Tenderness: There is no abdominal tenderness. There is no guarding or rebound.      Comments: No tenderness to palpation   Normal bowel sounds   Musculoskeletal:         General: No swelling or deformity.   Skin:     Coloration: Skin is not jaundiced.      Findings: No bruising.   Neurological:      General: No focal deficit present.      Mental Status: He is alert.      Motor: No weakness.   Psychiatric:         Mood and Affect: Mood normal.         Behavior: Behavior normal.         Fluids    Intake/Output Summary (Last 24 hours) at 3/7/2024 1403  Last data filed at 3/7/2024 1340  Gross per 24 hour   Intake 300 ml   Output 225 ml   Net 75 ml       Laboratory  Recent Labs     03/06/24  1245 03/07/24  0107   WBC 8.0 14.4*   RBC 3.75* 3.49*   HEMOGLOBIN 13.0* 11.8*   HEMATOCRIT 37.6* 35.3*   .3* 101.1*   MCH 34.7* 33.8*   MCHC 34.6 33.4   RDW 53.2* 54.5*   PLATELETCT 143* 173   MPV 9.6 8.6*     Recent Labs     03/06/24  1245 03/07/24  0107   SODIUM 134* 134*   POTASSIUM 3.5* 4.6   CHLORIDE 103 102   CO2 22 21   GLUCOSE 80 88   BUN 19 21   CREATININE 1.71* 2.07*   CALCIUM 8.1* 7.7*                   Imaging  CT-ABDOMEN-PELVIS WITH   Final Result      1.  Multiple mildly dilated loops of fluid-filled small intestine with scattered air/fluid levels likely representing partial small bowel obstruction versus ileus.      2.  Fatty liver.      3.  Small right pleural effusion with right lung base atelectasis.      4.  Prior cholecystectomy.      5.  Enlarged prostate gland. There are  surgical changes involving the sigmoid.      6.  Multiple sclerotic foci involving the pelvis and lumbar spine likely representing bony metastases, unchanged from comparison.      DX-CHEST-PORTABLE (1 VIEW)   Final Result      Linear atelectasis within the right lung base with a small right pleural effusion.           Assessment/Plan  * Ileus (HCC)  Assessment & Plan  History of colectomy requiring ostomy and subsequent reversal  As demonstrated on CT  Patient has a benign abdominal exam, no tenderness to palpation, normal bowel sounds, passing gas  Advance diet to clears    Hematuria  Assessment & Plan  Hematuria with retention  Place haines    Complicated urinary tract infection- (present on admission)  Assessment & Plan  With associated retention and hematuria  Required straigtht cath 2 days ago  IV rocephin  Follow culture - pending      Adenocarcinoma of lung (HCC), stage 4 ([facility] Oct. 2021)- (present on admission)  Assessment & Plan  Metastatic adenocarcinoma the lung  Was treated at [facility]  He continues to have chemotherapy on a daily basis but right now with the ileus may not be able to tolerate that so we are holding that temporarily especially with a urinary tract infection    Chronic kidney disease, stage 3a (HCC)- (present on admission)  Assessment & Plan  Monitor renal functions avoid nephrotoxic medications  Give fluid resuscitation    Hyponatremia- (present on admission)  Assessment & Plan  Na 134    Hypokalemia- (present on admission)  Assessment & Plan  monitor    Macrocytic anemia- (present on admission)  Assessment & Plan  Hb 11.8      Hypertension- (present on admission)  Assessment & Plan  Optimize blood pressure management keep stop blood pressure less than 140 diastolic under 90  Continue with Norvasc 5 mg daily, and losartan 100 mg daily.    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  Continue to monitor    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)  Assessment & Plan  Did have to have a Payne catheter placed  This may be the cause of his infection  Continue with Flomax    Vitamin D deficiency- (present on admission)  Assessment & Plan  Vitamin D supplementation    Adrenal insufficiency (HCC), secondary- (present on admission)  Assessment & Plan  Continue with adrenal supplementation the patient in the past did have melanoma in 2016 and was treated with immunotherapy from which she developed complications and had to be treated with high-dose of steroids and since that he has been adrenal insufficiency.    GERD (gastroesophageal reflux disease)- (present on admission)  Assessment & Plan  PPI therapy    Hypothyroid- (present on admission)  Assessment & Plan  Continue with Synthroid supplementation with Cytomel 10 mcg daily also give levothyroxine 125 mcg daily  Most recent TSH 3.270           VTE prophylaxis:   SCDs/TEDs      I have performed a physical exam and reviewed and updated ROS and Plan today (3/7/2024). In review of yesterday's note (3/6/2024), there are no changes except as documented above.    Greater than 51 minutes spent prepping to see patient (e.g. review of tests) obtaining and/or reviewing separately obtained history. Performing a medically appropriate examination and/ evaluation.  Counseling and educating the patient/family/caregiver.  Ordering medications, tests, or procedures.  Referring and communicating with other health care professionals.  Documenting clinical information in EPIC.  Independently interpreting results and communicating results to patient/family/caregiver.  Care coordination.

## 2024-03-07 NOTE — PROGRESS NOTES
4 Eyes Skin Assessment Completed by RHODA Cohn and RHODA Bailey.    Head Blanching and Redness  Ears Scab/small growth behind left ear  Nose WDL  Mouth WDL  Neck WDL  Breast/Chest WDL  Shoulder Blades WDL  Spine WDL  (R) Arm/Elbow/Hand WDL  (L) Arm/Elbow/Hand WDL  Abdomen WDL  Groin WDL  Scrotum/Coccyx/Buttocks WDL  (R) Leg WDL  (L) Leg WDL  (R) Heel/Foot/Toe Redness and Blanching  (L) Heel/Foot/Toe Redness and Blanching          Devices In Places Tele Box      Interventions In Place N/A    Possible Skin Injury No    Pictures Uploaded Into Epic N/A  Wound Consult Placed N/A  RN Wound Prevention Protocol Ordered No

## 2024-03-07 NOTE — ASSESSMENT & PLAN NOTE
History of colectomy requiring ostomy and subsequent reversal  As demonstrated on CT  Patient has a benign abdominal exam, no tenderness to palpation, normal bowel sounds, passing gas  Having bowel movements, tolerating diet  KUB reviewed  Advance diet as tolerated

## 2024-03-07 NOTE — ASSESSMENT & PLAN NOTE
Did have to have a Payne catheter placed  This may be the cause of his infection  Continue with Flomax

## 2024-03-07 NOTE — ASSESSMENT & PLAN NOTE
Optimize blood pressure management keep stop blood pressure less than 140 diastolic under 90  Continue with Norvasc 5 mg daily, and losartan 100 mg daily.   No

## 2024-03-07 NOTE — ASSESSMENT & PLAN NOTE
With associated retention and hematuria  Required straigtht cath 2 days ago  Urine culture positive MSSA  IV antibiotis changed to Ancef  WBC downtrending 16.9 --> 11.2

## 2024-03-07 NOTE — ASSESSMENT & PLAN NOTE
Continue with adrenal supplementation the patient in the past did have melanoma in 2016 and was treated with immunotherapy from which she developed complications and had to be treated with high-dose of steroids and since that he has been adrenal insufficiency.

## 2024-03-07 NOTE — ED NOTES
Pt ambulated to/from restroom c this RN, Pt denied feeling dizzy and/or light-headed, Pt denied having any needs at this time, no distress noted;

## 2024-03-07 NOTE — CARE PLAN
The patient is Stable - Low risk of patient condition declining or worsening    Shift Goals  Clinical Goals: monitor pain in abdomen  Patient Goals: feel better    Progress made toward(s) clinical / shift goals:      Patient is not progressing towards the following goals:      Problem: Pain - Standard  Goal: Alleviation of pain or a reduction in pain to the patient’s comfort goal  Outcome: Progressing  Note: No complaints of pain     Problem: Knowledge Deficit - Standard  Goal: Patient and family/care givers will demonstrate understanding of plan of care, disease process/condition, diagnostic tests and medications  Outcome: Progressing  Note: Plan of care discussed with patient and wife. Patient not passing gas, IVF given and iV abx     Problem: Fall Risk  Goal: Patient will remain free from falls  Outcome: Progressing

## 2024-03-08 ENCOUNTER — APPOINTMENT (OUTPATIENT)
Dept: RADIOLOGY | Facility: MEDICAL CENTER | Age: 79
DRG: 699 | End: 2024-03-08
Attending: INTERNAL MEDICINE
Payer: MEDICARE

## 2024-03-08 PROBLEM — N18.32 ACUTE RENAL FAILURE SUPERIMPOSED ON STAGE 3B CHRONIC KIDNEY DISEASE (HCC): Status: ACTIVE | Noted: 2024-03-08

## 2024-03-08 PROBLEM — N17.9 ACUTE RENAL FAILURE SUPERIMPOSED ON STAGE 3B CHRONIC KIDNEY DISEASE (HCC): Status: ACTIVE | Noted: 2024-03-08

## 2024-03-08 LAB
ALBUMIN SERPL BCP-MCNC: 2.8 G/DL (ref 3.2–4.9)
BASOPHILS # BLD AUTO: 0.2 % (ref 0–1.8)
BASOPHILS # BLD: 0.04 K/UL (ref 0–0.12)
BUN SERPL-MCNC: 17 MG/DL (ref 8–22)
CALCIUM ALBUM COR SERPL-MCNC: 8.4 MG/DL (ref 8.5–10.5)
CALCIUM SERPL-MCNC: 7.4 MG/DL (ref 8.4–10.2)
CHLORIDE SERPL-SCNC: 102 MMOL/L (ref 96–112)
CO2 SERPL-SCNC: 20 MMOL/L (ref 20–33)
CREAT SERPL-MCNC: 1.71 MG/DL (ref 0.5–1.4)
EOSINOPHIL # BLD AUTO: 0.01 K/UL (ref 0–0.51)
EOSINOPHIL NFR BLD: 0.1 % (ref 0–6.9)
ERYTHROCYTE [DISTWIDTH] IN BLOOD BY AUTOMATED COUNT: 56 FL (ref 35.9–50)
GFR SERPLBLD CREATININE-BSD FMLA CKD-EPI: 41 ML/MIN/1.73 M 2
GLUCOSE SERPL-MCNC: 92 MG/DL (ref 65–99)
HCT VFR BLD AUTO: 34.7 % (ref 42–52)
HGB BLD-MCNC: 11.8 G/DL (ref 14–18)
IMM GRANULOCYTES # BLD AUTO: 0.31 K/UL (ref 0–0.11)
IMM GRANULOCYTES NFR BLD AUTO: 1.8 % (ref 0–0.9)
LYMPHOCYTES # BLD AUTO: 0.65 K/UL (ref 1–4.8)
LYMPHOCYTES NFR BLD: 3.9 % (ref 22–41)
MAGNESIUM SERPL-MCNC: 1.7 MG/DL (ref 1.5–2.5)
MCH RBC QN AUTO: 34.4 PG (ref 27–33)
MCHC RBC AUTO-ENTMCNC: 34 G/DL (ref 32.3–36.5)
MCV RBC AUTO: 101.2 FL (ref 81.4–97.8)
MONOCYTES # BLD AUTO: 1.01 K/UL (ref 0–0.85)
MONOCYTES NFR BLD AUTO: 6 % (ref 0–13.4)
NEUTROPHILS # BLD AUTO: 14.86 K/UL (ref 1.82–7.42)
NEUTROPHILS NFR BLD: 88 % (ref 44–72)
NRBC # BLD AUTO: 0 K/UL
NRBC BLD-RTO: 0 /100 WBC (ref 0–0.2)
PHOSPHATE SERPL-MCNC: 2.8 MG/DL (ref 2.5–4.5)
PLATELET # BLD AUTO: 172 K/UL (ref 164–446)
PMV BLD AUTO: 9.3 FL (ref 9–12.9)
POTASSIUM SERPL-SCNC: 3.9 MMOL/L (ref 3.6–5.5)
RBC # BLD AUTO: 3.43 M/UL (ref 4.7–6.1)
SODIUM SERPL-SCNC: 135 MMOL/L (ref 135–145)
WBC # BLD AUTO: 16.9 K/UL (ref 4.8–10.8)

## 2024-03-08 PROCEDURE — 94760 N-INVAS EAR/PLS OXIMETRY 1: CPT

## 2024-03-08 PROCEDURE — A9270 NON-COVERED ITEM OR SERVICE: HCPCS | Performed by: HOSPITALIST

## 2024-03-08 PROCEDURE — 83735 ASSAY OF MAGNESIUM: CPT

## 2024-03-08 PROCEDURE — 700111 HCHG RX REV CODE 636 W/ 250 OVERRIDE (IP): Performed by: HOSPITALIST

## 2024-03-08 PROCEDURE — 700105 HCHG RX REV CODE 258: Performed by: HOSPITALIST

## 2024-03-08 PROCEDURE — A9270 NON-COVERED ITEM OR SERVICE: HCPCS | Performed by: INTERNAL MEDICINE

## 2024-03-08 PROCEDURE — 80069 RENAL FUNCTION PANEL: CPT

## 2024-03-08 PROCEDURE — 85025 COMPLETE CBC W/AUTO DIFF WBC: CPT

## 2024-03-08 PROCEDURE — 700102 HCHG RX REV CODE 250 W/ 637 OVERRIDE(OP): Performed by: INTERNAL MEDICINE

## 2024-03-08 PROCEDURE — 74018 RADEX ABDOMEN 1 VIEW: CPT

## 2024-03-08 PROCEDURE — 700111 HCHG RX REV CODE 636 W/ 250 OVERRIDE (IP): Mod: JZ | Performed by: HOSPITALIST

## 2024-03-08 PROCEDURE — 99233 SBSQ HOSP IP/OBS HIGH 50: CPT | Performed by: INTERNAL MEDICINE

## 2024-03-08 PROCEDURE — 36415 COLL VENOUS BLD VENIPUNCTURE: CPT

## 2024-03-08 PROCEDURE — 700105 HCHG RX REV CODE 258: Performed by: INTERNAL MEDICINE

## 2024-03-08 PROCEDURE — 700102 HCHG RX REV CODE 250 W/ 637 OVERRIDE(OP): Performed by: HOSPITALIST

## 2024-03-08 PROCEDURE — 770001 HCHG ROOM/CARE - MED/SURG/GYN PRIV*

## 2024-03-08 PROCEDURE — 700111 HCHG RX REV CODE 636 W/ 250 OVERRIDE (IP): Mod: JZ | Performed by: INTERNAL MEDICINE

## 2024-03-08 RX ORDER — LORATADINE 10 MG/1
10 TABLET ORAL DAILY
Status: DISCONTINUED | OUTPATIENT
Start: 2024-03-08 | End: 2024-03-10 | Stop reason: HOSPADM

## 2024-03-08 RX ORDER — SODIUM CHLORIDE 9 MG/ML
INJECTION, SOLUTION INTRAVENOUS
Status: COMPLETED
Start: 2024-03-08 | End: 2024-03-08

## 2024-03-08 RX ORDER — CEFTRIAXONE 2 G/1
INJECTION, POWDER, FOR SOLUTION INTRAMUSCULAR; INTRAVENOUS
Status: ACTIVE
Start: 2024-03-08 | End: 2024-03-08

## 2024-03-08 RX ADMIN — CEFAZOLIN 2 G: 2 INJECTION, POWDER, FOR SOLUTION INTRAMUSCULAR; INTRAVENOUS at 17:53

## 2024-03-08 RX ADMIN — Medication 5 MG: at 21:06

## 2024-03-08 RX ADMIN — OXYCODONE HYDROCHLORIDE 5 MG: 5 TABLET ORAL at 12:30

## 2024-03-08 RX ADMIN — CEFTRIAXONE SODIUM 2000 MG: 2 INJECTION, POWDER, FOR SOLUTION INTRAMUSCULAR; INTRAVENOUS at 04:46

## 2024-03-08 RX ADMIN — SODIUM CHLORIDE: 9 INJECTION, SOLUTION INTRAVENOUS at 04:26

## 2024-03-08 RX ADMIN — HYDROCORTISONE 20 MG: 10 TABLET ORAL at 04:46

## 2024-03-08 RX ADMIN — TAMSULOSIN HYDROCHLORIDE 0.4 MG: 0.4 CAPSULE ORAL at 08:03

## 2024-03-08 RX ADMIN — LORATADINE 10 MG: 10 TABLET ORAL at 12:26

## 2024-03-08 RX ADMIN — HYDROCORTISONE 20 MG: 10 TABLET ORAL at 17:48

## 2024-03-08 RX ADMIN — ACETAMINOPHEN 650 MG: 325 TABLET ORAL at 08:03

## 2024-03-08 RX ADMIN — OXYCODONE HYDROCHLORIDE 5 MG: 5 TABLET ORAL at 20:03

## 2024-03-08 RX ADMIN — PROCHLORPERAZINE EDISYLATE 10 MG: 5 INJECTION INTRAMUSCULAR; INTRAVENOUS at 14:13

## 2024-03-08 RX ADMIN — LIOTHYRONINE SODIUM 10 MCG: 5 TABLET ORAL at 04:46

## 2024-03-08 RX ADMIN — CEFAZOLIN 2 G: 2 INJECTION, POWDER, FOR SOLUTION INTRAMUSCULAR; INTRAVENOUS at 21:07

## 2024-03-08 RX ADMIN — MORPHINE SULFATE 2 MG: 4 INJECTION, SOLUTION INTRAMUSCULAR; INTRAVENOUS at 14:25

## 2024-03-08 RX ADMIN — LEVOTHYROXINE SODIUM 125 MCG: 0.12 TABLET ORAL at 04:46

## 2024-03-08 RX ADMIN — ONDANSETRON 4 MG: 2 INJECTION INTRAMUSCULAR; INTRAVENOUS at 20:03

## 2024-03-08 ASSESSMENT — LIFESTYLE VARIABLES: SUBSTANCE_ABUSE: 0

## 2024-03-08 ASSESSMENT — ENCOUNTER SYMPTOMS
NAUSEA: 0
SHORTNESS OF BREATH: 0
HALLUCINATIONS: 0
SORE THROAT: 0
DOUBLE VISION: 0
BLURRED VISION: 0
VOMITING: 0
BACK PAIN: 0
HEADACHES: 0
PALPITATIONS: 0
FALLS: 0
CHILLS: 0
ABDOMINAL PAIN: 0
SEIZURES: 0
COUGH: 0
FEVER: 0
DIARRHEA: 1

## 2024-03-08 ASSESSMENT — PAIN DESCRIPTION - PAIN TYPE
TYPE: ACUTE PAIN
TYPE: ACUTE PAIN

## 2024-03-08 NOTE — CARE PLAN
The patient is Stable - Low risk of patient condition declining or worsening    Shift Goals  Clinical Goals: nausea and pain will decrease  Patient Goals: rest    Progress made toward(s) clinical / shift goals:  Pain assessments performed and pt encouraged to report pain ir increase in nausea. Pt medicated for nausea per MAR, but is tolerating clear liquid diet. Pharmacologic and nonpharmacologic comfort measures in place. Care clustered to allow for rest.    Patient is not progressing towards the following goals:

## 2024-03-08 NOTE — PROGRESS NOTES
Telemetry Shift Summary     Rhythm: SR   Rate: 72-80  Measurements: .20/.14/.40  Ectopy (reported by Monitor Tech): r PVC, r PAC     Normal Values  Rhythm: Sinus  HR:   Measurements: 0.12-0.20/0.06-0.10/0.30-0.52

## 2024-03-08 NOTE — PROGRESS NOTES
Report received from Marii DUONG. POC discussed. Pt resting comfortably in bed. Safety precautions in place.

## 2024-03-08 NOTE — PROGRESS NOTES
Hospital Medicine Daily Progress Note    Date of Service  3/8/2024    Chief Complaint  Bartolo Berrios [facility] is a 79 y.o. male admitted 3/6/2024 with abdominal pain    Hospital Course  No notes on file    Interval Problem Update  Patient was seen and examined at bedside.  No acute events overnight. Patient is resting comfortably in bed and in no acute distress. Family updated at bedside.    IV antibiotics for UTI  Ileus resolved  Advance diet as tolerated  Hematuria and retention - place haines    I have discussed this patient's plan of care and discharge plan at IDT rounds today with Case Management, Nursing, Nursing leadership, and other members of the IDT team.    Code Status  Full Code    Disposition  The patient is not medically cleared for discharge to home or a post-acute facility.      I have placed the appropriate orders for post-discharge needs.    Review of Systems  Review of Systems   Constitutional:  Positive for malaise/fatigue. Negative for chills and fever.   HENT:  Negative for congestion and sore throat.    Eyes:  Negative for blurred vision and double vision.   Respiratory:  Negative for cough and shortness of breath.    Cardiovascular:  Negative for chest pain and palpitations.   Gastrointestinal:  Positive for diarrhea. Negative for abdominal pain, nausea and vomiting.   Genitourinary:  Negative for dysuria and frequency.   Musculoskeletal:  Negative for back pain and falls.   Skin:  Negative for rash.   Neurological:  Negative for seizures and headaches.   Psychiatric/Behavioral:  Negative for hallucinations and substance abuse.         Physical Exam  Temp:  [36.5 °C (97.7 °F)-37.5 °C (99.5 °F)] 36.7 °C (98.1 °F)  Pulse:  [73-88] 80  Resp:  [16-18] 18  BP: (123-149)/(46-68) 136/64  SpO2:  [93 %-94 %] 94 %    Physical Exam  Vitals and nursing note reviewed.   Constitutional:       General: He is not in acute distress.     Appearance: He is not toxic-appearing.   HENT:      Right Ear:  External ear normal.      Left Ear: External ear normal.      Nose: No congestion.      Mouth/Throat:      Mouth: Mucous membranes are dry.      Pharynx: No oropharyngeal exudate.   Eyes:      General: No scleral icterus.     Pupils: Pupils are equal, round, and reactive to light.   Cardiovascular:      Rate and Rhythm: Normal rate and regular rhythm.      Heart sounds: No murmur heard.  Pulmonary:      Breath sounds: No wheezing.   Abdominal:      General: Bowel sounds are normal.      Tenderness: There is no abdominal tenderness. There is no guarding or rebound.   Musculoskeletal:         General: No swelling or deformity.   Skin:     Coloration: Skin is not jaundiced.      Findings: No bruising.   Neurological:      General: No focal deficit present.      Mental Status: He is alert.      Motor: No weakness.   Psychiatric:         Mood and Affect: Mood normal.         Behavior: Behavior normal.         Fluids    Intake/Output Summary (Last 24 hours) at 3/8/2024 1345  Last data filed at 3/8/2024 0803  Gross per 24 hour   Intake 2360 ml   Output 3025 ml   Net -665 ml       Laboratory  Recent Labs     03/06/24  1245 03/07/24  0107 03/08/24  0038   WBC 8.0 14.4* 16.9*   RBC 3.75* 3.49* 3.43*   HEMOGLOBIN 13.0* 11.8* 11.8*   HEMATOCRIT 37.6* 35.3* 34.7*   .3* 101.1* 101.2*   MCH 34.7* 33.8* 34.4*   MCHC 34.6 33.4 34.0   RDW 53.2* 54.5* 56.0*   PLATELETCT 143* 173 172   MPV 9.6 8.6* 9.3     Recent Labs     03/06/24  1245 03/07/24  0107 03/08/24  0038   SODIUM 134* 134* 135   POTASSIUM 3.5* 4.6 3.9   CHLORIDE 103 102 102   CO2 22 21 20   GLUCOSE 80 88 92   BUN 19 21 17   CREATININE 1.71* 2.07* 1.71*   CALCIUM 8.1* 7.7* 7.4*                   Imaging  [id number] VIEW   Final Result      No dilated bowel      CT-ABDOMEN-PELVIS WITH   Final Result      1.  Multiple mildly dilated loops of fluid-filled small intestine with scattered air/fluid levels likely representing partial small bowel obstruction versus ileus.       2.  Fatty liver.      3.  Small right pleural effusion with right lung base atelectasis.      4.  Prior cholecystectomy.      5.  Enlarged prostate gland. There are surgical changes involving the sigmoid.      6.  Multiple sclerotic foci involving the pelvis and lumbar spine likely representing bony metastases, unchanged from comparison.      DX-CHEST-PORTABLE (1 VIEW)   Final Result      Linear atelectasis within the right lung base with a small right pleural effusion.           Assessment/Plan  * Ileus (HCC)  Assessment & Plan  History of colectomy requiring ostomy and subsequent reversal  As demonstrated on CT  Patient has a benign abdominal exam, no tenderness to palpation, normal bowel sounds, passing gas  Having bowel movements, tolerating diet  KUB reviewed  Advance diet as tolerated    Acute renal failure superimposed on stage 3b chronic kidney disease (HCC)  Assessment & Plan  Cr 2.07 --> 1.71  Baseline 1.4  Likely due to UTI and decreased intravascular volume    Complicated urinary tract infection- (present on admission)  Assessment & Plan  With associated retention and hematuria  Required straigtht cath 2 days ago  Follow culture - pending  Continue IV rocephin    Adenocarcinoma of lung (HCC), stage 4 ([facility] Oct. 2021)- (present on admission)  Assessment & Plan  Metastatic adenocarcinoma the lung  Was treated at [facility]  He continues to have chemotherapy on a daily basis but right now with the ileus may not be able to tolerate that so we are holding that temporarily especially with a urinary tract infection    Hematuria  Assessment & Plan  Hematuria with retention  Place haines  No clots  Urine clear today    Chronic kidney disease, stage 3a (HCC)- (present on admission)  Assessment & Plan  Monitor renal functions avoid nephrotoxic medications  Give fluid resuscitation    Hyponatremia- (present on admission)  Assessment & Plan  Na 135    Hypokalemia- (present on admission)  Assessment &  Plan  monitor    Macrocytic anemia- (present on admission)  Assessment & Plan  Hb 11.8      Hypertension- (present on admission)  Assessment & Plan  Optimize blood pressure management keep stop blood pressure less than 140 diastolic under 90  Continue with Norvasc 5 mg daily, and losartan 100 mg daily.    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  Continue to monitor    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)  Assessment & Plan  Did have to have a Payne catheter placed  This may be the cause of his infection  Continue with Flomax    Vitamin D deficiency- (present on admission)  Assessment & Plan  Vitamin D supplementation    Adrenal insufficiency (HCC), secondary- (present on admission)  Assessment & Plan  Continue with adrenal supplementation the patient in the past did have melanoma in 2016 and was treated with immunotherapy from which she developed complications and had to be treated with high-dose of steroids and since that he has been adrenal insufficiency.    GERD (gastroesophageal reflux disease)- (present on admission)  Assessment & Plan  PPI therapy    Hypothyroid- (present on admission)  Assessment & Plan  Continue with Synthroid supplementation with Cytomel 10 mcg daily also give levothyroxine 125 mcg daily  Most recent TSH 3.270           VTE prophylaxis:   SCDs/TEDs      I have performed a physical exam and reviewed and updated ROS and Plan today (3/8/2024). In review of yesterday's note (3/7/2024), there are no changes except as documented above.    Greater than 56 minutes spent prepping to see patient (e.g. review of tests) obtaining and/or reviewing separately obtained history. Performing a medically appropriate examination and/ evaluation.  Counseling and educating the  patient/family/caregiver.  Ordering medications, tests, or procedures.  Referring and communicating with other health care professionals.  Documenting clinical information in EPIC.  Independently interpreting results and communicating results to patient/family/caregiver.  Care coordination.

## 2024-03-08 NOTE — PROGRESS NOTES
Telemetry Shift Summary    Rhythm SR, BBB  HR Range 70s-80s  Ectopy F PAC  Measurements 0.20/0.12/0.40        Normal Values  Rhythm SR  HR Range    Measurements 0.12-0.20 / 0.06-0.10  / 0.30-0.52

## 2024-03-08 NOTE — CARE PLAN
The patient is Stable - Low risk of patient condition declining or worsening    Shift Goals  Clinical Goals: (P) wean fluids, safety  Patient Goals: (P) go home    Progress made toward(s) clinical / shift goals:      Patient is not progressing towards the following goals:      Problem: Pain - Standard  Goal: Alleviation of pain or a reduction in pain to the patient’s comfort goal  Outcome: Progressing  Note: Pain and burning in bladder despite haines catheter.      Problem: Knowledge Deficit - Standard  Goal: Patient and family/care givers will demonstrate understanding of plan of care, disease process/condition, diagnostic tests and medications  Note: Plan of care discussed including abx, haines catheter, C/Diff Rule out     Problem: Fall Risk  Goal: Patient will remain free from falls  Outcome: Progressing

## 2024-03-09 LAB
ALBUMIN SERPL BCP-MCNC: 2.7 G/DL (ref 3.2–4.9)
BASOPHILS # BLD AUTO: 0.2 % (ref 0–1.8)
BASOPHILS # BLD: 0.02 K/UL (ref 0–0.12)
BUN SERPL-MCNC: 12 MG/DL (ref 8–22)
CALCIUM ALBUM COR SERPL-MCNC: 8.3 MG/DL (ref 8.5–10.5)
CALCIUM SERPL-MCNC: 7.3 MG/DL (ref 8.4–10.2)
CHLORIDE SERPL-SCNC: 103 MMOL/L (ref 96–112)
CO2 SERPL-SCNC: 21 MMOL/L (ref 20–33)
CREAT SERPL-MCNC: 1.39 MG/DL (ref 0.5–1.4)
EOSINOPHIL # BLD AUTO: 0.01 K/UL (ref 0–0.51)
EOSINOPHIL NFR BLD: 0.1 % (ref 0–6.9)
ERYTHROCYTE [DISTWIDTH] IN BLOOD BY AUTOMATED COUNT: 52.9 FL (ref 35.9–50)
GFR SERPLBLD CREATININE-BSD FMLA CKD-EPI: 52 ML/MIN/1.73 M 2
GLUCOSE SERPL-MCNC: 114 MG/DL (ref 65–99)
HCT VFR BLD AUTO: 34.9 % (ref 42–52)
HGB BLD-MCNC: 11.7 G/DL (ref 14–18)
IMM GRANULOCYTES # BLD AUTO: 0.13 K/UL (ref 0–0.11)
IMM GRANULOCYTES NFR BLD AUTO: 1.2 % (ref 0–0.9)
LYMPHOCYTES # BLD AUTO: 0.48 K/UL (ref 1–4.8)
LYMPHOCYTES NFR BLD: 4.3 % (ref 22–41)
MAGNESIUM SERPL-MCNC: 1.8 MG/DL (ref 1.5–2.5)
MCH RBC QN AUTO: 33.3 PG (ref 27–33)
MCHC RBC AUTO-ENTMCNC: 33.5 G/DL (ref 32.3–36.5)
MCV RBC AUTO: 99.4 FL (ref 81.4–97.8)
MONOCYTES # BLD AUTO: 0.68 K/UL (ref 0–0.85)
MONOCYTES NFR BLD AUTO: 6.1 % (ref 0–13.4)
NEUTROPHILS # BLD AUTO: 9.91 K/UL (ref 1.82–7.42)
NEUTROPHILS NFR BLD: 88.1 % (ref 44–72)
NRBC # BLD AUTO: 0 K/UL
NRBC BLD-RTO: 0 /100 WBC (ref 0–0.2)
PHOSPHATE SERPL-MCNC: 2.2 MG/DL (ref 2.5–4.5)
PLATELET # BLD AUTO: 174 K/UL (ref 164–446)
PMV BLD AUTO: 8.9 FL (ref 9–12.9)
POTASSIUM SERPL-SCNC: 3.6 MMOL/L (ref 3.6–5.5)
RBC # BLD AUTO: 3.51 M/UL (ref 4.7–6.1)
SODIUM SERPL-SCNC: 133 MMOL/L (ref 135–145)
WBC # BLD AUTO: 11.2 K/UL (ref 4.8–10.8)

## 2024-03-09 PROCEDURE — 99233 SBSQ HOSP IP/OBS HIGH 50: CPT | Performed by: INTERNAL MEDICINE

## 2024-03-09 PROCEDURE — 700111 HCHG RX REV CODE 636 W/ 250 OVERRIDE (IP): Performed by: INTERNAL MEDICINE

## 2024-03-09 PROCEDURE — 700102 HCHG RX REV CODE 250 W/ 637 OVERRIDE(OP): Performed by: HOSPITALIST

## 2024-03-09 PROCEDURE — 80069 RENAL FUNCTION PANEL: CPT

## 2024-03-09 PROCEDURE — 700111 HCHG RX REV CODE 636 W/ 250 OVERRIDE (IP): Mod: JZ | Performed by: HOSPITALIST

## 2024-03-09 PROCEDURE — 94760 N-INVAS EAR/PLS OXIMETRY 1: CPT

## 2024-03-09 PROCEDURE — 700102 HCHG RX REV CODE 250 W/ 637 OVERRIDE(OP): Performed by: INTERNAL MEDICINE

## 2024-03-09 PROCEDURE — A9270 NON-COVERED ITEM OR SERVICE: HCPCS | Performed by: HOSPITALIST

## 2024-03-09 PROCEDURE — A9270 NON-COVERED ITEM OR SERVICE: HCPCS | Performed by: INTERNAL MEDICINE

## 2024-03-09 PROCEDURE — 700111 HCHG RX REV CODE 636 W/ 250 OVERRIDE (IP): Performed by: HOSPITALIST

## 2024-03-09 PROCEDURE — 82652 VIT D 1 25-DIHYDROXY: CPT

## 2024-03-09 PROCEDURE — 85025 COMPLETE CBC W/AUTO DIFF WBC: CPT

## 2024-03-09 PROCEDURE — 36415 COLL VENOUS BLD VENIPUNCTURE: CPT

## 2024-03-09 PROCEDURE — 770001 HCHG ROOM/CARE - MED/SURG/GYN PRIV*

## 2024-03-09 PROCEDURE — 83735 ASSAY OF MAGNESIUM: CPT

## 2024-03-09 PROCEDURE — 700101 HCHG RX REV CODE 250: Performed by: INTERNAL MEDICINE

## 2024-03-09 PROCEDURE — 700105 HCHG RX REV CODE 258: Performed by: INTERNAL MEDICINE

## 2024-03-09 RX ADMIN — PROCHLORPERAZINE EDISYLATE 10 MG: 5 INJECTION INTRAMUSCULAR; INTRAVENOUS at 19:25

## 2024-03-09 RX ADMIN — HYDROCORTISONE 20 MG: 10 TABLET ORAL at 17:30

## 2024-03-09 RX ADMIN — SODIUM PHOSPHATE, MONOBASIC, MONOHYDRATE AND SODIUM PHOSPHATE, DIBASIC, ANHYDROUS 15 MMOL: 142; 276 INJECTION, SOLUTION INTRAVENOUS at 09:05

## 2024-03-09 RX ADMIN — TAMSULOSIN HYDROCHLORIDE 0.4 MG: 0.4 CAPSULE ORAL at 09:03

## 2024-03-09 RX ADMIN — CEFAZOLIN 2 G: 2 INJECTION, POWDER, FOR SOLUTION INTRAMUSCULAR; INTRAVENOUS at 21:21

## 2024-03-09 RX ADMIN — OXYCODONE HYDROCHLORIDE 2.5 MG: 5 TABLET ORAL at 19:24

## 2024-03-09 RX ADMIN — ONDANSETRON 4 MG: 2 INJECTION INTRAMUSCULAR; INTRAVENOUS at 21:17

## 2024-03-09 RX ADMIN — LEVOTHYROXINE SODIUM 125 MCG: 0.12 TABLET ORAL at 05:39

## 2024-03-09 RX ADMIN — ONDANSETRON 4 MG: 2 INJECTION INTRAMUSCULAR; INTRAVENOUS at 16:46

## 2024-03-09 RX ADMIN — CEFAZOLIN 2 G: 2 INJECTION, POWDER, FOR SOLUTION INTRAMUSCULAR; INTRAVENOUS at 15:21

## 2024-03-09 RX ADMIN — CEFAZOLIN 2 G: 2 INJECTION, POWDER, FOR SOLUTION INTRAMUSCULAR; INTRAVENOUS at 05:38

## 2024-03-09 RX ADMIN — Medication 5 MG: at 21:18

## 2024-03-09 RX ADMIN — LIOTHYRONINE SODIUM 10 MCG: 5 TABLET ORAL at 05:38

## 2024-03-09 RX ADMIN — OXYCODONE HYDROCHLORIDE 5 MG: 5 TABLET ORAL at 22:15

## 2024-03-09 RX ADMIN — PROCHLORPERAZINE EDISYLATE 10 MG: 5 INJECTION INTRAMUSCULAR; INTRAVENOUS at 13:35

## 2024-03-09 RX ADMIN — HYDROCORTISONE 20 MG: 10 TABLET ORAL at 05:38

## 2024-03-09 RX ADMIN — LORATADINE 10 MG: 10 TABLET ORAL at 05:39

## 2024-03-09 RX ADMIN — ONDANSETRON 4 MG: 4 TABLET, ORALLY DISINTEGRATING ORAL at 11:57

## 2024-03-09 ASSESSMENT — ENCOUNTER SYMPTOMS
DIARRHEA: 1
NAUSEA: 0
HALLUCINATIONS: 0
BLURRED VISION: 0
ABDOMINAL PAIN: 0
BACK PAIN: 0
FALLS: 0
COUGH: 0
SORE THROAT: 0
DOUBLE VISION: 0
CHILLS: 0
PALPITATIONS: 0
VOMITING: 0
HEADACHES: 0
FEVER: 0
SHORTNESS OF BREATH: 0
SEIZURES: 0

## 2024-03-09 ASSESSMENT — PAIN DESCRIPTION - PAIN TYPE
TYPE: ACUTE PAIN

## 2024-03-09 ASSESSMENT — LIFESTYLE VARIABLES: SUBSTANCE_ABUSE: 0

## 2024-03-09 ASSESSMENT — FIBROSIS 4 INDEX: FIB4 SCORE: 2.03

## 2024-03-09 NOTE — CARE PLAN
The patient is Stable - Low risk of patient condition declining or worsening    Shift Goals  Clinical Goals: dc haines, electrolyte replacement, safety  Patient Goals: go home  Family Goals: discharge when safe    Progress made toward(s) clinical / shift goals:  Haines discontinued, electrolyte replacement given, pt up ambulating safely. Family at bedside for duration of shift. Cdiff r/o discontinued per MD as pt has not had a BM since earlier yesterday. Pt in good affect, vitals stable, wishing to dc home.    Patient is not progressing towards the following goals: n/a      Problem: Fall Risk  Goal: Patient will remain free from falls  Outcome: Progressing     Problem: Knowledge Deficit - Standard  Goal: Patient and family/care givers will demonstrate understanding of plan of care, disease process/condition, diagnostic tests and medications  Outcome: Progressing

## 2024-03-09 NOTE — PROGRESS NOTES
1325-Received bedside report from Charmaine DUONG.  Assumed pt care.   Assessment and chart check complete.  Pt is [id number], respirations even and unlabored, no signs of distress, denies nausea/vomiting.  Updated for the POC of the day.  Family at bedside.  Fall precautions in place, treaded socks on pt, bed in low position.   Call light within reach.   Educated pt to call if needing anything.   Hourly rounding.

## 2024-03-09 NOTE — CARE PLAN
Problem: Pain - Standard  Goal: Alleviation of pain or a reduction in pain to the patient’s comfort goal  Outcome: Progressing     Problem: Knowledge Deficit - Standard  Goal: Patient and family/care givers will demonstrate understanding of plan of care, disease process/condition, diagnostic tests and medications  Outcome: Progressing     Problem: Fall Risk  Goal: Patient will remain free from falls  Outcome: Progressing  Note: Fall [precautino measures in place   The patient is Stable - Low risk of patient condition declining or worsening    Shift Goals  Clinical Goals: pain control,stool sample,isolation precaution  Patient Goals: rest,pain control    Progress made toward(s) clinical / shift goals:  Educated on fall preventino measures,call light within reach,updated on plan of care.    Patient is not progressing towards the following goals:

## 2024-03-09 NOTE — PROGRESS NOTES
Hospital Medicine Daily Progress Note    Date of Service  3/9/2024    Chief Complaint  Bartolo Berrios [facility] is a 79 y.o. male admitted 3/6/2024 with abdominal pain    Hospital Course  No notes on file    Interval Problem Update  Patient was seen and examined at bedside.  No acute events overnight. Patient is resting comfortably in bed and in no acute distress. Family updated at bedside.     IV antibiotics for UTI - adjusted to Ancef for urine culture with MSSA  Voiding trial  Ileus resolved  Anticipate discharge in next 24 hours    I have discussed this patient's plan of care and discharge plan at IDT rounds today with Case Management, Nursing, Nursing leadership, and other members of the IDT team.    Code Status  Full Code    Disposition  The patient is not medically cleared for discharge to home or a post-acute facility.      I have placed the appropriate orders for post-discharge needs.    Review of Systems  Review of Systems   Constitutional:  Positive for malaise/fatigue. Negative for chills and fever.   HENT:  Negative for congestion and sore throat.    Eyes:  Negative for blurred vision and double vision.   Respiratory:  Negative for cough and shortness of breath.    Cardiovascular:  Negative for chest pain and palpitations.   Gastrointestinal:  Positive for diarrhea. Negative for abdominal pain, nausea and vomiting.   Genitourinary:  Negative for dysuria and frequency.   Musculoskeletal:  Negative for back pain and falls.   Skin:  Negative for rash.   Neurological:  Negative for seizures and headaches.   Psychiatric/Behavioral:  Negative for hallucinations and substance abuse.         Physical Exam  Temp:  [36.5 °C (97.7 °F)-37.2 °C (98.9 °F)] 36.5 °C (97.7 °F)  Pulse:  [70-86] 84  Resp:  [16-20] 18  BP: (118-149)/(61-86) 149/69  SpO2:  [92 %-97 %] 97 %    Physical Exam  Vitals and nursing note reviewed.   Constitutional:       General: He is not in acute distress.     Appearance: He is not  toxic-appearing.   HENT:      Right Ear: External ear normal.      Left Ear: External ear normal.      Nose: No congestion.      Mouth/Throat:      Mouth: Mucous membranes are dry.      Pharynx: No oropharyngeal exudate.   Eyes:      General: No scleral icterus.     Pupils: Pupils are equal, round, and reactive to light.   Cardiovascular:      Rate and Rhythm: Normal rate and regular rhythm.      Heart sounds: No murmur heard.  Pulmonary:      Breath sounds: No wheezing.   Abdominal:      General: Bowel sounds are normal.      Tenderness: There is no abdominal tenderness. There is no guarding or rebound.   Musculoskeletal:         General: No swelling or deformity.   Skin:     Coloration: Skin is not jaundiced.      Findings: No bruising.   Neurological:      General: No focal deficit present.      Mental Status: He is alert.      Motor: No weakness.   Psychiatric:         Mood and Affect: Mood normal.         Behavior: Behavior normal.         Fluids    Intake/Output Summary (Last 24 hours) at 3/9/2024 1331  Last data filed at 3/9/2024 0700  Gross per 24 hour   Intake 240 ml   Output 2700 ml   Net -2460 ml       Laboratory  Recent Labs     03/07/24  0107 03/08/24  0038 03/09/24  0131   WBC 14.4* 16.9* 11.2*   RBC 3.49* 3.43* 3.51*   HEMOGLOBIN 11.8* 11.8* 11.7*   HEMATOCRIT 35.3* 34.7* 34.9*   .1* 101.2* 99.4*   MCH 33.8* 34.4* 33.3*   MCHC 33.4 34.0 33.5   RDW 54.5* 56.0* 52.9*   PLATELETCT 173 172 174   MPV 8.6* 9.3 8.9*     Recent Labs     03/07/24  0107 03/08/24  0038 03/09/24  0131   SODIUM 134* 135 133*   POTASSIUM 4.6 3.9 3.6   CHLORIDE 102 102 103   CO2 21 20 21   GLUCOSE 88 92 114*   BUN 21 17 12   CREATININE 2.07* 1.71* 1.39   CALCIUM 7.7* 7.4* 7.3*                   Imaging  [id number] VIEW   Final Result      No dilated bowel      CT-ABDOMEN-PELVIS WITH   Final Result      1.  Multiple mildly dilated loops of fluid-filled small intestine with scattered air/fluid levels likely representing  partial small bowel obstruction versus ileus.      2.  Fatty liver.      3.  Small right pleural effusion with right lung base atelectasis.      4.  Prior cholecystectomy.      5.  Enlarged prostate gland. There are surgical changes involving the sigmoid.      6.  Multiple sclerotic foci involving the pelvis and lumbar spine likely representing bony metastases, unchanged from comparison.      DX-CHEST-PORTABLE (1 VIEW)   Final Result      Linear atelectasis within the right lung base with a small right pleural effusion.           Assessment/Plan  * Ileus (HCC)  Assessment & Plan  History of colectomy requiring ostomy and subsequent reversal  As demonstrated on CT  Patient has a benign abdominal exam, no tenderness to palpation, normal bowel sounds, passing gas  Having bowel movements, tolerating diet  KUB reviewed  Advance diet as tolerated    Acute renal failure superimposed on stage 3b chronic kidney disease (HCC)  Assessment & Plan  Cr 2.07 --> 1.39  Baseline 1.4  Likely due to UTI and decreased intravascular volume    Complicated urinary tract infection- (present on admission)  Assessment & Plan  With associated retention and hematuria  Required straigtht cath 2 days ago  Urine culture positive MSSA  IV antibiotis changed to Ancef  WBC downtrending 16.9 --> 11.2    Hematuria  Assessment & Plan  Hematuria with retention  Place haines  No clots  Urine clear today    Adenocarcinoma of lung (HCC), stage 4 ([facility] Oct. 2021)- (present on admission)  Assessment & Plan  Metastatic adenocarcinoma the lung  Was treated at [facility]  He continues to have chemotherapy on a daily basis but right now with the ileus may not be able to tolerate that so we are holding that temporarily especially with a urinary tract infection    Chronic kidney disease, stage 3a (HCC)- (present on admission)  Assessment & Plan  Monitor renal functions avoid nephrotoxic medications  Give fluid resuscitation    Hyponatremia- (present on  admission)  Assessment & Plan  Na 133    Hypokalemia- (present on admission)  Assessment & Plan  monitor    Macrocytic anemia- (present on admission)  Assessment & Plan  Hb 11.7    Hypertension- (present on admission)  Assessment & Plan  Optimize blood pressure management keep stop blood pressure less than 140 diastolic under 90  Continue with Norvasc 5 mg daily, and losartan 100 mg daily.    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  Continue to monitor    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)  Assessment & Plan  Did have to have a Payne catheter placed  This may be the cause of his infection  Continue with Flomax    Vitamin D deficiency- (present on admission)  Assessment & Plan  Check vitamin D level    Adrenal insufficiency (HCC), secondary- (present on admission)  Assessment & Plan  Continue with adrenal supplementation the patient in the past did have melanoma in 2016 and was treated with immunotherapy from which she developed complications and had to be treated with high-dose of steroids and since that he has been adrenal insufficiency.    GERD (gastroesophageal reflux disease)- (present on admission)  Assessment & Plan  PPI therapy    Hypothyroid- (present on admission)  Assessment & Plan  Continue with Synthroid supplementation with Cytomel 10 mcg daily also give levothyroxine 125 mcg daily  Most recent TSH 3.270           VTE prophylaxis:    Xarelto 10mg daily as prophylaxis      I have performed a physical exam and reviewed and updated ROS and Plan today (3/9/2024). In review of yesterday's note (3/8/2024), there are no changes except as documented above.    Greater than 54 minutes spent prepping to see patient (e.g. review of tests) obtaining and/or reviewing separately obtained  history. Performing a medically appropriate examination and/ evaluation.  Counseling and educating the patient/family/caregiver.  Ordering medications, tests, or procedures.  Referring and communicating with other health care professionals.  Documenting clinical information in EPIC.  Independently interpreting results and communicating results to patient/family/caregiver.  Care coordination.

## 2024-03-09 NOTE — PROGRESS NOTES
Charge Nurse Rounding Note    Bedside rounding completed to address quality of care and overall patient experience.    Patient Satisfaction addressed including staff responsiveness. Patient/family are aware of the POC and any questions answered. Thorough safety education completed including use of call light prior to all mobility throughout the entirety of the hospital stay.     Patient/family aware of time of next Hourly Round.    No further questions/concerns currently.     Additional Notes:

## 2024-03-09 NOTE — PROGRESS NOTES
4 Eyes Skin Assessment Completed by RHODA Rivera and RHODA Sullivan.     Head Blanching and Redness  Ears Scab/small growth behind left ear  Nose WDL  Mouth WDL  Neck WDL  Breast/Chest WDL  Shoulder Blades WDL  Spine WDL  (R) Arm/Elbow/Hand WDL  (L) Arm/Elbow/Hand WDL  Abdomen WDL  Groin WDL  Scrotum/Coccyx/Buttocks WDL  (R) Leg WDL  (L) Leg WDL  (R) Heel/Foot/Toe Redness and Blanching  (L) Heel/Foot/Toe Redness and Blanching              Devices In Places BP cuff, Pulse oximeter        Interventions In Place N/A     Possible Skin Injury No     Pictures Uploaded Into Epic N/A  Wound Consult Placed N/A  RN Wound Prevention Protocol Ordered No

## 2024-03-10 VITALS
HEART RATE: 66 BPM | WEIGHT: 158.73 LBS | OXYGEN SATURATION: 93 % | HEIGHT: 72 IN | DIASTOLIC BLOOD PRESSURE: 82 MMHG | SYSTOLIC BLOOD PRESSURE: 153 MMHG | RESPIRATION RATE: 18 BRPM | BODY MASS INDEX: 21.5 KG/M2 | TEMPERATURE: 97.2 F

## 2024-03-10 LAB
ALBUMIN SERPL BCP-MCNC: 3.2 G/DL (ref 3.2–4.9)
BASOPHILS # BLD AUTO: 0.6 % (ref 0–1.8)
BASOPHILS # BLD: 0.04 K/UL (ref 0–0.12)
BUN SERPL-MCNC: 14 MG/DL (ref 8–22)
CALCIUM ALBUM COR SERPL-MCNC: 8.4 MG/DL (ref 8.5–10.5)
CALCIUM SERPL-MCNC: 7.8 MG/DL (ref 8.4–10.2)
CHLORIDE SERPL-SCNC: 103 MMOL/L (ref 96–112)
CO2 SERPL-SCNC: 23 MMOL/L (ref 20–33)
CREAT SERPL-MCNC: 1.38 MG/DL (ref 0.5–1.4)
EOSINOPHIL # BLD AUTO: 0.02 K/UL (ref 0–0.51)
EOSINOPHIL NFR BLD: 0.3 % (ref 0–6.9)
ERYTHROCYTE [DISTWIDTH] IN BLOOD BY AUTOMATED COUNT: 54.2 FL (ref 35.9–50)
GFR SERPLBLD CREATININE-BSD FMLA CKD-EPI: 53 ML/MIN/1.73 M 2
GLUCOSE SERPL-MCNC: 101 MG/DL (ref 65–99)
HCT VFR BLD AUTO: 38.5 % (ref 42–52)
HGB BLD-MCNC: 13 G/DL (ref 14–18)
IMM GRANULOCYTES # BLD AUTO: 0.1 K/UL (ref 0–0.11)
IMM GRANULOCYTES NFR BLD AUTO: 1.5 % (ref 0–0.9)
LYMPHOCYTES # BLD AUTO: 0.93 K/UL (ref 1–4.8)
LYMPHOCYTES NFR BLD: 13.7 % (ref 22–41)
MAGNESIUM SERPL-MCNC: 2 MG/DL (ref 1.5–2.5)
MCH RBC QN AUTO: 34.1 PG (ref 27–33)
MCHC RBC AUTO-ENTMCNC: 33.8 G/DL (ref 32.3–36.5)
MCV RBC AUTO: 101 FL (ref 81.4–97.8)
MONOCYTES # BLD AUTO: 0.77 K/UL (ref 0–0.85)
MONOCYTES NFR BLD AUTO: 11.3 % (ref 0–13.4)
NEUTROPHILS # BLD AUTO: 4.94 K/UL (ref 1.82–7.42)
NEUTROPHILS NFR BLD: 72.6 % (ref 44–72)
NRBC # BLD AUTO: 0 K/UL
NRBC BLD-RTO: 0 /100 WBC (ref 0–0.2)
PHOSPHATE SERPL-MCNC: 2.5 MG/DL (ref 2.5–4.5)
PLATELET # BLD AUTO: 202 K/UL (ref 164–446)
PMV BLD AUTO: 9.1 FL (ref 9–12.9)
POTASSIUM SERPL-SCNC: 3.7 MMOL/L (ref 3.6–5.5)
RBC # BLD AUTO: 3.81 M/UL (ref 4.7–6.1)
SODIUM SERPL-SCNC: 137 MMOL/L (ref 135–145)
WBC # BLD AUTO: 6.8 K/UL (ref 4.8–10.8)

## 2024-03-10 PROCEDURE — 700111 HCHG RX REV CODE 636 W/ 250 OVERRIDE (IP): Performed by: INTERNAL MEDICINE

## 2024-03-10 PROCEDURE — 85025 COMPLETE CBC W/AUTO DIFF WBC: CPT

## 2024-03-10 PROCEDURE — 99239 HOSP IP/OBS DSCHRG MGMT >30: CPT | Performed by: INTERNAL MEDICINE

## 2024-03-10 PROCEDURE — A9270 NON-COVERED ITEM OR SERVICE: HCPCS | Performed by: INTERNAL MEDICINE

## 2024-03-10 PROCEDURE — 700102 HCHG RX REV CODE 250 W/ 637 OVERRIDE(OP): Performed by: HOSPITALIST

## 2024-03-10 PROCEDURE — 700105 HCHG RX REV CODE 258: Performed by: INTERNAL MEDICINE

## 2024-03-10 PROCEDURE — 700102 HCHG RX REV CODE 250 W/ 637 OVERRIDE(OP): Performed by: INTERNAL MEDICINE

## 2024-03-10 PROCEDURE — 83735 ASSAY OF MAGNESIUM: CPT

## 2024-03-10 PROCEDURE — 80069 RENAL FUNCTION PANEL: CPT

## 2024-03-10 PROCEDURE — 36415 COLL VENOUS BLD VENIPUNCTURE: CPT

## 2024-03-10 PROCEDURE — A9270 NON-COVERED ITEM OR SERVICE: HCPCS | Performed by: HOSPITALIST

## 2024-03-10 RX ORDER — CEFUROXIME AXETIL 500 MG/1
250 TABLET ORAL 2 TIMES DAILY
Qty: 5 TABLET | Refills: 0 | Status: ACTIVE | OUTPATIENT
Start: 2024-03-10 | End: 2024-03-15

## 2024-03-10 RX ORDER — GUAIFENESIN 600 MG/1
600 TABLET, EXTENDED RELEASE ORAL EVERY 12 HOURS
Qty: 28 TABLET | Refills: 0 | Status: SHIPPED | OUTPATIENT
Start: 2024-03-10

## 2024-03-10 RX ORDER — CEFUROXIME AXETIL 500 MG/1
500 TABLET ORAL 2 TIMES DAILY
Qty: 14 TABLET | Refills: 0 | Status: SHIPPED | OUTPATIENT
Start: 2024-03-10 | End: 2024-03-10

## 2024-03-10 RX ORDER — GUAIFENESIN 600 MG/1
600 TABLET, EXTENDED RELEASE ORAL EVERY 12 HOURS
Status: DISCONTINUED | OUTPATIENT
Start: 2024-03-10 | End: 2024-03-10 | Stop reason: HOSPADM

## 2024-03-10 RX ADMIN — TAMSULOSIN HYDROCHLORIDE 0.4 MG: 0.4 CAPSULE ORAL at 08:01

## 2024-03-10 RX ADMIN — LORATADINE 10 MG: 10 TABLET ORAL at 05:47

## 2024-03-10 RX ADMIN — CEFAZOLIN 2 G: 2 INJECTION, POWDER, FOR SOLUTION INTRAMUSCULAR; INTRAVENOUS at 05:50

## 2024-03-10 RX ADMIN — LEVOTHYROXINE SODIUM 125 MCG: 0.12 TABLET ORAL at 05:47

## 2024-03-10 RX ADMIN — HYDROCORTISONE 20 MG: 10 TABLET ORAL at 05:47

## 2024-03-10 RX ADMIN — LIOTHYRONINE SODIUM 10 MCG: 5 TABLET ORAL at 05:47

## 2024-03-10 ASSESSMENT — FIBROSIS 4 INDEX: FIB4 SCORE: 1.75

## 2024-03-10 ASSESSMENT — PAIN DESCRIPTION - PAIN TYPE: TYPE: ACUTE PAIN

## 2024-03-10 NOTE — HOSPITAL COURSE
6-year-old male with medical history significant for melanoma, lung adenocarcinoma, CKD 3, diverticulosis, colectomy requiring ostomy and subsequent reversal.  Presented 3/6 with abdominal pain, constipation, headache.  CT abdomen, pelvis demonstrated concern for small bowel obstruction versus ileus.  Patient was made n.p.o. and treated conservatively.  Also found to have a urinalysis consistent with UTI and started on IV antibiotics.  Improved clinically and ileus resolved.  Diet advanced which patient tolerated without complication and had bowel movement.  Culture positive for Staph aureus and antibiotics were adjusted appropriately.  Patient had a Payne placed 02/29 and followed up with urology on 03/04 and passed a voiding trial.  Patient's MSSA bacteria likely secondary to recent Payne.  Patient demonstrated a downward trend in his leukocytosis 14.4-6.8.  Patient improved clinically and was agreeable to discharge.  Given patient's history of colectomy with ostomy and reversal I did educate him and family regarding possibility of repeat ileus and bowel obstruction and provided information on when to return to ED.  Patient will follow-up with his oncology team regarding his ongoing treatment of lung adenocarcinoma. Patient was determined satisfactory for discharge with appropriate follow up.

## 2024-03-10 NOTE — DISCHARGE SUMMARY
Discharge Summary    CHIEF COMPLAINT ON ADMISSION  Chief Complaint   Patient presents with    Constipation     States he was constipated for 3 days, Had BM right when EMS arrives, started formed then diarrhea.     Headache     Headache after BM, resolved after medicated with EMS, NS, 100 mcg Fentanyl, and 4 mg zofran    N/V     Felt nauseous and vomited do to being constipated.     Urinary Retention     Had difficulty urinating,  was able to urinate after BM.  Had urinary retention with haines cath placed that was removed on Monday.        Reason for Admission  EMS     Admission Date  3/6/2024    CODE STATUS  Prior    HPI & HOSPITAL COURSE  6-year-old male with medical history significant for melanoma, lung adenocarcinoma, CKD 3, diverticulosis, colectomy requiring ostomy and subsequent reversal.  Presented 3/6 with abdominal pain, constipation, headache.  CT abdomen, pelvis demonstrated concern for small bowel obstruction versus ileus.  Patient was made n.p.o. and treated conservatively.  Also found to have a urinalysis consistent with UTI and started on IV antibiotics.  Improved clinically and ileus resolved.  Diet advanced which patient tolerated without complication and had bowel movement.  Culture positive for Staph aureus and antibiotics were adjusted appropriately.  Patient had a Haines placed 02/29 and followed up with urology on 03/04 and passed a voiding trial.  Patient's MSSA bacteria likely secondary to recent Haines.  Patient demonstrated a downward trend in his leukocytosis 14.4-6.8.  Patient improved clinically and was agreeable to discharge.  Given patient's history of colectomy with ostomy and reversal I did educate him and family regarding possibility of repeat ileus and bowel obstruction and provided information on when to return to ED.  Patient will follow-up with his oncology team regarding his ongoing treatment of lung adenocarcinoma. Patient was determined satisfactory for discharge with  appropriate follow up.    Therefore, he is discharged in fair and stable condition to home with close outpatient follow-up.    The patient met 2-midnight criteria for an inpatient stay at the time of discharge.    Discharge Date  3/10/2024    FOLLOW UP ITEMS POST DISCHARGE  Please follow up with PCP in 3-5 days for post hospitalization follow up and medication reconciliation.     DISCHARGE DIAGNOSES  Principal Problem:    Ileus (HCC) (POA: Unknown)  Active Problems:    Complicated urinary tract infection (POA: Yes)    Acute renal failure superimposed on stage 3b chronic kidney disease (HCC) (POA: Unknown)    Chronic kidney disease, stage 3a (HCC) (POA: Yes)    Adenocarcinoma of lung (HCC), stage 4 ([facility] Oct. 2021) (POA: Yes)    Hematuria (POA: Unknown)    Hypothyroid (POA: Yes)    GERD (gastroesophageal reflux disease) (POA: Yes)    Adrenal insufficiency (HCC), secondary (POA: Yes)    Vitamin D deficiency (POA: Yes)    BPH with obstruction/lower urinary tract symptoms (POA: Yes)    Brain lesion (POA: Yes)    Chronic pain (POA: Yes)    Hypertension (POA: Yes)    Macrocytic anemia (POA: Yes)    Hypokalemia (POA: Yes)    Hyponatremia (POA: Yes)  Resolved Problems:    * No resolved hospital problems. *      FOLLOW UP  Future Appointments   Date Time Provider Department Center   8/20/2024 12:45 PM [facility] None     No follow-up provider specified.    MEDICATIONS ON DISCHARGE     Medication List        START taking these medications        Instructions   cefUROXime 500 MG Tabs  Commonly known as: Ceftin   Take 0.5 Tablets by mouth 2 times a day for 5 days.  Dose: 250 mg     guaiFENesin  MG Tb12  Commonly known as: Mucinex   Take 1 Tablet by mouth every 12 hours.  Dose: 600 mg            CONTINUE taking these medications        Instructions   amLODIPine 5 MG Tabs  Commonly known as: Norvasc   Take 1 Tablet by mouth every morning.  Dose: 5 mg     atorvastatin 20 MG Tabs  Commonly known as:  "Lipitor   Take 1 Tablet by mouth every day.  Dose: 20 mg     BD Disp Needles 25G X 5/8\" Misc  Generic drug: NEEDLE (DISP) 25 G   Use as directed for testosterone injections every 7 days     BISACODYL PO   Take 2 Tablets by mouth as needed. (OTC)  Indications: Constipation  Dose: 2 Tablet     D-3-5 125 MCG (5000 UT) Caps  Generic drug: cholecalciferol   Take 5,000 Units by mouth every day.  Dose: 5,000 Units     dicyclomine 20 MG Tabs  Commonly known as: Bentyl   Take 1 Tablet by mouth every 6 hours as needed (Abdominal Bloating).  Dose: 20 mg     famotidine 20 MG Tabs  Commonly known as: Pepcid   Take 1 Tablet by mouth every evening.  Dose: 20 mg     hydrocortisone 10 MG Tabs  Commonly known as: Cortef   Take 2 Tablets by mouth 2 times a day.  Dose: 20 mg     liothyronine 5 MCG Tabs  Commonly known as: Cytomel   Take 10 mcg by mouth every day.  Dose: 10 mcg     losartan 100 MG Tabs  Commonly known as: Cozaar   Take 1 Tablet by mouth every day.  Dose: 100 mg     omeprazole 40 MG delayed-release capsule  Commonly known as: PriLOSEC   Take 1 Capsule by mouth every day.  Dose: 40 mg     ondansetron 4 MG Tbdp  Commonly known as: Zofran ODT   Take 1 Tablet by mouth every 6 hours as needed for Nausea/Vomiting.  Dose: 4 mg     potassium chloride 8 MEQ tablet  Commonly known as: Klor-Con   Take 2 Tablets by mouth every day.  Dose: 16 mEq     Senna 8.6 MG Tabs   Take 1 Tablet by mouth as needed. Pts wife RX  Indications: Constipation  Dose: 1 Tablet     tadalafil 20 MG tablet  Commonly known as: Cialis   Take 20 mg by mouth 1 time a day as needed for Erectile Dysfunction. for erectile dysfunction  Dose: 20 mg     Tagrisso 80 MG Tabs  Generic drug: Osimertinib Mesylate   Take 80 mg by mouth every day.  Dose: 80 mg     tamsulosin 0.4 MG capsule  Commonly known as: Flomax   Take 1 Capsule by mouth every day.  Dose: 0.4 mg     testosterone cypionate 200 MG/ML injection  Commonly known as: Depo-Testosterone   Inject 120 mg into " the shoulder, thigh, or buttocks every 7 days. On Sunday  Dose: 120 mg     Tirosint 125 MCG Caps  Generic drug: Levothyroxine Sodium   Take 125 mcg by mouth every morning on an empty stomach.  Dose: 125 mcg              Allergies  Allergies   Allergen Reactions    Gramineae Pollens Itching and Shortness of Breath     Itchy eyes, red face    Cat Hair Extract Hives and Itching    Horse Allergy Hives    Other Environmental     Pollen Extract Runny Nose and Itching     .       DIET  No orders of the defined types were placed in this encounter.    LABORATORY  Lab Results   Component Value Date    SODIUM 137 03/10/2024    POTASSIUM 3.7 03/10/2024    CHLORIDE 103 03/10/2024    CO2 23 03/10/2024    GLUCOSE 101 (H) 03/10/2024    BUN 14 03/10/2024    CREATININE 1.38 03/10/2024        Lab Results   Component Value Date    WBC 6.8 03/10/2024    HEMOGLOBIN 13.0 (L) 03/10/2024    HEMATOCRIT 38.5 (L) 03/10/2024    PLATELETCT 202 03/10/2024        Total time of the discharge process exceeds 38 minutes.

## 2024-03-10 NOTE — PROGRESS NOTES
Received report from day shift nurse, Nicole DUONG. Assumed pt care at 1915.   Pt is A&Ox4, resting comfortably in bed. Pt received on room air; no signs of SOB/respiratory distress. Labs noted, VSS. Pt complains of pain at 4/10 and nausea at this moment; prn medications given as per MAR. Needs attended well. Family members at bedside. Fall precautions in place. Bed at lowest position. Call light and personal belongings within reach. Plan of care on going, no further concerns as of present.   Hourly rounding in progress.

## 2024-03-11 ENCOUNTER — TELEPHONE (OUTPATIENT)
Dept: HEALTH INFORMATION MANAGEMENT | Facility: OTHER | Age: 79
End: 2024-03-11

## 2024-03-11 LAB
1,25(OH)2D3 SERPL-MCNC: 148 PG/ML (ref 19.9–79.3)
BACTERIA BLD CULT: NORMAL
BACTERIA BLD CULT: NORMAL
SIGNIFICANT IND 70042: NORMAL
SIGNIFICANT IND 70042: NORMAL
SITE SITE: NORMAL
SITE SITE: NORMAL
SOURCE SOURCE: NORMAL
SOURCE SOURCE: NORMAL

## 2024-03-12 LAB
BACTERIA UR CULT: ABNORMAL
BACTERIA UR CULT: ABNORMAL
SIGNIFICANT IND 70042: ABNORMAL
SITE SITE: ABNORMAL
SOURCE SOURCE: ABNORMAL

## 2024-03-18 DIAGNOSIS — K21.00 GASTROESOPHAGEAL REFLUX DISEASE WITH ESOPHAGITIS WITHOUT HEMORRHAGE: ICD-10-CM

## 2024-03-18 DIAGNOSIS — K29.00 ACUTE GASTRITIS WITHOUT HEMORRHAGE, UNSPECIFIED GASTRITIS TYPE: ICD-10-CM

## 2024-03-18 RX ORDER — OMEPRAZOLE 40 MG/1
40 CAPSULE, DELAYED RELEASE ORAL DAILY
Qty: 90 CAPSULE | Refills: 3 | Status: SHIPPED | OUTPATIENT
Start: 2024-03-18

## 2024-03-26 ENCOUNTER — HOSPITAL ENCOUNTER (OUTPATIENT)
Facility: MEDICAL CENTER | Age: 79
End: 2024-03-26
Attending: INTERNAL MEDICINE
Payer: COMMERCIAL

## 2024-03-26 LAB — 25(OH)D3 SERPL-MCNC: 118 NG/ML (ref 30–100)

## 2024-03-26 PROCEDURE — 82306 VITAMIN D 25 HYDROXY: CPT

## 2024-04-18 ENCOUNTER — HOSPITAL ENCOUNTER (OUTPATIENT)
Dept: RADIOLOGY | Facility: MEDICAL CENTER | Age: 79
End: 2024-04-18
Attending: INTERNAL MEDICINE
Payer: MEDICARE

## 2024-04-18 DIAGNOSIS — E83.59 TUMORAL CALCINOSIS: ICD-10-CM

## 2024-04-18 DIAGNOSIS — C43.72 MALIGNANT MELANOMA OF LEFT LOWER EXTREMITY INCLUDING HIP (HCC): ICD-10-CM

## 2024-04-18 DIAGNOSIS — C34.11 MALIGNANT NEOPLASM OF UPPER LOBE OF RIGHT LUNG (HCC): ICD-10-CM

## 2024-04-18 PROCEDURE — 700117 HCHG RX CONTRAST REV CODE 255

## 2024-04-18 PROCEDURE — A9579 GAD-BASE MR CONTRAST NOS,1ML: HCPCS

## 2024-04-18 PROCEDURE — 70553 MRI BRAIN STEM W/O & W/DYE: CPT

## 2024-04-18 RX ADMIN — GADOTERIDOL 15 ML: 279.3 INJECTION, SOLUTION INTRAVENOUS at 16:33

## 2024-04-23 ENCOUNTER — HOSPITAL ENCOUNTER (OUTPATIENT)
Dept: RADIOLOGY | Facility: MEDICAL CENTER | Age: 79
End: 2024-04-23
Payer: COMMERCIAL

## 2024-05-14 ENCOUNTER — HOSPITAL ENCOUNTER (INPATIENT)
Facility: MEDICAL CENTER | Age: 79
LOS: 2 days | DRG: 683 | End: 2024-05-16
Attending: EMERGENCY MEDICINE | Admitting: HOSPITALIST
Payer: MEDICARE

## 2024-05-14 ENCOUNTER — APPOINTMENT (OUTPATIENT)
Dept: RADIOLOGY | Facility: MEDICAL CENTER | Age: 79
DRG: 683 | End: 2024-05-14
Attending: EMERGENCY MEDICINE
Payer: MEDICARE

## 2024-05-14 ENCOUNTER — APPOINTMENT (OUTPATIENT)
Dept: RADIOLOGY | Facility: MEDICAL CENTER | Age: 79
DRG: 683 | End: 2024-05-14
Attending: HOSPITALIST
Payer: MEDICARE

## 2024-05-14 DIAGNOSIS — E86.0 DEHYDRATION: ICD-10-CM

## 2024-05-14 DIAGNOSIS — N17.9 ACUTE KIDNEY INJURY (HCC): ICD-10-CM

## 2024-05-14 DIAGNOSIS — R19.7 DIARRHEA, UNSPECIFIED TYPE: ICD-10-CM

## 2024-05-14 DIAGNOSIS — N17.9 ACUTE RENAL FAILURE, UNSPECIFIED ACUTE RENAL FAILURE TYPE (HCC): ICD-10-CM

## 2024-05-14 PROBLEM — R17 TOTAL BILIRUBIN, ELEVATED: Status: ACTIVE | Noted: 2024-05-14

## 2024-05-14 PROBLEM — K52.9 GASTROENTERITIS, INFECTIOUS, PRESUMED: Status: ACTIVE | Noted: 2024-05-14

## 2024-05-14 PROBLEM — D72.819 LEUKOPENIA: Status: ACTIVE | Noted: 2024-05-14

## 2024-05-14 PROBLEM — Z78.9 FULL CODE STATUS: Status: ACTIVE | Noted: 2024-05-14

## 2024-05-14 LAB
ALBUMIN SERPL BCP-MCNC: 3.6 G/DL (ref 3.2–4.9)
ALBUMIN/GLOB SERPL: 0.8 G/DL
ALP SERPL-CCNC: 65 U/L (ref 30–99)
ALT SERPL-CCNC: 10 U/L (ref 2–50)
ANION GAP SERPL CALC-SCNC: 10 MMOL/L (ref 7–16)
APPEARANCE UR: CLEAR
APTT PPP: 30.2 SEC (ref 24.7–36)
AST SERPL-CCNC: 19 U/L (ref 12–45)
BASOPHILS # BLD AUTO: 0.5 % (ref 0–1.8)
BASOPHILS # BLD: 0.02 K/UL (ref 0–0.12)
BILIRUB SERPL-MCNC: 1.7 MG/DL (ref 0.1–1.5)
BILIRUB UR QL STRIP.AUTO: NEGATIVE
BUN SERPL-MCNC: 19 MG/DL (ref 8–22)
CA-I SERPL-SCNC: 1.07 MMOL/L (ref 1.1–1.3)
CALCIUM ALBUM COR SERPL-MCNC: 8.9 MG/DL (ref 8.5–10.5)
CALCIUM SERPL-MCNC: 8.6 MG/DL (ref 8.4–10.2)
CHLORIDE SERPL-SCNC: 102 MMOL/L (ref 96–112)
CO2 SERPL-SCNC: 21 MMOL/L (ref 20–33)
COLOR UR: YELLOW
CREAT SERPL-MCNC: 2.39 MG/DL (ref 0.5–1.4)
CRP SERPL HS-MCNC: 0.58 MG/DL (ref 0–0.75)
EOSINOPHIL # BLD AUTO: 0 K/UL (ref 0–0.51)
EOSINOPHIL NFR BLD: 0 % (ref 0–6.9)
ERYTHROCYTE [DISTWIDTH] IN BLOOD BY AUTOMATED COUNT: 63.1 FL (ref 35.9–50)
ERYTHROCYTE [SEDIMENTATION RATE] IN BLOOD BY WESTERGREN METHOD: 40 MM/HOUR (ref 0–20)
GFR SERPLBLD CREATININE-BSD FMLA CKD-EPI: 27 ML/MIN/1.73 M 2
GLOBULIN SER CALC-MCNC: 4.6 G/DL (ref 1.9–3.5)
GLUCOSE SERPL-MCNC: 81 MG/DL (ref 65–99)
GLUCOSE UR STRIP.AUTO-MCNC: NEGATIVE MG/DL
HCT VFR BLD AUTO: 37.3 % (ref 42–52)
HGB BLD-MCNC: 12.4 G/DL (ref 14–18)
IMM GRANULOCYTES # BLD AUTO: 0.04 K/UL (ref 0–0.11)
IMM GRANULOCYTES NFR BLD AUTO: 1 % (ref 0–0.9)
INR PPP: 0.99 (ref 0.87–1.13)
KETONES UR STRIP.AUTO-MCNC: ABNORMAL MG/DL
LACTATE SERPL-SCNC: 1.4 MMOL/L (ref 0.5–2)
LEUKOCYTE ESTERASE UR QL STRIP.AUTO: NEGATIVE
LIPASE SERPL-CCNC: 33 U/L (ref 11–82)
LYMPHOCYTES # BLD AUTO: 0.63 K/UL (ref 1–4.8)
LYMPHOCYTES NFR BLD: 15 % (ref 22–41)
MCH RBC QN AUTO: 34.4 PG (ref 27–33)
MCHC RBC AUTO-ENTMCNC: 33.2 G/DL (ref 32.3–36.5)
MCV RBC AUTO: 103.6 FL (ref 81.4–97.8)
MICRO URNS: ABNORMAL
MONOCYTES # BLD AUTO: 0.38 K/UL (ref 0–0.85)
MONOCYTES NFR BLD AUTO: 9 % (ref 0–13.4)
NEUTROPHILS # BLD AUTO: 3.14 K/UL (ref 1.82–7.42)
NEUTROPHILS NFR BLD: 74.5 % (ref 44–72)
NITRITE UR QL STRIP.AUTO: NEGATIVE
NRBC # BLD AUTO: 0 K/UL
NRBC BLD-RTO: 0 /100 WBC (ref 0–0.2)
PH UR STRIP.AUTO: 6 [PH] (ref 5–8)
PHOSPHATE SERPL-MCNC: 2.5 MG/DL (ref 2.5–4.5)
PLATELET # BLD AUTO: 242 K/UL (ref 164–446)
PMV BLD AUTO: 8.9 FL (ref 9–12.9)
POTASSIUM SERPL-SCNC: 4.7 MMOL/L (ref 3.6–5.5)
PROCALCITONIN SERPL-MCNC: 0.09 NG/ML
PROT SERPL-MCNC: 8.2 G/DL (ref 6–8.2)
PROT UR QL STRIP: NEGATIVE MG/DL
PROTHROMBIN TIME: 13.6 SEC (ref 12–14.6)
RBC # BLD AUTO: 3.6 M/UL (ref 4.7–6.1)
RBC UR QL AUTO: NEGATIVE
SODIUM SERPL-SCNC: 133 MMOL/L (ref 135–145)
SP GR UR STRIP.AUTO: 1.01
URATE SERPL-MCNC: 8.3 MG/DL (ref 2.5–8.3)
WBC # BLD AUTO: 4.2 K/UL (ref 4.8–10.8)

## 2024-05-14 PROCEDURE — 99223 1ST HOSP IP/OBS HIGH 75: CPT | Performed by: HOSPITALIST

## 2024-05-14 RX ORDER — LEVOTHYROXINE SODIUM 0.12 MG/1
125 TABLET ORAL
Status: DISCONTINUED | OUTPATIENT
Start: 2024-05-15 | End: 2024-05-16 | Stop reason: HOSPADM

## 2024-05-14 RX ORDER — OXYCODONE HYDROCHLORIDE 5 MG/1
5 TABLET ORAL
Status: DISCONTINUED | OUTPATIENT
Start: 2024-05-14 | End: 2024-05-16 | Stop reason: HOSPADM

## 2024-05-14 RX ORDER — ONDANSETRON 2 MG/ML
4 INJECTION INTRAMUSCULAR; INTRAVENOUS ONCE
Status: COMPLETED | OUTPATIENT
Start: 2024-05-14 | End: 2024-05-14

## 2024-05-14 RX ORDER — TRAZODONE HYDROCHLORIDE 50 MG/1
50 TABLET ORAL
Status: DISCONTINUED | OUTPATIENT
Start: 2024-05-15 | End: 2024-05-16 | Stop reason: HOSPADM

## 2024-05-14 RX ORDER — LIOTHYRONINE SODIUM 5 UG/1
10 TABLET ORAL DAILY
Status: DISCONTINUED | OUTPATIENT
Start: 2024-05-15 | End: 2024-05-16 | Stop reason: HOSPADM

## 2024-05-14 RX ORDER — OXYCODONE HYDROCHLORIDE 5 MG/1
2.5 TABLET ORAL
Status: DISCONTINUED | OUTPATIENT
Start: 2024-05-14 | End: 2024-05-16 | Stop reason: HOSPADM

## 2024-05-14 RX ORDER — SODIUM CHLORIDE, SODIUM LACTATE, POTASSIUM CHLORIDE, CALCIUM CHLORIDE 600; 310; 30; 20 MG/100ML; MG/100ML; MG/100ML; MG/100ML
1000 INJECTION, SOLUTION INTRAVENOUS ONCE
Status: COMPLETED | OUTPATIENT
Start: 2024-05-14 | End: 2024-05-14

## 2024-05-14 RX ORDER — SILODOSIN 8 MG/1
8 CAPSULE ORAL
COMMUNITY

## 2024-05-14 RX ORDER — ACETAMINOPHEN 325 MG/1
650 TABLET ORAL EVERY 6 HOURS PRN
Status: DISCONTINUED | OUTPATIENT
Start: 2024-05-14 | End: 2024-05-16 | Stop reason: HOSPADM

## 2024-05-14 RX ORDER — TAMSULOSIN HYDROCHLORIDE 0.4 MG/1
0.4 CAPSULE ORAL DAILY
Status: DISCONTINUED | OUTPATIENT
Start: 2024-05-15 | End: 2024-05-14

## 2024-05-14 RX ORDER — HYDROCORTISONE 10 MG/1
20 TABLET ORAL 2 TIMES DAILY
Status: DISCONTINUED | OUTPATIENT
Start: 2024-05-14 | End: 2024-05-16 | Stop reason: HOSPADM

## 2024-05-14 RX ORDER — ONDANSETRON 4 MG/1
4 TABLET, ORALLY DISINTEGRATING ORAL EVERY 4 HOURS PRN
Status: DISCONTINUED | OUTPATIENT
Start: 2024-05-14 | End: 2024-05-16 | Stop reason: HOSPADM

## 2024-05-14 RX ORDER — ONDANSETRON 2 MG/ML
4 INJECTION INTRAMUSCULAR; INTRAVENOUS EVERY 4 HOURS PRN
Status: DISCONTINUED | OUTPATIENT
Start: 2024-05-14 | End: 2024-05-16 | Stop reason: HOSPADM

## 2024-05-14 RX ORDER — MORPHINE SULFATE 4 MG/ML
4 INJECTION INTRAVENOUS ONCE
Status: COMPLETED | OUTPATIENT
Start: 2024-05-14 | End: 2024-05-14

## 2024-05-14 RX ORDER — ATORVASTATIN CALCIUM 20 MG/1
20 TABLET, FILM COATED ORAL DAILY
Status: DISCONTINUED | OUTPATIENT
Start: 2024-05-15 | End: 2024-05-16 | Stop reason: HOSPADM

## 2024-05-14 RX ORDER — HYDROMORPHONE HYDROCHLORIDE 1 MG/ML
0.25 INJECTION, SOLUTION INTRAMUSCULAR; INTRAVENOUS; SUBCUTANEOUS
Status: DISCONTINUED | OUTPATIENT
Start: 2024-05-14 | End: 2024-05-16 | Stop reason: HOSPADM

## 2024-05-14 RX ORDER — DOXYCYCLINE HYCLATE 100 MG
100 TABLET ORAL 2 TIMES DAILY
Status: ON HOLD | COMMUNITY
End: 2024-05-16

## 2024-05-14 RX ORDER — TAMSULOSIN HYDROCHLORIDE 0.4 MG/1
0.8 CAPSULE ORAL
Status: DISCONTINUED | OUTPATIENT
Start: 2024-05-15 | End: 2024-05-16 | Stop reason: HOSPADM

## 2024-05-14 RX ORDER — SODIUM CHLORIDE 9 MG/ML
INJECTION, SOLUTION INTRAVENOUS CONTINUOUS
Status: DISCONTINUED | OUTPATIENT
Start: 2024-05-14 | End: 2024-05-16 | Stop reason: HOSPADM

## 2024-05-14 RX ORDER — ACETAMINOPHEN 500 MG
1000 TABLET ORAL ONCE
Status: COMPLETED | OUTPATIENT
Start: 2024-05-14 | End: 2024-05-14

## 2024-05-14 RX ORDER — LABETALOL HYDROCHLORIDE 5 MG/ML
10 INJECTION, SOLUTION INTRAVENOUS EVERY 4 HOURS PRN
Status: DISCONTINUED | OUTPATIENT
Start: 2024-05-14 | End: 2024-05-16 | Stop reason: HOSPADM

## 2024-05-14 RX ORDER — CEFEPIME HYDROCHLORIDE 2 G/1
2 INJECTION, POWDER, FOR SOLUTION INTRAVENOUS ONCE
Status: COMPLETED | OUTPATIENT
Start: 2024-05-14 | End: 2024-05-14

## 2024-05-14 RX ORDER — METRONIDAZOLE 500 MG/100ML
500 INJECTION, SOLUTION INTRAVENOUS EVERY 12 HOURS
Status: DISCONTINUED | OUTPATIENT
Start: 2024-05-14 | End: 2024-05-14

## 2024-05-14 RX ADMIN — OXYCODONE 5 MG: 5 TABLET ORAL at 20:42

## 2024-05-14 RX ADMIN — CEFTRIAXONE SODIUM 1000 MG: 1 INJECTION, POWDER, FOR SOLUTION INTRAMUSCULAR; INTRAVENOUS at 21:46

## 2024-05-14 RX ADMIN — ONDANSETRON 4 MG: 2 INJECTION INTRAMUSCULAR; INTRAVENOUS at 18:35

## 2024-05-14 RX ADMIN — SODIUM CHLORIDE: 9 INJECTION, SOLUTION INTRAVENOUS at 20:42

## 2024-05-14 RX ADMIN — ONDANSETRON 4 MG: 2 INJECTION INTRAMUSCULAR; INTRAVENOUS at 17:06

## 2024-05-14 RX ADMIN — MORPHINE SULFATE 4 MG: 4 INJECTION, SOLUTION INTRAMUSCULAR; INTRAVENOUS at 18:35

## 2024-05-14 RX ADMIN — SODIUM CHLORIDE, POTASSIUM CHLORIDE, SODIUM LACTATE AND CALCIUM CHLORIDE 1000 ML: 600; 310; 30; 20 INJECTION, SOLUTION INTRAVENOUS at 17:06

## 2024-05-14 RX ADMIN — HYDROMORPHONE HYDROCHLORIDE 0.25 MG: 1 INJECTION, SOLUTION INTRAMUSCULAR; INTRAVENOUS; SUBCUTANEOUS at 21:39

## 2024-05-14 RX ADMIN — ACETAMINOPHEN 1000 MG: 500 TABLET, FILM COATED ORAL at 20:42

## 2024-05-14 RX ADMIN — CEFEPIME 2 G: 2 INJECTION, POWDER, FOR SOLUTION INTRAVENOUS at 16:28

## 2024-05-14 RX ADMIN — HYDROCORTISONE 20 MG: 10 TABLET ORAL at 20:42

## 2024-05-14 RX ADMIN — ONDANSETRON 4 MG: 2 INJECTION INTRAMUSCULAR; INTRAVENOUS at 20:42

## 2024-05-14 RX ADMIN — VANCOMYCIN HYDROCHLORIDE 125 MG: 5 INJECTION, POWDER, LYOPHILIZED, FOR SOLUTION INTRAVENOUS at 20:45

## 2024-05-14 RX ADMIN — VANCOMYCIN HYDROCHLORIDE 125 MG: KIT ORAL at 20:45

## 2024-05-14 RX ADMIN — MORPHINE SULFATE 4 MG: 4 INJECTION, SOLUTION INTRAMUSCULAR; INTRAVENOUS at 17:06

## 2024-05-14 ASSESSMENT — COPD QUESTIONNAIRES
DO YOU EVER COUGH UP ANY MUCUS OR PHLEGM?: NO/ONLY WITH OCCASIONAL COLDS OR INFECTIONS
HAVE YOU SMOKED AT LEAST 100 CIGARETTES IN YOUR ENTIRE LIFE: NO/DON'T KNOW
COPD SCREENING SCORE: 1
DURING THE PAST 4 WEEKS HOW MUCH DID YOU FEEL SHORT OF BREATH: SOME OF THE TIME

## 2024-05-14 ASSESSMENT — ENCOUNTER SYMPTOMS
BLOOD IN STOOL: 0
FOCAL WEAKNESS: 0
WEAKNESS: 1
WHEEZING: 0
MEMORY LOSS: 0
HEMOPTYSIS: 0
NECK PAIN: 0
BRUISES/BLEEDS EASILY: 0
ABDOMINAL PAIN: 1
DIARRHEA: 1
DIAPHORESIS: 1
PALPITATIONS: 0
CHILLS: 1
INSOMNIA: 0
EYES NEGATIVE: 1
SEIZURES: 0
FEVER: 1
VOMITING: 1
NERVOUS/ANXIOUS: 0
DEPRESSION: 0
HEADACHES: 1
DIZZINESS: 1
COUGH: 0
MUSCULOSKELETAL NEGATIVE: 1
MYALGIAS: 0
CARDIOVASCULAR NEGATIVE: 1
HEARTBURN: 0
SORE THROAT: 0
LOSS OF CONSCIOUSNESS: 0
DOUBLE VISION: 0
SPEECH CHANGE: 0
CONSTIPATION: 0
SENSORY CHANGE: 0
SHORTNESS OF BREATH: 1
NAUSEA: 1

## 2024-05-14 ASSESSMENT — FIBROSIS 4 INDEX: FIB4 SCORE: 1.75

## 2024-05-14 ASSESSMENT — LIFESTYLE VARIABLES: SUBSTANCE_ABUSE: 0

## 2024-05-14 NOTE — ED NOTES
Medication history reviewed with pt. Med rec is complete.  Allergies reviewed, per pt    Patient has had outpatient antibiotics in the last 30 days, pt started DOXYCYCLINE 100MG on 4/15/2024 for a 14 day course.  Per pt reports that he did finish course of antibiotic     Pt is not on any anticoagulants

## 2024-05-14 NOTE — ED TRIAGE NOTES
Pt being treated for Lung CA  recent chemo last week  starts back this coming Monday   for the last week now pt has been having diarrhea weakness and dizziness  is unable to do daily activity w/out resting in between   sent here by oncologist for further evaluation  mask on pt   bld being drawn in triage

## 2024-05-14 NOTE — ED PROVIDER NOTES
ED Provider Note    CHIEF COMPLAINT  Chief Complaint   Patient presents with    Dizziness     Sent here from oncologist     Weakness     Last 5 days       Diarrhea     For the last 5 days now         EXTERNAL RECORDS REVIEWED  Inpatient Notes patient was admitted to [facility] from March 6, 2024 through March 10, 2024 for abdominal pain, constipation and a headache.  Patient was found to have a urinary tract infection.  Culture was positive for Staph aureus.  Payne catheter was placed for urinary retention.  He was also found to have a ileus.    Patient was seen in [facility] on May 11, 2024.  He presented there complaining of diarrhea.  He was given IV fluids.  Patient was unable to produce stool sample for testing.  Upon discharge patient was feeling better.  He was discharged with recommendation for close follow-up and stool studies.  Creatinine on Saturday in [facility] was 1.62    Patient was seen at [facility] April 25, 2024.  Patient expressed interest in clinical trials to [facility].  Patient's most recent therapy includes Abraxane plus carboplatin together with osimertinib.    Creatinine from 2 months ago was 1.38    HPI/ROS  LIMITATION TO HISTORY   Select: : None  OUTSIDE HISTORIAN(S):  Significant other wife states that the ER physician [employer] was concerned about the possibility of C. difficile.    Bartolo Berrios [facility] is a 79 y.o. male who presents to the ER with complaint of diarrhea, dizziness, lightheadedness, generalized weakness as well as nausea vomiting and abdominal pain.  Patient reports that 4 days ago patient developed diarrhea.  He reports multiple episodes of watery diarrhea.  He was traveling in [facility] at the time for a [employer] graduation.  He went to the emergency department in [place] on Saturday for diarrhea.  No imaging was done.  They did blood work.  They suggested getting some stool studies to rule out C. difficile.  Wife says it  "sounded like they wanted the patient to stay overnight, but the patient wanted to get back to [place].  Patient said the diarrhea improved and he only had very minimal diarrhea over the weekend.  However, diarrhea recurred yesterday.  He also started having vomiting.  He describes intermittent abdominal cramps that tend to occur before his bouts of diarrhea which also started 4 days ago at onset of the diarrhea.  Patient is currently undergoing chemotherapy.  He gets IV chemotherapy once weekly with third week is being off week.  His last round of chemotherapy was last Tuesday.  This is his off week.  He is followed by Dr. 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  No foreign travel.  Patient said he had Mexican food the night before the diarrhea started.  Nobody else ate the same food.  No ill contacts.  Patient was on antibiotics in March for UTI.  Wife says patient has been generally weak.  He has been dizzy.  She said that last night he was so weak and dizzy that if she was not there to support him she was worried he might \"fall and break every bone in his body.\"  Patient reports a temperature of 100.8 this morning.  He questions whether or not his thermometer is working as he said that previously every time he takes his temperature is 100.8.  He is afebrile here. He describes a intermittent mild headache since onset of diarrhea.  He does not have stiff neck.  Is not the worst headache of his life.  Tylenol seems to help.  He describes it as a bandlike pain across his forehead.    PAST MEDICAL HISTORY   has a past medical history of Adrenal insufficiency ([facility]), secondary (11/25/2020), Arrhythmia, Arthritis, Atherosclerosis of both carotid arteries, mild (11/25/2020), BPH (benign prostatic hyperplasia) (11/25/2020), Bronchitis, Cancer (HCC), Carcinoma in situ of respiratory system, CKD (chronic kidney disease) stage 3, GFR 30-59 ml/min (11/25/2020), Colostomy present ([facility]) " (11/25/2020), Diverticulosis of colon (11/25/2020), GERD (gastroesophageal reflux disease) (11/25/2020), High cholesterol, History of atrial tachycardia (11/25/2020), History of malignant melanoma, April 2016 (11/25/2020), History of shingles (11/25/2020), History of stroke, subcortical infarct on MRI April 2019 (11/25/2020), Hyperlipidemia (11/25/2020), Hypertension, Hypothyroid (11/25/2020), Indigestion, Lung cancer (HCC), adenocarcinoma Aug. 2019 (11/25/2020), and Pain.    SURGICAL HISTORY   has a past surgical history that includes other (Left, 2016); other (Left, 2017); other (Right, 2019); and reid by laparoscopy (2/26/2021).    FAMILY HISTORY  Family History   Problem Relation Age of Onset    Cancer Mother         Lung- bone    Cancer Father         Colon?    Cancer Sister         Lung    Cancer Brother         Lung    Cancer Brother         Brain       SOCIAL HISTORY  Social History     Tobacco Use    Smoking status: Never    Smokeless tobacco: Never   Vaping Use    Vaping Use: Never used   Substance and Sexual Activity    Alcohol use: Yes     Alcohol/week: 1.8 oz     Types: 3 Glasses of wine per week     Comment: Occasionally    Drug use: Not Currently     Comment: THC edibles    Sexual activity: Not on file       CURRENT MEDICATIONS  Home Medications       Reviewed by Eliazar Bailey M.D. (Physician) on 05/14/24 at 1927  Med List Status: Complete     Medication Last Dose Status   amLODIPine (NORVASC) 5 MG Tab 5/14/2024 Active   atorvastatin (LIPITOR) 20 MG Tab 5/14/2024 Active   BISACODYL PO > 1 month Active   cholecalciferol (D-3-5) 5000 UNIT Cap > 1 month Active   dicyclomine (BENTYL) 20 MG Tab > 2.5 weeks Active   doxycycline (VIBRAMYCIN) 100 MG Tab 4/28/2024 Active   famotidine (PEPCID) 20 MG Tab > 1 month Active   guaiFENesin ER (MUCINEX) 600 MG TABLET SR 12 HR > 2.5 weeks Active   hydrocortisone (CORTEF) 10 MG Tab 5/14/2024 Active   liothyronine (CYTOMEL) 5 MCG Tab 5/14/2024 Active   losartan  "(COZAAR) 100 MG Tab 5/14/2024 Active   NEEDLE, DISP, 25 G (BD DISP NEEDLES) 25G X 5/8\" Misc Unknown Active   omeprazole (PRILOSEC) 40 MG delayed-release capsule > 2.5 weeks Active   ondansetron (ZOFRAN ODT) 4 MG TABLET DISPERSIBLE > 2.5 weeks Active   potassium chloride (KLOR-CON) 8 MEQ tablet 5/14/2024 Active   silodosin (RAPAFLO) 8 MG Cap capsule 5/14/2024 Active   tadalafil (CIALIS) 20 MG tablet > 1 week Active   TAGRISSO 80 MG Tab 5/13/2024 Active   tamsulosin (FLOMAX) 0.4 MG capsule 5/14/2024 Active   testosterone cypionate (DEPO-TESTOSTERONE) 200 MG/ML Solution injection 5/5/2024 Active   TIROSINT 125 MCG Cap 5/14/2024 Active                    ALLERGIES  Allergies   Allergen Reactions    Gramineae Pollens Itching and Shortness of Breath     Itchy eyes, red face    Cat Hair Extract Hives and Itching    Horse Allergy Hives    Other Environmental     Pollen Extract Runny Nose and Itching     .       PHYSICAL EXAM  VITAL SIGNS: /58   Pulse 79   Temp 36.6 °C (97.9 °F) (Temporal)   Resp 20   Ht 1.829 m (6')   Wt 71.6 kg (157 lb 13.6 oz)   SpO2 94%   BMI 21.41 kg/m²    Constitutional:  Well developed, well nourished; ill-appearing  HENT: Normocephalic, Atraumatic, Bilateral external ears normal, dry mucous membranes  Eyes: PERRL, EOMI, Conjunctiva normal, No discharge.   Neck: Normal range of motion, supple, nontender  Lymphatic: No lymphadenopathy noted.   Cardiovascular: Normal heart rate, Normal rhythm, No murmurs, rubs or gallops   Thorax & Lungs: CTA=bilaterally;  No respiratory distress,  No wheezing rales, or rhonchi; No chest tenderness. No crepitus or subQ air  Abdomen: soft, good bowel sounds, no guarding no rebound, no masses, no pulsatile mass, tender in the right lower quadrant with percussion and palpation no distention  Skin: Warm, Dry, No erythema, No rash.   Back: No tenderness, No CVA tenderness.   Extremities: 2+ dp and pt pulses bilateral LEs;  Nontender; no pretibial " edema  Neurologic: Alert & oriented x 4, clear speech,   Psychiatric: appropriate, normal affect     EKG/LABS  Results for orders placed or performed during the hospital encounter of 05/14/24   CBC WITH DIFFERENTIAL   Result Value Ref Range    WBC 4.2 (L) 4.8 - 10.8 K/uL    RBC 3.60 (L) 4.70 - 6.10 M/uL    Hemoglobin 12.4 (L) 14.0 - 18.0 g/dL    Hematocrit 37.3 (L) 42.0 - 52.0 %    .6 (H) 81.4 - 97.8 fL    MCH 34.4 (H) 27.0 - 33.0 pg    MCHC 33.2 32.3 - 36.5 g/dL    RDW 63.1 (H) 35.9 - 50.0 fL    Platelet Count 242 164 - 446 K/uL    MPV 8.9 (L) 9.0 - 12.9 fL    Neutrophils-Polys 74.50 (H) 44.00 - 72.00 %    Lymphocytes 15.00 (L) 22.00 - 41.00 %    Monocytes 9.00 0.00 - 13.40 %    Eosinophils 0.00 0.00 - 6.90 %    Basophils 0.50 0.00 - 1.80 %    Immature Granulocytes 1.00 (H) 0.00 - 0.90 %    Nucleated RBC 0.00 0.00 - 0.20 /100 WBC    Neutrophils (Absolute) 3.14 1.82 - 7.42 K/uL    Lymphs (Absolute) 0.63 (L) 1.00 - 4.80 K/uL    Monos (Absolute) 0.38 0.00 - 0.85 K/uL    Eos (Absolute) 0.00 0.00 - 0.51 K/uL    Baso (Absolute) 0.02 0.00 - 0.12 K/uL    Immature Granulocytes (abs) 0.04 0.00 - 0.11 K/uL    NRBC (Absolute) 0.00 K/uL   COMP METABOLIC PANEL   Result Value Ref Range    Sodium 133 (L) 135 - 145 mmol/L    Potassium 4.7 3.6 - 5.5 mmol/L    Chloride 102 96 - 112 mmol/L    Co2 21 20 - 33 mmol/L    Anion Gap 10.0 7.0 - 16.0    Glucose 81 65 - 99 mg/dL    Bun 19 8 - 22 mg/dL    Creatinine 2.39 (H) 0.50 - 1.40 mg/dL    Calcium 8.6 8.4 - 10.2 mg/dL    Correct Calcium 8.9 8.5 - 10.5 mg/dL    AST(SGOT) 19 12 - 45 U/L    ALT(SGPT) 10 2 - 50 U/L    Alkaline Phosphatase 65 30 - 99 U/L    Total Bilirubin 1.7 (H) 0.1 - 1.5 mg/dL    Albumin 3.6 3.2 - 4.9 g/dL    Total Protein 8.2 6.0 - 8.2 g/dL    Globulin 4.6 (H) 1.9 - 3.5 g/dL    A-G Ratio 0.8 g/dL   LIPASE   Result Value Ref Range    Lipase 33 11 - 82 U/L   URINALYSIS    Specimen: Urine   Result Value Ref Range    Color Yellow     Character Clear     Specific Gravity  1.010 <1.035    Ph 6.0 5.0 - 8.0    Glucose Negative Negative mg/dL    Ketones Trace (A) Negative mg/dL    Protein Negative Negative mg/dL    Bilirubin Negative Negative    Nitrite Negative Negative    Leukocyte Esterase Negative Negative    Occult Blood Negative Negative    Micro Urine Req see below    Lactic acid (lactate)   Result Value Ref Range    Lactic Acid 1.4 0.5 - 2.0 mmol/L   Prothrombin Time (INR)   Result Value Ref Range    PT 13.6 12.0 - 14.6 sec    INR 0.99 0.87 - 1.13   APTT (PTT)   Result Value Ref Range    APTT 30.2 24.7 - 36.0 sec   ESTIMATED GFR   Result Value Ref Range    GFR (CKD-EPI) 27 (A) >60 mL/min/1.73 m 2      I have independently interpreted this EKG    RADIOLOGY/PROCEDURES   I have independently interpreted the diagnostic imaging associated with this visit and am waiting the final reading from the radiologist.     My preliminary interpretation is as follows: ER MD is reviewed the patient's chest x-ray.  No obvious infiltrates.    Radiologist interpretation:  CT-ABDOMEN-PELVIS W/O   Final Result      No acute abnormality within the abdomen or pelvis      DX-CHEST-PORTABLE (1 VIEW)   Final Result      No acute cardiac or pulmonary abnormalities are identified.      US-RENAL    (Results Pending)       COURSE & MEDICAL DECISION MAKING    ASSESSMENT, COURSE AND PLAN  Care Narrative: Patient presents to the ER with complaint of diarrhea for the last 4 days.  He had quite a lot of diarrhea 4 days ago.  Diarrhea seemed to subside over the weekend but he still had a little bit here and there.  Yesterday diarrhea recurred with about 8-9 episodes of watery, nonbloody diarrhea yesterday.  He also had vomiting.  He describes lower abdominal pain which gets worse prior to a bowel movement.  He was seen at the ER in [facility] 2 days ago for diarrhea and dehydration.  He was given 2 L of fluids and discharged home.  Creatinine at that point was 1.6.  Baseline creatinine is around 1.3.  Creatinine  here today is 2.4.  Patient clinically looks dry.  He has some tenderness in the right lower quadrant.  He underwent a CT scan of the abdomen pelvis without contrast.  CT scan is negative per radiologist.  I ordered stool studies as I am concerned patient may have C. difficile given his immunocompromise state.  He is a cancer patient on chemotherapy.  He gets IV chemotherapy once a week with an off week every third week.  He is currently on his off week.  The patient was started on neutropenic fever protocol as he did describe having low-grade fever of 100.8, although he admits his thermometer may not have been working well.  He was given cefepime and blood cultures were ordered upon arrival.  Patient is not hypotensive or tachycardic.  No evidence of pneumonia on chest x-ray.  No evidence of intra-abdominal infection.  Urine is clear.  He describes a intermittent mild headache since onset of diarrhea.  He does not have stiff neck.  Is not the worst headache of his life.  No concern for meningitis.  Patient's white blood cell count is 4.2.  This is a little lower than his normal.  His ANC is normal.  At this time no jorgito neutropenia.  Patient is dehydrated, likely as result of his diarrhea and vomiting.  He has acute kidney injury with a creatinine quite a bit higher than his baseline.  He will need to be hospitalized for acute kidney injury with associated vomiting and diarrhea and dehydration.  I ordered stool studies.  Those are pending at the time of this dictation.  I spoke with Dr. Bailey, hospitalist on-call, and he will kindly evaluate the patient hospitalization.      Hydration: Based on the patient's presentation of Acute Diarrhea the patient was given IV fluids. IV Hydration was used because oral hydration was not as rapid as required. Upon recheck following hydration, the patient was improved.          ADDITIONAL PROBLEMS MANAGED  Problem #1: Diarrhea x 4 days  Problem #2: Nausea and vomiting since  yesterday  Problem #3: Dizziness/lightheadedness and generalized weakness  Problem #4: Intermittent abdominal pain    DISPOSITION AND DISCUSSIONS  I have discussed management of the patient with the following physicians and GERA's: Hospitalist    Discussion of management with other Q or appropriate source(s): None     Escalation of care considered, and ultimately not performed:diagnostic imaging.  Patient has poor renal function today, likely due to dehydration.  Will avoid contrast.  Therefore patient got a CT scan of the abdomen without contrast today.    Barriers to care at this time, including but not limited to:  None known .     Decision tools and prescription drugs considered including, but not limited to: Pain Medications patient has been getting pain medication here in the ER (morphine) for abdominal pain and metastatic bone pain. .    FINAL DIAGNOSIS  1. Dehydration Acute   2. Diarrhea, unspecified type Acute   3. Acute kidney injury (HCC) Acute        This dictation has been created using voice recognition software. The accuracy of the dictation is limited by the abilities of the software. I expect there may be some errors of grammar and possibly content. I made every attempt to manually correct the errors within my dictation. However, errors related to voice recognition software may still exist and should be interpreted within the appropriate context.     Electronically signed by: Sana Bragg M.D., 5/14/2024 3:41 PM

## 2024-05-14 NOTE — ED NOTES
IV established. Blood and one set of cultures sent to lab. Pt educated on notifying staff for IV removal prior to leaving ED

## 2024-05-15 LAB
ANION GAP SERPL CALC-SCNC: 8 MMOL/L (ref 7–16)
BUN SERPL-MCNC: 18 MG/DL (ref 8–22)
CALCIUM SERPL-MCNC: 7.7 MG/DL (ref 8.4–10.2)
CHLORIDE SERPL-SCNC: 104 MMOL/L (ref 96–112)
CO2 SERPL-SCNC: 20 MMOL/L (ref 20–33)
CREAT SERPL-MCNC: 1.93 MG/DL (ref 0.5–1.4)
ERYTHROCYTE [DISTWIDTH] IN BLOOD BY AUTOMATED COUNT: 61.4 FL (ref 35.9–50)
GFR SERPLBLD CREATININE-BSD FMLA CKD-EPI: 35 ML/MIN/1.73 M 2
GLUCOSE SERPL-MCNC: 96 MG/DL (ref 65–99)
HCT VFR BLD AUTO: 30.6 % (ref 42–52)
HEMOCCULT STL QL: NEGATIVE
HGB BLD-MCNC: 10.3 G/DL (ref 14–18)
LACTATE SERPL-SCNC: 0.5 MMOL/L (ref 0.5–2)
LACTATE SERPL-SCNC: 2.3 MMOL/L (ref 0.5–2)
MCH RBC QN AUTO: 34.4 PG (ref 27–33)
MCHC RBC AUTO-ENTMCNC: 33.7 G/DL (ref 32.3–36.5)
MCV RBC AUTO: 102.3 FL (ref 81.4–97.8)
PLATELET # BLD AUTO: 220 K/UL (ref 164–446)
PMV BLD AUTO: 9.4 FL (ref 9–12.9)
POTASSIUM SERPL-SCNC: 4.6 MMOL/L (ref 3.6–5.5)
RBC # BLD AUTO: 2.99 M/UL (ref 4.7–6.1)
SODIUM SERPL-SCNC: 132 MMOL/L (ref 135–145)
VIT B12 SERPL-MCNC: 329 PG/ML (ref 211–911)
WBC # BLD AUTO: 2.8 K/UL (ref 4.8–10.8)

## 2024-05-15 PROCEDURE — 99232 SBSQ HOSP IP/OBS MODERATE 35: CPT | Performed by: INTERNAL MEDICINE

## 2024-05-15 RX ADMIN — VANCOMYCIN HYDROCHLORIDE 125 MG: 5 INJECTION, POWDER, LYOPHILIZED, FOR SOLUTION INTRAVENOUS at 00:12

## 2024-05-15 RX ADMIN — SODIUM CHLORIDE: 9 INJECTION, SOLUTION INTRAVENOUS at 05:05

## 2024-05-15 RX ADMIN — TRAZODONE HYDROCHLORIDE 50 MG: 50 TABLET ORAL at 00:12

## 2024-05-15 RX ADMIN — VANCOMYCIN HYDROCHLORIDE 125 MG: 5 INJECTION, POWDER, LYOPHILIZED, FOR SOLUTION INTRAVENOUS at 23:33

## 2024-05-15 RX ADMIN — CEFTRIAXONE SODIUM 1000 MG: 1 INJECTION, POWDER, FOR SOLUTION INTRAMUSCULAR; INTRAVENOUS at 17:47

## 2024-05-15 RX ADMIN — TRAZODONE HYDROCHLORIDE 50 MG: 50 TABLET ORAL at 20:43

## 2024-05-15 RX ADMIN — LIOTHYRONINE SODIUM 10 MCG: 5 TABLET ORAL at 06:05

## 2024-05-15 RX ADMIN — SODIUM CHLORIDE: 9 INJECTION, SOLUTION INTRAVENOUS at 13:52

## 2024-05-15 RX ADMIN — HYDROCORTISONE 20 MG: 10 TABLET ORAL at 06:05

## 2024-05-15 RX ADMIN — VANCOMYCIN HYDROCHLORIDE 125 MG: 5 INJECTION, POWDER, LYOPHILIZED, FOR SOLUTION INTRAVENOUS at 17:45

## 2024-05-15 RX ADMIN — TAMSULOSIN HYDROCHLORIDE 0.8 MG: 0.4 CAPSULE ORAL at 10:01

## 2024-05-15 RX ADMIN — VANCOMYCIN HYDROCHLORIDE 125 MG: 5 INJECTION, POWDER, LYOPHILIZED, FOR SOLUTION INTRAVENOUS at 13:00

## 2024-05-15 RX ADMIN — ATORVASTATIN CALCIUM 20 MG: 20 TABLET, FILM COATED ORAL at 06:05

## 2024-05-15 RX ADMIN — HYDROCORTISONE 20 MG: 10 TABLET ORAL at 17:45

## 2024-05-15 RX ADMIN — VANCOMYCIN HYDROCHLORIDE 125 MG: 5 INJECTION, POWDER, LYOPHILIZED, FOR SOLUTION INTRAVENOUS at 06:05

## 2024-05-15 RX ADMIN — LEVOTHYROXINE SODIUM 125 MCG: 0.12 TABLET ORAL at 06:05

## 2024-05-15 SDOH — ECONOMIC STABILITY: TRANSPORTATION INSECURITY
IN THE PAST 12 MONTHS, HAS THE LACK OF TRANSPORTATION KEPT YOU FROM MEDICAL APPOINTMENTS OR FROM GETTING MEDICATIONS?: NO

## 2024-05-15 SDOH — ECONOMIC STABILITY: TRANSPORTATION INSECURITY
IN THE PAST 12 MONTHS, HAS LACK OF RELIABLE TRANSPORTATION KEPT YOU FROM MEDICAL APPOINTMENTS, MEETINGS, WORK OR FROM GETTING THINGS NEEDED FOR DAILY LIVING?: NO

## 2024-05-15 ASSESSMENT — LIFESTYLE VARIABLES
DOES PATIENT WANT TO STOP DRINKING: NO
TOTAL SCORE: 0
HAVE PEOPLE ANNOYED YOU BY CRITICIZING YOUR DRINKING: NO
ALCOHOL_USE: NO
ON A TYPICAL DAY WHEN YOU DRINK ALCOHOL HOW MANY DRINKS DO YOU HAVE: 0
TOTAL SCORE: 0
HAVE YOU EVER FELT YOU SHOULD CUT DOWN ON YOUR DRINKING: NO
CONSUMPTION TOTAL: NEGATIVE
EVER HAD A DRINK FIRST THING IN THE MORNING TO STEADY YOUR NERVES TO GET RID OF A HANGOVER: NO
AVERAGE NUMBER OF DAYS PER WEEK YOU HAVE A DRINK CONTAINING ALCOHOL: 0
TOTAL SCORE: 0
HOW MANY TIMES IN THE PAST YEAR HAVE YOU HAD 5 OR MORE DRINKS IN A DAY: 0
EVER FELT BAD OR GUILTY ABOUT YOUR DRINKING: NO

## 2024-05-15 ASSESSMENT — ENCOUNTER SYMPTOMS
FEVER: 0
NERVOUS/ANXIOUS: 0
CONSTIPATION: 0
MYALGIAS: 0
SHORTNESS OF BREATH: 0
SENSORY CHANGE: 0
DIARRHEA: 1
INSOMNIA: 0
HEARTBURN: 0
ABDOMINAL PAIN: 0
SPEECH CHANGE: 0
HEADACHES: 0
COUGH: 0
PHOTOPHOBIA: 0
DIZZINESS: 0
DEPRESSION: 0
VOMITING: 0
CLAUDICATION: 0
WEAKNESS: 0
CHILLS: 0
BLURRED VISION: 0

## 2024-05-15 ASSESSMENT — PATIENT HEALTH QUESTIONNAIRE - PHQ9
2. FEELING DOWN, DEPRESSED, IRRITABLE, OR HOPELESS: NOT AT ALL
1. LITTLE INTEREST OR PLEASURE IN DOING THINGS: NOT AT ALL
2. FEELING DOWN, DEPRESSED, IRRITABLE, OR HOPELESS: NOT AT ALL
SUM OF ALL RESPONSES TO PHQ9 QUESTIONS 1 AND 2: 0
1. LITTLE INTEREST OR PLEASURE IN DOING THINGS: NOT AT ALL
SUM OF ALL RESPONSES TO PHQ9 QUESTIONS 1 AND 2: 0

## 2024-05-15 ASSESSMENT — SOCIAL DETERMINANTS OF HEALTH (SDOH)
WITHIN THE LAST YEAR, HAVE YOU BEEN AFRAID OF YOUR PARTNER OR EX-PARTNER?: NO
IN THE PAST 12 MONTHS, HAS THE ELECTRIC, GAS, OIL, OR WATER COMPANY THREATENED TO SHUT OFF SERVICE IN YOUR HOME?: NO
WITHIN THE PAST 12 MONTHS, THE FOOD YOU BOUGHT JUST DIDN'T LAST AND YOU DIDN'T HAVE MONEY TO GET MORE: NEVER TRUE
WITHIN THE PAST 12 MONTHS, YOU WORRIED THAT YOUR FOOD WOULD RUN OUT BEFORE YOU GOT THE MONEY TO BUY MORE: NEVER TRUE
WITHIN THE LAST YEAR, HAVE TO BEEN RAPED OR FORCED TO HAVE ANY KIND OF SEXUAL ACTIVITY BY YOUR PARTNER OR EX-PARTNER?: NO
WITHIN THE LAST YEAR, HAVE YOU BEEN HUMILIATED OR EMOTIONALLY ABUSED IN OTHER WAYS BY YOUR PARTNER OR EX-PARTNER?: NO
WITHIN THE LAST YEAR, HAVE YOU BEEN KICKED, HIT, SLAPPED, OR OTHERWISE PHYSICALLY HURT BY YOUR PARTNER OR EX-PARTNER?: NO

## 2024-05-15 ASSESSMENT — COGNITIVE AND FUNCTIONAL STATUS - GENERAL
MOBILITY SCORE: 23
CLIMB 3 TO 5 STEPS WITH RAILING: A LITTLE
SUGGESTED CMS G CODE MODIFIER DAILY ACTIVITY: CI
SUGGESTED CMS G CODE MODIFIER MOBILITY: CI
DAILY ACTIVITIY SCORE: 23
HELP NEEDED FOR BATHING: A LITTLE

## 2024-05-15 ASSESSMENT — PAIN DESCRIPTION - PAIN TYPE
TYPE: ACUTE PAIN
TYPE: ACUTE PAIN

## 2024-05-15 ASSESSMENT — FIBROSIS 4 INDEX: FIB4 SCORE: 2.1

## 2024-05-15 NOTE — HOSPITAL COURSE
Patient is a pleasant 79-year-old male who has had diarrhea for the last 5 days, weakness dizziness weight loss nausea vomiting and lethargy.  14 days ago he was on an antibiotic with sinus infection did well afterwards but [facility].  Patient came back to [place] and continued to have diarrhea as well as nausea vomiting and was sleeping for 16 to 18 hours a day.  Patient found to have acute kidney injury with leukocytosis, dehydration hypotension and was dizzy.  He is empirically been started on treatment for C. difficile until stool can be collected.  He was also started on Rocephin for concern of infectious gastroenteritis.

## 2024-05-15 NOTE — ASSESSMENT & PLAN NOTE
Hyponatremia secondary to dehydration most recent sodium level is low at 133  Give fluid resuscitation monitor sodium levels

## 2024-05-15 NOTE — ASSESSMENT & PLAN NOTE
Patient has developed acute diarrhea starting on Friday.  Patient now has also developed nausea vomiting as of Monday.  He is now extremely dehydrated and has underlying stage IIIb chronic kidney disease now with acute renal failure.  Give fluid resuscitation monitor renal functions and avoid nephrotoxic medications  Renal ultrasound shows no evidence of obstruction but does show some cysts, incidental finding

## 2024-05-15 NOTE — PROGRESS NOTES
4 Eyes Skin Assessment Completed by RHODA Sullivan and RHODA Trimble.    Head Redness  Ears WDL  Nose WDL  Mouth WDL  Neck WDL  Breast/Chest WDL  Shoulder Blades WDL  Spine WDL  (R) Arm/Elbow/Hand WDL  (L) Arm/Elbow/Hand WDL  Abdomen WDL  Groin WDL  Scrotum/Coccyx/Buttocks WDL  (R) Leg WDL  (L) Leg WDL  (R) Heel/Foot/Toe WDL  (L) Heel/Foot/Toe WDL          Devices In Places Pulse Ox      Interventions In Place Pillows    Possible Skin Injury No    Pictures Uploaded Into Epic N/A  Wound Consult Placed N/A  RN Wound Prevention Protocol Ordered No

## 2024-05-15 NOTE — ED NOTES
Report given by RHODA Hernández. Pt resting in bed with lights off. Call light and water within reach.

## 2024-05-15 NOTE — ASSESSMENT & PLAN NOTE
Ongoing treatment with chemotherapy for adenocarcinoma of the lung  Discussed with Dr. Ludwig of oncology he at this point recommends holding all chemotherapy including the oral daily chemotherapy the patient was.  He was on Tagrisso 80 mg daily.

## 2024-05-15 NOTE — ASSESSMENT & PLAN NOTE
Patient has acute diarrhea since Friday.  On Thursday night they ate at [place] restaurant in [place] and 12 hours later he developed diarrhea.  He had beef tacos.  Patient at this point is presumed to have an infectious diarrhea.  I will treat him with Rocephin and oral vancomycin  Patient 14 days ago was on antibiotics for a sinus infection this certainly could have given him C. difficile colitis and thus we will need to treat and check for C. difficile.  Fluid resuscitation to continue  Fecal lactoferrin level pending  C. difficile test pending however patient has not had any stool since admission  Stool cultures pending  Lactic acid levels normal  Procalcitonin level 2.09

## 2024-05-15 NOTE — ED NOTES
Back charting form Downtime paperwork  0100- pt resting on gurney with chest equal chest rise and fall, pt has no needs at this time.   0200-pt resting on gurney with chest equal chest rise and fall, pt has no needs at this time.   0300-Pt ambulated to the restroom with a steady gait. Pt back to the room no needs at this time.

## 2024-05-15 NOTE — ED NOTES
Stool sample collection kit provided to pt with education on collecting sample. Pt state he will try to collect after eating breakfast.

## 2024-05-15 NOTE — ASSESSMENT & PLAN NOTE
Optimize blood pressure management keep systolic blood pressure less than 140 diastolic under 90 however currently hypotensive and dehydrated we will hold routine antihypertensives and use as needed labetalol.

## 2024-05-15 NOTE — PROGRESS NOTES
Received pt's report from ED RN. Pt transported via gurney and able to walk to bed not in any distress on RA at 96%. AAOx4. Oriented to floor. Denies any pain or nausea. Discussed POC. Fall risk precautions in place, locked bed in lowest position and call light within reach. All needs met at this time. Hourly rounding in place.

## 2024-05-15 NOTE — PROGRESS NOTES
Hospital Medicine Daily Progress Note    Date of Service  5/15/2024    Chief Complaint  Bartolo Berrios [facility] is a 79 y.o. male admitted 5/14/2024 with diarrhea, weakness, lethargy    Hospital Course  Patient is a pleasant 79-year-old male who has had diarrhea for the last 5 days, weakness dizziness weight loss nausea vomiting and lethargy.  14 days ago he was on an antibiotic with sinus infection did well afterwards but then when he went to [facility] he had multiple episodes of uncontrolled diarrhea.  Patient came back to [place] and continued to have diarrhea as well as nausea vomiting and was sleeping for 16 to 18 hours a day.  Patient found to have acute kidney injury with leukocytosis, dehydration hypotension and was dizzy.  He is empirically been started on treatment for C. difficile until stool can be collected.  He was also started on Rocephin for concern of infectious gastroenteritis.    Interval Problem Update  5/15 patient overall states he feels slightly better.  He does feel more hydrated and was able to get some rest.  He has not had any bowel movement since he has been admitted thus making the likelihood of C. difficile fairly low.  If he has not had a bowel movement and an additional 12 hours or if it is solid then we will cancel the C. difficile rule out and cancel the empiric vancomycin.  Renal function is improving and creatinine is down to 1.93 from 2.39.  Renal ultrasound without evidence of obstruction.  Calcium is slightly low at 7.7 and replete.    I have discussed this patient's plan of care and discharge plan at IDT rounds today with Case Management, Nursing, Nursing leadership, and other members of the IDT team.    Consultants/Specialty  none    Code Status  Full Code    Disposition  The patient is not medically cleared for discharge to home or a post-acute facility.  Anticipate discharge to: home with close outpatient follow-up    I have placed the appropriate orders  for post-discharge needs.    Review of Systems  Review of Systems   Constitutional:  Negative for chills and fever.   HENT:  Negative for congestion.    Eyes:  Negative for blurred vision and photophobia.   Respiratory:  Negative for cough and shortness of breath.    Cardiovascular:  Negative for chest pain, claudication and leg swelling.   Gastrointestinal:  Positive for diarrhea (None since admission). Negative for abdominal pain, constipation, heartburn and vomiting.   Genitourinary:  Negative for dysuria and hematuria.   Musculoskeletal:  Negative for joint pain and myalgias.   Skin:  Negative for itching and rash.   Neurological:  Negative for dizziness, sensory change, speech change, weakness and headaches.   Psychiatric/Behavioral:  Negative for depression. The patient is not nervous/anxious and does not have insomnia.         Physical Exam  Temp:  [36.4 °C (97.6 °F)-37 °C (98.6 °F)] 36.4 °C (97.6 °F)  Pulse:  [74-89] 89  Resp:  [13-20] 17  BP: (125-159)/(58-75) 134/64  SpO2:  [90 %-97 %] 96 %    Physical Exam  Vitals and nursing note reviewed.   Constitutional:       General: He is not in acute distress.     Appearance: Normal appearance.   HENT:      Head: Normocephalic and atraumatic.   Eyes:      General: No scleral icterus.     Extraocular Movements: Extraocular movements intact.   Cardiovascular:      Rate and Rhythm: Normal rate and regular rhythm.      Pulses: Normal pulses.      Heart sounds: Normal heart sounds. No murmur heard.  Pulmonary:      Effort: Pulmonary effort is normal. No respiratory distress.      Breath sounds: Normal breath sounds. No wheezing, rhonchi or rales.   Abdominal:      General: Abdomen is flat. Bowel sounds are normal. There is no distension.      Palpations: Abdomen is soft.      Tenderness: There is no rebound.   Musculoskeletal:         General: No swelling or tenderness.      Cervical back: Normal range of motion and neck supple.   Lymphadenopathy:      Cervical: No  cervical adenopathy.   Skin:     Coloration: Skin is not jaundiced.      Findings: No erythema.   Neurological:      General: No focal deficit present.      Mental Status: He is alert and oriented to person, place, and time. Mental status is at baseline.      Cranial Nerves: No cranial nerve deficit.   Psychiatric:         Mood and Affect: Mood normal.         Behavior: Behavior normal.         Fluids  No intake or output data in the 24 hours ending 05/15/24 1500    Laboratory  Recent Labs     05/14/24  1424 05/15/24  0358   WBC 4.2* 2.8*   RBC 3.60* 2.99*   HEMOGLOBIN 12.4* 10.3*   HEMATOCRIT 37.3* 30.6*   .6* 102.3*   MCH 34.4* 34.4*   MCHC 33.2 33.7   RDW 63.1* 61.4*   PLATELETCT 242 220   MPV 8.9* 9.4     Recent Labs     05/14/24  1424 05/15/24  0331   SODIUM 133* 132*   POTASSIUM 4.7 4.6   CHLORIDE 102 104   CO2 21 20   GLUCOSE 81 96   BUN 19 18   CREATININE 2.39* 1.93*   CALCIUM 8.6 7.7*     Recent Labs     05/14/24  1424   APTT 30.2   INR 0.99               Imaging  US-RENAL   Final Result         1. No evidence of medical renal disease ultrasound.   2. No hydronephrosis.   3. Simple, Bosniak 1, bilateral renal cysts. No further follow-up required.      CT-ABDOMEN-PELVIS W/O   Final Result      No acute abnormality within the abdomen or pelvis      DX-CHEST-PORTABLE (1 VIEW)   Final Result      No acute cardiac or pulmonary abnormalities are identified.           Assessment/Plan  * Acute renal failure superimposed on stage 3b chronic kidney disease (HCC)- (present on admission)  Assessment & Plan  Patient has developed acute diarrhea starting on Friday.  Patient now has also developed nausea vomiting as of Monday.  He is now extremely dehydrated and has underlying stage IIIb chronic kidney disease now with acute renal failure.  Give fluid resuscitation monitor renal functions and avoid nephrotoxic medications  Renal ultrasound shows no evidence of obstruction but does show some cysts, incidental  finding    Full code status- (present on admission)  Assessment & Plan  Discussed advance directives with patient he wishes to be full code    Gastroenteritis, infectious, presumed- (present on admission)  Assessment & Plan  Patient has acute diarrhea since Friday.  On Thursday night they ate at [place] in [place] and 12 hours later he developed diarrhea.  He had beef tacos.  Patient at this point is presumed to have an infectious diarrhea.  I will treat him with Rocephin and oral vancomycin  Patient 14 days ago was on antibiotics for a sinus infection this certainly could have given him C. difficile colitis and thus we will need to treat and check for C. difficile.  Fluid resuscitation to continue  Fecal lactoferrin level pending  C. difficile test pending however patient has not had any stool since admission  Stool cultures pending  Lactic acid levels normal  Procalcitonin level 2.09    Leukopenia- (present on admission)  Assessment & Plan  Mild leukopenia at 4.2 which is most likely chronic from the chemotherapy    Total bilirubin, elevated  Assessment & Plan  Most likely Gilbert's syndrome versus chemotherapy induced    Hyponatremia- (present on admission)  Assessment & Plan  Hyponatremia secondary to dehydration most recent sodium level is low at 133  Give fluid resuscitation monitor sodium levels    Macrocytic anemia- (present on admission)  Assessment & Plan  Most recent .6  Check a B12 level, most recent level is 300 in  2022    Adenocarcinoma of lung (HCC), stage 4 ([facility] Oct. 2021)- (present on admission)  Assessment & Plan  Ongoing treatment with chemotherapy for adenocarcinoma of the lung  Discussed with Dr. Ludwig of oncology he at this point recommends holding all chemotherapy including the oral daily chemotherapy the patient was.  He was on Tagrisso 80 mg daily.    Hypertension- (present on admission)  Assessment & Plan  Optimize blood pressure management keep  systolic blood pressure less than 140 diastolic under 90 however currently hypotensive and dehydrated we will hold routine antihypertensives and use as needed labetalol.    Chronic pain- (present on admission)  Assessment & Plan  Chronic pain management as needed    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)  Assessment & Plan  Continue with Rapaflo and Flomax    Vitamin D deficiency- (present on admission)  Assessment & Plan  Vitamin D supplementation    Adrenal insufficiency (HCC), secondary- (present on admission)  Assessment & Plan  Home Dose of hydrocortisone 20 mg twice daily    GERD (gastroesophageal reflux disease)- (present on admission)  Assessment & Plan  Currently no heartburn and the patient is not on chronic PPI or H2 blocker    Obstructive sleep apnea- (present on admission)  Assessment & Plan  Does not use any BiPAP for    Hypothyroid- (present on admission)  Assessment & Plan  Synthroid supplementation at 125 mcg daily           VTE prophylaxis: VTE Selection    I have performed a physical exam and reviewed and updated ROS and Plan today (5/15/2024). In review of yesterday's note (5/14/2024), there are no changes except as documented above.

## 2024-05-16 VITALS
WEIGHT: 168.87 LBS | HEIGHT: 72 IN | OXYGEN SATURATION: 96 % | HEART RATE: 76 BPM | SYSTOLIC BLOOD PRESSURE: 127 MMHG | RESPIRATION RATE: 18 BRPM | TEMPERATURE: 98 F | DIASTOLIC BLOOD PRESSURE: 71 MMHG | BODY MASS INDEX: 22.87 KG/M2

## 2024-05-16 LAB
ANION GAP SERPL CALC-SCNC: 8 MMOL/L (ref 7–16)
BACTERIA UR CULT: NORMAL
BUN SERPL-MCNC: 12 MG/DL (ref 8–22)
C DIFF DNA SPEC QL NAA+PROBE: NEGATIVE
C DIFF TOX GENS STL QL NAA+PROBE: NEGATIVE
CALCIUM SERPL-MCNC: 7.3 MG/DL (ref 8.4–10.2)
CHLORIDE SERPL-SCNC: 110 MMOL/L (ref 96–112)
CO2 SERPL-SCNC: 19 MMOL/L (ref 20–33)
CREAT SERPL-MCNC: 1.58 MG/DL (ref 0.5–1.4)
ERYTHROCYTE [DISTWIDTH] IN BLOOD BY AUTOMATED COUNT: 63 FL (ref 35.9–50)
GFR SERPLBLD CREATININE-BSD FMLA CKD-EPI: 45 ML/MIN/1.73 M 2
GLUCOSE SERPL-MCNC: 87 MG/DL (ref 65–99)
HCT VFR BLD AUTO: 30.1 % (ref 42–52)
HGB BLD-MCNC: 10 G/DL (ref 14–18)
LACTOFERRIN STL QL IA: POSITIVE
MCH RBC QN AUTO: 34.2 PG (ref 27–33)
MCHC RBC AUTO-ENTMCNC: 33.2 G/DL (ref 32.3–36.5)
MCV RBC AUTO: 103.1 FL (ref 81.4–97.8)
PLATELET # BLD AUTO: 224 K/UL (ref 164–446)
PMV BLD AUTO: 9.3 FL (ref 9–12.9)
POTASSIUM SERPL-SCNC: 4.1 MMOL/L (ref 3.6–5.5)
RBC # BLD AUTO: 2.92 M/UL (ref 4.7–6.1)
SIGNIFICANT IND 70042: NORMAL
SITE SITE: NORMAL
SODIUM SERPL-SCNC: 137 MMOL/L (ref 135–145)
SOURCE SOURCE: NORMAL
WBC # BLD AUTO: 3 K/UL (ref 4.8–10.8)

## 2024-05-16 PROCEDURE — 99239 HOSP IP/OBS DSCHRG MGMT >30: CPT | Performed by: INTERNAL MEDICINE

## 2024-05-16 RX ADMIN — VANCOMYCIN HYDROCHLORIDE 125 MG: 5 INJECTION, POWDER, LYOPHILIZED, FOR SOLUTION INTRAVENOUS at 05:37

## 2024-05-16 RX ADMIN — HYDROCORTISONE 20 MG: 10 TABLET ORAL at 05:37

## 2024-05-16 RX ADMIN — LIOTHYRONINE SODIUM 10 MCG: 5 TABLET ORAL at 05:38

## 2024-05-16 RX ADMIN — ATORVASTATIN CALCIUM 20 MG: 20 TABLET, FILM COATED ORAL at 05:37

## 2024-05-16 RX ADMIN — LEVOTHYROXINE SODIUM 125 MCG: 0.12 TABLET ORAL at 05:38

## 2024-05-16 RX ADMIN — TAMSULOSIN HYDROCHLORIDE 0.8 MG: 0.4 CAPSULE ORAL at 09:11

## 2024-05-16 ASSESSMENT — PAIN DESCRIPTION - PAIN TYPE: TYPE: ACUTE PAIN

## 2024-05-16 NOTE — PROGRESS NOTES
Assumed care of pt at 1915, bedside report with RHODA Sullivan. Pt is AAOx 4, resting comfortably in bed with NADN, wife presently at bedside.    Pt using call light appropriately. Discussed plan of care for the night with patient, bed in lowest position, call light in reach, personal belongings in reach. No complaints at this time.

## 2024-05-16 NOTE — CARE PLAN
The patient is Stable - Low risk of patient condition declining or worsening    Shift Goals  Clinical Goals: Pt will remain pain controlled, monitor diarrhea, stool sample and fluid hydration  Patient Goals: Able to rest comfortably  Family Goals: n/a    Progress made toward(s) clinical / shift goals:  Pt reported pain controlled. Denies nausea. Tolerated diet. No BM during this shift. Continued IV fluids per MAR. Isolation remained in place. Pt did not sustain any fall during this shift.     Patient is not progressing towards the following goals:

## 2024-05-16 NOTE — PROGRESS NOTES
ISOLATION PRECAUTIONS EDUCATION    Educated PATIENT, FAMILY, S.O: patient on isolation for C DIFFICILE.    Educated on reason for isolation, how the infection may be transmitted, and how to help prevent transmission to others. Educated precautions involves staff and visitors wearing PPE, following Standard Precautions and performing meticulous hand hygiene in order to prevent transmission of infection.     Special Contact Precautions: Educated that Special Contact Precautions involves staff and visitors wearing gowns and gloves when in the patient room, and using soap and water for hand hygiene when exiting patient’s room.     Educated that patient may leave the room if they or continent of stool, but prior to exiting the patient room each time, the patient needs to have on a fresh patient gown, ensure the potentially infectious area is covered, and perform hand hygiene with soap and water immediately prior to exiting the room.    In addition, educated that alcohol based hand rub is NOT sufficient for hand hygiene for Special Contact Precautions. A bonnet is placed over the hand  dispenser inside the patients’ room as a reminder to wash hands with soap and water.     Patient transport and mobilization on unit  Educated that they may leave their room, but prior to exiting, the patient needs to have on a fresh patient gown, ensure the potentially infectious area is covered, performing appropriate hand hygiene immediately prior to exiting the room.

## 2024-05-16 NOTE — CARE PLAN
The patient is Stable - Low risk of patient condition declining or worsening    Shift Goals  Clinical Goals: stool sample, pain controlled <4/10, no falls, monitor I/Os  Patient Goals: rest throughout the night  Family Goals: home tomorrow    Progress made toward(s) clinical / shift goals:  patient had multiple BM throughout the night, stool sample received and sent to lab, patient had no complaints of pain during shift, no falls sustained    Patient is not progressing towards the following goals:

## 2024-05-16 NOTE — DISCHARGE INSTRUCTIONS
Metabolic panel ordered in 1 week[facility]

## 2024-05-16 NOTE — PROGRESS NOTES
Patient discharged per MD order via self-ambulation with personal belongings. Pt is in stable condition. Discharge instructions have been reviewed and signed; with copy provided to patient. Any questions have been answered and patient verbalizes understanding of education and instructions provided.

## 2024-05-16 NOTE — DISCHARGE SUMMARY
Discharge Summary    CHIEF COMPLAINT ON ADMISSION  Chief Complaint   Patient presents with    Dizziness     Sent here from oncologist     Weakness     Last 5 days       Diarrhea     For the last 5 days now         Reason for Admission  Diarrhea; Weakness     Admission Date  5/14/2024    CODE STATUS  Full Code    HPI & HOSPITAL COURSE  Patient is a pleasant 79-year-old male who has had diarrhea for the last 5 days, weakness dizziness weight loss nausea vomiting and lethargy.  14 days ago he was on an antibiotic with sinus infection did well afterwards but then when he went to [facility] he had multiple episodes of uncontrolled diarrhea.  Patient came back to [place] and continued to have diarrhea as well as nausea vomiting and was sleeping for 16 to 18 hours a day.  Patient found to have acute kidney injury with leukocytosis, dehydration, hypotension and was dizzy.  He is empirically been started on treatment for C. difficile until stool can be collected.  He was also started on Rocephin for concern of infectious gastroenteritis.     C. difficile resulted negative and oral vancomycin discontinued.  Patient's stool has started to form and at this time he is appropriate to discharge home.  His renal function has improved and on day of discharge his creatinine is 1.58.  I recommended that he continue to keep himself well-hydrated orally and to repeat a metabolic panel in 1 week to make sure his kidney function is returned to normal.  He should not need any further antibiotics at this time given the high likelihood that his gastroenteritis was probably viral.  Patient agreeable with discharge at this time and will follow-up with his primary care as well as his oncologist.    Therefore, he is discharged in good and stable condition to home with close outpatient follow-up.    The patient met 2-midnight criteria for an inpatient stay at the time of discharge.    Discharge Date  5/16/24    FOLLOW UP ITEMS POST  DISCHARGE  PCP, oncology    DISCHARGE DIAGNOSES  Principal Problem:    Acute renal failure superimposed on stage 3b chronic kidney disease (HCC) (POA: Yes)  Active Problems:    Hypothyroid (POA: Yes)    Obstructive sleep apnea (POA: Yes)    GERD (gastroesophageal reflux disease) (POA: Yes)    Adrenal insufficiency (HCC), secondary (POA: Yes)    Vitamin D deficiency (POA: Yes)    BPH with obstruction/lower urinary tract symptoms (POA: Yes)    Chronic pain (POA: Yes)    Hypertension (POA: Yes)    Adenocarcinoma of lung (HCC), stage 4 ([facility] Oct. 2021) (POA: Yes)    Macrocytic anemia (POA: Yes)    Hyponatremia (POA: Yes)    Total bilirubin, elevated (POA: Unknown)    Leukopenia (POA: Yes)    Gastroenteritis, infectious, presumed (POA: Yes)    Full code status (POA: Yes)  Resolved Problems:    Acute renal failure (HCC) (POA: Yes)      FOLLOW UP  Future Appointments   Date Time Provider Department Center   8/20/2024 12:45 PM [facility] None     Lesly Munoz M.D.  [address]  [address]  [place] [id number]  [phone]    Follow up in 2 week(s)        MEDICATIONS ON DISCHARGE     Medication List        CHANGE how you take these medications        Instructions   famotidine 20 MG Tabs  What changed:   when to take this  reasons to take this  Commonly known as: Pepcid   Take 1 Tablet by mouth every evening.  Dose: 20 mg     guaiFENesin  MG Tb12  What changed:   when to take this  reasons to take this  Commonly known as: Mucinex   Take 1 Tablet by mouth every 12 hours.  Dose: 600 mg     omeprazole 40 MG delayed-release capsule  What changed:   when to take this  reasons to take this  Commonly known as: PriLOSEC   Take 1 Capsule by mouth every day.  Dose: 40 mg            CONTINUE taking these medications        Instructions   amLODIPine 5 MG Tabs  Commonly known as: Norvasc   Take 1 Tablet by mouth every morning.  Dose: 5 mg     atorvastatin 20 MG Tabs  Commonly known as: Lipitor   Take 1  "Tablet by mouth every day.  Dose: 20 mg     BD Disp Needles 25G X 5/8\" Misc  Generic drug: NEEDLE (DISP) 25 G   Use as directed for testosterone injections every 7 days     BISACODYL PO   Take 2 Tablets by mouth as needed. (OTC)  Indications: Constipation  Dose: 2 Tablet     D-3-5 125 MCG (5000 UT) Caps  Generic drug: cholecalciferol   Take 5,000 Units by mouth every day.  Dose: 5,000 Units     dicyclomine 20 MG Tabs  Commonly known as: Bentyl   Take 1 Tablet by mouth every 6 hours as needed (Abdominal Bloating).  Dose: 20 mg     hydrocortisone 10 MG Tabs  Commonly known as: Cortef   Take 2 Tablets by mouth 2 times a day.  Dose: 20 mg     liothyronine 5 MCG Tabs  Commonly known as: Cytomel   Take 10 mcg by mouth every day.  Dose: 10 mcg     losartan 100 MG Tabs  Commonly known as: Cozaar   Take 1 Tablet by mouth every day.  Dose: 100 mg     ondansetron 4 MG Tbdp  Commonly known as: Zofran ODT   Take 1 Tablet by mouth every 6 hours as needed for Nausea/Vomiting.  Dose: 4 mg     potassium chloride 8 MEQ tablet  Commonly known as: Klor-Con   Take 2 Tablets by mouth every day.  Dose: 16 mEq     silodosin 8 MG Caps capsule  Commonly known as: Rapaflo   Take 8 mg by mouth 1/2 hour after breakfast.  Dose: 8 mg     tadalafil 20 MG tablet  Commonly known as: Cialis   Take 20 mg by mouth 1 time a day as needed for Erectile Dysfunction. for erectile dysfunction  Dose: 20 mg     Tagrisso 80 MG Tabs  Generic drug: Osimertinib Mesylate   Take 80 mg by mouth every day.  Dose: 80 mg     tamsulosin 0.4 MG capsule  Commonly known as: Flomax   Take 1 Capsule by mouth every day.  Dose: 0.4 mg     testosterone cypionate 200 MG/ML injection  Commonly known as: Depo-Testosterone   Inject 124 mg into the shoulder, thigh, or buttocks every 7 days. On Sunday, pt injects 0.62ML  Dose: 124 mg     Tirosint 125 MCG Caps  Generic drug: Levothyroxine Sodium   Take 125 mcg by mouth every morning on an empty stomach.  Dose: 125 mcg            STOP " taking these medications      doxycycline 100 MG Tabs  Commonly known as: Vibramycin              Allergies  Allergies   Allergen Reactions    Gramineae Pollens Itching and Shortness of Breath     Itchy eyes, red face    Cat Hair Extract Hives and Itching    Horse Allergy Hives    Other Environmental     Pollen Extract Runny Nose and Itching     .       DIET  Orders Placed This Encounter   Procedures    Diet Order Diet: Regular     Standing Status:   Standing     Number of Occurrences:   1     Order Specific Question:   Diet:     Answer:   Regular [1]       ACTIVITY  As tolerated.  Weight bearing as tolerated    CONSULTATIONS  None    PROCEDURES  None    LABORATORY  Lab Results   Component Value Date    SODIUM 137 05/16/2024    POTASSIUM 4.1 05/16/2024    CHLORIDE 110 05/16/2024    CO2 19 (L) 05/16/2024    GLUCOSE 87 05/16/2024    BUN 12 05/16/2024    CREATININE 1.58 (H) 05/16/2024        Lab Results   Component Value Date    WBC 3.0 (L) 05/16/2024    HEMOGLOBIN 10.0 (L) 05/16/2024    HEMATOCRIT 30.1 (L) 05/16/2024    PLATELETCT 224 05/16/2024        Total time of the discharge process exceeds 36 minutes.

## 2024-05-16 NOTE — CARE PLAN
The patient is Stable - Low risk of patient condition declining or worsening    Shift Goals  Clinical Goals: Pt without falls and injury; C.diff resulted and iso/treatment plan updated, pt without C.diff, discharged home.  Patient Goals: Get out of here.  Family Goals: home tomorrow    Progress made toward(s) clinical / shift goals:  Pt without falls and injury; C.diff resulted and iso/treatment plan updated.    Patient is not progressing towards the following goals:

## 2024-05-17 ENCOUNTER — TELEPHONE (OUTPATIENT)
Dept: HEALTH INFORMATION MANAGEMENT | Facility: OTHER | Age: 79
End: 2024-05-17
Payer: COMMERCIAL

## 2024-05-19 LAB
BACTERIA BLD CULT: NORMAL
BACTERIA BLD CULT: NORMAL
SIGNIFICANT IND 70042: NORMAL
SIGNIFICANT IND 70042: NORMAL
SITE SITE: NORMAL
SITE SITE: NORMAL
SOURCE SOURCE: NORMAL
SOURCE SOURCE: NORMAL

## 2024-05-20 LAB
BACTERIA STL CULT: NORMAL
SIGNIFICANT IND 70042: NORMAL
SITE SITE: NORMAL
SOURCE SOURCE: NORMAL

## 2024-05-23 ENCOUNTER — HOSPITAL ENCOUNTER (OUTPATIENT)
Facility: MEDICAL CENTER | Age: 79
End: 2024-05-23
Attending: INTERNAL MEDICINE
Payer: MEDICARE

## 2024-05-24 LAB
25(OH)D3 SERPL-MCNC: 100 NG/ML (ref 30–100)
ALBUMIN SERPL BCP-MCNC: 3.7 G/DL (ref 3.2–4.9)
ALBUMIN/GLOB SERPL: 0.9 G/DL
ALP SERPL-CCNC: 60 U/L (ref 30–99)
ALT SERPL-CCNC: 15 U/L (ref 2–50)
ANION GAP SERPL CALC-SCNC: 10 MMOL/L (ref 7–16)
AST SERPL-CCNC: 18 U/L (ref 12–45)
BILIRUB SERPL-MCNC: 0.9 MG/DL (ref 0.1–1.5)
BUN SERPL-MCNC: 22 MG/DL (ref 8–22)
CALCIUM ALBUM COR SERPL-MCNC: 9.5 MG/DL (ref 8.5–10.5)
CALCIUM SERPL-MCNC: 9.3 MG/DL (ref 8.5–10.5)
CHLORIDE SERPL-SCNC: 105 MMOL/L (ref 96–112)
CO2 SERPL-SCNC: 23 MMOL/L (ref 20–33)
CREAT SERPL-MCNC: 1.36 MG/DL (ref 0.5–1.4)
GFR SERPLBLD CREATININE-BSD FMLA CKD-EPI: 54 ML/MIN/1.73 M 2
GLOBULIN SER CALC-MCNC: 4.2 G/DL (ref 1.9–3.5)
GLUCOSE SERPL-MCNC: 109 MG/DL (ref 65–99)
POTASSIUM SERPL-SCNC: 4.5 MMOL/L (ref 3.6–5.5)
PROT SERPL-MCNC: 7.9 G/DL (ref 6–8.2)
SODIUM SERPL-SCNC: 138 MMOL/L (ref 135–145)

## 2024-05-25 DIAGNOSIS — E87.8 ELECTROLYTE IMBALANCE: ICD-10-CM

## 2024-05-28 RX ORDER — POTASSIUM CHLORIDE 600 MG/1
16 TABLET, FILM COATED, EXTENDED RELEASE ORAL
Qty: 180 TABLET | Refills: 0 | Status: SHIPPED | OUTPATIENT
Start: 2024-05-28

## 2024-06-02 ENCOUNTER — APPOINTMENT (OUTPATIENT)
Dept: RADIOLOGY | Facility: MEDICAL CENTER | Age: 79
End: 2024-06-02
Attending: EMERGENCY MEDICINE
Payer: MEDICARE

## 2024-06-02 ENCOUNTER — HOSPITAL ENCOUNTER (EMERGENCY)
Facility: MEDICAL CENTER | Age: 79
End: 2024-06-02
Attending: EMERGENCY MEDICINE
Payer: MEDICARE

## 2024-06-02 VITALS
HEIGHT: 72 IN | WEIGHT: 156.97 LBS | SYSTOLIC BLOOD PRESSURE: 110 MMHG | BODY MASS INDEX: 21.26 KG/M2 | RESPIRATION RATE: 18 BRPM | DIASTOLIC BLOOD PRESSURE: 80 MMHG | TEMPERATURE: 97.5 F | HEART RATE: 75 BPM | OXYGEN SATURATION: 95 %

## 2024-06-02 DIAGNOSIS — R51.9 ACUTE NONINTRACTABLE HEADACHE, UNSPECIFIED HEADACHE TYPE: ICD-10-CM

## 2024-06-02 DIAGNOSIS — R42 LIGHTHEADEDNESS: ICD-10-CM

## 2024-06-02 DIAGNOSIS — J90 PLEURAL EFFUSION ON RIGHT: ICD-10-CM

## 2024-06-02 LAB
ALBUMIN SERPL BCP-MCNC: 3.9 G/DL (ref 3.2–4.9)
ALBUMIN/GLOB SERPL: 0.9 G/DL
ALP SERPL-CCNC: 65 U/L (ref 30–99)
ALT SERPL-CCNC: 8 U/L (ref 2–50)
ANION GAP SERPL CALC-SCNC: 9 MMOL/L (ref 7–16)
AST SERPL-CCNC: 21 U/L (ref 12–45)
BASOPHILS # BLD AUTO: 0.4 % (ref 0–1.8)
BASOPHILS # BLD: 0.02 K/UL (ref 0–0.12)
BILIRUB SERPL-MCNC: 1.7 MG/DL (ref 0.1–1.5)
BUN SERPL-MCNC: 24 MG/DL (ref 8–22)
CALCIUM ALBUM COR SERPL-MCNC: 8.8 MG/DL (ref 8.5–10.5)
CALCIUM SERPL-MCNC: 8.7 MG/DL (ref 8.4–10.2)
CHLORIDE SERPL-SCNC: 103 MMOL/L (ref 96–112)
CO2 SERPL-SCNC: 25 MMOL/L (ref 20–33)
CREAT SERPL-MCNC: 1.56 MG/DL (ref 0.5–1.4)
EKG IMPRESSION: NORMAL
EKG IMPRESSION: NORMAL
EOSINOPHIL # BLD AUTO: 0.06 K/UL (ref 0–0.51)
EOSINOPHIL NFR BLD: 1.1 % (ref 0–6.9)
ERYTHROCYTE [DISTWIDTH] IN BLOOD BY AUTOMATED COUNT: 65.5 FL (ref 35.9–50)
GFR SERPLBLD CREATININE-BSD FMLA CKD-EPI: 45 ML/MIN/1.73 M 2
GLOBULIN SER CALC-MCNC: 4.5 G/DL (ref 1.9–3.5)
GLUCOSE SERPL-MCNC: 103 MG/DL (ref 65–99)
HCT VFR BLD AUTO: 36.4 % (ref 42–52)
HGB BLD-MCNC: 11.9 G/DL (ref 14–18)
IMM GRANULOCYTES # BLD AUTO: 0.02 K/UL (ref 0–0.11)
IMM GRANULOCYTES NFR BLD AUTO: 0.4 % (ref 0–0.9)
LYMPHOCYTES # BLD AUTO: 0.69 K/UL (ref 1–4.8)
LYMPHOCYTES NFR BLD: 12.1 % (ref 22–41)
MCH RBC QN AUTO: 34.5 PG (ref 27–33)
MCHC RBC AUTO-ENTMCNC: 32.7 G/DL (ref 32.3–36.5)
MCV RBC AUTO: 105.5 FL (ref 81.4–97.8)
MONOCYTES # BLD AUTO: 0.19 K/UL (ref 0–0.85)
MONOCYTES NFR BLD AUTO: 3.3 % (ref 0–13.4)
NEUTROPHILS # BLD AUTO: 4.73 K/UL (ref 1.82–7.42)
NEUTROPHILS NFR BLD: 82.7 % (ref 44–72)
NRBC # BLD AUTO: 0 K/UL
NRBC BLD-RTO: 0 /100 WBC (ref 0–0.2)
PLATELET # BLD AUTO: 186 K/UL (ref 164–446)
PMV BLD AUTO: 9.2 FL (ref 9–12.9)
POTASSIUM SERPL-SCNC: 4.5 MMOL/L (ref 3.6–5.5)
PROT SERPL-MCNC: 8.4 G/DL (ref 6–8.2)
RBC # BLD AUTO: 3.45 M/UL (ref 4.7–6.1)
SODIUM SERPL-SCNC: 137 MMOL/L (ref 135–145)
TROPONIN T SERPL-MCNC: 19 NG/L (ref 6–19)
TROPONIN T SERPL-MCNC: 21 NG/L (ref 6–19)
WBC # BLD AUTO: 5.7 K/UL (ref 4.8–10.8)

## 2024-06-02 PROCEDURE — 85025 COMPLETE CBC W/AUTO DIFF WBC: CPT

## 2024-06-02 PROCEDURE — 96375 TX/PRO/DX INJ NEW DRUG ADDON: CPT | Mod: XU

## 2024-06-02 PROCEDURE — 99285 EMERGENCY DEPT VISIT HI MDM: CPT

## 2024-06-02 PROCEDURE — 96374 THER/PROPH/DIAG INJ IV PUSH: CPT | Mod: XU

## 2024-06-02 PROCEDURE — 80053 COMPREHEN METABOLIC PANEL: CPT

## 2024-06-02 PROCEDURE — 700102 HCHG RX REV CODE 250 W/ 637 OVERRIDE(OP): Performed by: EMERGENCY MEDICINE

## 2024-06-02 PROCEDURE — 36415 COLL VENOUS BLD VENIPUNCTURE: CPT

## 2024-06-02 PROCEDURE — 700117 HCHG RX CONTRAST REV CODE 255: Performed by: EMERGENCY MEDICINE

## 2024-06-02 PROCEDURE — 93005 ELECTROCARDIOGRAM TRACING: CPT | Performed by: EMERGENCY MEDICINE

## 2024-06-02 PROCEDURE — 700111 HCHG RX REV CODE 636 W/ 250 OVERRIDE (IP): Mod: JZ | Performed by: EMERGENCY MEDICINE

## 2024-06-02 PROCEDURE — 70450 CT HEAD/BRAIN W/O DYE: CPT

## 2024-06-02 PROCEDURE — 84484 ASSAY OF TROPONIN QUANT: CPT | Mod: 91

## 2024-06-02 PROCEDURE — 93005 ELECTROCARDIOGRAM TRACING: CPT

## 2024-06-02 PROCEDURE — A9270 NON-COVERED ITEM OR SERVICE: HCPCS | Performed by: EMERGENCY MEDICINE

## 2024-06-02 PROCEDURE — 71045 X-RAY EXAM CHEST 1 VIEW: CPT

## 2024-06-02 PROCEDURE — 700105 HCHG RX REV CODE 258: Performed by: EMERGENCY MEDICINE

## 2024-06-02 PROCEDURE — 71275 CT ANGIOGRAPHY CHEST: CPT

## 2024-06-02 RX ORDER — DIPHENHYDRAMINE HYDROCHLORIDE 50 MG/ML
25 INJECTION INTRAMUSCULAR; INTRAVENOUS ONCE
Status: COMPLETED | OUTPATIENT
Start: 2024-06-02 | End: 2024-06-02

## 2024-06-02 RX ORDER — METOCLOPRAMIDE HYDROCHLORIDE 5 MG/ML
5 INJECTION INTRAMUSCULAR; INTRAVENOUS ONCE
Status: COMPLETED | OUTPATIENT
Start: 2024-06-02 | End: 2024-06-02

## 2024-06-02 RX ORDER — ACETAMINOPHEN 500 MG
1000 TABLET ORAL ONCE
Status: COMPLETED | OUTPATIENT
Start: 2024-06-02 | End: 2024-06-02

## 2024-06-02 RX ORDER — SODIUM CHLORIDE, SODIUM LACTATE, POTASSIUM CHLORIDE, CALCIUM CHLORIDE 600; 310; 30; 20 MG/100ML; MG/100ML; MG/100ML; MG/100ML
1000 INJECTION, SOLUTION INTRAVENOUS ONCE
Status: COMPLETED | OUTPATIENT
Start: 2024-06-02 | End: 2024-06-02

## 2024-06-02 RX ADMIN — ACETAMINOPHEN 1000 MG: 500 TABLET, FILM COATED ORAL at 17:44

## 2024-06-02 RX ADMIN — SODIUM CHLORIDE, POTASSIUM CHLORIDE, SODIUM LACTATE AND CALCIUM CHLORIDE 1000 ML: 600; 310; 30; 20 INJECTION, SOLUTION INTRAVENOUS at 17:44

## 2024-06-02 RX ADMIN — DIPHENHYDRAMINE HYDROCHLORIDE 25 MG: 50 INJECTION, SOLUTION INTRAMUSCULAR; INTRAVENOUS at 17:45

## 2024-06-02 RX ADMIN — IOHEXOL 100 ML: 350 INJECTION, SOLUTION INTRAVENOUS at 18:18

## 2024-06-02 RX ADMIN — METOCLOPRAMIDE 5 MG: 5 INJECTION, SOLUTION INTRAMUSCULAR; INTRAVENOUS at 17:45

## 2024-06-02 ASSESSMENT — FIBROSIS 4 INDEX: FIB4 SCORE: 1.6

## 2024-06-02 NOTE — ED TRIAGE NOTES
Chief Complaint   Patient presents with    Dizziness     Pt is under going Chemo and when he checked his BP at home is was 95/43. Pt has had intermittent dizziness for weeks but due to the low BP oncologist referred him to ER     Headache     /62   Pulse 77   Temp 36.3 °C (97.3 °F) (Temporal)   Resp 16   Ht 1.829 m (6')   Wt 71.2 kg (156 lb 15.5 oz)   SpO2 94%   BMI 21.29 kg/m²

## 2024-06-03 NOTE — ED PROVIDER NOTES
"ER Provider Note    Scribed for Scott Becerra M.d. by Júnior Lin. 6/2/2024  5:00 PM    Primary Care Provider: Lesly Munoz M.D.    CHIEF COMPLAINT   Chief Complaint   Patient presents with    Dizziness     Pt is under going Chemo and when he checked his BP at home is was 95/43. Pt has had intermittent dizziness for weeks but due to the low BP oncologist referred him to ER     Headache     EXTERNAL RECORDS REVIEWED  Inpatient Notes prior admit for cdiff    HPI/ROS  LIMITATION TO HISTORY   Select: : None  OUTSIDE HISTORIAN(S):  Significant other Patient's wife is present at bedside and helped provide history for the patient today.     Bartolo Berrios [facility] is a 79 y.o. male who presents to the ED complaining of worsening dizziness onset  today. Patient is currently undergoing lung cancer treatment via chemo therapy, and has been getting hypotension for months now. Patient is followed by Dr. Virgen for oncology. He has attempted radiation for treatment, which was unsuccessful. Today, patient's wife called his oncologist after his blood pressure dropped, who advised that they present to the ED today. He describes his dizziness as feeling like he may fall over, and denies any room spinning. His dizziness is intermittent, and lasts for a couple of minutes at a time when it comes on. Patient adds that he has had some \"lingering effects\" after some of his dizzy spells. Patient's wife reports that his symptoms have been generally worsening for the past two months. He has been experiencing some associated shortness of breath and headaches, but denies any loss of consciousness, hemoptysis, nausea, or chest pressure. Patient notes that his headaches have been more intense recently. He has not treated his headaches with Tylenol today He has noticed some lower extremity swelling to both legs, but states that this comes and goes. Patient denies any recent falls or injuries. Patient has no new medications or " "medication alterations in the past week. He states that he was admitted here for four days in the past with vomiting and diarrhea, which has improved. Patient denies any known history of myocardial infarction, blood clots, or stent placement. He does not drink alcohol or use drugs. Patient reports taking over the counter preparation H for diarrhea. He has an additional medical history of melanoma.    PAST MEDICAL HISTORY  Past Medical History:   Diagnosis Date    Adrenal insufficiency (HCC), secondary 11/25/2020    Arrhythmia     Arthritis     Atherosclerosis of both carotid arteries, mild 11/25/2020    BPH (benign prostatic hyperplasia) 11/25/2020    Bronchitis     a\" very long time ago\"    Cancer (HCC)     lung cancer, melanoma    Carcinoma in situ of respiratory system     CKD (chronic kidney disease) stage 3, GFR 30-59 ml/min 11/25/2020    Colostomy present (HCC) 11/25/2020    Diverticulosis of colon 11/25/2020    GERD (gastroesophageal reflux disease) 11/25/2020    High cholesterol     History of atrial tachycardia 11/25/2020    Ablation 2017    History of malignant melanoma, April 2016 11/25/2020    History of shingles 11/25/2020    History of stroke, subcortical infarct on MRI April 2019 11/25/2020    Hyperlipidemia 11/25/2020    Hypertension     Hypothyroid 11/25/2020    Indigestion     Lung cancer (HCC), adenocarcinoma Aug. 2019 11/25/2020    Metastasis to L5 (biopsy Dec. 2019)    Pain     back       SURGICAL HISTORY  Past Surgical History:   Procedure Laterality Date    CECILE BY LAPAROSCOPY  2/26/2021    Procedure: CHOLECYSTECTOMY, LAPAROSCOPIC;  Surgeon: Davey Oneal M.D.;  Location: SURGERY [facility];  Service: General    OTHER Right 2019    LOWER LOBE OF LUNG REMOVED    OTHER Left 2017    GROIN    OTHER Left 2016    SHIN       FAMILY HISTORY  Family History   Problem Relation Age of Onset    Cancer Mother         Lung- bone    Cancer Father         Colon?    Cancer Sister         Lung    Cancer " "Brother         Lung    Cancer Brother         Brain       SOCIAL HISTORY   reports that he has never smoked. He has never used smokeless tobacco. He reports current alcohol use of about 1.8 oz of alcohol per week. He reports that he does not currently use drugs.    CURRENT MEDICATIONS  Previous Medications    AMLODIPINE (NORVASC) 5 MG TAB    Take 1 Tablet by mouth every morning.    ATORVASTATIN (LIPITOR) 20 MG TAB    Take 1 Tablet by mouth every day.    BISACODYL PO    Take 2 Tablets by mouth as needed. (OTC)  Indications: Constipation    CHOLECALCIFEROL (D-3-5) 5000 UNIT CAP    Take 5,000 Units by mouth every day.    DICYCLOMINE (BENTYL) 20 MG TAB    Take 1 Tablet by mouth every 6 hours as needed (Abdominal Bloating).    FAMOTIDINE (PEPCID) 20 MG TAB    Take 1 Tablet by mouth every evening.    GUAIFENESIN ER (MUCINEX) 600 MG TABLET SR 12 HR    Take 1 Tablet by mouth every 12 hours.    HYDROCORTISONE (CORTEF) 10 MG TAB    Take 2 Tablets by mouth 2 times a day.    LIOTHYRONINE (CYTOMEL) 5 MCG TAB    Take 10 mcg by mouth every day.    LOSARTAN (COZAAR) 100 MG TAB    Take 1 Tablet by mouth every day.    NEEDLE, DISP, 25 G (BD DISP NEEDLES) 25G X 5/8\" MISC    Use as directed for testosterone injections every 7 days    OMEPRAZOLE (PRILOSEC) 40 MG DELAYED-RELEASE CAPSULE    Take 1 Capsule by mouth every day.    ONDANSETRON (ZOFRAN ODT) 4 MG TABLET DISPERSIBLE    Take 1 Tablet by mouth every 6 hours as needed for Nausea/Vomiting.    POTASSIUM CHLORIDE (KLOR-CON) 8 MEQ TABLET    TAKE 2 TABLETS BY MOUTH EVERY DAY    SILODOSIN (RAPAFLO) 8 MG CAP CAPSULE    Take 8 mg by mouth 1/2 hour after breakfast.    TADALAFIL (CIALIS) 20 MG TABLET    Take 20 mg by mouth 1 time a day as needed for Erectile Dysfunction. for erectile dysfunction    TAGRISSO 80 MG TAB    Take 80 mg by mouth every day.    TAMSULOSIN (FLOMAX) 0.4 MG CAPSULE    Take 1 Capsule by mouth every day.    TESTOSTERONE CYPIONATE (DEPO-TESTOSTERONE) 200 MG/ML SOLUTION " INJECTION    Inject 124 mg into the shoulder, thigh, or buttocks every 7 days. On Sunday, pt injects 0.62ML    TIROSINT 125 MCG CAP    Take 125 mcg by mouth every morning on an empty stomach.       ALLERGIES  Gramineae pollens, Cat hair extract, Horse allergy, Other environmental, and Pollen extract    PHYSICAL EXAM  /62   Pulse 77   Temp 36.3 °C (97.3 °F) (Temporal)   Resp 16   Ht 1.829 m (6')   Wt 71.2 kg (156 lb 15.5 oz)   SpO2 94%   BMI 21.29 kg/m²   Constitutional: Alert in no apparent distress.  HENT: No signs of trauma, Bilateral external ears normal, Nose normal. Uvula midline.   Eyes: Pupils are equal and reactive, Conjunctiva normal, Non-icteric.   Neck: Normal range of motion, No tenderness, Supple, No stridor.   Lymphatic: No lymphadenopathy noted.   Cardiovascular: Regular rate and rhythm, no murmurs.   Thorax & Lungs: Normal breath sounds, No respiratory distress, No wheezing, No chest tenderness.   Abdomen: Bowel sounds normal, Soft, No tenderness, No peritoneal signs, No masses, No pulsatile masses.   Skin: Warm, Dry, No erythema, No rash.   Back: No bony tenderness, No CVA tenderness.   Extremities: Intact distal pulses, No edema, No tenderness, No cyanosis.  Musculoskeletal: Good range of motion in all major joints. No tenderness to palpation or major deformities noted.   Neurologic: Alert , Normal motor function, Normal sensory function, No focal deficits noted. Cranial nerves II through XII intact.  5 out of 5 strength x4.  Sensation intact light touch.  Normal finger-nose-finger.  Normal reflexes bilaterally.  No clonus. EOMI. PERRL.    Psychiatric: Affect normal, Judgment normal, Mood normal.      DIAGNOSTIC STUDIES    EKG/LABS  Results for orders placed or performed during the hospital encounter of 06/02/24   CBC with Differential   Result Value Ref Range    WBC 5.7 4.8 - 10.8 K/uL    RBC 3.45 (L) 4.70 - 6.10 M/uL    Hemoglobin 11.9 (L) 14.0 - 18.0 g/dL    Hematocrit 36.4 (L) 42.0  - 52.0 %    .5 (H) 81.4 - 97.8 fL    MCH 34.5 (H) 27.0 - 33.0 pg    MCHC 32.7 32.3 - 36.5 g/dL    RDW 65.5 (H) 35.9 - 50.0 fL    Platelet Count 186 164 - 446 K/uL    MPV 9.2 9.0 - 12.9 fL    Neutrophils-Polys 82.70 (H) 44.00 - 72.00 %    Lymphocytes 12.10 (L) 22.00 - 41.00 %    Monocytes 3.30 0.00 - 13.40 %    Eosinophils 1.10 0.00 - 6.90 %    Basophils 0.40 0.00 - 1.80 %    Immature Granulocytes 0.40 0.00 - 0.90 %    Nucleated RBC 0.00 0.00 - 0.20 /100 WBC    Neutrophils (Absolute) 4.73 1.82 - 7.42 K/uL    Lymphs (Absolute) 0.69 (L) 1.00 - 4.80 K/uL    Monos (Absolute) 0.19 0.00 - 0.85 K/uL    Eos (Absolute) 0.06 0.00 - 0.51 K/uL    Baso (Absolute) 0.02 0.00 - 0.12 K/uL    Immature Granulocytes (abs) 0.02 0.00 - 0.11 K/uL    NRBC (Absolute) 0.00 K/uL   Complete Metabolic Panel (CMP)   Result Value Ref Range    Sodium 137 135 - 145 mmol/L    Potassium 4.5 3.6 - 5.5 mmol/L    Chloride 103 96 - 112 mmol/L    Co2 25 20 - 33 mmol/L    Anion Gap 9.0 7.0 - 16.0    Glucose 103 (H) 65 - 99 mg/dL    Bun 24 (H) 8 - 22 mg/dL    Creatinine 1.56 (H) 0.50 - 1.40 mg/dL    Calcium 8.7 8.4 - 10.2 mg/dL    Correct Calcium 8.8 8.5 - 10.5 mg/dL    AST(SGOT) 21 12 - 45 U/L    ALT(SGPT) 8 2 - 50 U/L    Alkaline Phosphatase 65 30 - 99 U/L    Total Bilirubin 1.7 (H) 0.1 - 1.5 mg/dL    Albumin 3.9 3.2 - 4.9 g/dL    Total Protein 8.4 (H) 6.0 - 8.2 g/dL    Globulin 4.5 (H) 1.9 - 3.5 g/dL    A-G Ratio 0.9 g/dL   Troponins NOW   Result Value Ref Range    Troponin T 21 (H) 6 - 19 ng/L   Troponins in two (2) hours   Result Value Ref Range    Troponin T 19 6 - 19 ng/L   ESTIMATED GFR   Result Value Ref Range    GFR (CKD-EPI) 45 (A) >60 mL/min/1.73 m 2   EKG   Result Value Ref Range    Report       [facility] Emergency Dept.    Test Date:  2024-06-02  Pt Name:    JESUS [facility]          Department: [facility]  MRN:        [id number]                      Room:  Gender:     Male                         Technician:  [id number]  :        1945                   Requested By:ER TRIAGE PROTOCOL  Order #:    [id number]                    Reading MD: Steven Becerra MD    Measurements  Intervals                                Axis  Rate:       82                           P:          65  VT:         193                          QRS:        94  QRSD:       136                          T:          62  QT:         400  QTc:        468    Interpretive Statements  Sinus rhythm  RBBB and LPFB  Probable inferior infarct, recent  Lateral infarct, acute  Compared to ECG 2024 21:57:51  Left posterior fascicular block now present  Myocardial infarct finding now present  Sinus arrhythmia no longer present  Electronically Signed On 2024 17:09:17 PDT by Steven Becerra MD      EKG   Result Value Ref Range    Report       [facility] Emergency Dept.    Test Date:  2024  Pt Name:    JESUS [facility]          Department: [facility]  MRN:        [id number]                      [facility]  Gender:     Male                         Technician: [id number]  :        1945                   Requested By:STEVEN BECERRA  Order #:    [id number]                    Reading MD:    Measurements  Intervals                                Axis  Rate:       71                           P:          45  VT:         194                          QRS:        82  QRSD:       135                          T:          33  QT:         434  QTc:        472    Interpretive Statements  Sinus rhythm  Atrial premature complexes  Right bundle branch block  ST elevation, consider lateral injury  Compared to ECG 2024 15:54:44  Atrial premature complex(es) now present  ST (T wave) deviation now present  Left posterior fascicular block no longer present  Myocardial infarct finding still present        I have independently interpreted this EKG    RADIOLOGY/PROCEDURES   The attending emergency physician has independently interpreted the  diagnostic imaging associated with this visit and am waiting the final reading from the radiologist.   My preliminary interpretation is a follows: no ich    Radiologist interpretation:  CT-HEAD W/O   Final Result      No acute intracranial abnormality detected.         CT-CTA CHEST PULMONARY ARTERY W/ RECONS   Final Result      1.  No pulmonary embolism.   2.  Small right pleural effusion which may be partially loculated.            DX-CHEST-PORTABLE (1 VIEW)   Final Result      Small RIGHT pleural effusion with overlying atelectasis similar to prior          COURSE & MEDICAL DECISION MAKING     ASSESSMENT, COURSE AND PLAN  Care Narrative:     5:12 PM - Patient seen and examined at bedside. Patient presents today with ongoing dizziness as advised by his oncologist today. He is undergoing therapy for lung cancer and has a history of hypotension. I performed a bedside ultrasound of the patient's heart, which was normal. Discussed plan of care, and informed the patient that his EKG today is slightly changed from those prior. His elevated troponin indicates that his heart may be under strain, but is not significantly abnormal. I informed the patient of my plan to CT scan his chest, because patients undergoing chemo therapy are at a higher risk of blood clots. Patient agrees to the plan of care. The patient will be medicated with Tylenol 1000 mg, Benadryl 25 mg, Reglan 5 mg, and LR bolus. Ordered for EKG, Troponin now, CMP, CBC with differential, DX-Chest, and CT-CTA chest pulmonary artery with recons to evaluate his symptoms.     HYDRATION: Based on the patient's presentation of Other HA  the patient was given IV fluids. IV Hydration was used because oral hydration was not adequate alone. Upon recheck following hydration, the patient was feeling improved.     5:34 PM I discussed the patient's case and the above findings with Dr. Greenwood  (Cardiology) who agrees that the patient's condition is not a myocardial  infarction.  And agrees with outpatient follow-up    5:36 PM - Upon reevaluation, I informed the patient of my discussion with Dr. Greenwood as noted above. I performed a neurologic exam, which was normal, as noted in the physical exam. I discussed the patient's medical condition.     7:18 PM I discussed the patient's case and the above findings with Dr. Corral (oncology) who agrees with outpatient follow-up    7:18 PM - I informed the patient of my discussion with Dr. Corral and Timo at this time, and explained there diagnostic study results including normal workups. I do not see new cancerous lesions on CT scan, but there is fluid in one of the patient's lung. I spoke with Dr. Corral about this, who believes that it is still best to get the patient home today. I informed the patient that were no blood clots visualized on his imaging. I had a long conversation with the patient about his health conditions and ongoing treatments. I discussed plan for discharge and follow up as outlined below. The patient is stable for discharge at this time and will return for any new or worsening symptoms. Patient verbalizes understanding and support with my plan for discharge.      CT scan demonstrates small pleural effusion  Patient agrees to return if symptoms worsen or progress  Patient with initial troponin elevated of 21  Repeat troponin unremarkable  Patient with no chest pain throughout all this therefore heart score not applicable  EKG reviewed by myself and cardiology who both agree patient does not have new changes noted and plan for outpatient follow-up  Patient has intermittently had lightheadedness as an outpatient and his oncology team is aware of this along with Dr. Corral who agreed to help establish patient with close follow-up      DISPOSITION AND DISCUSSIONS  I have discussed management of the patient with the following physicians and GERA's:  Dr. Greenwood and Dr. Corral    Discussion of management  with other [facility] or appropriate source(s): None     Escalation of care considered, and ultimately not performed: acute inpatient care management, however at this time, the patient is most appropriate for outpatient management.    Barriers to care at this time, including but not limited to:  None are known at this time .          The patient will return for new or worsening symptoms and is stable at the time of discharge.    DISPOSITION:  Patient will be discharged home in stable condition.    FOLLOW UP:  Lesly Munoz M.D.  [address] [facility]  [address]  [facility] [id number] [phone]    In 3 days      [facility], Emergency Dept  [address] [facility] [address] [phone]    If symptoms worsen    FINAL DIANGOSIS  1. Lightheadedness    2. Acute nonintractable headache, unspecified headache type    3. Pleural effusion on right          The note accurately reflects work and decisions made by me.  Scott Becerra M.D.  6/2/2024  9:30 PM     Júnior MCDANIEL (Scribe), am scribing for, and in the presence of, Scott Becerra M.D..    Electronically signed by: Júnior Lin (Peterson), 6/2/2024    Scott MCDANIEL M.D. personally performed the services described in this documentation, as scribed by Júnior Lin in my presence, and it is both accurate and complete.

## 2024-06-03 NOTE — DISCHARGE INSTRUCTIONS
Please discuss the following findings with your regular doctor:  CT-HEAD W/O   Final Result      No acute intracranial abnormality detected.         CT-CTA CHEST PULMONARY ARTERY W/ RECONS   Final Result      1.  No pulmonary embolism.   2.  Small right pleural effusion which may be partially loculated.            DX-CHEST-PORTABLE (1 VIEW)   Final Result      Small RIGHT pleural effusion with overlying atelectasis similar to prior        Labs Reviewed   CBC WITH DIFFERENTIAL - Abnormal; Notable for the following components:       Result Value    RBC 3.45 (*)     Hemoglobin 11.9 (*)     Hematocrit 36.4 (*)     .5 (*)     MCH 34.5 (*)     RDW 65.5 (*)     Neutrophils-Polys 82.70 (*)     Lymphocytes 12.10 (*)     Lymphs (Absolute) 0.69 (*)     All other components within normal limits   COMP METABOLIC PANEL - Abnormal; Notable for the following components:    Glucose 103 (*)     Bun 24 (*)     Creatinine 1.56 (*)     Total Bilirubin 1.7 (*)     Total Protein 8.4 (*)     Globulin 4.5 (*)     All other components within normal limits   TROPONIN - Abnormal; Notable for the following components:    Troponin T 21 (*)     All other components within normal limits   ESTIMATED GFR - Abnormal; Notable for the following components:    GFR (CKD-EPI) 45 (*)     All other components within normal limits   TROPONIN

## 2024-06-20 ENCOUNTER — HOSPITAL ENCOUNTER (OUTPATIENT)
Dept: RADIOLOGY | Facility: MEDICAL CENTER | Age: 79
End: 2024-06-20
Attending: INTERNAL MEDICINE
Payer: MEDICARE

## 2024-06-20 DIAGNOSIS — C43.71 MALIGNANT MELANOMA OF RIGHT LOWER EXTREMITY INCLUDING HIP (HCC): ICD-10-CM

## 2024-06-20 DIAGNOSIS — C34.11 MALIGNANT NEOPLASM OF UPPER LOBE OF RIGHT LUNG (HCC): ICD-10-CM

## 2024-06-20 DIAGNOSIS — E83.59 TUMORAL CALCINOSIS: ICD-10-CM

## 2024-06-20 PROCEDURE — A9579 GAD-BASE MR CONTRAST NOS,1ML: HCPCS | Performed by: INTERNAL MEDICINE

## 2024-06-20 PROCEDURE — 700117 HCHG RX CONTRAST REV CODE 255: Performed by: INTERNAL MEDICINE

## 2024-06-20 PROCEDURE — 70553 MRI BRAIN STEM W/O & W/DYE: CPT

## 2024-06-20 RX ADMIN — GADOTERIDOL 15 ML: 279.3 INJECTION, SOLUTION INTRAVENOUS at 11:30

## 2024-07-27 ENCOUNTER — OFFICE VISIT (OUTPATIENT)
Dept: URGENT CARE | Facility: CLINIC | Age: 79
End: 2024-07-27
Payer: MEDICARE

## 2024-07-27 VITALS
RESPIRATION RATE: 18 BRPM | HEART RATE: 68 BPM | DIASTOLIC BLOOD PRESSURE: 72 MMHG | SYSTOLIC BLOOD PRESSURE: 124 MMHG | BODY MASS INDEX: 20.99 KG/M2 | WEIGHT: 155 LBS | HEIGHT: 72 IN | TEMPERATURE: 98.3 F | OXYGEN SATURATION: 98 %

## 2024-07-27 DIAGNOSIS — H60.311 ACUTE DIFFUSE OTITIS EXTERNA OF RIGHT EAR: ICD-10-CM

## 2024-07-27 DIAGNOSIS — J01.40 ACUTE PANSINUSITIS, RECURRENCE NOT SPECIFIED: ICD-10-CM

## 2024-07-27 PROCEDURE — 99213 OFFICE O/P EST LOW 20 MIN: CPT

## 2024-07-27 PROCEDURE — 3074F SYST BP LT 130 MM HG: CPT

## 2024-07-27 PROCEDURE — 3078F DIAST BP <80 MM HG: CPT

## 2024-07-27 RX ORDER — AMOXICILLIN AND CLAVULANATE POTASSIUM 875; 125 MG/1; MG/1
1 TABLET, FILM COATED ORAL 2 TIMES DAILY
Qty: 10 TABLET | Refills: 0 | Status: SHIPPED | OUTPATIENT
Start: 2024-07-27 | End: 2024-08-01

## 2024-07-27 RX ORDER — PREDNISONE 10 MG/1
20 TABLET ORAL DAILY
Qty: 10 TABLET | Refills: 0 | Status: SHIPPED | OUTPATIENT
Start: 2024-07-27 | End: 2024-08-01

## 2024-07-27 RX ORDER — OFLOXACIN 3 MG/ML
10 SOLUTION AURICULAR (OTIC) DAILY
Qty: 14 ML | Refills: 0 | Status: SHIPPED | OUTPATIENT
Start: 2024-07-27 | End: 2024-08-03

## 2024-07-27 ASSESSMENT — ENCOUNTER SYMPTOMS
HEADACHES: 1
COUGH: 0
CHILLS: 1
SINUS PRESSURE: 1
HOARSE VOICE: 0
DIAPHORESIS: 0
SORE THROAT: 1
SWOLLEN GLANDS: 0
SHORTNESS OF BREATH: 0
NECK PAIN: 0

## 2024-07-27 ASSESSMENT — FIBROSIS 4 INDEX: FIB4 SCORE: 3.07

## 2024-08-13 ENCOUNTER — HOSPITAL ENCOUNTER (OUTPATIENT)
Dept: RADIOLOGY | Facility: MEDICAL CENTER | Age: 79
End: 2024-08-13
Payer: COMMERCIAL

## 2024-08-28 DIAGNOSIS — I15.9 SECONDARY HYPERTENSION: ICD-10-CM

## 2024-08-28 DIAGNOSIS — I10 ESSENTIAL HYPERTENSION: ICD-10-CM

## 2024-08-29 RX ORDER — LOSARTAN POTASSIUM 100 MG/1
100 TABLET ORAL DAILY
Qty: 90 TABLET | Refills: 0 | Status: SHIPPED | OUTPATIENT
Start: 2024-08-29

## 2024-09-16 DIAGNOSIS — E87.8 ELECTROLYTE IMBALANCE: ICD-10-CM

## 2024-09-16 RX ORDER — POTASSIUM CHLORIDE 600 MG/1
16 TABLET, FILM COATED, EXTENDED RELEASE ORAL
Qty: 180 TABLET | Refills: 3 | Status: SHIPPED | OUTPATIENT
Start: 2024-09-16

## 2024-10-08 ENCOUNTER — OFFICE VISIT (OUTPATIENT)
Dept: INTERNAL MEDICINE | Facility: IMAGING CENTER | Age: 79
End: 2024-10-08
Payer: MEDICARE

## 2024-10-08 VITALS
HEIGHT: 71 IN | DIASTOLIC BLOOD PRESSURE: 62 MMHG | RESPIRATION RATE: 17 BRPM | WEIGHT: 153 LBS | OXYGEN SATURATION: 96 % | TEMPERATURE: 98.9 F | SYSTOLIC BLOOD PRESSURE: 130 MMHG | BODY MASS INDEX: 21.42 KG/M2 | HEART RATE: 74 BPM

## 2024-10-08 DIAGNOSIS — T45.1X5A PERIPHERAL NEUROPATHY DUE TO CHEMOTHERAPY (HCC): ICD-10-CM

## 2024-10-08 DIAGNOSIS — G62.0 PERIPHERAL NEUROPATHY DUE TO CHEMOTHERAPY (HCC): ICD-10-CM

## 2024-10-08 DIAGNOSIS — G47.9 DISORDERED SLEEP: ICD-10-CM

## 2024-10-08 PROCEDURE — 3078F DIAST BP <80 MM HG: CPT | Performed by: FAMILY MEDICINE

## 2024-10-08 PROCEDURE — 99214 OFFICE O/P EST MOD 30 MIN: CPT | Performed by: FAMILY MEDICINE

## 2024-10-08 PROCEDURE — 3075F SYST BP GE 130 - 139MM HG: CPT | Performed by: FAMILY MEDICINE

## 2024-10-08 RX ORDER — MAGNESIUM 200 MG
1000 TABLET ORAL DAILY
COMMUNITY

## 2024-10-08 RX ORDER — MAGNESIUM OXIDE 400 MG/1
400 TABLET ORAL DAILY
COMMUNITY

## 2024-10-08 ASSESSMENT — FIBROSIS 4 INDEX: FIB4 SCORE: 3.07

## 2024-10-09 ENCOUNTER — HOSPITAL ENCOUNTER (OUTPATIENT)
Facility: MEDICAL CENTER | Age: 79
End: 2024-10-09
Attending: OTOLARYNGOLOGY
Payer: MEDICARE

## 2024-10-09 PROCEDURE — 87075 CULTR BACTERIA EXCEPT BLOOD: CPT

## 2024-10-09 PROCEDURE — 87070 CULTURE OTHR SPECIMN AEROBIC: CPT

## 2024-10-09 PROCEDURE — 87205 SMEAR GRAM STAIN: CPT

## 2024-10-10 LAB
GRAM STN SPEC: NORMAL
SIGNIFICANT IND 70042: NORMAL
SITE SITE: NORMAL
SOURCE SOURCE: NORMAL

## 2024-10-11 LAB
BACTERIA WND AEROBE CULT: NORMAL
GRAM STN SPEC: NORMAL
SIGNIFICANT IND 70042: NORMAL
SITE SITE: NORMAL
SOURCE SOURCE: NORMAL

## 2024-10-12 LAB
BACTERIA SPEC ANAEROBE CULT: NORMAL
SIGNIFICANT IND 70042: NORMAL
SITE SITE: NORMAL
SOURCE SOURCE: NORMAL
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
## 2024-10-31 ENCOUNTER — HOSPITAL ENCOUNTER (OUTPATIENT)
Dept: RADIOLOGY | Facility: MEDICAL CENTER | Age: 79
End: 2024-10-31
Payer: COMMERCIAL

## 2024-11-18 ENCOUNTER — APPOINTMENT (OUTPATIENT)
Dept: INTERNAL MEDICINE | Facility: IMAGING CENTER | Age: 79
End: 2024-11-18
Payer: MEDICARE

## 2024-11-19 ENCOUNTER — HOSPITAL ENCOUNTER (OUTPATIENT)
Facility: MEDICAL CENTER | Age: 79
End: 2024-11-19
Attending: OTOLARYNGOLOGY
Payer: MEDICARE

## 2024-11-19 PROCEDURE — 87075 CULTR BACTERIA EXCEPT BLOOD: CPT

## 2024-11-19 PROCEDURE — 87070 CULTURE OTHR SPECIMN AEROBIC: CPT

## 2024-11-19 PROCEDURE — 87186 SC STD MICRODIL/AGAR DIL: CPT

## 2024-11-19 PROCEDURE — 87205 SMEAR GRAM STAIN: CPT

## 2024-11-19 PROCEDURE — 87077 CULTURE AEROBIC IDENTIFY: CPT

## 2024-11-20 LAB
GRAM STN SPEC: NORMAL
SIGNIFICANT IND 70042: NORMAL
SITE SITE: NORMAL
SOURCE SOURCE: NORMAL

## 2024-11-22 LAB
BACTERIA SPEC ANAEROBE CULT: NORMAL
BACTERIA WND AEROBE CULT: ABNORMAL
BACTERIA WND AEROBE CULT: ABNORMAL
GRAM STN SPEC: ABNORMAL
SIGNIFICANT IND 70042: ABNORMAL
SIGNIFICANT IND 70042: NORMAL
SITE SITE: ABNORMAL
SITE SITE: NORMAL
SOURCE SOURCE: ABNORMAL
SOURCE SOURCE: NORMAL

## 2024-12-02 DIAGNOSIS — E79.0 ELEVATED URIC ACID IN BLOOD: ICD-10-CM

## 2024-12-02 DIAGNOSIS — E55.9 VITAMIN D DEFICIENCY: ICD-10-CM

## 2024-12-02 DIAGNOSIS — I10 ESSENTIAL HYPERTENSION: ICD-10-CM

## 2024-12-02 DIAGNOSIS — N18.31 STAGE 3A CHRONIC KIDNEY DISEASE: ICD-10-CM

## 2024-12-02 DIAGNOSIS — E03.9 ACQUIRED HYPOTHYROIDISM: ICD-10-CM

## 2024-12-02 DIAGNOSIS — E78.2 HYPERLIPIDEMIA, MIXED: ICD-10-CM

## 2024-12-02 DIAGNOSIS — D53.9 MACROCYTIC ANEMIA: ICD-10-CM

## 2024-12-02 DIAGNOSIS — R73.9 HYPERGLYCEMIA: ICD-10-CM

## 2024-12-03 ENCOUNTER — NON-PROVIDER VISIT (OUTPATIENT)
Dept: INTERNAL MEDICINE | Facility: IMAGING CENTER | Age: 79
End: 2024-12-03
Payer: MEDICARE

## 2024-12-03 ENCOUNTER — HOSPITAL ENCOUNTER (OUTPATIENT)
Facility: MEDICAL CENTER | Age: 79
End: 2024-12-03
Attending: INTERNAL MEDICINE
Payer: MEDICARE

## 2024-12-03 ENCOUNTER — HOSPITAL ENCOUNTER (OUTPATIENT)
Facility: MEDICAL CENTER | Age: 79
End: 2024-12-03
Attending: FAMILY MEDICINE
Payer: MEDICARE

## 2024-12-03 DIAGNOSIS — N18.31 STAGE 3A CHRONIC KIDNEY DISEASE: ICD-10-CM

## 2024-12-03 DIAGNOSIS — E79.0 ELEVATED URIC ACID IN BLOOD: ICD-10-CM

## 2024-12-03 DIAGNOSIS — E78.2 HYPERLIPIDEMIA, MIXED: ICD-10-CM

## 2024-12-03 DIAGNOSIS — E55.9 VITAMIN D DEFICIENCY: ICD-10-CM

## 2024-12-03 DIAGNOSIS — R73.9 HYPERGLYCEMIA: ICD-10-CM

## 2024-12-03 DIAGNOSIS — D53.9 MACROCYTIC ANEMIA: ICD-10-CM

## 2024-12-03 LAB
25(OH)D3 SERPL-MCNC: 53 NG/ML (ref 30–100)
25(OH)D3 SERPL-MCNC: 54 NG/ML (ref 30–100)
ALBUMIN SERPL BCP-MCNC: 3.7 G/DL (ref 3.2–4.9)
ALBUMIN/GLOB SERPL: 0.8 G/DL
ALP SERPL-CCNC: 64 U/L (ref 30–99)
ALT SERPL-CCNC: 11 U/L (ref 2–50)
ANION GAP SERPL CALC-SCNC: 7 MMOL/L (ref 7–16)
AST SERPL-CCNC: 29 U/L (ref 12–45)
BILIRUB SERPL-MCNC: 0.7 MG/DL (ref 0.1–1.5)
BUN SERPL-MCNC: 27 MG/DL (ref 8–22)
CALCIUM ALBUM COR SERPL-MCNC: 8.9 MG/DL (ref 8.5–10.5)
CALCIUM SERPL-MCNC: 8.7 MG/DL (ref 8.5–10.5)
CHLORIDE SERPL-SCNC: 104 MMOL/L (ref 96–112)
CHOLEST SERPL-MCNC: 227 MG/DL (ref 100–199)
CO2 SERPL-SCNC: 26 MMOL/L (ref 20–33)
CREAT SERPL-MCNC: 1.6 MG/DL (ref 0.5–1.4)
CREAT UR-MCNC: 157.9 MG/DL
ERYTHROCYTE [DISTWIDTH] IN BLOOD BY AUTOMATED COUNT: 52.4 FL (ref 35.9–50)
FOLATE SERPL-MCNC: 17.3 NG/ML
GFR SERPLBLD CREATININE-BSD FMLA CKD-EPI: 44 ML/MIN/1.73 M 2
GLOBULIN SER CALC-MCNC: 4.5 G/DL (ref 1.9–3.5)
GLUCOSE SERPL-MCNC: 94 MG/DL (ref 65–99)
HCT VFR BLD AUTO: 42.9 % (ref 42–52)
HDLC SERPL-MCNC: 61 MG/DL
HGB BLD-MCNC: 13.7 G/DL (ref 14–18)
LDLC SERPL CALC-MCNC: 139 MG/DL
MCH RBC QN AUTO: 30 PG (ref 27–33)
MCHC RBC AUTO-ENTMCNC: 31.9 G/DL (ref 32.3–36.5)
MCV RBC AUTO: 93.9 FL (ref 81.4–97.8)
MICROALBUMIN UR-MCNC: 19.1 MG/DL
MICROALBUMIN/CREAT UR: 121 MG/G (ref 0–30)
POTASSIUM SERPL-SCNC: 4.4 MMOL/L (ref 3.6–5.5)
PROT SERPL-MCNC: 8.2 G/DL (ref 6–8.2)
RBC # BLD AUTO: 4.57 M/UL (ref 4.7–6.1)
SODIUM SERPL-SCNC: 137 MMOL/L (ref 135–145)
T3FREE SERPL-MCNC: 2.96 PG/ML (ref 2–4.4)
T4 FREE SERPL-MCNC: 1.18 NG/DL (ref 0.93–1.7)
TESTOST SERPL-MCNC: 726 NG/DL (ref 175–781)
TRIGL SERPL-MCNC: 136 MG/DL (ref 0–149)
TSH SERPL-ACNC: 2.09 UIU/ML (ref 0.35–5.5)
URATE SERPL-MCNC: 5.6 MG/DL (ref 2.5–8.3)
VIT B12 SERPL-MCNC: 419 PG/ML (ref 211–911)
WBC # BLD AUTO: 6.8 K/UL (ref 4.8–10.8)

## 2024-12-03 PROCEDURE — 82570 ASSAY OF URINE CREATININE: CPT

## 2024-12-03 PROCEDURE — 80053 COMPREHEN METABOLIC PANEL: CPT

## 2024-12-03 PROCEDURE — 82306 VITAMIN D 25 HYDROXY: CPT | Mod: 91

## 2024-12-03 PROCEDURE — 84270 ASSAY OF SEX HORMONE GLOBUL: CPT

## 2024-12-03 PROCEDURE — 82306 VITAMIN D 25 HYDROXY: CPT

## 2024-12-03 PROCEDURE — 82746 ASSAY OF FOLIC ACID SERUM: CPT

## 2024-12-03 PROCEDURE — 80061 LIPID PANEL: CPT

## 2024-12-03 PROCEDURE — 84550 ASSAY OF BLOOD/URIC ACID: CPT

## 2024-12-03 PROCEDURE — 84481 FREE ASSAY (FT-3): CPT

## 2024-12-03 PROCEDURE — 84403 ASSAY OF TOTAL TESTOSTERONE: CPT

## 2024-12-03 PROCEDURE — 84439 ASSAY OF FREE THYROXINE: CPT

## 2024-12-03 PROCEDURE — 99999 PR NO CHARGE: CPT

## 2024-12-03 PROCEDURE — 85041 AUTOMATED RBC COUNT: CPT

## 2024-12-03 PROCEDURE — 85018 HEMOGLOBIN: CPT

## 2024-12-03 PROCEDURE — 82043 UR ALBUMIN QUANTITATIVE: CPT

## 2024-12-03 PROCEDURE — 85014 HEMATOCRIT: CPT

## 2024-12-03 PROCEDURE — 83036 HEMOGLOBIN GLYCOSYLATED A1C: CPT

## 2024-12-03 PROCEDURE — 85048 AUTOMATED LEUKOCYTE COUNT: CPT

## 2024-12-03 PROCEDURE — 82607 VITAMIN B-12: CPT

## 2024-12-03 PROCEDURE — 84443 ASSAY THYROID STIM HORMONE: CPT

## 2024-12-04 ENCOUNTER — APPOINTMENT (OUTPATIENT)
Dept: URBAN - METROPOLITAN AREA CLINIC 4 | Facility: CLINIC | Age: 79
Setting detail: DERMATOLOGY
End: 2024-12-04

## 2024-12-04 DIAGNOSIS — Z85.820 PERSONAL HISTORY OF MALIGNANT MELANOMA OF SKIN: ICD-10-CM

## 2024-12-04 PROBLEM — D48.5 NEOPLASM OF UNCERTAIN BEHAVIOR OF SKIN: Status: ACTIVE | Noted: 2024-12-04

## 2024-12-04 PROCEDURE — 11102 TANGNTL BX SKIN SINGLE LES: CPT

## 2024-12-04 PROCEDURE — ? BIOPSY BY SHAVE METHOD

## 2024-12-04 PROCEDURE — ? COUNSELING

## 2024-12-04 ASSESSMENT — LOCATION SIMPLE DESCRIPTION DERM: LOCATION SIMPLE: LEFT KNEE

## 2024-12-04 ASSESSMENT — LOCATION ZONE DERM: LOCATION ZONE: LEG

## 2024-12-04 ASSESSMENT — LOCATION DETAILED DESCRIPTION DERM: LOCATION DETAILED: LEFT KNEE

## 2024-12-06 LAB — SHBG SERPL-SCNC: 85 NMOL/L (ref 19–76)

## 2024-12-09 LAB
EST. AVERAGE GLUCOSE BLD GHB EST-MCNC: 134 MG/DL
HBA1C MFR BLD: 6.3 % (ref 4–5.6)

## 2024-12-10 ENCOUNTER — HOSPITAL ENCOUNTER (OUTPATIENT)
Facility: MEDICAL CENTER | Age: 79
End: 2024-12-10
Attending: INTERNAL MEDICINE
Payer: MEDICARE

## 2024-12-10 ENCOUNTER — OFFICE VISIT (OUTPATIENT)
Dept: INTERNAL MEDICINE | Facility: IMAGING CENTER | Age: 79
End: 2024-12-10
Payer: MEDICARE

## 2024-12-10 VITALS
BODY MASS INDEX: 21.56 KG/M2 | HEART RATE: 80 BPM | SYSTOLIC BLOOD PRESSURE: 124 MMHG | HEIGHT: 71 IN | OXYGEN SATURATION: 97 % | DIASTOLIC BLOOD PRESSURE: 66 MMHG | TEMPERATURE: 98.5 F | WEIGHT: 154 LBS | RESPIRATION RATE: 17 BRPM

## 2024-12-10 DIAGNOSIS — E27.40 ADRENAL INSUFFICIENCY (HCC): ICD-10-CM

## 2024-12-10 DIAGNOSIS — I10 PRIMARY HYPERTENSION: ICD-10-CM

## 2024-12-10 DIAGNOSIS — N18.31 CHRONIC KIDNEY DISEASE, STAGE 3A: ICD-10-CM

## 2024-12-10 DIAGNOSIS — Z00.00 ENCOUNTER FOR MEDICARE ANNUAL WELLNESS EXAM: ICD-10-CM

## 2024-12-10 DIAGNOSIS — C34.90 ADENOCARCINOMA OF LUNG, UNSPECIFIED LATERALITY (HCC): ICD-10-CM

## 2024-12-10 DIAGNOSIS — E78.2 MIXED HYPERLIPIDEMIA: ICD-10-CM

## 2024-12-10 DIAGNOSIS — R97.20 ELEVATED PSA: ICD-10-CM

## 2024-12-10 DIAGNOSIS — Z85.828 HISTORY OF NONMELANOMA SKIN CANCER: ICD-10-CM

## 2024-12-10 DIAGNOSIS — N13.8 BPH WITH OBSTRUCTION/LOWER URINARY TRACT SYMPTOMS: ICD-10-CM

## 2024-12-10 DIAGNOSIS — E03.9 ACQUIRED HYPOTHYROIDISM: ICD-10-CM

## 2024-12-10 DIAGNOSIS — E55.9 VITAMIN D DEFICIENCY: ICD-10-CM

## 2024-12-10 DIAGNOSIS — K21.00 GASTROESOPHAGEAL REFLUX DISEASE WITH ESOPHAGITIS WITHOUT HEMORRHAGE: ICD-10-CM

## 2024-12-10 DIAGNOSIS — G89.29 OTHER CHRONIC PAIN: ICD-10-CM

## 2024-12-10 DIAGNOSIS — Z85.820 HISTORY OF MALIGNANT MELANOMA: ICD-10-CM

## 2024-12-10 DIAGNOSIS — Z71.85 VACCINE COUNSELING: ICD-10-CM

## 2024-12-10 DIAGNOSIS — D53.9 MACROCYTIC ANEMIA: ICD-10-CM

## 2024-12-10 DIAGNOSIS — T45.1X5A PERIPHERAL NEUROPATHY DUE TO CHEMOTHERAPY (HCC): ICD-10-CM

## 2024-12-10 DIAGNOSIS — G62.0 PERIPHERAL NEUROPATHY DUE TO CHEMOTHERAPY (HCC): ICD-10-CM

## 2024-12-10 DIAGNOSIS — C79.51 MALIGNANT NEOPLASM METASTATIC TO BONE (HCC): ICD-10-CM

## 2024-12-10 DIAGNOSIS — N40.1 BPH WITH OBSTRUCTION/LOWER URINARY TRACT SYMPTOMS: ICD-10-CM

## 2024-12-10 DIAGNOSIS — E29.1 ANDROGEN DEFICIENCY: ICD-10-CM

## 2024-12-10 DIAGNOSIS — R30.0 DYSURIA: ICD-10-CM

## 2024-12-10 DIAGNOSIS — R73.9 HYPERGLYCEMIA: ICD-10-CM

## 2024-12-10 PROBLEM — K56.7 ILEUS (HCC): Status: RESOLVED | Noted: 2024-03-06 | Resolved: 2024-12-10

## 2024-12-10 PROBLEM — R31.9 HEMATURIA: Status: RESOLVED | Noted: 2024-03-07 | Resolved: 2024-12-10

## 2024-12-10 PROBLEM — Z78.9 FULL CODE STATUS: Status: RESOLVED | Noted: 2024-05-14 | Resolved: 2024-12-10

## 2024-12-10 PROBLEM — E87.1 HYPONATREMIA: Status: RESOLVED | Noted: 2024-03-06 | Resolved: 2024-12-10

## 2024-12-10 PROBLEM — E87.6 HYPOKALEMIA: Status: RESOLVED | Noted: 2024-03-06 | Resolved: 2024-12-10

## 2024-12-10 PROBLEM — K52.9 GASTROENTERITIS, INFECTIOUS, PRESUMED: Status: RESOLVED | Noted: 2024-05-14 | Resolved: 2024-12-10

## 2024-12-10 LAB
APPEARANCE UR: CLEAR
BASOPHILS # BLD AUTO: 0.3 % (ref 0–1.8)
BASOPHILS # BLD: 0.02 K/UL (ref 0–0.12)
BILIRUB UR STRIP-MCNC: NORMAL MG/DL
COLOR UR AUTO: YELLOW
EOSINOPHIL # BLD AUTO: 0 K/UL (ref 0–0.51)
EOSINOPHIL NFR BLD: 0 % (ref 0–6.9)
ERYTHROCYTE [DISTWIDTH] IN BLOOD BY AUTOMATED COUNT: 54.3 FL (ref 35.9–50)
ERYTHROCYTE [SEDIMENTATION RATE] IN BLOOD BY WESTERGREN METHOD: 22 MM/HOUR (ref 0–20)
GLUCOSE UR STRIP.AUTO-MCNC: NEGATIVE MG/DL
HCT VFR BLD AUTO: 42.4 % (ref 42–52)
HGB BLD-MCNC: 14 G/DL (ref 14–18)
IMM GRANULOCYTES # BLD AUTO: 0.06 K/UL (ref 0–0.11)
IMM GRANULOCYTES NFR BLD AUTO: 0.8 % (ref 0–0.9)
KETONES UR STRIP.AUTO-MCNC: 15 MG/DL
LEUKOCYTE ESTERASE UR QL STRIP.AUTO: NEGATIVE
LYMPHOCYTES # BLD AUTO: 0.74 K/UL (ref 1–4.8)
LYMPHOCYTES NFR BLD: 9.3 % (ref 22–41)
MCH RBC QN AUTO: 31.1 PG (ref 27–33)
MCHC RBC AUTO-ENTMCNC: 33 G/DL (ref 32.3–36.5)
MCV RBC AUTO: 94.2 FL (ref 81.4–97.8)
MONOCYTES # BLD AUTO: 0.56 K/UL (ref 0–0.85)
MONOCYTES NFR BLD AUTO: 7 % (ref 0–13.4)
NEUTROPHILS # BLD AUTO: 6.57 K/UL (ref 1.82–7.42)
NEUTROPHILS NFR BLD: 82.6 % (ref 44–72)
NITRITE UR QL STRIP.AUTO: NEGATIVE
NRBC # BLD AUTO: 0 K/UL
NRBC BLD-RTO: 0 /100 WBC (ref 0–0.2)
PH UR STRIP.AUTO: 5.5 [PH] (ref 5–8)
PLATELET # BLD AUTO: 267 K/UL (ref 164–446)
PMV BLD AUTO: 9.6 FL (ref 9–12.9)
PROT UR QL STRIP: 30 MG/DL
RBC # BLD AUTO: 4.5 M/UL (ref 4.7–6.1)
RBC UR QL AUTO: NEGATIVE
SP GR UR STRIP.AUTO: 1.02
UROBILINOGEN UR STRIP-MCNC: 0.2 MG/DL
WBC # BLD AUTO: 8 K/UL (ref 4.8–10.8)

## 2024-12-10 PROCEDURE — G0439 PPPS, SUBSEQ VISIT: HCPCS | Performed by: FAMILY MEDICINE

## 2024-12-10 PROCEDURE — 85025 COMPLETE CBC W/AUTO DIFF WBC: CPT

## 2024-12-10 PROCEDURE — 3074F SYST BP LT 130 MM HG: CPT | Performed by: FAMILY MEDICINE

## 2024-12-10 PROCEDURE — 86140 C-REACTIVE PROTEIN: CPT

## 2024-12-10 PROCEDURE — 81002 URINALYSIS NONAUTO W/O SCOPE: CPT | Performed by: FAMILY MEDICINE

## 2024-12-10 PROCEDURE — 3078F DIAST BP <80 MM HG: CPT | Performed by: FAMILY MEDICINE

## 2024-12-10 PROCEDURE — 99999 PR NO CHARGE: CPT | Performed by: FAMILY MEDICINE

## 2024-12-10 PROCEDURE — 85652 RBC SED RATE AUTOMATED: CPT

## 2024-12-10 PROCEDURE — 80053 COMPREHEN METABOLIC PANEL: CPT

## 2024-12-10 RX ORDER — OMEPRAZOLE 40 MG/1
40 CAPSULE, DELAYED RELEASE ORAL DAILY
Qty: 90 CAPSULE | Refills: 3 | Status: SHIPPED | OUTPATIENT
Start: 2024-12-10

## 2024-12-10 RX ORDER — ATORVASTATIN CALCIUM 20 MG/1
20 TABLET, FILM COATED ORAL DAILY
Qty: 90 TABLET | Refills: 3 | Status: SHIPPED | OUTPATIENT
Start: 2024-12-10

## 2024-12-10 RX ORDER — TAMSULOSIN HYDROCHLORIDE 0.4 MG/1
0.8 CAPSULE ORAL DAILY
Qty: 180 CAPSULE | Refills: 3 | Status: SHIPPED | OUTPATIENT
Start: 2024-12-10

## 2024-12-10 ASSESSMENT — ENCOUNTER SYMPTOMS: GENERAL WELL-BEING: GOOD

## 2024-12-10 ASSESSMENT — PATIENT HEALTH QUESTIONNAIRE - PHQ9: CLINICAL INTERPRETATION OF PHQ2 SCORE: 0

## 2024-12-10 ASSESSMENT — FIBROSIS 4 INDEX: FIB4 SCORE: 3.62

## 2024-12-10 ASSESSMENT — ACTIVITIES OF DAILY LIVING (ADL): BATHING_REQUIRES_ASSISTANCE: 0

## 2024-12-11 LAB
ALBUMIN SERPL BCP-MCNC: 4.1 G/DL (ref 3.2–4.9)
ALBUMIN/GLOB SERPL: 1 G/DL
ALP SERPL-CCNC: 68 U/L (ref 30–99)
ALT SERPL-CCNC: 6 U/L (ref 2–50)
ANION GAP SERPL CALC-SCNC: 12 MMOL/L (ref 7–16)
AST SERPL-CCNC: 20 U/L (ref 12–45)
BILIRUB SERPL-MCNC: 0.6 MG/DL (ref 0.1–1.5)
BUN SERPL-MCNC: 31 MG/DL (ref 8–22)
CALCIUM ALBUM COR SERPL-MCNC: 9 MG/DL (ref 8.5–10.5)
CALCIUM SERPL-MCNC: 9.1 MG/DL (ref 8.5–10.5)
CHLORIDE SERPL-SCNC: 108 MMOL/L (ref 96–112)
CO2 SERPL-SCNC: 21 MMOL/L (ref 20–33)
CREAT SERPL-MCNC: 1.71 MG/DL (ref 0.5–1.4)
CRP SERPL HS-MCNC: <0.3 MG/DL (ref 0–0.75)
GFR SERPLBLD CREATININE-BSD FMLA CKD-EPI: 41 ML/MIN/1.73 M 2
GLOBULIN SER CALC-MCNC: 4.3 G/DL (ref 1.9–3.5)
GLUCOSE SERPL-MCNC: 98 MG/DL (ref 65–99)
POTASSIUM SERPL-SCNC: 5.5 MMOL/L (ref 3.6–5.5)
PROT SERPL-MCNC: 8.4 G/DL (ref 6–8.2)
SODIUM SERPL-SCNC: 141 MMOL/L (ref 135–145)

## 2024-12-12 ENCOUNTER — APPOINTMENT (OUTPATIENT)
Dept: URBAN - METROPOLITAN AREA CLINIC 4 | Facility: CLINIC | Age: 79
Setting detail: DERMATOLOGY
End: 2024-12-12

## 2024-12-12 ENCOUNTER — NON-PROVIDER VISIT (OUTPATIENT)
Dept: INTERNAL MEDICINE | Facility: IMAGING CENTER | Age: 79
End: 2024-12-12
Payer: MEDICARE

## 2024-12-12 ENCOUNTER — HOSPITAL ENCOUNTER (OUTPATIENT)
Facility: MEDICAL CENTER | Age: 79
End: 2024-12-12
Attending: UROLOGY
Payer: MEDICARE

## 2024-12-12 PROBLEM — D48.5 NEOPLASM OF UNCERTAIN BEHAVIOR OF SKIN: Status: ACTIVE | Noted: 2024-12-12

## 2024-12-12 LAB — PSA SERPL DL<=0.01 NG/ML-MCNC: 15.1 NG/ML (ref 0–4)

## 2024-12-12 PROCEDURE — 99999 PR NO CHARGE: CPT

## 2024-12-12 PROCEDURE — 11102 TANGNTL BX SKIN SINGLE LES: CPT

## 2024-12-12 PROCEDURE — 84153 ASSAY OF PSA TOTAL: CPT

## 2024-12-12 PROCEDURE — ? COUNSELING

## 2024-12-12 PROCEDURE — 11103 TANGNTL BX SKIN EA SEP/ADDL: CPT

## 2024-12-12 PROCEDURE — ? BIOPSY BY SHAVE METHOD

## 2024-12-12 NOTE — PROCEDURE: BIOPSY BY SHAVE METHOD
Detail Level: Detailed
Depth Of Biopsy: dermis
Was A Bandage Applied: Yes
Size Of Lesion In Cm: 0
Biopsy Type: H and E
Biopsy Method: Dermablade
Anesthesia Type: 1% lidocaine without epinephrine
Anesthesia Volume In Cc: 0.5
Hemostasis: Drysol
Wound Care: Petrolatum
Dressing: bandage
Destruction After The Procedure: No
Type Of Destruction Used: Curettage
Curettage Text: The wound bed was treated with curettage after the biopsy was performed.
Cryotherapy Text: The wound bed was treated with cryotherapy after the biopsy was performed.
Electrodesiccation Text: The wound bed was treated with electrodesiccation after the biopsy was performed.
Electrodesiccation And Curettage Text: The wound bed was treated with electrodesiccation and curettage after the biopsy was performed.
Silver Nitrate Text: The wound bed was treated with silver nitrate after the biopsy was performed.
Lab: [id number]
Lab Facility: 
Medical Necessity Information: It is in your best interest to select a reason for this procedure from the list below. All of these items fulfill various CMS LCD requirements except the new and changing color options.
Consent: Written consent was obtained and risks were reviewed including but not limited to scarring, infection, bleeding, scabbing, incomplete removal, nerve damage and allergy to anesthesia.
Post-Care Instructions: I reviewed with the patient in detail post-care instructions. Patient is to keep the biopsy site dry overnight, and then apply bacitracin twice daily until healed. Patient may apply hydrogen peroxide soaks to remove any crusting.
Notification Instructions: Patient will be notified of biopsy results. However, patient instructed to call the office if not contacted within 2 weeks.
Billing Type: Third-Party Bill
Information: Selecting Yes will display possible errors in your note based on the variables you have selected. This validation is only offered as a suggestion for you. PLEASE NOTE THAT THE VALIDATION TEXT WILL BE REMOVED WHEN YOU FINALIZE YOUR NOTE. IF YOU WANT TO FAX A PRELIMINARY NOTE YOU WILL NEED TO TOGGLE THIS TO 'NO' IF YOU DO NOT WANT IT IN YOUR FAXED NOTE.

## 2024-12-15 DIAGNOSIS — M48.50XS COMPRESSION FRACTURE OF SPINE, NON-TRAUMATIC, SEQUELA: ICD-10-CM

## 2024-12-17 PROBLEM — I63.9 CEREBRAL INFARCT (HCC): Status: RESOLVED | Noted: 2019-04-09 | Resolved: 2024-12-17

## 2024-12-17 PROBLEM — N17.9 ACUTE RENAL FAILURE SUPERIMPOSED ON STAGE 3B CHRONIC KIDNEY DISEASE (HCC): Status: RESOLVED | Noted: 2024-03-08 | Resolved: 2024-12-17

## 2024-12-17 PROBLEM — N39.0 COMPLICATED URINARY TRACT INFECTION: Status: RESOLVED | Noted: 2024-03-06 | Resolved: 2024-12-17

## 2024-12-17 PROBLEM — Z85.828 HISTORY OF NONMELANOMA SKIN CANCER: Status: ACTIVE | Noted: 2024-12-17

## 2024-12-17 PROBLEM — C34.90 LUNG CANCER (HCC): Status: RESOLVED | Noted: 2020-11-25 | Resolved: 2024-12-17

## 2024-12-17 PROBLEM — R17 TOTAL BILIRUBIN, ELEVATED: Status: RESOLVED | Noted: 2024-05-14 | Resolved: 2024-12-17

## 2024-12-17 PROBLEM — N18.32 ACUTE RENAL FAILURE SUPERIMPOSED ON STAGE 3B CHRONIC KIDNEY DISEASE (HCC): Status: RESOLVED | Noted: 2024-03-08 | Resolved: 2024-12-17

## 2024-12-17 NOTE — PROGRESS NOTES
CC:   Medicare Annual Wellness Visit    HPI:  Bartloo is a 79 y.o. male here for his Medicare Annual Wellness Visit and to review labs done 12/3/24.     Patient Active Problem List    Diagnosis Date Noted    History of nonmelanoma skin cancer 12/17/2024    Hyperglycemia 12/10/2024    Peripheral neuropathy due to chemotherapy (HCC) 10/08/2024    Disordered sleep 10/08/2024    Leukopenia 05/14/2024    Macrocytic anemia 03/06/2024    Androgen deficiency 04/27/2022    Malignant neoplasm metastatic to bone (HCC) 10/06/2021    Chronic pain 06/30/2021    Hypothyroid 11/25/2020    Obstructive sleep apnea 11/25/2020    GERD (gastroesophageal reflux disease) 11/25/2020    Hyperlipidemia 11/25/2020    Adrenal insufficiency (HCC), secondary 11/25/2020    History of stroke, subcortical infarct on MRI April 2019 11/25/2020    Diverticulosis of colon 11/25/2020    History of shingles 11/25/2020    Vitamin D deficiency 11/25/2020    History of malignant melanoma, April 2016 11/25/2020    History of atrial tachycardia 11/25/2020    BPH with obstruction/lower urinary tract symptoms 11/25/2020    Elevated PSA 11/25/2020    Atherosclerosis of both carotid arteries, mild 11/25/2020    Chronic kidney disease, stage 3a     Sensorineural hearing loss of both ears 06/08/2020    History of radiation therapy 01/30/2020    Premature atrial contractions 09/19/2019    Adenocarcinoma of lung (HCC), stage 4 ([facility] Oct. 2021) 08/26/2019    Brain lesion 06/26/2019    H/O cardiac radiofrequency ablation 06/11/2019    Left renal atrophy 12/14/2018    Long term (current) use of systemic steroids 12/11/2018    Right foot drop 11/16/2018    Right peroneal nerve palsy 11/16/2018    Palpitations 11/20/2016    Anxiety 07/28/2016    Family history of coronary artery disease 01/08/2016    Family history of early CAD 01/08/2016    Hypertension 01/08/2016     Current Outpatient Medications   Medication Sig Dispense Refill    atorvastatin (LIPITOR) 20 MG Tab  "Take 1 Tablet by mouth every day. 90 Tablet 3    tamsulosin (FLOMAX) 0.4 MG capsule Take 2 Capsules by mouth every day. 180 Capsule 3    omeprazole (PRILOSEC) 40 MG delayed-release capsule Take 1 Capsule by mouth every day. 90 Capsule 3    magnesium oxide (MAG-OX) 400 MG Tab tablet Take 400 mg by mouth every day.      Cyanocobalamin (B-12) 1000 MCG SL Tab Place 1,000 mcg under the tongue every day.      potassium chloride (KLOR-CON) 8 MEQ tablet Take 2 Tablets by mouth every day. 180 Tablet 3    silodosin (RAPAFLO) 8 MG Cap capsule Take 8 mg by mouth 1/2 hour after breakfast.      BISACODYL PO Take 2 Tablets by mouth as needed. (OTC)  Indications: Constipation      ondansetron (ZOFRAN ODT) 4 MG TABLET DISPERSIBLE Take 1 Tablet by mouth every 6 hours as needed for Nausea/Vomiting. 10 Tablet 0    dicyclomine (BENTYL) 20 MG Tab Take 1 Tablet by mouth every 6 hours as needed (Abdominal Bloating). 12 Tablet 0    NEEDLE, DISP, 25 G (BD DISP NEEDLES) 25G X 5/8\" Misc Use as directed for testosterone injections every 7 days 12 Each 3    famotidine (PEPCID) 20 MG Tab Take 1 Tablet by mouth every evening. 90 Tablet 3    amLODIPine (NORVASC) 5 MG Tab Take 1 Tablet by mouth every morning. 100 Tablet 3    liothyronine (CYTOMEL) 5 MCG Tab Take 10 mcg by mouth every day.      tadalafil (CIALIS) 20 MG tablet Take 20 mg by mouth 1 time a day as needed for Erectile Dysfunction. for erectile dysfunction      testosterone cypionate (DEPO-TESTOSTERONE) 200 MG/ML Solution injection Inject 124 mg into the shoulder, thigh, or buttocks every 7 days. On Sunday, pt injects 0.62ML      TIROSINT 125 MCG Cap Take 125 mcg by mouth every morning on an empty stomach. 90 Capsule 3    TAGRISSO 80 MG Tab Take 80 mg by mouth every day.      losartan (COZAAR) 100 MG Tab Take 1 Tablet by mouth every day. PLEASE CALL [phone] AND SCHEDULE A FOLLOW UP APPOINTMENT FOR FURTHER REFILLS. THANK YOU! (Patient not taking: Reported on 12/10/2024) 90 Tablet 0    " cholecalciferol (D-3-5) 5000 UNIT Cap Take 5,000 Units by mouth every day. (Patient not taking: Reported on 12/10/2024)       No current facility-administered medications for this visit.      Current supplements: see MAR  Chronic narcotic pain medicines: not currently   Allergies: Gramineae pollens, Cat hair extract, Horse allergy, Other environmental, and Pollen extract  Exercise: yes  Current social contact/activities: yes  Current mood: good  Advance Directive on file: no    Screening:  Depression Screening  Little interest or pleasure in doing things?  0 - not at all  Feeling down, depressed , or hopeless? 0 - not at all  Patient Health Questionnaire Score: 0     If depressive symptoms identified deferred to follow up visit unless specifically addressed in assessment and plan.    Interpretation of PHQ-9 Total Score   Score Severity   1-4 No Depression   5-9 Mild Depression   10-14 Moderate Depression   15-19 Moderately Severe Depression   20-27 Severe Depression    Screening for Cognitive Impairment  Do you or any of your friends or family members have any concern about your memory? No  Three Minute Recall (Leader, Season, Table) 3/3    Omkar clock face with all 12 numbers and set the hands to show 10 minutes after 11.  Yes    Cognitive concerns identified deferred for follow up unless specifically addressed in assessment and plan.    Fall Risk Assessment  Has the patient had two or more falls in the last year or any fall with injury in the last year?  No    Safety Assessment  Do you always wear your seatbelt?  Yes  Any changes to home needed to function safely? No  Difficulty hearing.  No  Patient counseled about all safety risks that were identified.    Functional Assessment ADLs  Are there any barriers preventing you from cooking for yourself or meeting nutritional needs?  No.    Are there any barriers preventing you from driving safely or obtaining transportation?  No.    Are there any barriers preventing you  from using a telephone or calling for help?  No    Are there any barriers preventing you from shopping?  No.    Are there any barriers preventing you from taking care of your own finances?  No    Are there any barriers preventing you from managing your medications?  No    Are there any barriers preventing you from showering, bathing or dressing yourself? No    Are there any barriers preventing you from doing housework or laundry? No  Are there any barriers preventing you from using the toilet?No  Are you currently engaging in any exercise or physical activity?  Yes.      Self-Assessment of Health  What is your perception of your health? Good    Do you sleep more than six hours a night? Yes    In the past 7 days, how much did pain keep you from doing your normal work? None    Do you spend quality time with family or friends (virtually or in person)? Yes    Do you usually eat a heart healthy diet that constists of a variety of fruits, vegetables, whole grains and fiber? Yes    Do you eat foods high in fat and/or Fast Food more than three times per week? No    How concerned are you that your medical conditions are not being well managed? Not at all    Are you worried that in the next 2 months, you may not have stable housing that you own, rent, or stay in as part of a household?        Advance Care Planning  Do you have an Advance Directive, Living Will, Durable Power of , or POLST?                   Health Maintenance Summary            Overdue - Zoster (Shingles) Vaccines (1 of 2) Overdue since 1/4/2013 11/09/2012  Imm Admin: Zoster Vaccine Live (ZVL) (Zostavax) - HISTORICAL DATA    01/19/2010  Imm Admin: Zoster Vaccine Live (ZVL) (Zostavax) - HISTORICAL DATA              Overdue - IMM DTaP/Tdap/Td Vaccine (2 - Td or Tdap) Overdue since 9/7/2019 09/07/2009  Imm Admin: Tdap Vaccine              Overdue - Influenza Vaccine (1) Overdue since 9/1/2024      10/19/2022  Imm Admin: Influenza Vaccine Adult  HD    11/11/2021  Imm Admin: Influenza, Unspecified - HISTORICAL DATA    10/06/2020  Imm Admin: Influenza Vaccine Adult HD    09/25/2019  Imm Admin: Influenza Seasonal Injectable - Historical Data    11/01/2018  Imm Admin: Influenza Seasonal Injectable - Historical Data    Only the first 5 history entries have been loaded, but more history exists.              Annual Wellness Visit (Yearly) Next due on 12/17/2025 12/17/2024  Subsequent Annual Wellness Visit - Includes PPPS ()    12/10/2024  Visit Dx: Encounter for Medicare annual wellness exam    04/27/2022  Visit Dx: Medicare annual wellness visit, subsequent    10/06/2020      05/05/2017      Only the first 5 history entries have been loaded, but more history exists.              Hepatitis C Screening  Completed      02/27/2024  Done    02/07/2024  Hepatitis C Antibody component of HEP C VIRUS ANTIBODY    10/19/2022  Reason not specified    10/19/2022  Hepatitis C Antibody component of HEP C VIRUS ANTIBODY    10/19/2022  Done    Only the first 5 history entries have been loaded, but more history exists.              Hepatitis A Vaccine (Hep A) (Series Information) Aged Out      No completion history exists for this topic.              HPV Vaccines (Series Information) Aged Out      No completion history exists for this topic.              Polio Vaccine (Inactivated Polio) (Series Information) Aged Out      No completion history exists for this topic.              Meningococcal Immunization (Series Information) Aged Out      No completion history exists for this topic.              Discontinued - Colorectal Cancer Screening  Discontinued        Frequency changed to Never automatically (Topic No Longer Applies)    05/15/2024  Occult Blood Feces component of OCCULT BLOOD STOOL    11/15/2016  Colonoscopy (Done - documented CareEverywhere)              Discontinued - Hepatitis B Vaccine (Hep B)  Discontinued      No completion history exists for this topic.               Discontinued - COVID-19 Vaccine  Discontinued      11/11/2021  Imm Admin: PFIZER PURPLE CAP SARS-COV-2 VACCINATION (12+)    02/24/2021  Imm Admin: PFIZER PURPLE CAP SARS-COV-2 VACCINATION (12+)    02/03/2021  Imm Admin: PFIZER PURPLE CAP SARS-COV-2 VACCINATION (12+)              Discontinued - Pneumococcal Vaccine: 65+ Years  Discontinued      05/08/2017  Imm Admin: Pneumococcal Conjugate Vaccine (Prevnar/PCV-13)    01/19/2010  Imm Admin: Pneumococcal polysaccharide vaccine (PPSV-23)                    Patient Care Team:  Lesly Munoz M.D. as PCP - General (Family Medicine)  Emanuel Villarreal M.D. (Radiation Oncology)  Luzma Corral M.D. (Medical Oncology)  OG Oliveros RLAURIE      Social History     Tobacco Use    Smoking status: Never    Smokeless tobacco: Never   Vaping Use    Vaping status: Never Used   Substance Use Topics    Alcohol use: Not Currently     Alcohol/week: 1.8 oz     Types: 3 Glasses of wine per week     Comment: Occasionally    Drug use: Not Currently     Comment: THC edibles     Family History   Problem Relation Age of Onset    Cancer Mother         Lung- bone    Cancer Father         Colon?    Cancer Sister         Lung    Cancer Brother         Lung    Cancer Brother         Brain     He  has a past medical history of Adrenal insufficiency (HCC), secondary (11/25/2020), Arrhythmia, Arthritis, Atherosclerosis of both carotid arteries, mild (11/25/2020), BPH (benign prostatic hyperplasia) (11/25/2020), Bronchitis, Cancer (HCC), Carcinoma in situ of respiratory system, CKD (chronic kidney disease) stage 3, GFR 30-59 ml/min (11/25/2020), Colostomy present (HCC) (11/25/2020), Diverticulosis of colon (11/25/2020), GERD (gastroesophageal reflux disease) (11/25/2020), High cholesterol, History of atrial tachycardia (11/25/2020), History of malignant melanoma, April 2016 (11/25/2020), History of shingles (11/25/2020), History of stroke, subcortical  "infarct on MRI April 2019 (11/25/2020), Hyperlipidemia (11/25/2020), Hypertension, Hypothyroid (11/25/2020), Indigestion, Lung cancer (HCC), adenocarcinoma Aug. 2019 (11/25/2020), and Pain.   Past Surgical History:   Procedure Laterality Date    CECILE BY LAPAROSCOPY  2/26/2021    Procedure: CHOLECYSTECTOMY, LAPAROSCOPIC;  Surgeon: Davey Oneal M.D.;  Location: SURGERY [facility];  Service: General    OTHER Right 2019    LOWER LOBE OF LUNG REMOVED    OTHER Left 2017    GROIN    OTHER Left 2016    SHIN       ROS:    All positives noted in HPI. All others reviewed and are negative.    Ostomy or other tubes or amputations: no  Chronic oxygen use: no  Last eye exam: UTD per report  : denies urinary incontinence; does not interfere with ADLs/ sleep  Gait: stable  Problems with balance/ difficulty walking: at times   Hearing: adequate  Dentition: adequate    Lab results 12/3/24 reviewed with patient at visit today.     Exam:   /66 (BP Location: Left arm, Patient Position: Sitting, BP Cuff Size: Adult)   Pulse 80   Temp 36.9 °C (98.5 °F) (Temporal)   Resp 17   Ht 1.803 m (5' 11\")   Wt 69.9 kg (154 lb)   SpO2 97%  Body mass index is 21.48 kg/m².    Gen: Well developed; thin; no acute distress; age appropriate appearance   HEENT: Normocephalic; atraumatic; PEERLA b/l; sclera clear b/l; b/l external auditory canals WNL; b/l TM WNL; nares patent; oropharynx clear; oral mucosa moist; tongue midline; dentition adequate   Neck: No adenopathy; no thyromegaly  CV: Regular rate and rhythm; S1/ S2 present; no murmur, gallop or rub noted  Pulm: No respiratory distress; clear to ascultation b/l; no wheezing or stridor noted b/l  Abd: Adequate bowel sounds noted; soft and nontender; no rebound, rigidity, nor distention  Extremities: No peripheral edema b/l LE extremities/ no clubbing nor cyanosis noted  Skin: Warm and dry; no rashes noted   Neuro: No focal deficits noted; pt is able to get up out of chair unassisted " "and walk forward  Psych: AAOx4; mood and affect are appropriate    Assessment and Plan:  1. Mixed hyperlipidemia  Uncontrolled as compared to prior lipid panel results. Recommend patient resume Lipitor 20 mg HS use (no use for past four months due to \"dizziness\") and repeat fasting lipid panel in three months for ongoing management. New RX sent to pharmacy.   - atorvastatin (LIPITOR) 20 MG Tab; Take 1 Tablet by mouth every day.  Dispense: 90 Tablet; Refill: 3  - Lipid Profile; Future  - Subsequent Annual Wellness Visit - Includes PPPS ()    2. BPH with obstruction/lower urinary tract symptoms  Chronic, ongoing condition managed by [facility]. Recommend patient continue current Flomax 0.8 mg daily use, and new RX sent to pharmacy. Patient has very close follow up scheduled with [facility].   - tamsulosin (FLOMAX) 0.4 MG capsule; Take 2 Capsules by mouth every day.  Dispense: 180 Capsule; Refill: 3  - Subsequent Annual Wellness Visit - Includes PPPS ()    3. Dysuria  Patient reports recent history of dysuria without associated hematuria/flank pain/fevers. POC UA positive for proteins and ketones today. Recommend patient increase daily hydration efforts, and no signs of active infection present.   - POCT Urinalysis  - Subsequent Annual Wellness Visit - Includes PPPS ()    4. Gastroesophageal reflux disease with esophagitis without hemorrhage  Stable/ recommend patient continue Prilosec 40 mg daily use, and new RX sent to pharmacy.   - omeprazole (PRILOSEC) 40 MG delayed-release capsule; Take 1 Capsule by mouth every day.  Dispense: 90 Capsule; Refill: 3  - Subsequent Annual Wellness Visit - Includes PPPS ()    5. Hyperglycemia  Ongoing issue for patient related to chronic steroid use. HgA1c is 6.3% and strategies to decrease overall blood sugar averages discussed with patient today. Recommend patient repeat HgA1c in three months for ongoing management.   - HEMOGLOBIN A1C; Future  - Subsequent Annual " Wellness Visit - Includes PPPS ()    6. Peripheral neuropathy due to chemotherapy (HCC)  Chronic condition directly related to prior chemotherapy for cancer treatment. Patient would like to try PT sessions, and new referral made to [facility] per patient request.   - Referral to Physical Therapy  - Subsequent Annual Wellness Visit - Includes PPPS ()    7. Adenocarcinoma of lung, unspecified laterality (HCC)  S/p wedge resection of RLL mass 8/20/19 - path consistent with pulmonary adenocarcinoma, and treatment managed to date by Dr. Corral.   - Subsequent Annual Wellness Visit - Includes PPPS ()    8. Malignant neoplasm metastatic to bone (HCC)  Bone biopsy 10/15/21 showed metastatic carcinoma consistent with lung origin after PET Scan 6/25/21 showed increased uptake in left sacrum sclerotic lesion. Patient has completed palliative radiation therapy in 2021 and Tagrisso with ongoing disease progression on more recent imaging managed by Dr. Corral as well as Dr[facility] Samuel at [facility].   - Subsequent Annual Wellness Visit - Includes PPPS ()    9. History of nonmelanoma skin cancer  S/p removal in past and patient continues to see dermatology regularly for surveillance.   - Subsequent Annual Wellness Visit - Includes PPPS ()    10. History of malignant melanoma, April 2016  KATHYA to date - diagnosed in June 2016. Stage IV malignant melanoma of left leg removed with wide local excision. Left femoral lymph node dissection 7/1/16 showed 1/3 lymph nodes positive (dissection declined at that time). Patient was started on Nivo 11/17 after recurrence noted 9/17. Patient also completed three doses of T- VEC and then Nivo discontinued 9/18 due to grade 4 skin toxicity requiring Infliximab. Followed by Dr. Corral.   - Subsequent Annual Wellness Visit - Includes PPPS ()    11. Adrenal insufficiency (HCC), secondary  Chronic condition managed by his endocrinology team.   - Subsequent Annual Wellness Visit  - Includes PPPS ()    12. Elevated PSA  Ongoing issue for patient - recent PSA: 15 and condition being actively managed by [facility].   - Subsequent Annual Wellness Visit - Includes PPPS ()    13. Androgen deficiency  Chronic condition managed by his endocrinology team.   - Subsequent Annual Wellness Visit - Includes PPPS ()    14. Chronic kidney disease, stage 3a  Stable/ will follow.   - Subsequent Annual Wellness Visit - Includes PPPS ()    15. Vitamin D deficiency  Stable/ recommend patient continue current Vitamin D replacement for maintenance.   - Subsequent Annual Wellness Visit - Includes PPPS ()    16. Macrocytic anemia  Resolved/ will follow   - Subsequent Annual Wellness Visit - Includes PPPS ()    17. Acquired hypothyroidism  Stable/ recommend patient continue Tirosint 125 mcg daily use.   - Subsequent Annual Wellness Visit - Includes PPPS ()    18. Primary hypertension  Stable/ well managed at this time - patient has not been taking Amlodipine nor Losartan for four months, and condition [facility].   - Subsequent Annual Wellness Visit - Includes PPPS ()    19. Other chronic pain  Ongoing issue for patient (multi factorial in origin). Condition is seemingly better at this time with need for narcotics at this time. Will follow.   - Subsequent Annual Wellness Visit - Includes PPPS ()    20. Vaccine counseling  Patient is aware of vaccine eligibility and declines vaccination at this time.   - Subsequent Annual Wellness Visit - Includes PPPS ()    21. Encounter for Medicare annual wellness exam  Patient remains well versed about medical conditions that need additional attention moving forward, and he is doing suprisingly well overall (considering multitude of medical problems he has been dealing within).   - Subsequent Annual Wellness Visit - Includes PPPS ()       Services needed: no new services needed at this time  Health Care  Screening: recommendations as per orders if indicated.  Referrals offered: PT  Counseling provided today:  Prevent falls and reduce trip hazards; Secure or remove rugs if present   Maintain working fire alarm and carbon monoxide detectors   Engage in regular physical activity daily and social activities weekly as tolerated   F/U with me as detailed above and sooner PRN as new needs arise

## 2025-01-21 ENCOUNTER — APPOINTMENT (OUTPATIENT)
Dept: URBAN - METROPOLITAN AREA CLINIC 4 | Facility: CLINIC | Age: 80
Setting detail: DERMATOLOGY
End: 2025-01-21

## 2025-01-21 PROBLEM — C44.722 SQUAMOUS CELL CARCINOMA OF SKIN OF RIGHT LOWER LIMB, INCLUDING HIP: Status: ACTIVE | Noted: 2025-01-21

## 2025-01-21 PROCEDURE — 13122 CMPLX RPR S/A/L ADDL 5 CM/>: CPT

## 2025-01-21 PROCEDURE — ? EXCISION

## 2025-01-21 PROCEDURE — 11604 EXC TR-EXT MAL+MARG 3.1-4 CM: CPT

## 2025-01-21 PROCEDURE — 13121 CMPLX RPR S/A/L 2.6-7.5 CM: CPT

## 2025-01-21 NOTE — PROCEDURE: EXCISION
Referring Physician (Optional): JACOBY Becker
Surgeon (Optional): Jose A
Biopsy Photograph Reviewed: Yes
Previous Accession (Optional): [id number].
Date Of Previous Biopsy (Optional): 12/04/2024
Size Of Lesion In Cm: 2.9
Size Of Margin In Cm: 0.2
Anesthesia Volume In Cc: 17.3
Was An Eye Clamp Used?: No
Eye Clamp Note Details: An eye clamp was used during the procedure.
Excision Method: Elliptical
Saucerization Depth: dermis and superficial adipose tissue
Repair Type: Complex
Intermediate / Complex Repair - Final Wound Length In Cm: 8.7
Suturegard Retention Suture: 2-0 Nylon
Retention Suture Bite Size: 3 mm
Length To Time In Minutes Device Was In Place: 10
Number Of Hemigard Strips Per Side: 1
Width Of Defect Perpendicular To Closure In Cm (Required): 2.5
Distance Of Undermining In Cm (Required): 3.2
Undermining Type: Entire Wound
Debridement Text: The wound edges were debrided prior to proceeding with the closure to facilitate wound healing.
Helical Rim Text: The closure involved the helical rim.
Vermilion Border Text: The closure involved the vermilion border.
Nostril Rim Text: The closure involved the nostril rim.
Retention Suture Text: Retention sutures were placed to support the closure and prevent dehiscence.
Primary Defect Length (In Cm): 0
Lab: [id number]
Lab Facility: 
Graft Donor Site Bandage (Optional-Leave Blank If You Don't Want In Note): Steri-strips and a pressure bandage were applied to the donor site.
Epidermal Closure Graft Donor Site (Optional): simple interrupted
Billing Type: Third-Party Bill
Excision Depth: adipose tissue
Scalpel Size: 15 blade
Anesthesia Type: 1% lidocaine with epinephrine
Additional Anesthesia Volume In Cc: 6
Hemostasis: Electrocautery
Estimated Blood Loss (Cc): minimal
Detail Level: Detailed
Repair Depth: use same depth as excision depth
Repair Anesthesia Method: local infiltration
Anesthesia Volume In Cc: 22
Deep Sutures: 2-0 Vicryl
Dermal Closure: buried vertical mattress
Epidermal Sutures: 4-0 Vicryl Rapide
Epidermal Closure: running subcuticular
Wound Care: Bacitracin
Dressing: dry sterile dressing
Suturegard Intro: Intraoperative tissue expansion was performed, utilizing the SUTUREGARD device, in order to reduce wound tension.
Suturegard Body: The suture ends were repeatedly re-tightened and re-clamped to achieve the desired tissue expansion.
Hemigard Intro: Due to skin fragility and wound tension, it was decided to use HEMIGARD adhesive retention suture devices to permit a linear closure. The skin was cleaned and dried for a 6cm distance away from the wound. Excessive hair, if present, was removed to allow for adhesion.
Hemigard Postcare Instructions: The HEMIGARD strips are to remain completely dry for at least 5-7 days.
Positioning (Leave Blank If You Do Not Want): The patient was placed in a comfortable position exposing the surgical site.
Pre-Excision Curettage Text (Leave Blank If You Do Not Want): Prior to drawing the surgical margin the visible lesion was removed with electrodesiccation and curettage to clearly define the lesion size.
Complex Repair Preamble Text (Leave Blank If You Do Not Want): Extensive wide undermining was performed.
Intermediate Repair Preamble Text (Leave Blank If You Do Not Want): Undermining was performed with blunt dissection.
Curvilinear Excision Additional Text (Leave Blank If You Do Not Want): The margin was drawn around the clinically apparent lesion.  A curvilinear shape was then drawn on the skin incorporating the lesion and margins.  Incisions were then made along these lines to the appropriate tissue plane and the lesion was extirpated.
Fusiform Excision Additional Text (Leave Blank If You Do Not Want): The margin was drawn around the clinically apparent lesion.  A fusiform shape was then drawn on the skin incorporating the lesion and margins.  Incisions were then made along these lines to the appropriate tissue plane and the lesion was extirpated.
Elliptical Excision Additional Text (Leave Blank If You Do Not Want): The margin was drawn around the clinically apparent lesion.  An elliptical shape was then drawn on the skin incorporating the lesion and margins.  Incisions were then made along these lines to the appropriate tissue plane and the lesion was extirpated.
Saucerization Excision Additional Text (Leave Blank If You Do Not Want): The margin was drawn around the clinically apparent lesion.  Incisions were then made along these lines, in a tangential fashion, to the appropriate tissue plane and the lesion was extirpated.
Slit Excision Additional Text (Leave Blank If You Do Not Want): A linear line was drawn on the skin overlying the lesion. An incision was made slowly until the lesion was visualized.  Once visualized, the lesion was removed with blunt dissection.
Excisional Biopsy Additional Text (Leave Blank If You Do Not Want): The margin was drawn around the clinically apparent lesion. An elliptical shape was then drawn on the skin incorporating the lesion and margins.  Incisions were then made along these lines to the appropriate tissue plane and the lesion was extirpated.
Perilesional Excision Additional Text (Leave Blank If You Do Not Want): The margin was drawn around the clinically apparent lesion. Incisions were then made along these lines to the appropriate tissue plane and the lesion was extirpated.
Repair Performed By Another Provider Text (Leave Blank If You Do Not Want): After the tissue was excised the defect was repaired by another provider.
No Repair - Repaired With Adjacent Surgical Defect Text (Leave Blank If You Do Not Want): After the excision the defect was repaired concurrently with another surgical defect which was in close approximation.
Adjacent Tissue Transfer Text: The defect edges were debeveled with a #15 scalpel blade. Given the location of the defect and the proximity to free margins an adjacent tissue transfer was deemed most appropriate. Using a sterile surgical marker, an appropriate flap was drawn incorporating the defect and placing the expected incisions within the relaxed skin tension lines where possible. The area thus outlined was incised deep to adipose tissue with a #15 scalpel blade. The skin margins were undermined to an appropriate distance in all directions utilizing iris scissors and carried over to close the primary defect.
Advancement Flap (Single) Text: The defect edges were debeveled with a #15 scalpel blade.  Given the location of the defect and the proximity to free margins a single advancement flap was deemed most appropriate.  Using a sterile surgical marker, an appropriate advancement flap was drawn incorporating the defect and placing the expected incisions within the relaxed skin tension lines where possible.    The area thus outlined was incised deep to adipose tissue with a #15 scalpel blade.  The skin margins were undermined to an appropriate distance in all directions utilizing iris scissors.
Advancement Flap (Double) Text: The defect edges were debeveled with a #15 scalpel blade.  Given the location of the defect and the proximity to free margins a double advancement flap was deemed most appropriate.  Using a sterile surgical marker, the appropriate advancement flaps were drawn incorporating the defect and placing the expected incisions within the relaxed skin tension lines where possible.    The area thus outlined was incised deep to adipose tissue with a #15 scalpel blade.  The skin margins were undermined to an appropriate distance in all directions utilizing iris scissors.
Burow's Advancement Flap Text: The defect edges were debeveled with a #15 scalpel blade.  Given the location of the defect and the proximity to free margins a Burow's advancement flap was deemed most appropriate.  Using a sterile surgical marker, the appropriate advancement flap was drawn incorporating the defect and placing the expected incisions within the relaxed skin tension lines where possible.    The area thus outlined was incised deep to adipose tissue with a #15 scalpel blade.  The skin margins were undermined to an appropriate distance in all directions utilizing iris scissors.
Chonodrocutaneous Helical Advancement Flap Text: The defect edges were debeveled with a #15 scalpel blade. Given the location of the defect and the proximity to free margins a chondrocutaneous helical advancement flap was deemed most appropriate. Using a sterile surgical marker, the appropriate advancement flap was drawn incorporating the defect and placing the expected incisions within the relaxed skin tension lines where possible. The area thus outlined was incised deep to adipose tissue with a #15 scalpel blade. The skin margins were undermined to an appropriate distance in all directions utilizing iris scissors. Following this, the designed flap was advanced and carried over into the primary defect and sutured into place.
Crescentic Advancement Flap Text: The defect edges were debeveled with a #15 scalpel blade.  Given the location of the defect and the proximity to free margins a crescentic advancement flap was deemed most appropriate.  Using a sterile surgical marker, the appropriate advancement flap was drawn incorporating the defect and placing the expected incisions within the relaxed skin tension lines where possible.    The area thus outlined was incised deep to adipose tissue with a #15 scalpel blade.  The skin margins were undermined to an appropriate distance in all directions utilizing iris scissors.
A-T Advancement Flap Text: The defect edges were debeveled with a #15 scalpel blade.  Given the location of the defect, shape of the defect and the proximity to free margins an A-T advancement flap was deemed most appropriate.  Using a sterile surgical marker, an appropriate advancement flap was drawn incorporating the defect and placing the expected incisions within the relaxed skin tension lines where possible.    The area thus outlined was incised deep to adipose tissue with a #15 scalpel blade.  The skin margins were undermined to an appropriate distance in all directions utilizing iris scissors.
O-T Advancement Flap Text: The defect edges were debeveled with a #15 scalpel blade.  Given the location of the defect, shape of the defect and the proximity to free margins an O-T advancement flap was deemed most appropriate.  Using a sterile surgical marker, an appropriate advancement flap was drawn incorporating the defect and placing the expected incisions within the relaxed skin tension lines where possible.    The area thus outlined was incised deep to adipose tissue with a #15 scalpel blade.  The skin margins were undermined to an appropriate distance in all directions utilizing iris scissors.
O-L Flap Text: The defect edges were debeveled with a #15 scalpel blade.  Given the location of the defect, shape of the defect and the proximity to free margins an O-L flap was deemed most appropriate.  Using a sterile surgical marker, an appropriate advancement flap was drawn incorporating the defect and placing the expected incisions within the relaxed skin tension lines where possible.    The area thus outlined was incised deep to adipose tissue with a #15 scalpel blade.  The skin margins were undermined to an appropriate distance in all directions utilizing iris scissors.
O-Z Flap Text: The defect edges were debeveled with a #15 scalpel blade. Given the location of the defect, shape of the defect and the proximity to free margins an O-Z flap was deemed most appropriate. Using a sterile surgical marker, an appropriate transposition flap was drawn incorporating the defect and placing the expected incisions within the relaxed skin tension lines where possible. The area thus outlined was incised deep to adipose tissue with a #15 scalpel blade. The skin margins were undermined to an appropriate distance in all directions utilizing iris scissors. Following this, the designed flap was carried over into the primary defect and sutured into place.
Double O-Z Flap Text: The defect edges were debeveled with a #15 scalpel blade. Given the location of the defect, shape of the defect and the proximity to free margins a Double O-Z flap was deemed most appropriate. Using a sterile surgical marker, an appropriate transposition flap was drawn incorporating the defect and placing the expected incisions within the relaxed skin tension lines where possible. The area thus outlined was incised deep to adipose tissue with a #15 scalpel blade. The skin margins were undermined to an appropriate distance in all directions utilizing iris scissors. Following this, the designed flap was carried over into the primary defect and sutured into place.
V-Y Flap Text: The defect edges were debeveled with a #15 scalpel blade.  Given the location of the defect, shape of the defect and the proximity to free margins a V-Y flap was deemed most appropriate.  Using a sterile surgical marker, an appropriate advancement flap was drawn incorporating the defect and placing the expected incisions within the relaxed skin tension lines where possible.    The area thus outlined was incised deep to adipose tissue with a #15 scalpel blade.  The skin margins were undermined to an appropriate distance in all directions utilizing iris scissors.
Advancement-Rotation Flap Text: The defect edges were debeveled with a #15 scalpel blade.  Given the location of the defect, shape of the defect and the proximity to free margins an advancement-rotation flap was deemed most appropriate.  Using a sterile surgical marker, an appropriate flap was drawn incorporating the defect and placing the expected incisions within the relaxed skin tension lines where possible. The area thus outlined was incised deep to adipose tissue with a #15 scalpel blade.  The skin margins were undermined to an appropriate distance in all directions utilizing iris scissors.
Mercedes Flap Text: The defect edges were debeveled with a #15 scalpel blade. Given the location of the defect, shape of the defect and the proximity to free margins a Mercedes flap was deemed most appropriate. Using a sterile surgical marker, an appropriate advancement flap was drawn incorporating the defect and placing the expected incisions within the relaxed skin tension lines where possible. The area thus outlined was incised deep to adipose tissue with a #15 scalpel blade. The skin margins were undermined to an appropriate distance in all directions utilizing iris scissors. Following this, the designed flap was advanced and carried over into the primary defect and sutured into place.
Modified Advancement Flap Text: The defect edges were debeveled with a #15 scalpel blade.  Given the location of the defect, shape of the defect and the proximity to free margins a modified advancement flap was deemed most appropriate.  Using a sterile surgical marker, an appropriate advancement flap was drawn incorporating the defect and placing the expected incisions within the relaxed skin tension lines where possible.    The area thus outlined was incised deep to adipose tissue with a #15 scalpel blade.  The skin margins were undermined to an appropriate distance in all directions utilizing iris scissors.
Mucosal Advancement Flap Text: Given the location of the defect, shape of the defect and the proximity to free margins a mucosal advancement flap was deemed most appropriate. Incisions were made with a 15 blade scalpel in the appropriate fashion along the cutaneous vermilion border and the mucosal lip. The remaining actinically damaged mucosal tissue was excised.  The mucosal advancement flap was then elevated to the gingival sulcus with care taken to preserve the neurovascular structures and advanced into the primary defect. Care was taken to ensure that precise realignment of the vermilion border was achieved.
Peng Advancement Flap Text: The defect edges were debeveled with a #15 scalpel blade. Given the location of the defect, shape of the defect and the proximity to free margins a Peng advancement flap was deemed most appropriate. Using a sterile surgical marker, an appropriate advancement flap was drawn incorporating the defect and placing the expected incisions within the relaxed skin tension lines where possible. The area thus outlined was incised deep to adipose tissue with a #15 scalpel blade. The skin margins were undermined to an appropriate distance in all directions utilizing iris scissors. Following this, the designed flap was advanced and carried over into the primary defect and sutured into place.
Hatchet Flap Text: The defect edges were debeveled with a #15 scalpel blade.  Given the location of the defect, shape of the defect and the proximity to free margins a hatchet flap was deemed most appropriate.  Using a sterile surgical marker, an appropriate hatchet flap was drawn incorporating the defect and placing the expected incisions within the relaxed skin tension lines where possible.    The area thus outlined was incised deep to adipose tissue with a #15 scalpel blade.  The skin margins were undermined to an appropriate distance in all directions utilizing iris scissors.
Rotation Flap Text: The defect edges were debeveled with a #15 scalpel blade.  Given the location of the defect, shape of the defect and the proximity to free margins a rotation flap was deemed most appropriate.  Using a sterile surgical marker, an appropriate rotation flap was drawn incorporating the defect and placing the expected incisions within the relaxed skin tension lines where possible.    The area thus outlined was incised deep to adipose tissue with a #15 scalpel blade.  The skin margins were undermined to an appropriate distance in all directions utilizing iris scissors.
Bilateral Rotation Flap Text: The defect edges were debeveled with a #15 scalpel blade. Given the location of the defect, shape of the defect and the proximity to free margins a bilateral rotation flap was deemed most appropriate. Using a sterile surgical marker, an appropriate rotation flap was drawn incorporating the defect and placing the expected incisions within the relaxed skin tension lines where possible. The area thus outlined was incised deep to adipose tissue with a #15 scalpel blade. The skin margins were undermined to an appropriate distance in all directions utilizing iris scissors. Following this, the designed flap was carried over into the primary defect and sutured into place.
Spiral Flap Text: The defect edges were debeveled with a #15 scalpel blade.  Given the location of the defect, shape of the defect and the proximity to free margins a spiral flap was deemed most appropriate.  Using a sterile surgical marker, an appropriate rotation flap was drawn incorporating the defect and placing the expected incisions within the relaxed skin tension lines where possible. The area thus outlined was incised deep to adipose tissue with a #15 scalpel blade.  The skin margins were undermined to an appropriate distance in all directions utilizing iris scissors.
Staged Advancement Flap Text: The defect edges were debeveled with a #15 scalpel blade. Given the location of the defect, shape of the defect and the proximity to free margins a staged advancement flap was deemed most appropriate. Using a sterile surgical marker, an appropriate advancement flap was drawn incorporating the defect and placing the expected incisions within the relaxed skin tension lines where possible. The area thus outlined was incised deep to adipose tissue with a #15 scalpel blade. The skin margins were undermined to an appropriate distance in all directions utilizing iris scissors. Following this, the designed flap was carried over into the primary defect and sutured into place.
Star Wedge Flap Text: The defect edges were debeveled with a #15 scalpel blade.  Given the location of the defect, shape of the defect and the proximity to free margins a star wedge flap was deemed most appropriate.  Using a sterile surgical marker, an appropriate rotation flap was drawn incorporating the defect and placing the expected incisions within the relaxed skin tension lines where possible. The area thus outlined was incised deep to adipose tissue with a #15 scalpel blade.  The skin margins were undermined to an appropriate distance in all directions utilizing iris scissors.
Transposition Flap Text: The defect edges were debeveled with a #15 scalpel blade.  Given the location of the defect and the proximity to free margins a transposition flap was deemed most appropriate.  Using a sterile surgical marker, an appropriate transposition flap was drawn incorporating the defect.    The area thus outlined was incised deep to adipose tissue with a #15 scalpel blade.  The skin margins were undermined to an appropriate distance in all directions utilizing iris scissors.
Muscle Hinge Flap Text: The defect edges were debeveled with a #15 scalpel blade.  Given the size, depth and location of the defect and the proximity to free margins a muscle hinge flap was deemed most appropriate.  Using a sterile surgical marker, an appropriate hinge flap was drawn incorporating the defect. The area thus outlined was incised with a #15 scalpel blade.  The skin margins were undermined to an appropriate distance in all directions utilizing iris scissors.
Mustarde Flap Text: The defect edges were debeveled with a #15 scalpel blade.  Given the size, depth and location of the defect and the proximity to free margins a Mustarde flap was deemed most appropriate. Using a sterile surgical marker, an appropriate flap was drawn incorporating the defect. The area thus outlined was incised with a #15 scalpel blade. The skin margins were undermined to an appropriate distance in all directions utilizing iris scissors. Following this, the designed flap was carried into the primary defect and sutured into place.
Nasal Turnover Hinge Flap Text: The defect edges were debeveled with a #15 scalpel blade.  Given the size, depth, location of the defect and the defect being full thickness a nasal turnover hinge flap was deemed most appropriate. Using a sterile surgical marker, an appropriate hinge flap was drawn incorporating the defect. The area thus outlined was incised with a #15 scalpel blade. The flap was designed to recreate the nasal mucosal lining and the alar rim. The skin margins were undermined to an appropriate distance in all directions utilizing iris scissors. Following this, the designed flap was carried over into the primary defect and sutured into place
Nasalis-Muscle-Based Myocutaneous Island Pedicle Flap Text: Using a #15 blade, an incision was made around the donor flap to the level of the nasalis muscle. Wide lateral undermining was then performed in both the subcutaneous plane above the nasalis muscle, and in a submuscular plane just above periosteum. This allowed the formation of a free nasalis muscle axial pedicle (based on the angular artery) which was still attached to the actual cutaneous flap, increasing its mobility and vascular viability. Hemostasis was obtained with pinpoint electrocoagulation. The flap was mobilized into position and the pivotal anchor points positioned and stabilized with buried interrupted sutures. Subcutaneous and dermal tissues were closed in a multilayered fashion with sutures. Tissue redundancies were excised, and the epidermal edges were apposed without significant tension and sutured with sutures.
Nasalis Myocutaneous Flap Text: Using a #15 blade, an incision was made around the donor flap to the level of the nasalis muscle. Wide lateral undermining was then performed in both the subcutaneous plane above the nasalis muscle, and in a submuscular plane just above periosteum. This allowed the formation of a free nasalis muscle axial pedicle which was still attached to the actual cutaneous flap, increasing its mobility and vascular viability. Hemostasis was obtained with pinpoint electrocoagulation. The flap was mobilized into position and the pivotal anchor points positioned and stabilized with buried interrupted sutures. Subcutaneous and dermal tissues were closed in a multilayered fashion with sutures. Tissue redundancies were excised, and the epidermal edges were apposed without significant tension and sutured with sutures.
Nasolabial Transposition Flap Text: The defect edges were debeveled with a #15 scalpel blade.  Given the size, depth and location of the defect and the proximity to free margins a nasolabial transposition flap was deemed most appropriate. Using a sterile surgical marker, an appropriate flap was drawn incorporating the defect. The area thus outlined was incised with a #15 scalpel blade. The skin margins were undermined to an appropriate distance in all directions utilizing iris scissors. Following this, the designed flap was carried into the primary defect and sutured into place.
Orbicularis Oris Muscle Flap Text: The defect edges were debeveled with a #15 scalpel blade.  Given that the defect affected the competency of the oral sphincter an orbicularis oris muscle flap was deemed most appropriate to restore this competency and normal muscle function.  Using a sterile surgical marker, an appropriate flap was drawn incorporating the defect. The area thus outlined was incised with a #15 scalpel blade. Following this, the designed flap was carried over into the primary defect and sutured into place.
Melolabial Transposition Flap Text: The defect edges were debeveled with a #15 scalpel blade.  Given the location of the defect and the proximity to free margins a melolabial flap was deemed most appropriate.  Using a sterile surgical marker, an appropriate melolabial transposition flap was drawn incorporating the defect.    The area thus outlined was incised deep to adipose tissue with a #15 scalpel blade.  The skin margins were undermined to an appropriate distance in all directions utilizing iris scissors.
Rectangular Flap Text: The defect edges were debeveled with a #15 scalpel blade. Given the location of the defect and the proximity to free margins a rectangular flap was deemed most appropriate. Using a sterile surgical marker, an appropriate rectangular flap was drawn incorporating the defect. The area thus outlined was incised deep to adipose tissue with a #15 scalpel blade. The skin margins were undermined to an appropriate distance in all directions utilizing iris scissors. Following this, the designed flap was carried over into the primary defect and sutured into place.
Rhombic Flap Text: The defect edges were debeveled with a #15 scalpel blade.  Given the location of the defect and the proximity to free margins a rhombic flap was deemed most appropriate.  Using a sterile surgical marker, an appropriate rhombic flap was drawn incorporating the defect.    The area thus outlined was incised deep to adipose tissue with a #15 scalpel blade.  The skin margins were undermined to an appropriate distance in all directions utilizing iris scissors.
Rhomboid Transposition Flap Text: The defect edges were debeveled with a #15 scalpel blade. Given the location of the defect and the proximity to free margins a rhomboid transposition flap was deemed most appropriate. Using a sterile surgical marker, an appropriate rhomboid flap was drawn incorporating the defect. The area thus outlined was incised deep to adipose tissue with a #15 scalpel blade. The skin margins were undermined to an appropriate distance in all directions utilizing iris scissors. Following this, the designed flap was carried over into the primary defect and sutured into place.
Bi-Rhombic Flap Text: The defect edges were debeveled with a #15 scalpel blade.  Given the location of the defect and the proximity to free margins a bi-rhombic flap was deemed most appropriate.  Using a sterile surgical marker, an appropriate rhombic flap was drawn incorporating the defect. The area thus outlined was incised deep to adipose tissue with a #15 scalpel blade.  The skin margins were undermined to an appropriate distance in all directions utilizing iris scissors.
Helical Rim Advancement Flap Text: The defect edges were debeveled with a #15 blade scalpel.  Given the location of the defect and the proximity to free margins (helical rim) a double helical rim advancement flap was deemed most appropriate.  Using a sterile surgical marker, the appropriate advancement flaps were drawn incorporating the defect and placing the expected incisions between the helical rim and antihelix where possible.  The area thus outlined was incised through and through with a #15 scalpel blade.  With a skin hook and iris scissors, the flaps were gently and sharply undermined and freed up.
Bilateral Helical Rim Advancement Flap Text: The defect edges were debeveled with a #15 blade scalpel.  Given the location of the defect and the proximity to free margins (helical rim) a bilateral helical rim advancement flap was deemed most appropriate.  Using a sterile surgical marker, the appropriate advancement flaps were drawn incorporating the defect and placing the expected incisions between the helical rim and antihelix where possible.  The area thus outlined was incised through and through with a #15 scalpel blade.  With a skin hook and iris scissors, the flaps were gently and sharply undermined and freed up.
Ear Star Wedge Flap Text: The defect edges were debeveled with a #15 blade scalpel.  Given the location of the defect and the proximity to free margins (helical rim) an ear star wedge flap was deemed most appropriate.  Using a sterile surgical marker, the appropriate flap was drawn incorporating the defect and placing the expected incisions between the helical rim and antihelix where possible.  The area thus outlined was incised through and through with a #15 scalpel blade.
Flip-Flop Flap Text: The defect edges were debeveled with a #15 blade scalpel.  Given the location of the defect and the proximity to free margins a flip-flop flap was deemed most appropriate. Using a sterile surgical marker, the appropriate flap was drawn incorporating the defect and placing the expected incisions between the helical rim and antihelix where possible.  The area thus outlined was incised through and through with a #15 scalpel blade. Following this, the designed flap was carried over into the primary defect and sutured into place.
Banner Transposition Flap Text: The defect edges were debeveled with a #15 scalpel blade. Given the location of the defect and the proximity to free margins a Banner transposition flap was deemed most appropriate. Using a sterile surgical marker, an appropriate flap was drawn around the defect. The area thus outlined was incised deep to adipose tissue with a #15 scalpel blade. The skin margins were undermined to an appropriate distance in all directions utilizing iris scissors. Following this, the designed flap was carried into the primary defect and sutured into place.
Bilobed Flap Text: The defect edges were debeveled with a #15 scalpel blade.  Given the location of the defect and the proximity to free margins a bilobe flap was deemed most appropriate.  Using a sterile surgical marker, an appropriate bilobe flap drawn around the defect.    The area thus outlined was incised deep to adipose tissue with a #15 scalpel blade.  The skin margins were undermined to an appropriate distance in all directions utilizing iris scissors.
Bilobed Transposition Flap Text: The defect edges were debeveled with a #15 scalpel blade.  Given the location of the defect and the proximity to free margins a bilobed transposition flap was deemed most appropriate.  Using a sterile surgical marker, an appropriate bilobe flap drawn around the defect.    The area thus outlined was incised deep to adipose tissue with a #15 scalpel blade.  The skin margins were undermined to an appropriate distance in all directions utilizing iris scissors.
Trilobed Flap Text: The defect edges were debeveled with a #15 scalpel blade.  Given the location of the defect and the proximity to free margins a trilobed flap was deemed most appropriate.  Using a sterile surgical marker, an appropriate trilobed flap drawn around the defect.    The area thus outlined was incised deep to adipose tissue with a #15 scalpel blade.  The skin margins were undermined to an appropriate distance in all directions utilizing iris scissors.
Dorsal Nasal Flap Text: The defect edges were debeveled with a #15 scalpel blade.  Given the location of the defect and the proximity to free margins a dorsal nasal flap was deemed most appropriate.  Using a sterile surgical marker, an appropriate dorsal nasal flap was drawn around the defect.    The area thus outlined was incised deep to adipose tissue with a #15 scalpel blade.  The skin margins were undermined to an appropriate distance in all directions utilizing iris scissors.
Island Pedicle Flap Text: The defect edges were debeveled with a #15 scalpel blade.  Given the location of the defect, shape of the defect and the proximity to free margins an island pedicle advancement flap was deemed most appropriate.  Using a sterile surgical marker, an appropriate advancement flap was drawn incorporating the defect, outlining the appropriate donor tissue and placing the expected incisions within the relaxed skin tension lines where possible.    The area thus outlined was incised deep to adipose tissue with a #15 scalpel blade.  The skin margins were undermined to an appropriate distance in all directions around the primary defect and laterally outward around the island pedicle utilizing iris scissors.  There was minimal undermining beneath the pedicle flap.
Island Pedicle Flap With Canthal Suspension Text: The defect edges were debeveled with a #15 scalpel blade.  Given the location of the defect, shape of the defect and the proximity to free margins an island pedicle advancement flap was deemed most appropriate.  Using a sterile surgical marker, an appropriate advancement flap was drawn incorporating the defect, outlining the appropriate donor tissue and placing the expected incisions within the relaxed skin tension lines where possible. The area thus outlined was incised deep to adipose tissue with a #15 scalpel blade.  The skin margins were undermined to an appropriate distance in all directions around the primary defect and laterally outward around the island pedicle utilizing iris scissors.  There was minimal undermining beneath the pedicle flap. A suspension suture was placed in the canthal tendon to prevent tension and prevent ectropion.
Alar Island Pedicle Flap Text: The defect edges were debeveled with a #15 scalpel blade.  Given the location of the defect, shape of the defect and the proximity to the alar rim an island pedicle advancement flap was deemed most appropriate.  Using a sterile surgical marker, an appropriate advancement flap was drawn incorporating the defect, outlining the appropriate donor tissue and placing the expected incisions within the nasal ala running parallel to the alar rim. The area thus outlined was incised with a #15 scalpel blade.  The skin margins were undermined minimally to an appropriate distance in all directions around the primary defect and laterally outward around the island pedicle utilizing iris scissors.  There was minimal undermining beneath the pedicle flap.
Double Island Pedicle Flap Text: The defect edges were debeveled with a #15 scalpel blade.  Given the location of the defect, shape of the defect and the proximity to free margins a double island pedicle advancement flap was deemed most appropriate.  Using a sterile surgical marker, an appropriate advancement flap was drawn incorporating the defect, outlining the appropriate donor tissue and placing the expected incisions within the relaxed skin tension lines where possible.    The area thus outlined was incised deep to adipose tissue with a #15 scalpel blade.  The skin margins were undermined to an appropriate distance in all directions around the primary defect and laterally outward around the island pedicle utilizing iris scissors.  There was minimal undermining beneath the pedicle flap.
Island Pedicle Flap-Requiring Vessel Identification Text: The defect edges were debeveled with a #15 scalpel blade.  Given the location of the defect, shape of the defect and the proximity to free margins an island pedicle advancement flap was deemed most appropriate.  Using a sterile surgical marker, an appropriate advancement flap was drawn, based on the axial vessel mentioned above, incorporating the defect, outlining the appropriate donor tissue and placing the expected incisions within the relaxed skin tension lines where possible.    The area thus outlined was incised deep to adipose tissue with a #15 scalpel blade.  The skin margins were undermined to an appropriate distance in all directions around the primary defect and laterally outward around the island pedicle utilizing iris scissors.  There was minimal undermining beneath the pedicle flap.
Keystone Flap Text: The defect edges were debeveled with a #15 scalpel blade.  Given the location of the defect, shape of the defect a keystone flap was deemed most appropriate.  Using a sterile surgical marker, an appropriate keystone flap was drawn incorporating the defect, outlining the appropriate donor tissue and placing the expected incisions within the relaxed skin tension lines where possible. The area thus outlined was incised deep to adipose tissue with a #15 scalpel blade.  The skin margins were undermined to an appropriate distance in all directions around the primary defect and laterally outward around the flap utilizing iris scissors.
O-T Plasty Text: The defect edges were debeveled with a #15 scalpel blade.  Given the location of the defect, shape of the defect and the proximity to free margins an O-T plasty was deemed most appropriate.  Using a sterile surgical marker, an appropriate O-T plasty was drawn incorporating the defect and placing the expected incisions within the relaxed skin tension lines where possible.    The area thus outlined was incised deep to adipose tissue with a #15 scalpel blade.  The skin margins were undermined to an appropriate distance in all directions utilizing iris scissors.
O-Z Plasty Text: The defect edges were debeveled with a #15 scalpel blade.  Given the location of the defect, shape of the defect and the proximity to free margins an O-Z plasty (double transposition flap) was deemed most appropriate.  Using a sterile surgical marker, the appropriate transposition flaps were drawn incorporating the defect and placing the expected incisions within the relaxed skin tension lines where possible.    The area thus outlined was incised deep to adipose tissue with a #15 scalpel blade.  The skin margins were undermined to an appropriate distance in all directions utilizing iris scissors.  Hemostasis was achieved with electrocautery.  The flaps were then transposed into place, one clockwise and the other counterclockwise, and anchored with interrupted buried subcutaneous sutures.
Double O-Z Plasty Text: The defect edges were debeveled with a #15 scalpel blade. Given the location of the defect, shape of the defect and the proximity to free margins a Double O-Z plasty (double transposition flap) was deemed most appropriate. Using a sterile surgical marker, the appropriate transposition flaps were drawn incorporating the defect and placing the expected incisions within the relaxed skin tension lines where possible. The area thus outlined was incised deep to adipose tissue with a #15 scalpel blade. The skin margins were undermined to an appropriate distance in all directions utilizing iris scissors. Hemostasis was achieved with electrocautery. The flaps were then transposed and carried over into place, one clockwise and the other counterclockwise, and anchored with interrupted buried subcutaneous sutures.
V-Y Plasty Text: The defect edges were debeveled with a #15 scalpel blade.  Given the location of the defect, shape of the defect and the proximity to free margins an V-Y advancement flap was deemed most appropriate.  Using a sterile surgical marker, an appropriate advancement flap was drawn incorporating the defect and placing the expected incisions within the relaxed skin tension lines where possible.    The area thus outlined was incised deep to adipose tissue with a #15 scalpel blade.  The skin margins were undermined to an appropriate distance in all directions utilizing iris scissors.
H Plasty Text: Given the location of the defect, shape of the defect and the proximity to free margins a H-plasty was deemed most appropriate for repair.  Using a sterile surgical marker, the appropriate advancement arms of the H-plasty were drawn incorporating the defect and placing the expected incisions within the relaxed skin tension lines where possible. The area thus outlined was incised deep to adipose tissue with a #15 scalpel blade. The skin margins were undermined to an appropriate distance in all directions utilizing iris scissors.  The opposing advancement arms were then advanced into place in opposite direction and anchored with interrupted buried subcutaneous sutures.
W Plasty Text: The lesion was extirpated to the level of the fat with a #15 scalpel blade.  Given the location of the defect, shape of the defect and the proximity to free margins a W-plasty was deemed most appropriate for repair.  Using a sterile surgical marker, the appropriate transposition arms of the W-plasty were drawn incorporating the defect and placing the expected incisions within the relaxed skin tension lines where possible.    The area thus outlined was incised deep to adipose tissue with a #15 scalpel blade.  The skin margins were undermined to an appropriate distance in all directions utilizing iris scissors.  The opposing transposition arms were then transposed into place in opposite direction and anchored with interrupted buried subcutaneous sutures.
Z Plasty Text: The lesion was extirpated to the level of the fat with a #15 scalpel blade.  Given the location of the defect, shape of the defect and the proximity to free margins a Z-plasty was deemed most appropriate for repair.  Using a sterile surgical marker, the appropriate transposition arms of the Z-plasty were drawn incorporating the defect and placing the expected incisions within the relaxed skin tension lines where possible.    The area thus outlined was incised deep to adipose tissue with a #15 scalpel blade.  The skin margins were undermined to an appropriate distance in all directions utilizing iris scissors.  The opposing transposition arms were then transposed into place in opposite direction and anchored with interrupted buried subcutaneous sutures.
Double Z Plasty Text: The lesion was extirpated to the level of the fat with a #15 scalpel blade. Given the location of the defect, shape of the defect and the proximity to free margins a double Z-plasty was deemed most appropriate for repair. Using a sterile surgical marker, the appropriate transposition arms of the double Z-plasty were drawn incorporating the defect and placing the expected incisions within the relaxed skin tension lines where possible. The area thus outlined was incised deep to adipose tissue with a #15 scalpel blade. The skin margins were undermined to an appropriate distance in all directions utilizing iris scissors. The opposing transposition arms were then transposed and carried over into place in opposite direction and anchored with interrupted buried subcutaneous sutures.
Zygomaticofacial Flap Text: Given the location of the defect, shape of the defect and the proximity to free margins a zygomaticofacial flap was deemed most appropriate for repair. Using a sterile surgical marker, the appropriate flap was drawn incorporating the defect and placing the expected incisions within the relaxed skin tension lines where possible. The area thus outlined was incised deep to adipose tissue with a #15 scalpel blade with preservation of a vascular pedicle.  The skin margins were undermined to an appropriate distance in all directions utilizing iris scissors. The flap was then carried over into the defect and anchored with interrupted buried subcutaneous sutures.
Cheek Interpolation Flap Text: A decision was made to reconstruct the defect utilizing an interpolation axial flap and a staged reconstruction.  A telfa template was made of the defect.  This telfa template was then used to outline the Cheek Interpolation flap.  The donor area for the pedicle flap was then injected with anesthesia.  The flap was excised through the skin and subcutaneous tissue down to the layer of the underlying musculature.  The interpolation flap was carefully excised within this deep plane to maintain its blood supply.  The edges of the donor site were undermined.   The donor site was closed in a primary fashion.  The pedicle was then rotated into position and sutured.  Once the tube was sutured into place, adequate blood supply was confirmed with blanching and refill.  The pedicle was then wrapped with xeroform gauze and dressed appropriately with a telfa and gauze bandage to ensure continued blood supply and protect the attached pedicle.
Cheek-To-Nose Interpolation Flap Text: A decision was made to reconstruct the defect utilizing an interpolation axial flap and a staged reconstruction.  A telfa template was made of the defect.  This telfa template was then used to outline the Cheek-To-Nose Interpolation flap.  The donor area for the pedicle flap was then injected with anesthesia.  The flap was excised through the skin and subcutaneous tissue down to the layer of the underlying musculature.  The interpolation flap was carefully excised within this deep plane to maintain its blood supply.  The edges of the donor site were undermined.   The donor site was closed in a primary fashion.  The pedicle was then rotated into position and sutured.  Once the tube was sutured into place, adequate blood supply was confirmed with blanching and refill.  The pedicle was then wrapped with xeroform gauze and dressed appropriately with a telfa and gauze bandage to ensure continued blood supply and protect the attached pedicle.
Interpolation Flap Text: A decision was made to reconstruct the defect utilizing an interpolation axial flap and a staged reconstruction.  A telfa template was made of the defect.  This telfa template was then used to outline the interpolation flap.  The donor area for the pedicle flap was then injected with anesthesia.  The flap was excised through the skin and subcutaneous tissue down to the layer of the underlying musculature.  The interpolation flap was carefully excised within this deep plane to maintain its blood supply.  The edges of the donor site were undermined.   The donor site was closed in a primary fashion.  The pedicle was then rotated into position and sutured.  Once the tube was sutured into place, adequate blood supply was confirmed with blanching and refill.  The pedicle was then wrapped with xeroform gauze and dressed appropriately with a telfa and gauze bandage to ensure continued blood supply and protect the attached pedicle.
Melolabial Interpolation Flap Text: A decision was made to reconstruct the defect utilizing an interpolation axial flap and a staged reconstruction.  A telfa template was made of the defect.  This telfa template was then used to outline the melolabial interpolation flap.  The donor area for the pedicle flap was then injected with anesthesia.  The flap was excised through the skin and subcutaneous tissue down to the layer of the underlying musculature.  The pedicle flap was carefully excised within this deep plane to maintain its blood supply.  The edges of the donor site were undermined.   The donor site was closed in a primary fashion.  The pedicle was then rotated into position and sutured.  Once the tube was sutured into place, adequate blood supply was confirmed with blanching and refill.  The pedicle was then wrapped with xeroform gauze and dressed appropriately with a telfa and gauze bandage to ensure continued blood supply and protect the attached pedicle.
Mastoid Interpolation Flap Text: A decision was made to reconstruct the defect utilizing an interpolation axial flap and a staged reconstruction.  A telfa template was made of the defect.  This telfa template was then used to outline the mastoid interpolation flap.  The donor area for the pedicle flap was then injected with anesthesia.  The flap was excised through the skin and subcutaneous tissue down to the layer of the underlying musculature.  The pedicle flap was carefully excised within this deep plane to maintain its blood supply.  The edges of the donor site were undermined.   The donor site was closed in a primary fashion.  The pedicle was then rotated into position and sutured.  Once the tube was sutured into place, adequate blood supply was confirmed with blanching and refill.  The pedicle was then wrapped with xeroform gauze and dressed appropriately with a telfa and gauze bandage to ensure continued blood supply and protect the attached pedicle.
Posterior Auricular Interpolation Flap Text: A decision was made to reconstruct the defect utilizing an interpolation axial flap and a staged reconstruction.  A telfa template was made of the defect.  This telfa template was then used to outline the posterior auricular interpolation flap.  The donor area for the pedicle flap was then injected with anesthesia.  The flap was excised through the skin and subcutaneous tissue down to the layer of the underlying musculature.  The pedicle flap was carefully excised within this deep plane to maintain its blood supply.  The edges of the donor site were undermined.   The donor site was closed in a primary fashion.  The pedicle was then rotated into position and sutured.  Once the tube was sutured into place, adequate blood supply was confirmed with blanching and refill.  The pedicle was then wrapped with xeroform gauze and dressed appropriately with a telfa and gauze bandage to ensure continued blood supply and protect the attached pedicle.
Paramedian Forehead Flap Text: A decision was made to reconstruct the defect utilizing an interpolation axial flap and a staged reconstruction.  A telfa template was made of the defect.  This telfa template was then used to outline the paramedian forehead pedicle flap.  The donor area for the pedicle flap was then injected with anesthesia.  The flap was excised through the skin and subcutaneous tissue down to the layer of the underlying musculature.  The pedicle flap was carefully excised within this deep plane to maintain its blood supply.  The edges of the donor site were undermined.   The donor site was closed in a primary fashion.  The pedicle was then rotated into position and sutured.  Once the tube was sutured into place, adequate blood supply was confirmed with blanching and refill.  The pedicle was then wrapped with xeroform gauze and dressed appropriately with a telfa and gauze bandage to ensure continued blood supply and protect the attached pedicle.
Abbe Flap (Upper To Lower Lip) Text: The defect of the lower lip was assessed and measured.  Given the location and size of the defect, an Abbe flap was deemed most appropriate. Using a sterile surgical marker, an appropriate Abbe flap was measured and drawn on the upper lip. Local anesthesia was then infiltrated.  A scalpel was then used to incise the upper lip through and through the skin, vermilion, muscle and mucosa, leaving the flap pedicled on the opposite side.  The flap was then rotated and transferred to the lower lip defect.  The flap was then sutured into place with a three layer technique, closing the orbicularis oris muscle layer with subcutaneous buried sutures, followed by a mucosal layer and an epidermal layer.
Abbe Flap (Lower To Upper Lip) Text: The defect of the upper lip was assessed and measured.  Given the location and size of the defect, an Abbe flap was deemed most appropriate. Using a sterile surgical marker, an appropriate Abbe flap was measured and drawn on the lower lip. Local anesthesia was then infiltrated. A scalpel was then used to incise the upper lip through and through the skin, vermilion, muscle and mucosa, leaving the flap pedicled on the opposite side.  The flap was then rotated and transferred to the lower lip defect.  The flap was then sutured into place with a three layer technique, closing the orbicularis oris muscle layer with subcutaneous buried sutures, followed by a mucosal layer and an epidermal layer.
Estlander Flap (Upper To Lower Lip) Text: The defect of the lower lip was assessed and measured.  Given the location and size of the defect, an Estlander flap was deemed most appropriate. Using a sterile surgical marker, an appropriate Estlander flap was measured and drawn on the upper lip. Local anesthesia was then infiltrated. A scalpel was then used to incise the lateral aspect of the flap, through skin, muscle and mucosa, leaving the flap pedicled medially.  The flap was then rotated and positioned to fill the lower lip defect.  The flap was then sutured into place with a three layer technique, closing the orbicularis oris muscle layer with subcutaneous buried sutures, followed by a mucosal layer and an epidermal layer.
Lip Wedge Excision Repair Text: Given the location of the defect and the proximity to free margins a full thickness wedge repair was deemed most appropriate.  Using a sterile surgical marker, the appropriate repair was drawn incorporating the defect and placing the expected incisions perpendicular to the vermilion border.  The vermilion border was also meticulously outlined to ensure appropriate reapproximation during the repair.  The area thus outlined was incised through and through with a #15 scalpel blade.  The muscularis and dermis were reaproximated with deep sutures following hemostasis. Care was taken to realign the vermilion border before proceeding with the superficial closure.  Once the vermilion was realigned the superfical and mucosal closure was finished.
Ftsg Text: The defect edges were debeveled with a #15 scalpel blade.  Given the location of the defect, shape of the defect and the proximity to free margins a full thickness skin graft was deemed most appropriate.  Using a sterile surgical marker, the primary defect shape was transferred to the donor site. The area thus outlined was incised deep to adipose tissue with a #15 scalpel blade.  The harvested graft was then trimmed of adipose tissue until only dermis and epidermis was left.  The skin margins of the secondary defect were undermined to an appropriate distance in all directions utilizing iris scissors.  The secondary defect was closed with interrupted buried subcutaneous sutures.  The skin edges were then re-apposed with running  sutures.  The skin graft was then placed in the primary defect and oriented appropriately.
Split-Thickness Skin Graft Text: The defect edges were debeveled with a #15 scalpel blade.  Given the location of the defect, shape of the defect and the proximity to free margins a split thickness skin graft was deemed most appropriate.  Using a sterile surgical marker, the primary defect shape was transferred to the donor site. The split thickness graft was then harvested.  The skin graft was then placed in the primary defect and oriented appropriately.
Pinch Graft Text: The defect edges were debeveled with a #15 scalpel blade. Given the location of the defect, shape of the defect and the proximity to free margins a pinch graft was deemed most appropriate. Using a sterile surgical marker, the primary defect shape was transferred to the donor site. The area thus outlined was incised deep to adipose tissue with a #15 scalpel blade.  The harvested graft was then trimmed of adipose tissue until only dermis and epidermis was left. The skin graft was then placed in the primary defect and oriented appropriately.
Burow's Graft Text: The defect edges were debeveled with a #15 scalpel blade. Given the location of the defect, shape of the defect, the proximity to free margins and the presence of a standing cone deformity a Burow's skin graft was deemed most appropriate. The standing cone was removed and this tissue was then trimmed to the shape of the primary defect. The adipose tissue was also removed until only dermis and epidermis were left.  The skin graft was then placed in the primary defect and oriented appropriately.
Cartilage Graft Text: The defect edges were debeveled with a #15 scalpel blade.  Given the location of the defect, shape of the defect, the fact the defect involved a full thickness cartilage defect a cartilage graft was deemed most appropriate.  An appropriate donor site was identified, cleansed, and anesthetized. The cartilage graft was then harvested and transferred to the recipient site, oriented appropriately and then sutured into place.  The secondary defect was then repaired using a primary closure.
Composite Graft Text: The defect edges were debeveled with a #15 scalpel blade.  Given the location of the defect, shape of the defect, the proximity to free margins and the fact the defect was full thickness a composite graft was deemed most appropriate.  The defect was outline and then transferred to the donor site.  A full thickness graft was then excised from the donor site. The graft was then placed in the primary defect, oriented appropriately and then sutured into place.  The secondary defect was then repaired using a primary closure.
Epidermal Autograft Text: The defect edges were debeveled with a #15 scalpel blade.  Given the location of the defect, shape of the defect and the proximity to free margins an epidermal autograft was deemed most appropriate.  Using a sterile surgical marker, the primary defect shape was transferred to the donor site. The epidermal graft was then harvested.  The skin graft was then placed in the primary defect and oriented appropriately.
Dermal Autograft Text: The defect edges were debeveled with a #15 scalpel blade.  Given the location of the defect, shape of the defect and the proximity to free margins a dermal autograft was deemed most appropriate.  Using a sterile surgical marker, the primary defect shape was transferred to the donor site. The area thus outlined was incised deep to adipose tissue with a #15 scalpel blade.  The harvested graft was then trimmed of adipose and epidermal tissue until only dermis was left.  The skin graft was then placed in the primary defect and oriented appropriately.
Skin Substitute Text: The defect edges were debeveled with a #15 scalpel blade.  Given the location of the defect, shape of the defect and the proximity to free margins a skin substitute graft was deemed most appropriate.  The graft material was trimmed to fit the size of the defect. The graft was then placed in the primary defect and oriented appropriately.
Tissue Cultured Epidermal Autograft Text: The defect edges were debeveled with a #15 scalpel blade.  Given the location of the defect, shape of the defect and the proximity to free margins a tissue cultured epidermal autograft was deemed most appropriate.  The graft was then trimmed to fit the size of the defect.  The graft was then placed in the primary defect and oriented appropriately.
Xenograft Text: The defect edges were debeveled with a #15 scalpel blade.  Given the location of the defect, shape of the defect and the proximity to free margins a xenograft was deemed most appropriate.  The graft was then trimmed to fit the size of the defect.  The graft was then placed in the primary defect and oriented appropriately.
Purse String (Intermediate) Text: Given the location of the defect and the characteristics of the surrounding skin a purse string intermediate closure was deemed most appropriate.  Undermining was performed circumfirentially around the surgical defect.  A purse string suture was then placed and tightened.
Purse String (Simple) Text: Given the location of the defect and the characteristics of the surrounding skin a purse string simple closure was deemed most appropriate.  Undermining was performed circumferentially around the surgical defect.  A purse string suture was then placed and tightened.
Partial Purse String (Intermediate) Text: Given the location of the defect and the characteristics of the surrounding skin an intermediate purse string closure was deemed most appropriate.  Undermining was performed circumferentially around the surgical defect.  A purse string suture was then placed and tightened. Wound tension of the circular defect prevented complete closure of the wound.
Partial Purse String (Simple) Text: Given the location of the defect and the characteristics of the surrounding skin a simple purse string closure was deemed most appropriate.  Undermining was performed circumferentially around the surgical defect.  A purse string suture was then placed and tightened. Wound tension of the circular defect prevented complete closure of the wound.
Complex Repair And Single Advancement Flap Text: The defect edges were debeveled with a #15 scalpel blade.  The primary defect was closed partially with a complex linear closure.  Given the location of the remaining defect, shape of the defect and the proximity to free margins a single advancement flap was deemed most appropriate for complete closure of the defect.  Using a sterile surgical marker, an appropriate advancement flap was drawn incorporating the defect and placing the expected incisions within the relaxed skin tension lines where possible.    The area thus outlined was incised deep to adipose tissue with a #15 scalpel blade.  The skin margins were undermined to an appropriate distance in all directions utilizing iris scissors.
Complex Repair And Double Advancement Flap Text: The defect edges were debeveled with a #15 scalpel blade.  The primary defect was closed partially with a complex linear closure.  Given the location of the remaining defect, shape of the defect and the proximity to free margins a double advancement flap was deemed most appropriate for complete closure of the defect.  Using a sterile surgical marker, an appropriate advancement flap was drawn incorporating the defect and placing the expected incisions within the relaxed skin tension lines where possible.    The area thus outlined was incised deep to adipose tissue with a #15 scalpel blade.  The skin margins were undermined to an appropriate distance in all directions utilizing iris scissors.
Complex Repair And Modified Advancement Flap Text: The defect edges were debeveled with a #15 scalpel blade.  The primary defect was closed partially with a complex linear closure.  Given the location of the remaining defect, shape of the defect and the proximity to free margins a modified advancement flap was deemed most appropriate for complete closure of the defect.  Using a sterile surgical marker, an appropriate advancement flap was drawn incorporating the defect and placing the expected incisions within the relaxed skin tension lines where possible.    The area thus outlined was incised deep to adipose tissue with a #15 scalpel blade.  The skin margins were undermined to an appropriate distance in all directions utilizing iris scissors.
Complex Repair And A-T Advancement Flap Text: The defect edges were debeveled with a #15 scalpel blade.  The primary defect was closed partially with a complex linear closure.  Given the location of the remaining defect, shape of the defect and the proximity to free margins an A-T advancement flap was deemed most appropriate for complete closure of the defect.  Using a sterile surgical marker, an appropriate advancement flap was drawn incorporating the defect and placing the expected incisions within the relaxed skin tension lines where possible.    The area thus outlined was incised deep to adipose tissue with a #15 scalpel blade.  The skin margins were undermined to an appropriate distance in all directions utilizing iris scissors.
Complex Repair And O-T Advancement Flap Text: The defect edges were debeveled with a #15 scalpel blade.  The primary defect was closed partially with a complex linear closure.  Given the location of the remaining defect, shape of the defect and the proximity to free margins an O-T advancement flap was deemed most appropriate for complete closure of the defect.  Using a sterile surgical marker, an appropriate advancement flap was drawn incorporating the defect and placing the expected incisions within the relaxed skin tension lines where possible.    The area thus outlined was incised deep to adipose tissue with a #15 scalpel blade.  The skin margins were undermined to an appropriate distance in all directions utilizing iris scissors.
Complex Repair And O-L Flap Text: The defect edges were debeveled with a #15 scalpel blade.  The primary defect was closed partially with a complex linear closure.  Given the location of the remaining defect, shape of the defect and the proximity to free margins an O-L flap was deemed most appropriate for complete closure of the defect.  Using a sterile surgical marker, an appropriate flap was drawn incorporating the defect and placing the expected incisions within the relaxed skin tension lines where possible.    The area thus outlined was incised deep to adipose tissue with a #15 scalpel blade.  The skin margins were undermined to an appropriate distance in all directions utilizing iris scissors.
Complex Repair And Bilobe Flap Text: The defect edges were debeveled with a #15 scalpel blade.  The primary defect was closed partially with a complex linear closure.  Given the location of the remaining defect, shape of the defect and the proximity to free margins a bilobe flap was deemed most appropriate for complete closure of the defect.  Using a sterile surgical marker, an appropriate advancement flap was drawn incorporating the defect and placing the expected incisions within the relaxed skin tension lines where possible.    The area thus outlined was incised deep to adipose tissue with a #15 scalpel blade.  The skin margins were undermined to an appropriate distance in all directions utilizing iris scissors.
Complex Repair And Melolabial Flap Text: The defect edges were debeveled with a #15 scalpel blade.  The primary defect was closed partially with a complex linear closure.  Given the location of the remaining defect, shape of the defect and the proximity to free margins a melolabial flap was deemed most appropriate for complete closure of the defect.  Using a sterile surgical marker, an appropriate advancement flap was drawn incorporating the defect and placing the expected incisions within the relaxed skin tension lines where possible.    The area thus outlined was incised deep to adipose tissue with a #15 scalpel blade.  The skin margins were undermined to an appropriate distance in all directions utilizing iris scissors.
Complex Repair And Rotation Flap Text: The defect edges were debeveled with a #15 scalpel blade.  The primary defect was closed partially with a complex linear closure.  Given the location of the remaining defect, shape of the defect and the proximity to free margins a rotation flap was deemed most appropriate for complete closure of the defect.  Using a sterile surgical marker, an appropriate advancement flap was drawn incorporating the defect and placing the expected incisions within the relaxed skin tension lines where possible.    The area thus outlined was incised deep to adipose tissue with a #15 scalpel blade.  The skin margins were undermined to an appropriate distance in all directions utilizing iris scissors.
Complex Repair And Rhombic Flap Text: The defect edges were debeveled with a #15 scalpel blade.  The primary defect was closed partially with a complex linear closure.  Given the location of the remaining defect, shape of the defect and the proximity to free margins a rhombic flap was deemed most appropriate for complete closure of the defect.  Using a sterile surgical marker, an appropriate advancement flap was drawn incorporating the defect and placing the expected incisions within the relaxed skin tension lines where possible.    The area thus outlined was incised deep to adipose tissue with a #15 scalpel blade.  The skin margins were undermined to an appropriate distance in all directions utilizing iris scissors.
Complex Repair And Transposition Flap Text: The defect edges were debeveled with a #15 scalpel blade.  The primary defect was closed partially with a complex linear closure.  Given the location of the remaining defect, shape of the defect and the proximity to free margins a transposition flap was deemed most appropriate for complete closure of the defect.  Using a sterile surgical marker, an appropriate advancement flap was drawn incorporating the defect and placing the expected incisions within the relaxed skin tension lines where possible.    The area thus outlined was incised deep to adipose tissue with a #15 scalpel blade.  The skin margins were undermined to an appropriate distance in all directions utilizing iris scissors.
Complex Repair And V-Y Plasty Text: The defect edges were debeveled with a #15 scalpel blade.  The primary defect was closed partially with a complex linear closure.  Given the location of the remaining defect, shape of the defect and the proximity to free margins a V-Y plasty was deemed most appropriate for complete closure of the defect.  Using a sterile surgical marker, an appropriate advancement flap was drawn incorporating the defect and placing the expected incisions within the relaxed skin tension lines where possible.    The area thus outlined was incised deep to adipose tissue with a #15 scalpel blade.  The skin margins were undermined to an appropriate distance in all directions utilizing iris scissors.
Complex Repair And M Plasty Text: The defect edges were debeveled with a #15 scalpel blade.  The primary defect was closed partially with a complex linear closure.  Given the location of the remaining defect, shape of the defect and the proximity to free margins an M plasty was deemed most appropriate for complete closure of the defect.  Using a sterile surgical marker, an appropriate advancement flap was drawn incorporating the defect and placing the expected incisions within the relaxed skin tension lines where possible.    The area thus outlined was incised deep to adipose tissue with a #15 scalpel blade.  The skin margins were undermined to an appropriate distance in all directions utilizing iris scissors.
Complex Repair And Double M Plasty Text: The defect edges were debeveled with a #15 scalpel blade.  The primary defect was closed partially with a complex linear closure.  Given the location of the remaining defect, shape of the defect and the proximity to free margins a double M plasty was deemed most appropriate for complete closure of the defect.  Using a sterile surgical marker, an appropriate advancement flap was drawn incorporating the defect and placing the expected incisions within the relaxed skin tension lines where possible.    The area thus outlined was incised deep to adipose tissue with a #15 scalpel blade.  The skin margins were undermined to an appropriate distance in all directions utilizing iris scissors.
Complex Repair And W Plasty Text: The defect edges were debeveled with a #15 scalpel blade.  The primary defect was closed partially with a complex linear closure.  Given the location of the remaining defect, shape of the defect and the proximity to free margins a W plasty was deemed most appropriate for complete closure of the defect.  Using a sterile surgical marker, an appropriate advancement flap was drawn incorporating the defect and placing the expected incisions within the relaxed skin tension lines where possible.    The area thus outlined was incised deep to adipose tissue with a #15 scalpel blade.  The skin margins were undermined to an appropriate distance in all directions utilizing iris scissors.
Complex Repair And Z Plasty Text: The defect edges were debeveled with a #15 scalpel blade.  The primary defect was closed partially with a complex linear closure.  Given the location of the remaining defect, shape of the defect and the proximity to free margins a Z plasty was deemed most appropriate for complete closure of the defect.  Using a sterile surgical marker, an appropriate advancement flap was drawn incorporating the defect and placing the expected incisions within the relaxed skin tension lines where possible.    The area thus outlined was incised deep to adipose tissue with a #15 scalpel blade.  The skin margins were undermined to an appropriate distance in all directions utilizing iris scissors.
Complex Repair And Dorsal Nasal Flap Text: The defect edges were debeveled with a #15 scalpel blade.  The primary defect was closed partially with a complex linear closure.  Given the location of the remaining defect, shape of the defect and the proximity to free margins a dorsal nasal flap was deemed most appropriate for complete closure of the defect.  Using a sterile surgical marker, an appropriate flap was drawn incorporating the defect and placing the expected incisions within the relaxed skin tension lines where possible.    The area thus outlined was incised deep to adipose tissue with a #15 scalpel blade.  The skin margins were undermined to an appropriate distance in all directions utilizing iris scissors.
Complex Repair And Ftsg Text: The defect edges were debeveled with a #15 scalpel blade.  The primary defect was closed partially with a complex linear closure.  Given the location of the defect, shape of the defect and the proximity to free margins a full thickness skin graft was deemed most appropriate to repair the remaining defect.  The graft was trimmed to fit the size of the remaining defect.  The graft was then placed in the primary defect, oriented appropriately, and sutured into place.
Complex Repair And Burow's Graft Text: The defect edges were debeveled with a #15 scalpel blade.  The primary defect was closed partially with a complex linear closure.  Given the location of the defect, shape of the defect, the proximity to free margins and the presence of a standing cone deformity a Burow's graft was deemed most appropriate to repair the remaining defect.  The graft was trimmed to fit the size of the remaining defect.  The graft was then placed in the primary defect, oriented appropriately, and sutured into place.
Complex Repair And Split-Thickness Skin Graft Text: The defect edges were debeveled with a #15 scalpel blade.  The primary defect was closed partially with a complex linear closure.  Given the location of the defect, shape of the defect and the proximity to free margins a split thickness skin graft was deemed most appropriate to repair the remaining defect.  The graft was trimmed to fit the size of the remaining defect.  The graft was then placed in the primary defect, oriented appropriately, and sutured into place.
Complex Repair And Epidermal Autograft Text: The defect edges were debeveled with a #15 scalpel blade.  The primary defect was closed partially with a complex linear closure.  Given the location of the defect, shape of the defect and the proximity to free margins an epidermal autograft was deemed most appropriate to repair the remaining defect.  The graft was trimmed to fit the size of the remaining defect.  The graft was then placed in the primary defect, oriented appropriately, and sutured into place.
Complex Repair And Dermal Autograft Text: The defect edges were debeveled with a #15 scalpel blade.  The primary defect was closed partially with a complex linear closure.  Given the location of the defect, shape of the defect and the proximity to free margins an dermal autograft was deemed most appropriate to repair the remaining defect.  The graft was trimmed to fit the size of the remaining defect.  The graft was then placed in the primary defect, oriented appropriately, and sutured into place.
Complex Repair And Tissue Cultured Epidermal Autograft Text: The defect edges were debeveled with a #15 scalpel blade.  The primary defect was closed partially with a complex linear closure.  Given the location of the defect, shape of the defect and the proximity to free margins an tissue cultured epidermal autograft was deemed most appropriate to repair the remaining defect.  The graft was trimmed to fit the size of the remaining defect.  The graft was then placed in the primary defect, oriented appropriately, and sutured into place.
Complex Repair And Xenograft Text: The defect edges were debeveled with a #15 scalpel blade.  The primary defect was closed partially with a complex linear closure.  Given the location of the defect, shape of the defect and the proximity to free margins a xenograft was deemed most appropriate to repair the remaining defect.  The graft was trimmed to fit the size of the remaining defect.  The graft was then placed in the primary defect, oriented appropriately, and sutured into place.
Complex Repair And Skin Substitute Graft Text: The defect edges were debeveled with a #15 scalpel blade.  The primary defect was closed partially with a complex linear closure.  Given the location of the remaining defect, shape of the defect and the proximity to free margins a skin substitute graft was deemed most appropriate to repair the remaining defect.  The graft was trimmed to fit the size of the remaining defect.  The graft was then placed in the primary defect, oriented appropriately, and sutured into place.
Include Anticoagulation In Mohs Note?: Please Select the Appropriate Response
Path Notes (To The Dermatopathologist): Please check margins.
Consent was obtained from the patient. The risks and benefits to therapy were discussed in detail. Specifically, the risks of infection, scarring, bleeding, prolonged wound healing, incomplete removal, allergy to anesthesia, nerve injury and recurrence were addressed. Prior to the procedure, the treatment site was clearly identified and confirmed by the patient. All components of Universal Protocol/PAUSE Rule completed.
Post-Care Instructions: I reviewed with the patient in detail post-care instructions:\\n1. Apply bacitracin over the incision line\\n2. Fold non-stick pad (Telfa) in half to cover the site\\n3. Apply tape (hypafix) over the non-stick pad\\n4. Change once per day for 7 days\\n5. Shower with bandage on, change bandage after shower\\n\\nPatient is not to engage in any heavy lifting, exercise, hot tub, or swimming for the next 14 days. Should the patient develop any fevers, chills, bleeding, severe pain patient will contact the office immediately.
Home Suture Removal Text: Patient was provided a home suture removal kit and will remove their sutures at home.  If they have any questions or difficulties they will call the office.
Where Do You Want The Question To Include Opioid Counseling Located?: Case Summary Tab
Information: Selecting Yes will display possible errors in your note based on the variables you have selected. This validation is only offered as a suggestion for you. PLEASE NOTE THAT THE VALIDATION TEXT WILL BE REMOVED WHEN YOU FINALIZE YOUR NOTE. IF YOU WANT TO FAX A PRELIMINARY NOTE YOU WILL NEED TO TOGGLE THIS TO 'NO' IF YOU DO NOT WANT IT IN YOUR FAXED NOTE.

## 2025-01-28 DIAGNOSIS — E78.2 MIXED HYPERLIPIDEMIA: ICD-10-CM

## 2025-01-29 RX ORDER — ATORVASTATIN CALCIUM 20 MG/1
20 TABLET, FILM COATED ORAL DAILY
Qty: 40 TABLET | OUTPATIENT
Start: 2025-01-29

## 2025-01-30 DIAGNOSIS — E78.2 MIXED HYPERLIPIDEMIA: ICD-10-CM

## 2025-01-31 ENCOUNTER — HOSPITAL ENCOUNTER (EMERGENCY)
Facility: MEDICAL CENTER | Age: 80
End: 2025-01-31
Attending: EMERGENCY MEDICINE
Payer: MEDICARE

## 2025-01-31 VITALS
SYSTOLIC BLOOD PRESSURE: 157 MMHG | HEIGHT: 71 IN | BODY MASS INDEX: 22.04 KG/M2 | WEIGHT: 157.41 LBS | OXYGEN SATURATION: 94 % | DIASTOLIC BLOOD PRESSURE: 60 MMHG | HEART RATE: 92 BPM | TEMPERATURE: 99.2 F | RESPIRATION RATE: 18 BRPM

## 2025-01-31 DIAGNOSIS — H65.03 BILATERAL ACUTE SEROUS OTITIS MEDIA, RECURRENCE NOT SPECIFIED: ICD-10-CM

## 2025-01-31 DIAGNOSIS — J34.89 SINUS PAIN: ICD-10-CM

## 2025-01-31 DIAGNOSIS — J01.00 ACUTE MAXILLARY SINUSITIS, RECURRENCE NOT SPECIFIED: ICD-10-CM

## 2025-01-31 LAB
FLUAV RNA SPEC QL NAA+PROBE: NEGATIVE
FLUBV RNA SPEC QL NAA+PROBE: NEGATIVE
RSV RNA SPEC QL NAA+PROBE: NEGATIVE
SARS-COV-2 RNA RESP QL NAA+PROBE: NOTDETECTED
SPECIMEN SOURCE: NORMAL

## 2025-01-31 PROCEDURE — A9270 NON-COVERED ITEM OR SERVICE: HCPCS | Performed by: EMERGENCY MEDICINE

## 2025-01-31 PROCEDURE — 0241U HCHG SARS-COV-2 COVID-19 NFCT DS RESP RNA 4 TRGT MIC: CPT

## 2025-01-31 PROCEDURE — 96372 THER/PROPH/DIAG INJ SC/IM: CPT

## 2025-01-31 PROCEDURE — 700102 HCHG RX REV CODE 250 W/ 637 OVERRIDE(OP): Performed by: EMERGENCY MEDICINE

## 2025-01-31 PROCEDURE — 99284 EMERGENCY DEPT VISIT MOD MDM: CPT

## 2025-01-31 PROCEDURE — 700111 HCHG RX REV CODE 636 W/ 250 OVERRIDE (IP): Mod: JZ | Performed by: EMERGENCY MEDICINE

## 2025-01-31 RX ORDER — ATORVASTATIN CALCIUM 20 MG/1
20 TABLET, FILM COATED ORAL DAILY
Qty: 90 TABLET | Refills: 0 | Status: SHIPPED
Start: 2025-01-31

## 2025-01-31 RX ORDER — KETOROLAC TROMETHAMINE 15 MG/ML
15 INJECTION, SOLUTION INTRAMUSCULAR; INTRAVENOUS ONCE
Status: COMPLETED | OUTPATIENT
Start: 2025-01-31 | End: 2025-01-31

## 2025-01-31 RX ORDER — KETOROLAC TROMETHAMINE 10 MG/1
10 TABLET, FILM COATED ORAL 3 TIMES DAILY PRN
Qty: 15 TABLET | Refills: 0 | Status: SHIPPED | OUTPATIENT
Start: 2025-01-31

## 2025-01-31 RX ADMIN — AMOXICILLIN AND CLAVULANATE POTASSIUM 1 TABLET: 875; 125 TABLET, FILM COATED ORAL at 05:21

## 2025-01-31 RX ADMIN — KETOROLAC TROMETHAMINE 15 MG: 15 INJECTION, SOLUTION INTRAMUSCULAR; INTRAVENOUS at 05:22

## 2025-01-31 ASSESSMENT — FIBROSIS 4 INDEX: FIB4 SCORE: 2.35

## 2025-01-31 NOTE — ED NOTES
Pt resting in bed with equal chest rise and fall observed. No pt needs at this time. No s/s of distress noted.

## 2025-01-31 NOTE — DISCHARGE INSTRUCTIONS
Apply warm moist compresses to your sinus area and do gentle massages to help with the pressure and pain.  No dairy products for the next 2 to 3 days.  Warm salt water gargles 3-4 times daily.  Take the antibiotics until completely gone with food.  Do some normal saline rinses to your sinuses or get a Keiry pot with distilled water and flush your sinuses.  Tylenol as needed for pain or fever greater than 101  Follow-up with your primary care provider in 1 week for recheck and return if any problems or worsening.  Take over-the-counter decongestants to get the fluid out from behind your eardrums.

## 2025-01-31 NOTE — TELEPHONE ENCOUNTER
Is the patient due for a refill? Yes    Was the patient seen the last 15 months? No    Date of last office visit: 08.21.2023    Does the patient have an upcoming appointment?  No    Provider to refill:AB    Does the patient have correction Plus and need 100-day supply? (This applies to ALL medications) Patient does not have SCP

## 2025-01-31 NOTE — ED TRIAGE NOTES
"Chief Complaint   Patient presents with    Sinus Pain     Pt c/o of sinus pain and a headache for a few hours. Pt states he has a history of sinus infections.     BP (!) 157/60   Pulse 92   Temp 37.3 °C (99.2 °F) (Temporal)   Resp 18   Ht 1.803 m (5' 11\")   Wt 71.4 kg (157 lb 6.5 oz)   SpO2 94%   BMI 21.95 kg/m²     "

## 2025-01-31 NOTE — ED PROVIDER NOTES
ED Provider Note    CHIEF COMPLAINT  Chief Complaint   Patient presents with    Sinus Pain     Pt c/o of sinus pain and a headache for a few hours. Pt states he has a history of sinus infections.       EXTERNAL RECORDS REVIEWED  No pertinent medical records    HPI/ROS  LIMITATION TO HISTORY   Select: : None  OUTSIDE HISTORIAN(S):  None    Bartolo Berrios [facility] is a 79 y.o. male who presents tonight with a chief complaint of sinus pressure and pain, headache and bilateral ear pain that has been present for the last several days.  He denies any fever, chills, nausea, vomiting or diarrhea.  He said no productive cough.    PAST MEDICAL HISTORY   has a past medical history of Adrenal insufficiency ([facility]), secondary (11/25/2020), Arrhythmia, Arthritis, Atherosclerosis of both carotid arteries, mild (11/25/2020), BPH (benign prostatic hyperplasia) (11/25/2020), Bronchitis, Cancer ([facility]), Carcinoma in situ of respiratory system, CKD (chronic kidney disease) stage 3, GFR 30-59 ml/min (11/25/2020), Colostomy present ([facility]) (11/25/2020), Diverticulosis of colon (11/25/2020), GERD (gastroesophageal reflux disease) (11/25/2020), High cholesterol, History of atrial tachycardia (11/25/2020), History of malignant melanoma, April 2016 (11/25/2020), History of shingles (11/25/2020), History of stroke, subcortical infarct on MRI April 2019 (11/25/2020), Hyperlipidemia (11/25/2020), Hypertension, Hypothyroid (11/25/2020), Indigestion, Lung cancer ([facility]), adenocarcinoma Aug. 2019 (11/25/2020), and Pain.    SURGICAL HISTORY   has a past surgical history that includes other (Left, 2016); other (Left, 2017); other (Right, 2019); and reid by laparoscopy (2/26/2021).    FAMILY HISTORY  Family History   Problem Relation Age of Onset    Cancer Mother         Lung- bone    Cancer Father         Colon?    Cancer Sister         Lung    Cancer Brother         Lung    Cancer Brother         Brain       SOCIAL HISTORY  Social History     Tobacco  "Use    Smoking status: Never    Smokeless tobacco: Never   Vaping Use    Vaping status: Never Used   Substance and Sexual Activity    Alcohol use: Not Currently     Alcohol/week: 1.8 oz     Types: 3 Glasses of wine per week     Comment: Occasionally    Drug use: Not Currently     Comment: THC edibles    Sexual activity: Not on file       CURRENT MEDICATIONS  Home Medications       Reviewed by Harinder Bronson R.N. (Registered Nurse) on 01/31/25 at 0457  Med List Status: Not Addressed     Medication Last Dose Status   amLODIPine (NORVASC) 5 MG Tab  Active   atorvastatin (LIPITOR) 20 MG Tab  Active   BISACODYL PO  Active   cholecalciferol (D-3-5) 5000 UNIT Cap  Active   Cyanocobalamin (B-12) 1000 MCG SL Tab  Active   dicyclomine (BENTYL) 20 MG Tab  Active   famotidine (PEPCID) 20 MG Tab  Active   liothyronine (CYTOMEL) 5 MCG Tab  Active   losartan (COZAAR) 100 MG Tab  Active   magnesium oxide (MAG-OX) 400 MG Tab tablet  Active   NEEDLE, DISP, 25 G (BD DISP NEEDLES) 25G X 5/8\" Misc  Active   omeprazole (PRILOSEC) 40 MG delayed-release capsule  Active   ondansetron (ZOFRAN ODT) 4 MG TABLET DISPERSIBLE  Active   potassium chloride (KLOR-CON) 8 MEQ tablet  Active   silodosin (RAPAFLO) 8 MG Cap capsule  Active   tadalafil (CIALIS) 20 MG tablet  Active   TAGRISSO 80 MG Tab  Active   tamsulosin (FLOMAX) 0.4 MG capsule  Active   testosterone cypionate (DEPO-TESTOSTERONE) 200 MG/ML Solution injection  Active   TIROSINT 125 MCG Cap  Active                    ALLERGIES  Allergies   Allergen Reactions    Gramineae Pollens Itching and Shortness of Breath     Itchy eyes, red face    Cat Hair Extract Hives and Itching    Horse Allergy Hives    Other Environmental     Pollen Extract Runny Nose and Itching     .       PHYSICAL EXAM  VITAL SIGNS: BP (!) 157/60   Pulse 92   Temp 37.3 °C (99.2 °F) (Temporal)   Resp 18   Ht 1.803 m (5' 11\")   Wt 71.4 kg (157 lb 6.5 oz)   SpO2 94%   BMI 21.95 kg/m²    Constitutional: Patient is well " developed, well nourished. Non-toxic appearing. No acute distress.   HENT: Normocephalic, bilateral tympanic membranes are erythematous with serous fluid, nares reveal increased turbinate size and patient has moderate tenderness upon palpation of his frontal and maxillary sinuses.  Posterior pharynx reveals drainage from the nasopharynx.  Eyes: PERRL, EOMI, Conjunctiva without erythema or exudates.   Neck: Supple   Lymphatic: No lymphadenopathy noted.   Cardiovascular: Normal heart rate and Regular rhythm. No murmur  Thorax & Lungs: Clear and equal breath sounds with good excursion. No respiratory distress, no rhonchi or wheezing.  Abdomen: Bowel sounds normal in all four quadrants. Soft,nontender  Skin: Warm, Dry  Extremities: Peripheral pulses 4/4   Musculoskeletal: Normal range of motion in all major joints.   Neurologic: Alert & oriented x 3, Normal motor function, Normal sensory function, very hard of hearing.  Psychiatric: Affect normal, Judgment normal, Mood normal.      EKG/LABS  Results for orders placed or performed during the hospital encounter of 01/31/25   CoV-2, Flu A/B, And RSV by PCR ([employer])    Collection Time: 01/31/25  4:57 AM    Specimen: Respirate   Result Value Ref Range    Influenza virus A RNA Negative Negative    Influenza virus B, PCR Negative Negative    RSV, PCR Negative Negative    SARS-CoV-2 by PCR NotDetected     SARS-CoV-2 Source Nasal Swab      *Note: Due to a large number of results and/or encounters for the requested time period, some results have not been displayed. A complete set of results can be found in Results Review.          COURSE & MEDICAL DECISION MAKING    ASSESSMENT, COURSE AND PLAN  Care Narrative: Viral swab was obtained and was found to be negative for COVID, RSV and influenza.  He will be treated with Augmentin and Toradol for his sinus pain and infection.  He is to do [place] pot, normal saline flushes, increase fluids with no dairy products and follow-up with his  primary care doctor in 1 week for recheck.  He is discharged in stable and improved condition            DISPOSITION AND DISCUSSIONS  I have discussed management of the patient with the following physicians and GERA's: None    Discussion of management with other [facility] [place] or appropriate source(s): None     Barriers to care at this time, including but not limited to: None.     Decision tools and prescription drugs considered including, but not limited to: Antibiotics Augmentin and Toradol. .    FINAL DIAGNOSIS  1. Sinus pain    2. Bilateral acute serous otitis media, recurrence not specified    3. Acute maxillary sinusitis, recurrence not specified         Electronically signed by: Judy Mayer D.O., 1/31/2025 6:38 AM

## 2025-02-05 ENCOUNTER — HOSPITAL ENCOUNTER (OUTPATIENT)
Dept: RADIOLOGY | Facility: MEDICAL CENTER | Age: 80
End: 2025-02-05
Attending: INTERNAL MEDICINE
Payer: MEDICARE

## 2025-02-05 DIAGNOSIS — H66.90: ICD-10-CM

## 2025-02-05 DIAGNOSIS — B49 FUNGAL SINUSITIS: ICD-10-CM

## 2025-02-05 DIAGNOSIS — B96.5: ICD-10-CM

## 2025-02-05 DIAGNOSIS — J32.4 CHRONIC PANSINUSITIS: ICD-10-CM

## 2025-02-05 DIAGNOSIS — J32.9 FUNGAL SINUSITIS: ICD-10-CM

## 2025-02-05 PROCEDURE — 36573 INSJ PICC RS&I 5 YR+: CPT

## 2025-02-05 NOTE — PROGRESS NOTES
Vascular Access Team     Date of Insertion: 2/5/25  Arm Circumference: 27 cm  Internal length: 44cm  External Length: 0cm  Vein Occupancy %: 33%   Reason for PICC: abx  Labs: WBC 8.0, , BUN 31, Cr 1.71, GFR 41     Consents confirmed, vessel patency confirmed with ultrasound. Risks and benefits of procedure explained to patient and education regarding central line associated bloodstream infections provided. Questions answered.      PICC placed in [facility] per licensed provider order with ultrasound guidance.  4 Fr, single lumen PICC placed in cephalic vein after 1 attempt(s). 0 mL of 1% lidocaine injected intradermally at the insertion site. A 21 gauge microintroducer needle was visualized entering the vein and modified Seldinger technique was used to obtain access to the vein. 44 cm catheter inserted and brisk blood return was observed from each lumen upon aspiration. Line secured at the 0 cm marker. TCS stylet removed and observed to be fully intact. Each lumen flushed using pulsatile method without resistance with 10 mL 0.9% normal saline. PICC line secured with Biopatch and Tegaderm.     PICC tip placement location is confirmed by nurse to be in the Superior Vena Cava (SVC) utilizing 3CG technology. PICC line is appropriate for use at this time. Patient tolerated procedure well, without complications.  Patient condition relayed to primary RN or ordering physician via this post procedure note in the EMR.      Ultrasound images uploaded to PACS and viewable in the EMR - yes  Ultrasound imaged printed and placed in paper chart - no     BARD Power PICC ref # [id number], Lot # [id number], Expiration Date 12/31/25

## 2025-02-08 ENCOUNTER — HOSPITAL ENCOUNTER (OUTPATIENT)
Dept: RADIOLOGY | Facility: MEDICAL CENTER | Age: 80
End: 2025-02-08
Attending: INTERNAL MEDICINE
Payer: MEDICARE

## 2025-02-08 DIAGNOSIS — C43.72 MALIGNANT MELANOMA OF LEFT LOWER EXTREMITY INCLUDING HIP (HCC): ICD-10-CM

## 2025-02-08 DIAGNOSIS — C34.11 MALIGNANT NEOPLASM OF UPPER LOBE OF RIGHT LUNG (HCC): ICD-10-CM

## 2025-02-08 DIAGNOSIS — E83.59 TUMORAL CALCINOSIS: ICD-10-CM

## 2025-02-08 PROCEDURE — A9579 GAD-BASE MR CONTRAST NOS,1ML: HCPCS | Mod: JZ

## 2025-02-08 PROCEDURE — 70553 MRI BRAIN STEM W/O & W/DYE: CPT

## 2025-02-08 PROCEDURE — 700117 HCHG RX CONTRAST REV CODE 255: Mod: JZ

## 2025-02-08 RX ADMIN — GADOTERIDOL 15 ML: 279.3 INJECTION, SOLUTION INTRAVENOUS at 11:28

## 2025-02-10 ENCOUNTER — HOSPITAL ENCOUNTER (OUTPATIENT)
Facility: MEDICAL CENTER | Age: 80
End: 2025-02-10
Attending: INTERNAL MEDICINE
Payer: MEDICARE

## 2025-02-10 PROCEDURE — 86140 C-REACTIVE PROTEIN: CPT

## 2025-02-10 PROCEDURE — 80053 COMPREHEN METABOLIC PANEL: CPT

## 2025-02-10 PROCEDURE — 85652 RBC SED RATE AUTOMATED: CPT

## 2025-02-10 PROCEDURE — 82550 ASSAY OF CK (CPK): CPT

## 2025-02-10 PROCEDURE — 85025 COMPLETE CBC W/AUTO DIFF WBC: CPT

## 2025-02-11 LAB
ALBUMIN SERPL BCP-MCNC: 3.6 G/DL (ref 3.2–4.9)
ALBUMIN/GLOB SERPL: 0.9 G/DL
ALP SERPL-CCNC: 61 U/L (ref 30–99)
ALT SERPL-CCNC: 15 U/L (ref 2–50)
ANION GAP SERPL CALC-SCNC: 7 MMOL/L (ref 7–16)
AST SERPL-CCNC: 21 U/L (ref 12–45)
BASOPHILS # BLD AUTO: 0.4 % (ref 0–1.8)
BASOPHILS # BLD: 0.04 K/UL (ref 0–0.12)
BILIRUB SERPL-MCNC: 0.7 MG/DL (ref 0.1–1.5)
BUN SERPL-MCNC: 23 MG/DL (ref 8–22)
CALCIUM ALBUM COR SERPL-MCNC: 9 MG/DL (ref 8.5–10.5)
CALCIUM SERPL-MCNC: 8.7 MG/DL (ref 8.5–10.5)
CHLORIDE SERPL-SCNC: 106 MMOL/L (ref 96–112)
CK SERPL-CCNC: 55 U/L (ref 0–154)
CO2 SERPL-SCNC: 25 MMOL/L (ref 20–33)
CREAT SERPL-MCNC: 1.52 MG/DL (ref 0.5–1.4)
CRP SERPL HS-MCNC: <0.3 MG/DL (ref 0–0.75)
EOSINOPHIL # BLD AUTO: 0.07 K/UL (ref 0–0.51)
EOSINOPHIL NFR BLD: 0.7 % (ref 0–6.9)
ERYTHROCYTE [DISTWIDTH] IN BLOOD BY AUTOMATED COUNT: 61.8 FL (ref 35.9–50)
ERYTHROCYTE [SEDIMENTATION RATE] IN BLOOD BY WESTERGREN METHOD: 24 MM/HOUR (ref 0–20)
GFR SERPLBLD CREATININE-BSD FMLA CKD-EPI: 47 ML/MIN/1.73 M 2
GLOBULIN SER CALC-MCNC: 4.2 G/DL (ref 1.9–3.5)
GLUCOSE SERPL-MCNC: 105 MG/DL (ref 65–99)
HCT VFR BLD AUTO: 38.5 % (ref 42–52)
HGB BLD-MCNC: 12.4 G/DL (ref 14–18)
IMM GRANULOCYTES # BLD AUTO: 0.12 K/UL (ref 0–0.11)
IMM GRANULOCYTES NFR BLD AUTO: 1.2 % (ref 0–0.9)
LYMPHOCYTES # BLD AUTO: 0.35 K/UL (ref 1–4.8)
LYMPHOCYTES NFR BLD: 3.5 % (ref 22–41)
MCH RBC QN AUTO: 30.5 PG (ref 27–33)
MCHC RBC AUTO-ENTMCNC: 32.2 G/DL (ref 32.3–36.5)
MCV RBC AUTO: 94.8 FL (ref 81.4–97.8)
MONOCYTES # BLD AUTO: 0.36 K/UL (ref 0–0.85)
MONOCYTES NFR BLD AUTO: 3.6 % (ref 0–13.4)
NEUTROPHILS # BLD AUTO: 9.18 K/UL (ref 1.82–7.42)
NEUTROPHILS NFR BLD: 90.6 % (ref 44–72)
NRBC # BLD AUTO: 0 K/UL
NRBC BLD-RTO: 0 /100 WBC (ref 0–0.2)
PLATELET # BLD AUTO: 345 K/UL (ref 164–446)
PMV BLD AUTO: 9.6 FL (ref 9–12.9)
POTASSIUM SERPL-SCNC: 4.1 MMOL/L (ref 3.6–5.5)
PROT SERPL-MCNC: 7.8 G/DL (ref 6–8.2)
RBC # BLD AUTO: 4.06 M/UL (ref 4.7–6.1)
SODIUM SERPL-SCNC: 138 MMOL/L (ref 135–145)
WBC # BLD AUTO: 10.1 K/UL (ref 4.8–10.8)

## 2025-02-18 ENCOUNTER — HOSPITAL ENCOUNTER (OUTPATIENT)
Facility: MEDICAL CENTER | Age: 80
End: 2025-02-18
Attending: INTERNAL MEDICINE
Payer: MEDICARE

## 2025-02-18 PROCEDURE — 85652 RBC SED RATE AUTOMATED: CPT

## 2025-02-18 PROCEDURE — 85025 COMPLETE CBC W/AUTO DIFF WBC: CPT

## 2025-02-18 PROCEDURE — 80053 COMPREHEN METABOLIC PANEL: CPT

## 2025-02-18 PROCEDURE — 82550 ASSAY OF CK (CPK): CPT

## 2025-02-18 PROCEDURE — 86140 C-REACTIVE PROTEIN: CPT

## 2025-02-19 LAB
ALBUMIN SERPL BCP-MCNC: 3.4 G/DL (ref 3.2–4.9)
ALBUMIN/GLOB SERPL: 0.9 G/DL
ALP SERPL-CCNC: 57 U/L (ref 30–99)
ALT SERPL-CCNC: 13 U/L (ref 2–50)
ANION GAP SERPL CALC-SCNC: 10 MMOL/L (ref 7–16)
AST SERPL-CCNC: 19 U/L (ref 12–45)
BASOPHILS # BLD AUTO: 0.2 % (ref 0–1.8)
BASOPHILS # BLD: 0.02 K/UL (ref 0–0.12)
BILIRUB SERPL-MCNC: 1.2 MG/DL (ref 0.1–1.5)
BUN SERPL-MCNC: 36 MG/DL (ref 8–22)
CALCIUM ALBUM COR SERPL-MCNC: 9.3 MG/DL (ref 8.5–10.5)
CALCIUM SERPL-MCNC: 8.8 MG/DL (ref 8.5–10.5)
CHLORIDE SERPL-SCNC: 105 MMOL/L (ref 96–112)
CK SERPL-CCNC: 65 U/L (ref 0–154)
CO2 SERPL-SCNC: 24 MMOL/L (ref 20–33)
CREAT SERPL-MCNC: 1.65 MG/DL (ref 0.5–1.4)
CRP SERPL HS-MCNC: <0.3 MG/DL (ref 0–0.75)
EOSINOPHIL # BLD AUTO: 0 K/UL (ref 0–0.51)
EOSINOPHIL NFR BLD: 0 % (ref 0–6.9)
ERYTHROCYTE [DISTWIDTH] IN BLOOD BY AUTOMATED COUNT: 62.5 FL (ref 35.9–50)
ERYTHROCYTE [SEDIMENTATION RATE] IN BLOOD BY WESTERGREN METHOD: 4 MM/HOUR (ref 0–20)
GFR SERPLBLD CREATININE-BSD FMLA CKD-EPI: 42 ML/MIN/1.73 M 2
GLOBULIN SER CALC-MCNC: 3.6 G/DL (ref 1.9–3.5)
GLUCOSE SERPL-MCNC: 176 MG/DL (ref 65–99)
HCT VFR BLD AUTO: 40.1 % (ref 42–52)
HGB BLD-MCNC: 12.7 G/DL (ref 14–18)
IMM GRANULOCYTES # BLD AUTO: 0.08 K/UL (ref 0–0.11)
IMM GRANULOCYTES NFR BLD AUTO: 0.8 % (ref 0–0.9)
LYMPHOCYTES # BLD AUTO: 0.45 K/UL (ref 1–4.8)
LYMPHOCYTES NFR BLD: 4.4 % (ref 22–41)
MCH RBC QN AUTO: 29.8 PG (ref 27–33)
MCHC RBC AUTO-ENTMCNC: 31.7 G/DL (ref 32.3–36.5)
MCV RBC AUTO: 94.1 FL (ref 81.4–97.8)
MONOCYTES # BLD AUTO: 0.74 K/UL (ref 0–0.85)
MONOCYTES NFR BLD AUTO: 7.3 % (ref 0–13.4)
NEUTROPHILS # BLD AUTO: 8.85 K/UL (ref 1.82–7.42)
NEUTROPHILS NFR BLD: 87.3 % (ref 44–72)
NRBC # BLD AUTO: 0 K/UL
NRBC BLD-RTO: 0 /100 WBC (ref 0–0.2)
PLATELET # BLD AUTO: 229 K/UL (ref 164–446)
PMV BLD AUTO: 9.6 FL (ref 9–12.9)
POTASSIUM SERPL-SCNC: 3.8 MMOL/L (ref 3.6–5.5)
PROT SERPL-MCNC: 7 G/DL (ref 6–8.2)
RBC # BLD AUTO: 4.26 M/UL (ref 4.7–6.1)
SODIUM SERPL-SCNC: 139 MMOL/L (ref 135–145)
WBC # BLD AUTO: 10.1 K/UL (ref 4.8–10.8)

## 2025-02-24 ENCOUNTER — HOSPITAL ENCOUNTER (OUTPATIENT)
Facility: MEDICAL CENTER | Age: 80
End: 2025-02-24
Attending: INTERNAL MEDICINE
Payer: MEDICARE

## 2025-02-24 LAB
BASOPHILS # BLD AUTO: 0.2 % (ref 0–1.8)
BASOPHILS # BLD: 0.02 K/UL (ref 0–0.12)
EOSINOPHIL # BLD AUTO: 0.02 K/UL (ref 0–0.51)
EOSINOPHIL NFR BLD: 0.2 % (ref 0–6.9)
ERYTHROCYTE [DISTWIDTH] IN BLOOD BY AUTOMATED COUNT: 61.5 FL (ref 35.9–50)
HCT VFR BLD AUTO: 41.2 % (ref 42–52)
HGB BLD-MCNC: 13.1 G/DL (ref 14–18)
IMM GRANULOCYTES # BLD AUTO: 0.16 K/UL (ref 0–0.11)
IMM GRANULOCYTES NFR BLD AUTO: 1.2 % (ref 0–0.9)
LYMPHOCYTES # BLD AUTO: 0.3 K/UL (ref 1–4.8)
LYMPHOCYTES NFR BLD: 2.3 % (ref 22–41)
MCH RBC QN AUTO: 29.8 PG (ref 27–33)
MCHC RBC AUTO-ENTMCNC: 31.8 G/DL (ref 32.3–36.5)
MCV RBC AUTO: 93.6 FL (ref 81.4–97.8)
MONOCYTES # BLD AUTO: 0.72 K/UL (ref 0–0.85)
MONOCYTES NFR BLD AUTO: 5.4 % (ref 0–13.4)
NEUTROPHILS # BLD AUTO: 12.07 K/UL (ref 1.82–7.42)
NEUTROPHILS NFR BLD: 90.7 % (ref 44–72)
NRBC # BLD AUTO: 0 K/UL
NRBC BLD-RTO: 0 /100 WBC (ref 0–0.2)
PLATELET # BLD AUTO: 119 K/UL (ref 164–446)
PMV BLD AUTO: 10.3 FL (ref 9–12.9)
RBC # BLD AUTO: 4.4 M/UL (ref 4.7–6.1)
WBC # BLD AUTO: 13.3 K/UL (ref 4.8–10.8)

## 2025-02-24 PROCEDURE — 82550 ASSAY OF CK (CPK): CPT

## 2025-02-24 PROCEDURE — 86140 C-REACTIVE PROTEIN: CPT

## 2025-02-24 PROCEDURE — 85025 COMPLETE CBC W/AUTO DIFF WBC: CPT

## 2025-02-24 PROCEDURE — 80053 COMPREHEN METABOLIC PANEL: CPT

## 2025-02-24 PROCEDURE — 85652 RBC SED RATE AUTOMATED: CPT

## 2025-02-25 LAB
ALBUMIN SERPL BCP-MCNC: 3.3 G/DL (ref 3.2–4.9)
ALBUMIN/GLOB SERPL: 1 G/DL
ALP SERPL-CCNC: 62 U/L (ref 30–99)
ALT SERPL-CCNC: 19 U/L (ref 2–50)
ANION GAP SERPL CALC-SCNC: 8 MMOL/L (ref 7–16)
AST SERPL-CCNC: 24 U/L (ref 12–45)
BILIRUB SERPL-MCNC: 1.1 MG/DL (ref 0.1–1.5)
BUN SERPL-MCNC: 35 MG/DL (ref 8–22)
CALCIUM ALBUM COR SERPL-MCNC: 9.5 MG/DL (ref 8.5–10.5)
CALCIUM SERPL-MCNC: 8.9 MG/DL (ref 8.5–10.5)
CHLORIDE SERPL-SCNC: 105 MMOL/L (ref 96–112)
CK SERPL-CCNC: 110 U/L (ref 0–154)
CO2 SERPL-SCNC: 25 MMOL/L (ref 20–33)
CREAT SERPL-MCNC: 1.72 MG/DL (ref 0.5–1.4)
CRP SERPL HS-MCNC: <0.3 MG/DL (ref 0–0.75)
ERYTHROCYTE [SEDIMENTATION RATE] IN BLOOD BY WESTERGREN METHOD: 4 MM/HOUR (ref 0–20)
GFR SERPLBLD CREATININE-BSD FMLA CKD-EPI: 40 ML/MIN/1.73 M 2
GLOBULIN SER CALC-MCNC: 3.4 G/DL (ref 1.9–3.5)
GLUCOSE SERPL-MCNC: 88 MG/DL (ref 65–99)
POTASSIUM SERPL-SCNC: 4.4 MMOL/L (ref 3.6–5.5)
PROT SERPL-MCNC: 6.7 G/DL (ref 6–8.2)
SODIUM SERPL-SCNC: 138 MMOL/L (ref 135–145)

## 2025-02-28 DIAGNOSIS — K21.00 GASTROESOPHAGEAL REFLUX DISEASE WITH ESOPHAGITIS WITHOUT HEMORRHAGE: ICD-10-CM

## 2025-02-28 RX ORDER — OMEPRAZOLE 40 MG/1
40 CAPSULE, DELAYED RELEASE ORAL DAILY
Qty: 90 CAPSULE | Refills: 3 | Status: SHIPPED | OUTPATIENT
Start: 2025-02-28

## 2025-03-03 ENCOUNTER — HOSPITAL ENCOUNTER (OUTPATIENT)
Facility: MEDICAL CENTER | Age: 80
End: 2025-03-03
Attending: INTERNAL MEDICINE
Payer: MEDICARE

## 2025-03-03 PROCEDURE — 85025 COMPLETE CBC W/AUTO DIFF WBC: CPT

## 2025-03-03 PROCEDURE — 82550 ASSAY OF CK (CPK): CPT

## 2025-03-03 PROCEDURE — 80053 COMPREHEN METABOLIC PANEL: CPT

## 2025-03-03 PROCEDURE — 85652 RBC SED RATE AUTOMATED: CPT

## 2025-03-03 PROCEDURE — 86140 C-REACTIVE PROTEIN: CPT

## 2025-03-04 LAB
ALBUMIN SERPL BCP-MCNC: 3.2 G/DL (ref 3.2–4.9)
ALBUMIN/GLOB SERPL: 1 G/DL
ALP SERPL-CCNC: 68 U/L (ref 30–99)
ALT SERPL-CCNC: 13 U/L (ref 2–50)
ANION GAP SERPL CALC-SCNC: 8 MMOL/L (ref 7–16)
AST SERPL-CCNC: 22 U/L (ref 12–45)
BASOPHILS # BLD AUTO: 0.5 % (ref 0–1.8)
BASOPHILS # BLD: 0.04 K/UL (ref 0–0.12)
BILIRUB SERPL-MCNC: 0.8 MG/DL (ref 0.1–1.5)
BUN SERPL-MCNC: 23 MG/DL (ref 8–22)
CALCIUM ALBUM COR SERPL-MCNC: 9.3 MG/DL (ref 8.5–10.5)
CALCIUM SERPL-MCNC: 8.7 MG/DL (ref 8.5–10.5)
CHLORIDE SERPL-SCNC: 107 MMOL/L (ref 96–112)
CK SERPL-CCNC: 55 U/L (ref 0–154)
CO2 SERPL-SCNC: 25 MMOL/L (ref 20–33)
CREAT SERPL-MCNC: 1.76 MG/DL (ref 0.5–1.4)
CRP SERPL HS-MCNC: 1.98 MG/DL (ref 0–0.75)
EOSINOPHIL # BLD AUTO: 0.07 K/UL (ref 0–0.51)
EOSINOPHIL NFR BLD: 0.9 % (ref 0–6.9)
ERYTHROCYTE [DISTWIDTH] IN BLOOD BY AUTOMATED COUNT: 61.4 FL (ref 35.9–50)
ERYTHROCYTE [SEDIMENTATION RATE] IN BLOOD BY WESTERGREN METHOD: 19 MM/HOUR (ref 0–20)
GFR SERPLBLD CREATININE-BSD FMLA CKD-EPI: 39 ML/MIN/1.73 M 2
GLOBULIN SER CALC-MCNC: 3.3 G/DL (ref 1.9–3.5)
GLUCOSE SERPL-MCNC: 134 MG/DL (ref 65–99)
HCT VFR BLD AUTO: 40 % (ref 42–52)
HGB BLD-MCNC: 12.9 G/DL (ref 14–18)
IMM GRANULOCYTES # BLD AUTO: 0.13 K/UL (ref 0–0.11)
IMM GRANULOCYTES NFR BLD AUTO: 1.7 % (ref 0–0.9)
LYMPHOCYTES # BLD AUTO: 0.34 K/UL (ref 1–4.8)
LYMPHOCYTES NFR BLD: 4.5 % (ref 22–41)
MCH RBC QN AUTO: 30.1 PG (ref 27–33)
MCHC RBC AUTO-ENTMCNC: 32.3 G/DL (ref 32.3–36.5)
MCV RBC AUTO: 93.5 FL (ref 81.4–97.8)
MONOCYTES # BLD AUTO: 0.51 K/UL (ref 0–0.85)
MONOCYTES NFR BLD AUTO: 6.8 % (ref 0–13.4)
NEUTROPHILS # BLD AUTO: 6.43 K/UL (ref 1.82–7.42)
NEUTROPHILS NFR BLD: 85.6 % (ref 44–72)
NRBC # BLD AUTO: 0 K/UL
NRBC BLD-RTO: 0 /100 WBC (ref 0–0.2)
PLATELET # BLD AUTO: 120 K/UL (ref 164–446)
PMV BLD AUTO: 10.9 FL (ref 9–12.9)
POTASSIUM SERPL-SCNC: 4.2 MMOL/L (ref 3.6–5.5)
PROT SERPL-MCNC: 6.5 G/DL (ref 6–8.2)
RBC # BLD AUTO: 4.28 M/UL (ref 4.7–6.1)
SODIUM SERPL-SCNC: 140 MMOL/L (ref 135–145)
WBC # BLD AUTO: 7.5 K/UL (ref 4.8–10.8)

## 2025-03-19 ENCOUNTER — APPOINTMENT (OUTPATIENT)
Dept: RADIOLOGY | Facility: MEDICAL CENTER | Age: 80
DRG: 871 | End: 2025-03-19
Attending: EMERGENCY MEDICINE
Payer: MEDICARE

## 2025-03-19 ENCOUNTER — HOSPITAL ENCOUNTER (INPATIENT)
Facility: MEDICAL CENTER | Age: 80
LOS: 7 days | DRG: 871 | End: 2025-03-26
Attending: EMERGENCY MEDICINE | Admitting: INTERNAL MEDICINE
Payer: MEDICARE

## 2025-03-19 DIAGNOSIS — J90 PLEURAL EFFUSION: ICD-10-CM

## 2025-03-19 DIAGNOSIS — J18.9 PNEUMONIA OF RIGHT LUNG DUE TO INFECTIOUS ORGANISM, UNSPECIFIED PART OF LUNG: ICD-10-CM

## 2025-03-19 DIAGNOSIS — J96.01 ACUTE RESPIRATORY FAILURE WITH HYPOXIA (HCC): ICD-10-CM

## 2025-03-19 DIAGNOSIS — R07.9 LEFT-SIDED CHEST PAIN: ICD-10-CM

## 2025-03-19 DIAGNOSIS — J96.01 ACUTE HYPOXEMIC RESPIRATORY FAILURE (HCC): ICD-10-CM

## 2025-03-19 DIAGNOSIS — A41.9 SEPSIS, DUE TO UNSPECIFIED ORGANISM, UNSPECIFIED WHETHER ACUTE ORGAN DYSFUNCTION PRESENT (HCC): ICD-10-CM

## 2025-03-19 DIAGNOSIS — R05.1 ACUTE COUGH: ICD-10-CM

## 2025-03-19 PROBLEM — D72.829 LEUKOCYTOSIS: Status: ACTIVE | Noted: 2025-03-19

## 2025-03-19 PROBLEM — J10.1 INFLUENZA A: Status: ACTIVE | Noted: 2025-03-19

## 2025-03-19 PROBLEM — N18.9 ACUTE KIDNEY INJURY SUPERIMPOSED ON CHRONIC KIDNEY DISEASE (HCC): Status: ACTIVE | Noted: 2024-05-14

## 2025-03-19 LAB
ALBUMIN SERPL BCP-MCNC: 2.8 G/DL (ref 3.2–4.9)
ALBUMIN/GLOB SERPL: 0.9 G/DL
ALP SERPL-CCNC: 66 U/L (ref 30–99)
ALT SERPL-CCNC: 6 U/L (ref 2–50)
ANION GAP SERPL CALC-SCNC: 9 MMOL/L (ref 7–16)
ANISOCYTOSIS BLD QL SMEAR: ABNORMAL
APPEARANCE UR: CLEAR
AST SERPL-CCNC: 15 U/L (ref 12–45)
BACTERIA #/AREA URNS HPF: NORMAL /HPF
BASO STIPL BLD QL SMEAR: NORMAL
BASOPHILS # BLD AUTO: 0 % (ref 0–1.8)
BASOPHILS # BLD: 0 K/UL (ref 0–0.12)
BILIRUB SERPL-MCNC: 1.3 MG/DL (ref 0.1–1.5)
BILIRUB UR QL STRIP.AUTO: NEGATIVE
BUN SERPL-MCNC: 32 MG/DL (ref 8–22)
BURR CELLS BLD QL SMEAR: NORMAL
CALCIUM ALBUM COR SERPL-MCNC: 8.8 MG/DL (ref 8.5–10.5)
CALCIUM SERPL-MCNC: 7.8 MG/DL (ref 8.4–10.2)
CASTS URNS QL MICRO: NORMAL /LPF (ref 0–2)
CHLORIDE SERPL-SCNC: 103 MMOL/L (ref 96–112)
CO2 SERPL-SCNC: 23 MMOL/L (ref 20–33)
COLOR UR: YELLOW
CREAT SERPL-MCNC: 1.65 MG/DL (ref 0.5–1.4)
EKG IMPRESSION: NORMAL
EOSINOPHIL # BLD AUTO: 0 K/UL (ref 0–0.51)
EOSINOPHIL NFR BLD: 0 % (ref 0–6.9)
EPITHELIAL CELLS 1715: NORMAL /HPF (ref 0–5)
ERYTHROCYTE [DISTWIDTH] IN BLOOD BY AUTOMATED COUNT: 58.9 FL (ref 35.9–50)
FLUAV RNA SPEC QL NAA+PROBE: POSITIVE
FLUBV RNA SPEC QL NAA+PROBE: NEGATIVE
GFR SERPLBLD CREATININE-BSD FMLA CKD-EPI: 42 ML/MIN/1.73 M 2
GLOBULIN SER CALC-MCNC: 3.2 G/DL (ref 1.9–3.5)
GLUCOSE SERPL-MCNC: 80 MG/DL (ref 65–99)
GLUCOSE UR STRIP.AUTO-MCNC: NEGATIVE MG/DL
HCT VFR BLD AUTO: 37.9 % (ref 42–52)
HGB BLD-MCNC: 12.6 G/DL (ref 14–18)
INR PPP: 1.02 (ref 0.87–1.13)
KETONES UR STRIP.AUTO-MCNC: NEGATIVE MG/DL
LACTATE SERPL-SCNC: 1 MMOL/L (ref 0.5–2)
LACTATE SERPL-SCNC: 1.2 MMOL/L (ref 0.5–2)
LACTATE SERPL-SCNC: 1.2 MMOL/L (ref 0.5–2)
LACTATE SERPL-SCNC: 1.7 MMOL/L (ref 0.5–2)
LACTATE SERPL-SCNC: 3.1 MMOL/L (ref 0.5–2)
LEUKOCYTE ESTERASE UR QL STRIP.AUTO: NEGATIVE
LYMPHOCYTES # BLD AUTO: 0.72 K/UL (ref 1–4.8)
LYMPHOCYTES NFR BLD: 2.6 % (ref 22–41)
MACROCYTES BLD QL SMEAR: ABNORMAL
MANUAL DIFF BLD: NORMAL
MCH RBC QN AUTO: 30 PG (ref 27–33)
MCHC RBC AUTO-ENTMCNC: 33.2 G/DL (ref 32.3–36.5)
MCV RBC AUTO: 90.2 FL (ref 81.4–97.8)
MICRO URNS: ABNORMAL
MONOCYTES # BLD AUTO: 0.97 K/UL (ref 0–0.85)
MONOCYTES NFR BLD AUTO: 3.5 % (ref 0–13.4)
NEUTROPHILS # BLD AUTO: 25.92 K/UL (ref 1.82–7.42)
NEUTROPHILS NFR BLD: 93.9 % (ref 44–72)
NITRITE UR QL STRIP.AUTO: NEGATIVE
NRBC # BLD AUTO: 0 K/UL
NRBC BLD-RTO: 0 /100 WBC (ref 0–0.2)
OVALOCYTES BLD QL SMEAR: NORMAL
PH UR STRIP.AUTO: 7 [PH] (ref 5–8)
PLATELET # BLD AUTO: 226 K/UL (ref 164–446)
PLATELET BLD QL SMEAR: NORMAL
PMV BLD AUTO: 9.9 FL (ref 9–12.9)
POIKILOCYTOSIS BLD QL SMEAR: NORMAL
POLYCHROMASIA BLD QL SMEAR: NORMAL
POTASSIUM SERPL-SCNC: 3.3 MMOL/L (ref 3.6–5.5)
PROCALCITONIN SERPL-MCNC: 11.2 NG/ML
PROCALCITONIN SERPL-MCNC: 12.7 NG/ML
PROT SERPL-MCNC: 6 G/DL (ref 6–8.2)
PROT UR QL STRIP: 30 MG/DL
PROTHROMBIN TIME: 13.8 SEC (ref 12–14.6)
RBC # BLD AUTO: 4.2 M/UL (ref 4.7–6.1)
RBC # URNS HPF: NORMAL /HPF
RBC BLD AUTO: PRESENT
RBC UR QL AUTO: NEGATIVE
RSV RNA SPEC QL NAA+PROBE: NEGATIVE
SARS-COV-2 RNA RESP QL NAA+PROBE: NOTDETECTED
SODIUM SERPL-SCNC: 135 MMOL/L (ref 135–145)
SP GR UR STRIP.AUTO: 1.03
SPECIMEN SOURCE: ABNORMAL
TROPONIN T SERPL-MCNC: 31 NG/L (ref 6–19)
UROBILINOGEN UR STRIP.AUTO-MCNC: 1 EU/DL
WBC # BLD AUTO: 27.6 K/UL (ref 4.8–10.8)
WBC #/AREA URNS HPF: NORMAL /HPF

## 2025-03-19 PROCEDURE — 83605 ASSAY OF LACTIC ACID: CPT

## 2025-03-19 PROCEDURE — A9270 NON-COVERED ITEM OR SERVICE: HCPCS | Performed by: EMERGENCY MEDICINE

## 2025-03-19 PROCEDURE — 99285 EMERGENCY DEPT VISIT HI MDM: CPT

## 2025-03-19 PROCEDURE — 87086 URINE CULTURE/COLONY COUNT: CPT

## 2025-03-19 PROCEDURE — 96374 THER/PROPH/DIAG INJ IV PUSH: CPT

## 2025-03-19 PROCEDURE — 99223 1ST HOSP IP/OBS HIGH 75: CPT | Mod: AI | Performed by: INTERNAL MEDICINE

## 2025-03-19 PROCEDURE — 770020 HCHG ROOM/CARE - TELE (206)

## 2025-03-19 PROCEDURE — 85027 COMPLETE CBC AUTOMATED: CPT

## 2025-03-19 PROCEDURE — 51798 US URINE CAPACITY MEASURE: CPT

## 2025-03-19 PROCEDURE — A9270 NON-COVERED ITEM OR SERVICE: HCPCS | Performed by: INTERNAL MEDICINE

## 2025-03-19 PROCEDURE — 85610 PROTHROMBIN TIME: CPT

## 2025-03-19 PROCEDURE — 700105 HCHG RX REV CODE 258: Performed by: EMERGENCY MEDICINE

## 2025-03-19 PROCEDURE — 94760 N-INVAS EAR/PLS OXIMETRY 1: CPT

## 2025-03-19 PROCEDURE — 36415 COLL VENOUS BLD VENIPUNCTURE: CPT

## 2025-03-19 PROCEDURE — 85007 BL SMEAR W/DIFF WBC COUNT: CPT

## 2025-03-19 PROCEDURE — 700111 HCHG RX REV CODE 636 W/ 250 OVERRIDE (IP): Mod: JZ | Performed by: EMERGENCY MEDICINE

## 2025-03-19 PROCEDURE — 71275 CT ANGIOGRAPHY CHEST: CPT

## 2025-03-19 PROCEDURE — 700117 HCHG RX CONTRAST REV CODE 255: Performed by: EMERGENCY MEDICINE

## 2025-03-19 PROCEDURE — 700101 HCHG RX REV CODE 250: Performed by: INTERNAL MEDICINE

## 2025-03-19 PROCEDURE — 84484 ASSAY OF TROPONIN QUANT: CPT

## 2025-03-19 PROCEDURE — 700105 HCHG RX REV CODE 258: Performed by: INTERNAL MEDICINE

## 2025-03-19 PROCEDURE — 71045 X-RAY EXAM CHEST 1 VIEW: CPT

## 2025-03-19 PROCEDURE — 0241U HCHG SARS-COV-2 COVID-19 NFCT DS RESP RNA 4 TRGT MIC: CPT

## 2025-03-19 PROCEDURE — 80053 COMPREHEN METABOLIC PANEL: CPT

## 2025-03-19 PROCEDURE — 700102 HCHG RX REV CODE 250 W/ 637 OVERRIDE(OP): Performed by: INTERNAL MEDICINE

## 2025-03-19 PROCEDURE — 87040 BLOOD CULTURE FOR BACTERIA: CPT | Mod: 91

## 2025-03-19 PROCEDURE — 81001 URINALYSIS AUTO W/SCOPE: CPT

## 2025-03-19 PROCEDURE — 700111 HCHG RX REV CODE 636 W/ 250 OVERRIDE (IP): Mod: JZ | Performed by: INTERNAL MEDICINE

## 2025-03-19 PROCEDURE — 84145 PROCALCITONIN (PCT): CPT

## 2025-03-19 PROCEDURE — 700102 HCHG RX REV CODE 250 W/ 637 OVERRIDE(OP): Performed by: EMERGENCY MEDICINE

## 2025-03-19 PROCEDURE — 93005 ELECTROCARDIOGRAM TRACING: CPT | Mod: TC | Performed by: EMERGENCY MEDICINE

## 2025-03-19 RX ORDER — GUAIFENESIN 600 MG/1
600 TABLET, EXTENDED RELEASE ORAL EVERY 12 HOURS
Status: DISCONTINUED | OUTPATIENT
Start: 2025-03-19 | End: 2025-03-26 | Stop reason: HOSPADM

## 2025-03-19 RX ORDER — AMLODIPINE BESYLATE 5 MG/1
5 TABLET ORAL EVERY MORNING
Status: DISCONTINUED | OUTPATIENT
Start: 2025-03-19 | End: 2025-03-26 | Stop reason: HOSPADM

## 2025-03-19 RX ORDER — FOLIC ACID 1 MG/1
1 TABLET ORAL DAILY
COMMUNITY

## 2025-03-19 RX ORDER — HYDROMORPHONE HYDROCHLORIDE 1 MG/ML
1 INJECTION, SOLUTION INTRAMUSCULAR; INTRAVENOUS; SUBCUTANEOUS ONCE
Status: COMPLETED | OUTPATIENT
Start: 2025-03-19 | End: 2025-03-19

## 2025-03-19 RX ORDER — SODIUM CHLORIDE, SODIUM LACTATE, POTASSIUM CHLORIDE, AND CALCIUM CHLORIDE .6; .31; .03; .02 G/100ML; G/100ML; G/100ML; G/100ML
1000 INJECTION, SOLUTION INTRAVENOUS ONCE
Status: COMPLETED | OUTPATIENT
Start: 2025-03-19 | End: 2025-03-19

## 2025-03-19 RX ORDER — HYDROMORPHONE HYDROCHLORIDE 1 MG/ML
2 INJECTION, SOLUTION INTRAMUSCULAR; INTRAVENOUS; SUBCUTANEOUS
Status: DISCONTINUED | OUTPATIENT
Start: 2025-03-19 | End: 2025-03-19

## 2025-03-19 RX ORDER — AZITHROMYCIN 250 MG/1
500 TABLET, FILM COATED ORAL ONCE
Status: COMPLETED | OUTPATIENT
Start: 2025-03-19 | End: 2025-03-19

## 2025-03-19 RX ORDER — ALBUTEROL SULFATE 90 UG/1
2 INHALANT RESPIRATORY (INHALATION)
Status: DISCONTINUED | OUTPATIENT
Start: 2025-03-19 | End: 2025-03-26 | Stop reason: HOSPADM

## 2025-03-19 RX ORDER — HYDROMORPHONE HYDROCHLORIDE 1 MG/ML
0.5 INJECTION, SOLUTION INTRAMUSCULAR; INTRAVENOUS; SUBCUTANEOUS
Status: DISCONTINUED | OUTPATIENT
Start: 2025-03-19 | End: 2025-03-19

## 2025-03-19 RX ORDER — DOXYCYCLINE HYCLATE 100 MG
100 TABLET ORAL 2 TIMES DAILY
Status: ON HOLD | COMMUNITY
End: 2025-03-26

## 2025-03-19 RX ORDER — ATORVASTATIN CALCIUM 20 MG/1
20 TABLET, FILM COATED ORAL NIGHTLY
Status: DISCONTINUED | OUTPATIENT
Start: 2025-03-19 | End: 2025-03-26 | Stop reason: HOSPADM

## 2025-03-19 RX ORDER — HYDROMORPHONE HYDROCHLORIDE 1 MG/ML
1 INJECTION, SOLUTION INTRAMUSCULAR; INTRAVENOUS; SUBCUTANEOUS
Status: DISCONTINUED | OUTPATIENT
Start: 2025-03-19 | End: 2025-03-19

## 2025-03-19 RX ORDER — GABAPENTIN 100 MG/1
100 CAPSULE ORAL EVERY EVENING
COMMUNITY

## 2025-03-19 RX ORDER — LINEZOLID 600 MG/1
600 TABLET, FILM COATED ORAL EVERY 12 HOURS
Status: COMPLETED | OUTPATIENT
Start: 2025-03-19 | End: 2025-03-24

## 2025-03-19 RX ORDER — ACETAMINOPHEN 500 MG
1000 TABLET ORAL EVERY 6 HOURS PRN
COMMUNITY

## 2025-03-19 RX ORDER — HYDROCODONE BITARTRATE AND HOMATROPINE METHYLBROMIDE ORAL SOLUTION 5; 1.5 MG/5ML; MG/5ML
5 LIQUID ORAL EVERY 4 HOURS PRN
Refills: 0 | Status: DISCONTINUED | OUTPATIENT
Start: 2025-03-19 | End: 2025-03-26 | Stop reason: HOSPADM

## 2025-03-19 RX ORDER — NALOXONE HYDROCHLORIDE 0.4 MG/ML
INJECTION, SOLUTION INTRAMUSCULAR; INTRAVENOUS; SUBCUTANEOUS
Status: ACTIVE
Start: 2025-03-19 | End: 2025-03-20

## 2025-03-19 RX ORDER — HYDROMORPHONE HYDROCHLORIDE 2 MG/1
1 TABLET ORAL EVERY 4 HOURS PRN
Status: DISCONTINUED | OUTPATIENT
Start: 2025-03-19 | End: 2025-03-26

## 2025-03-19 RX ORDER — OXYCODONE HYDROCHLORIDE 10 MG/1
10 TABLET ORAL EVERY 4 HOURS PRN
Refills: 0 | Status: DISCONTINUED | OUTPATIENT
Start: 2025-03-19 | End: 2025-03-19

## 2025-03-19 RX ORDER — HYDROMORPHONE HYDROCHLORIDE 2 MG/1
2 TABLET ORAL EVERY 4 HOURS PRN
Refills: 0 | Status: DISCONTINUED | OUTPATIENT
Start: 2025-03-19 | End: 2025-03-19

## 2025-03-19 RX ORDER — SODIUM CHLORIDE 9 MG/ML
INJECTION, SOLUTION INTRAVENOUS CONTINUOUS
Status: DISCONTINUED | OUTPATIENT
Start: 2025-03-19 | End: 2025-03-22

## 2025-03-19 RX ORDER — NAPROXEN SODIUM 220 MG/1
220 TABLET, FILM COATED ORAL 2 TIMES DAILY PRN
Status: ON HOLD | COMMUNITY
End: 2025-04-10

## 2025-03-19 RX ORDER — HYDROMORPHONE HYDROCHLORIDE 1 MG/ML
1 INJECTION, SOLUTION INTRAMUSCULAR; INTRAVENOUS; SUBCUTANEOUS
Status: DISCONTINUED | OUTPATIENT
Start: 2025-03-19 | End: 2025-03-21

## 2025-03-19 RX ORDER — OXYCODONE HYDROCHLORIDE 5 MG/1
5 TABLET ORAL EVERY 4 HOURS PRN
Refills: 0 | Status: DISCONTINUED | OUTPATIENT
Start: 2025-03-19 | End: 2025-03-19

## 2025-03-19 RX ORDER — HYDROMORPHONE HYDROCHLORIDE 2 MG/1
4 TABLET ORAL EVERY 4 HOURS PRN
Refills: 0 | Status: DISCONTINUED | OUTPATIENT
Start: 2025-03-19 | End: 2025-03-19

## 2025-03-19 RX ORDER — LIOTHYRONINE SODIUM 5 UG/1
10 TABLET ORAL DAILY
Status: DISCONTINUED | OUTPATIENT
Start: 2025-03-19 | End: 2025-03-26 | Stop reason: HOSPADM

## 2025-03-19 RX ORDER — ACETAMINOPHEN 325 MG/1
650 TABLET ORAL EVERY 6 HOURS PRN
Status: DISCONTINUED | OUTPATIENT
Start: 2025-03-19 | End: 2025-03-26 | Stop reason: HOSPADM

## 2025-03-19 RX ORDER — ONDANSETRON 2 MG/ML
4 INJECTION INTRAMUSCULAR; INTRAVENOUS EVERY 4 HOURS PRN
Status: DISCONTINUED | OUTPATIENT
Start: 2025-03-19 | End: 2025-03-26 | Stop reason: HOSPADM

## 2025-03-19 RX ORDER — OMEPRAZOLE 20 MG/1
40 CAPSULE, DELAYED RELEASE ORAL DAILY
Status: DISCONTINUED | OUTPATIENT
Start: 2025-03-19 | End: 2025-03-26 | Stop reason: HOSPADM

## 2025-03-19 RX ORDER — TAMSULOSIN HYDROCHLORIDE 0.4 MG/1
0.4 CAPSULE ORAL 2 TIMES DAILY
Status: DISCONTINUED | OUTPATIENT
Start: 2025-03-19 | End: 2025-03-26 | Stop reason: HOSPADM

## 2025-03-19 RX ORDER — SILODOSIN 8 MG/1
8 CAPSULE ORAL
Status: DISCONTINUED | OUTPATIENT
Start: 2025-03-19 | End: 2025-03-19

## 2025-03-19 RX ORDER — LIDOCAINE 4 G/G
1 PATCH TOPICAL EVERY 24 HOURS
Status: DISCONTINUED | OUTPATIENT
Start: 2025-03-19 | End: 2025-03-26 | Stop reason: HOSPADM

## 2025-03-19 RX ORDER — LINEZOLID 2 MG/ML
600 INJECTION, SOLUTION INTRAVENOUS EVERY 12 HOURS
Status: DISCONTINUED | OUTPATIENT
Start: 2025-03-19 | End: 2025-03-19

## 2025-03-19 RX ORDER — PREDNISONE 10 MG/1
10-60 TABLET ORAL SEE ADMIN INSTRUCTIONS
Status: ON HOLD | COMMUNITY
End: 2025-03-26

## 2025-03-19 RX ORDER — NALOXONE HYDROCHLORIDE 0.4 MG/ML
0.4 INJECTION, SOLUTION INTRAMUSCULAR; INTRAVENOUS; SUBCUTANEOUS ONCE
Status: COMPLETED | OUTPATIENT
Start: 2025-03-19 | End: 2025-03-19

## 2025-03-19 RX ORDER — GABAPENTIN 100 MG/1
100 CAPSULE ORAL EVERY EVENING
Status: DISCONTINUED | OUTPATIENT
Start: 2025-03-19 | End: 2025-03-26 | Stop reason: HOSPADM

## 2025-03-19 RX ORDER — LEVOTHYROXINE SODIUM 125 UG/1
125 TABLET ORAL
Status: DISCONTINUED | OUTPATIENT
Start: 2025-03-19 | End: 2025-03-26 | Stop reason: HOSPADM

## 2025-03-19 RX ORDER — HYDROMORPHONE HYDROCHLORIDE 2 MG/1
2 TABLET ORAL EVERY 4 HOURS PRN
Status: DISCONTINUED | OUTPATIENT
Start: 2025-03-19 | End: 2025-03-26

## 2025-03-19 RX ORDER — CEFTRIAXONE 2 G/1
2000 INJECTION, POWDER, FOR SOLUTION INTRAMUSCULAR; INTRAVENOUS ONCE
Status: COMPLETED | OUTPATIENT
Start: 2025-03-19 | End: 2025-03-19

## 2025-03-19 RX ORDER — OSELTAMIVIR PHOSPHATE 30 MG/1
30 CAPSULE ORAL 2 TIMES DAILY
Status: COMPLETED | OUTPATIENT
Start: 2025-03-19 | End: 2025-03-24

## 2025-03-19 RX ORDER — ONDANSETRON 4 MG/1
4 TABLET, ORALLY DISINTEGRATING ORAL EVERY 4 HOURS PRN
Status: DISCONTINUED | OUTPATIENT
Start: 2025-03-19 | End: 2025-03-26 | Stop reason: HOSPADM

## 2025-03-19 RX ORDER — HYDROCORTISONE 10 MG/1
20 TABLET ORAL 2 TIMES DAILY
Status: DISCONTINUED | OUTPATIENT
Start: 2025-03-19 | End: 2025-03-26 | Stop reason: HOSPADM

## 2025-03-19 RX ORDER — HYDROCORTISONE 10 MG/1
20 TABLET ORAL 2 TIMES DAILY
COMMUNITY

## 2025-03-19 RX ADMIN — RIVAROXABAN 10 MG: 10 TABLET, FILM COATED ORAL at 17:44

## 2025-03-19 RX ADMIN — PIPERACILLIN AND TAZOBACTAM 4.5 G: 4; .5 INJECTION, POWDER, FOR SOLUTION INTRAVENOUS at 16:18

## 2025-03-19 RX ADMIN — OMEPRAZOLE 40 MG: 20 CAPSULE, DELAYED RELEASE ORAL at 13:16

## 2025-03-19 RX ADMIN — HYDROCODONE BITARTRATE AND HOMATROPINE METHYLBROMIDE 5 ML: 5; 1.5 SOLUTION ORAL at 14:48

## 2025-03-19 RX ADMIN — ONDANSETRON 4 MG: 2 INJECTION INTRAMUSCULAR; INTRAVENOUS at 16:07

## 2025-03-19 RX ADMIN — HYDROCORTISONE 20 MG: 10 TABLET ORAL at 17:44

## 2025-03-19 RX ADMIN — ATORVASTATIN CALCIUM 20 MG: 20 TABLET, FILM COATED ORAL at 20:42

## 2025-03-19 RX ADMIN — HYDROMORPHONE HYDROCHLORIDE 0.5 MG: 1 INJECTION, SOLUTION INTRAMUSCULAR; INTRAVENOUS; SUBCUTANEOUS at 13:16

## 2025-03-19 RX ADMIN — PIPERACILLIN AND TAZOBACTAM 4.5 G: 4; .5 INJECTION, POWDER, FOR SOLUTION INTRAVENOUS at 13:42

## 2025-03-19 RX ADMIN — LEVOTHYROXINE SODIUM 125 MCG: 0.12 TABLET ORAL at 13:16

## 2025-03-19 RX ADMIN — NALOXONE HYDROCHLORIDE 0.4 MG: 0.4 INJECTION, SOLUTION INTRAMUSCULAR; INTRAVENOUS; SUBCUTANEOUS at 16:37

## 2025-03-19 RX ADMIN — HYDROMORPHONE HYDROCHLORIDE 1 MG: 2 TABLET ORAL at 22:08

## 2025-03-19 RX ADMIN — IOHEXOL 80 ML: 350 INJECTION, SOLUTION INTRAVENOUS at 10:45

## 2025-03-19 RX ADMIN — LIOTHYRONINE SODIUM 10 MCG: 5 TABLET ORAL at 13:16

## 2025-03-19 RX ADMIN — OXYCODONE HYDROCHLORIDE 10 MG: 10 TABLET ORAL at 12:10

## 2025-03-19 RX ADMIN — LINEZOLID 600 MG: 600 TABLET, FILM COATED ORAL at 13:16

## 2025-03-19 RX ADMIN — LIDOCAINE 1 PATCH: 4 PATCH TOPICAL at 17:45

## 2025-03-19 RX ADMIN — GUAIFENESIN 600 MG: 600 TABLET, EXTENDED RELEASE ORAL at 17:44

## 2025-03-19 RX ADMIN — AMLODIPINE BESYLATE 5 MG: 5 TABLET ORAL at 13:16

## 2025-03-19 RX ADMIN — OSELTAMIVIR PHOSPHATE 30 MG: 30 CAPSULE ORAL at 14:47

## 2025-03-19 RX ADMIN — AZITHROMYCIN DIHYDRATE 500 MG: 250 TABLET ORAL at 10:40

## 2025-03-19 RX ADMIN — PIPERACILLIN AND TAZOBACTAM 4.5 G: 4; .5 INJECTION, POWDER, FOR SOLUTION INTRAVENOUS at 23:37

## 2025-03-19 RX ADMIN — CEFTRIAXONE SODIUM 2000 MG: 2 INJECTION, POWDER, FOR SOLUTION INTRAMUSCULAR; INTRAVENOUS at 10:40

## 2025-03-19 RX ADMIN — GABAPENTIN 100 MG: 100 CAPSULE ORAL at 20:42

## 2025-03-19 RX ADMIN — HYDROMORPHONE HYDROCHLORIDE 1 MG: 1 INJECTION, SOLUTION INTRAMUSCULAR; INTRAVENOUS; SUBCUTANEOUS at 16:07

## 2025-03-19 RX ADMIN — TAMSULOSIN HYDROCHLORIDE 0.4 MG: 0.4 CAPSULE ORAL at 17:44

## 2025-03-19 RX ADMIN — HYDROMORPHONE HYDROCHLORIDE 1 MG: 1 INJECTION, SOLUTION INTRAMUSCULAR; INTRAVENOUS; SUBCUTANEOUS at 14:47

## 2025-03-19 RX ADMIN — SODIUM CHLORIDE: 9 INJECTION, SOLUTION INTRAVENOUS at 13:41

## 2025-03-19 RX ADMIN — SODIUM CHLORIDE, POTASSIUM CHLORIDE, SODIUM LACTATE AND CALCIUM CHLORIDE 1000 ML: 600; 310; 30; 20 INJECTION, SOLUTION INTRAVENOUS at 08:44

## 2025-03-19 ASSESSMENT — LIFESTYLE VARIABLES
TOTAL SCORE: 0
HOW MANY TIMES IN THE PAST YEAR HAVE YOU HAD 5 OR MORE DRINKS IN A DAY: 0
DOES PATIENT WANT TO STOP DRINKING: NO
EVER FELT BAD OR GUILTY ABOUT YOUR DRINKING: NO
ALCOHOL_USE: NO
TOTAL SCORE: 0
CONSUMPTION TOTAL: NEGATIVE
TOTAL SCORE: 0
AVERAGE NUMBER OF DAYS PER WEEK YOU HAVE A DRINK CONTAINING ALCOHOL: 0
EVER HAD A DRINK FIRST THING IN THE MORNING TO STEADY YOUR NERVES TO GET RID OF A HANGOVER: NO
ON A TYPICAL DAY WHEN YOU DRINK ALCOHOL HOW MANY DRINKS DO YOU HAVE: 1
HAVE YOU EVER FELT YOU SHOULD CUT DOWN ON YOUR DRINKING: NO
HAVE PEOPLE ANNOYED YOU BY CRITICIZING YOUR DRINKING: NO

## 2025-03-19 ASSESSMENT — COGNITIVE AND FUNCTIONAL STATUS - GENERAL
MOBILITY SCORE: 20
SUGGESTED CMS G CODE MODIFIER MOBILITY: CJ
MOVING TO AND FROM BED TO CHAIR: A LITTLE
WALKING IN HOSPITAL ROOM: A LITTLE
HELP NEEDED FOR BATHING: A LITTLE
SUGGESTED CMS G CODE MODIFIER DAILY ACTIVITY: CJ
DRESSING REGULAR LOWER BODY CLOTHING: A LITTLE
DRESSING REGULAR UPPER BODY CLOTHING: A LITTLE
DAILY ACTIVITIY SCORE: 21
CLIMB 3 TO 5 STEPS WITH RAILING: A LITTLE
STANDING UP FROM CHAIR USING ARMS: A LITTLE

## 2025-03-19 ASSESSMENT — SOCIAL DETERMINANTS OF HEALTH (SDOH)
WITHIN THE LAST YEAR, HAVE TO BEEN RAPED OR FORCED TO HAVE ANY KIND OF SEXUAL ACTIVITY BY YOUR PARTNER OR EX-PARTNER?: NO
WITHIN THE LAST YEAR, HAVE YOU BEEN AFRAID OF YOUR PARTNER OR EX-PARTNER?: NO
WITHIN THE LAST YEAR, HAVE YOU BEEN KICKED, HIT, SLAPPED, OR OTHERWISE PHYSICALLY HURT BY YOUR PARTNER OR EX-PARTNER?: NO
WITHIN THE PAST 12 MONTHS, THE FOOD YOU BOUGHT JUST DIDN'T LAST AND YOU DIDN'T HAVE MONEY TO GET MORE: NEVER TRUE
IN THE PAST 12 MONTHS, HAS THE ELECTRIC, GAS, OIL, OR WATER COMPANY THREATENED TO SHUT OFF SERVICE IN YOUR HOME?: NO
WITHIN THE LAST YEAR, HAVE YOU BEEN HUMILIATED OR EMOTIONALLY ABUSED IN OTHER WAYS BY YOUR PARTNER OR EX-PARTNER?: NO
WITHIN THE PAST 12 MONTHS, YOU WORRIED THAT YOUR FOOD WOULD RUN OUT BEFORE YOU GOT THE MONEY TO BUY MORE: NEVER TRUE

## 2025-03-19 ASSESSMENT — PAIN DESCRIPTION - PAIN TYPE
TYPE: ACUTE PAIN

## 2025-03-19 ASSESSMENT — ENCOUNTER SYMPTOMS
HEARTBURN: 0
SPEECH CHANGE: 0
MYALGIAS: 0
VOMITING: 0
WEAKNESS: 0
BLURRED VISION: 0
CLAUDICATION: 0
NERVOUS/ANXIOUS: 0
SPUTUM PRODUCTION: 1
FEVER: 0
DIARRHEA: 0
SENSORY CHANGE: 0
INSOMNIA: 0
ABDOMINAL PAIN: 0
PHOTOPHOBIA: 0
HEADACHES: 0
SHORTNESS OF BREATH: 1
DEPRESSION: 0
CONSTIPATION: 0
CHILLS: 0
DIZZINESS: 0
COUGH: 1

## 2025-03-19 ASSESSMENT — FIBROSIS 4 INDEX
FIB4 SCORE: 2.11
FIB4 SCORE: 3.97

## 2025-03-19 NOTE — ASSESSMENT & PLAN NOTE
Secondary to significant pneumonia, procalcitonin elevated at 12.7  Sp Zyvox and Zosyn  WBC count continues to drop, down to 11.2  Received 2 doses of dexamethasone, some improvement in pain but still intense, will try another mode to control pain

## 2025-03-19 NOTE — ED PROVIDER NOTES
"ED Provider Note      ED PHYSICIAN NOTE    CHIEF COMPLAINT  Chief Complaint   Patient presents with    Cough     Pt has been coughing for past couple weeks. Hx of stage 4 lung CA in remission. Cough became productive about 7 days ago and then sob started 6 days ago. Pt on abx for sinus infection, had abx port/IV removed recently. Albuterol in route. Tylenol in route. Endorses left sided rib pain from coughing    Shortness of Breath       EXTERNAL RECORDS REVIEWED  Outpatient Notes from [place] [facility] on March 13 for chronic sinusitis    HPI/ROS  LIMITATION TO HISTORY   Select: : None  OUTSIDE HISTORIAN(S):  EMS who gave patient Tylenol and route as well as albuterol with no change    Bartolo Berrios [facility] is a 80 y.o. male who presents cough and shortness of breath.  Patient reports he has had some shortness of breath essentially for a month although cough over the last week.  Cough has been productive of some yellow and gray sputum, no blood.  He also endorses fever over the last few days.  He reports some rough left sided chest pain only when he is coughing that began after he was coughing.  There is no radiation to the pain or other alleviating or aggravating factors.  He reports no abdominal pain, nausea or vomiting or diarrhea.  No leg pain or swelling.  He does have a history of lung cancer melanoma reports both of these are in remission and is not undergoing any treatment    PAST MEDICAL HISTORY  Past Medical History:   Diagnosis Date    Adrenal insufficiency (HCC), secondary 11/25/2020    Arrhythmia     Arthritis     Atherosclerosis of both carotid arteries, mild 11/25/2020    BPH (benign prostatic hyperplasia) 11/25/2020    Bronchitis     a\" very long time ago\"    Cancer (HCC)     lung cancer, melanoma    Carcinoma in situ of respiratory system     CKD (chronic kidney disease) stage 3, GFR 30-59 ml/min 11/25/2020    Colostomy present (HCC) 11/25/2020    Diverticulosis of colon 11/25/2020    " "GERD (gastroesophageal reflux disease) 11/25/2020    High cholesterol     History of atrial tachycardia 11/25/2020    Ablation 2017    History of malignant melanoma, April 2016 11/25/2020    History of shingles 11/25/2020    History of stroke, subcortical infarct on MRI April 2019 11/25/2020    Hyperlipidemia 11/25/2020    Hypertension     Hypothyroid 11/25/2020    Indigestion     Lung cancer (HCC), adenocarcinoma Aug. 2019 11/25/2020    Metastasis to L5 (biopsy Dec. 2019)    Pain     back       SOCIAL HISTORY  Social History     Tobacco Use    Smoking status: Never    Smokeless tobacco: Never   Vaping Use    Vaping status: Never Used   Substance Use Topics    Alcohol use: Not Currently     Alcohol/week: 1.8 oz     Types: 3 Glasses of wine per week     Comment: Occasionally    Drug use: Not Currently     Comment: THC edibles       CURRENT MEDICATIONS  Home Medications       Reviewed by Trent Wheeler (Pharmacy Tech) on 03/19/25 at 1039  Med List Status: Complete     Medication Last Dose Status   acetaminophen (TYLENOL) 500 MG Tab 3/17/2025 Active   amLODIPine (NORVASC) 5 MG Tab 3/18/2025 Active   amoxicillin-clavulanate (AUGMENTIN) 875-125 MG Tab 3/18/2025 Active   Ascorbic Acid (VITAMIN C PO) 3/18/2025 Active   atorvastatin (LIPITOR) 20 MG Tab 3/18/2025 Active   Cyanocobalamin (B-12 SL) 3/18/2025 Active   dicyclomine (BENTYL) 20 MG Tab Not Taking Active   doxycycline (VIBRAMYCIN) 100 MG Tab 3/16/2025 Active   famotidine (PEPCID) 20 MG Tab Not Taking Active   folic acid (FOLVITE) 1 MG Tab 3/18/2025 Active   gabapentin (NEURONTIN) 100 MG Cap 3/18/2025 Active   hydrocortisone (CORTEF) 10 MG Tab 3/18/2025 Active   liothyronine (CYTOMEL) 5 MCG Tab 3/18/2025 Active   losartan (COZAAR) 100 MG Tab Not Taking Active   MAGNESIUM PO 3/18/2025 Active   naproxen (ALEVE) 220 MG tablet 3/16/2025 Active   NEEDLE, DISP, 25 G (BD DISP NEEDLES) 25G X 5/8\" Misc  Active   omeprazole (PRILOSEC) 40 MG delayed-release capsule " 3/18/2025 Active   ondansetron (ZOFRAN ODT) 4 MG TABLET DISPERSIBLE Not Taking Active   potassium chloride (KLOR-CON) 8 MEQ tablet 3/18/2025 Active   predniSONE (DELTASONE) 10 MG Tab 3/18/2025 Active   silodosin (RAPAFLO) 8 MG Cap capsule 3/18/2025 Active   tadalafil (CIALIS) 20 MG tablet 2/17/2025 Active   TAGRISSO 80 MG Tab 3/18/2025 Active   tamsulosin (FLOMAX) 0.4 MG capsule 3/18/2025 Active   testosterone cypionate (DEPO-TESTOSTERONE) 200 MG/ML Solution injection 3/16/2025 Active   TIROSINT 125 MCG Cap 3/18/2025 Active                  Audit from Redirected Encounters    **Home medications have not yet been reviewed for this encounter**         ALLERGIES  Allergies   Allergen Reactions    Gramineae Pollens Itching and Shortness of Breath     Itchy eyes, red face    Cat Hair Extract Hives and Itching    Horse Allergy Hives    Other Environmental     Pollen Extract Runny Nose and Itching     .       PHYSICAL EXAM  VITAL SIGNS: BP (!) 151/70   Pulse 82   Temp 36.2 °C (97.1 °F) (Oral)   Resp (!) 28   Wt 73 kg (161 lb)   SpO2 91%   BMI 22.45 kg/m²    Constitutional: Awake and alert ill-appearing  HENT: Normal inspection, no signs of trauma  Eyes: Normal inspection, Pupils equal, non-icteric  Neck: Grossly normal range of motion. No stridor  Cardiovascular: Regular rate and rhythm, no murmurs.  Symmetric peripheral pulses at radial  Thorax & Lungs: Diminished in the bases more on the left no wheezing, No rales, No rhonchi, No chest tenderness.   Abdomen:  soft, non-distended, nontender, no mass  Skin: No obvious rash. Warm. Dry.   Back: No tenderness, No CVA tenderness.   Extremities: No cyanosis, no edema  Neurologic: AO3, Grossly normal,  Psychiatric: Normal affect for situation        DIAGNOSTIC STUDIES / PROCEDURES  LABS/EKG  Results for orders placed or performed during the hospital encounter of 03/19/25   Blood Culture - Draw one from central line and one from peripheral site    Collection Time: 03/19/25   8:47 AM    Specimen: Line; Blood   Result Value Ref Range    Significant Indicator NEG     Source BLD     Site LINE     Culture Result       No Growth  Note: Blood cultures are incubated for 5 days and  are monitored continuously.Positive blood cultures  are called to the RN and reported as soon as  they are identified.  Blood culture testing and Gram stain, if indicated, are  performed at [facility],  [address] [place] [address].  Positive blood  cultures are sent to [facility],  [address] [place] [address], for organism identification  and susceptibility testing.     Lactic Acid    Collection Time: 03/19/25  8:48 AM   Result Value Ref Range    Lactic Acid 1.2 0.5 - 2.0 mmol/L   CBC with Differential    Collection Time: 03/19/25  8:48 AM   Result Value Ref Range    WBC 27.6 (H) 4.8 - 10.8 K/uL    RBC 4.20 (L) 4.70 - 6.10 M/uL    Hemoglobin 12.6 (L) 14.0 - 18.0 g/dL    Hematocrit 37.9 (L) 42.0 - 52.0 %    MCV 90.2 81.4 - 97.8 fL    MCH 30.0 27.0 - 33.0 pg    MCHC 33.2 32.3 - 36.5 g/dL    RDW 58.9 (H) 35.9 - 50.0 fL    Platelet Count 226 164 - 446 K/uL    MPV 9.9 9.0 - 12.9 fL    Neutrophils-Polys 93.90 (H) 44.00 - 72.00 %    Lymphocytes 2.60 (L) 22.00 - 41.00 %    Monocytes 3.50 0.00 - 13.40 %    Eosinophils 0.00 0.00 - 6.90 %    Basophils 0.00 0.00 - 1.80 %    Nucleated RBC 0.00 0.00 - 0.20 /100 WBC    Neutrophils (Absolute) 25.92 (H) 1.82 - 7.42 K/uL    Lymphs (Absolute) 0.72 (L) 1.00 - 4.80 K/uL    Monos (Absolute) 0.97 (H) 0.00 - 0.85 K/uL    Eos (Absolute) 0.00 0.00 - 0.51 K/uL    Baso (Absolute) 0.00 0.00 - 0.12 K/uL    NRBC (Absolute) 0.00 K/uL    Anisocytosis 1+     Macrocytosis 1+    Complete Metabolic Panel    Collection Time: 03/19/25  8:48 AM   Result Value Ref Range    Sodium 135 135 - 145 mmol/L    Potassium 3.3 (L) 3.6 - 5.5 mmol/L    Chloride 103 96 - 112 mmol/L    Co2 23 20 - 33 mmol/L    Anion Gap 9.0 7.0 - 16.0    Glucose 80 65 - 99 mg/dL     Bun 32 (H) 8 - 22 mg/dL    Creatinine 1.65 (H) 0.50 - 1.40 mg/dL    Calcium 7.8 (L) 8.4 - 10.2 mg/dL    Correct Calcium 8.8 8.5 - 10.5 mg/dL    AST(SGOT) 15 12 - 45 U/L    ALT(SGPT) 6 2 - 50 U/L    Alkaline Phosphatase 66 30 - 99 U/L    Total Bilirubin 1.3 0.1 - 1.5 mg/dL    Albumin 2.8 (L) 3.2 - 4.9 g/dL    Total Protein 6.0 6.0 - 8.2 g/dL    Globulin 3.2 1.9 - 3.5 g/dL    A-G Ratio 0.9 g/dL   Procalcitonin    Collection Time: 25  8:48 AM   Result Value Ref Range    Procalcitonin 12.70 (HH) <0.25 ng/mL   TROPONIN    Collection Time: 25  8:48 AM   Result Value Ref Range    Troponin T 31 (H) 6 - 19 ng/L   ESTIMATED GFR    Collection Time: 25  8:48 AM   Result Value Ref Range    GFR (CKD-EPI) 42 (A) >60 mL/min/1.73 m 2   DIFFERENTIAL MANUAL    Collection Time: 25  8:48 AM   Result Value Ref Range    Manual Diff Status PERFORMED    PLATELET ESTIMATE    Collection Time: 25  8:48 AM   Result Value Ref Range    Plt Estimation Normal    MORPHOLOGY    Collection Time: 25  8:48 AM   Result Value Ref Range    RBC Morphology Present     Polychromia 1+     Poikilocytosis 3+     Ovalocytes 1+     Echinocytes 1+     Basophilic Stippling Few    EKG    Collection Time: 25  9:33 AM   Result Value Ref Range    Report       [facility] Emergency Dept.    Test Date:  2025  Pt Name:    JESUS [facility]          Department: [facility]  MRN:        [id number]                      Room:       -ROOM 9  Gender:     Male                         Technician:  :        [id number]                   Requested By:ER TRIAGE PROTOCOL  Order #:    [id number]                    Reading MD: SUSAN OAKLEY MD    Measurements  Intervals                                Axis  Rate:       103                          P:          52  IN:         165                          QRS:        82  QRSD:       140                          T:          19  QT:         363  QTc:         475    Interpretive Statements  Sinus tachycardia  Atrial premature complexes  Right bundle branch block  Compared to ECG 06/02/2024 16:21:02  Sinus rhythm no longer present  ST (T wave) deviation no longer present  Myocardial infarct finding still present  Electronically Signed On 03- 09:33:02 PDT by SUSAN OAKLEY MD           RADIOLOGY  I have independently interpreted the diagnostic imaging associated with this visit and am waiting the final reading from the radiologist.   My preliminary interpretation is as follows: Left sided pneumonia    CT-CTA CHEST PULMONARY ARTERY W/ RECONS   Final Result      1.  No evidence of pulmonary embolus.      2.  Consolidation within the left lung base likely representing pneumonia.      3.  Moderate right and small left pleural effusions with bibasilar atelectasis.      4.  Surgical changes involving the right hilum.      5.  Interval progression of a compression fracture at the T8 level. There are chronic unchanged compression fractures involving T7, T6, T5, T9 and T10.      DX-CHEST-PORTABLE (1 VIEW)   Final Result      1.  Bibasilar atelectasis/consolidation, right greater than left.      2.  Small right pleural effusion.      3.  Cardiomegaly.            COURSE & MEDICAL DECISION MAKING    INITIAL ASSESSMENT, COURSE AND PLAN  Care Narrative: 8:02 AM  Patient presents with cough, fever and shortness of breath.  Differential diagnosis includes pneumonia, viral infection, sepsis, pulmonary edema, electrolyte metabolic derangement.  The nature of his symptoms is not highly suggestive of cardiac pathology and while he does have some chest pain certainly seems more infectious.  He does endorse some shortness of breath as well so with infectious workup is unrevealing may need to broaden workup for other causes as well    9:50 AM  Labs have returned with leukocytosis elevated procalcitonin.  Sepsis is considered as below and antibiotics have been ordered this  "time    Patient is reevaluated, discussed all results, pending CTA    Pt and family are updated on results he is agreeable with hospitalization plan    Case is discussed with Dr. Mejía for admission      Interventions  Medications   LR (Bolus) infusion 1,000 mL (1,000 mL Intravenous New Bag 3/19/25 0844)   azithromycin (Zithromax) tablet 500 mg (500 mg Oral Given 3/19/25 1040)   cefTRIAXone (Rocephin) injection 2,000 mg (2,000 mg Intravenous Given 3/19/25 1040)   iohexol (OMNIPAQUE) 350 mg/mL (IV) (80 mL Intravenous Given 3/19/25 1045)       Measures  Hydration: Based on the patient's presentation of Tachycardia the patient was given IV fluids. IV Hydration was used because oral hydration was not as rapid as required. Upon recheck following hydration, the patient was improved.  Sepsis: Infection was suspected 950 (Time). Sepsis pathway was initiated. Fluids not needed (no hypotension or lactate greater than or equal to 4). Antibiotics were given per protocol.       PROBLEM LIST  This is a pleasant 80-year-old male presenting with cough and shortness of breath found to have    # Pneumonia.  Will start on Rocephin and azithromycin as he is currently on Augmentin.  Further management as inpatient.  His pneumonia has resulted in    # Sepsis.  With criteria of leukocytosis tachypnea and findings of endorgan damage with acute hypoxemic respiratory failure.  He has no hypotension or lactic acidosis so 30 cc/kg bolus is not indicated at this point    # Acute hypoxemic respiratory failure.  From above.  CTA performed no evidence of pulmonary embolus    # History of lung cancer.  Last treatment was 4 to 6 months ago, reported as \"clear\".      DISPOSITION AND DISCUSSIONS  I have discussed management of the patient with the following physicians and GERA's:  as noted above    Discussion of management with other QHP or appropriate source(s): Pharmacy to discuss antibiotic   selection given he is already on Augmentin    Pt is " admitted in guarded condition    FINAL DIAGNOSIS  1. Acute hypoxemic respiratory failure (HCC)        2. Pneumonia of right lung due to infectious organism, unspecified part of lung        3. Sepsis, due to unspecified organism, unspecified whether acute organ dysfunction present (HCC)               CRITICAL CARE  The very real possibilty of a deterioration of this patient's condition required the highest level of my preparedness for sudden, emergent intervention.  I provided critical care services, which included medication orders, frequent reevaluations of the patient's condition and response to treatment, ordering and reviewing test results, and discussing the case with various consultants.  The critical care time associated with the care of the patient was 40 minutes. Review chart for interventions. This time is exclusive of any other billable procedures.       This dictation was created using voice recognition software. The accuracy of the dictation is limited to the abilities of the software. I expect there may be some errors of grammar and possibly content. The nursing notes were reviewed and certain aspects of this information were incorporated into this note.    Electronically signed by: Josh Chung M.D., 3/19/2025

## 2025-03-19 NOTE — ED NOTES
Labs drawn and collected. Pt and family aware of need for urine spec and  pending cxr. Call light in reach

## 2025-03-19 NOTE — ED NOTES
Medication history reviewed with pt. Med rec is complete.  Allergies reviewed, per pt  Interviewed pt with family at bedside with permission from pt.    Pts wife brought in pts TAGRISSO 80MG.    Pt reports that he is not taking FAMOTIDINE 20MG, DICYCLOMINE 20MG, LOSARTAN 100MG, or ZOFRAN 4MG in the last 30 days or longer.    Called [facility] at [phone] to verify PREDNISONE.    Pt started DOXYCYCLINE 100MG on 3/10/2025 for 14 day course, pt only took until 3/16/2025 was switched to AUGMENTIN 875-125MG.  Pt started AUGMENTIN 875-125MG on 3/17/2025 for 14 day course, pt took last dose on 3/18/2025 at 2200    Pt is not on any anticoagulants      Dispense history available in EPIC? YES

## 2025-03-19 NOTE — ED TRIAGE NOTES
BIB ems for following complaints.     Chief Complaint   Patient presents with    Cough     Pt has been coughing for past couple weeks. Hx of stage 4 lung CA in remission. Cough became productive about 7 days ago and then sob started 6 days ago. Pt on abx for sinus infection, had abx port/IV removed recently. Albuterol in route. Tylenol in route. Endorses left sided rib pain from coughing    Shortness of Breath     BP (!) 186/87   Pulse 89   Temp (!) 38.2 °C (100.7 °F) (Temporal)   Resp 20   Wt 73 kg (161 lb)   SpO2 96% Comment: 2  BMI 22.45 kg/m²

## 2025-03-19 NOTE — ASSESSMENT & PLAN NOTE
Patient is immunocompromised with recent chemotherapy and recent treatment of chronic sinusitis  Sp Zyvox and Zosyn  Negative blood cultures  Sputum culture as able  No growth from pleural fluid culture  Hycodan for cough suppressant, Mucinex for expectorant  Thoracentesis 3/20 with 635ml removed, serosanguinous slightly cloudy, labs, cytology shows malignant cells.  Pathology report confirms lung origin, report copy given to patient.    3/25: I spoke with RT for pulmonary toilet

## 2025-03-19 NOTE — ASSESSMENT & PLAN NOTE
Patient finished chemotherapy a couple of months ago  Unfortunately pathology confirms recurrent lung cancer in the pleural effusion, will need PET scan outpatient.  Follows with Dr. Corral and Phil Mejía spoke with Dr. Corral who will fu with outpatient PET

## 2025-03-19 NOTE — ED NOTES
Evy from Lab called with critical result of procalcitonin=12.7 at 0942. Critical lab result read back to evy.   Dr. Chung notified of critical lab result at 0949.  Critical lab result read back by Dr. Chung.

## 2025-03-19 NOTE — ASSESSMENT & PLAN NOTE
This is Sepsis Present on admission  SIRS criteria identified on my evaluation include: Fever, with temperature greater than 100.9 deg F, Tachycardia, with heart rate greater than 90 BPM, and Leukocytosis, with WBC greater than 12,000  Clinical indicators of end organ dysfunction include Hypotension with systolic blood pressure less than 90 or MAP less than 65 and Acute Respiratory Failure - (mechanical ventilation or BiPap or PaO2/FiO2 ratio < 300)  Source is pneumonia  Sepsis protocol initiated  Crystalloid Fluid Administration: Fluid resuscitation ordered per standard protocol - 30 mL/kg per current or ideal body weight  IV antibiotics as appropriate for source of sepsis  Reassessment: I have reassessed the patient's hemodynamic status    Resolved

## 2025-03-19 NOTE — PROGRESS NOTES
1607: Scheduled Dilaudid IV 1mg administered. At the time patient reporting 7/10 pain.       1627: Patient became drowsy, weak, and unable to stand steadily to use urinal. Patient still A&Ox4  and arousable. Oxygen dropped to 80%. This RN assisted patient safely to lay in bed, increased oxygen to 10L oxymask and notified MD Mejía and Charge RN. Charge RN to bedside. Patient's Oxygen still in the 80's, respiratory rate dropped to 10. Narcan ordered.       1637: Narcan administered, respiratory rate increased back to 20, patient reporting 7/10 pain. He is alert and oriented x4. Oxygen at 93% on 6L Oxymask.  MD notified of patient's pain. Lidocaine patch ordered.

## 2025-03-19 NOTE — ED NOTES
"Lab called regarding previously sent viral swab. -states was \"canceled by epic\". Update to dr stanley. Spec currently in process. Med for pain prior to transfer to [facility] per pt request. Hot pack also provided at this time   "

## 2025-03-19 NOTE — PROGRESS NOTES
1429:  Lab called with critical result of Procalcitonin 11.20     Dr. Mejía notified of critical lab result at 1429.

## 2025-03-19 NOTE — H&P
Hospital Medicine History & Physical Note    Date of Service  3/19/2025    Primary Care Physician  Lesly Munoz M.D.    Consultants  none    Specialist Names: n/a    Code Status  Full Code    Chief Complaint  Chief Complaint   Patient presents with    Cough     Pt has been coughing for past couple weeks. Hx of stage 4 lung CA in remission. Cough became productive about 7 days ago and then sob started 6 days ago. Pt on abx for sinus infection, had abx port/IV removed recently. Albuterol in route. Tylenol in route. Endorses left sided rib pain from coughing    Shortness of Breath       History of Presenting Illness  Bartolo Berrios [facility] is a 80 y.o. male who presented 3/19/2025 with severe shortness of breath and cough.  He does have a history of lung cancer having finished chemotherapy few months ago.  He has been increasingly short of breath over the last couple of months but very severe over the last 1 to 2 weeks.  He endorses fevers chills and significant pain in the left chest especially with cough.  CT shows a right pleural effusion and consolidation in the left chest base.  Given the asymptomatic nature of the pleural effusion on the right I wonder if this is malignant pleural effusion rather than acute in the acute decompensation is coming from the left-sided pneumonia.  He probably will need a thoracentesis prior to discharge both diagnostic and therapeutic.  Will discuss further with pulmonology as patient starts to show improvement.  Is currently requiring 3 L nasal cannula to maintain adequate saturation is not on oxygen at baseline.  Given his recent cancer and chemotherapy he is immunosuppressed therefore I will broaden his antibiotic coverage to Zyvox and Zosyn though he did receive Rocephin and azithromycin in the emergency room.  Procalcitonin is 12.7.  I will also place him on Hycodan to help with the pain as well as the cough and Mucinex as an expectorant.  CODE STATUS reviewed with the  patient and he is full code.    I discussed the plan of care with patient and bedside RN.    Review of Systems  Review of Systems   Constitutional:  Positive for malaise/fatigue. Negative for chills and fever.   HENT:  Negative for congestion.    Eyes:  Negative for blurred vision and photophobia.   Respiratory:  Positive for cough, sputum production and shortness of breath.    Cardiovascular:  Positive for chest pain. Negative for claudication and leg swelling.   Gastrointestinal:  Negative for abdominal pain, constipation, diarrhea, heartburn and vomiting.   Genitourinary:  Negative for dysuria and hematuria.   Musculoskeletal:  Negative for joint pain and myalgias.   Skin:  Negative for itching and rash.   Neurological:  Negative for dizziness, sensory change, speech change, weakness and headaches.   Psychiatric/Behavioral:  Negative for depression. The patient is not nervous/anxious and does not have insomnia.        Past Medical History   has a past medical history of Adrenal insufficiency ([facility]), secondary (11/25/2020), Arrhythmia, Arthritis, Atherosclerosis of both carotid arteries, mild (11/25/2020), BPH (benign prostatic hyperplasia) (11/25/2020), Bronchitis, Cancer ([facility]), Carcinoma in situ of respiratory system, CKD (chronic kidney disease) stage 3, GFR 30-59 ml/min (11/25/2020), Colostomy present ([facility]) (11/25/2020), Diverticulosis of colon (11/25/2020), GERD (gastroesophageal reflux disease) (11/25/2020), High cholesterol, History of atrial tachycardia (11/25/2020), History of malignant melanoma, April 2016 (11/25/2020), History of shingles (11/25/2020), History of stroke, subcortical infarct on MRI April 2019 (11/25/2020), Hyperlipidemia (11/25/2020), Hypertension, Hypothyroid (11/25/2020), Indigestion, Lung cancer (HCC), adenocarcinoma Aug. 2019 (11/25/2020), and Pain.    Surgical History   has a past surgical history that includes other (Left, 2016); other (Left, 2017); other (Right, 2019); and reid by  "laparoscopy (2/26/2021).     Family History  family history includes Cancer in his brother, brother, father, mother, and sister.   Family history reviewed with patient. There is no family history that is pertinent to the chief complaint.     Social History   reports that he has never smoked. He has never used smokeless tobacco. He reports that he does not currently use alcohol after a past usage of about 1.8 oz of alcohol per week. He reports that he does not currently use drugs.    Allergies  Allergies   Allergen Reactions    Gramineae Pollens Itching and Shortness of Breath     Itchy eyes, red face    Cat Hair Extract Hives and Itching    Horse Allergy Hives    Other Environmental     Pollen Extract Runny Nose and Itching     .       Medications  Prior to Admission Medications   Prescriptions Last Dose Informant Patient Reported? Taking?   Ascorbic Acid (VITAMIN C PO) 3/18/2025 at  9:00 AM Patient Yes Yes   Sig: Take 1 Tablet by mouth see administration instructions. Pt takes sporidially   Cyanocobalamin (B-12 SL) 3/18/2025 at  9:00 AM Patient Yes Yes   Sig: Place 1 Tablet under the tongue every day.   MAGNESIUM PO 3/18/2025 at 10:00 PM Patient Yes Yes   Sig: Take 2 Capsules by mouth every evening. (OTC)   NEEDLE, DISP, 25 G (BD DISP NEEDLES) 25G X 5/8\" Misc  Patient No No   Sig: Use as directed for testosterone injections every 7 days   TAGRISSO 80 MG Tab 3/18/2025 at  9:00 AM Patient Yes Yes   Sig: Take 80 mg by mouth every day.   TIROSINT 125 MCG Cap 3/18/2025 at  9:00 AM Patient Yes Yes   Sig: Take 125 mcg by mouth every morning on an empty stomach.   acetaminophen (TYLENOL) 500 MG Tab 3/17/2025 Patient Yes Yes   Sig: Take 1,000 mg by mouth every 6 hours as needed. Indications: Pain   amLODIPine (NORVASC) 5 MG Tab 3/18/2025 at  9:00 AM Patient No Yes   Sig: Take 1 Tablet by mouth every morning.   amoxicillin-clavulanate (AUGMENTIN) 875-125 MG Tab 3/18/2025 at 10:00 PM Patient No Yes   Sig: Take 1 Tablet by " mouth 2 times a day. Take with food   atorvastatin (LIPITOR) 20 MG Tab 3/18/2025 at 10:00 PM Patient No Yes   Sig: Take 1 Tablet by mouth every day.   dicyclomine (BENTYL) 20 MG Tab Not Taking Patient No No   Sig: Take 1 Tablet by mouth every 6 hours as needed (Abdominal Bloating).   Patient not taking: Reported on 3/19/2025   doxycycline (VIBRAMYCIN) 100 MG Tab 3/16/2025 Patient Yes Yes   Sig: Take 100 mg by mouth 2 times a day. Pt started on 3/10/2025 for 14 day course  STOPPED on 3/16/2025   famotidine (PEPCID) 20 MG Tab Not Taking Patient No No   Sig: Take 1 Tablet by mouth every evening.   Patient not taking: Reported on 3/19/2025   folic acid (FOLVITE) 1 MG Tab 3/18/2025 at  9:00 AM Patient Yes Yes   Sig: Take 1 mg by mouth every day.   gabapentin (NEURONTIN) 100 MG Cap 3/18/2025 at 10:00 PM Patient Yes Yes   Sig: Take 100 mg by mouth every evening.   hydrocortisone (CORTEF) 10 MG Tab 3/18/2025 at 10:00 PM Patient Yes Yes   Sig: Take 20 mg by mouth 2 times a day.   liothyronine (CYTOMEL) 5 MCG Tab 3/18/2025 at  9:00 AM Patient Yes Yes   Sig: Take 10 mcg by mouth every day.   losartan (COZAAR) 100 MG Tab Not Taking Patient No No   Sig: Take 1 Tablet by mouth every day. PLEASE CALL [phone] AND SCHEDULE A FOLLOW UP APPOINTMENT FOR FURTHER REFILLS. THANK YOU!   Patient not taking: Reported on 3/19/2025   naproxen (ALEVE) 220 MG tablet 3/16/2025 Patient Yes Yes   Sig: Take 220 mg by mouth 2 times a day as needed. Indications: Pain   omeprazole (PRILOSEC) 40 MG delayed-release capsule 3/18/2025 at  9:00 AM Patient No Yes   Sig: Take 1 Capsule by mouth every day.   ondansetron (ZOFRAN ODT) 4 MG TABLET DISPERSIBLE Not Taking Patient No No   Sig: Take 1 Tablet by mouth every 6 hours as needed for Nausea/Vomiting.   Patient not taking: Reported on 3/19/2025   potassium chloride (KLOR-CON) 8 MEQ tablet 3/18/2025 at  9:00 AM Patient No Yes   Sig: Take 2 Tablets by mouth every day.   predniSONE (DELTASONE) 10 MG Tab  3/18/2025 at  9:00 AM Patient's Home Pharmacy Yes Yes   Sig: Take 10-60 mg by mouth see administration instructions. Pt started on 3/11/2025 for  day course  Pt started taking 6 tablets (60MG) for 5 day course  4 tablets (40MG) for 4 day course  2 tablets (20MG) for 3 day course   1 tablet (10MG) for 2 days   silodosin (RAPAFLO) 8 MG Cap capsule 3/18/2025 at  9:00 AM Patient Yes Yes   Sig: Take 8 mg by mouth 1/2 hour after breakfast.   tadalafil (CIALIS) 20 MG tablet 2/17/2025 Patient Yes No   Sig: Take 20 mg by mouth 1 time a day as needed for Erectile Dysfunction. for erectile dysfunction   tamsulosin (FLOMAX) 0.4 MG capsule 3/18/2025 at 10:00 PM Patient No Yes   Sig: Take 2 Capsules by mouth every day.   Patient taking differently: Take 0.4 mg by mouth 2 times a day.   testosterone cypionate (DEPO-TESTOSTERONE) 200 MG/ML Solution injection 3/16/2025 Patient Yes No   Sig: Inject 124 mg into the shoulder, thigh, or buttocks every 7 days. On Sunday, pt injects 0.62ML      Facility-Administered Medications: None       Physical Exam  Temp:  [36.2 °C (97.1 °F)-38.2 °C (100.7 °F)] 37.3 °C (99.1 °F)  Pulse:  [] 90  Resp:  [20-44] 24  BP: (144-189)/(70-87) 144/81  SpO2:  [91 %-96 %] 92 %  Blood Pressure : (!) 144/81   Temperature: 37.3 °C (99.1 °F)   Pulse: 90   Respiration: (!) 24   Pulse Oximetry: 92 %       Physical Exam  Vitals and nursing note reviewed.   Constitutional:       General: He is not in acute distress.     Appearance: Normal appearance.   HENT:      Head: Normocephalic and atraumatic.   Eyes:      General: No scleral icterus.     Extraocular Movements: Extraocular movements intact.   Cardiovascular:      Rate and Rhythm: Normal rate and regular rhythm.      Pulses: Normal pulses.      Heart sounds: Normal heart sounds. No murmur heard.  Pulmonary:      Effort: Pulmonary effort is normal. No respiratory distress.      Breath sounds: No wheezing, rhonchi or rales.      Comments: Decreased breath  "sounds bilaterally, pain to palpation left chest  Abdominal:      General: Abdomen is flat. Bowel sounds are normal. There is no distension.      Palpations: Abdomen is soft.      Tenderness: There is no rebound.   Musculoskeletal:         General: No swelling or tenderness.      Cervical back: Normal range of motion and neck supple.   Lymphadenopathy:      Cervical: No cervical adenopathy.   Skin:     Coloration: Skin is not jaundiced.      Findings: No erythema.   Neurological:      General: No focal deficit present.      Mental Status: He is alert and oriented to person, place, and time. Mental status is at baseline.      Cranial Nerves: No cranial nerve deficit.   Psychiatric:         Mood and Affect: Mood normal.         Behavior: Behavior normal.         Laboratory:  Recent Labs     03/19/25  0848   WBC 27.6*   RBC 4.20*   HEMOGLOBIN 12.6*   HEMATOCRIT 37.9*   MCV 90.2   MCH 30.0   MCHC 33.2   RDW 58.9*   PLATELETCT 226   MPV 9.9     Recent Labs     03/19/25  0848   SODIUM 135   POTASSIUM 3.3*   CHLORIDE 103   CO2 23   GLUCOSE 80   BUN 32*   CREATININE 1.65*   CALCIUM 7.8*     Recent Labs     03/19/25  0848   ALTSGPT 6   ASTSGOT 15   ALKPHOSPHAT 66   TBILIRUBIN 1.3   GLUCOSE 80         No results for input(s): \"NTPROBNP\" in the last 72 hours.      Recent Labs     03/19/25  0848   TROPONINT 31*       Imaging:  CT-CTA CHEST PULMONARY ARTERY W/ RECONS   Final Result      1.  No evidence of pulmonary embolus.      2.  Consolidation within the left lung base likely representing pneumonia.      3.  Moderate right and small left pleural effusions with bibasilar atelectasis.      4.  Surgical changes involving the right hilum.      5.  Interval progression of a compression fracture at the T8 level. There are chronic unchanged compression fractures involving T7, T6, T5, T9 and T10.      DX-CHEST-PORTABLE (1 VIEW)   Final Result      1.  Bibasilar atelectasis/consolidation, right greater than left.      2.  Small right " pleural effusion.      3.  Cardiomegaly.          X-Ray:  I have personally reviewed the images and compared with prior images.  CT chest, images reviewed    Assessment/Plan:  Justification for Admission Status  I anticipate this patient will require at least two midnights for appropriate medical management, necessitating inpatient admission because sepsis, pneumonia, influenza A    Patient will need a Telemetry bed on MEDICAL service .  The need is secondary to sepsis.    * Sepsis (HCC)- (present on admission)  Assessment & Plan  This is Sepsis Present on admission  SIRS criteria identified on my evaluation include: Fever, with temperature greater than 100.9 deg F, Tachycardia, with heart rate greater than 90 BPM, and Leukocytosis, with WBC greater than 12,000  Clinical indicators of end organ dysfunction include Hypotension with systolic blood pressure less than 90 or MAP less than 65 and Acute Respiratory Failure - (mechanical ventilation or BiPap or PaO2/FiO2 ratio < 300)  Source is pneumonia  Sepsis protocol initiated  Crystalloid Fluid Administration: Fluid resuscitation ordered per standard protocol - 30 mL/kg per current or ideal body weight  IV antibiotics as appropriate for source of sepsis  Reassessment: I have reassessed the patient's hemodynamic status    Influenza A  Assessment & Plan  Tamiflu  Oxygen titration  Suspect concurrent bacterial infection    Leukocytosis  Assessment & Plan  Secondary to significant pneumonia, procalcitonin elevated at 12.7  Zyvox and Zosyn  Holding Vanco given acute kidney injury with a creatinine 1.6    Pleural effusion  Assessment & Plan  Recurrent right-sided pleural effusion seen on CT scan, continue to follow with treatment of antibiotics, may require thoracentesis    Pneumonia  Assessment & Plan  Patient is immunocompromised with recent chemotherapy and recent treatment of chronic sinusitis  Zyvox and Zosyn  Sputum culture as able  Hycodan for cough suppressant,  Mucinex for expectorant    Peripheral neuropathy due to chemotherapy (HCC)- (present on admission)  Assessment & Plan  Continue gabapentin nightly    Acute kidney injury superimposed on chronic kidney disease (HCC)- (present on admission)  Assessment & Plan  Creatinine 1.6  Avoid nephrotoxin  Will use Zyvox instead of Vanco because of acute kidney injury  Continue with IV hydration    Right foot drop- (present on admission)  Assessment & Plan  Chronic    Adenocarcinoma of lung (HCC), stage 4 ([facility] Oct. 2021)- (present on admission)  Assessment & Plan  Patient finished chemotherapy a couple of months ago  Follows with Dr. Staley    History of malignant melanoma, April 2016- (present on admission)  Assessment & Plan  .      Adrenal insufficiency (HCC), secondary- (present on admission)  Assessment & Plan  Resume home dose steroid    Hyperlipidemia- (present on admission)  Assessment & Plan  Continue statin    Hypothyroid- (present on admission)  Assessment & Plan  Continue home dose Synthroid and Cytomel        VTE prophylaxis: SCDs/TEDs

## 2025-03-19 NOTE — ED NOTES
Pt with increased wob with any activity, oxygen placed by remsa, rm air sat in er=89%, though pt requesting oxygen prior to same due to air hunger. Erp aware

## 2025-03-19 NOTE — ASSESSMENT & PLAN NOTE
Thoracentesis completed on 3/20  Cultures negative  Pending pathology  Malignant cells seen, lung origin identified.  Patient and wife aware of the finding  CXR on 3/24 showed smaller effusions, L atelectasis  I ordered incentive spirometry and chest PT

## 2025-03-20 ENCOUNTER — APPOINTMENT (OUTPATIENT)
Dept: RADIOLOGY | Facility: MEDICAL CENTER | Age: 80
DRG: 871 | End: 2025-03-20
Attending: HOSPITALIST
Payer: MEDICARE

## 2025-03-20 PROBLEM — R33.9 URINARY RETENTION: Status: ACTIVE | Noted: 2025-03-20

## 2025-03-20 LAB
AMYLASE FLD-CCNC: 97 U/L
ANION GAP SERPL CALC-SCNC: 10 MMOL/L (ref 7–16)
APPEARANCE FLD: NORMAL
BODY FLD TYPE: NORMAL
BUN SERPL-MCNC: 33 MG/DL (ref 8–22)
CALCIUM SERPL-MCNC: 7 MG/DL (ref 8.4–10.2)
CHLORIDE SERPL-SCNC: 99 MMOL/L (ref 96–112)
CO2 SERPL-SCNC: 23 MMOL/L (ref 20–33)
COLOR FLD: YELLOW
CREAT SERPL-MCNC: 2.04 MG/DL (ref 0.5–1.4)
CYTOLOGY REG CYTOL: NORMAL
ERYTHROCYTE [DISTWIDTH] IN BLOOD BY AUTOMATED COUNT: 61.1 FL (ref 35.9–50)
FUNGUS SPEC FUNGUS STN: NORMAL
GFR SERPLBLD CREATININE-BSD FMLA CKD-EPI: 33 ML/MIN/1.73 M 2
GLUCOSE FLD-MCNC: 71 MG/DL
GLUCOSE SERPL-MCNC: 75 MG/DL (ref 65–99)
GRAM STN SPEC: NORMAL
HCT VFR BLD AUTO: 34.1 % (ref 42–52)
HGB BLD-MCNC: 10.9 G/DL (ref 14–18)
LDH FLD L TO P-CCNC: 335 U/L
LYMPHOCYTES NFR FLD: 29 %
MCH RBC QN AUTO: 29.7 PG (ref 27–33)
MCHC RBC AUTO-ENTMCNC: 32 G/DL (ref 32.3–36.5)
MCV RBC AUTO: 92.9 FL (ref 81.4–97.8)
MONOS+MACROS NFR FLD MANUAL: 51 %
NEUTROPHILS NFR FLD: 12 %
NUC CELL # FLD: 2892 CELLS/UL
PATHOLOGY CONSULT NOTE: NORMAL
PH FLD: 8 [PH]
PLATELET # BLD AUTO: 181 K/UL (ref 164–446)
PMV BLD AUTO: 10.1 FL (ref 9–12.9)
POTASSIUM SERPL-SCNC: 4.3 MMOL/L (ref 3.6–5.5)
PROT FLD-MCNC: 3.7 G/DL
RBC # BLD AUTO: 3.67 M/UL (ref 4.7–6.1)
RBC # FLD: 7000 CELLS/UL
SIGNIFICANT IND 70042: NORMAL
SIGNIFICANT IND 70042: NORMAL
SITE SITE: NORMAL
SITE SITE: NORMAL
SODIUM SERPL-SCNC: 132 MMOL/L (ref 135–145)
SOURCE SOURCE: NORMAL
SOURCE SOURCE: NORMAL
WBC # BLD AUTO: 24.8 K/UL (ref 4.8–10.8)
WBC OTHER NFR FLD: 8 %

## 2025-03-20 PROCEDURE — 99233 SBSQ HOSP IP/OBS HIGH 50: CPT | Performed by: INTERNAL MEDICINE

## 2025-03-20 PROCEDURE — A9270 NON-COVERED ITEM OR SERVICE: HCPCS | Performed by: INTERNAL MEDICINE

## 2025-03-20 PROCEDURE — 0W993ZZ DRAINAGE OF RIGHT PLEURAL CAVITY, PERCUTANEOUS APPROACH: ICD-10-PCS | Performed by: HOSPITALIST

## 2025-03-20 PROCEDURE — 89051 BODY FLUID CELL COUNT: CPT

## 2025-03-20 PROCEDURE — 83986 ASSAY PH BODY FLUID NOS: CPT

## 2025-03-20 PROCEDURE — 700105 HCHG RX REV CODE 258: Performed by: INTERNAL MEDICINE

## 2025-03-20 PROCEDURE — 87205 SMEAR GRAM STAIN: CPT

## 2025-03-20 PROCEDURE — 88305 TISSUE EXAM BY PATHOLOGIST: CPT | Mod: 26 | Performed by: STUDENT IN AN ORGANIZED HEALTH CARE EDUCATION/TRAINING PROGRAM

## 2025-03-20 PROCEDURE — 87102 FUNGUS ISOLATION CULTURE: CPT

## 2025-03-20 PROCEDURE — 88305 TISSUE EXAM BY PATHOLOGIST: CPT | Performed by: STUDENT IN AN ORGANIZED HEALTH CARE EDUCATION/TRAINING PROGRAM

## 2025-03-20 PROCEDURE — 88112 CYTOPATH CELL ENHANCE TECH: CPT | Performed by: STUDENT IN AN ORGANIZED HEALTH CARE EDUCATION/TRAINING PROGRAM

## 2025-03-20 PROCEDURE — 80503 PATH CLIN CONSLTJ SF 5-20: CPT

## 2025-03-20 PROCEDURE — 94760 N-INVAS EAR/PLS OXIMETRY 1: CPT

## 2025-03-20 PROCEDURE — 700102 HCHG RX REV CODE 250 W/ 637 OVERRIDE(OP): Performed by: HOSPITALIST

## 2025-03-20 PROCEDURE — 88112 CYTOPATH CELL ENHANCE TECH: CPT | Mod: 26 | Performed by: STUDENT IN AN ORGANIZED HEALTH CARE EDUCATION/TRAINING PROGRAM

## 2025-03-20 PROCEDURE — 84157 ASSAY OF PROTEIN OTHER: CPT

## 2025-03-20 PROCEDURE — 87116 MYCOBACTERIA CULTURE: CPT

## 2025-03-20 PROCEDURE — 82945 GLUCOSE OTHER FLUID: CPT

## 2025-03-20 PROCEDURE — 700102 HCHG RX REV CODE 250 W/ 637 OVERRIDE(OP): Performed by: INTERNAL MEDICINE

## 2025-03-20 PROCEDURE — 85027 COMPLETE CBC AUTOMATED: CPT

## 2025-03-20 PROCEDURE — 700111 HCHG RX REV CODE 636 W/ 250 OVERRIDE (IP): Mod: JZ | Performed by: INTERNAL MEDICINE

## 2025-03-20 PROCEDURE — 32555 ASPIRATE PLEURA W/ IMAGING: CPT | Performed by: HOSPITALIST

## 2025-03-20 PROCEDURE — 88342 IMHCHEM/IMCYTCHM 1ST ANTB: CPT | Performed by: STUDENT IN AN ORGANIZED HEALTH CARE EDUCATION/TRAINING PROGRAM

## 2025-03-20 PROCEDURE — 71045 X-RAY EXAM CHEST 1 VIEW: CPT

## 2025-03-20 PROCEDURE — 770020 HCHG ROOM/CARE - TELE (206)

## 2025-03-20 PROCEDURE — 87070 CULTURE OTHR SPECIMN AEROBIC: CPT

## 2025-03-20 PROCEDURE — 80048 BASIC METABOLIC PNL TOTAL CA: CPT

## 2025-03-20 PROCEDURE — 36415 COLL VENOUS BLD VENIPUNCTURE: CPT

## 2025-03-20 PROCEDURE — A9270 NON-COVERED ITEM OR SERVICE: HCPCS | Performed by: HOSPITALIST

## 2025-03-20 PROCEDURE — 700111 HCHG RX REV CODE 636 W/ 250 OVERRIDE (IP): Performed by: HOSPITALIST

## 2025-03-20 PROCEDURE — 88342 IMHCHEM/IMCYTCHM 1ST ANTB: CPT | Mod: 26 | Performed by: STUDENT IN AN ORGANIZED HEALTH CARE EDUCATION/TRAINING PROGRAM

## 2025-03-20 PROCEDURE — 83615 LACTATE (LD) (LDH) ENZYME: CPT

## 2025-03-20 PROCEDURE — 82150 ASSAY OF AMYLASE: CPT

## 2025-03-20 PROCEDURE — 88341 IMHCHEM/IMCYTCHM EA ADD ANTB: CPT | Mod: 26 | Performed by: STUDENT IN AN ORGANIZED HEALTH CARE EDUCATION/TRAINING PROGRAM

## 2025-03-20 PROCEDURE — 88341 IMHCHEM/IMCYTCHM EA ADD ANTB: CPT | Mod: 91 | Performed by: STUDENT IN AN ORGANIZED HEALTH CARE EDUCATION/TRAINING PROGRAM

## 2025-03-20 PROCEDURE — 87015 SPECIMEN INFECT AGNT CONCNTJ: CPT

## 2025-03-20 RX ORDER — BENZONATATE 100 MG/1
100 CAPSULE ORAL 3 TIMES DAILY PRN
Status: DISCONTINUED | OUTPATIENT
Start: 2025-03-20 | End: 2025-03-26 | Stop reason: HOSPADM

## 2025-03-20 RX ORDER — DEXTROSE MONOHYDRATE 50 MG/ML
INJECTION, SOLUTION INTRAVENOUS CONTINUOUS
Status: DISCONTINUED | OUTPATIENT
Start: 2025-03-20 | End: 2025-03-20

## 2025-03-20 RX ORDER — NALOXONE HYDROCHLORIDE 0.4 MG/ML
0.4 INJECTION, SOLUTION INTRAMUSCULAR; INTRAVENOUS; SUBCUTANEOUS PRN
Status: COMPLETED | OUTPATIENT
Start: 2025-03-20 | End: 2025-03-20

## 2025-03-20 RX ADMIN — RIVAROXABAN 10 MG: 10 TABLET, FILM COATED ORAL at 17:28

## 2025-03-20 RX ADMIN — HYDROCORTISONE 20 MG: 10 TABLET ORAL at 17:28

## 2025-03-20 RX ADMIN — PIPERACILLIN AND TAZOBACTAM 4.5 G: 4; .5 INJECTION, POWDER, FOR SOLUTION INTRAVENOUS at 08:08

## 2025-03-20 RX ADMIN — PIPERACILLIN AND TAZOBACTAM 4.5 G: 4; .5 INJECTION, POWDER, FOR SOLUTION INTRAVENOUS at 23:11

## 2025-03-20 RX ADMIN — ATORVASTATIN CALCIUM 20 MG: 20 TABLET, FILM COATED ORAL at 21:56

## 2025-03-20 RX ADMIN — FENTANYL CITRATE 25 MCG: 50 INJECTION, SOLUTION INTRAMUSCULAR; INTRAVENOUS at 21:17

## 2025-03-20 RX ADMIN — LIDOCAINE HYDROCHLORIDE 20 ML: 10 INJECTION, SOLUTION INFILTRATION; PERINEURAL at 13:48

## 2025-03-20 RX ADMIN — LEVOTHYROXINE SODIUM 125 MCG: 0.12 TABLET ORAL at 04:49

## 2025-03-20 RX ADMIN — GUAIFENESIN 600 MG: 600 TABLET, EXTENDED RELEASE ORAL at 17:28

## 2025-03-20 RX ADMIN — LIOTHYRONINE SODIUM 10 MCG: 5 TABLET ORAL at 06:21

## 2025-03-20 RX ADMIN — SODIUM CHLORIDE: 9 INJECTION, SOLUTION INTRAVENOUS at 07:22

## 2025-03-20 RX ADMIN — LINEZOLID 600 MG: 600 TABLET, FILM COATED ORAL at 06:15

## 2025-03-20 RX ADMIN — OSELTAMIVIR PHOSPHATE 30 MG: 30 CAPSULE ORAL at 17:28

## 2025-03-20 RX ADMIN — TAMSULOSIN HYDROCHLORIDE 0.4 MG: 0.4 CAPSULE ORAL at 17:28

## 2025-03-20 RX ADMIN — GABAPENTIN 100 MG: 100 CAPSULE ORAL at 17:28

## 2025-03-20 RX ADMIN — HYDROCORTISONE 20 MG: 10 TABLET ORAL at 06:15

## 2025-03-20 RX ADMIN — TAMSULOSIN HYDROCHLORIDE 0.4 MG: 0.4 CAPSULE ORAL at 06:15

## 2025-03-20 RX ADMIN — HYDROCODONE BITARTRATE AND HOMATROPINE METHYLBROMIDE 5 ML: 5; 1.5 SOLUTION ORAL at 13:49

## 2025-03-20 RX ADMIN — HYDROMORPHONE HYDROCHLORIDE 2 MG: 2 TABLET ORAL at 15:28

## 2025-03-20 RX ADMIN — OSELTAMIVIR PHOSPHATE 30 MG: 30 CAPSULE ORAL at 06:20

## 2025-03-20 RX ADMIN — OMEPRAZOLE 40 MG: 20 CAPSULE, DELAYED RELEASE ORAL at 06:15

## 2025-03-20 RX ADMIN — BENZONATATE 100 MG: 100 CAPSULE ORAL at 21:43

## 2025-03-20 RX ADMIN — HYDROMORPHONE HYDROCHLORIDE 1 MG: 1 INJECTION, SOLUTION INTRAMUSCULAR; INTRAVENOUS; SUBCUTANEOUS at 18:03

## 2025-03-20 RX ADMIN — HYDROMORPHONE HYDROCHLORIDE 2 MG: 2 TABLET ORAL at 02:06

## 2025-03-20 RX ADMIN — HYDROMORPHONE HYDROCHLORIDE 2 MG: 2 TABLET ORAL at 06:17

## 2025-03-20 RX ADMIN — GUAIFENESIN 600 MG: 600 TABLET, EXTENDED RELEASE ORAL at 06:15

## 2025-03-20 RX ADMIN — PIPERACILLIN AND TAZOBACTAM 4.5 G: 4; .5 INJECTION, POWDER, FOR SOLUTION INTRAVENOUS at 15:33

## 2025-03-20 RX ADMIN — LINEZOLID 600 MG: 600 TABLET, FILM COATED ORAL at 17:28

## 2025-03-20 RX ADMIN — BENZONATATE 100 MG: 100 CAPSULE ORAL at 04:49

## 2025-03-20 RX ADMIN — AMLODIPINE BESYLATE 5 MG: 5 TABLET ORAL at 06:15

## 2025-03-20 ASSESSMENT — PAIN DESCRIPTION - PAIN TYPE
TYPE: ACUTE PAIN

## 2025-03-20 ASSESSMENT — FIBROSIS 4 INDEX: FIB4 SCORE: 2.64

## 2025-03-20 NOTE — DIETARY
Nutrition Services: Initial Assessment     Day 1 of admit. Bartolo Berrios [facility] is 80 y.o., male with admitting DX of Sepsis (HCC) [A41.9].    Consult Received for: MST - Malnutrition Screening Tool score 2: +wt loss of unknown amount, x >1 yr, no poor PO intake PTA.    Current Hospital Problems List:      Sepsis (HCC) (POA: Yes)    Hypothyroid (POA: Yes)    Hyperlipidemia (POA: Yes)    Adrenal insufficiency (HCC), secondary (POA: Yes)    History of malignant melanoma, April 2016 (POA: Yes)      Adenocarcinoma of lung (HCC), stage 4 ([facility] Oct. 2021) (POA: Yes)    Right foot drop (POA: Yes)    Acute kidney injury superimposed on chronic kidney disease (HCC) (POA: Yes)    Peripheral neuropathy due to chemotherapy (HCC) (POA: Yes)    Pneumonia (POA: Unknown)    Pleural effusion (POA: Unknown)    Leukocytosis (POA: Unknown)    Influenza A (POA: Unknown)    Acute respiratory failure with hypoxia (HCC) (POA: Unknown)    Nutrition Assessment:      Height: 182.9 cm (6')  Weight: 74.5 kg (164 lb 3.9 oz)  Weight taken via: Bed Scale  BMI Calculated: 21.32  BMI Classification: WNL     Wt Readings from Last 5 Encounters:   03/20/25 74.5 kg (164 lb 3.9 oz)   01/31/25 71.4 kg (157 lb 6.5 oz)   12/10/24 69.9 kg (154 lb)   10/08/24 69.4 kg (153 lb)   07/27/24 70.3 kg (155 lb)   Wt trending up in the past >5 months    Objective:   Pertinent Medical Hx: carcinoma in situ of respiratory system with chemo completed a couple months ago, CKD 3, colostomy, diverticulosis, GERD, hx stroke, lap reid  Pt w/ cough for a couple weeks, became productive 7 days PTA, shortness of breath starting 6 days PTA.  Pertinent Labs: Na 132, BUN 33, sCr 2.04, GFR 33, Ca 7.0  Pertinent Meds: gabapentin, mucinex, cortef, dilaudid, synthroid, NS @ 83 mL/hr prilosec, tamiflu, zosyn  Skin/Wounds:  no documented wounds  Food Allergies: NKFA  Last BM:  Patient stated, Not observed  03/18/25 (PTA as per pt)     Current Diet Order/Intake:   Regular  diet; 25-50% x 1 meal per ADLs - dinner the evening of admit, likely did not get to select preferences for that meal    Subjective:   Currently on airborne/droplet isolation. RD not entering airborne isolation per department policy.   Dietary Recall/Energy Intake: no poor PO intake PTA per flowsheets. Only x1 meal documented here - PO intake still establishing.    Nutrition Focused Physical Exam (NFPE)  Weight Loss: none in the past 5 months - gradual trend up in the past 5 months per chart hx. Suspect current wt may be closer to prior wt of 157 lb (1/31/25) due to current 1+ edema to BLEs.  NFPE: BUDDY due to airborne isolation status  Fluid Accumulation: 1+ to BLEs  Reduced  Strength: N/A in acute care setting.    Nutrition Diagnosis:      Patient does not meet criteria in congruence with ASPEN/Academy guidelines for malnutrition: no poor PO intake PTA per admit screen. Pt w/ wt gain over the past >5 months per chart hx review.    Nutrition Interventions:      Nutrition Representative to visit pt for meal preferences.    Nutrition Monitoring and Evaluation:      Monitor nutrition POC, goal for >75% intake from meals. Monitor meal intake documentation for adequacy; follow up w/ interventions as indicated.  Monitor weight trends.    RD following and will provide updated recommendations as indicated.      Aleshia Ortiz R.D.                                         ASP[employer]/AND CRITERIA FOR MALNUTRITION

## 2025-03-20 NOTE — HOSPITAL COURSE
Bartolo Berrios [facility] is a 80 y.o. male who presented 3/19/2025 with severe shortness of breath and cough.  He does have a history of lung cancer having finished chemotherapy few months ago.  He has been increasingly short of breath over the last couple of months but very severe over the last 1 to 2 weeks.  He endorses fevers chills and significant pain in the left chest especially with cough.  CT shows a right pleural effusion and consolidation in the left chest base.  Given the asymptomatic nature of the pleural effusion on the right I wonder if this is malignant pleural effusion rather than acute in the acute decompensation is coming from the left-sided pneumonia.  He probably will need a thoracentesis prior to discharge both diagnostic and therapeutic.  Will discuss further with pulmonology as patient starts to show improvement.  Is currently requiring 3 L nasal cannula to maintain adequate saturation is not on oxygen at baseline.  Given his recent cancer and chemotherapy he is immunosuppressed therefore I will broaden his antibiotic coverage to Zyvox and Zosyn though he did receive Rocephin and azithromycin in the emergency room.  Procalcitonin is 12.7.  I will also place him on Hycodan to help with the pain as well as the cough and Mucinex as an expectorant.  CODE STATUS reviewed with the patient and he is full code.

## 2025-03-20 NOTE — CARE PLAN
The patient is Watcher - Medium risk of patient condition declining or worsening    Shift Goals  Clinical Goals: Monitor respiratory status, pain control  Patient Goals: pain mgt  Family Goals: Paib mgt, haines    Progress made toward(s) clinical / shift goals:      Problem: Knowledge Deficit - Standard  Goal: Patient and family/care givers will demonstrate understanding of plan of care, disease process/condition, diagnostic tests and medications  Outcome: Progressing  Note: Reviewed POC. Discussed and provided patient and family on medications and treatment. Bladder scanned done and showed 719 ml volume maximum, inserted haines ordered. Maintained skin clean and dry, applied sacral foam. Patient and wife verbalized understanding plan of care.      Problem: Respiratory  Goal: Patient will achieve/maintain optimum respiratory ventilation and gas exchange  Outcome: Progressing  Note: Maintained oxygen at 6 lpm via oxymask and SPO2 above 90%, monitor for respiratory distress, placed patient comfortably on bed above 45 degree head of bed, monitor for any respiratory distress.      Problem: Fall Risk  Goal: Patient will remain free from falls  Outcome: Progressing  Note: Fall and safety precaution placed. Side rails up and bed alarm on. Instructed patient to call whenever assistance is needed.        Patient is not progressing towards the following goals:      Problem: Pain - Standard  Goal: Alleviation of pain or a reduction in pain to the patient’s comfort goal  Outcome: Not Progressing  Note: Non pharmacological and pharmacological interventions discussed. Placed hot pack on affected area and given pain medicine as needed.Educated on pain medicine side effects.

## 2025-03-20 NOTE — PROGRESS NOTES
Telemetry Shift Summary     Per monitor tech:  Rhythm SR/BBB  HR Range 75-96  Ectopy o-fPac/rPvc  Measurements 0.16/0.14/0.44      Normal Values  Rhythm SR  HR Range:   Measurements: 0.12-0.20/0.06-0.10/0.30-0.52

## 2025-03-20 NOTE — PROCEDURES
Thoracentesis    Date/Time: 3/20/2025 1:50 PM    Performed by: Eliazar Bailey M.D.  Authorized by: Eliazar Bailey M.D.    Consent:     Consent obtained:  Verbal and written    Consent given by:  Patient    Risks, benefits, and alternatives were discussed: yes      Risks discussed:  Bleeding, infection, pain, incomplete drainage, nerve damage and pneumothorax    Alternatives discussed:  No treatment  Universal protocol:     Procedure explained and questions answered to patient or proxy's satisfaction: yes      Relevant documents present and verified: yes      Test results available: yes      Imaging studies available: yes      Required blood products, implants, devices, and special equipment available: yes      Site/side marked: yes      Immediately prior to procedure, a time out was called: yes      Patient identity confirmed:  Verbally with patient, hospital-assigned identification number and arm band  Sedation:     Sedation type:  None  Anesthesia:     Anesthesia method:  Local infiltration    Local anesthetic:  Lidocaine 1% w/o epi  Procedure details:     Preparation: Patient was prepped and draped in usual sterile fashion      Patient position:  Sitting    Location:  R posterior    Intercostal space:  7th    Puncture method:  Over-the-needle catheter    Ultrasound guidance: yes      Indwelling catheter placed: no      Needle gauge:  20    Number of attempts:  1    Drainage characteristics:  Cloudy  Post-procedure details:     Chest x-ray performed: yes      Chest x-ray findings:  Pleural effusion improved    Procedure completion:  Tolerated  Comments:      635 ml of fluid was removed, 30 ml was sent to lab in 3 separate specimen tubes.

## 2025-03-20 NOTE — PROGRESS NOTES
Late entry:     3/19/25 2000: Upon assessment noted patient experiencing urinary retention, with a strong urge to void but unable to do so. Bladder scan revealed 719 ml of urine. Patient wife at bedside and requested haines catheter placement instead of straight cath. Referred to MD and obtained order for Haines insertion. Successfully placed Haines catheter as ordered. Continue to monitor.

## 2025-03-20 NOTE — PROGRESS NOTES
[facility] Medicine Daily Progress Note    Date of Service  3/20/2025    Chief Complaint  Bartolo Berrios [facility] is a 80 y.o. male admitted 3/19/2025 with [place], shortness of breath, productive cough and left-sided chest pain    Hospital Course  Bartolo Berrios [facility] is a 80 y.o. male who presented 3/19/2025 with severe shortness of breath and cough.  He does have a history of lung cancer having finished chemotherapy few months ago.  He has been increasingly short of breath over the last couple of months but very severe over the last 1 to 2 weeks.  He endorses fevers chills and significant pain in the left chest especially with cough.  CT shows a right pleural effusion and consolidation in the left chest base.  Given the asymptomatic nature of the pleural effusion on the right I wonder if this is malignant pleural effusion rather than acute in the acute decompensation is coming from the left-sided pneumonia.  He probably will need a thoracentesis prior to discharge both diagnostic and therapeutic.  Will discuss further with pulmonology as patient starts to show improvement.  Is currently requiring 3 L nasal cannula to maintain adequate saturation is not on oxygen at baseline.  Given his recent cancer and chemotherapy he is immunosuppressed therefore I will broaden his antibiotic coverage to Zyvox and Zosyn though he did receive Rocephin and azithromycin in the emergency room.  Procalcitonin is 12.7.  I will also place him on Hycodan to help with the pain as well as the cough and Mucinex as an expectorant.  CODE STATUS reviewed with the patient and he is full code.     Interval Problem Update  3/20 patient had a difficult night and required Payne catheter secondary to urinary retention.  Creatinine is slightly worse today going from 1.6-2.04.  Lactic acid is normalized.  WBC count is slightly better going from 27 down to 24.  Though most of his symptoms are related to chest pain on the left side he does  have a sizable effusion on the right.  I have reached out to my partner for thoracentesis for diagnostic and therapeutic purposes.  Will send fluid for culture and pathology.  Hopefully with the removal of the fluid collection his hypoxia should improve.  He did receive multiple doses of pain medication yesterday and did require a dose of Narcan secondary to respiratory failure and readily turned around to where he was saturating much better.  Pain medication was dosed back to appropriate doses.    I have discussed this patient's plan of care and discharge plan at IDT rounds today with Case Management, Nursing, Nursing leadership, and other members of the IDT team.    Consultants/Specialty  none    Code Status  Full Code    Disposition  The patient is not medically cleared for discharge to home or a post-acute facility.  Anticipate discharge to: home with close outpatient follow-up    I have placed the appropriate orders for post-discharge needs.    Review of Systems  ROS     Physical Exam  Temp:  [36.7 °C (98 °F)-37.6 °C (99.6 °F)] 36.7 °C (98 °F)  Pulse:  [] 85  Resp:  [10-26] 20  BP: ()/(50-71) 94/57  SpO2:  [80 %-97 %] 95 %    Physical Exam    Fluids    Intake/Output Summary (Last 24 hours) at 3/20/2025 1531  Last data filed at 3/20/2025 1000  Gross per 24 hour   Intake 620 ml   Output 1100 ml   Net -480 ml        Laboratory  Recent Labs     03/19/25  0848 03/20/25  0121   WBC 27.6* 24.8*   RBC 4.20* 3.67*   HEMOGLOBIN 12.6* 10.9*   HEMATOCRIT 37.9* 34.1*   MCV 90.2 92.9   MCH 30.0 29.7   MCHC 33.2 32.0*   RDW 58.9* 61.1*   PLATELETCT 226 181   MPV 9.9 10.1     Recent Labs     03/19/25  0848 03/20/25  0121   SODIUM 135 132*   POTASSIUM 3.3* 4.3   CHLORIDE 103 99   CO2 23 23   GLUCOSE 80 75   BUN 32* 33*   CREATININE 1.65* 2.04*   CALCIUM 7.8* 7.0*     Recent Labs     03/19/25  1448   INR 1.02               Imaging  DX-CHEST-PORTABLE (1 VIEW)   Final Result      1.  Bibasilar atelectasis, left greater  than right.      2.  No evidence of pneumothorax postthoracentesis.      CT-CTA CHEST PULMONARY ARTERY W/ RECONS   Final Result      1.  No evidence of pulmonary embolus.      2.  Consolidation within the left lung base likely representing pneumonia.      3.  Moderate right and small left pleural effusions with bibasilar atelectasis.      4.  Surgical changes involving the right hilum.      5.  Interval progression of a compression fracture at the T8 level. There are chronic unchanged compression fractures involving T7, T6, T5, T9 and T10.      DX-CHEST-PORTABLE (1 VIEW)   Final Result      1.  Bibasilar atelectasis/consolidation, right greater than left.      2.  Small right pleural effusion.      3.  Cardiomegaly.           Assessment/Plan  * Sepsis (HCC)- (present on admission)  Assessment & Plan  This is Sepsis Present on admission  SIRS criteria identified on my evaluation include: Fever, with temperature greater than 100.9 deg F, Tachycardia, with heart rate greater than 90 BPM, and Leukocytosis, with WBC greater than 12,000  Clinical indicators of end organ dysfunction include Hypotension with systolic blood pressure less than 90 or MAP less than 65 and Acute Respiratory Failure - (mechanical ventilation or BiPap or PaO2/FiO2 ratio < 300)  Source is pneumonia  Sepsis protocol initiated  Crystalloid Fluid Administration: Fluid resuscitation ordered per standard protocol - 30 mL/kg per current or ideal body weight  IV antibiotics as appropriate for source of sepsis  Reassessment: I have reassessed the patient's hemodynamic status    Urinary retention  Assessment & Plan  Payne placed 3/19 due to retention      Acute respiratory failure with hypoxia (HCC)  Assessment & Plan  Oxygen titration, currently requiring 3 L to maintain adequate saturation    Influenza A  Assessment & Plan  Tamiflu  Oxygen titration  Suspect concurrent bacterial infection    Leukocytosis  Assessment & Plan  Secondary to significant  pneumonia, procalcitonin elevated at 12.7  Zyvox and Zosyn  Holding Vanco given acute kidney injury with a creatinine 1.6    Pleural effusion  Assessment & Plan  Recurrent right-sided pleural effusion seen on CT scan, continue to follow with treatment of antibiotics, may require thoracentesis    Pneumonia  Assessment & Plan  Patient is immunocompromised with recent chemotherapy and recent treatment of chronic sinusitis  Zyvox and Zosyn  Sputum culture as able  Hycodan for cough suppressant, Mucinex for expectorant  Thoracentesis today with 635ml removed, serosanguinous slightly cloudy, labs, cytology and pathology pending.    Peripheral neuropathy due to chemotherapy (HCC)- (present on admission)  Assessment & Plan  Continue gabapentin nightly    Acute kidney injury superimposed on chronic kidney disease (HCC)- (present on admission)  Assessment & Plan  Creatinine 2.04  Avoid nephrotoxin  Will use Zyvox instead of Vanco because of acute kidney injury  Continue with IV hydration    Right foot drop- (present on admission)  Assessment & Plan  Chronic    Adenocarcinoma of lung (HCC), stage 4 ([facility] Oct. 2021)- (present on admission)  Assessment & Plan  Patient finished chemotherapy a couple of months ago  Follows with Dr. Staley    History of malignant melanoma, April 2016- (present on admission)  Assessment & Plan  .      Adrenal insufficiency (HCC), secondary- (present on admission)  Assessment & Plan  Resume home dose steroid    Hyperlipidemia- (present on admission)  Assessment & Plan  Continue statin    Hypothyroid- (present on admission)  Assessment & Plan  Continue home dose Synthroid and Cytomel         VTE prophylaxis: VTE Selection    I have performed a physical exam and reviewed and updated ROS and Plan today (3/20/2025). In review of yesterday's note (3/19/2025), there are no changes except as documented above.    My total time spent caring for the patient on the day of the encounter was 54  minutes.   This does not include time spent on separately billable procedures/tests.

## 2025-03-20 NOTE — PROGRESS NOTES
4 Eyes Skin Assessment Completed by RHODA Rice and RHODA Victoria.    Head WDL; redness on the face  Ears WDL  Nose Redness  Mouth WDL  Neck Redness and Blanching  Breast/Chest Redness and Blanching  Shoulder Blades Redness and Blanching  Spine Redness and Blanching  (R) Arm/Elbow/Hand Bruising  (L) Arm/Elbow/Hand Bruising  Abdomen Scar  Groin WDL  Scrotum/Coccyx/Buttocks Redness  (R) Leg Redness, Scar, and Swelling- Surgical scar on right knee  (L) Leg Swelling  (R) Heel/Foot/Toe WDL  (L) Heel/Foot/Toe WDL            Devices In Places Tele Box, Pulse Ox, Payne, and Oxy Mask      Interventions In Place NC W/Ear Foams, Sacral Mepilex, and Pillows    Possible Skin Injury No    Pictures Uploaded Into Epic N/A  Wound Consult Placed N/A  RN Wound Prevention Protocol Ordered No

## 2025-03-20 NOTE — CARE PLAN
The patient is Watcher - Medium risk of patient condition declining or worsening    Shift Goals  Clinical Goals: Monitor oxygen, pain control  Patient Goals: pain managmet  Family Goals: pain managment, updates    Progress made toward(s) clinical / shift goals:    Patient given dilaudid for uncontrolled pain but did not tolerate 3rd dose of 1mg IV and was given narcan shortly after. Patient condition improved after narcan administration, still having pain. Lidocaine patch applied per orders. Patient on increased oxygen needs, currently on 6L Oxymask from 3LNC. Room air baseline. Spouse updated, on POC and events.     Problem: Knowledge Deficit - Standard  Goal: Patient and family/care givers will demonstrate understanding of plan of care, disease process/condition, diagnostic tests and medications  Outcome: Progressing       Patient is not progressing towards the following goals:      Problem: Respiratory  Goal: Patient will achieve/maintain optimum respiratory ventilation and gas exchange  Outcome: Not Progressing     Problem: Pain - Standard  Goal: Alleviation of pain or a reduction in pain to the patient’s comfort goal  Outcome: Not Progressing

## 2025-03-20 NOTE — PROGRESS NOTES
Telemetry Shift Summary    Rhythm : ST  HR Range 112  Ectopy : rPAC/PVC  Measurements 0.18/0.13/0.40    Normal Values  Rhythm SR  HR Range:   Measurements: 0.12-0.20/0.06-0.10/0.30-0.52

## 2025-03-21 ENCOUNTER — APPOINTMENT (OUTPATIENT)
Dept: RADIOLOGY | Facility: MEDICAL CENTER | Age: 80
End: 2025-03-21
Payer: MEDICARE

## 2025-03-21 PROBLEM — R07.9 LEFT-SIDED CHEST PAIN: Status: ACTIVE | Noted: 2025-03-21

## 2025-03-21 LAB
ANION GAP SERPL CALC-SCNC: 11 MMOL/L (ref 7–16)
BACTERIA UR CULT: NORMAL
BUN SERPL-MCNC: 26 MG/DL (ref 8–22)
CALCIUM SERPL-MCNC: 7.1 MG/DL (ref 8.4–10.2)
CHLORIDE SERPL-SCNC: 100 MMOL/L (ref 96–112)
CO2 SERPL-SCNC: 21 MMOL/L (ref 20–33)
CREAT SERPL-MCNC: 1.71 MG/DL (ref 0.5–1.4)
ERYTHROCYTE [DISTWIDTH] IN BLOOD BY AUTOMATED COUNT: 60.5 FL (ref 35.9–50)
GFR SERPLBLD CREATININE-BSD FMLA CKD-EPI: 40 ML/MIN/1.73 M 2
GLUCOSE SERPL-MCNC: 158 MG/DL (ref 65–99)
HCT VFR BLD AUTO: 33.7 % (ref 42–52)
HGB BLD-MCNC: 10.7 G/DL (ref 14–18)
MCH RBC QN AUTO: 29.3 PG (ref 27–33)
MCHC RBC AUTO-ENTMCNC: 31.8 G/DL (ref 32.3–36.5)
MCV RBC AUTO: 92.3 FL (ref 81.4–97.8)
PATH REV: NORMAL
PATH REV: NORMAL
PLATELET # BLD AUTO: 201 K/UL (ref 164–446)
PMV BLD AUTO: 10.2 FL (ref 9–12.9)
POTASSIUM SERPL-SCNC: 3.4 MMOL/L (ref 3.6–5.5)
PROCALCITONIN SERPL-MCNC: 8.87 NG/ML
RBC # BLD AUTO: 3.65 M/UL (ref 4.7–6.1)
SIGNIFICANT IND 70042: NORMAL
SITE SITE: NORMAL
SODIUM SERPL-SCNC: 132 MMOL/L (ref 135–145)
SOURCE SOURCE: NORMAL
WBC # BLD AUTO: 17.3 K/UL (ref 4.8–10.8)

## 2025-03-21 PROCEDURE — 770020 HCHG ROOM/CARE - TELE (206)

## 2025-03-21 PROCEDURE — 700102 HCHG RX REV CODE 250 W/ 637 OVERRIDE(OP): Performed by: INTERNAL MEDICINE

## 2025-03-21 PROCEDURE — 94760 N-INVAS EAR/PLS OXIMETRY 1: CPT

## 2025-03-21 PROCEDURE — 99233 SBSQ HOSP IP/OBS HIGH 50: CPT | Performed by: INTERNAL MEDICINE

## 2025-03-21 PROCEDURE — 700111 HCHG RX REV CODE 636 W/ 250 OVERRIDE (IP): Mod: JZ | Performed by: INTERNAL MEDICINE

## 2025-03-21 PROCEDURE — 36415 COLL VENOUS BLD VENIPUNCTURE: CPT

## 2025-03-21 PROCEDURE — 700102 HCHG RX REV CODE 250 W/ 637 OVERRIDE(OP): Performed by: HOSPITALIST

## 2025-03-21 PROCEDURE — 85027 COMPLETE CBC AUTOMATED: CPT

## 2025-03-21 PROCEDURE — 84145 PROCALCITONIN (PCT): CPT

## 2025-03-21 PROCEDURE — A9270 NON-COVERED ITEM OR SERVICE: HCPCS | Performed by: INTERNAL MEDICINE

## 2025-03-21 PROCEDURE — 80048 BASIC METABOLIC PNL TOTAL CA: CPT

## 2025-03-21 PROCEDURE — 700105 HCHG RX REV CODE 258: Performed by: INTERNAL MEDICINE

## 2025-03-21 PROCEDURE — A9270 NON-COVERED ITEM OR SERVICE: HCPCS | Performed by: HOSPITALIST

## 2025-03-21 RX ORDER — NALOXONE HYDROCHLORIDE 0.4 MG/ML
0.4 INJECTION, SOLUTION INTRAMUSCULAR; INTRAVENOUS; SUBCUTANEOUS PRN
Status: DISCONTINUED | OUTPATIENT
Start: 2025-03-21 | End: 2025-03-26 | Stop reason: HOSPADM

## 2025-03-21 RX ADMIN — OSELTAMIVIR PHOSPHATE 30 MG: 30 CAPSULE ORAL at 06:09

## 2025-03-21 RX ADMIN — GUAIFENESIN 600 MG: 600 TABLET, EXTENDED RELEASE ORAL at 06:09

## 2025-03-21 RX ADMIN — LIOTHYRONINE SODIUM 10 MCG: 5 TABLET ORAL at 06:09

## 2025-03-21 RX ADMIN — HYDROCORTISONE 20 MG: 10 TABLET ORAL at 06:09

## 2025-03-21 RX ADMIN — BENZONATATE 100 MG: 100 CAPSULE ORAL at 16:51

## 2025-03-21 RX ADMIN — FENTANYL CITRATE 25 MCG: 50 INJECTION, SOLUTION INTRAMUSCULAR; INTRAVENOUS at 20:43

## 2025-03-21 RX ADMIN — TAMSULOSIN HYDROCHLORIDE 0.4 MG: 0.4 CAPSULE ORAL at 16:41

## 2025-03-21 RX ADMIN — SODIUM CHLORIDE: 9 INJECTION, SOLUTION INTRAVENOUS at 16:43

## 2025-03-21 RX ADMIN — OMEPRAZOLE 40 MG: 20 CAPSULE, DELAYED RELEASE ORAL at 06:09

## 2025-03-21 RX ADMIN — FENTANYL CITRATE 25 MCG: 50 INJECTION, SOLUTION INTRAMUSCULAR; INTRAVENOUS at 16:51

## 2025-03-21 RX ADMIN — LINEZOLID 600 MG: 600 TABLET, FILM COATED ORAL at 06:09

## 2025-03-21 RX ADMIN — AMLODIPINE BESYLATE 5 MG: 5 TABLET ORAL at 06:09

## 2025-03-21 RX ADMIN — GUAIFENESIN 600 MG: 600 TABLET, EXTENDED RELEASE ORAL at 16:41

## 2025-03-21 RX ADMIN — OSELTAMIVIR PHOSPHATE 30 MG: 30 CAPSULE ORAL at 16:42

## 2025-03-21 RX ADMIN — HYDROMORPHONE HYDROCHLORIDE 2 MG: 2 TABLET ORAL at 00:30

## 2025-03-21 RX ADMIN — LINEZOLID 600 MG: 600 TABLET, FILM COATED ORAL at 16:41

## 2025-03-21 RX ADMIN — PIPERACILLIN AND TAZOBACTAM 4.5 G: 4; .5 INJECTION, POWDER, FOR SOLUTION INTRAVENOUS at 23:25

## 2025-03-21 RX ADMIN — ATORVASTATIN CALCIUM 20 MG: 20 TABLET, FILM COATED ORAL at 20:43

## 2025-03-21 RX ADMIN — PIPERACILLIN AND TAZOBACTAM 4.5 G: 4; .5 INJECTION, POWDER, FOR SOLUTION INTRAVENOUS at 16:55

## 2025-03-21 RX ADMIN — HYDROMORPHONE HYDROCHLORIDE 2 MG: 2 TABLET ORAL at 18:36

## 2025-03-21 RX ADMIN — HYDROCORTISONE 20 MG: 10 TABLET ORAL at 16:42

## 2025-03-21 RX ADMIN — LEVOTHYROXINE SODIUM 125 MCG: 0.12 TABLET ORAL at 05:55

## 2025-03-21 RX ADMIN — TAMSULOSIN HYDROCHLORIDE 0.4 MG: 0.4 CAPSULE ORAL at 06:09

## 2025-03-21 RX ADMIN — GABAPENTIN 100 MG: 100 CAPSULE ORAL at 16:42

## 2025-03-21 RX ADMIN — HYDROMORPHONE HYDROCHLORIDE 2 MG: 2 TABLET ORAL at 13:57

## 2025-03-21 RX ADMIN — HYDROMORPHONE HYDROCHLORIDE 2 MG: 2 TABLET ORAL at 05:55

## 2025-03-21 RX ADMIN — HYDROCODONE BITARTRATE AND HOMATROPINE METHYLBROMIDE 5 ML: 5; 1.5 SOLUTION ORAL at 10:01

## 2025-03-21 RX ADMIN — HYDROMORPHONE HYDROCHLORIDE 2 MG: 2 TABLET ORAL at 23:28

## 2025-03-21 RX ADMIN — PIPERACILLIN AND TAZOBACTAM 4.5 G: 4; .5 INJECTION, POWDER, FOR SOLUTION INTRAVENOUS at 07:59

## 2025-03-21 RX ADMIN — RIVAROXABAN 10 MG: 10 TABLET, FILM COATED ORAL at 16:42

## 2025-03-21 ASSESSMENT — ENCOUNTER SYMPTOMS
CLAUDICATION: 0
COUGH: 1
INSOMNIA: 0
HEADACHES: 0
PHOTOPHOBIA: 0
SPEECH CHANGE: 0
WEAKNESS: 0
SENSORY CHANGE: 0
CONSTIPATION: 0
MYALGIAS: 0
FEVER: 0
SPUTUM PRODUCTION: 1
HEARTBURN: 0
DEPRESSION: 0
BLURRED VISION: 0
SHORTNESS OF BREATH: 1
DIZZINESS: 0
CHILLS: 0
VOMITING: 0
ABDOMINAL PAIN: 0
NERVOUS/ANXIOUS: 0
DIARRHEA: 0

## 2025-03-21 ASSESSMENT — PAIN DESCRIPTION - PAIN TYPE
TYPE: ACUTE PAIN

## 2025-03-21 NOTE — ASSESSMENT & PLAN NOTE
Related to the pneumonia  Stable with Dilaudid but responded better to fentanyl  Trial of fentanyl patch, 12 mcg ineffective, moved to 25mcg  Received 2 doses of dexamethasone 6 mg and helped some but still intense pain  Lidocaine ineffective  Oxycodone ineffective  3/25: I increased fentanyl patch to 37mcg, also one time dose of IV toradol

## 2025-03-21 NOTE — CARE PLAN
The patient is Watcher - Medium risk of patient condition declining or worsening    Shift Goals  Clinical Goals: Pain management, monitor respiratory status  Patient Goals: Pain control, sleep  Family Goals: Paib zaki luevano    Progress made toward(s) clinical / shift goals:      Problem: Knowledge Deficit - Standard  Goal: Patient and family/care givers will demonstrate understanding of plan of care, disease process/condition, diagnostic tests and medications  Outcome: Progressing  Note: Reviewed POC. Wife at bedside, discussed and provided education on treatment and medication ( especially fentanyl), monitoring oxygen needs and pain management, maintained skin clean and dry. Cleaned and maintained Payne cath in placed, ensuring proper hygiene and patency. Patient and wife verbalized understanding plan of care. Continue to monitor.      Problem: Respiratory  Goal: Patient will achieve/maintain optimum respiratory ventilation and gas exchange  Outcome: Progressing  Note: Oxygen maintained at 2 lpm via nasal cannula and SPO2 above 90%, monitor for respiratory distress. Placed on High fowlers position.      Problem: Pain - Standard  Goal: Alleviation of pain or a reduction in pain to the patient’s comfort goal  Outcome: Progressing  Note: Given fentanyl as ordered and remained inside the room for more than 30 mins for monitoring for vitals and alertness. Patient was able to remain alert/oriented and vital signs within normal range after giving fentanyl. Patient stated alleviation of pain from 7/10 to 5 or 6 pain scale.      Problem: Fall Risk  Goal: Patient will remain free from falls  Outcome: Progressing  Note: Fall and safety precautions in placed. Bed alarm on and side rails up. Instructed patient to call whenever assistance is needed. Call lights within reach.        Patient is not progressing towards the following goals:

## 2025-03-21 NOTE — PROGRESS NOTES
Telemetry Shift Summary     Rhythm: SR BBB  HR: 70-80  Ectopy: fPAC, r-fPVC    Measurements: .16/.14/.40    Normal Values  Rhythm: SR  HR:   Measurements: 0.12-0.20/0.08-0.10/0.30-0.52

## 2025-03-21 NOTE — PROGRESS NOTES
Telemetry Shift Summary    Per monitor tech:  Rhythm SR  HR Range 70-87  Ectopy fPac  Measurements 0.18/0.14/0.43      Normal Values  Rhythm SR  HR Range:   Measurements: 0.12-0.20/0.06-0.10/0.30-0.52

## 2025-03-21 NOTE — PROGRESS NOTES
[facility] Medicine Daily Progress Note    Date of Service  3/21/2025    Chief Complaint  Bartolo Berrios [facility] is a 80 y.o. male admitted 3/19/2025 with [place], shortness of breath, productive cough and left-sided chest pain    Hospital Course  Bartolo Berrios [facility] is a 80 y.o. male who presented 3/19/2025 with severe shortness of breath and cough.  He does have a history of lung cancer having finished chemotherapy few months ago.  He has been increasingly short of breath over the last couple of months but very severe over the last 1 to 2 weeks.  He endorses fevers chills and significant pain in the left chest especially with cough.  CT shows a right pleural effusion and consolidation in the left chest base.  Given the asymptomatic nature of the pleural effusion on the right I wonder if this is malignant pleural effusion rather than acute in the acute decompensation is coming from the left-sided pneumonia.  He probably will need a thoracentesis prior to discharge both diagnostic and therapeutic.  Will discuss further with pulmonology as patient starts to show improvement.  Is currently requiring 3 L nasal cannula to maintain adequate saturation is not on oxygen at baseline.  Given his recent cancer and chemotherapy he is immunosuppressed therefore I will broaden his antibiotic coverage to Zyvox and Zosyn though he did receive Rocephin and azithromycin in the emergency room.  Procalcitonin is 12.7.  I will also place him on Hycodan to help with the pain as well as the cough and Mucinex as an expectorant.  CODE STATUS reviewed with the patient and he is full code.     Interval Problem Update  3/20 patient had a difficult night and required Payne catheter secondary to urinary retention.  Creatinine is slightly worse today going from 1.6-2.04.  Lactic acid is normalized.  WBC count is slightly better going from 27 down to 24.  Though most of his symptoms are related to chest pain on the left side he does  have a sizable effusion on the right.  I have reached out to my partner for thoracentesis for diagnostic and therapeutic purposes.  Will send fluid for culture and pathology.  Hopefully with the removal of the fluid collection his hypoxia should improve.  He did receive multiple doses of pain medication yesterday and did require a dose of Narcan secondary to respiratory failure and readily turned around to where he was saturating much better.  Pain medication was dosed back to appropriate doses.  3/21 patient still with significant left-sided chest pain that responded the best to 25 mcg of fentanyl compared to the oxycodone and Dilaudid that he had been receiving.  No help with the lidocaine patch.  Hycodan is helping with the cough but have to watch for respiratory depression with multiple narcotics.  As needed Narcan always to be readily available on the MAR.  Even though patient is not feeling better he looks markedly better especially with skin turgor and awareness of his medical condition.    I have discussed this patient's plan of care and discharge plan at IDT rounds today with Case Management, Nursing, Nursing leadership, and other members of the IDT team.    Consultants/Specialty  none    Code Status  Full Code    Disposition  The patient is not medically cleared for discharge to home or a post-acute facility.  Anticipate discharge to: home with close outpatient follow-up    I have placed the appropriate orders for post-discharge needs.    Review of Systems  Review of Systems   Constitutional:  Negative for chills and fever.   HENT:  Negative for congestion.    Eyes:  Negative for blurred vision and photophobia.   Respiratory:  Positive for cough, sputum production and shortness of breath.    Cardiovascular:  Positive for chest pain. Negative for claudication and leg swelling.   Gastrointestinal:  Negative for abdominal pain, constipation, diarrhea, heartburn and vomiting.   Genitourinary:  Negative for  dysuria and hematuria.   Musculoskeletal:  Negative for joint pain and myalgias.   Skin:  Negative for itching and rash.   Neurological:  Negative for dizziness, sensory change, speech change, weakness and headaches.   Psychiatric/Behavioral:  Negative for depression. The patient is not nervous/anxious and does not have insomnia.         Physical Exam  Temp:  [36.3 °C (97.4 °F)-37.6 °C (99.6 °F)] 36.9 °C (98.5 °F)  Pulse:  [75-85] 80  Resp:  [18-20] 18  BP: (106-139)/(55-69) 113/55  SpO2:  [93 %-96 %] 96 %    Physical Exam  Vitals and nursing note reviewed.   Constitutional:       General: He is not in acute distress.     Appearance: Normal appearance.   HENT:      Head: Normocephalic and atraumatic.   Eyes:      General: No scleral icterus.     Extraocular Movements: Extraocular movements intact.   Cardiovascular:      Rate and Rhythm: Normal rate and regular rhythm.      Pulses: Normal pulses.      Heart sounds: Normal heart sounds. No murmur heard.  Pulmonary:      Effort: Pulmonary effort is normal. No respiratory distress.      Breath sounds: Normal breath sounds. No wheezing, rhonchi or rales.      Comments: Better air movement in the right chest postthoracentesis  Chest:      Chest wall: Tenderness (Left-sided with palpation) present.   Abdominal:      General: Abdomen is flat. Bowel sounds are normal. There is no distension.      Palpations: Abdomen is soft.      Tenderness: There is no rebound.   Musculoskeletal:         General: No swelling or tenderness.      Cervical back: Normal range of motion and neck supple.   Lymphadenopathy:      Cervical: No cervical adenopathy.   Skin:     Coloration: Skin is not jaundiced.      Findings: No erythema.   Neurological:      General: No focal deficit present.      Mental Status: He is alert and oriented to person, place, and time. Mental status is at baseline.      Cranial Nerves: No cranial nerve deficit.   Psychiatric:         Mood and Affect: Mood normal.          Behavior: Behavior normal.         Fluids    Intake/Output Summary (Last 24 hours) at 3/21/2025 1336  Last data filed at 3/21/2025 0400  Gross per 24 hour   Intake 400 ml   Output 1800 ml   Net -1400 ml        Laboratory  Recent Labs     03/19/25  0848 03/20/25  0121 03/21/25  0121   WBC 27.6* 24.8* 17.3*   RBC 4.20* 3.67* 3.65*   HEMOGLOBIN 12.6* 10.9* 10.7*   HEMATOCRIT 37.9* 34.1* 33.7*   MCV 90.2 92.9 92.3   MCH 30.0 29.7 29.3   MCHC 33.2 32.0* 31.8*   RDW 58.9* 61.1* 60.5*   PLATELETCT 226 181 201   MPV 9.9 10.1 10.2     Recent Labs     03/19/25  0848 03/20/25  0121 03/21/25  0121   SODIUM 135 132* 132*   POTASSIUM 3.3* 4.3 3.4*   CHLORIDE 103 99 100   CO2 23 23 21   GLUCOSE 80 75 158*   BUN 32* 33* 26*   CREATININE 1.65* 2.04* 1.71*   CALCIUM 7.8* 7.0* 7.1*     Recent Labs     03/19/25  1448   INR 1.02               Imaging  DX-CHEST-PORTABLE (1 VIEW)   Final Result      1.  Bibasilar atelectasis, left greater than right.      2.  No evidence of pneumothorax postthoracentesis.      CT-CTA CHEST PULMONARY ARTERY W/ RECONS   Final Result      1.  No evidence of pulmonary embolus.      2.  Consolidation within the left lung base likely representing pneumonia.      3.  Moderate right and small left pleural effusions with bibasilar atelectasis.      4.  Surgical changes involving the right hilum.      5.  Interval progression of a compression fracture at the T8 level. There are chronic unchanged compression fractures involving T7, T6, T5, T9 and T10.      DX-CHEST-PORTABLE (1 VIEW)   Final Result      1.  Bibasilar atelectasis/consolidation, right greater than left.      2.  Small right pleural effusion.      3.  Cardiomegaly.           Assessment/Plan  * Sepsis (HCC)- (present on admission)  Assessment & Plan  This is Sepsis Present on admission  SIRS criteria identified on my evaluation include: Fever, with temperature greater than 100.9 deg F, Tachycardia, with heart rate greater than 90 BPM, and Leukocytosis,  with WBC greater than 12,000  Clinical indicators of end organ dysfunction include Hypotension with systolic blood pressure less than 90 or MAP less than 65 and Acute Respiratory Failure - (mechanical ventilation or BiPap or PaO2/FiO2 ratio < 300)  Source is pneumonia  Sepsis protocol initiated  Crystalloid Fluid Administration: Fluid resuscitation ordered per standard protocol - 30 mL/kg per current or ideal body weight  IV antibiotics as appropriate for source of sepsis  Reassessment: I have reassessed the patient's hemodynamic status    Left-sided chest pain  Assessment & Plan  Related to the pneumonia  Stable with Dilaudid but responded better to fentanyl  De-escalate pain medication as able  Likely would be best served by an anti-inflammatory but cannot give secondary to acute kidney injury  Lidocaine ineffective  Oxycodone ineffective    Urinary retention  Assessment & Plan  Payne placed 3/19 due to retention      Acute respiratory failure with hypoxia (HCC)  Assessment & Plan  Oxygen titration, currently requiring 3 L to maintain adequate saturation    Influenza A  Assessment & Plan  Tamiflu  Oxygen titration  Suspect concurrent bacterial infection    Leukocytosis  Assessment & Plan  Secondary to significant pneumonia, procalcitonin elevated at 12.7  Zyvox and Zosyn  WBC count continues to drop, down to 17.3    Pleural effusion  Assessment & Plan  Thoracentesis completed on 3/20  Cultures negative  Pending pathology    Pneumonia  Assessment & Plan  Patient is immunocompromised with recent chemotherapy and recent treatment of chronic sinusitis  Zyvox and Zosyn  Negative blood cultures  Sputum culture as able  No growth from pleural fluid culture  Hycodan for cough suppressant, Mucinex for expectorant  Thoracentesis 3/20 with 635ml removed, serosanguinous slightly cloudy, labs, cytology and pathology pending.    Peripheral neuropathy due to chemotherapy (HCC)- (present on admission)  Assessment & Plan  Continue  gabapentin nightly    Acute kidney injury superimposed on chronic kidney disease (HCC)- (present on admission)  Assessment & Plan  Creatinine better at 1.7  Avoid nephrotoxin  Will use Zyvox instead of Vanco because of acute kidney injury  Continue with IV hydration    Right foot drop- (present on admission)  Assessment & Plan  Chronic    Adenocarcinoma of lung (HCC), stage 4 ([facility] Oct. 2021)- (present on admission)  Assessment & Plan  Patient finished chemotherapy a couple of months ago  Follows with Dr. Corral and Phil  Pending pathology from thoracentesis, should be available on Monday    History of malignant melanoma, April 2016- (present on admission)  Assessment & Plan  .      Adrenal insufficiency (HCC), secondary- (present on admission)  Assessment & Plan  Resume home dose steroid    Hyperlipidemia- (present on admission)  Assessment & Plan  Continue statin    Hypothyroid- (present on admission)  Assessment & Plan  Continue home dose Synthroid and Cytomel         VTE prophylaxis:   SCDs/TEDs      I have performed a physical exam and reviewed and updated ROS and Plan today (3/21/2025). In review of yesterday's note (3/20/2025), there are no changes except as documented above.    My total time spent caring for the patient on the day of the encounter was 57 minutes.   This does not include time spent on separately billable procedures/tests.

## 2025-03-22 LAB
ANION GAP SERPL CALC-SCNC: 11 MMOL/L (ref 7–16)
BUN SERPL-MCNC: 13 MG/DL (ref 8–22)
CALCIUM SERPL-MCNC: 7.4 MG/DL (ref 8.4–10.2)
CHLORIDE SERPL-SCNC: 103 MMOL/L (ref 96–112)
CO2 SERPL-SCNC: 24 MMOL/L (ref 20–33)
CREAT SERPL-MCNC: 1.45 MG/DL (ref 0.5–1.4)
ERYTHROCYTE [DISTWIDTH] IN BLOOD BY AUTOMATED COUNT: 61 FL (ref 35.9–50)
GFR SERPLBLD CREATININE-BSD FMLA CKD-EPI: 49 ML/MIN/1.73 M 2
GLUCOSE SERPL-MCNC: 98 MG/DL (ref 65–99)
HCT VFR BLD AUTO: 38.3 % (ref 42–52)
HGB BLD-MCNC: 12.2 G/DL (ref 14–18)
MCH RBC QN AUTO: 29.7 PG (ref 27–33)
MCHC RBC AUTO-ENTMCNC: 31.9 G/DL (ref 32.3–36.5)
MCV RBC AUTO: 93.2 FL (ref 81.4–97.8)
PLATELET # BLD AUTO: 232 K/UL (ref 164–446)
PMV BLD AUTO: 10.5 FL (ref 9–12.9)
POTASSIUM SERPL-SCNC: 3.5 MMOL/L (ref 3.6–5.5)
PRELIMINARY RPT Q0601: NORMAL
RBC # BLD AUTO: 4.11 M/UL (ref 4.7–6.1)
RHODAMINE-AURAMINE STN SPEC: NORMAL
SCCMEC + MECA PNL NOSE NAA+PROBE: NEGATIVE
SODIUM SERPL-SCNC: 138 MMOL/L (ref 135–145)
WBC # BLD AUTO: 13.5 K/UL (ref 4.8–10.8)

## 2025-03-22 PROCEDURE — 700105 HCHG RX REV CODE 258: Performed by: INTERNAL MEDICINE

## 2025-03-22 PROCEDURE — 99233 SBSQ HOSP IP/OBS HIGH 50: CPT | Performed by: INTERNAL MEDICINE

## 2025-03-22 PROCEDURE — 700111 HCHG RX REV CODE 636 W/ 250 OVERRIDE (IP): Mod: JZ | Performed by: INTERNAL MEDICINE

## 2025-03-22 PROCEDURE — 94760 N-INVAS EAR/PLS OXIMETRY 1: CPT

## 2025-03-22 PROCEDURE — 770020 HCHG ROOM/CARE - TELE (206)

## 2025-03-22 PROCEDURE — 85027 COMPLETE CBC AUTOMATED: CPT

## 2025-03-22 PROCEDURE — 80048 BASIC METABOLIC PNL TOTAL CA: CPT

## 2025-03-22 PROCEDURE — 700102 HCHG RX REV CODE 250 W/ 637 OVERRIDE(OP): Performed by: HOSPITALIST

## 2025-03-22 PROCEDURE — 700102 HCHG RX REV CODE 250 W/ 637 OVERRIDE(OP): Performed by: INTERNAL MEDICINE

## 2025-03-22 PROCEDURE — A9270 NON-COVERED ITEM OR SERVICE: HCPCS | Performed by: HOSPITALIST

## 2025-03-22 PROCEDURE — A9270 NON-COVERED ITEM OR SERVICE: HCPCS | Performed by: INTERNAL MEDICINE

## 2025-03-22 PROCEDURE — 87641 MR-STAPH DNA AMP PROBE: CPT

## 2025-03-22 PROCEDURE — 36415 COLL VENOUS BLD VENIPUNCTURE: CPT

## 2025-03-22 RX ORDER — DEXAMETHASONE SODIUM PHOSPHATE 4 MG/ML
6 INJECTION, SOLUTION INTRA-ARTICULAR; INTRALESIONAL; INTRAMUSCULAR; INTRAVENOUS; SOFT TISSUE EVERY 6 HOURS
Status: COMPLETED | OUTPATIENT
Start: 2025-03-22 | End: 2025-03-23

## 2025-03-22 RX ADMIN — HYDROCORTISONE 20 MG: 10 TABLET ORAL at 17:51

## 2025-03-22 RX ADMIN — OSELTAMIVIR PHOSPHATE 30 MG: 30 CAPSULE ORAL at 17:51

## 2025-03-22 RX ADMIN — LINEZOLID 600 MG: 600 TABLET, FILM COATED ORAL at 05:08

## 2025-03-22 RX ADMIN — ATORVASTATIN CALCIUM 20 MG: 20 TABLET, FILM COATED ORAL at 20:55

## 2025-03-22 RX ADMIN — AMLODIPINE BESYLATE 5 MG: 5 TABLET ORAL at 05:07

## 2025-03-22 RX ADMIN — DEXAMETHASONE SODIUM PHOSPHATE 6 MG: 4 INJECTION INTRA-ARTICULAR; INTRALESIONAL; INTRAMUSCULAR; INTRAVENOUS; SOFT TISSUE at 17:52

## 2025-03-22 RX ADMIN — LIOTHYRONINE SODIUM 10 MCG: 5 TABLET ORAL at 05:07

## 2025-03-22 RX ADMIN — BENZONATATE 100 MG: 100 CAPSULE ORAL at 02:38

## 2025-03-22 RX ADMIN — GUAIFENESIN 600 MG: 600 TABLET, EXTENDED RELEASE ORAL at 17:51

## 2025-03-22 RX ADMIN — HYDROMORPHONE HYDROCHLORIDE 2 MG: 2 TABLET ORAL at 20:55

## 2025-03-22 RX ADMIN — LINEZOLID 600 MG: 600 TABLET, FILM COATED ORAL at 17:51

## 2025-03-22 RX ADMIN — OMEPRAZOLE 40 MG: 20 CAPSULE, DELAYED RELEASE ORAL at 05:08

## 2025-03-22 RX ADMIN — TAMSULOSIN HYDROCHLORIDE 0.4 MG: 0.4 CAPSULE ORAL at 05:08

## 2025-03-22 RX ADMIN — HYDROMORPHONE HYDROCHLORIDE 2 MG: 2 TABLET ORAL at 10:49

## 2025-03-22 RX ADMIN — HYDROCODONE BITARTRATE AND HOMATROPINE METHYLBROMIDE 5 ML: 5; 1.5 SOLUTION ORAL at 15:21

## 2025-03-22 RX ADMIN — FENTANYL CITRATE 25 MCG: 50 INJECTION, SOLUTION INTRAMUSCULAR; INTRAVENOUS at 08:36

## 2025-03-22 RX ADMIN — GUAIFENESIN 600 MG: 600 TABLET, EXTENDED RELEASE ORAL at 05:08

## 2025-03-22 RX ADMIN — HYDROMORPHONE HYDROCHLORIDE 2 MG: 2 TABLET ORAL at 05:08

## 2025-03-22 RX ADMIN — FENTANYL CITRATE 25 MCG: 50 INJECTION, SOLUTION INTRAMUSCULAR; INTRAVENOUS at 12:41

## 2025-03-22 RX ADMIN — PIPERACILLIN AND TAZOBACTAM 4.5 G: 4; .5 INJECTION, POWDER, FOR SOLUTION INTRAVENOUS at 15:20

## 2025-03-22 RX ADMIN — FENTANYL CITRATE 25 MCG: 50 INJECTION, SOLUTION INTRAMUSCULAR; INTRAVENOUS at 18:41

## 2025-03-22 RX ADMIN — HYDROCORTISONE 20 MG: 10 TABLET ORAL at 05:06

## 2025-03-22 RX ADMIN — SODIUM CHLORIDE: 9 INJECTION, SOLUTION INTRAVENOUS at 07:34

## 2025-03-22 RX ADMIN — DEXAMETHASONE SODIUM PHOSPHATE 6 MG: 4 INJECTION INTRA-ARTICULAR; INTRALESIONAL; INTRAMUSCULAR; INTRAVENOUS; SOFT TISSUE at 15:21

## 2025-03-22 RX ADMIN — BENZONATATE 100 MG: 100 CAPSULE ORAL at 12:51

## 2025-03-22 RX ADMIN — PIPERACILLIN AND TAZOBACTAM 4.5 G: 4; .5 INJECTION, POWDER, FOR SOLUTION INTRAVENOUS at 08:25

## 2025-03-22 RX ADMIN — RIVAROXABAN 10 MG: 10 TABLET, FILM COATED ORAL at 17:51

## 2025-03-22 RX ADMIN — FENTANYL CITRATE 25 MCG: 50 INJECTION, SOLUTION INTRAMUSCULAR; INTRAVENOUS at 02:38

## 2025-03-22 RX ADMIN — LEVOTHYROXINE SODIUM 125 MCG: 0.12 TABLET ORAL at 05:08

## 2025-03-22 RX ADMIN — TAMSULOSIN HYDROCHLORIDE 0.4 MG: 0.4 CAPSULE ORAL at 17:51

## 2025-03-22 RX ADMIN — HYDROMORPHONE HYDROCHLORIDE 2 MG: 2 TABLET ORAL at 16:40

## 2025-03-22 RX ADMIN — GABAPENTIN 100 MG: 100 CAPSULE ORAL at 17:51

## 2025-03-22 RX ADMIN — OSELTAMIVIR PHOSPHATE 30 MG: 30 CAPSULE ORAL at 05:08

## 2025-03-22 ASSESSMENT — ENCOUNTER SYMPTOMS
DIARRHEA: 0
SPUTUM PRODUCTION: 1
FEVER: 0
CLAUDICATION: 0
VOMITING: 0
MYALGIAS: 0
ABDOMINAL PAIN: 0
DIZZINESS: 0
INSOMNIA: 0
HEARTBURN: 0
WEAKNESS: 0
SENSORY CHANGE: 0
CONSTIPATION: 0
COUGH: 1
PHOTOPHOBIA: 0
SPEECH CHANGE: 0
HEADACHES: 0
BLURRED VISION: 0
NERVOUS/ANXIOUS: 0
CHILLS: 0
SHORTNESS OF BREATH: 1
DEPRESSION: 0

## 2025-03-22 ASSESSMENT — PAIN DESCRIPTION - PAIN TYPE
TYPE: ACUTE PAIN

## 2025-03-22 NOTE — CARE PLAN
The patient is Watcher - Medium risk of patient condition declining or worsening    Shift Goals  Clinical Goals: Pain management, monitor respiratory status  Patient Goals: pain control, comfort  Family Goals: Paizaki santacruz    Progress made toward(s) clinical / shift goals:      Problem: Respiratory  Goal: Patient will achieve/maintain optimum respiratory ventilation and gas exchange  Outcome: Progressing  Note: Maintained oxygen at 2lpm via nasal cannula and SPO2 above 90%. Monitor closely for signs of respiratory distress.     Problem: Knowledge Deficit - Standard  Goal: Patient and family/care givers will demonstrate understanding of plan of care, disease process/condition, diagnostic tests and medications  Outcome: Progressing  Note: Reviewed POC. Provided patient and wife education on current medications ( especially pain medicine) monitoring patient oxygen needs and pain management. Maintained haines in place cleaned and secured. Continuing patient's medication as prescribed.      Problem: Fall Risk  Goal: Patient will remain free from falls  Outcome: Progressing  Note: Fall and safety precaution in place. Bed alarm and side rails up for safety. Instructed patient to call this nurse whenever assistance is needed. Patient able to ambulate to the bathroom with one/ hand held assist.        Patient is not progressing towards the following goals:      Problem: Pain - Standard  Goal: Alleviation of pain or a reduction in pain to the patient’s comfort goal  Outcome: Not Progressing  Note: Pain medication administered as needed with close monitoring of respiratory status and level of alertness. Patient reported initial pain relief with pain level decreasing to 4-5/10, however after several hours, pain level escalated to 7/10. Administered additional pain medication as needed. Monitoring patient's response to medication and reassessing pain level.

## 2025-03-22 NOTE — PROGRESS NOTES
Telemetry Shift Summary     Rhythm: SR  Rate: 77-85  Measurements: .18/.14/.42  Ectopy (reported by Monitor Tech): r-f PAC, r coup     Normal Values  Rhythm: Sinus  HR:   Measurements: 0.12-0.20/0.06-0.10/0.30-0.52

## 2025-03-22 NOTE — CARE PLAN
The patient is Stable - Low risk of patient condition declining or worsening    Shift Goals  Clinical Goals: pain management  Patient Goals: rest, pain management  Family Goals: zaki Mendiola    Progress made toward(s) clinical / shift goals:  Pain medications administered as ordered. Patient is not getting a lot of relief. Advised we are being cautious as he has already received one dose of narcan this hospital stay.     Patient is not progressing towards the following goals:      Problem: Pain - Standard  Goal: Alleviation of pain or a reduction in pain to the patient’s comfort goal  Outcome: Not Progressing

## 2025-03-22 NOTE — PROGRESS NOTES
[facility] Medicine Daily Progress Note    Date of Service  3/22/2025    Chief Complaint  Bartolo Berrios [facility] is a 80 y.o. male admitted 3/19/2025 with [place], shortness of breath, productive cough and left-sided chest pain    Hospital Course  Bartolo Berrios [facility] is a 80 y.o. male who presented 3/19/2025 with severe shortness of breath and cough.  He does have a history of lung cancer having finished chemotherapy few months ago.  He has been increasingly short of breath over the last couple of months but very severe over the last 1 to 2 weeks.  He endorses fevers chills and significant pain in the left chest especially with cough.  CT shows a right pleural effusion and consolidation in the left chest base.  Given the asymptomatic nature of the pleural effusion on the right I wonder if this is malignant pleural effusion rather than acute in the acute decompensation is coming from the left-sided pneumonia.  He probably will need a thoracentesis prior to discharge both diagnostic and therapeutic.  Will discuss further with pulmonology as patient starts to show improvement.  Is currently requiring 3 L nasal cannula to maintain adequate saturation is not on oxygen at baseline.  Given his recent cancer and chemotherapy he is immunosuppressed therefore I will broaden his antibiotic coverage to Zyvox and Zosyn though he did receive Rocephin and azithromycin in the emergency room.  Procalcitonin is 12.7.  I will also place him on Hycodan to help with the pain as well as the cough and Mucinex as an expectorant.  CODE STATUS reviewed with the patient and he is full code.     Interval Problem Update  3/20 patient had a difficult night and required Payne catheter secondary to urinary retention.  Creatinine is slightly worse today going from 1.6-2.04.  Lactic acid is normalized.  WBC count is slightly better going from 27 down to 24.  Though most of his symptoms are related to chest pain on the left side he does  have a sizable effusion on the right.  I have reached out to my partner for thoracentesis for diagnostic and therapeutic purposes.  Will send fluid for culture and pathology.  Hopefully with the removal of the fluid collection his hypoxia should improve.  He did receive multiple doses of pain medication yesterday and did require a dose of Narcan secondary to respiratory failure and readily turned around to where he was saturating much better.  Pain medication was dosed back to appropriate doses.  3/21 patient still with significant left-sided chest pain that responded the best to 25 mcg of fentanyl compared to the oxycodone and Dilaudid that he had been receiving.  No help with the lidocaine patch.  Hycodan is helping with the cough but have to watch for respiratory depression with multiple narcotics.  As needed Narcan always to be readily available on the MAR.  Even though patient is not feeling better he looks markedly better especially with skin turgor and awareness of his medical condition.  3/22 patient continues to show improvement overall but still with nagging left-sided chest pain.  The fentanyl Dilaudid combo seems to be helping but I will add dexamethasone to it as well to see if we can get the pain under better control.  His kidney function is improving but not to the point where I feel safe to use Toradol.  Cytology did come back with presence of malignant cells and this was conveyed both to the patient and his wife.  Awaiting pathology report to help identify the type of malignancy but high suspicion that this is recurrent lung cancer.  I will reach out to his oncologist on Monday.      I have discussed this patient's plan of care and discharge plan at IDT rounds today with Case Management, Nursing, Nursing leadership, and other members of the IDT team.    Consultants/Specialty  none    Code Status  Full Code    Disposition  The patient is not medically cleared for discharge to home or a post-acute  facility.  Anticipate discharge to: home with close outpatient follow-up    I have placed the appropriate orders for post-discharge needs.    Review of Systems  Review of Systems   Constitutional:  Negative for chills and fever.   HENT:  Negative for congestion.    Eyes:  Negative for blurred vision and photophobia.   Respiratory:  Positive for cough, sputum production and shortness of breath.    Cardiovascular:  Positive for chest pain. Negative for claudication and leg swelling.   Gastrointestinal:  Negative for abdominal pain, constipation, diarrhea, heartburn and vomiting.   Genitourinary:  Negative for dysuria and hematuria.   Musculoskeletal:  Negative for joint pain and myalgias.   Skin:  Negative for itching and rash.   Neurological:  Negative for dizziness, sensory change, speech change, weakness and headaches.   Psychiatric/Behavioral:  Negative for depression. The patient is not nervous/anxious and does not have insomnia.         Physical Exam  Temp:  [36.7 °C (98.1 °F)-37.6 °C (99.7 °F)] 36.7 °C (98.1 °F)  Pulse:  [74-97] 85  Resp:  [17-22] 18  BP: (131-162)/(60-84) 162/80  SpO2:  [92 %-97 %] 95 %    Physical Exam  Vitals and nursing note reviewed.   Constitutional:       General: He is not in acute distress.     Appearance: Normal appearance.   HENT:      Head: Normocephalic and atraumatic.   Eyes:      General: No scleral icterus.     Extraocular Movements: Extraocular movements intact.   Cardiovascular:      Rate and Rhythm: Normal rate and regular rhythm.      Pulses: Normal pulses.      Heart sounds: Normal heart sounds. No murmur heard.  Pulmonary:      Effort: Pulmonary effort is normal. No respiratory distress.      Breath sounds: Normal breath sounds. No wheezing, rhonchi or rales.      Comments: Better air movement in the right chest postthoracentesis  Chest:      Chest wall: Tenderness (Left-sided with palpation) present.   Abdominal:      General: Abdomen is flat. Bowel sounds are normal.  There is no distension.      Palpations: Abdomen is soft.      Tenderness: There is no rebound.   Musculoskeletal:         General: No swelling or tenderness.      Cervical back: Normal range of motion and neck supple.   Lymphadenopathy:      Cervical: No cervical adenopathy.   Skin:     Coloration: Skin is not jaundiced.      Findings: No erythema.   Neurological:      General: No focal deficit present.      Mental Status: He is alert and oriented to person, place, and time. Mental status is at baseline.      Cranial Nerves: No cranial nerve deficit.   Psychiatric:         Mood and Affect: Mood normal.         Behavior: Behavior normal.         Fluids    Intake/Output Summary (Last 24 hours) at 3/22/2025 1537  Last data filed at 3/22/2025 0829  Gross per 24 hour   Intake 320 ml   Output 2350 ml   Net -2030 ml        Laboratory  Recent Labs     03/20/25  0121 03/21/25  0121 03/22/25  0239   WBC 24.8* 17.3* 13.5*   RBC 3.67* 3.65* 4.11*   HEMOGLOBIN 10.9* 10.7* 12.2*   HEMATOCRIT 34.1* 33.7* 38.3*   MCV 92.9 92.3 93.2   MCH 29.7 29.3 29.7   MCHC 32.0* 31.8* 31.9*   RDW 61.1* 60.5* 61.0*   PLATELETCT 181 201 232   MPV 10.1 10.2 10.5     Recent Labs     03/20/25  0121 03/21/25  0121 03/22/25  0239   SODIUM 132* 132* 138   POTASSIUM 4.3 3.4* 3.5*   CHLORIDE 99 100 103   CO2 23 21 24   GLUCOSE 75 158* 98   BUN 33* 26* 13   CREATININE 2.04* 1.71* 1.45*   CALCIUM 7.0* 7.1* 7.4*                     Imaging  DX-CHEST-PORTABLE (1 VIEW)   Final Result      1.  Bibasilar atelectasis, left greater than right.      2.  No evidence of pneumothorax postthoracentesis.      CT-CTA CHEST PULMONARY ARTERY W/ RECONS   Final Result      1.  No evidence of pulmonary embolus.      2.  Consolidation within the left lung base likely representing pneumonia.      3.  Moderate right and small left pleural effusions with bibasilar atelectasis.      4.  Surgical changes involving the right hilum.      5.  Interval progression of a compression  fracture at the T8 level. There are chronic unchanged compression fractures involving T7, T6, T5, T9 and T10.      DX-CHEST-PORTABLE (1 VIEW)   Final Result      1.  Bibasilar atelectasis/consolidation, right greater than left.      2.  Small right pleural effusion.      3.  Cardiomegaly.           Assessment/Plan  * Sepsis (HCC)- (present on admission)  Assessment & Plan  This is Sepsis Present on admission  SIRS criteria identified on my evaluation include: Fever, with temperature greater than 100.9 deg F, Tachycardia, with heart rate greater than 90 BPM, and Leukocytosis, with WBC greater than 12,000  Clinical indicators of end organ dysfunction include Hypotension with systolic blood pressure less than 90 or MAP less than 65 and Acute Respiratory Failure - (mechanical ventilation or BiPap or PaO2/FiO2 ratio < 300)  Source is pneumonia  Sepsis protocol initiated  Crystalloid Fluid Administration: Fluid resuscitation ordered per standard protocol - 30 mL/kg per current or ideal body weight  IV antibiotics as appropriate for source of sepsis  Reassessment: I have reassessed the patient's hemodynamic status    Left-sided chest pain  Assessment & Plan  Related to the pneumonia  Stable with Dilaudid but responded better to fentanyl  De-escalate pain medication as able  Likely would be best served by an anti-inflammatory but cannot give secondary to acute kidney injury  Trial dexamethasone 4 mg every 6 hours x 4 doses  Lidocaine ineffective  Oxycodone ineffective    Urinary retention  Assessment & Plan  Payne placed 3/19 due to retention      Acute respiratory failure with hypoxia (HCC)  Assessment & Plan  Oxygen titration, currently requiring 3 L to maintain adequate saturation    Influenza A  Assessment & Plan  Tamiflu  Oxygen titration  Suspect concurrent bacterial infection    Leukocytosis  Assessment & Plan  Secondary to significant pneumonia, procalcitonin elevated at 12.7  Zyvox and Zosyn  WBC count continues to  drop, down to 13.5  Will give dose of dexamethasone today to see if this helps with the left-sided pain, expect WBC count to increase    Pleural effusion  Assessment & Plan  Thoracentesis completed on 3/20  Cultures negative  Pending pathology  Malignant cells seen, awaiting identification, high suspicion this is recurrent lung cancer  Patient and wife aware of the finding    Pneumonia  Assessment & Plan  Patient is immunocompromised with recent chemotherapy and recent treatment of chronic sinusitis  MRSA nasal swab pending  Zyvox and Zosyn  Negative blood cultures  Sputum culture as able  No growth from pleural fluid culture  Hycodan for cough suppressant, Mucinex for expectorant  Thoracentesis 3/20 with 635ml removed, serosanguinous slightly cloudy, labs, cytology and pathology pending.    Peripheral neuropathy due to chemotherapy (HCC)- (present on admission)  Assessment & Plan  Continue gabapentin nightly    Acute kidney injury superimposed on chronic kidney disease (HCC)- (present on admission)  Assessment & Plan  Creatinine better at 1.45  Avoid nephrotoxin  Will use Zyvox instead of Vanco because of acute kidney injury  Stop IV hydration    Right foot drop- (present on admission)  Assessment & Plan  Chronic    Adenocarcinoma of lung (HCC), stage 4 ([facility] Oct. 2021)- (present on admission)  Assessment & Plan  Patient finished chemotherapy a couple of months ago  Follows with Dr. Corrla and Phil  Pending pathology from thoracentesis, should be available on Monday    History of malignant melanoma, April 2016- (present on admission)  Assessment & Plan  .      Adrenal insufficiency (HCC), secondary- (present on admission)  Assessment & Plan  Resume home dose steroid    Hyperlipidemia- (present on admission)  Assessment & Plan  Continue statin    Hypothyroid- (present on admission)  Assessment & Plan  Continue home dose Synthroid and Cytomel         VTE prophylaxis:   SCDs/TEDs      I have performed a physical  exam and reviewed and updated ROS and Plan today (3/22/2025). In review of yesterday's note (3/21/2025), there are no changes except as documented above.    My total time spent caring for the patient on the day of the encounter was 54 minutes.   This does not include time spent on separately billable procedures/tests.

## 2025-03-22 NOTE — PROGRESS NOTES
Telemetry Shift Summary     Per monitor tech:  Rhythm SR  HR Range 75-89  Ectopy fPac  Measurements 0.19/0.14/0.41      Normal Values  Rhythm SR  HR Range:   Measurements: 0.12-0.20/0.06-0.10/0.30-0.52

## 2025-03-23 LAB
ANION GAP SERPL CALC-SCNC: 11 MMOL/L (ref 7–16)
BACTERIA FLD AEROBE CULT: NORMAL
BASOPHILS # BLD AUTO: 0 % (ref 0–1.8)
BASOPHILS # BLD: 0 K/UL (ref 0–0.12)
BUN SERPL-MCNC: 13 MG/DL (ref 8–22)
BURR CELLS BLD QL SMEAR: NORMAL
CALCIUM SERPL-MCNC: 7.3 MG/DL (ref 8.4–10.2)
CHLORIDE SERPL-SCNC: 104 MMOL/L (ref 96–112)
CO2 SERPL-SCNC: 22 MMOL/L (ref 20–33)
CREAT SERPL-MCNC: 1.07 MG/DL (ref 0.5–1.4)
EOSINOPHIL # BLD AUTO: 0 K/UL (ref 0–0.51)
EOSINOPHIL NFR BLD: 0 % (ref 0–6.9)
ERYTHROCYTE [DISTWIDTH] IN BLOOD BY AUTOMATED COUNT: 57.3 FL (ref 35.9–50)
GFR SERPLBLD CREATININE-BSD FMLA CKD-EPI: 71 ML/MIN/1.73 M 2
GLUCOSE SERPL-MCNC: 157 MG/DL (ref 65–99)
GRAM STN SPEC: NORMAL
HCT VFR BLD AUTO: 37.1 % (ref 42–52)
HGB BLD-MCNC: 12.1 G/DL (ref 14–18)
LYMPHOCYTES # BLD AUTO: 0.1 K/UL (ref 1–4.8)
LYMPHOCYTES NFR BLD: 0.9 % (ref 22–41)
MANUAL DIFF BLD: NORMAL
MCH RBC QN AUTO: 29.5 PG (ref 27–33)
MCHC RBC AUTO-ENTMCNC: 32.6 G/DL (ref 32.3–36.5)
MCV RBC AUTO: 90.5 FL (ref 81.4–97.8)
MONOCYTES # BLD AUTO: 0.49 K/UL (ref 0–0.85)
MONOCYTES NFR BLD AUTO: 4.4 % (ref 0–13.4)
MYELOCYTES NFR BLD MANUAL: 1.7 %
NEUTROPHILS # BLD AUTO: 10.42 K/UL (ref 1.82–7.42)
NEUTROPHILS NFR BLD: 93 % (ref 44–72)
NRBC # BLD AUTO: 0 K/UL
NRBC BLD-RTO: 0 /100 WBC (ref 0–0.2)
OVALOCYTES BLD QL SMEAR: NORMAL
PLATELET # BLD AUTO: 233 K/UL (ref 164–446)
PLATELET BLD QL SMEAR: NORMAL
PMV BLD AUTO: 9.9 FL (ref 9–12.9)
POIKILOCYTOSIS BLD QL SMEAR: NORMAL
POTASSIUM SERPL-SCNC: 3.5 MMOL/L (ref 3.6–5.5)
PROCALCITONIN SERPL-MCNC: 1.39 NG/ML
RBC # BLD AUTO: 4.1 M/UL (ref 4.7–6.1)
RBC BLD AUTO: PRESENT
SIGNIFICANT IND 70042: NORMAL
SITE SITE: NORMAL
SODIUM SERPL-SCNC: 137 MMOL/L (ref 135–145)
SOURCE SOURCE: NORMAL
WBC # BLD AUTO: 11.2 K/UL (ref 4.8–10.8)

## 2025-03-23 PROCEDURE — 36415 COLL VENOUS BLD VENIPUNCTURE: CPT

## 2025-03-23 PROCEDURE — 770020 HCHG ROOM/CARE - TELE (206)

## 2025-03-23 PROCEDURE — 80048 BASIC METABOLIC PNL TOTAL CA: CPT

## 2025-03-23 PROCEDURE — 700111 HCHG RX REV CODE 636 W/ 250 OVERRIDE (IP): Mod: JZ | Performed by: INTERNAL MEDICINE

## 2025-03-23 PROCEDURE — 700105 HCHG RX REV CODE 258: Performed by: INTERNAL MEDICINE

## 2025-03-23 PROCEDURE — 85027 COMPLETE CBC AUTOMATED: CPT

## 2025-03-23 PROCEDURE — A9270 NON-COVERED ITEM OR SERVICE: HCPCS | Performed by: INTERNAL MEDICINE

## 2025-03-23 PROCEDURE — 99233 SBSQ HOSP IP/OBS HIGH 50: CPT | Performed by: INTERNAL MEDICINE

## 2025-03-23 PROCEDURE — 84145 PROCALCITONIN (PCT): CPT

## 2025-03-23 PROCEDURE — 85007 BL SMEAR W/DIFF WBC COUNT: CPT

## 2025-03-23 PROCEDURE — 700102 HCHG RX REV CODE 250 W/ 637 OVERRIDE(OP): Performed by: INTERNAL MEDICINE

## 2025-03-23 RX ORDER — DEXAMETHASONE SODIUM PHOSPHATE 4 MG/ML
6 INJECTION, SOLUTION INTRA-ARTICULAR; INTRALESIONAL; INTRAMUSCULAR; INTRAVENOUS; SOFT TISSUE ONCE
Status: COMPLETED | OUTPATIENT
Start: 2025-03-23 | End: 2025-03-23

## 2025-03-23 RX ADMIN — PIPERACILLIN AND TAZOBACTAM 4.5 G: 4; .5 INJECTION, POWDER, FOR SOLUTION INTRAVENOUS at 00:32

## 2025-03-23 RX ADMIN — FENTANYL CITRATE 25 MCG: 50 INJECTION, SOLUTION INTRAMUSCULAR; INTRAVENOUS at 19:55

## 2025-03-23 RX ADMIN — TAMSULOSIN HYDROCHLORIDE 0.4 MG: 0.4 CAPSULE ORAL at 16:56

## 2025-03-23 RX ADMIN — GUAIFENESIN 600 MG: 600 TABLET, EXTENDED RELEASE ORAL at 05:26

## 2025-03-23 RX ADMIN — HYDROCORTISONE 20 MG: 10 TABLET ORAL at 16:56

## 2025-03-23 RX ADMIN — DEXAMETHASONE SODIUM PHOSPHATE 6 MG: 4 INJECTION INTRA-ARTICULAR; INTRALESIONAL; INTRAMUSCULAR; INTRAVENOUS; SOFT TISSUE at 00:25

## 2025-03-23 RX ADMIN — LIOTHYRONINE SODIUM 10 MCG: 5 TABLET ORAL at 05:25

## 2025-03-23 RX ADMIN — LINEZOLID 600 MG: 600 TABLET, FILM COATED ORAL at 16:57

## 2025-03-23 RX ADMIN — GABAPENTIN 100 MG: 100 CAPSULE ORAL at 16:58

## 2025-03-23 RX ADMIN — ATORVASTATIN CALCIUM 20 MG: 20 TABLET, FILM COATED ORAL at 19:55

## 2025-03-23 RX ADMIN — PIPERACILLIN AND TAZOBACTAM 4.5 G: 4; .5 INJECTION, POWDER, FOR SOLUTION INTRAVENOUS at 23:11

## 2025-03-23 RX ADMIN — PIPERACILLIN AND TAZOBACTAM 4.5 G: 4; .5 INJECTION, POWDER, FOR SOLUTION INTRAVENOUS at 17:05

## 2025-03-23 RX ADMIN — HYDROCORTISONE 20 MG: 10 TABLET ORAL at 05:25

## 2025-03-23 RX ADMIN — PIPERACILLIN AND TAZOBACTAM 4.5 G: 4; .5 INJECTION, POWDER, FOR SOLUTION INTRAVENOUS at 09:55

## 2025-03-23 RX ADMIN — HYDROCODONE BITARTRATE AND HOMATROPINE METHYLBROMIDE 5 ML: 5; 1.5 SOLUTION ORAL at 12:36

## 2025-03-23 RX ADMIN — HYDROCODONE BITARTRATE AND HOMATROPINE METHYLBROMIDE 5 ML: 5; 1.5 SOLUTION ORAL at 23:11

## 2025-03-23 RX ADMIN — AMLODIPINE BESYLATE 5 MG: 5 TABLET ORAL at 05:26

## 2025-03-23 RX ADMIN — FENTANYL CITRATE 25 MCG: 50 INJECTION, SOLUTION INTRAMUSCULAR; INTRAVENOUS at 13:27

## 2025-03-23 RX ADMIN — HYDROCODONE BITARTRATE AND HOMATROPINE METHYLBROMIDE 5 ML: 5; 1.5 SOLUTION ORAL at 17:14

## 2025-03-23 RX ADMIN — HYDROCODONE BITARTRATE AND HOMATROPINE METHYLBROMIDE 5 ML: 5; 1.5 SOLUTION ORAL at 05:54

## 2025-03-23 RX ADMIN — LINEZOLID 600 MG: 600 TABLET, FILM COATED ORAL at 05:26

## 2025-03-23 RX ADMIN — DEXAMETHASONE SODIUM PHOSPHATE 6 MG: 4 INJECTION INTRA-ARTICULAR; INTRALESIONAL; INTRAMUSCULAR; INTRAVENOUS; SOFT TISSUE at 09:46

## 2025-03-23 RX ADMIN — RIVAROXABAN 10 MG: 10 TABLET, FILM COATED ORAL at 16:57

## 2025-03-23 RX ADMIN — OSELTAMIVIR PHOSPHATE 30 MG: 30 CAPSULE ORAL at 16:56

## 2025-03-23 RX ADMIN — HYDROMORPHONE HYDROCHLORIDE 2 MG: 2 TABLET ORAL at 21:57

## 2025-03-23 RX ADMIN — LEVOTHYROXINE SODIUM 125 MCG: 0.12 TABLET ORAL at 05:27

## 2025-03-23 RX ADMIN — TAMSULOSIN HYDROCHLORIDE 0.4 MG: 0.4 CAPSULE ORAL at 05:26

## 2025-03-23 RX ADMIN — OMEPRAZOLE 40 MG: 20 CAPSULE, DELAYED RELEASE ORAL at 05:26

## 2025-03-23 RX ADMIN — GUAIFENESIN 600 MG: 600 TABLET, EXTENDED RELEASE ORAL at 16:58

## 2025-03-23 RX ADMIN — HYDROMORPHONE HYDROCHLORIDE 2 MG: 2 TABLET ORAL at 09:45

## 2025-03-23 RX ADMIN — OSELTAMIVIR PHOSPHATE 30 MG: 30 CAPSULE ORAL at 05:26

## 2025-03-23 RX ADMIN — HYDROMORPHONE HYDROCHLORIDE 2 MG: 2 TABLET ORAL at 16:57

## 2025-03-23 RX ADMIN — DEXAMETHASONE SODIUM PHOSPHATE 6 MG: 4 INJECTION INTRA-ARTICULAR; INTRALESIONAL; INTRAMUSCULAR; INTRAVENOUS; SOFT TISSUE at 05:27

## 2025-03-23 ASSESSMENT — ENCOUNTER SYMPTOMS
CONSTIPATION: 0
NERVOUS/ANXIOUS: 0
SPUTUM PRODUCTION: 1
SPEECH CHANGE: 0
MYALGIAS: 0
SHORTNESS OF BREATH: 1
DEPRESSION: 0
WEAKNESS: 0
DIZZINESS: 0
INSOMNIA: 0
ABDOMINAL PAIN: 0
COUGH: 1
HEADACHES: 0
BLURRED VISION: 0
DIARRHEA: 0
CHILLS: 0
FEVER: 0
SENSORY CHANGE: 0
CLAUDICATION: 0
VOMITING: 0
HEARTBURN: 0
PHOTOPHOBIA: 0

## 2025-03-23 ASSESSMENT — PAIN DESCRIPTION - PAIN TYPE
TYPE: ACUTE PAIN
TYPE: ACUTE PAIN;CHRONIC PAIN
TYPE: ACUTE PAIN
TYPE: ACUTE PAIN

## 2025-03-23 NOTE — PROGRESS NOTES
Telemetry Shift Summary     Rhythm: SR  Rate: 80-95  Measurements: .20/.14/.40  Ectopy (reported by Monitor Tech): f PAC     Normal Values  Rhythm: Sinus  HR:   Measurements: 0.12-0.20/0.06-0.10/0.30-0.52

## 2025-03-23 NOTE — CARE PLAN
The patient is Watcher - Medium risk of patient condition declining or worsening    Shift Goals  Clinical Goals: pain mangement, respiratory, O2  Patient Goals: comfort  Family Goals: BUDDY    Progress made toward(s) clinical / shift goals:  patient's pain able to decrease to 4 and patient able to rest during shift. PRN continued to be given for adequate pain control. Family at bedside. Patient remains on 3L NC. Patient given one dose of PRN cough medication.      Problem: Respiratory  Goal: Patient will achieve/maintain optimum respiratory ventilation and gas exchange  Outcome: Progressing     Problem: Pain - Standard  Goal: Alleviation of pain or a reduction in pain to the patient’s comfort goal  Outcome: Progressing       Patient is not progressing towards the following goals:

## 2025-03-23 NOTE — PROGRESS NOTES
[facility] Medicine Daily Progress Note    Date of Service  3/23/2025    Chief Complaint  Bartolo Berrios [facility] is a 80 y.o. male admitted 3/19/2025 with Parmelee, shortness of breath, productive cough and left-sided chest pain    Hospital Course  Bartolo Berrios [facility] is a 80 y.o. male who presented 3/19/2025 with severe shortness of breath and cough.  He does have a history of lung cancer having finished chemotherapy few months ago.  He has been increasingly short of breath over the last couple of months but very severe over the last 1 to 2 weeks.  He endorses fevers chills and significant pain in the left chest especially with cough.  CT shows a right pleural effusion and consolidation in the left chest base.  Given the asymptomatic nature of the pleural effusion on the right I wonder if this is malignant pleural effusion rather than acute in the acute decompensation is coming from the left-sided pneumonia.  He probably will need a thoracentesis prior to discharge both diagnostic and therapeutic.  Will discuss further with pulmonology as patient starts to show improvement.  Is currently requiring 3 L nasal cannula to maintain adequate saturation is not on oxygen at baseline.  Given his recent cancer and chemotherapy he is immunosuppressed therefore I will broaden his antibiotic coverage to Zyvox and Zosyn though he did receive Rocephin and azithromycin in the emergency room.  Procalcitonin is 12.7.  I will also place him on Hycodan to help with the pain as well as the cough and Mucinex as an expectorant.  CODE STATUS reviewed with the patient and he is full code.     Interval Problem Update  3/20 patient had a difficult night and required Payne catheter secondary to urinary retention.  Creatinine is slightly worse today going from 1.6-2.04.  Lactic acid is normalized.  WBC count is slightly better going from 27 down to 24.  Though most of his symptoms are related to chest pain on the left side he does  have a sizable effusion on the right.  I have reached out to my partner for thoracentesis for diagnostic and therapeutic purposes.  Will send fluid for culture and pathology.  Hopefully with the removal of the fluid collection his hypoxia should improve.  He did receive multiple doses of pain medication yesterday and did require a dose of Narcan secondary to respiratory failure and readily turned around to where he was saturating much better.  Pain medication was dosed back to appropriate doses.  3/21 patient still with significant left-sided chest pain that responded the best to 25 mcg of fentanyl compared to the oxycodone and Dilaudid that he had been receiving.  No help with the lidocaine patch.  Hycodan is helping with the cough but have to watch for respiratory depression with multiple narcotics.  As needed Narcan always to be readily available on the MAR.  Even though patient is not feeling better he looks markedly better especially with skin turgor and awareness of his medical condition.  3/22 patient continues to show improvement overall but still with nagging left-sided chest pain.  The fentanyl Dilaudid combo seems to be helping but I will add dexamethasone to it as well to see if we can get the pain under better control.  His kidney function is improving but not to the point where I feel safe to use Toradol.  Cytology did come back with presence of malignant cells and this was conveyed both to the patient and his wife.  Awaiting pathology report to help identify the type of malignancy but high suspicion that this is recurrent lung cancer.  I will reach out to his oncologist on Monday.  3/23 he does feel better today regarding the left-sided chest pain.  He needed fewer doses of fentanyl after the initiation of steroid.  Given his chronic adrenal insufficiency on lifelong steroids I we will give another dose of dexamethasone but will do it 1 dose at a time.  Creatinine continues to improve down to 1.07 and  may be possible to give him some doses of Toradol but will stick with this dexamethasone for now.  Will reach out to his oncology treatment team tomorrow with the news of recurrent malignant cells.    I have discussed this patient's plan of care and discharge plan at IDT rounds today with Case Management, Nursing, Nursing leadership, and other members of the IDT team.    Consultants/Specialty  none    Code Status  Full Code    Disposition  The patient is not medically cleared for discharge to home or a post-acute facility.  Anticipate discharge to: home with close outpatient follow-up    I have placed the appropriate orders for post-discharge needs.    Review of Systems  Review of Systems   Constitutional:  Negative for chills and fever.   HENT:  Negative for congestion.    Eyes:  Negative for blurred vision and photophobia.   Respiratory:  Positive for cough (improved), sputum production and shortness of breath (improved).    Cardiovascular:  Positive for chest pain (improved). Negative for claudication and leg swelling.   Gastrointestinal:  Negative for abdominal pain, constipation, diarrhea, heartburn and vomiting.   Genitourinary:  Negative for dysuria and hematuria.   Musculoskeletal:  Negative for joint pain and myalgias.   Skin:  Negative for itching and rash.   Neurological:  Negative for dizziness, sensory change, speech change, weakness and headaches.   Psychiatric/Behavioral:  Negative for depression. The patient is not nervous/anxious and does not have insomnia.         Physical Exam  Temp:  [36.7 °C (98 °F)-37.4 °C (99.4 °F)] 36.7 °C (98.1 °F)  Pulse:  [80-87] 82  Resp:  [16-18] 17  BP: (128-162)/(63-87) 131/70  SpO2:  [95 %-98 %] 95 %    Physical Exam  Vitals and nursing note reviewed.   Constitutional:       General: He is not in acute distress.     Appearance: Normal appearance.   HENT:      Head: Normocephalic and atraumatic.   Eyes:      General: No scleral icterus.     Extraocular Movements:  Extraocular movements intact.   Cardiovascular:      Rate and Rhythm: Normal rate and regular rhythm.      Pulses: Normal pulses.      Heart sounds: Normal heart sounds. No murmur heard.  Pulmonary:      Effort: Pulmonary effort is normal. No respiratory distress.      Breath sounds: Normal breath sounds. No wheezing, rhonchi or rales.      Comments: Better air movement in the right chest postthoracentesis  Chest:      Chest wall: Tenderness (Left-sided with palpation - better today) present.   Abdominal:      General: Abdomen is flat. Bowel sounds are normal. There is no distension.      Palpations: Abdomen is soft.      Tenderness: There is no rebound.   Musculoskeletal:         General: No swelling or tenderness.      Cervical back: Normal range of motion and neck supple.   Lymphadenopathy:      Cervical: No cervical adenopathy.   Skin:     Coloration: Skin is not jaundiced.      Findings: No erythema.   Neurological:      General: No focal deficit present.      Mental Status: He is alert and oriented to person, place, and time. Mental status is at baseline.      Cranial Nerves: No cranial nerve deficit.   Psychiatric:         Mood and Affect: Mood normal.         Behavior: Behavior normal.         Fluids    Intake/Output Summary (Last 24 hours) at 3/23/2025 1303  Last data filed at 3/23/2025 0600  Gross per 24 hour   Intake --   Output 2050 ml   Net -2050 ml        Laboratory  Recent Labs     03/21/25  0121 03/22/25  0239 03/23/25  0332   WBC 17.3* 13.5* 11.2*   RBC 3.65* 4.11* 4.10*   HEMOGLOBIN 10.7* 12.2* 12.1*   HEMATOCRIT 33.7* 38.3* 37.1*   MCV 92.3 93.2 90.5   MCH 29.3 29.7 29.5   MCHC 31.8* 31.9* 32.6   RDW 60.5* 61.0* 57.3*   PLATELETCT 201 232 233   MPV 10.2 10.5 9.9     Recent Labs     03/21/25  0121 03/22/25  0239 03/23/25  0332   SODIUM 132* 138 137   POTASSIUM 3.4* 3.5* 3.5*   CHLORIDE 100 103 104   CO2 21 24 22   GLUCOSE 158* 98 157*   BUN 26* 13 13   CREATININE 1.71* 1.45* 1.07   CALCIUM 7.1*  7.4* 7.3*                     Imaging  DX-CHEST-PORTABLE (1 VIEW)   Final Result      1.  Bibasilar atelectasis, left greater than right.      2.  No evidence of pneumothorax postthoracentesis.      CT-CTA CHEST PULMONARY ARTERY W/ RECONS   Final Result      1.  No evidence of pulmonary embolus.      2.  Consolidation within the left lung base likely representing pneumonia.      3.  Moderate right and small left pleural effusions with bibasilar atelectasis.      4.  Surgical changes involving the right hilum.      5.  Interval progression of a compression fracture at the T8 level. There are chronic unchanged compression fractures involving T7, T6, T5, T9 and T10.      DX-CHEST-PORTABLE (1 VIEW)   Final Result      1.  Bibasilar atelectasis/consolidation, right greater than left.      2.  Small right pleural effusion.      3.  Cardiomegaly.           Assessment/Plan  * Sepsis (HCC)- (present on admission)  Assessment & Plan  This is Sepsis Present on admission  SIRS criteria identified on my evaluation include: Fever, with temperature greater than 100.9 deg F, Tachycardia, with heart rate greater than 90 BPM, and Leukocytosis, with WBC greater than 12,000  Clinical indicators of end organ dysfunction include Hypotension with systolic blood pressure less than 90 or MAP less than 65 and Acute Respiratory Failure - (mechanical ventilation or BiPap or PaO2/FiO2 ratio < 300)  Source is pneumonia  Sepsis protocol initiated  Crystalloid Fluid Administration: Fluid resuscitation ordered per standard protocol - 30 mL/kg per current or ideal body weight  IV antibiotics as appropriate for source of sepsis  Reassessment: I have reassessed the patient's hemodynamic status    Left-sided chest pain  Assessment & Plan  Related to the pneumonia  Stable with Dilaudid but responded better to fentanyl  De-escalate pain medication as able  Likely would be best served by an anti-inflammatory but cannot give secondary to acute kidney  injury  Second dose of dexamethasone 6 mg today  Lidocaine ineffective  Oxycodone ineffective    Urinary retention  Assessment & Plan  Payne placed 3/19 due to retention      Acute respiratory failure with hypoxia (HCC)  Assessment & Plan  Oxygen titration, currently requiring 3 L to maintain adequate saturation    Influenza A  Assessment & Plan  Tamiflu  Oxygen titration  Suspect concurrent bacterial infection    Leukocytosis  Assessment & Plan  Secondary to significant pneumonia, procalcitonin elevated at 12.7  Zyvox and Zosyn  WBC count continues to drop, down to 11.2  Will give dose another dexamethasone today      Pleural effusion  Assessment & Plan  Thoracentesis completed on 3/20  Cultures negative  Pending pathology  Malignant cells seen, awaiting identification, high suspicion this is recurrent lung cancer  Patient and wife aware of the finding    Pneumonia  Assessment & Plan  Patient is immunocompromised with recent chemotherapy and recent treatment of chronic sinusitis  MRSA nasal swab pending  Zyvox and Zosyn  Negative blood cultures  Sputum culture as able  No growth from pleural fluid culture  Hycodan for cough suppressant, Mucinex for expectorant  Thoracentesis 3/20 with 635ml removed, serosanguinous slightly cloudy, labs, cytology shows malignant cells and pathology pending.    Peripheral neuropathy due to chemotherapy (HCC)- (present on admission)  Assessment & Plan  Continue gabapentin nightly    Acute kidney injury superimposed on chronic kidney disease (HCC)- (present on admission)  Assessment & Plan  Creatinine better at 1.45  Avoid nephrotoxin  Will use Zyvox instead of Vanco because of acute kidney injury  Stop IV hydration    Right foot drop- (present on admission)  Assessment & Plan  Chronic    Adenocarcinoma of lung (HCC), stage 4 ([facility] Oct. 2021)- (present on admission)  Assessment & Plan  Patient finished chemotherapy a couple of months ago  Follows with Dr. Corral and Phil  Pending  pathology from thoracentesis, should be available on Monday    History of malignant melanoma, April 2016- (present on admission)  Assessment & Plan  .      Adrenal insufficiency (HCC), secondary- (present on admission)  Assessment & Plan  Resume home dose steroid    Hyperlipidemia- (present on admission)  Assessment & Plan  Continue statin    Hypothyroid- (present on admission)  Assessment & Plan  Continue home dose Synthroid and Cytomel         VTE prophylaxis:    Xarelto 10mg daily as prophylaxis      I have performed a physical exam and reviewed and updated ROS and Plan today (3/23/2025). In review of yesterday's note (3/22/2025), there are no changes except as documented above.    My total time spent caring for the patient on the day of the encounter was 55 minutes.   This does not include time spent on separately billable procedures/tests.

## 2025-03-24 ENCOUNTER — APPOINTMENT (OUTPATIENT)
Dept: RADIOLOGY | Facility: MEDICAL CENTER | Age: 80
DRG: 871 | End: 2025-03-24
Attending: INTERNAL MEDICINE
Payer: MEDICARE

## 2025-03-24 LAB
ANION GAP SERPL CALC-SCNC: 10 MMOL/L (ref 7–16)
BACTERIA BLD CULT: NORMAL
BACTERIA BLD CULT: NORMAL
BUN SERPL-MCNC: 18 MG/DL (ref 8–22)
CALCIUM SERPL-MCNC: 7.4 MG/DL (ref 8.4–10.2)
CHLORIDE SERPL-SCNC: 107 MMOL/L (ref 96–112)
CO2 SERPL-SCNC: 23 MMOL/L (ref 20–33)
CREAT SERPL-MCNC: 1.28 MG/DL (ref 0.5–1.4)
ERYTHROCYTE [DISTWIDTH] IN BLOOD BY AUTOMATED COUNT: 58.1 FL (ref 35.9–50)
FUNGUS SPEC CULT: NORMAL
FUNGUS SPEC FUNGUS STN: NORMAL
GFR SERPLBLD CREATININE-BSD FMLA CKD-EPI: 57 ML/MIN/1.73 M 2
GLUCOSE SERPL-MCNC: 144 MG/DL (ref 65–99)
HCT VFR BLD AUTO: 36.4 % (ref 42–52)
HGB BLD-MCNC: 12 G/DL (ref 14–18)
MCH RBC QN AUTO: 30 PG (ref 27–33)
MCHC RBC AUTO-ENTMCNC: 33 G/DL (ref 32.3–36.5)
MCV RBC AUTO: 91 FL (ref 81.4–97.8)
PLATELET # BLD AUTO: 253 K/UL (ref 164–446)
PMV BLD AUTO: 10.1 FL (ref 9–12.9)
POTASSIUM SERPL-SCNC: 3.5 MMOL/L (ref 3.6–5.5)
RBC # BLD AUTO: 4 M/UL (ref 4.7–6.1)
SIGNIFICANT IND 70042: NORMAL
SITE SITE: NORMAL
SODIUM SERPL-SCNC: 140 MMOL/L (ref 135–145)
SOURCE SOURCE: NORMAL
WBC # BLD AUTO: 16.1 K/UL (ref 4.8–10.8)

## 2025-03-24 PROCEDURE — A9270 NON-COVERED ITEM OR SERVICE: HCPCS | Performed by: INTERNAL MEDICINE

## 2025-03-24 PROCEDURE — A9270 NON-COVERED ITEM OR SERVICE: HCPCS | Performed by: HOSPITALIST

## 2025-03-24 PROCEDURE — 700102 HCHG RX REV CODE 250 W/ 637 OVERRIDE(OP): Performed by: INTERNAL MEDICINE

## 2025-03-24 PROCEDURE — 700111 HCHG RX REV CODE 636 W/ 250 OVERRIDE (IP): Performed by: INTERNAL MEDICINE

## 2025-03-24 PROCEDURE — 71045 X-RAY EXAM CHEST 1 VIEW: CPT

## 2025-03-24 PROCEDURE — 770020 HCHG ROOM/CARE - TELE (206)

## 2025-03-24 PROCEDURE — 99233 SBSQ HOSP IP/OBS HIGH 50: CPT | Performed by: INTERNAL MEDICINE

## 2025-03-24 PROCEDURE — 94760 N-INVAS EAR/PLS OXIMETRY 1: CPT

## 2025-03-24 PROCEDURE — 85027 COMPLETE CBC AUTOMATED: CPT

## 2025-03-24 PROCEDURE — 700102 HCHG RX REV CODE 250 W/ 637 OVERRIDE(OP): Performed by: HOSPITALIST

## 2025-03-24 PROCEDURE — 80048 BASIC METABOLIC PNL TOTAL CA: CPT

## 2025-03-24 PROCEDURE — 36415 COLL VENOUS BLD VENIPUNCTURE: CPT

## 2025-03-24 RX ORDER — KETOROLAC TROMETHAMINE 15 MG/ML
15 INJECTION, SOLUTION INTRAMUSCULAR; INTRAVENOUS ONCE
Status: COMPLETED | OUTPATIENT
Start: 2025-03-24 | End: 2025-03-24

## 2025-03-24 RX ORDER — FENTANYL 25 UG/1
1 PATCH TRANSDERMAL
Refills: 0 | Status: DISCONTINUED | OUTPATIENT
Start: 2025-03-24 | End: 2025-03-26 | Stop reason: HOSPADM

## 2025-03-24 RX ORDER — FENTANYL 12.5 UG/1
1 PATCH TRANSDERMAL
Status: DISCONTINUED | OUTPATIENT
Start: 2025-03-24 | End: 2025-03-24

## 2025-03-24 RX ADMIN — RIVAROXABAN 10 MG: 10 TABLET, FILM COATED ORAL at 18:01

## 2025-03-24 RX ADMIN — HYDROMORPHONE HYDROCHLORIDE 2 MG: 2 TABLET ORAL at 19:49

## 2025-03-24 RX ADMIN — TAMSULOSIN HYDROCHLORIDE 0.4 MG: 0.4 CAPSULE ORAL at 18:01

## 2025-03-24 RX ADMIN — HYDROCORTISONE 20 MG: 10 TABLET ORAL at 06:10

## 2025-03-24 RX ADMIN — OMEPRAZOLE 40 MG: 20 CAPSULE, DELAYED RELEASE ORAL at 06:10

## 2025-03-24 RX ADMIN — AMLODIPINE BESYLATE 5 MG: 5 TABLET ORAL at 06:10

## 2025-03-24 RX ADMIN — GUAIFENESIN 600 MG: 600 TABLET, EXTENDED RELEASE ORAL at 06:11

## 2025-03-24 RX ADMIN — FENTANYL 1 PATCH: 12 PATCH TRANSDERMAL at 12:12

## 2025-03-24 RX ADMIN — HYDROCORTISONE 20 MG: 10 TABLET ORAL at 18:01

## 2025-03-24 RX ADMIN — FENTANYL 1 PATCH: 25 PATCH TRANSDERMAL at 14:24

## 2025-03-24 RX ADMIN — LIOTHYRONINE SODIUM 10 MCG: 5 TABLET ORAL at 06:11

## 2025-03-24 RX ADMIN — HYDROCODONE BITARTRATE AND HOMATROPINE METHYLBROMIDE 5 ML: 5; 1.5 SOLUTION ORAL at 22:48

## 2025-03-24 RX ADMIN — HYDROCODONE BITARTRATE AND HOMATROPINE METHYLBROMIDE 5 ML: 5; 1.5 SOLUTION ORAL at 09:29

## 2025-03-24 RX ADMIN — GABAPENTIN 100 MG: 100 CAPSULE ORAL at 18:01

## 2025-03-24 RX ADMIN — ATORVASTATIN CALCIUM 20 MG: 20 TABLET, FILM COATED ORAL at 19:49

## 2025-03-24 RX ADMIN — KETOROLAC TROMETHAMINE 15 MG: 15 INJECTION, SOLUTION INTRAMUSCULAR; INTRAVENOUS at 14:23

## 2025-03-24 RX ADMIN — TAMSULOSIN HYDROCHLORIDE 0.4 MG: 0.4 CAPSULE ORAL at 06:10

## 2025-03-24 RX ADMIN — GUAIFENESIN 600 MG: 600 TABLET, EXTENDED RELEASE ORAL at 18:01

## 2025-03-24 RX ADMIN — HYDROMORPHONE HYDROCHLORIDE 2 MG: 2 TABLET ORAL at 09:29

## 2025-03-24 RX ADMIN — LEVOTHYROXINE SODIUM 125 MCG: 0.12 TABLET ORAL at 06:10

## 2025-03-24 RX ADMIN — OSELTAMIVIR PHOSPHATE 30 MG: 30 CAPSULE ORAL at 06:10

## 2025-03-24 RX ADMIN — LINEZOLID 600 MG: 600 TABLET, FILM COATED ORAL at 06:10

## 2025-03-24 RX ADMIN — BENZONATATE 100 MG: 100 CAPSULE ORAL at 14:56

## 2025-03-24 ASSESSMENT — ENCOUNTER SYMPTOMS
DIZZINESS: 0
INSOMNIA: 0
MYALGIAS: 0
NERVOUS/ANXIOUS: 0
HEARTBURN: 0
ABDOMINAL PAIN: 0
DEPRESSION: 0
FEVER: 0
VOMITING: 0
SHORTNESS OF BREATH: 1
HEADACHES: 0
CHILLS: 0
DIARRHEA: 0
BLURRED VISION: 0
COUGH: 1
PHOTOPHOBIA: 0
SENSORY CHANGE: 0
SPUTUM PRODUCTION: 1
WEAKNESS: 0
SPEECH CHANGE: 0
CLAUDICATION: 0
CONSTIPATION: 0

## 2025-03-24 ASSESSMENT — PAIN DESCRIPTION - PAIN TYPE
TYPE: ACUTE PAIN

## 2025-03-24 NOTE — PROGRESS NOTES
[facility] Medicine Daily Progress Note    Date of Service  3/24/2025    Chief Complaint  Bartolo Berrios [facility] is a 80 y.o. male admitted 3/19/2025 with [place], shortness of breath, productive cough and left-sided chest pain    Hospital Course  Bartolo Berrios [facility] is a 80 y.o. male who presented 3/19/2025 with severe shortness of breath and cough.  He does have a history of lung cancer having finished chemotherapy few months ago.  He has been increasingly short of breath over the last couple of months but very severe over the last 1 to 2 weeks.  He endorses fevers chills and significant pain in the left chest especially with cough.  CT shows a right pleural effusion and consolidation in the left chest base.  Given the asymptomatic nature of the pleural effusion on the right I wonder if this is malignant pleural effusion rather than acute in the acute decompensation is coming from the left-sided pneumonia.  He probably will need a thoracentesis prior to discharge both diagnostic and therapeutic.  Will discuss further with pulmonology as patient starts to show improvement.  Is currently requiring 3 L nasal cannula to maintain adequate saturation is not on oxygen at baseline.  Given his recent cancer and chemotherapy he is immunosuppressed therefore I will broaden his antibiotic coverage to Zyvox and Zosyn though he did receive Rocephin and azithromycin in the emergency room.  Procalcitonin is 12.7.  I will also place him on Hycodan to help with the pain as well as the cough and Mucinex as an expectorant.  CODE STATUS reviewed with the patient and he is full code.     Interval Problem Update  3/20 patient had a difficult night and required Payne catheter secondary to urinary retention.  Creatinine is slightly worse today going from 1.6-2.04.  Lactic acid is normalized.  WBC count is slightly better going from 27 down to 24.  Though most of his symptoms are related to chest pain on the left side he does  have a sizable effusion on the right.  I have reached out to my partner for thoracentesis for diagnostic and therapeutic purposes.  Will send fluid for culture and pathology.  Hopefully with the removal of the fluid collection his hypoxia should improve.  He did receive multiple doses of pain medication yesterday and did require a dose of Narcan secondary to respiratory failure and readily turned around to where he was saturating much better.  Pain medication was dosed back to appropriate doses.  3/21 patient still with significant left-sided chest pain that responded the best to 25 mcg of fentanyl compared to the oxycodone and Dilaudid that he had been receiving.  No help with the lidocaine patch.  Hycodan is helping with the cough but have to watch for respiratory depression with multiple narcotics.  As needed Narcan always to be readily available on the MAR.  Even though patient is not feeling better he looks markedly better especially with skin turgor and awareness of his medical condition.  3/22 patient continues to show improvement overall but still with nagging left-sided chest pain.  The fentanyl Dilaudid combo seems to be helping but I will add dexamethasone to it as well to see if we can get the pain under better control.  His kidney function is improving but not to the point where I feel safe to use Toradol.  Cytology did come back with presence of malignant cells and this was conveyed both to the patient and his wife.  Awaiting pathology report to help identify the type of malignancy but high suspicion that this is recurrent lung cancer.  I will reach out to his oncologist on Monday.  3/23 he does feel better today regarding the left-sided chest pain.  He needed fewer doses of fentanyl after the initiation of steroid.  Given his chronic adrenal insufficiency on lifelong steroids I we will give another dose of dexamethasone but will do it 1 dose at a time.  Creatinine continues to improve down to 1.07 and  may be possible to give him some doses of Toradol but will stick with this dexamethasone for now.  Will reach out to his oncology treatment team tomorrow with the news of recurrent malignant cells.  3/24 patient much more awake and conversant and concerned about his care today compared to the previous days of the hospital nation.  He is on 2 to 3 L of oxygen.  The cough is still present and goes to his left chest.  As fentanyl has been the most beneficial pain medication will trial a fentanyl patch.  I initially put him on 12 mcg which did not do anything and have increased it to 25 mcg to see if we can better control his pain.  His pathology has returned confirming that lung is the origin of the malignant cells showing recurrence of his lung cancer.  Ordered 1 single dose of Toradol as his kidney function has improved to see if an anti-inflammatory is better at controlling his pain and the opiate medications we have been using.  I will reach out to his oncologist to let her know the most recent development on his pathology report.  Chest x-ray is currently pending for today for further evaluation few days after the last thoracentesis and to see how the pneumonia is progressing.      I have discussed this patient's plan of care and discharge plan at IDT rounds today with Case Management, Nursing, Nursing leadership, and other members of the IDT team.    Consultants/Specialty  none    Code Status  Full Code    Disposition  The patient is not medically cleared for discharge to home or a post-acute facility.  Anticipate discharge to: home with close outpatient follow-up    I have placed the appropriate orders for post-discharge needs.    Review of Systems  Review of Systems   Constitutional:  Negative for chills and fever.   HENT:  Negative for congestion.    Eyes:  Negative for blurred vision and photophobia.   Respiratory:  Positive for cough, sputum production and shortness of breath (better).    Cardiovascular:   Positive for chest pain. Negative for claudication and leg swelling.   Gastrointestinal:  Negative for abdominal pain, constipation, diarrhea, heartburn and vomiting.   Genitourinary:  Negative for dysuria and hematuria.   Musculoskeletal:  Negative for joint pain and myalgias.   Skin:  Negative for itching and rash.   Neurological:  Negative for dizziness, sensory change, speech change, weakness and headaches.   Psychiatric/Behavioral:  Negative for depression. The patient is not nervous/anxious and does not have insomnia.         Physical Exam  Temp:  [36.9 °C (98.4 °F)-37.6 °C (99.7 °F)] 37.6 °C (99.7 °F)  Pulse:  [75-91] 91  Resp:  [17-18] 18  BP: (118-154)/(57-80) 154/76  SpO2:  [92 %-97 %] 92 %    Physical Exam  Vitals and nursing note reviewed.   Constitutional:       General: He is not in acute distress.     Appearance: Normal appearance.   HENT:      Head: Normocephalic and atraumatic.   Eyes:      General: No scleral icterus.     Extraocular Movements: Extraocular movements intact.   Cardiovascular:      Rate and Rhythm: Normal rate and regular rhythm.      Pulses: Normal pulses.      Heart sounds: Normal heart sounds. No murmur heard.  Pulmonary:      Effort: Pulmonary effort is normal. No respiratory distress.      Breath sounds: Normal breath sounds. No wheezing, rhonchi or rales.      Comments: Better air movement in the right chest postthoracentesis  Chest:      Chest wall: Tenderness (left sided pain to palpation) present.   Abdominal:      General: Abdomen is flat. Bowel sounds are normal. There is no distension.      Palpations: Abdomen is soft.      Tenderness: There is no rebound.   Musculoskeletal:         General: No swelling or tenderness.      Cervical back: Normal range of motion and neck supple.   Lymphadenopathy:      Cervical: No cervical adenopathy.   Skin:     Coloration: Skin is not jaundiced.      Findings: No erythema.   Neurological:      General: No focal deficit present.       Mental Status: He is alert and oriented to person, place, and time. Mental status is at baseline.      Cranial Nerves: No cranial nerve deficit.   Psychiatric:         Mood and Affect: Mood normal.         Behavior: Behavior normal.         Fluids    Intake/Output Summary (Last 24 hours) at 3/24/2025 1514  Last data filed at 3/23/2025 1657  Gross per 24 hour   Intake 800 ml   Output --   Net 800 ml        Laboratory  Recent Labs     03/22/25 0239 03/23/25  0332 03/24/25  0245   WBC 13.5* 11.2* 16.1*   RBC 4.11* 4.10* 4.00*   HEMOGLOBIN 12.2* 12.1* 12.0*   HEMATOCRIT 38.3* 37.1* 36.4*   MCV 93.2 90.5 91.0   MCH 29.7 29.5 30.0   MCHC 31.9* 32.6 33.0   RDW 61.0* 57.3* 58.1*   PLATELETCT 232 233 253   MPV 10.5 9.9 10.1     Recent Labs     03/22/25 0239 03/23/25 0332 03/24/25  0245   SODIUM 138 137 140   POTASSIUM 3.5* 3.5* 3.5*   CHLORIDE 103 104 107   CO2 24 22 23   GLUCOSE 98 157* 144*   BUN 13 13 18   CREATININE 1.45* 1.07 1.28   CALCIUM 7.4* 7.3* 7.4*                     Imaging  DX-CHEST-PORTABLE (1 VIEW)   Final Result      1.  Bibasilar atelectasis, left greater than right.      2.  No evidence of pneumothorax postthoracentesis.      CT-CTA CHEST PULMONARY ARTERY W/ RECONS   Final Result      1.  No evidence of pulmonary embolus.      2.  Consolidation within the left lung base likely representing pneumonia.      3.  Moderate right and small left pleural effusions with bibasilar atelectasis.      4.  Surgical changes involving the right hilum.      5.  Interval progression of a compression fracture at the T8 level. There are chronic unchanged compression fractures involving T7, T6, T5, T9 and T10.      DX-CHEST-PORTABLE (1 VIEW)   Final Result      1.  Bibasilar atelectasis/consolidation, right greater than left.      2.  Small right pleural effusion.      3.  Cardiomegaly.      DX-CHEST-LIMITED (1 VIEW)    (Results Pending)        Assessment/Plan  * Sepsis (HCC)- (present on admission)  Assessment &  Plan  This is Sepsis Present on admission  SIRS criteria identified on my evaluation include: Fever, with temperature greater than 100.9 deg F, Tachycardia, with heart rate greater than 90 BPM, and Leukocytosis, with WBC greater than 12,000  Clinical indicators of end organ dysfunction include Hypotension with systolic blood pressure less than 90 or MAP less than 65 and Acute Respiratory Failure - (mechanical ventilation or BiPap or PaO2/FiO2 ratio < 300)  Source is pneumonia  Sepsis protocol initiated  Crystalloid Fluid Administration: Fluid resuscitation ordered per standard protocol - 30 mL/kg per current or ideal body weight  IV antibiotics as appropriate for source of sepsis  Reassessment: I have reassessed the patient's hemodynamic status    Left-sided chest pain  Assessment & Plan  Related to the pneumonia  Stable with Dilaudid but responded better to fentanyl  Trial of fentanyl patch, 12 mcg ineffective, moved to 25mcg  Single dose of toradol since renal function has improved  Received 2 doses of dexamethasone 6 mg and helped some but still intense pain  Lidocaine ineffective  Oxycodone ineffective    Urinary retention  Assessment & Plan  Payne placed 3/19 due to retention  Voiding trial        Acute respiratory failure with hypoxia (HCC)  Assessment & Plan  Oxygen titration, currently requiring 3 L to maintain adequate saturation    Influenza A  Assessment & Plan  Tamiflu x 5 days  Oxygen titration - down to 2 L  Suspect concurrent bacterial infection    Leukocytosis  Assessment & Plan  Secondary to significant pneumonia, procalcitonin elevated at 12.7  Zyvox and Zosyn  WBC count continues to drop, down to 11.2  Received 2 doses of dexamethasone, some improvement in pain but still intense, will try another mode to control pain      Pleural effusion  Assessment & Plan  Thoracentesis completed on 3/20  Cultures negative  Pending pathology  Malignant cells seen, lung origin identified.  Patient and wife aware  of the finding    Pneumonia  Assessment & Plan  Patient is immunocompromised with recent chemotherapy and recent treatment of chronic sinusitis  MRSA nasal swab pending  Zyvox and Zosyn  Negative blood cultures  Sputum culture as able  No growth from pleural fluid culture  Hycodan for cough suppressant, Mucinex for expectorant  Thoracentesis 3/20 with 635ml removed, serosanguinous slightly cloudy, labs, cytology shows malignant cells.  Pathology report confirms lung origin, report copy given to patient.  Will reach out to oncology for update    Peripheral neuropathy due to chemotherapy (HCC)- (present on admission)  Assessment & Plan  Continue gabapentin nightly    Acute kidney injury superimposed on chronic kidney disease (HCC)- (present on admission)  Assessment & Plan  Creatinine normalized at 1.28  Avoid nephrotoxin  Will use Zyvox instead of Vanco because of acute kidney injury  Stopped IV hydration    Right foot drop- (present on admission)  Assessment & Plan  Chronic    Adenocarcinoma of lung (HCC), stage 4 ([facility] Oct. 2021)- (present on admission)  Assessment & Plan  Patient finished chemotherapy a couple of months ago  Unfortunately pathology confirms recurrent lung cancer in the pleural effusion, will need PET scan outpatient.  Follows with Dr. Staley      History of malignant melanoma, April 2016- (present on admission)  Assessment & Plan  .      Adrenal insufficiency (HCC), secondary- (present on admission)  Assessment & Plan  Resume home dose steroid    Hyperlipidemia- (present on admission)  Assessment & Plan  Continue statin    Hypothyroid- (present on admission)  Assessment & Plan  Continue home dose Synthroid and Cytomel         VTE prophylaxis:   SCDs/TEDs      I have performed a physical exam and reviewed and updated ROS and Plan today (3/24/2025). In review of yesterday's note (3/23/2025), there are no changes except as documented above.    My total time spent caring for the patient  on the day of the encounter was 58 minutes.   This does not include time spent on separately billable procedures/tests.

## 2025-03-24 NOTE — CARE PLAN
Problem: Care Map:  Optimal Outcome for the Pneumonia Patient  Goal: Collection and monitoring of appropriate tests and labs  Outcome: Progressing     Problem: Respiratory  Goal: Patient will achieve/maintain optimum respiratory ventilation and gas exchange  Outcome: Progressing   The patient is Stable - Low risk of patient condition declining or worsening    Shift Goals  Clinical Goals: pain mangement, respiratory, O2  Patient Goals: comfort  Family Goals: BUDDY    Progress made toward(s) clinical / shift goals:      Patient is not progressing towards the following goals:

## 2025-03-24 NOTE — PROGRESS NOTES
Telemetry Shift Summary    Per monitor tech:  Rhythm SR  HR Range 77-85  Ectopy fPVC, rPAC  Measurements 0.22/0.08/0.36      Normal Values  Rhythm SR  HR Range:   Measurements: 0.12-0.20/0.06-0.10/0.30-0.52

## 2025-03-25 LAB
ANION GAP SERPL CALC-SCNC: 9 MMOL/L (ref 7–16)
BUN SERPL-MCNC: 18 MG/DL (ref 8–22)
CALCIUM SERPL-MCNC: 7.3 MG/DL (ref 8.4–10.2)
CHLORIDE SERPL-SCNC: 105 MMOL/L (ref 96–112)
CO2 SERPL-SCNC: 26 MMOL/L (ref 20–33)
CREAT SERPL-MCNC: 1.08 MG/DL (ref 0.5–1.4)
ERYTHROCYTE [DISTWIDTH] IN BLOOD BY AUTOMATED COUNT: 57 FL (ref 35.9–50)
GFR SERPLBLD CREATININE-BSD FMLA CKD-EPI: 70 ML/MIN/1.73 M 2
GLUCOSE SERPL-MCNC: 99 MG/DL (ref 65–99)
HCT VFR BLD AUTO: 36.6 % (ref 42–52)
HGB BLD-MCNC: 11.9 G/DL (ref 14–18)
MCH RBC QN AUTO: 29.4 PG (ref 27–33)
MCHC RBC AUTO-ENTMCNC: 32.5 G/DL (ref 32.3–36.5)
MCV RBC AUTO: 90.4 FL (ref 81.4–97.8)
PLATELET # BLD AUTO: 236 K/UL (ref 164–446)
PMV BLD AUTO: 9.5 FL (ref 9–12.9)
POTASSIUM SERPL-SCNC: 2.9 MMOL/L (ref 3.6–5.5)
POTASSIUM SERPL-SCNC: 3.7 MMOL/L (ref 3.6–5.5)
RBC # BLD AUTO: 4.05 M/UL (ref 4.7–6.1)
SODIUM SERPL-SCNC: 140 MMOL/L (ref 135–145)
WBC # BLD AUTO: 12.5 K/UL (ref 4.8–10.8)

## 2025-03-25 PROCEDURE — 700111 HCHG RX REV CODE 636 W/ 250 OVERRIDE (IP): Performed by: INTERNAL MEDICINE

## 2025-03-25 PROCEDURE — 80048 BASIC METABOLIC PNL TOTAL CA: CPT

## 2025-03-25 PROCEDURE — 700102 HCHG RX REV CODE 250 W/ 637 OVERRIDE(OP): Performed by: INTERNAL MEDICINE

## 2025-03-25 PROCEDURE — 36415 COLL VENOUS BLD VENIPUNCTURE: CPT

## 2025-03-25 PROCEDURE — 94669 MECHANICAL CHEST WALL OSCILL: CPT

## 2025-03-25 PROCEDURE — 84132 ASSAY OF SERUM POTASSIUM: CPT

## 2025-03-25 PROCEDURE — 94760 N-INVAS EAR/PLS OXIMETRY 1: CPT

## 2025-03-25 PROCEDURE — 99232 SBSQ HOSP IP/OBS MODERATE 35: CPT | Performed by: INTERNAL MEDICINE

## 2025-03-25 PROCEDURE — A9270 NON-COVERED ITEM OR SERVICE: HCPCS | Mod: JZ | Performed by: HOSPITALIST

## 2025-03-25 PROCEDURE — A9270 NON-COVERED ITEM OR SERVICE: HCPCS | Performed by: INTERNAL MEDICINE

## 2025-03-25 PROCEDURE — 700111 HCHG RX REV CODE 636 W/ 250 OVERRIDE (IP): Performed by: HOSPITALIST

## 2025-03-25 PROCEDURE — 85027 COMPLETE CBC AUTOMATED: CPT

## 2025-03-25 PROCEDURE — 700102 HCHG RX REV CODE 250 W/ 637 OVERRIDE(OP): Mod: JZ | Performed by: HOSPITALIST

## 2025-03-25 PROCEDURE — 770020 HCHG ROOM/CARE - TELE (206)

## 2025-03-25 RX ORDER — POTASSIUM CHLORIDE 1500 MG/1
40 TABLET, EXTENDED RELEASE ORAL ONCE
Status: COMPLETED | OUTPATIENT
Start: 2025-03-25 | End: 2025-03-25

## 2025-03-25 RX ORDER — POTASSIUM CHLORIDE 7.45 MG/ML
10 INJECTION INTRAVENOUS
Status: COMPLETED | OUTPATIENT
Start: 2025-03-25 | End: 2025-03-25

## 2025-03-25 RX ORDER — KETOROLAC TROMETHAMINE 15 MG/ML
15 INJECTION, SOLUTION INTRAMUSCULAR; INTRAVENOUS ONCE
Status: COMPLETED | OUTPATIENT
Start: 2025-03-25 | End: 2025-03-25

## 2025-03-25 RX ORDER — POTASSIUM CHLORIDE 1500 MG/1
40 TABLET, EXTENDED RELEASE ORAL 3 TIMES DAILY
Status: DISCONTINUED | OUTPATIENT
Start: 2025-03-25 | End: 2025-03-25

## 2025-03-25 RX ORDER — FENTANYL 12.5 UG/1
1 PATCH TRANSDERMAL
Refills: 0 | Status: DISCONTINUED | OUTPATIENT
Start: 2025-03-25 | End: 2025-03-26 | Stop reason: HOSPADM

## 2025-03-25 RX ORDER — POTASSIUM CHLORIDE 1500 MG/1
40 TABLET, EXTENDED RELEASE ORAL DAILY
Status: DISCONTINUED | OUTPATIENT
Start: 2025-03-26 | End: 2025-03-26 | Stop reason: HOSPADM

## 2025-03-25 RX ADMIN — HYDROMORPHONE HYDROCHLORIDE 2 MG: 2 TABLET ORAL at 01:03

## 2025-03-25 RX ADMIN — GUAIFENESIN 600 MG: 600 TABLET, EXTENDED RELEASE ORAL at 17:49

## 2025-03-25 RX ADMIN — TAMSULOSIN HYDROCHLORIDE 0.4 MG: 0.4 CAPSULE ORAL at 17:50

## 2025-03-25 RX ADMIN — HYDROCODONE BITARTRATE AND HOMATROPINE METHYLBROMIDE 5 ML: 5; 1.5 SOLUTION ORAL at 04:00

## 2025-03-25 RX ADMIN — HYDROMORPHONE HYDROCHLORIDE 2 MG: 2 TABLET ORAL at 05:56

## 2025-03-25 RX ADMIN — TAMSULOSIN HYDROCHLORIDE 0.4 MG: 0.4 CAPSULE ORAL at 05:56

## 2025-03-25 RX ADMIN — LIOTHYRONINE SODIUM 10 MCG: 5 TABLET ORAL at 05:56

## 2025-03-25 RX ADMIN — KETOROLAC TROMETHAMINE 15 MG: 15 INJECTION, SOLUTION INTRAMUSCULAR; INTRAVENOUS at 11:33

## 2025-03-25 RX ADMIN — FENTANYL 1 PATCH: 12 PATCH TRANSDERMAL at 11:32

## 2025-03-25 RX ADMIN — AMLODIPINE BESYLATE 5 MG: 5 TABLET ORAL at 05:56

## 2025-03-25 RX ADMIN — LEVOTHYROXINE SODIUM 125 MCG: 0.12 TABLET ORAL at 05:56

## 2025-03-25 RX ADMIN — ATORVASTATIN CALCIUM 20 MG: 20 TABLET, FILM COATED ORAL at 20:23

## 2025-03-25 RX ADMIN — HYDROCORTISONE 20 MG: 10 TABLET ORAL at 17:49

## 2025-03-25 RX ADMIN — POTASSIUM CHLORIDE 40 MEQ: 1500 TABLET, EXTENDED RELEASE ORAL at 06:33

## 2025-03-25 RX ADMIN — POTASSIUM CHLORIDE 10 MEQ: 7.46 INJECTION, SOLUTION INTRAVENOUS at 06:32

## 2025-03-25 RX ADMIN — POTASSIUM CHLORIDE 40 MEQ: 1500 TABLET, EXTENDED RELEASE ORAL at 08:27

## 2025-03-25 RX ADMIN — POTASSIUM CHLORIDE 40 MEQ: 1500 TABLET, EXTENDED RELEASE ORAL at 11:34

## 2025-03-25 RX ADMIN — HYDROCORTISONE 20 MG: 10 TABLET ORAL at 05:56

## 2025-03-25 RX ADMIN — GUAIFENESIN 600 MG: 600 TABLET, EXTENDED RELEASE ORAL at 05:56

## 2025-03-25 RX ADMIN — HYDROMORPHONE HYDROCHLORIDE 1 MG: 2 TABLET ORAL at 17:59

## 2025-03-25 RX ADMIN — POTASSIUM CHLORIDE 10 MEQ: 7.46 INJECTION, SOLUTION INTRAVENOUS at 08:24

## 2025-03-25 RX ADMIN — HYDROMORPHONE HYDROCHLORIDE 1 MG: 2 TABLET ORAL at 22:25

## 2025-03-25 RX ADMIN — GABAPENTIN 100 MG: 100 CAPSULE ORAL at 17:49

## 2025-03-25 RX ADMIN — RIVAROXABAN 10 MG: 10 TABLET, FILM COATED ORAL at 17:49

## 2025-03-25 RX ADMIN — OMEPRAZOLE 40 MG: 20 CAPSULE, DELAYED RELEASE ORAL at 05:57

## 2025-03-25 ASSESSMENT — PAIN DESCRIPTION - PAIN TYPE
TYPE: ACUTE PAIN

## 2025-03-25 NOTE — PROGRESS NOTES
Payne catheter removed per order. Patient tolerated removal and given a urinal. Voiding trial started and patient educated on the need to notify staff when the urge to void occurs.

## 2025-03-25 NOTE — CARE PLAN
Problem: Hyperinflation  Goal: Prevent or improve atelectasis  Description: Target End Date:  3 to 4 days1. Instruct incentive spirometry usage2.  Perform hyperinflation therapy as indicated  Outcome: Progressing   IS volume 3000 ml. Breath sounds fine crackles on the right, clear on the left. Dry NPC.

## 2025-03-25 NOTE — CARE PLAN
Problem: Care Map:  Optimal Outcome for the Pneumonia Patient  Goal: Collection and monitoring of appropriate tests and labs  Outcome: Progressing     Problem: Respiratory  Goal: Patient will achieve/maintain optimum respiratory ventilation and gas exchange  Outcome: Progressing   The patient is Stable - Low risk of patient condition declining or worsening    Shift Goals  Clinical Goals: pain management, respiratory, haines assessment  Patient Goals: comfort and pain relief  Family Goals: BUDDY    Progress made toward(s) clinical / shift goals:      Patient is not progressing towards the following goals:

## 2025-03-25 NOTE — PROGRESS NOTES
Telemetry shift summary    Rhythm: sr bbb  HR: 68-94   Ectopy: f pac, r pvc    Measurements: .18 / .15 / .46    Normal values  Rhythm SR  HR   Measurements: 0.12-0.20/0.08-0.10/0.30-0.52

## 2025-03-25 NOTE — PROGRESS NOTES
Hospital Medicine Daily Progress Note    Date of Service  3/25/2025    Chief Complaint  Bartolo Berrios [facility] is a 80 y.o. male admitted 3/19/2025 with sob, cough, L sided chest pain    Hospital Course  [facility] a 80 y.[facility]. male who presented 3/19/2025 with severe shortness of breath and cough.  He does have a history of lung cancer having finished chemotherapy few months ago.  He has been increasingly short of breath over the last couple of months but very severe over the last 1 to 2 weeks.  He endorses fevers chills and significant pain in the left chest especially with cough.  CT shows a right pleural effusion and consolidation in the left chest base.  Given the asymptomatic nature of the pleural effusion on the right I wonder if this is malignant pleural effusion rather than acute in the acute decompensation is coming from the left-sided pneumonia.  He probably will need a thoracentesis prior to discharge both diagnostic and therapeutic.  Will discuss further with pulmonology as patient starts to show improvement.  Is currently requiring 3 L nasal cannula to maintain adequate saturation is not on oxygen at baseline.  Given his recent cancer and chemotherapy he is immunosuppressed therefore I will broaden his antibiotic coverage to Zyvox and Zosyn though he did receive Rocephin and azithromycin in the emergency room.  Procalcitonin is 12.7.  I will also place him on Hycodan to help with the pain as well as the cough and Mucinex as an expectorant.  CODE STATUS reviewed with the patient and he is full code.     Interval Problem Update  - patient with ongoing L sided chest pain, at  best 5/10, discussed another dose of toradol and increasing fentanyl patch  - sob with exertion but o2 sats remain normal    I have discussed this patient's plan of care and discharge plan at IDT rounds today with Case Management, Nursing, Nursing leadership, and other members of the IDT team.      Code  Status  Full Code    Disposition  The patient is not medically cleared for discharge to home or a post-acute facility.  Anticipate discharge to: home with close outpatient follow-up    I have placed the appropriate orders for post-discharge needs.    Review of Systems  Review of Systems   All other systems reviewed and are negative.       Physical Exam  Temp:  [36.4 °C (97.5 °F)-37.3 °C (99.1 °F)] 36.7 °C (98 °F)  Pulse:  [] 86  Resp:  [16-20] 20  BP: (131-194)/(56-91) 151/78  SpO2:  [92 %-96 %] 94 %    Physical Exam  General: appears sob  HEENT: PERRLA, EOMI  Cards: RRR, no murmur or gallops, no tenderness of chest wall, JVP nl  Pulm: dec BS R>L, rhonchi  Abdomen: soft, NTND, + bowel sounds, no rebound tenderness or guarding  MSK: normal ROM of upper and lower extremities  Neuro: CN II-XII grossly intact, sensation/strength intact, AAOx3  Psych: Appropriate mood   Fluids    Intake/Output Summary (Last 24 hours) at 3/25/2025 1512  Last data filed at 3/25/2025 0000  Gross per 24 hour   Intake --   Output 2350 ml   Net -2350 ml        Laboratory  Recent Labs     03/23/25  0332 03/24/25  0245 03/25/25  0333   WBC 11.2* 16.1* 12.5*   RBC 4.10* 4.00* 4.05*   HEMOGLOBIN 12.1* 12.0* 11.9*   HEMATOCRIT 37.1* 36.4* 36.6*   MCV 90.5 91.0 90.4   MCH 29.5 30.0 29.4   MCHC 32.6 33.0 32.5   RDW 57.3* 58.1* 57.0*   PLATELETCT 233 253 236   MPV 9.9 10.1 9.5     Recent Labs     03/23/25  0332 03/24/25  0245 03/25/25  0333 03/25/25  1254   SODIUM 137 140 140  --    POTASSIUM 3.5* 3.5* 2.9* 3.7   CHLORIDE 104 107 105  --    CO2 22 23 26  --    GLUCOSE 157* 144* 99  --    BUN 13 18 18  --    CREATININE 1.07 1.28 1.08  --    CALCIUM 7.3* 7.4* 7.3*  --                    Imaging  DX-CHEST-LIMITED (1 VIEW)   Final Result      1.  Small right pleural effusion and trace left pleural effusion.      2.  Left basilar atelectasis.      3.  Wedge-shaped opacity in right lung base consistent with parenchymal scarring, pneumonitis, and/or  atelectasis.      4.  Slight atelectasis or parenchymal scarring along right minor fissure.      DX-CHEST-PORTABLE (1 VIEW)   Final Result      1.  Bibasilar atelectasis, left greater than right.      2.  No evidence of pneumothorax postthoracentesis.      CT-CTA CHEST PULMONARY ARTERY W/ RECONS   Final Result      1.  No evidence of pulmonary embolus.      2.  Consolidation within the left lung base likely representing pneumonia.      3.  Moderate right and small left pleural effusions with bibasilar atelectasis.      4.  Surgical changes involving the right hilum.      5.  Interval progression of a compression fracture at the T8 level. There are chronic unchanged compression fractures involving T7, T6, T5, T9 and T10.      DX-CHEST-PORTABLE (1 VIEW)   Final Result      1.  Bibasilar atelectasis/consolidation, right greater than left.      2.  Small right pleural effusion.      3.  Cardiomegaly.           Assessment/Plan  * Sepsis (HCC)- (present on admission)  Assessment & Plan  This is Sepsis Present on admission  SIRS criteria identified on my evaluation include: Fever, with temperature greater than 100.9 deg F, Tachycardia, with heart rate greater than 90 BPM, and Leukocytosis, with WBC greater than 12,000  Clinical indicators of end organ dysfunction include Hypotension with systolic blood pressure less than 90 or MAP less than 65 and Acute Respiratory Failure - (mechanical ventilation or BiPap or PaO2/FiO2 ratio < 300)  Source is pneumonia  Sepsis protocol initiated  Crystalloid Fluid Administration: Fluid resuscitation ordered per standard protocol - 30 mL/kg per current or ideal body weight  IV antibiotics as appropriate for source of sepsis  Reassessment: I have reassessed the patient's hemodynamic status    Resolved    Left-sided chest pain  Assessment & Plan  Related to the pneumonia  Stable with Dilaudid but responded better to fentanyl  Trial of fentanyl patch, 12 mcg ineffective, moved to  25mcg  Received 2 doses of dexamethasone 6 mg and helped some but still intense pain  Lidocaine ineffective  Oxycodone ineffective  3/25: I increased fentanyl patch to 37mcg, also one time dose of IV toradol    Urinary retention  Assessment & Plan  Haines placed 3/19 due to retention  Voiding trial   Off haines now        Acute respiratory failure with hypoxia (HCC)  Assessment & Plan  Oxygen titration, currently requiring 3 L to maintain adequate saturation  3/25 on RA    Influenza A  Assessment & Plan  Tamiflu x 5 days  Oxygen titration - down to 2 L  Suspect concurrent bacterial infection    Leukocytosis  Assessment & Plan  Secondary to significant pneumonia, procalcitonin elevated at 12.7  Sp Zyvox and Zosyn  WBC count continues to drop, down to 11.2  Received 2 doses of dexamethasone, some improvement in pain but still intense, will try another mode to control pain      Pleural effusion  Assessment & Plan  Thoracentesis completed on 3/20  Cultures negative  Pending pathology  Malignant cells seen, lung origin identified.  Patient and wife aware of the finding  CXR on 3/24 showed smaller effusions, L atelectasis  I ordered incentive spirometry and chest PT    Pneumonia  Assessment & Plan  Patient is immunocompromised with recent chemotherapy and recent treatment of chronic sinusitis  Sp Zyvox and Zosyn  Negative blood cultures  Sputum culture as able  No growth from pleural fluid culture  Hycodan for cough suppressant, Mucinex for expectorant  Thoracentesis 3/20 with 635ml removed, serosanguinous slightly cloudy, labs, cytology shows malignant cells.  Pathology report confirms lung origin, report copy given to patient.    3/25: I spoke with RT for pulmonary toilet      Peripheral neuropathy due to chemotherapy (HCC)- (present on admission)  Assessment & Plan  Cw gabapentin    Acute kidney injury superimposed on chronic kidney disease (HCC)- (present on admission)  Assessment & Plan  Due to dehydration  Cr now  nl  Avoid nephrotoxin  I will check bmp tomorrow    Right foot drop- (present on admission)  Assessment & Plan  Chronic    Adenocarcinoma of lung (HCC), stage 4 ([facility] Oct. 2021)- (present on admission)  Assessment & Plan  Patient finished chemotherapy a couple of months ago  Unfortunately pathology confirms recurrent lung cancer in the pleural effusion, will need PET scan outpatient.  Follows with Dr. Corral and Phil Mejía spoke with Dr. Corral who will fu with outpatient PET      History of malignant melanoma, April 2016- (present on admission)  Assessment & Plan  noted.      Adrenal insufficiency (HCC), secondary- (present on admission)  Assessment & Plan  Home steroids    Hyperlipidemia- (present on admission)  Assessment & Plan  Cw statin    Hypothyroid- (present on admission)  Assessment & Plan  Cw synthroid and cytomel         VTE prophylaxis: Xarelto  I have performed a physical exam and reviewed and updated ROS and Plan today (3/25/2025). In review of yesterday's note (3/24/2025), there are no changes except as documented above.    I spent 40 minutes providing care for this patient.  This included face-to-face interview, physical examination.  Review of lab work including CBC, BMP,  Discussion with multidisciplinary team including case management, nursing staff and pharmacy

## 2025-03-25 NOTE — PROGRESS NOTES
Telemetry Shift Summary     Per monitor tech:  Rhythm SR BBB  HR Range 82-94  Ectopy rPVC/PAC  Measurements 0.16/0.18/0.42      Normal Values  Rhythm SR  HR Range:   Measurements: 0.12-0.20/0.06-0.10/0.30-0.52

## 2025-03-25 NOTE — CARE PLAN
The patient is Stable - Low risk of patient condition declining or worsening    Shift Goals  Clinical Goals: Pain management, monitor O2  Patient Goals: Pain management  Family Goals: BUDDY    Progress made toward(s) clinical / shift goals:      Problem: Respiratory  Goal: Patient will achieve/maintain optimum respiratory ventilation and gas exchange  Outcome: Progressing  Note: Patient has remained on RA this shift.      Problem: Physical Regulation  Goal: Signs and symptoms of infection will decrease  Outcome: Progressing     Problem: Fall Risk  Goal: Patient will remain free from falls  Outcome: Progressing       Patient is not progressing towards the following goals:

## 2025-03-26 ENCOUNTER — PHARMACY VISIT (OUTPATIENT)
Dept: PHARMACY | Facility: MEDICAL CENTER | Age: 80
End: 2025-03-26
Payer: COMMERCIAL

## 2025-03-26 VITALS
HEIGHT: 72 IN | WEIGHT: 164.24 LBS | SYSTOLIC BLOOD PRESSURE: 148 MMHG | HEART RATE: 88 BPM | BODY MASS INDEX: 22.25 KG/M2 | OXYGEN SATURATION: 96 % | TEMPERATURE: 98.8 F | RESPIRATION RATE: 16 BRPM | DIASTOLIC BLOOD PRESSURE: 72 MMHG

## 2025-03-26 LAB
ANION GAP SERPL CALC-SCNC: 9 MMOL/L (ref 7–16)
BUN SERPL-MCNC: 16 MG/DL (ref 8–22)
CALCIUM SERPL-MCNC: 7.3 MG/DL (ref 8.4–10.2)
CHLORIDE SERPL-SCNC: 105 MMOL/L (ref 96–112)
CO2 SERPL-SCNC: 25 MMOL/L (ref 20–33)
CREAT SERPL-MCNC: 1.04 MG/DL (ref 0.5–1.4)
ERYTHROCYTE [DISTWIDTH] IN BLOOD BY AUTOMATED COUNT: 57.9 FL (ref 35.9–50)
GFR SERPLBLD CREATININE-BSD FMLA CKD-EPI: 73 ML/MIN/1.73 M 2
GLUCOSE SERPL-MCNC: 80 MG/DL (ref 65–99)
HCT VFR BLD AUTO: 36.7 % (ref 42–52)
HGB BLD-MCNC: 11.8 G/DL (ref 14–18)
MCH RBC QN AUTO: 29.4 PG (ref 27–33)
MCHC RBC AUTO-ENTMCNC: 32.2 G/DL (ref 32.3–36.5)
MCV RBC AUTO: 91.3 FL (ref 81.4–97.8)
PLATELET # BLD AUTO: 243 K/UL (ref 164–446)
PMV BLD AUTO: 9.6 FL (ref 9–12.9)
POTASSIUM SERPL-SCNC: 3.5 MMOL/L (ref 3.6–5.5)
RBC # BLD AUTO: 4.02 M/UL (ref 4.7–6.1)
SODIUM SERPL-SCNC: 139 MMOL/L (ref 135–145)
WBC # BLD AUTO: 12.2 K/UL (ref 4.8–10.8)

## 2025-03-26 PROCEDURE — 700102 HCHG RX REV CODE 250 W/ 637 OVERRIDE(OP): Performed by: INTERNAL MEDICINE

## 2025-03-26 PROCEDURE — 99239 HOSP IP/OBS DSCHRG MGMT >30: CPT | Performed by: INTERNAL MEDICINE

## 2025-03-26 PROCEDURE — 85027 COMPLETE CBC AUTOMATED: CPT

## 2025-03-26 PROCEDURE — 36415 COLL VENOUS BLD VENIPUNCTURE: CPT

## 2025-03-26 PROCEDURE — A9270 NON-COVERED ITEM OR SERVICE: HCPCS | Mod: JZ | Performed by: INTERNAL MEDICINE

## 2025-03-26 PROCEDURE — 80048 BASIC METABOLIC PNL TOTAL CA: CPT

## 2025-03-26 PROCEDURE — 700102 HCHG RX REV CODE 250 W/ 637 OVERRIDE(OP): Mod: JZ | Performed by: INTERNAL MEDICINE

## 2025-03-26 PROCEDURE — A9270 NON-COVERED ITEM OR SERVICE: HCPCS | Performed by: INTERNAL MEDICINE

## 2025-03-26 PROCEDURE — RXMED WILLOW AMBULATORY MEDICATION CHARGE: Performed by: INTERNAL MEDICINE

## 2025-03-26 RX ORDER — FENTANYL 12.5 UG/1
1 PATCH TRANSDERMAL
Qty: 5 PATCH | Refills: 0 | Status: ON HOLD | OUTPATIENT
Start: 2025-03-28 | End: 2025-04-10

## 2025-03-26 RX ORDER — ALBUTEROL SULFATE 90 UG/1
2 INHALANT RESPIRATORY (INHALATION) EVERY 4 HOURS PRN
Qty: 8.5 G | Refills: 0 | Status: SHIPPED | OUTPATIENT
Start: 2025-03-26 | End: 2025-04-25

## 2025-03-26 RX ORDER — HYDROCODONE BITARTRATE AND HOMATROPINE METHYLBROMIDE ORAL SOLUTION 5; 1.5 MG/5ML; MG/5ML
5 LIQUID ORAL EVERY 4 HOURS PRN
Qty: 120 ML | Refills: 0 | Status: SHIPPED | OUTPATIENT
Start: 2025-03-26 | End: 2025-04-03 | Stop reason: SDUPTHER

## 2025-03-26 RX ORDER — BENZONATATE 100 MG/1
100 CAPSULE ORAL 3 TIMES DAILY PRN
Qty: 60 CAPSULE | Refills: 0 | Status: SHIPPED | OUTPATIENT
Start: 2025-03-26

## 2025-03-26 RX ORDER — GUAIFENESIN 600 MG/1
600 TABLET, EXTENDED RELEASE ORAL EVERY 12 HOURS
Qty: 30 TABLET | Refills: 0 | Status: SHIPPED | OUTPATIENT
Start: 2025-03-26 | End: 2025-04-10

## 2025-03-26 RX ORDER — FENTANYL 25 UG/1
1 PATCH TRANSDERMAL
Qty: 5 PATCH | Refills: 0 | Status: SHIPPED | OUTPATIENT
Start: 2025-03-27 | End: 2025-04-13

## 2025-03-26 RX ORDER — OXYCODONE HYDROCHLORIDE 5 MG/1
5 TABLET ORAL EVERY 4 HOURS PRN
Refills: 0 | Status: DISCONTINUED | OUTPATIENT
Start: 2025-03-26 | End: 2025-03-26 | Stop reason: HOSPADM

## 2025-03-26 RX ADMIN — LEVOTHYROXINE SODIUM 125 MCG: 0.12 TABLET ORAL at 06:40

## 2025-03-26 RX ADMIN — HYDROMORPHONE HYDROCHLORIDE 1 MG: 2 TABLET ORAL at 11:20

## 2025-03-26 RX ADMIN — HYDROCORTISONE 20 MG: 10 TABLET ORAL at 06:41

## 2025-03-26 RX ADMIN — TAMSULOSIN HYDROCHLORIDE 0.4 MG: 0.4 CAPSULE ORAL at 06:41

## 2025-03-26 RX ADMIN — LIOTHYRONINE SODIUM 10 MCG: 5 TABLET ORAL at 06:41

## 2025-03-26 RX ADMIN — AMLODIPINE BESYLATE 5 MG: 5 TABLET ORAL at 06:41

## 2025-03-26 RX ADMIN — OMEPRAZOLE 40 MG: 20 CAPSULE, DELAYED RELEASE ORAL at 06:41

## 2025-03-26 RX ADMIN — GUAIFENESIN 600 MG: 600 TABLET, EXTENDED RELEASE ORAL at 06:41

## 2025-03-26 RX ADMIN — POTASSIUM CHLORIDE 40 MEQ: 1500 TABLET, EXTENDED RELEASE ORAL at 06:41

## 2025-03-26 ASSESSMENT — PAIN DESCRIPTION - PAIN TYPE: TYPE: ACUTE PAIN

## 2025-03-26 NOTE — PROGRESS NOTES
Telemetry Shift Summary     Rhythm SR/ST with BBB  HR Range   Ectopy fPAC/rPVC  Measurements  0.14/0.13/0.37     Normal Values  Rhythm SR  HR Range    Measurements 0.12-0.20 / 0.06-0.10  / 0.30-0.52

## 2025-03-26 NOTE — CARE PLAN
The patient is Stable - Low risk of patient condition declining or worsening    Shift Goals  Clinical Goals: monitor 02  Patient Goals: pain management  Family Goals: BUDDY    Progress made toward(s) clinical / shift goals:  Patient weaned from 02. Pain medicated per MAR and pt request.

## 2025-03-26 NOTE — PROGRESS NOTES
Telemetry Summary    Rhythm: SR, BBB  Rate: 70's-80's  Ectopy: Rare to Frequent PVC's/PAC's  Measurements: (.17/.14/.39)    Normal Values  Rhythm: SR  Rate:   Measurements: 0.12-0.20/0.06-0.10/0.30-0.52;

## 2025-03-26 NOTE — DISCHARGE INSTRUCTIONS
You were hospitalized for shortness of breath and noted to have pleural effusion which is fluid around your lung  You underwent procedure that removed the fluid and tested for infection and malignancy. While the cultures did not have any growth, the fluid did have cancerous cells.  You will need to have a PET scan as outpatient.  In addition, you were treated for influenza A and likely superimposed bacterial pneumonia and completed course.  You had severe pain where you had the fluid and pneumonia and now are on fentanyl patch which seemed to be the only method working for pain control.  Should you want to wean off it please have your primary care doctor assist with this.   Should you have worsening shortness of breath, fevers, worsening pain please return to ER.

## 2025-03-26 NOTE — CARE PLAN
The patient is Stable - Low risk of patient condition declining or worsening    Shift Goals  Clinical Goals: Monitor labs and vitals  Patient Goals: rest and comfort  Family Goals: BUDDY    Progress made toward(s) clinical / shift goals: Patient assessed and consulted with MD at bedside. Patient consulted with case management at bedside. Patient treated at bedside by RT. Patient's family at bedside.    Problem: Knowledge Deficit - Standard  Goal: Patient and family/care givers will demonstrate understanding of plan of care, disease process/condition, diagnostic tests and medications  Outcome: Progressing  Note: Patient verbalizes understanding of plan of care and barriers to discharge. Patient asks appropriate questions about plan of care.       Problem: Pain - Standard  Goal: Alleviation of pain or a reduction in pain to the patient’s comfort goal  Outcome: Progressing  Note: Patient verbalizes pain using 0-10 scale. Patient uses pharmacological and non-pharmacological methods of pain management.         Patient is not progressing towards the following goals:

## 2025-03-26 NOTE — DISCHARGE PLANNING
Met with pt at bedside to deliver detailed notice of DC. Pt confirmed he called to appeal, however has decided to DC home today.

## 2025-03-27 ENCOUNTER — TELEPHONE (OUTPATIENT)
Dept: INTERNAL MEDICINE | Facility: IMAGING CENTER | Age: 80
End: 2025-03-27
Payer: MEDICARE

## 2025-03-27 NOTE — DISCHARGE SUMMARY
Hospital Medicine Discharge Note     Admit Date:  3/19/2025       Discharge Date:   3/26/2025  LOS: 7 days     Primary Care Provider:    Lesly Munoz M.D.    Attending Physician:  Georgie Juarez M.D.     Discharge Diagnoses:   Principal Problem:    Sepsis (HCC)  Active Problems:    Hypothyroid    Hyperlipidemia    Adrenal insufficiency (HCC), secondary    History of malignant melanoma, April 2016    Adenocarcinoma of lung (HCC), stage 4 ([facility] Oct. 2021)    Right foot drop    Acute kidney injury superimposed on chronic kidney disease (HCC)    Peripheral neuropathy due to chemotherapy (HCC)    Pneumonia    Pleural effusion    Leukocytosis    Influenza A    Acute respiratory failure with hypoxia (HCC)    Urinary retention    Left-sided chest pain        Hospital Summary (Brief Narrative):         Bartolo Berrios [facility] is a 80 y.o. male who presented 3/19/2025 with severe shortness of breath and cough.  He has been treated for stage IV lung cancer and doing well, last chemo several months PTA.  He noted to be increasingly short of breath over the last couple of months but very severe over the last 1 to 2 weeks PTA.  He endorsed fevers chills and significant pain in the left chest especially with cough.  CT showed a right pleural effusion and consolidation in the left chest base.  He was dx with influenza A and completed 5 days of Tamiflu. In addition, due to c/f superimposed bacterial infection, he also received 5 days of IV abx. On admission he required 3L O2 via NC and underwent thoracentesis by pulm with fluid culture neg for infection but + malignant cells. Dr. Corral was notified and plan was outpatient PET scan. Patient's severe pain was difficult to control. He also had episode of overdose in hospital requiring narcan. He was started on fentanyl patch total 37mcg (25 and 12 mcg patches) and this seemed to help him the most. These are low doses and patient may slowly wean this under  supervision of pain doctor as outpatient as they expressed concern for long term use.     Day of discharge, patient was doing well, he ambulated without any o2 needs although still with mild dyspnea. Cough was persistent but improving and may last for weeks. RT had worked with patient and he did really well with incentive spirometer and also flutter valve. Discharged home with return precautions.    Disposition:   Discharge home    Condition:  Stable    Activity:   As tolerated     Diet:   Regular    Discharge Medications:           Medication List        START taking these medications        Instructions   albuterol 108 (90 Base) MCG/ACT Aers inhalation aerosol   Inhale 2 Puffs by mouth every four hours as needed for Shortness of Breath for up to 30 days.  Dose: 2 Puff     benzonatate 100 MG Caps  Commonly known as: Tessalon   Take 1 Capsule by mouth 3 times a day as needed for Cough.  Dose: 100 mg     * fentaNYL 25 MCG/HR Pt72  Start taking on: March 27, 2025  Commonly known as: Duragesic   Place 1 Patch on the skin every 72 hours for 15 days.  Dose: 1 Patch     * fentaNYL 12 MCG/HR Pt72  Start taking on: March 28, 2025  Commonly known as: Duragesic   Place 1 Patch on the skin every 72 hours for 15 days.  Dose: 1 Patch     guaiFENesin  MG Tb12  Commonly known as: Mucinex   Take 1 Tablet by mouth every 12 hours for 15 days.  Dose: 600 mg     HYDROcodone homatropine 5-1.5 mg/5 mL Soln   Take 5 mL by mouth every four hours as needed (cough) for up to 10 days.  Dose: 5 mL           * This list has 2 medication(s) that are the same as other medications prescribed for you. Read the directions carefully, and ask your doctor or other care provider to review them with you.                CONTINUE taking these medications        Instructions   acetaminophen 500 MG Tabs  Commonly known as: Tylenol   Take 1,000 mg by mouth every 6 hours as needed. Indications: Pain  Dose: 1,000 mg     Aleve 220 MG tablet  Generic drug:  "naproxen   Take 220 mg by mouth 2 times a day as needed. Indications: Pain  Dose: 220 mg     amLODIPine 5 MG Tabs  Commonly known as: Norvasc   Take 1 Tablet by mouth every morning.  Dose: 5 mg     atorvastatin 20 MG Tabs  Commonly known as: Lipitor   Take 1 Tablet by mouth every day.  Dose: 20 mg     B-12 SL   Place 1 Tablet under the tongue every day.  Dose: 1 Tablet     BD Disp Needles 25G X 5/8\" Misc  Generic drug: NEEDLE (DISP) 25 G   Use as directed for testosterone injections every 7 days     folic acid 1 MG Tabs  Commonly known as: Folvite   Take 1 mg by mouth every day.  Dose: 1 mg     gabapentin 100 MG Caps  Commonly known as: Neurontin   Take 100 mg by mouth every evening.  Dose: 100 mg     hydrocortisone 10 MG Tabs  Commonly known as: Cortef   Take 20 mg by mouth 2 times a day.  Dose: 20 mg     liothyronine 5 MCG Tabs  Commonly known as: Cytomel   Take 10 mcg by mouth every day.  Dose: 10 mcg     MAGNESIUM PO   Take 2 Capsules by mouth every evening. (OTC)  Dose: 2 Capsule     omeprazole 40 MG delayed-release capsule  Commonly known as: PriLOSEC   Take 1 Capsule by mouth every day.  Dose: 40 mg     potassium chloride 8 MEQ tablet  Commonly known as: Klor-Con   Take 2 Tablets by mouth every day.  Dose: 16 mEq     silodosin 8 MG Caps capsule  Commonly known as: Rapaflo   Take 8 mg by mouth 1/2 hour after breakfast.  Dose: 8 mg     tadalafil 20 MG tablet   Take 20 mg by mouth 1 time a day as needed for Erectile Dysfunction. for erectile dysfunction  Dose: 20 mg     Tagrisso 80 MG Tabs  Generic drug: Osimertinib Mesylate   Take 80 mg by mouth every day.  Dose: 80 mg     tamsulosin 0.4 MG capsule  Commonly known as: Flomax   Take 2 Capsules by mouth every day.  Dose: 0.8 mg     testosterone cypionate 200 MG/ML injection  Commonly known as: Depo-Testosterone   Inject 124 mg into the shoulder, thigh, or buttocks every 7 days. On Sunday, pt injects 0.62ML  Dose: 124 mg     Tirosint 125 MCG Caps  Generic drug: " Levothyroxine Sodium   Take 125 mcg by mouth every morning on an empty stomach.  Dose: 125 mcg     VITAMIN C PO   Take 1 Tablet by mouth see administration instructions. Pt takes sporidially  Dose: 1 Tablet            STOP taking these medications      amoxicillin-clavulanate 875-125 MG Tabs  Commonly known as: Augmentin     dicyclomine 20 MG Tabs  Commonly known as: Bentyl     doxycycline 100 MG Tabs  Commonly known as: Vibramycin     famotidine 20 MG Tabs  Commonly known as: Pepcid     losartan 100 MG Tabs  Commonly known as: Cozaar     ondansetron 4 MG Tbdp  Commonly known as: Zofran ODT     predniSONE 10 MG Tabs  Commonly known as: Deltasone                Follow up appointment details :      I encouraged him to call his PCP to confirm follow up after discharge.    Future Appointments   Date Time Provider Department Center   4/8/2025  8:30 [facility]         Consultants:      Pulm    Studies:    Imaging/ Testing:      DX-CHEST-LIMITED (1 VIEW)   Final Result      1.  Small right pleural effusion and trace left pleural effusion.      2.  Left basilar atelectasis.      3.  Wedge-shaped opacity in right lung base consistent with parenchymal scarring, pneumonitis, and/or atelectasis.      4.  Slight atelectasis or parenchymal scarring along right minor fissure.      DX-CHEST-PORTABLE (1 VIEW)   Final Result      1.  Bibasilar atelectasis, left greater than right.      2.  No evidence of pneumothorax postthoracentesis.      CT-CTA CHEST PULMONARY ARTERY W/ RECONS   Final Result      1.  No evidence of pulmonary embolus.      2.  Consolidation within the left lung base likely representing pneumonia.      3.  Moderate right and small left pleural effusions with bibasilar atelectasis.      4.  Surgical changes involving the right hilum.      5.  Interval progression of a compression fracture at the T8 level. There are chronic unchanged compression fractures involving T7, T6, T5, T9 and T10.       DX-CHEST-PORTABLE (1 VIEW)   Final Result      1.  Bibasilar atelectasis/consolidation, right greater than left.      2.  Small right pleural effusion.      3.  Cardiomegaly.          Procedures:        Thora      Instructions:      The were given instructions to return to the ER if patient's condition worsens      Time Spent on Discharge:     Discharge instructions were discussed with the patient at bedside. Patient  expressed understanding and agreed to comply with all discharge instructions.    45 minutes were spent in the discharge planning and management of this  patient, including more than 50% of the time spent face to face in   Counseling.

## 2025-03-28 NOTE — TELEPHONE ENCOUNTER
Patient's wife called to report that Kevin has coughed up bloody sputum (bright red blood at least size of egg yolk per her estimation) three times in the past 1.5 hrs. He does not feel well and was just discharged from [facility] after inpatient stay for PNA and recurrent lung cancer. She is not able to assess his pulse oximeter reading and has not taken his vital signs. Considering that he is at very high risk for decompensation due to ongoing medical conditions (both acute and chronic), I directed her that Kevin needs to go to the nearest ER for evaluation now. Will follow and all of her questions answered as able.

## 2025-03-31 ENCOUNTER — OFFICE VISIT (OUTPATIENT)
Dept: INTERNAL MEDICINE | Facility: IMAGING CENTER | Age: 80
End: 2025-03-31
Payer: MEDICARE

## 2025-03-31 VITALS
HEIGHT: 72 IN | TEMPERATURE: 97.9 F | BODY MASS INDEX: 20.99 KG/M2 | OXYGEN SATURATION: 96 % | RESPIRATION RATE: 17 BRPM | HEART RATE: 51 BPM | SYSTOLIC BLOOD PRESSURE: 122 MMHG | WEIGHT: 155 LBS | DIASTOLIC BLOOD PRESSURE: 64 MMHG

## 2025-03-31 DIAGNOSIS — Z00.00 HEALTH CARE MAINTENANCE: ICD-10-CM

## 2025-03-31 DIAGNOSIS — E87.8 ELECTROLYTE IMBALANCE: ICD-10-CM

## 2025-03-31 DIAGNOSIS — J96.01 ACUTE RESPIRATORY FAILURE WITH HYPOXIA (HCC): ICD-10-CM

## 2025-03-31 DIAGNOSIS — Z09 HOSPITAL DISCHARGE FOLLOW-UP: ICD-10-CM

## 2025-03-31 DIAGNOSIS — J90 PLEURAL EFFUSION: ICD-10-CM

## 2025-03-31 DIAGNOSIS — J10.1 INFLUENZA A: ICD-10-CM

## 2025-03-31 DIAGNOSIS — C34.90 ADENOCARCINOMA OF LUNG, UNSPECIFIED LATERALITY (HCC): ICD-10-CM

## 2025-03-31 DIAGNOSIS — J18.9 PNEUMONIA OF BOTH LOWER LOBES DUE TO INFECTIOUS ORGANISM: ICD-10-CM

## 2025-03-31 DIAGNOSIS — R07.9 LEFT-SIDED CHEST PAIN: ICD-10-CM

## 2025-03-31 PROCEDURE — 3078F DIAST BP <80 MM HG: CPT | Performed by: FAMILY MEDICINE

## 2025-03-31 PROCEDURE — 99214 OFFICE O/P EST MOD 30 MIN: CPT | Performed by: FAMILY MEDICINE

## 2025-03-31 PROCEDURE — 3074F SYST BP LT 130 MM HG: CPT | Performed by: FAMILY MEDICINE

## 2025-03-31 ASSESSMENT — FIBROSIS 4 INDEX: FIB4 SCORE: 1.97

## 2025-04-01 PROBLEM — D72.819 LEUKOPENIA: Status: RESOLVED | Noted: 2024-05-14 | Resolved: 2025-04-01

## 2025-04-01 PROBLEM — J96.01 ACUTE RESPIRATORY FAILURE WITH HYPOXIA (HCC): Status: RESOLVED | Noted: 2025-03-19 | Resolved: 2025-04-01

## 2025-04-01 PROBLEM — J10.1 INFLUENZA A: Status: RESOLVED | Noted: 2025-03-19 | Resolved: 2025-04-01

## 2025-04-01 PROBLEM — A41.9 SEPSIS (HCC): Status: RESOLVED | Noted: 2025-03-19 | Resolved: 2025-04-01

## 2025-04-01 PROBLEM — N18.9 ACUTE KIDNEY INJURY SUPERIMPOSED ON CHRONIC KIDNEY DISEASE (HCC): Status: RESOLVED | Noted: 2024-05-14 | Resolved: 2025-04-01

## 2025-04-01 PROBLEM — R33.9 URINARY RETENTION: Status: RESOLVED | Noted: 2025-03-20 | Resolved: 2025-04-01

## 2025-04-01 PROBLEM — N17.9 ACUTE KIDNEY INJURY SUPERIMPOSED ON CHRONIC KIDNEY DISEASE (HCC): Status: RESOLVED | Noted: 2024-05-14 | Resolved: 2025-04-01

## 2025-04-01 PROBLEM — D72.829 LEUKOCYTOSIS: Status: RESOLVED | Noted: 2025-03-19 | Resolved: 2025-04-01

## 2025-04-01 NOTE — PROGRESS NOTES
Chief Complaint   Patient presents with    Hospital Follow-up       HPI:  Patient is a 80 y.o. male established patient who presents today for a hospital follow up visit. He was hospitalized at [facility] 3/19/25 - 3/26/25 for evaluation and treatment of sepsis, acute influenza A, acute hypoxic respiratory failure, pneumonia, and related sequelae. He was discharged home in stable condition on 3/26/25 and remains on room air. He reports feeling generalized weakness and continues to experience cough with colored sputum production (denies recurrence of bloody expectorated sputum).     Patient Active Problem List    Diagnosis Date Noted    Left-sided chest pain 03/21/2025    Pneumonia 03/19/2025    Pleural effusion 03/19/2025    History of nonmelanoma skin cancer 12/17/2024    Hyperglycemia 12/10/2024    Peripheral neuropathy due to chemotherapy (HCC) 10/08/2024    Disordered sleep 10/08/2024    Macrocytic anemia 03/06/2024    Androgen deficiency 04/27/2022    Malignant neoplasm metastatic to bone (HCC) 10/06/2021    Chronic pain 06/30/2021    Hypothyroid 11/25/2020    Obstructive sleep apnea 11/25/2020    GERD (gastroesophageal reflux disease) 11/25/2020    Hyperlipidemia 11/25/2020    Adrenal insufficiency (HCC), secondary 11/25/2020    History of stroke, subcortical infarct on MRI April 2019 11/25/2020    Diverticulosis of colon 11/25/2020    History of shingles 11/25/2020    Vitamin D deficiency 11/25/2020    History of malignant melanoma, April 2016 11/25/2020    History of atrial tachycardia 11/25/2020    BPH with obstruction/lower urinary tract symptoms 11/25/2020    Elevated PSA 11/25/2020    Atherosclerosis of both carotid arteries, mild 11/25/2020    Chronic kidney disease, stage 3a     Sensorineural hearing loss of both ears 06/08/2020    History of radiation therapy 01/30/2020    Premature atrial contractions 09/19/2019    Adenocarcinoma of lung (HCC), stage 4 ([facility] Oct. 2021) 08/26/2019    Brain lesion  06/26/2019    H/O cardiac radiofrequency ablation 06/11/2019    Left renal atrophy 12/14/2018    Long term (current) use of systemic steroids 12/11/2018    Right foot drop 11/16/2018    Right peroneal nerve palsy 11/16/2018    Anxiety 07/28/2016    Family history of coronary artery disease 01/08/2016    Family history of early CAD 01/08/2016    Hypertension 01/08/2016       Past medical, surgical, family, and social history was reviewed and updated in Epic chart by me today.     Medications and allergies reviewed and updated in Epic chart by me today.     ROS:  Pertinent positives listed above in HPI. All other systems have been reviewed and are negative.    PE:   /64 (BP Location: Left arm, Patient Position: Sitting, BP Cuff Size: Adult)   Pulse (!) 51   Temp 36.6 °C (97.9 °F) (Temporal)   Resp 17   Ht 1.829 m (6')   Wt 70.3 kg (155 lb)   SpO2 96%   BMI 21.02 kg/m²   Vital signs reviewed with patient.     Gen: Well developed; thin; no acute distress; chronically ill appearance   HEENT: Normocephalic; atraumatic; PEERLA b/l; sclera clear b/l; b/l external auditory canals WNL; b/l TM WNL; nares patent; oropharynx clear; oral mucosa moist; tongue midline; dentition adequate   Neck: No adenopathy; no thyromegaly  CV: Regular rate and rhythm; S1/ S2 present; no murmur, gallop or rub noted  Pulm: No respiratory distress; diminished BS b/l; no wheezing or stridor noted b/l; hacking cough  Abd: Adequate bowel sounds noted; soft and nontender; no rebound, rigidity, nor distention  Extremities: No new peripheral edema b/l LE extremities/ no clubbing nor cyanosis noted  Skin: Warm and dry; no rashes noted   Neuro: No focal deficits noted   Psych: AAOx4; mood and affect are appropriate    A/P:  1. Hospital discharge follow-up  Patient was admitted to [facility] 3/19/25 - 3/26/25 as detailed above in HPI, and I have reviewed all pertinent records prior to our visit today.     2. Acute respiratory failure with hypoxia  (HCC)  Resolved/ during recent hospital stay patient was requiring supplemental oxygen but was weaned off prior to discharged. Room air saturation is WNL today, and patient reminded to continue regular deep breathing exercises daily.     3. Pneumonia of both lower lobes due to infectious organism  Patient completed abx treatment for PNA during hospital stay and continues to experience hacking cough with intermittent sputum production. Recommend patient use plain Mucinex HS (can be used BID) to help thin secretions, uses Albuterol inhaler and tessalon PRN, and continue to rest/ not exert himself until he recovers further. Recommend patient repeat chest imaging in four weeks and will see what imaging Dr. Corral orders to avoid duplication.     4. Pleural effusion  Patient had large right sided pleural effusion and underwent thoracentesis for both therapeutic and diagnostic purposes during recent inpatient stay.     5. Left-sided chest pain  Improving since discharge - patient is motivated to wean off using Fentanyl patch use and strategies discussed today at visit.     6. Influenza A  Patient diagnosed with acute influenza A during recent inpatient stay and completed five day Tamiflu treatment course. Patient denies lingering related illness at this time.     7. Adenocarcinoma of lung, unspecified laterality (HCC)  Patient has been previously treated for stage IV lung cancer, and pleural fluid 3/20/25 showed malignant cells consistent with poorly differentiated adenocarcinoma of lung. Patient is working on follow up appointment with Dr. Corral and will have outpatient PET scan done to determine further management.     8. Health care maintenance  Recommend patient repeat non fasting labs next week for close monitoring.   - CBC WITH DIFFERENTIAL; Future  - Comp Metabolic Panel; Future    9. Electrolyte imbalance  Recommend patient repeat labs next week for close monitoring.   - MAGNESIUM; Future  - PHOSPHORUS;  Future    I emphasized need for patient to rest (he has a tendency to push himself at all times), eat well balanced meals, and stay hydrated as he slowly recovers.

## 2025-04-02 ENCOUNTER — APPOINTMENT (OUTPATIENT)
Dept: URBAN - METROPOLITAN AREA CLINIC 36 | Facility: CLINIC | Age: 80
Setting detail: DERMATOLOGY
End: 2025-04-02

## 2025-04-02 PROCEDURE — ? MOHS SURGERY

## 2025-04-02 NOTE — PROCEDURE: MOHS SURGERY
Primary Defect Width In Cm (Final Defect Size - Required For Flaps/Grafts): 0
Stage 9: Additional Anesthesia Type: 1% lidocaine with epinephrine
Undermining Type: Entire Wound
Area H Indication Text: Tumors in this location are included in Area H (eyelids, eyebrows, nose, lips, chin, ear, pre-auricular, post-auricular, temple, genitalia, hands, feet, ankles and areola).  Tissue conservation is critical in these anatomic locations.
Interpolation Flap Text: A decision was made to reconstruct the defect utilizing an interpolation axial flap and a staged reconstruction.  A telfa template was made of the defect.  This telfa template was then used to outline the interpolation flap.  The donor area for the pedicle flap was then injected with anesthesia.  The flap was excised through the skin and subcutaneous tissue down to the layer of the underlying musculature.  The interpolation flap was carefully excised within this deep plane to maintain its blood supply.  The edges of the donor site were undermined.   The donor site was closed in a primary fashion.  The pedicle was then rotated into position and sutured.  Once the tube was sutured into place, adequate blood supply was confirmed with blanching and refill.  The pedicle was then wrapped with xeroform gauze and dressed appropriately with a telfa and gauze bandage to ensure continued blood supply and protect the attached pedicle.
Special Stains Stage 13 - Results: Base On Clearance Noted Above
Presence Of Scar Tissue (For Histology): absent
Tarsorrhaphy Performed?: No
Eyelid Full Thickness Repair - 49228: The eyelid defect was full thickness which required a wedge repair of the eyelid. Special care was taken to ensure that the eyelid margin was realligned when placing sutures.
Otolaryngologist Procedure Text (D): After obtaining clear surgical margins the patient was sent to otolaryngology for surgical repair.  The patient understands they will receive post-surgical care and follow-up from the referring physician's office.
Nostril Rim Text: The closure involved the nostril rim.
Surgeon Performing Repair (Optional): Vinnie Bernal MD
Wound Check: 6 weeks
Debridement Text: The wound edges were debrided prior to proceeding with the closure to facilitate wound healing.
Referred To Asc For Closure Text (Leave Blank If You Do Not Want): After obtaining clear surgical margins the patient was sent to an ASC for surgical repair.  The patient understands they will receive post-surgical care and follow-up from the ASC physician.
Dorsal Nasal Flap Text: The defect edges were debeveled with a #15 scalpel blade.  Given the location of the defect and the proximity to free margins a dorsal nasal flap was deemed most appropriate.  Using a sterile surgical marker, an appropriate dorsal nasal flap was drawn around the defect.    The area thus outlined was incised deep to adipose tissue with a #15 scalpel blade.  The skin margins were undermined to an appropriate distance in all directions utilizing iris scissors.
Show Additional Anesthesia Variables In The Stage Tabs: Yes
Alternatives Discussed Intro (Do Not Add Period): I discussed alternative treatments to Mohs surgery and specifically discussed the risks and benefits of
Modified Advancement Flap Text: The defect edges were debeveled with a #15 scalpel blade.  Given the location of the defect, shape of the defect and the proximity to free margins a modified advancement flap was deemed most appropriate.  Using a sterile surgical marker, an appropriate advancement flap was drawn incorporating the defect and placing the expected incisions within the relaxed skin tension lines where possible.    The area thus outlined was incised deep to adipose tissue with a #15 scalpel blade.  The skin margins were undermined to an appropriate distance in all directions utilizing iris scissors.
Peng Advancement Flap Text: The defect edges were debeveled with a #15 scalpel blade.  Given the location of the defect, shape of the defect and the proximity to free margins a Peng advancement flap was deemed most appropriate.  Using a sterile surgical marker, an appropriate advancement flap was drawn incorporating the defect and placing the expected incisions within the relaxed skin tension lines where possible. The area thus outlined was incised deep to adipose tissue with a #15 scalpel blade.  The skin margins were undermined to an appropriate distance in all directions utilizing iris scissors.
Purse String (Simple) Text: Given the location of the defect and the characteristics of the surrounding skin a purse string closure was deemed most appropriate.  Undermining was performed circumfirentially around the surgical defect.  A purse string suture was then placed and tightened.
Medical Necessity Statement: Based on my medical judgement, Mohs surgery is the most appropriate treatment for this cancer compared to other treatments.
Chonodrocutaneous Helical Advancement Flap Text: The defect edges were debeveled with a #15 scalpel blade.  Given the location of the defect and the proximity to free margins a chondrocutaneous helical advancement flap was deemed most appropriate.  Using a sterile surgical marker, the appropriate advancement flap was drawn incorporating the defect and placing the expected incisions within the relaxed skin tension lines where possible.    The area thus outlined was incised deep to adipose tissue with a #15 scalpel blade.  The skin margins were undermined to an appropriate distance in all directions utilizing iris scissors.
Provider Procedure Text (C): After obtaining clear surgical margins the defect was repaired by another provider.
Mid-Level Procedure Text (D): After obtaining clear surgical margins the patient was sent to a mid-level provider for surgical repair.  The patient understands they will receive post-surgical care and follow-up from the mid-level provider.
Plastic Surgeon Procedure Text (A): After obtaining clear surgical margins the patient was sent to plastics for surgical repair.  The patient understands they will receive post-surgical care and follow-up from the referring physician's office.
Transposition Flap Text: The defect edges were debeveled with a #15 scalpel blade.  Given the location of the defect and the proximity to free margins a transposition flap was deemed most appropriate.  Using a sterile surgical marker, an appropriate transposition flap was drawn incorporating the defect.    The area thus outlined was incised deep to adipose tissue with a #15 scalpel blade.  The skin margins were undermined to an appropriate distance in all directions utilizing iris scissors.
Double O-Z Flap Text: The defect edges were debeveled with a #15 scalpel blade.  Given the location of the defect, shape of the defect and the proximity to free margins a Double O-Z flap was deemed most appropriate.  Using a sterile surgical marker, an appropriate transposition flap was drawn incorporating the defect and placing the expected incisions within the relaxed skin tension lines where possible. The area thus outlined was incised deep to adipose tissue with a #15 scalpel blade.  The skin margins were undermined to an appropriate distance in all directions utilizing iris scissors.
Graft Donor Site Dermal Sutures (Optional): 5-0 Polysorb
Lazy S Intermediate Repair Preamble Text (Leave Blank If You Do Not Want): Undermining was performed with blunt dissection.
Suture Removal: 7 days
Cartilage Graft Text: The defect edges were debeveled with a #15 scalpel blade.  Given the location of the defect, shape of the defect, the fact the defect involved a full thickness cartilage defect a cartilage graft was deemed most appropriate.  An appropriate donor site was identified, cleansed, and anesthetized. The cartilage graft was then harvested and transferred to the recipient site, oriented appropriately and then sutured into place.  The secondary defect was then repaired using a primary closure.
Suturegard Retention Suture: 0-0 Nylon
Mucosal Advancement Flap Text: Given the location of the defect, shape of the defect and the proximity to free margins a mucosal advancement flap was deemed most appropriate. Incisions were made with a 15 blade scalpel in the appropriate fashion along the cutaneous vermilion border and the mucosal lip. The remaining actinically damaged mucosal tissue was excised.  The mucosal advancement flap was then elevated to the gingival sulcus with care taken to preserve the neurovascular structures and advanced into the primary defect. Care was taken to ensure that precise realignment of the vermilion border was achieved.
Mohs Histo Method Verbiage: Each section was then chromacoded and processed in the Mohs lab using the Mohs protocol and submitted for frozen section.
Rhomboid Transposition Flap Text: The defect edges were debeveled with a #15 scalpel blade.  Given the location of the defect and the proximity to free margins a rhomboid transposition flap was deemed most appropriate.  Using a sterile surgical marker, an appropriate rhomboid flap was drawn incorporating the defect.    The area thus outlined was incised deep to adipose tissue with a #15 scalpel blade.  The skin margins were undermined to an appropriate distance in all directions utilizing iris scissors.
Area L Indication Text: Tumors in this location are included in Area L (trunk and extremities).  Mohs surgery is indicated for larger tumors, or tumors with aggressive histologic features, in these anatomic locations.
Closure 3 Information: This tab is for additional flaps and grafts above and beyond our usual structured repairs.  Please note if you enter information here it will not currently bill and you will need to add the billing information manually.
Mart-1 - Positive Histology Text: MART-1 staining demonstrates areas of higher density and clustering of melanocytes with Pagetoid spread upwards within the epidermis. The surgical margins are positive for tumor cells.
Complex Repair And Flap Additional Text (Will Appearing After The Standard Complex Repair Text): The complex repair was not sufficient to completely close the primary defect. The remaining additional defect was repaired with the flap mentioned below.
Bi-Rhombic Flap Text: The defect edges were debeveled with a #15 scalpel blade.  Given the location of the defect and the proximity to free margins a bi-rhombic flap was deemed most appropriate.  Using a sterile surgical marker, an appropriate rhombic flap was drawn incorporating the defect. The area thus outlined was incised deep to adipose tissue with a #15 scalpel blade.  The skin margins were undermined to an appropriate distance in all directions utilizing iris scissors.
Wound Care: Vaseline
Orbicularis Oris Muscle Flap Text: The defect edges were debeveled with a #15 scalpel blade.  Given that the defect affected the competency of the oral sphincter an orbicularis oris muscle flap was deemed most appropriate to restore this competency and normal muscle function.  Using a sterile surgical marker, an appropriate flap was drawn incorporating the defect. The area thus outlined was incised with a #15 scalpel blade.
Oculoplastic Surgeon Procedure Text (C): After obtaining clear surgical margins the patient was sent to oculoplastics for surgical repair.  The patient understands they will receive post-surgical care and follow-up from the referring physician's office.
Postop Diagnosis: same
Was The Patient On Physician Recommended Anticoagulation Therapy?: Please Select the Appropriate Response
Double O-Z Plasty Text: The defect edges were debeveled with a #15 scalpel blade.  Given the location of the defect, shape of the defect and the proximity to free margins a Double O-Z plasty (double transposition flap) was deemed most appropriate.  Using a sterile surgical marker, the appropriate transposition flaps were drawn incorporating the defect and placing the expected incisions within the relaxed skin tension lines where possible. The area thus outlined was incised deep to adipose tissue with a #15 scalpel blade.  The skin margins were undermined to an appropriate distance in all directions utilizing iris scissors.  Hemostasis was achieved with electrocautery.  The flaps were then transposed into place, one clockwise and the other counterclockwise, and anchored with interrupted buried subcutaneous sutures.
Xenograft Text: The defect edges were debeveled with a #15 scalpel blade.  Given the location of the defect, shape of the defect and the proximity to free margins a xenograft was deemed most appropriate.  The graft was then trimmed to fit the size of the defect.  The graft was then placed in the primary defect and oriented appropriately.
Undermining Location (Optional): in the superficial subcutaneous fat
Cheek-To-Nose Interpolation Flap Text: A decision was made to reconstruct the defect utilizing an interpolation axial flap and a staged reconstruction.  A telfa template was made of the defect.  This telfa template was then used to outline the Cheek-To-Nose Interpolation flap.  The donor area for the pedicle flap was then injected with anesthesia.  The flap was excised through the skin and subcutaneous tissue down to the layer of the underlying musculature.  The interpolation flap was carefully excised within this deep plane to maintain its blood supply.  The edges of the donor site were undermined.   The donor site was closed in a primary fashion.  The pedicle was then rotated into position and sutured.  Once the tube was sutured into place, adequate blood supply was confirmed with blanching and refill.  The pedicle was then wrapped with xeroform gauze and dressed appropriately with a telfa and gauze bandage to ensure continued blood supply and protect the attached pedicle.
Purse String (Intermediate) Text: Given the location of the defect and the characteristics of the surrounding skin a purse string intermediate closure was deemed most appropriate.  Undermining was performed circumfirentially around the surgical defect.  A purse string suture was then placed and tightened.
No Repair - Repaired With Adjacent Surgical Defect Text (Leave Blank If You Do Not Want): After obtaining clear surgical margins the defect was repaired concurrently with another surgical defect which was in close approximation.
Island Pedicle Flap-Requiring Vessel Identification Text: The defect edges were debeveled with a #15 scalpel blade.  Given the location of the defect, shape of the defect and the proximity to free margins an island pedicle advancement flap was deemed most appropriate.  Using a sterile surgical marker, an appropriate advancement flap was drawn, based on the axial vessel mentioned above, incorporating the defect, outlining the appropriate donor tissue and placing the expected incisions within the relaxed skin tension lines where possible.    The area thus outlined was incised deep to adipose tissue with a #15 scalpel blade.  The skin margins were undermined to an appropriate distance in all directions around the primary defect and laterally outward around the island pedicle utilizing iris scissors.  There was minimal undermining beneath the pedicle flap.
Epidermal Autograft Text: The defect edges were debeveled with a #15 scalpel blade.  Given the location of the defect, shape of the defect and the proximity to free margins an epidermal autograft was deemed most appropriate.  Using a sterile surgical marker, the primary defect shape was transferred to the donor site. The epidermal graft was then harvested.  The skin graft was then placed in the primary defect and oriented appropriately.
Rotation Flap Text: The defect edges were debeveled with a #15 scalpel blade.  Given the location of the defect, shape of the defect and the proximity to free margins a rotation flap was deemed most appropriate.  Using a sterile surgical marker, an appropriate rotation flap was drawn incorporating the defect and placing the expected incisions within the relaxed skin tension lines where possible.    The area thus outlined was incised deep to adipose tissue with a #15 scalpel blade.  The skin margins were undermined to an appropriate distance in all directions utilizing iris scissors.
H Plasty Text: Given the location of the defect, shape of the defect and the proximity to free margins a H-plasty was deemed most appropriate for repair.  Using a sterile surgical marker, the appropriate advancement arms of the H-plasty were drawn incorporating the defect and placing the expected incisions within the relaxed skin tension lines where possible. The area thus outlined was incised deep to adipose tissue with a #15 scalpel blade. The skin margins were undermined to an appropriate distance in all directions utilizing iris scissors.  The opposing advancement arms were then advanced into place in opposite direction and anchored with interrupted buried subcutaneous sutures.
Double M-Plasty Complex Repair Preamble Text (Leave Blank If You Do Not Want): Extensive wide undermining was performed.
Alar Island Pedicle Flap Text: The defect edges were debeveled with a #15 scalpel blade.  Given the location of the defect, shape of the defect and the proximity to the alar rim an island pedicle advancement flap was deemed most appropriate.  Using a sterile surgical marker, an appropriate advancement flap was drawn incorporating the defect, outlining the appropriate donor tissue and placing the expected incisions within the nasal ala running parallel to the alar rim. The area thus outlined was incised with a #15 scalpel blade.  The skin margins were undermined minimally to an appropriate distance in all directions around the primary defect and laterally outward around the island pedicle utilizing iris scissors.  There was minimal undermining beneath the pedicle flap.
Pinch Graft Text: The defect edges were debeveled with a #15 scalpel blade. Given the location of the defect, shape of the defect and the proximity to free margins a pinch graft was deemed most appropriate. Using a sterile surgical marker, the primary defect shape was transferred to the donor site. The area thus outlined was incised deep to adipose tissue with a #15 scalpel blade.  The harvested graft was then trimmed of adipose tissue until only dermis and epidermis was left. The skin margins of the secondary defect were undermined to an appropriate distance in all directions utilizing iris scissors.  The secondary defect was closed with interrupted buried subcutaneous sutures.  The skin edges were then re-apposed with running  sutures.  The skin graft was then placed in the primary defect and oriented appropriately.
Mustarde Flap Text: The defect edges were debeveled with a #15 scalpel blade.  Given the size, depth and location of the defect and the proximity to free margins a Mustarde flap was deemed most appropriate.  Using a sterile surgical marker, an appropriate flap was drawn incorporating the defect. The area thus outlined was incised with a #15 scalpel blade.  The skin margins were undermined to an appropriate distance in all directions utilizing iris scissors.
Graft Donor Site Epidermal Sutures (Optional): 5-0 Surgipro
Complex Repair And Graft Additional Text (Will Appearing After The Standard Complex Repair Text): The complex repair was not sufficient to completely close the primary defect. The remaining additional defect was repaired with the graft mentioned below.
O-T Plasty Text: The defect edges were debeveled with a #15 scalpel blade.  Given the location of the defect, shape of the defect and the proximity to free margins an O-T plasty was deemed most appropriate.  Using a sterile surgical marker, an appropriate O-T plasty was drawn incorporating the defect and placing the expected incisions within the relaxed skin tension lines where possible.    The area thus outlined was incised deep to adipose tissue with a #15 scalpel blade.  The skin margins were undermined to an appropriate distance in all directions utilizing iris scissors.
Retention Suture Bite Size: 1 mm
Z Plasty Text: The lesion was extirpated to the level of the fat with a #15 scalpel blade.  Given the location of the defect, shape of the defect and the proximity to free margins a Z-plasty was deemed most appropriate for repair.  Using a sterile surgical marker, the appropriate transposition arms of the Z-plasty were drawn incorporating the defect and placing the expected incisions within the relaxed skin tension lines where possible.    The area thus outlined was incised deep to adipose tissue with a #15 scalpel blade.  The skin margins were undermined to an appropriate distance in all directions utilizing iris scissors.  The opposing transposition arms were then transposed into place in opposite direction and anchored with interrupted buried subcutaneous sutures.
Consent (Near Eyelid Margin)/Introductory Paragraph: The rationale for Mohs was explained to the patient and consent was obtained. The risks, benefits and alternatives to therapy were discussed in detail. Specifically, the risks of ectropion or eyelid deformity, infection, scarring, bleeding, prolonged wound healing, incomplete removal, allergy to anesthesia, nerve injury and recurrence were addressed. Prior to the procedure, the treatment site was clearly identified and confirmed by the patient. All components of Universal Protocol/PAUSE Rule completed.
Graft Cartilage Fenestration Text: The cartilage was fenestrated with a 2mm punch biopsy to help facilitate graft survival and healing.
Wound Care (No Sutures): Petrolatum
V-Y Plasty Text: The defect edges were debeveled with a #15 scalpel blade.  Given the location of the defect, shape of the defect and the proximity to free margins an V-Y advancement flap was deemed most appropriate.  Using a sterile surgical marker, an appropriate advancement flap was drawn incorporating the defect and placing the expected incisions within the relaxed skin tension lines where possible.    The area thus outlined was incised deep to adipose tissue with a #15 scalpel blade.  The skin margins were undermined to an appropriate distance in all directions utilizing iris scissors.
Mohs Method Verbiage: An incision at a 45 degree angle following the standard Mohs approach was done and the specimen was harvested as a microscopic controlled layer.
Ear Wedge Repair Text: A wedge excision was completed by carrying down an excision through the full thickness of the ear and cartilage with an inward facing Burow's triangle. The wound was then closed in a layered fashion.
Staging Info: By selecting yes to the question above you will include information on AJCC 8 tumor staging in your Mohs note. Information on tumor staging will be automatically added for SCCs on the head and neck. AJCC 8 includes tumor size, tumor depth, perineural involvement and bone invasion.
Full Thickness Lip Wedge Repair (Flap) Text: Given the location of the defect and the proximity to free margins a full thickness wedge repair was deemed most appropriate.  Using a sterile surgical marker, the appropriate repair was drawn incorporating the defect and placing the expected incisions perpendicular to the vermilion border.  The vermilion border was also meticulously outlined to ensure appropriate reapproximation during the repair.  The area thus outlined was incised through and through with a #15 scalpel blade.  The muscularis and dermis were reaproximated with deep sutures following hemostasis. Care was taken to realign the vermilion border before proceeding with the superficial closure.  Once the vermilion was realigned the superfical and mucosal closure was finished.
Consent 3/Introductory Paragraph: I gave the patient a chance to ask questions they had about the procedure.  Following this I explained the Mohs procedure and consent was obtained. The risks, benefits and alternatives to therapy were discussed in detail. Specifically, the risks of infection, scarring, bleeding, prolonged wound healing, incomplete removal, allergy to anesthesia, nerve injury and recurrence were addressed. Prior to the procedure, the treatment site was clearly identified and confirmed by the patient. All components of Universal Protocol/PAUSE Rule completed.
Where Do You Want The Question To Include Opioid Counseling Located?: Case Summary Tab
Bilobed Flap Text: The defect edges were debeveled with a #15 scalpel blade.  Given the location of the defect and the proximity to free margins a bilobe flap was deemed most appropriate.  Using a sterile surgical marker, an appropriate bilobe flap drawn around the defect.    The area thus outlined was incised deep to adipose tissue with a #15 scalpel blade.  The skin margins were undermined to an appropriate distance in all directions utilizing iris scissors.
Mercedes Flap Text: The defect edges were debeveled with a #15 scalpel blade.  Given the location of the defect, shape of the defect and the proximity to free margins a Mercedes flap was deemed most appropriate.  Using a sterile surgical marker, an appropriate advancement flap was drawn incorporating the defect and placing the expected incisions within the relaxed skin tension lines where possible. The area thus outlined was incised deep to adipose tissue with a #15 scalpel blade.  The skin margins were undermined to an appropriate distance in all directions utilizing iris scissors.
Advancement-Rotation Flap Text: The defect edges were debeveled with a #15 scalpel blade.  Given the location of the defect, shape of the defect and the proximity to free margins an advancement-rotation flap was deemed most appropriate.  Using a sterile surgical marker, an appropriate flap was drawn incorporating the defect and placing the expected incisions within the relaxed skin tension lines where possible. The area thus outlined was incised deep to adipose tissue with a #15 scalpel blade.  The skin margins were undermined to an appropriate distance in all directions utilizing iris scissors.
Consent (Ear)/Introductory Paragraph: The rationale for Mohs was explained to the patient and consent was obtained. The risks, benefits and alternatives to therapy were discussed in detail. Specifically, the risks of ear deformity, infection, scarring, bleeding, prolonged wound healing, incomplete removal, allergy to anesthesia, nerve injury and recurrence were addressed. Prior to the procedure, the treatment site was clearly identified and confirmed by the patient. All components of Universal Protocol/PAUSE Rule completed.
Subsequent Stages Histo Method Verbiage: Using a similar technique to that described above, a thin layer of tissue was removed from all areas where tumor was visible on the previous stage.  The tissue was again oriented, mapped, dyed, and processed as above.
Bill 59 Modifier?: No - Continue to Bill 79 Modifier
Repair Type: Complex Repair
Which Instrument Did You Use For Dermabrasion?: Wire Brush
Cheiloplasty (Complex) Text: A decision was made to reconstruct the defect with a  cheiloplasty.  The defect was undermined extensively.  Additional obicularis oris muscle was excised with a 15 blade scalpel.  The defect was converted into a full thickness wedge to facilite a better cosmetic result.  Small vessels were then tied off with 5-0 monocyrl. The obicularis oris, superficial fascia, adipose and dermis were then reapproximated.  After the deeper layers were approximated the epidermis was reapproximated with particular care given to realign the vermilion border.
Hemostasis: Electrocautery
Rectangular Flap Text: The defect edges were debeveled with a #15 scalpel blade. Given the location of the defect and the proximity to free margins a rectangular flap was deemed most appropriate. Using a sterile surgical marker, an appropriate rectangular flap was drawn incorporating the defect. The area thus outlined was incised deep to adipose tissue with a #15 scalpel blade. The skin margins were undermined to an appropriate distance in all directions utilizing iris scissors. Following this, the designed flap was carried over into the primary defect and sutured into place.
Zygomaticofacial Flap Text: Given the location of the defect, shape of the defect and the proximity to free margins a zygomaticofacial flap was deemed most appropriate for repair.  Using a sterile surgical marker, the appropriate flap was drawn incorporating the defect and placing the expected incisions within the relaxed skin tension lines where possible. The area thus outlined was incised deep to adipose tissue with a #15 scalpel blade with preservation of a vascular pedicle.  The skin margins were undermined to an appropriate distance in all directions utilizing iris scissors.  The flap was then placed into the defect and anchored with interrupted buried subcutaneous sutures.
Mart-1 - Negative Histology Text: MART-1 staining demonstrates a normal density and pattern of melanocytes along the dermal-epidermal junction. The surgical margins are negative for tumor cells.
Post-Care Instructions: I reviewed with the patient in detail post-care instructions. Patient is not to engage in any heavy lifting, exercise, or swimming for the next 14 days. Should the patient develop any fevers, chills, bleeding, severe pain patient will contact the office immediately.
Banner Transposition Flap Text: The defect edges were debeveled with a #15 scalpel blade.  Given the location of the defect and the proximity to free margins a Banner transposition flap was deemed most appropriate.  Using a sterile surgical marker, an appropriate flap drawn around the defect. The area thus outlined was incised deep to adipose tissue with a #15 scalpel blade.  The skin margins were undermined to an appropriate distance in all directions utilizing iris scissors.
Location Indication Override (Is Already Calculated Based On Selected Body Location): Area M
Bcc Infiltrative Histology Text: There were numerous aggregates of basaloid cells demonstrating an infiltrative pattern.
Suturegard Body: The suture ends were repeatedly re-tightened and re-clamped to achieve the desired tissue expansion.
Anesthesia Volume In Cc: 3
Bilateral Helical Rim Advancement Flap Text: The defect edges were debeveled with a #15 blade scalpel.  Given the location of the defect and the proximity to free margins (helical rim) a bilateral helical rim advancement flap was deemed most appropriate.  Using a sterile surgical marker, the appropriate advancement flaps were drawn incorporating the defect and placing the expected incisions between the helical rim and antihelix where possible.  The area thus outlined was incised through and through with a #15 scalpel blade.  With a skin hook and iris scissors, the flaps were gently and sharply undermined and freed up.
Number Of Stages: 1
Surgeon/Pathologist Verbiage (Will Incorporate Name Of Surgeon From Intro If Not Blank): operated in two distinct and integrated capacities as the surgeon and pathologist.
Estlander Flap (Lower To Upper Lip) Text: The defect of the lower lip was assessed and measured.  Given the location and size of the defect, an Estlander flap was deemed most appropriate.  Using a sterile surgical marker, an appropriate Estlander flap was measured and drawn on the upper lip. Local anesthesia was then infiltrated. A scalpel was then used to incise the lateral aspect of the flap, through skin, muscle and mucosa, leaving the flap pedicled medially.  The flap was then rotated and positioned to fill the lower lip defect.  The flap was then sutured into place with a three layer technique, closing the orbicularis oris muscle layer with subcutaneous buried sutures, followed by a mucosal layer and an epidermal layer.
Melolabial Transposition Flap Text: The defect edges were debeveled with a #15 scalpel blade.  Given the location of the defect and the proximity to free margins a melolabial flap was deemed most appropriate.  Using a sterile surgical marker, an appropriate melolabial transposition flap was drawn incorporating the defect.    The area thus outlined was incised deep to adipose tissue with a #15 scalpel blade.  The skin margins were undermined to an appropriate distance in all directions utilizing iris scissors.
Cheek Interpolation Flap Text: A decision was made to reconstruct the defect utilizing an interpolation axial flap and a staged reconstruction.  A telfa template was made of the defect.  This telfa template was then used to outline the Cheek Interpolation flap.  The donor area for the pedicle flap was then injected with anesthesia.  The flap was excised through the skin and subcutaneous tissue down to the layer of the underlying musculature.  The interpolation flap was carefully excised within this deep plane to maintain its blood supply.  The edges of the donor site were undermined.   The donor site was closed in a primary fashion.  The pedicle was then rotated into position and sutured.  Once the tube was sutured into place, adequate blood supply was confirmed with blanching and refill.  The pedicle was then wrapped with xeroform gauze and dressed appropriately with a telfa and gauze bandage to ensure continued blood supply and protect the attached pedicle.
Mohs Rapid Report Verbiage: The area of clinically evident tumor was marked with skin marking ink and appropriately hatched.  The initial incision was made following the Mohs approach through the skin.  The specimen was taken to the lab, divided into the necessary number of pieces, chromacoded and processed according to the Mohs protocol.  This was repeated in successive stages until a tumor free defect was achieved.
Hemigard Postcare Instructions: The HEMIGARD strips are to remain completely dry for at least 5-7 days.
Bilateral Rotation Flap Text: The defect edges were debeveled with a #15 scalpel blade. Given the location of the defect, shape of the defect and the proximity to free margins a bilateral rotation flap was deemed most appropriate. Using a sterile surgical marker, an appropriate rotation flap was drawn incorporating the defect and placing the expected incisions within the relaxed skin tension lines where possible. The area thus outlined was incised deep to adipose tissue with a #15 scalpel blade. The skin margins were undermined to an appropriate distance in all directions utilizing iris scissors. Following this, the designed flap was carried over into the primary defect and sutured into place.
Consent (Lip)/Introductory Paragraph: The rationale for Mohs was explained to the patient and consent was obtained. The risks, benefits and alternatives to therapy were discussed in detail. Specifically, the risks of lip deformity, changes in the oral aperture, infection, scarring, bleeding, prolonged wound healing, incomplete removal, allergy to anesthesia, nerve injury and recurrence were addressed. Prior to the procedure, the treatment site was clearly identified and confirmed by the patient. All components of Universal Protocol/PAUSE Rule completed.
Referring Physician (Optional): ASHLEY Ibarra
Bilobed Transposition Flap Text: The defect edges were debeveled with a #15 scalpel blade.  Given the location of the defect and the proximity to free margins a bilobed transposition flap was deemed most appropriate.  Using a sterile surgical marker, an appropriate bilobe flap drawn around the defect.    The area thus outlined was incised deep to adipose tissue with a #15 scalpel blade.  The skin margins were undermined to an appropriate distance in all directions utilizing iris scissors.
Abbe Flap (Upper To Lower Lip) Text: The defect of the lower lip was assessed and measured.  Given the location and size of the defect, an Abbe flap was deemed most appropriate.  Using a sterile surgical marker, an appropriate Abbe flap was measured and drawn on the upper lip. Local anesthesia was then infiltrated.  A scalpel was then used to incise the upper lip through and through the skin, vermilion, muscle and mucosa, leaving the flap pedicled on the opposite side.  The flap was then rotated and transferred to the lower lip defect.  The flap was then sutured into place with a three layer technique, closing the orbicularis oris muscle layer with subcutaneous buried sutures, followed by a mucosal layer and an epidermal layer.
Consent 2/Introductory Paragraph: Mohs surgery was explained to the patient and consent was obtained. The risks, benefits and alternatives to therapy were discussed in detail. Specifically, the risks of infection, scarring, bleeding, prolonged wound healing, incomplete removal, allergy to anesthesia, nerve injury and recurrence were addressed. Prior to the procedure, the treatment site was clearly identified and confirmed by the patient. All components of Universal Protocol/PAUSE Rule completed.
Suturegard Intro: Intraoperative tissue expansion was performed, utilizing the SUTUREGARD device, in order to reduce wound tension.
Manual Repair Warning Statement: We plan on removing the manually selected variable below in favor of our much easier automatic structured text blocks found in the previous tab. We decided to do this to help make the flow better and give you the full power of structured data. Manual selection is never going to be ideal in our platform and I would encourage you to avoid using manual selection from this point on, especially since I will be sunsetting this feature. It is important that you do one of two things with the customized text below. First, you can save all of the text in a word file so you can have it for future reference. Second, transfer the text to the appropriate area in the Library tab. Lastly, if there is a flap or graft type which we do not have you need to let us know right away so I can add it in before the variable is hidden. No need to panic, we plan to give you roughly 6 months to make the change.
Rhombic Flap Text: The defect edges were debeveled with a #15 scalpel blade.  Given the location of the defect and the proximity to free margins a rhombic flap was deemed most appropriate.  Using a sterile surgical marker, an appropriate rhombic flap was drawn incorporating the defect.    The area thus outlined was incised deep to adipose tissue with a #15 scalpel blade.  The skin margins were undermined to an appropriate distance in all directions utilizing iris scissors.
Consent (Nose)/Introductory Paragraph: The rationale for Mohs was explained to the patient and consent was obtained. The risks, benefits and alternatives to therapy were discussed in detail. Specifically, the risks of nasal deformity, changes in the flow of air through the nose, infection, scarring, bleeding, prolonged wound healing, incomplete removal, allergy to anesthesia, nerve injury and recurrence were addressed. Prior to the procedure, the treatment site was clearly identified and confirmed by the patient. All components of Universal Protocol/PAUSE Rule completed.
Advancement Flap (Single) Text: The defect edges were debeveled with a #15 scalpel blade.  Given the location of the defect and the proximity to free margins a single advancement flap was deemed most appropriate.  Using a sterile surgical marker, an appropriate advancement flap was drawn incorporating the defect and placing the expected incisions within the relaxed skin tension lines where possible.    The area thus outlined was incised deep to adipose tissue with a #15 scalpel blade.  The skin margins were undermined to an appropriate distance in all directions utilizing iris scissors.
Hatchet Flap Text: The defect edges were debeveled with a #15 scalpel blade.  Given the location of the defect, shape of the defect and the proximity to free margins a hatchet flap was deemed most appropriate.  Using a sterile surgical marker, an appropriate hatchet flap was drawn incorporating the defect and placing the expected incisions within the relaxed skin tension lines where possible.    The area thus outlined was incised deep to adipose tissue with a #15 scalpel blade.  The skin margins were undermined to an appropriate distance in all directions utilizing iris scissors.
Repair Hemostasis (Optional): Pinpoint electrocautery
Burow's Advancement Flap Text: The defect edges were debeveled with a #15 scalpel blade.  Given the location of the defect and the proximity to free margins a Burow's advancement flap was deemed most appropriate.  Using a sterile surgical marker, the appropriate advancement flap was drawn incorporating the defect and placing the expected incisions within the relaxed skin tension lines where possible.    The area thus outlined was incised deep to adipose tissue with a #15 scalpel blade.  The skin margins were undermined to an appropriate distance in all directions utilizing iris scissors.
Paramedian Forehead Flap Text: A decision was made to reconstruct the defect utilizing an interpolation axial flap and a staged reconstruction.  A telfa template was made of the defect.  This telfa template was then used to outline the paramedian forehead pedicle flap.  The donor area for the pedicle flap was then injected with anesthesia.  The flap was excised through the skin and subcutaneous tissue down to the layer of the underlying musculature.  The pedicle flap was carefully excised within this deep plane to maintain its blood supply.  The edges of the donor site were undermined.   The donor site was closed in a primary fashion.  The pedicle was then rotated into position and sutured.  Once the tube was sutured into place, adequate blood supply was confirmed with blanching and refill.  The pedicle was then wrapped with xeroform gauze and dressed appropriately with a telfa and gauze bandage to ensure continued blood supply and protect the attached pedicle.
Closure 2 Information: This tab is for additional flaps and grafts, including complex repair and grafts and complex repair and flaps. You can also specify a different location for the additional defect, if the location is the same you do not need to select a new one. We will insert the automated text for the repair you select below just as we do for solitary flaps and grafts. Please note that at this time if you select a location with a different insurance zone you will need to override the ICD10 and CPT if appropriate.
Helical Rim Advancement Flap Text: The defect edges were debeveled with a #15 blade scalpel.  Given the location of the defect and the proximity to free margins (helical rim) a double helical rim advancement flap was deemed most appropriate.  Using a sterile surgical marker, the appropriate advancement flaps were drawn incorporating the defect and placing the expected incisions between the helical rim and antihelix where possible.  The area thus outlined was incised through and through with a #15 scalpel blade.  With a skin hook and iris scissors, the flaps were gently and sharply undermined and freed up.
Consent (Spinal Accessory)/Introductory Paragraph: The rationale for Mohs was explained to the patient and consent was obtained. The risks, benefits and alternatives to therapy were discussed in detail. Specifically, the risks of damage to the spinal accessory nerve, infection, scarring, bleeding, prolonged wound healing, incomplete removal, allergy to anesthesia, and recurrence were addressed. Prior to the procedure, the treatment site was clearly identified and confirmed by the patient. All components of Universal Protocol/PAUSE Rule completed.
Additional Anesthesia Volume In Cc: 6
Mohs Case Number: [id number]
Nasalis-Muscle-Based Myocutaneous Island Pedicle Flap Text: Using a #15 blade, an incision was made around the donor flap to the level of the nasalis muscle. Wide lateral undermining was then performed in both the subcutaneous plane above the nasalis muscle, and in a submuscular plane just above periosteum. This allowed the formation of a free nasalis muscle axial pedicle (based on the angular artery) which was still attached to the actual cutaneous flap, increasing its mobility and vascular viability. Hemostasis was obtained with pinpoint electrocoagulation. The flap was mobilized into position and the pivotal anchor points positioned and stabilized with buried interrupted sutures. Subcutaneous and dermal tissues were closed in a multilayered fashion with sutures. Tissue redundancies were excised, and the epidermal edges were apposed without significant tension and sutured with sutures.
O-L Flap Text: The defect edges were debeveled with a #15 scalpel blade.  Given the location of the defect, shape of the defect and the proximity to free margins an O-L flap was deemed most appropriate.  Using a sterile surgical marker, an appropriate advancement flap was drawn incorporating the defect and placing the expected incisions within the relaxed skin tension lines where possible.    The area thus outlined was incised deep to adipose tissue with a #15 scalpel blade.  The skin margins were undermined to an appropriate distance in all directions utilizing iris scissors.
Retention Suture Text: Retention sutures were placed to support the closure and prevent dehiscence.
V-Y Flap Text: The defect edges were debeveled with a #15 scalpel blade.  Given the location of the defect, shape of the defect and the proximity to free margins a V-Y flap was deemed most appropriate.  Using a sterile surgical marker, an appropriate advancement flap was drawn incorporating the defect and placing the expected incisions within the relaxed skin tension lines where possible.    The area thus outlined was incised deep to adipose tissue with a #15 scalpel blade.  The skin margins were undermined to an appropriate distance in all directions utilizing iris scissors.
Unna Boot Text: An Unna boot was placed to help immobilize the limb and facilitate more rapid healing.
Inflammation Suggestive Of Cancer Camouflage Histology Text: There was a dense lymphocytic infiltrate which prevented adequate histologic evaluation of adjacent structures.
Localized Dermabrasion With Wire Brush Text: The patient was draped in routine manner.  Localized dermabrasion using 3 x 17 mm wire brush was performed in routine manner to papillary dermis. This spot dermabrasion is being performed to complete skin cancer reconstruction. It also will eliminate the other sun damaged precancerous cells that are known to be part of the regional effect of a lifetime's worth of sun exposure. This localized dermabrasion is therapeutic and should not be considered cosmetic in any regard.
Partial Purse String (Intermediate) Text: Given the location of the defect and the characteristics of the surrounding skin an intermediate purse string closure was deemed most appropriate.  Undermining was performed circumfirentially around the surgical defect.  A purse string suture was then placed and tightened. Wound tension only allowed a partial closure of the circular defect.
Depth Of Tumor Invasion (For Histology): dermis
Previous Accession (Optional): [id number]
Epidermal Closure Graft Donor Site (Optional): running
A-T Advancement Flap Text: The defect edges were debeveled with a #15 scalpel blade.  Given the location of the defect, shape of the defect and the proximity to free margins an A-T advancement flap was deemed most appropriate.  Using a sterile surgical marker, an appropriate advancement flap was drawn incorporating the defect and placing the expected incisions within the relaxed skin tension lines where possible.    The area thus outlined was incised deep to adipose tissue with a #15 scalpel blade.  The skin margins were undermined to an appropriate distance in all directions utilizing iris scissors.
Star Wedge Flap Text: The defect edges were debeveled with a #15 scalpel blade.  Given the location of the defect, shape of the defect and the proximity to free margins a star wedge flap was deemed most appropriate.  Using a sterile surgical marker, an appropriate rotation flap was drawn incorporating the defect and placing the expected incisions within the relaxed skin tension lines where possible. The area thus outlined was incised deep to adipose tissue with a #15 scalpel blade.  The skin margins were undermined to an appropriate distance in all directions utilizing iris scissors.
Eyelid Partial Thickness Repair - 77657: The eyelid defect was partial thickness which required a wedge repair of the eyelid. Special care was taken to ensure that the eyelid margin was realligned when placing sutures.
Graft Donor Site Bandage (Optional-Leave Blank If You Don't Want In Note): Aquaplast was fitted to the graft site and sewn into place. A pressure bandage were applied to the donor site and over the aquaplast bolster.
W Plasty Text: The lesion was extirpated to the level of the fat with a #15 scalpel blade.  Given the location of the defect, shape of the defect and the proximity to free margins a W-plasty was deemed most appropriate for repair.  Using a sterile surgical marker, the appropriate transposition arms of the W-plasty were drawn incorporating the defect and placing the expected incisions within the relaxed skin tension lines where possible.    The area thus outlined was incised deep to adipose tissue with a #15 scalpel blade.  The skin margins were undermined to an appropriate distance in all directions utilizing iris scissors.  The opposing transposition arms were then transposed into place in opposite direction and anchored with interrupted buried subcutaneous sutures.
Detail Level: Detailed
Spiral Flap Text: The defect edges were debeveled with a #15 scalpel blade.  Given the location of the defect, shape of the defect and the proximity to free margins a spiral flap was deemed most appropriate.  Using a sterile surgical marker, an appropriate rotation flap was drawn incorporating the defect and placing the expected incisions within the relaxed skin tension lines where possible. The area thus outlined was incised deep to adipose tissue with a #15 scalpel blade.  The skin margins were undermined to an appropriate distance in all directions utilizing iris scissors.
Double Z Plasty Text: The lesion was extirpated to the level of the fat with a #15 scalpel blade. Given the location of the defect, shape of the defect and the proximity to free margins a double Z-plasty was deemed most appropriate for repair. Using a sterile surgical marker, the appropriate transposition arms of the double Z-plasty were drawn incorporating the defect and placing the expected incisions within the relaxed skin tension lines where possible. The area thus outlined was incised deep to adipose tissue with a #15 scalpel blade. The skin margins were undermined to an appropriate distance in all directions utilizing iris scissors. The opposing transposition arms were then transposed and carried over into place in opposite direction and anchored with interrupted buried subcutaneous sutures.
Area M Indication Text: Tumors in this location are included in Area M (cheek, forehead, scalp, neck, jawline and pretibial skin).  Mohs surgery is indicated for tumors in these anatomic locations.
Tarsorrhaphy Text: A tarsorrhaphy was performed using Frost sutures.
Muscle Hinge Flap Text: The defect edges were debeveled with a #15 scalpel blade.  Given the size, depth and location of the defect and the proximity to free margins a muscle hinge flap was deemed most appropriate.  Using a sterile surgical marker, an appropriate hinge flap was drawn incorporating the defect. The area thus outlined was incised with a #15 scalpel blade.  The skin margins were undermined to an appropriate distance in all directions utilizing iris scissors.
Ear Star Wedge Flap Text: The defect edges were debeveled with a #15 blade scalpel.  Given the location of the defect and the proximity to free margins (helical rim) an ear star wedge flap was deemed most appropriate.  Using a sterile surgical marker, the appropriate flap was drawn incorporating the defect and placing the expected incisions between the helical rim and antihelix where possible.  The area thus outlined was incised through and through with a #15 scalpel blade.
Tumor Debulked?: curette
Nasal Turnover Hinge Flap Text: The defect edges were debeveled with a #15 scalpel blade.  Given the size, depth, location of the defect and the defect being full thickness a nasal turnover hinge flap was deemed most appropriate.  Using a sterile surgical marker, an appropriate hinge flap was drawn incorporating the defect. The area thus outlined was incised with a #15 scalpel blade. The flap was designed to recreate the nasal mucosal lining and the alar rim. The skin margins were undermined to an appropriate distance in all directions utilizing iris scissors.
Ftsg Text: The defect edges were debeveled with a #15 scalpel blade.  Given the location of the defect, shape of the defect and the proximity to free margins a full thickness skin graft was deemed most appropriate.  Using a sterile surgical marker, the primary defect shape was transferred to the donor site. The area thus outlined was incised deep to adipose tissue with a #15 scalpel blade.  The harvested graft was then trimmed of adipose tissue until only dermis and epidermis was left.  The skin margins of the secondary defect were undermined to an appropriate distance in all directions utilizing iris scissors.  The secondary defect was closed with interrupted buried subcutaneous sutures.  The skin edges were then re-apposed with running  sutures.  The skin graft was then placed in the primary defect and oriented appropriately.
Information: Selecting Yes will display possible errors in your note based on the variables you have selected. This validation is only offered as a suggestion for you. PLEASE NOTE THAT THE VALIDATION TEXT WILL BE REMOVED WHEN YOU FINALIZE YOUR NOTE. IF YOU WANT TO FAX A PRELIMINARY NOTE YOU WILL NEED TO TOGGLE THIS TO 'NO' IF YOU DO NOT WANT IT IN YOUR FAXED NOTE.
Non-Graft Cartilage Fenestration Text: The cartilage was fenestrated with a 2mm punch biopsy to help facilitate healing.
Estimated Blood Loss (Cc): minimal
Flip-Flop Flap Text: The defect edges were debeveled with a #15 blade scalpel.  Given the location of the defect and the proximity to free margins a flip-flop flap was deemed most appropriate. Using a sterile surgical marker, the appropriate flap was drawn incorporating the defect and placing the expected incisions between the helical rim and antihelix where possible.  The area thus outlined was incised through and through with a #15 scalpel blade. Following this, the designed flap was carried over into the primary defect and sutured into place.
O-T Advancement Flap Text: The defect edges were debeveled with a #15 scalpel blade.  Given the location of the defect, shape of the defect and the proximity to free margins an O-T advancement flap was deemed most appropriate.  Using a sterile surgical marker, an appropriate advancement flap was drawn incorporating the defect and placing the expected incisions within the relaxed skin tension lines where possible.    The area thus outlined was incised deep to adipose tissue with a #15 scalpel blade.  The skin margins were undermined to an appropriate distance in all directions utilizing iris scissors.
Crescentic Advancement Flap Text: The defect edges were debeveled with a #15 scalpel blade.  Given the location of the defect and the proximity to free margins a crescentic advancement flap was deemed most appropriate.  Using a sterile surgical marker, the appropriate advancement flap was drawn incorporating the defect and placing the expected incisions within the relaxed skin tension lines where possible.    The area thus outlined was incised deep to adipose tissue with a #15 scalpel blade.  The skin margins were undermined to an appropriate distance in all directions utilizing iris scissors.
Helical Rim Text: The closure involved the helical rim.
Vermilion Border Text: The closure involved the vermilion border.
Melolabial Interpolation Flap Text: A decision was made to reconstruct the defect utilizing an interpolation axial flap and a staged reconstruction.  A telfa template was made of the defect.  This telfa template was then used to outline the melolabial interpolation flap.  The donor area for the pedicle flap was then injected with anesthesia.  The flap was excised through the skin and subcutaneous tissue down to the layer of the underlying musculature.  The pedicle flap was carefully excised within this deep plane to maintain its blood supply.  The edges of the donor site were undermined.   The donor site was closed in a primary fashion.  The pedicle was then rotated into position and sutured.  Once the tube was sutured into place, adequate blood supply was confirmed with blanching and refill.  The pedicle was then wrapped with xeroform gauze and dressed appropriately with a telfa and gauze bandage to ensure continued blood supply and protect the attached pedicle.
O-Z Plasty Text: The defect edges were debeveled with a #15 scalpel blade.  Given the location of the defect, shape of the defect and the proximity to free margins an O-Z plasty (double transposition flap) was deemed most appropriate.  Using a sterile surgical marker, the appropriate transposition flaps were drawn incorporating the defect and placing the expected incisions within the relaxed skin tension lines where possible.    The area thus outlined was incised deep to adipose tissue with a #15 scalpel blade.  The skin margins were undermined to an appropriate distance in all directions utilizing iris scissors.  Hemostasis was achieved with electrocautery.  The flaps were then transposed into place, one clockwise and the other counterclockwise, and anchored with interrupted buried subcutaneous sutures.
Pain Refusal Text: I offered to prescribe pain medication but the patient refused to take this medication.
Trilobed Flap Text: The defect edges were debeveled with a #15 scalpel blade.  Given the location of the defect and the proximity to free margins a trilobed flap was deemed most appropriate.  Using a sterile surgical marker, an appropriate trilobed flap drawn around the defect.    The area thus outlined was incised deep to adipose tissue with a #15 scalpel blade.  The skin margins were undermined to an appropriate distance in all directions utilizing iris scissors.
Epidermal Closure: running cuticular
Brow Lift Text: A midfrontal incision was made medially to the defect to allow access to the tissues just superior to the left eyebrow. Following careful dissection inferiorly in a supraperiosteal plane to the level of the left eyebrow, several 3-0 monocryl sutures were used to resuspend the eyebrow orbicularis oculi muscular unit to the superior frontal bone periosteum. This resulted in an appropriate reapproximation of static eyebrow symmetry and correction of the left brow ptosis.
Deep Sutures: 5-0 Maxon
Consent Type: Consent 1 (Standard)
Nasalis Myocutaneous Flap Text: Using a #15 blade, an incision was made around the donor flap to the level of the nasalis muscle. Wide lateral undermining was then performed in both the subcutaneous plane above the nasalis muscle, and in a submuscular plane just above periosteum. This allowed the formation of a free nasalis muscle axial pedicle which was still attached to the actual cutaneous flap, increasing its mobility and vascular viability. Hemostasis was obtained with pinpoint electrocoagulation. The flap was mobilized into position and the pivotal anchor points positioned and stabilized with buried interrupted sutures. Subcutaneous and dermal tissues were closed in a multilayered fashion with sutures. Tissue redundancies were excised, and the epidermal edges were apposed without significant tension and sutured with sutures.
Keystone Flap Text: The defect edges were debeveled with a #15 scalpel blade.  Given the location of the defect, shape of the defect a keystone flap was deemed most appropriate.  Using a sterile surgical marker, an appropriate keystone flap was drawn incorporating the defect, outlining the appropriate donor tissue and placing the expected incisions within the relaxed skin tension lines where possible. The area thus outlined was incised deep to adipose tissue with a #15 scalpel blade.  The skin margins were undermined to an appropriate distance in all directions around the primary defect and laterally outward around the flap utilizing iris scissors.
Partial Purse String (Simple) Text: Given the location of the defect and the characteristics of the surrounding skin a simple purse string closure was deemed most appropriate.  Undermining was performed circumfirentially around the surgical defect.  A purse string suture was then placed and tightened. Wound tension only allowed a partial closure of the circular defect.
Dermal Autograft Text: The defect edges were debeveled with a #15 scalpel blade.  Given the location of the defect, shape of the defect and the proximity to free margins a dermal autograft was deemed most appropriate.  Using a sterile surgical marker, the primary defect shape was transferred to the donor site. The area thus outlined was incised deep to adipose tissue with a #15 scalpel blade.  The harvested graft was then trimmed of adipose and epidermal tissue until only dermis was left.  The skin graft was then placed in the primary defect and oriented appropriately.
Nasolabial Transposition Flap Text: The defect edges were debeveled with a #15 scalpel blade.  Given the size, depth and location of the defect and the proximity to free margins a nasolabial transposition flap was deemed most appropriate. Using a sterile surgical marker, an appropriate flap was drawn incorporating the defect. The area thus outlined was incised with a #15 scalpel blade. The skin margins were undermined to an appropriate distance in all directions utilizing iris scissors. Following this, the designed flap was carried into the primary defect and sutured into place.
O-Z Flap Text: The defect edges were debeveled with a #15 scalpel blade.  Given the location of the defect, shape of the defect and the proximity to free margins an O-Z flap was deemed most appropriate.  Using a sterile surgical marker, an appropriate transposition flap was drawn incorporating the defect and placing the expected incisions within the relaxed skin tension lines where possible. The area thus outlined was incised deep to adipose tissue with a #15 scalpel blade.  The skin margins were undermined to an appropriate distance in all directions utilizing iris scissors.
Split-Thickness Skin Graft Text: The defect edges were debeveled with a #15 scalpel blade.  Given the location of the defect, shape of the defect and the proximity to free margins a split thickness skin graft was deemed most appropriate.  Using a sterile surgical marker, the primary defect shape was transferred to the donor site. The split thickness graft was then harvested.  The skin graft was then placed in the primary defect and oriented appropriately.
Secondary Intention Text (Leave Blank If You Do Not Want): The defect will heal with secondary intention.
Mauc Instructions: By selecting yes to the question below the MAUC number will be added into the note.  This will be calculated automatically based on the diagnosis chosen, the size entered, the body zone selected (H,M,L) and the specific indications you chose. You will also have the option to override the Mohs AUC if you disagree with the automatically calculated number and this option is found in the Case Summary tab.
Tissue Cultured Epidermal Autograft Text: The defect edges were debeveled with a #15 scalpel blade.  Given the location of the defect, shape of the defect and the proximity to free margins a tissue cultured epidermal autograft was deemed most appropriate.  The graft was then trimmed to fit the size of the defect.  The graft was then placed in the primary defect and oriented appropriately.
Hemigard Intro: Due to skin fragility and wound tension, it was decided to use HEMIGARD adhesive retention suture devices to permit a linear closure. The skin was cleaned and dried for a 6cm distance away from the wound. Excessive hair, if present, was removed to allow for adhesion.
Posterior Auricular Interpolation Flap Text: A decision was made to reconstruct the defect utilizing an interpolation axial flap and a staged reconstruction.  A telfa template was made of the defect.  This telfa template was then used to outline the posterior auricular interpolation flap.  The donor area for the pedicle flap was then injected with anesthesia.  The flap was excised through the skin and subcutaneous tissue down to the layer of the underlying musculature.  The pedicle flap was carefully excised within this deep plane to maintain its blood supply.  The edges of the donor site were undermined.   The donor site was closed in a primary fashion.  The pedicle was then rotated into position and sutured.  Once the tube was sutured into place, adequate blood supply was confirmed with blanching and refill.  The pedicle was then wrapped with xeroform gauze and dressed appropriately with a telfa and gauze bandage to ensure continued blood supply and protect the attached pedicle.
Dressing: pressure dressing with telfa
Skin Substitute Text: The defect edges were debeveled with a #15 scalpel blade.  Given the location of the defect, shape of the defect and the proximity to free margins a skin substitute graft was deemed most appropriate.  The graft material was trimmed to fit the size of the defect. The graft was then placed in the primary defect and oriented appropriately.
Abbe Flap (Lower To Upper Lip) Text: The defect of the upper lip was assessed and measured.  Given the location and size of the defect, an Abbe flap was deemed most appropriate.  Using a sterile surgical marker, an appropriate Abbe flap was measured and drawn on the lower lip. Local anesthesia was then infiltrated. A scalpel was then used to incise the upper lip through and through the skin, vermilion, muscle and mucosa, leaving the flap pedicled on the opposite side.  The flap was then rotated and transferred to the lower lip defect.  The flap was then sutured into place with a three layer technique, closing the orbicularis oris muscle layer with subcutaneous buried sutures, followed by a mucosal layer and an epidermal layer.
Advancement Flap (Double) Text: The defect edges were debeveled with a #15 scalpel blade.  Given the location of the defect and the proximity to free margins a double advancement flap was deemed most appropriate.  Using a sterile surgical marker, the appropriate advancement flaps were drawn incorporating the defect and placing the expected incisions within the relaxed skin tension lines where possible.    The area thus outlined was incised deep to adipose tissue with a #15 scalpel blade.  The skin margins were undermined to an appropriate distance in all directions utilizing iris scissors.
Composite Graft Text: The defect edges were debeveled with a #15 scalpel blade.  Given the location of the defect, shape of the defect, the proximity to free margins and the fact the defect was full thickness a composite graft was deemed most appropriate.  The defect was outline and then transferred to the donor site.  A full thickness graft was then excised from the donor site. The graft was then placed in the primary defect, oriented appropriately and then sutured into place.  The secondary defect was then repaired using a primary closure.
Tumor Depth: Less than 6mm from granular layer and no invasion beyond the subcutaneous fat
Consent (Scalp)/Introductory Paragraph: The rationale for Mohs was explained to the patient and consent was obtained. The risks, benefits and alternatives to therapy were discussed in detail. Specifically, the risks of changes in hair growth pattern secondary to repair, infection, scarring, bleeding, prolonged wound healing, incomplete removal, allergy to anesthesia, nerve injury and recurrence were addressed. Prior to the procedure, the treatment site was clearly identified and confirmed by the patient. All components of Universal Protocol/PAUSE Rule completed.
Eye Protection Verbiage: Before proceeding with the stage, a plastic scleral shield was inserted. The globe was anesthetized with a few drops of 1% lidocaine with 1:100,000 epinephrine. Then, an appropriate sized scleral shield was chosen and coated with lacrilube ointment. The shield was gently inserted and left in place for the duration of each stage. After the stage was completed, the shield was gently removed.
Cheiloplasty (Less Than 50%) Text: A decision was made to reconstruct the defect with a  cheiloplasty.  The defect was undermined extensively.  Additional obicularis oris muscle was excised with a 15 blade scalpel.  The defect was converted into a full thickness wedge, of less than 50% of the vertical height of the lip, to facilite a better cosmetic result.  Small vessels were then tied off with 5-0 monocyrl. The obicularis oris, superficial fascia, adipose and dermis were then reapproximated.  After the deeper layers were approximated the epidermis was reapproximated with particular care given to realign the vermilion border.
Consent 1/Introductory Paragraph: The rationale for Mohs was explained to the patient and consent was obtained. The risks, benefits and alternatives to therapy were discussed in detail. Specifically, the risks of infection, scarring, bleeding, prolonged wound healing, incomplete removal, allergy to anesthesia, nerve injury and recurrence were addressed. Prior to the procedure, the treatment site was clearly identified and confirmed by the patient. All components of Universal Protocol/PAUSE Rule completed.
Localized Dermabrasion With 15 Blade Text: The patient was draped in routine manner.  Localized dermabrasion using a 15 blade was performed in routine manner to papillary dermis. This spot dermabrasion is being performed to complete skin cancer reconstruction. It also will eliminate the other sun damaged precancerous cells that are known to be part of the regional effect of a lifetime's worth of sun exposure. This localized dermabrasion is therapeutic and should not be considered cosmetic in any regard.
Repair Anesthesia Method: local infiltration
Which Eyelid Repair Cpt Are You Using?: 12548
Intermediate Repair And Flap Additional Text (Will Appearing After The Standard Complex Repair Text): The intermediate repair was not sufficient to completely close the primary defect. The remaining additional defect was repaired with the flap mentioned below.
No Residual Tumor Seen Histology Text: There were no malignant cells seen in the sections examined.
Adjacent Tissue Transfer Text: The defect edges were debeveled with a #15 scalpel blade.  Given the location of the defect and the proximity to free margins an adjacent tissue transfer was deemed most appropriate.  Using a sterile surgical marker, an appropriate flap was drawn incorporating the defect and placing the expected incisions within the relaxed skin tension lines where possible.    The area thus outlined was incised deep to adipose tissue with a #15 scalpel blade.  The skin margins were undermined to an appropriate distance in all directions utilizing iris scissors.
Mastoid Interpolation Flap Text: A decision was made to reconstruct the defect utilizing an interpolation axial flap and a staged reconstruction.  A telfa template was made of the defect.  This telfa template was then used to outline the mastoid interpolation flap.  The donor area for the pedicle flap was then injected with anesthesia.  The flap was excised through the skin and subcutaneous tissue down to the layer of the underlying musculature.  The pedicle flap was carefully excised within this deep plane to maintain its blood supply.  The edges of the donor site were undermined.   The donor site was closed in a primary fashion.  The pedicle was then rotated into position and sutured.  Once the tube was sutured into place, adequate blood supply was confirmed with blanching and refill.  The pedicle was then wrapped with xeroform gauze and dressed appropriately with a telfa and gauze bandage to ensure continued blood supply and protect the attached pedicle.
Consent (Marginal Mandibular)/Introductory Paragraph: The rationale for Mohs was explained to the patient and consent was obtained. The risks, benefits and alternatives to therapy were discussed in detail. Specifically, the risks of damage to the marginal mandibular branch of the facial nerve, infection, scarring, bleeding, prolonged wound healing, incomplete removal, allergy to anesthesia, and recurrence were addressed. Prior to the procedure, the treatment site was clearly identified and confirmed by the patient. All components of Universal Protocol/PAUSE Rule completed.
Island Pedicle Flap With Canthal Suspension Text: The defect edges were debeveled with a #15 scalpel blade.  Given the location of the defect, shape of the defect and the proximity to free margins an island pedicle advancement flap was deemed most appropriate.  Using a sterile surgical marker, an appropriate advancement flap was drawn incorporating the defect, outlining the appropriate donor tissue and placing the expected incisions within the relaxed skin tension lines where possible. The area thus outlined was incised deep to adipose tissue with a #15 scalpel blade.  The skin margins were undermined to an appropriate distance in all directions around the primary defect and laterally outward around the island pedicle utilizing iris scissors.  There was minimal undermining beneath the pedicle flap. A suspension suture was placed in the canthal tendon to prevent tension and prevent ectropion.
Double Island Pedicle Flap Text: The defect edges were debeveled with a #15 scalpel blade.  Given the location of the defect, shape of the defect and the proximity to free margins a double island pedicle advancement flap was deemed most appropriate.  Using a sterile surgical marker, an appropriate advancement flap was drawn incorporating the defect, outlining the appropriate donor tissue and placing the expected incisions within the relaxed skin tension lines where possible.    The area thus outlined was incised deep to adipose tissue with a #15 scalpel blade.  The skin margins were undermined to an appropriate distance in all directions around the primary defect and laterally outward around the island pedicle utilizing iris scissors.  There was minimal undermining beneath the pedicle flap.
Consent (Temporal Branch)/Introductory Paragraph: The rationale for Mohs was explained to the patient and consent was obtained. The risks, benefits and alternatives to therapy were discussed in detail. Specifically, the risks of damage to the temporal branch of the facial nerve, infection, scarring, bleeding, prolonged wound healing, incomplete removal, allergy to anesthesia, and recurrence were addressed. Prior to the procedure, the treatment site was clearly identified and confirmed by the patient. All components of Universal Protocol/PAUSE Rule completed.
Island Pedicle Flap Text: The defect edges were debeveled with a #15 scalpel blade.  Given the location of the defect, shape of the defect and the proximity to free margins an island pedicle advancement flap was deemed most appropriate.  Using a sterile surgical marker, an appropriate advancement flap was drawn incorporating the defect, outlining the appropriate donor tissue and placing the expected incisions within the relaxed skin tension lines where possible.    The area thus outlined was incised deep to adipose tissue with a #15 scalpel blade.  The skin margins were undermined to an appropriate distance in all directions around the primary defect and laterally outward around the island pedicle utilizing iris scissors.  There was minimal undermining beneath the pedicle flap.
Staged Advancement Flap Text: The defect edges were debeveled with a #15 scalpel blade.  Given the location of the defect, shape of the defect and the proximity to free margins a staged advancement flap was deemed most appropriate.  Using a sterile surgical marker, an appropriate advancement flap was drawn incorporating the defect and placing the expected incisions within the relaxed skin tension lines where possible. The area thus outlined was incised deep to adipose tissue with a #15 scalpel blade.  The skin margins were undermined to an appropriate distance in all directions utilizing iris scissors.
Same Histology In Subsequent Stages Text: The pattern and morphology of the tumor is as described in the first stage.
Bcc Histology Text: There were numerous aggregates of basaloid cells.
Donor Site Anesthesia Type: same as repair anesthesia
Home Suture Removal Text: Patient was provided instructions on removing sutures and will remove their sutures at home.  If they have any questions or difficulties they will call the office.
Burow's Graft Text: The defect edges were debeveled with a #15 scalpel blade.  Given the location of the defect, shape of the defect, the proximity to free margins and the presence of a standing cone deformity a Burow's skin graft was deemed most appropriate. The standing cone was removed and this tissue was then trimmed to the shape of the primary defect. The adipose tissue was also removed until only dermis and epidermis were left.  The skin margins of the secondary defect were undermined to an appropriate distance in all directions utilizing iris scissors.  The secondary defect was closed with interrupted buried subcutaneous sutures.  The skin edges were then re-apposed with running  sutures.  The skin graft was then placed in the primary defect and oriented appropriately.
Intermediate Repair And Graft Additional Text (Will Appearing After The Standard Complex Repair Text): The intermediate repair was not sufficient to completely close the primary defect. The remaining additional defect was repaired with the graft mentioned below.
Localized Dermabrasion With Sand Papertext: The patient was draped in routine manner.  Localized dermabrasion using sterile sand paper was performed in routine manner to papillary dermis. This spot dermabrasion is being performed to complete skin cancer reconstruction. It also will eliminate the other sun damaged precancerous cells that are known to be part of the regional effect of a lifetime's worth of sun exposure. This localized dermabrasion is therapeutic and should not be considered cosmetic in any regard.

## 2025-04-03 DIAGNOSIS — R05.1 ACUTE COUGH: ICD-10-CM

## 2025-04-03 RX ORDER — HYDROCODONE BITARTRATE AND HOMATROPINE METHYLBROMIDE ORAL SOLUTION 5; 1.5 MG/5ML; MG/5ML
5 LIQUID ORAL EVERY 6 HOURS PRN
Qty: 140 ML | Refills: 0 | Status: SHIPPED | OUTPATIENT
Start: 2025-04-03 | End: 2025-04-03 | Stop reason: SDUPTHER

## 2025-04-03 RX ORDER — HYDROCODONE BITARTRATE AND HOMATROPINE METHYLBROMIDE ORAL SOLUTION 5; 1.5 MG/5ML; MG/5ML
5 LIQUID ORAL EVERY 6 HOURS PRN
Qty: 140 ML | Refills: 0 | Status: SHIPPED | OUTPATIENT
Start: 2025-04-03 | End: 2025-04-10

## 2025-04-07 ENCOUNTER — APPOINTMENT (OUTPATIENT)
Dept: RADIOLOGY | Facility: MEDICAL CENTER | Age: 80
DRG: 175 | End: 2025-04-07
Attending: EMERGENCY MEDICINE
Payer: MEDICARE

## 2025-04-07 ENCOUNTER — HOSPITAL ENCOUNTER (INPATIENT)
Facility: MEDICAL CENTER | Age: 80
LOS: 3 days | DRG: 175 | End: 2025-04-10
Attending: EMERGENCY MEDICINE | Admitting: HOSPITALIST
Payer: MEDICARE

## 2025-04-07 DIAGNOSIS — B33.8 INFECTION DUE TO RESPIRATORY SYNCYTIAL VIRUS (RSV), UNSPECIFIED INFECTION TYPE: ICD-10-CM

## 2025-04-07 DIAGNOSIS — R07.9 LEFT-SIDED CHEST PAIN: ICD-10-CM

## 2025-04-07 DIAGNOSIS — R05.1 ACUTE COUGH: ICD-10-CM

## 2025-04-07 DIAGNOSIS — I26.99 BILATERAL PULMONARY EMBOLISM (HCC): ICD-10-CM

## 2025-04-07 DIAGNOSIS — I26.09 ACUTE PULMONARY EMBOLISM WITH ACUTE COR PULMONALE, UNSPECIFIED PULMONARY EMBOLISM TYPE (HCC): ICD-10-CM

## 2025-04-07 DIAGNOSIS — C34.90 ADENOCARCINOMA OF LUNG, UNSPECIFIED LATERALITY (HCC): ICD-10-CM

## 2025-04-07 DIAGNOSIS — D53.9 MACROCYTIC ANEMIA: ICD-10-CM

## 2025-04-07 DIAGNOSIS — Z85.828 HISTORY OF NONMELANOMA SKIN CANCER: ICD-10-CM

## 2025-04-07 LAB
ALBUMIN SERPL BCP-MCNC: 3.3 G/DL (ref 3.2–4.9)
ALBUMIN/GLOB SERPL: 0.8 G/DL
ALP SERPL-CCNC: 99 U/L (ref 30–99)
ALT SERPL-CCNC: 12 U/L (ref 2–50)
ANION GAP SERPL CALC-SCNC: 13 MMOL/L (ref 7–16)
ANISOCYTOSIS BLD QL SMEAR: ABNORMAL
APPEARANCE UR: CLEAR
APTT PPP: 25.6 SEC (ref 24.7–36)
AST SERPL-CCNC: 24 U/L (ref 12–45)
BASOPHILS # BLD AUTO: 0 % (ref 0–1.8)
BASOPHILS # BLD: 0 K/UL (ref 0–0.12)
BILIRUB SERPL-MCNC: 0.7 MG/DL (ref 0.1–1.5)
BILIRUB UR QL STRIP.AUTO: NEGATIVE
BUN SERPL-MCNC: 20 MG/DL (ref 8–22)
BURR CELLS BLD QL SMEAR: NORMAL
CALCIUM ALBUM COR SERPL-MCNC: 9.1 MG/DL (ref 8.5–10.5)
CALCIUM SERPL-MCNC: 8.5 MG/DL (ref 8.5–10.5)
CHLORIDE SERPL-SCNC: 102 MMOL/L (ref 96–112)
CO2 SERPL-SCNC: 21 MMOL/L (ref 20–33)
COLOR UR: YELLOW
CREAT SERPL-MCNC: 1.6 MG/DL (ref 0.5–1.4)
D DIMER PPP IA.FEU-MCNC: 10.85 UG/ML (FEU) (ref 0–0.5)
EKG IMPRESSION: NORMAL
EOSINOPHIL # BLD AUTO: 0 K/UL (ref 0–0.51)
EOSINOPHIL NFR BLD: 0 % (ref 0–6.9)
ERYTHROCYTE [DISTWIDTH] IN BLOOD BY AUTOMATED COUNT: 56 FL (ref 35.9–50)
FLUAV RNA SPEC QL NAA+PROBE: NEGATIVE
FLUBV RNA SPEC QL NAA+PROBE: NEGATIVE
GFR SERPLBLD CREATININE-BSD FMLA CKD-EPI: 44 ML/MIN/1.73 M 2
GLOBULIN SER CALC-MCNC: 4.3 G/DL (ref 1.9–3.5)
GLUCOSE BLD STRIP.AUTO-MCNC: 186 MG/DL (ref 65–99)
GLUCOSE SERPL-MCNC: 118 MG/DL (ref 65–99)
GLUCOSE UR STRIP.AUTO-MCNC: NEGATIVE MG/DL
HCT VFR BLD AUTO: 39.4 % (ref 42–52)
HGB BLD-MCNC: 12.8 G/DL (ref 14–18)
INR PPP: 0.91 (ref 0.87–1.13)
KETONES UR STRIP.AUTO-MCNC: NEGATIVE MG/DL
LACTATE SERPL-SCNC: 2.9 MMOL/L (ref 0.5–2)
LACTATE SERPL-SCNC: 3.8 MMOL/L (ref 0.5–2)
LACTATE SERPL-SCNC: 3.9 MMOL/L (ref 0.5–2)
LACTATE SERPL-SCNC: 4.6 MMOL/L (ref 0.5–2)
LACTATE SERPL-SCNC: NORMAL MMOL/L (ref 0.5–2)
LEUKOCYTE ESTERASE UR QL STRIP.AUTO: NEGATIVE
LYMPHOCYTES # BLD AUTO: 0.83 K/UL (ref 1–4.8)
LYMPHOCYTES NFR BLD: 6.9 % (ref 22–41)
MACROCYTES BLD QL SMEAR: ABNORMAL
MANUAL DIFF BLD: NORMAL
MCH RBC QN AUTO: 29.4 PG (ref 27–33)
MCHC RBC AUTO-ENTMCNC: 32.5 G/DL (ref 32.3–36.5)
MCV RBC AUTO: 90.6 FL (ref 81.4–97.8)
MICRO URNS: NORMAL
MONOCYTES # BLD AUTO: 0.2 K/UL (ref 0–0.85)
MONOCYTES NFR BLD AUTO: 1.7 % (ref 0–13.4)
MYELOCYTES NFR BLD MANUAL: 0.9 %
NEUTROPHILS # BLD AUTO: 10.86 K/UL (ref 1.82–7.42)
NEUTROPHILS NFR BLD: 89.6 % (ref 44–72)
NEUTS BAND NFR BLD MANUAL: 0.9 % (ref 0–10)
NITRITE UR QL STRIP.AUTO: NEGATIVE
NRBC # BLD AUTO: 0 K/UL
NRBC BLD-RTO: 0 /100 WBC (ref 0–0.2)
NT-PROBNP SERPL IA-MCNC: 360 PG/ML (ref 0–125)
NT-PROBNP SERPL IA-MCNC: 436 PG/ML (ref 0–125)
OVALOCYTES BLD QL SMEAR: NORMAL
PH UR STRIP.AUTO: 6 [PH] (ref 5–8)
PLATELET # BLD AUTO: 347 K/UL (ref 164–446)
PLATELET BLD QL SMEAR: NORMAL
PMV BLD AUTO: 8.8 FL (ref 9–12.9)
POIKILOCYTOSIS BLD QL SMEAR: NORMAL
POTASSIUM SERPL-SCNC: 3.4 MMOL/L (ref 3.6–5.5)
PROT SERPL-MCNC: 7.6 G/DL (ref 6–8.2)
PROT UR QL STRIP: NEGATIVE MG/DL
PROTHROMBIN TIME: 12.6 SEC (ref 12–14.6)
RBC # BLD AUTO: 4.35 M/UL (ref 4.7–6.1)
RBC BLD AUTO: PRESENT
RBC UR QL AUTO: NEGATIVE
RSV RNA SPEC QL NAA+PROBE: POSITIVE
SARS-COV-2 RNA RESP QL NAA+PROBE: NOTDETECTED
SODIUM SERPL-SCNC: 136 MMOL/L (ref 135–145)
SP GR UR STRIP.AUTO: 1.02
SPECIMEN SOURCE: ABNORMAL
TROPONIN T SERPL-MCNC: 37 NG/L (ref 6–19)
TROPONIN T SERPL-MCNC: 39 NG/L (ref 6–19)
UFH PPP CHRO-ACNC: 0.7 IU/ML
UFH PPP CHRO-ACNC: <0.1 IU/ML
UROBILINOGEN UR STRIP.AUTO-MCNC: 1 EU/DL
WBC # BLD AUTO: 12 K/UL (ref 4.8–10.8)

## 2025-04-07 PROCEDURE — A9270 NON-COVERED ITEM OR SERVICE: HCPCS | Performed by: HOSPITALIST

## 2025-04-07 PROCEDURE — 0241U HCHG SARS-COV-2 COVID-19 NFCT DS RESP RNA 4 TRGT MIC: CPT

## 2025-04-07 PROCEDURE — 700105 HCHG RX REV CODE 258: Performed by: EMERGENCY MEDICINE

## 2025-04-07 PROCEDURE — 99223 1ST HOSP IP/OBS HIGH 75: CPT | Performed by: INTERNAL MEDICINE

## 2025-04-07 PROCEDURE — 87086 URINE CULTURE/COLONY COUNT: CPT

## 2025-04-07 PROCEDURE — 85007 BL SMEAR W/DIFF WBC COUNT: CPT

## 2025-04-07 PROCEDURE — 93005 ELECTROCARDIOGRAM TRACING: CPT | Mod: TC | Performed by: HOSPITALIST

## 2025-04-07 PROCEDURE — 83605 ASSAY OF LACTIC ACID: CPT | Mod: 91

## 2025-04-07 PROCEDURE — 36415 COLL VENOUS BLD VENIPUNCTURE: CPT

## 2025-04-07 PROCEDURE — 85027 COMPLETE CBC AUTOMATED: CPT

## 2025-04-07 PROCEDURE — 85379 FIBRIN DEGRADATION QUANT: CPT

## 2025-04-07 PROCEDURE — 83880 ASSAY OF NATRIURETIC PEPTIDE: CPT | Mod: 91

## 2025-04-07 PROCEDURE — 80053 COMPREHEN METABOLIC PANEL: CPT

## 2025-04-07 PROCEDURE — 99223 1ST HOSP IP/OBS HIGH 75: CPT | Mod: AI | Performed by: HOSPITALIST

## 2025-04-07 PROCEDURE — 94640 AIRWAY INHALATION TREATMENT: CPT

## 2025-04-07 PROCEDURE — 700101 HCHG RX REV CODE 250: Performed by: EMERGENCY MEDICINE

## 2025-04-07 PROCEDURE — 82962 GLUCOSE BLOOD TEST: CPT

## 2025-04-07 PROCEDURE — 94760 N-INVAS EAR/PLS OXIMETRY 1: CPT

## 2025-04-07 PROCEDURE — 87040 BLOOD CULTURE FOR BACTERIA: CPT | Mod: 91

## 2025-04-07 PROCEDURE — 85520 HEPARIN ASSAY: CPT | Mod: 91

## 2025-04-07 PROCEDURE — 84484 ASSAY OF TROPONIN QUANT: CPT

## 2025-04-07 PROCEDURE — 93010 ELECTROCARDIOGRAM REPORT: CPT | Performed by: INTERNAL MEDICINE

## 2025-04-07 PROCEDURE — 71275 CT ANGIOGRAPHY CHEST: CPT

## 2025-04-07 PROCEDURE — 81003 URINALYSIS AUTO W/O SCOPE: CPT

## 2025-04-07 PROCEDURE — 99285 EMERGENCY DEPT VISIT HI MDM: CPT

## 2025-04-07 PROCEDURE — 85610 PROTHROMBIN TIME: CPT

## 2025-04-07 PROCEDURE — 770020 HCHG ROOM/CARE - TELE (206)

## 2025-04-07 PROCEDURE — 700102 HCHG RX REV CODE 250 W/ 637 OVERRIDE(OP): Performed by: HOSPITALIST

## 2025-04-07 PROCEDURE — 94664 DEMO&/EVAL PT USE INHALER: CPT

## 2025-04-07 PROCEDURE — 700102 HCHG RX REV CODE 250 W/ 637 OVERRIDE(OP): Performed by: EMERGENCY MEDICINE

## 2025-04-07 PROCEDURE — 96365 THER/PROPH/DIAG IV INF INIT: CPT

## 2025-04-07 PROCEDURE — A9270 NON-COVERED ITEM OR SERVICE: HCPCS | Performed by: EMERGENCY MEDICINE

## 2025-04-07 PROCEDURE — 96366 THER/PROPH/DIAG IV INF ADDON: CPT

## 2025-04-07 PROCEDURE — 85730 THROMBOPLASTIN TIME PARTIAL: CPT

## 2025-04-07 PROCEDURE — 700111 HCHG RX REV CODE 636 W/ 250 OVERRIDE (IP): Mod: JZ | Performed by: EMERGENCY MEDICINE

## 2025-04-07 PROCEDURE — 700117 HCHG RX CONTRAST REV CODE 255: Performed by: EMERGENCY MEDICINE

## 2025-04-07 PROCEDURE — 96367 TX/PROPH/DG ADDL SEQ IV INF: CPT

## 2025-04-07 PROCEDURE — 96375 TX/PRO/DX INJ NEW DRUG ADDON: CPT

## 2025-04-07 RX ORDER — OXYCODONE HYDROCHLORIDE 10 MG/1
10 TABLET ORAL
Refills: 0 | Status: DISCONTINUED | OUTPATIENT
Start: 2025-04-07 | End: 2025-04-10 | Stop reason: HOSPADM

## 2025-04-07 RX ORDER — VITAMIN B COMPLEX
1000 TABLET ORAL DAILY
Status: DISCONTINUED | OUTPATIENT
Start: 2025-04-07 | End: 2025-04-10 | Stop reason: HOSPADM

## 2025-04-07 RX ORDER — HEPARIN SODIUM 5000 [USP'U]/100ML
0-30 INJECTION, SOLUTION INTRAVENOUS CONTINUOUS
Status: DISCONTINUED | OUTPATIENT
Start: 2025-04-07 | End: 2025-04-09

## 2025-04-07 RX ORDER — PREDNISONE 10 MG/1
10 TABLET ORAL DAILY
Status: ON HOLD | COMMUNITY
End: 2025-04-10

## 2025-04-07 RX ORDER — AMOXICILLIN 250 MG
2 CAPSULE ORAL EVERY EVENING
Status: DISCONTINUED | OUTPATIENT
Start: 2025-04-07 | End: 2025-04-10 | Stop reason: HOSPADM

## 2025-04-07 RX ORDER — HEPARIN SODIUM 1000 [USP'U]/ML
40 INJECTION, SOLUTION INTRAVENOUS; SUBCUTANEOUS PRN
Status: DISCONTINUED | OUTPATIENT
Start: 2025-04-07 | End: 2025-04-09

## 2025-04-07 RX ORDER — FENTANYL 25 UG/1
1 PATCH TRANSDERMAL
Refills: 0 | Status: DISCONTINUED | OUTPATIENT
Start: 2025-04-07 | End: 2025-04-10 | Stop reason: HOSPADM

## 2025-04-07 RX ORDER — POLYETHYLENE GLYCOL 3350 17 G/17G
1 POWDER, FOR SOLUTION ORAL
Status: DISCONTINUED | OUTPATIENT
Start: 2025-04-07 | End: 2025-04-10 | Stop reason: HOSPADM

## 2025-04-07 RX ORDER — HEPARIN SODIUM 1000 [USP'U]/ML
80 INJECTION, SOLUTION INTRAVENOUS; SUBCUTANEOUS ONCE
Status: COMPLETED | OUTPATIENT
Start: 2025-04-07 | End: 2025-04-07

## 2025-04-07 RX ORDER — ONDANSETRON 2 MG/ML
4 INJECTION INTRAMUSCULAR; INTRAVENOUS EVERY 4 HOURS PRN
Status: DISCONTINUED | OUTPATIENT
Start: 2025-04-07 | End: 2025-04-10 | Stop reason: HOSPADM

## 2025-04-07 RX ORDER — OXYCODONE HYDROCHLORIDE 5 MG/1
5 TABLET ORAL
Refills: 0 | Status: DISCONTINUED | OUTPATIENT
Start: 2025-04-07 | End: 2025-04-10 | Stop reason: HOSPADM

## 2025-04-07 RX ORDER — FOLIC ACID 1 MG/1
1 TABLET ORAL DAILY
Status: DISCONTINUED | OUTPATIENT
Start: 2025-04-08 | End: 2025-04-10 | Stop reason: HOSPADM

## 2025-04-07 RX ORDER — TAMSULOSIN HYDROCHLORIDE 0.4 MG/1
0.4 CAPSULE ORAL 2 TIMES DAILY
Status: DISCONTINUED | OUTPATIENT
Start: 2025-04-07 | End: 2025-04-10 | Stop reason: HOSPADM

## 2025-04-07 RX ORDER — POTASSIUM CHLORIDE 1500 MG/1
20 TABLET, EXTENDED RELEASE ORAL DAILY
Status: DISCONTINUED | OUTPATIENT
Start: 2025-04-07 | End: 2025-04-10 | Stop reason: HOSPADM

## 2025-04-07 RX ORDER — AZITHROMYCIN 250 MG/1
500 TABLET, FILM COATED ORAL ONCE
Status: COMPLETED | OUTPATIENT
Start: 2025-04-07 | End: 2025-04-07

## 2025-04-07 RX ORDER — HYDROCORTISONE 10 MG/1
20 TABLET ORAL 2 TIMES DAILY
Status: DISCONTINUED | OUTPATIENT
Start: 2025-04-07 | End: 2025-04-10 | Stop reason: HOSPADM

## 2025-04-07 RX ORDER — GABAPENTIN 100 MG/1
100 CAPSULE ORAL EVERY EVENING
Status: DISCONTINUED | OUTPATIENT
Start: 2025-04-07 | End: 2025-04-10 | Stop reason: HOSPADM

## 2025-04-07 RX ORDER — DOXYCYCLINE HYCLATE 100 MG
100 TABLET ORAL 2 TIMES DAILY
Status: ON HOLD | COMMUNITY
End: 2025-04-10

## 2025-04-07 RX ORDER — AMLODIPINE BESYLATE 5 MG/1
5 TABLET ORAL EVERY MORNING
Status: DISCONTINUED | OUTPATIENT
Start: 2025-04-07 | End: 2025-04-10 | Stop reason: HOSPADM

## 2025-04-07 RX ORDER — ALBUTEROL SULFATE 5 MG/ML
2.5 SOLUTION RESPIRATORY (INHALATION)
Status: COMPLETED | OUTPATIENT
Start: 2025-04-07 | End: 2025-04-07

## 2025-04-07 RX ORDER — LEVOTHYROXINE SODIUM 125 UG/1
125 TABLET ORAL
Status: DISCONTINUED | OUTPATIENT
Start: 2025-04-07 | End: 2025-04-10 | Stop reason: HOSPADM

## 2025-04-07 RX ORDER — SODIUM CHLORIDE, SODIUM LACTATE, POTASSIUM CHLORIDE, AND CALCIUM CHLORIDE .6; .31; .03; .02 G/100ML; G/100ML; G/100ML; G/100ML
1000 INJECTION, SOLUTION INTRAVENOUS ONCE
Status: COMPLETED | OUTPATIENT
Start: 2025-04-07 | End: 2025-04-07

## 2025-04-07 RX ORDER — LABETALOL HYDROCHLORIDE 5 MG/ML
10 INJECTION, SOLUTION INTRAVENOUS EVERY 4 HOURS PRN
Status: DISCONTINUED | OUTPATIENT
Start: 2025-04-07 | End: 2025-04-10 | Stop reason: HOSPADM

## 2025-04-07 RX ORDER — LIOTHYRONINE SODIUM 5 UG/1
10 TABLET ORAL DAILY
Status: DISCONTINUED | OUTPATIENT
Start: 2025-04-07 | End: 2025-04-10 | Stop reason: HOSPADM

## 2025-04-07 RX ORDER — GUAIFENESIN 600 MG/1
600 TABLET, EXTENDED RELEASE ORAL EVERY 12 HOURS
Status: DISCONTINUED | OUTPATIENT
Start: 2025-04-07 | End: 2025-04-10 | Stop reason: HOSPADM

## 2025-04-07 RX ORDER — HYDROMORPHONE HYDROCHLORIDE 1 MG/ML
0.5 INJECTION, SOLUTION INTRAMUSCULAR; INTRAVENOUS; SUBCUTANEOUS
Status: DISCONTINUED | OUTPATIENT
Start: 2025-04-07 | End: 2025-04-10 | Stop reason: HOSPADM

## 2025-04-07 RX ORDER — ACETAMINOPHEN 325 MG/1
650 TABLET ORAL EVERY 6 HOURS PRN
Status: DISCONTINUED | OUTPATIENT
Start: 2025-04-07 | End: 2025-04-10 | Stop reason: HOSPADM

## 2025-04-07 RX ORDER — ALBUTEROL SULFATE 90 UG/1
2 INHALANT RESPIRATORY (INHALATION) EVERY 4 HOURS PRN
Status: DISCONTINUED | OUTPATIENT
Start: 2025-04-07 | End: 2025-04-10 | Stop reason: HOSPADM

## 2025-04-07 RX ORDER — ATORVASTATIN CALCIUM 20 MG/1
20 TABLET, FILM COATED ORAL EVERY EVENING
Status: DISCONTINUED | OUTPATIENT
Start: 2025-04-07 | End: 2025-04-10 | Stop reason: HOSPADM

## 2025-04-07 RX ORDER — OMEPRAZOLE 20 MG/1
40 CAPSULE, DELAYED RELEASE ORAL DAILY
Status: DISCONTINUED | OUTPATIENT
Start: 2025-04-07 | End: 2025-04-10 | Stop reason: HOSPADM

## 2025-04-07 RX ORDER — SILODOSIN 8 MG/1
8 CAPSULE ORAL
Status: DISCONTINUED | OUTPATIENT
Start: 2025-04-07 | End: 2025-04-07

## 2025-04-07 RX ORDER — ONDANSETRON 4 MG/1
4 TABLET, ORALLY DISINTEGRATING ORAL EVERY 4 HOURS PRN
Status: DISCONTINUED | OUTPATIENT
Start: 2025-04-07 | End: 2025-04-10 | Stop reason: HOSPADM

## 2025-04-07 RX ADMIN — CEFTRIAXONE SODIUM 2000 MG: 2 INJECTION, POWDER, FOR SOLUTION INTRAMUSCULAR; INTRAVENOUS at 11:58

## 2025-04-07 RX ADMIN — IOHEXOL 62 ML: 350 INJECTION, SOLUTION INTRAVENOUS at 12:43

## 2025-04-07 RX ADMIN — OMEPRAZOLE 40 MG: 20 CAPSULE, DELAYED RELEASE ORAL at 18:21

## 2025-04-07 RX ADMIN — ATORVASTATIN CALCIUM 20 MG: 20 TABLET, FILM COATED ORAL at 18:21

## 2025-04-07 RX ADMIN — IPRATROPIUM BROMIDE 0.5 MG: 0.5 SOLUTION RESPIRATORY (INHALATION) at 11:16

## 2025-04-07 RX ADMIN — AZITHROMYCIN DIHYDRATE 500 MG: 250 TABLET ORAL at 12:01

## 2025-04-07 RX ADMIN — TAMSULOSIN HYDROCHLORIDE 0.4 MG: 0.4 CAPSULE ORAL at 18:20

## 2025-04-07 RX ADMIN — ALBUTEROL SULFATE 2.5 MG: 2.5 SOLUTION RESPIRATORY (INHALATION) at 11:16

## 2025-04-07 RX ADMIN — LIOTHYRONINE SODIUM 10 MCG: 5 TABLET ORAL at 18:20

## 2025-04-07 RX ADMIN — Medication 1000 UNITS: at 18:21

## 2025-04-07 RX ADMIN — HEPARIN SODIUM 5600 UNITS: 1000 INJECTION, SOLUTION INTRAVENOUS; SUBCUTANEOUS at 13:31

## 2025-04-07 RX ADMIN — OXYCODONE HYDROCHLORIDE 5 MG: 5 TABLET ORAL at 18:55

## 2025-04-07 RX ADMIN — POTASSIUM CHLORIDE 20 MEQ: 1500 TABLET, EXTENDED RELEASE ORAL at 18:21

## 2025-04-07 RX ADMIN — GUAIFENESIN 600 MG: 600 TABLET, EXTENDED RELEASE ORAL at 18:20

## 2025-04-07 RX ADMIN — SODIUM CHLORIDE, POTASSIUM CHLORIDE, SODIUM LACTATE AND CALCIUM CHLORIDE 1000 ML: 600; 310; 30; 20 INJECTION, SOLUTION INTRAVENOUS at 11:56

## 2025-04-07 RX ADMIN — HEPARIN SODIUM 18 UNITS/KG/HR: 5000 INJECTION, SOLUTION INTRAVENOUS at 13:31

## 2025-04-07 RX ADMIN — HYDROCORTISONE 20 MG: 10 TABLET ORAL at 18:20

## 2025-04-07 ASSESSMENT — ENCOUNTER SYMPTOMS
VOMITING: 0
HEMOPTYSIS: 0
SEIZURES: 0
WHEEZING: 0
NERVOUS/ANXIOUS: 0
DOUBLE VISION: 0
BRUISES/BLEEDS EASILY: 0
CONSTIPATION: 0
SHORTNESS OF BREATH: 1
FOCAL WEAKNESS: 0
EYES NEGATIVE: 1
CARDIOVASCULAR NEGATIVE: 1
HEADACHES: 0
LOSS OF CONSCIOUSNESS: 0
ABDOMINAL PAIN: 0
BLOOD IN STOOL: 0
DIAPHORESIS: 0
COUGH: 1
MUSCULOSKELETAL NEGATIVE: 1
NEUROLOGICAL NEGATIVE: 1
WEIGHT LOSS: 1
DIARRHEA: 0
DIZZINESS: 0
FEVER: 0
PALPITATIONS: 0
NAUSEA: 0
HEARTBURN: 0
CHILLS: 0
GASTROINTESTINAL NEGATIVE: 1
PSYCHIATRIC NEGATIVE: 1

## 2025-04-07 ASSESSMENT — LIFESTYLE VARIABLES
EVER HAD A DRINK FIRST THING IN THE MORNING TO STEADY YOUR NERVES TO GET RID OF A HANGOVER: NO
HAVE PEOPLE ANNOYED YOU BY CRITICIZING YOUR DRINKING: NO
AVERAGE NUMBER OF DAYS PER WEEK YOU HAVE A DRINK CONTAINING ALCOHOL: 1
HAVE YOU EVER FELT YOU SHOULD CUT DOWN ON YOUR DRINKING: NO
CONSUMPTION TOTAL: NEGATIVE
TOTAL SCORE: 0
DOES PATIENT WANT TO STOP DRINKING: NO
TOTAL SCORE: 0
ON A TYPICAL DAY WHEN YOU DRINK ALCOHOL HOW MANY DRINKS DO YOU HAVE: 1
ALCOHOL_USE: YES
EVER FELT BAD OR GUILTY ABOUT YOUR DRINKING: NO
HOW MANY TIMES IN THE PAST YEAR HAVE YOU HAD 5 OR MORE DRINKS IN A DAY: 0
TOTAL SCORE: 0

## 2025-04-07 ASSESSMENT — PAIN DESCRIPTION - PAIN TYPE
TYPE: ACUTE PAIN
TYPE: ACUTE PAIN

## 2025-04-07 ASSESSMENT — FIBROSIS 4 INDEX
FIB4 SCORE: 1.56
FIB4 SCORE: 1.97

## 2025-04-07 NOTE — ED PROVIDER NOTES
ED Provider Note    Scribed for Derrick Vaca M.D. by Derrick Vaca M.D.. 4/7/2025  10:46 AM    Primary care provider: Lesly Munoz M.D.  Means of arrival: EMS    CHIEF COMPLAINT  Chief Complaint   Patient presents with    Shortness of Breath       EXTERNAL RECORDS REVIEWED  Clinic note from March 31 reviewed for hospital follow-up after admission for sepsis influenza A hypoxic respiratory failure and pneumonia.  Was discharged home from the hospital on room air. Normal echocardiogram in 2021. Latest CT angiogram from March 2025 revealed left basilar pneumonia and effusions but no PE.     BROOKLYN Berrios [facility] is a 80 y.o. male with a history of lung cancer who presents to the Emergency Department for worsening shortness of breath onset 6 days ago. The patient reports that he started having shortness of breath 6 weeks ago when he got the flu. He was discharged from the hospital 12 days ago without oxygen after being admitted for pneumonia and influenza. He explains that his shortness of breath was initially improved but it started to worsen again 6 days ago. He saw his PCP yesterday and was started on Augmentin which provided no alleviation. Then last night his shortness of breath worsened dramatically prompting him to call for EMS. EMS notes that the patient was hypoxic at 85% upon evaluation. He was treated with two nebulizer treatments and a dose of solu-medrol en route. He was placed on 4L of oxygen and his saturation is up to 93%. The patient describes walking only about 10 feet before becoming short of breath. He has associated cough. He adds that he has an inhaler at home that has provided minimal alleviation. Denies fever, vomiting, dysuria, leg swelling, or blood in his stool. He notes that he had diarrhea but it has since resolved. The patient has a history of lung cancer and has been treated with surgery and radiation with minimal success. He is currently between cancer treatments.  "Recent biopsy testing revealed that the cancer has returned to the \"fluid\" near his lungs. Patient confirms history of immunocompromise. Denies history of smoking, blood clots, diabetes, or PE.     LIMITATION TO HISTORY   None    OUTSIDE HISTORIAN(S):  EMS provides history of patient's SPO2 and treatment en route.     REVIEW OF SYSTEMS  Pertinent positives include: cough, shortness of breath.  Pertinent negatives include: fever, dysuria, vomiting, leg swelling, blood in his stool, or diarrhea.      PAST MEDICAL HISTORY  Past Medical History:   Diagnosis Date    Adrenal insufficiency (HCC), secondary 11/25/2020    Arrhythmia     Arthritis     Atherosclerosis of both carotid arteries, mild 11/25/2020    BPH (benign prostatic hyperplasia) 11/25/2020    Bronchitis     a\" very long time ago\"    Cancer (HCC)     lung cancer, melanoma    Carcinoma in situ of respiratory system     CKD (chronic kidney disease) stage 3, GFR 30-59 ml/min 11/25/2020    Colostomy present (HCC) 11/25/2020    Diverticulosis of colon 11/25/2020    GERD (gastroesophageal reflux disease) 11/25/2020    High cholesterol     History of atrial tachycardia 11/25/2020    Ablation 2017    History of malignant melanoma, April 2016 11/25/2020    History of shingles 11/25/2020    History of stroke, subcortical infarct on MRI April 2019 11/25/2020    Hyperlipidemia 11/25/2020    Hypertension     Hypothyroid 11/25/2020    Indigestion     Lung cancer (HCC), adenocarcinoma Aug. 2019 11/25/2020    Metastasis to L5 (biopsy Dec. 2019)    Pain     back       FAMILY HISTORY  Family History   Problem Relation Age of Onset    Cancer Mother         Lung- bone    Cancer Father         Colon?    Cancer Sister         Lung    Cancer Brother         Lung    Cancer Brother         Brain       SOCIAL HISTORY  Social History     Tobacco Use    Smoking status: Never    Smokeless tobacco: Never   Vaping Use    Vaping status: Never Used   Substance Use Topics    Alcohol use: Not " "Currently     Alcohol/week: 1.8 oz     Types: 3 Glasses of wine per week     Comment: Occasionally    Drug use: Not Currently     Comment: THC edibles     Social History     Substance and Sexual Activity   Drug Use Not Currently    Comment: THC edibles       SURGICAL HISTORY  Past Surgical History:   Procedure Laterality Date    CECILE BY LAPAROSCOPY  2/26/2021    Procedure: CHOLECYSTECTOMY, LAPAROSCOPIC;  Surgeon: Davey Oneal M.D.;  Location: SURGERY [facility];  Service: General    OTHER Right 2019    LOWER LOBE OF LUNG REMOVED    OTHER Left 2017    GROIN    OTHER Left 2016    SHIN       CURRENT MEDICATIONS  Current Outpatient Medications   Medication Instructions    acetaminophen (TYLENOL) 1,000 mg, EVERY 6 HOURS PRN    albuterol 108 (90 Base) MCG/ACT Aero Soln inhalation aerosol Inhale 2 Puffs by mouth every four hours as needed for Shortness of Breath for up to 30 days.    amLODIPine (NORVASC) 5 mg, Oral, EVERY MORNING    Ascorbic Acid (VITAMIN C PO) 1 Tablet, Oral, SEE ADMIN INSTRUCTIONS, Pt takes sporidially     atorvastatin (LIPITOR) 20 mg, Oral, DAILY    benzonatate (TESSALON) 100 mg, Oral, 3 TIMES DAILY PRN    Cyanocobalamin (B-12 SL) 1 Tablet, Sublingual, DAILY    fentaNYL (DURAGESIC) 12 MCG/HR PATCH 72 HR 1 Patch, Transdermal, EVERY 72 HOURS    fentaNYL (DURAGESIC) 25 MCG/HR PATCH 72 HR 1 Patch, Transdermal, EVERY 72 HOURS    folic acid (FOLVITE) 1 mg, Oral, DAILY    gabapentin (NEURONTIN) 100 mg, Oral, EVERY EVENING    guaiFENesin ER (MUCINEX) 600 mg, Oral, EVERY 12 HOURS    HYDROcodone homatropine 5-1.5 mg/5 mL Solution 5 mL, Oral, EVERY 6 HOURS PRN    hydrocortisone (CORTEF) 20 mg, Oral, 2 TIMES DAILY    liothyronine (CYTOMEL) 10 mcg, DAILY    MAGNESIUM PO 2 Capsules, Oral, EVERY EVENING, (OTC)    naproxen (ALEVE) 220 mg, 2 TIMES DAILY PRN    NEEDLE, DISP, 25 G (BD DISP NEEDLES) 25G X 5/8\" Misc Use as directed for testosterone injections every 7 days    omeprazole (PRILOSEC) 40 mg, Oral, DAILY "    potassium chloride (KLOR-CON) 8 MEQ tablet 16 mEq, Oral, EVERY DAY    silodosin (RAPAFLO) 8 mg, AFTER BREAKFAST    tadalafil 20 mg, 1 TIME DAILY PRN    Tagrisso 80 mg, DAILY    tamsulosin (FLOMAX) 0.8 mg, Oral, DAILY    testosterone cypionate (DEPO-TESTOSTERONE) 124 mg, EVERY 7 DAYS    Tirosint 125 mcg, EACH MORNING ON EMPTY STOMACH        ALLERGIES  Allergies   Allergen Reactions    Gramineae Pollens Itching and Shortness of Breath     Itchy eyes, red face    Cat Hair Extract Hives and Itching    Horse Allergy Hives    Other Environmental     Pollen Extract Runny Nose and Itching     .       PHYSICAL EXAM  VITAL SIGNS: /81   Pulse (!) 109   Temp 36.7 °C (98 °F) (Temporal)   Resp (!) 23   Ht 1.829 m (6')   Wt 70.3 kg (155 lb)   SpO2 93%   BMI 21.02 kg/m²   Reviewed and tachycardic afebrile tachypneic  Constitutional: Well developed, Well nourished, Mild distress.   HENT: Normocephalic, atraumatic, bilateral external ears normal, No intraoral erythema, edema, exudate  Eyes: PERRLA, conjunctiva pink, no scleral icterus.   Cardiovascular: Tachycardic rate and rhythm. No murmurs, rubs or gallops.  No dependent edema or calf tenderness  Respiratory: Dyspneic. Tachypneic. Occasional rhonchorous sounds. No wheezes or rales.  Abdominal:  Abdomen soft, non-tender, non distended. No rebound, or guarding.    Skin: No erythema, no rash. No wounds or bruising.  Genitourinary: No costovertebral angle tenderness.   Musculoskeletal: no deformities.   Neurologic: Alert & oriented x 3, cranial nerves 2-12 intact by passive exam.  No focal deficit noted.  Psychiatric: Affect normal, Judgment normal, Mood normal.     LABS Ordered and Reviewed by Me:  Results for orders placed or performed during the hospital encounter of 04/07/25   Lactic Acid    Collection Time: 04/07/25 11:00 AM   Result Value Ref Range    Lactic Acid 2.9 (H) 0.5 - 2.0 mmol/L   Lactic Acid    Collection Time: 04/07/25 11:00 AM   Result Value Ref Range     Lactic Acid RR 0.5 - 2.0 mmol/L   Complete Metabolic Panel    Collection Time: 04/07/25 11:00 AM   Result Value Ref Range    Sodium 136 135 - 145 mmol/L    Potassium 3.4 (L) 3.6 - 5.5 mmol/L    Chloride 102 96 - 112 mmol/L    Co2 21 20 - 33 mmol/L    Anion Gap 13.0 7.0 - 16.0    Glucose 118 (H) 65 - 99 mg/dL    Bun 20 8 - 22 mg/dL    Creatinine 1.60 (H) 0.50 - 1.40 mg/dL    Calcium 8.5 8.5 - 10.5 mg/dL    Correct Calcium 9.1 8.5 - 10.5 mg/dL    AST(SGOT) 24 12 - 45 U/L    ALT(SGPT) 12 2 - 50 U/L    Alkaline Phosphatase 99 30 - 99 U/L    Total Bilirubin 0.7 0.1 - 1.5 mg/dL    Albumin 3.3 3.2 - 4.9 g/dL    Total Protein 7.6 6.0 - 8.2 g/dL    Globulin 4.3 (H) 1.9 - 3.5 g/dL    A-G Ratio 0.8 g/dL   Blood Culture - Draw one from central line and one from peripheral site    Collection Time: 04/07/25 11:00 AM    Specimen: Line; Blood   Result Value Ref Range    Significant Indicator NEG     Source BLD     Site LINE     Culture Result       No Growth  Note: Blood cultures are incubated for 5 days and  are monitored continuously.Positive blood cultures  are called to the RN and reported as soon as  they are identified.  Blood culture testing and Gram stain, if indicated, are  performed at [facility],  [address] [place] [address].  Positive blood  cultures are sent to [facility],  [address] [place] [address], for organism identification  and susceptibility testing.     proBrain Natriuretic Peptide, NT    Collection Time: 04/07/25 11:00 AM   Result Value Ref Range    NT-proBNP 360 (H) 0 - 125 pg/mL   D-DIMER    Collection Time: 04/07/25 11:00 AM   Result Value Ref Range    D-Dimer 10.85 (H) 0.00 - 0.50 ug/mL (FEU)   ESTIMATED GFR    Collection Time: 04/07/25 11:00 AM   Result Value Ref Range    GFR (CKD-EPI) 44 (A) >60 mL/min/1.73 m 2   CBC WITH DIFFERENTIAL    Collection Time: 04/07/25 11:00 AM   Result Value Ref Range    WBC 12.0 (H) 4.8 - 10.8 K/uL    RBC 4.35 (L)  4.70 - 6.10 M/uL    Hemoglobin 12.8 (L) 14.0 - 18.0 g/dL    Hematocrit 39.4 (L) 42.0 - 52.0 %    MCV 90.6 81.4 - 97.8 fL    MCH 29.4 27.0 - 33.0 pg    MCHC 32.5 32.3 - 36.5 g/dL    RDW 56.0 (H) 35.9 - 50.0 fL    Platelet Count 347 164 - 446 K/uL    MPV 8.8 (L) 9.0 - 12.9 fL    Neutrophils-Polys 89.60 (H) 44.00 - 72.00 %    Lymphocytes 6.90 (L) 22.00 - 41.00 %    Monocytes 1.70 0.00 - 13.40 %    Eosinophils 0.00 0.00 - 6.90 %    Basophils 0.00 0.00 - 1.80 %    Nucleated RBC 0.00 0.00 - 0.20 /100 WBC    Neutrophils (Absolute) 10.86 (H) 1.82 - 7.42 K/uL    Lymphs (Absolute) 0.83 (L) 1.00 - 4.80 K/uL    Monos (Absolute) 0.20 0.00 - 0.85 K/uL    Eos (Absolute) 0.00 0.00 - 0.51 K/uL    Baso (Absolute) 0.00 0.00 - 0.12 K/uL    NRBC (Absolute) 0.00 K/uL    Anisocytosis 1+     Macrocytosis 1+    DIFFERENTIAL MANUAL    Collection Time: 04/07/25 11:00 AM   Result Value Ref Range    Bands-Stabs 0.90 0.00 - 10.00 %    Myelocytes 0.90 %    Manual Diff Status PERFORMED    PLATELET ESTIMATE    Collection Time: 04/07/25 11:00 AM   Result Value Ref Range    Plt Estimation Normal    MORPHOLOGY    Collection Time: 04/07/25 11:00 AM   Result Value Ref Range    RBC Morphology Present     Poikilocytosis 2+     Ovalocytes 1+     Echinocytes 2+    APTT    Collection Time: 04/07/25 11:00 AM   Result Value Ref Range    APTT 25.6 24.7 - 36.0 sec   Prothrombin Time    Collection Time: 04/07/25 11:00 AM   Result Value Ref Range    PT 12.6 12.0 - 14.6 sec    INR 0.91 0.87 - 1.13   Heparin Anti-Xa    Collection Time: 04/07/25 11:00 AM   Result Value Ref Range    Heparin Xa (UFH) <0.10 IU/mL   Blood Culture - Draw one from central line and one from peripheral site    Collection Time: 04/07/25 11:15 AM    Specimen: Peripheral; Blood   Result Value Ref Range    Significant Indicator NEG     Source BLD     Site PERIPHERAL     Culture Result       No Growth  Note: Blood cultures are incubated for 5 days and  are monitored continuously.Positive blood  cultures  are called to the RN and reported as soon as  they are identified.  Blood culture testing and Gram stain, if indicated, are  performed at [facility],  [address] [place] [address].  Positive blood  cultures are sent to [facility],  [address] [place] [address], for organism identification  and susceptibility testing.     CoV-2, Flu A/B, And RSV by PCR ([employer])    Collection Time: 04/07/25 11:55 AM    Specimen: Respirate   Result Value Ref Range    Influenza virus A RNA Negative Negative    Influenza virus B, PCR Negative Negative    RSV, PCR POSITIVE (A) Negative    SARS-CoV-2 by PCR NotDetected     SARS-CoV-2 Source Nasal Swab    Urinalysis    Collection Time: 04/07/25 12:50 PM    Specimen: Blood   Result Value Ref Range    Color Yellow     Character Clear     Specific Gravity 1.016 <1.035    Ph 6.0 5.0 - 8.0    Glucose Negative Negative mg/dL    Ketones Negative Negative mg/dL    Protein Negative Negative mg/dL    Bilirubin Negative Negative    Urobilinogen, Urine 1.0 <=1.0 EU/dL    Nitrite Negative Negative    Leukocyte Esterase Negative Negative    Occult Blood Negative Negative    Micro Urine Req see below    Lactic Acid    Collection Time: 04/07/25  1:15 PM   Result Value Ref Range    Lactic Acid 4.6 (HH) 0.5 - 2.0 mmol/L   TROPONIN    Collection Time: 04/07/25  5:15 PM   Result Value Ref Range    Troponin T 37 (H) 6 - 19 ng/L   proBrain Natriuretic Peptide, NT    Collection Time: 04/07/25  5:15 PM   Result Value Ref Range    NT-proBNP 436 (H) 0 - 125 pg/mL   LACTIC ACID    Collection Time: 04/07/25  5:15 PM   Result Value Ref Range    Lactic Acid 3.8 (H) 0.5 - 2.0 mmol/L   LACTIC ACID    Collection Time: 04/07/25  6:53 PM   Result Value Ref Range    Lactic Acid 3.9 (H) 0.5 - 2.0 mmol/L       Interpretations:    Pulse Oximetry: 84% on room air which I interpret is low    RADIOLOGY  I have independently interpreted the CT-Chest associated with this  visit demonstrating bilateral pulmonary embolisms.  I am awaiting the final reading from the radiologist.     Final Radiology Report  CT-CTA CHEST PULMONARY ARTERY W/ RECONS   Final Result      1.  Bilateral lobar and segmental pulmonary emboli. RV/LV ratio is elevated at 1.3 consistent with a component of right heart strain.      2.  Again seen volume loss and consolidation within the left lower lobe and atelectasis within the right lower lobe with bilateral, right greater than left pleural effusions.      3.  Atherosclerotic vascular calcification.      4.  Fatty liver.      5.  This was discussed with MIGNON URRUTIA at 12:56 PM on 4/7/2025.        Radiologist interpretation have been reviewed by me.     COURSE & MEDICAL DECISION MAKING  10:46 AM - Patient seen and examined at bedside.     INTERVENTIONS BY ME:  Medications   heparin infusion 25,000 units in 500 mL 0.45% NACL (18 Units/kg/hr × 70.3 kg Intravenous New Bag.(Insulin or Heparin) 4/7/25 1331)   heparin injection 2,800 Units (has no administration in time range)   albuterol (Proventil) 2.5mg/0.5ml nebulizer solution 2.5 mg (2.5 mg Nebulization Given 4/7/25 1116)   ipratropium (Atrovent) 0.02 % nebulizer solution 0.5 mg (0.5 mg Nebulization Given 4/7/25 1116)   LR (Bolus) infusion 1,000 mL (1,000 mL Intravenous New Bag 4/7/25 1156)   cefTRIAXone (Rocephin) 2,000 mg in  mL IVPB (0 mg Intravenous Stopped 4/7/25 1221)   azithromycin (Zithromax) tablet 500 mg (500 mg Oral Given 4/7/25 1201)   iohexol (OMNIPAQUE) 350 mg/mL (IV) (62 mL Intravenous Given 4/7/25 1243)   heparin injection 5,600 Units (5,600 Units Intravenous Given 4/7/25 1331)     12:58 PM - Patient was reevaluated at bedside. He is still having shortness of breath and only feels minimal improvement after breathing treatments. Discussed lab and radiology results with the patient and informed them that there are blood clots in both of his lungs. The patient had the opportunity to ask any  questions. The plan for hospitalization was discussed with the patient given their current presentation and diagnostic study results. The patient is understanding and agreeable to the plan for hospitalization.      2:08 PM I discussed the patient's case and the above findings with Dr. Bailey (Hospitalist) who agrees to evaluate the patient for hospitalization.     ASSESSMENT, COURSE AND PLAN:  PROBLEMS EVALUATED THIS VISIT:    This patient presents with a history of lung cancer, recent influenza A respiratory failure with dyspnea and hypoxia.  Recently his pleural effusion was found to contain malignant cells.  Here in the ER he was found to have acute PE with right heart strain tachycardia but no shock.  He will require admission and possible echocardiogram to determine whether he would benefit from thrombolysis.  He required immediate IV heparin.  There is no evidence of pneumonia.  There is no evidence of heart failure.  He did test positive for RSV which may be contributory.    Measures: Hydration: Based on the patient's presentation of Dehydration the patient was given IV fluids. IV Hydration was used because oral hydration was not adequate alone. Upon recheck following hydration, the patient was feeling improved.     DISPOSITION AND DISCUSSIONS    I have discussed management of the patient with the following physicians and sources:    Dr. Bailey (Hospitalist)    RISK:  High for IV heparin    DISPOSITION:  Patient will be hospitalized by Dr. Bailey (ICU Hospitalist) in critical condition.     Critical care warranted given Hypoxemic respiratory failure with PEs causing right heart strain requiring oxygen and immediate IV heparin.  Critical care time 35 minutes.    FINAL IMPRESSION  1. Acute pulmonary embolism with acute cor pulmonale, unspecified pulmonary embolism type (HCC)    2. Infection due to respiratory syncytial virus (RSV), unspecified infection type     CRITICALCARE       I, Oma Arthur (Peterson), nik  scribing for, and in the presence of, Derrick Vaca M.D..    Electronically signed by: Oma Arthur (Scribe), 4/7/2025    I, Derrick Vaca M.D. personally performed the services described in this documentation, as scribed by Oma Arthur in my presence, and it is both accurate and complete.     The note accurately reflects work and decisions made by me.  Derrick Vaca M.D.  4/7/2025  7:27 PM

## 2025-04-07 NOTE — CONSULTS
Pulmonary Consultation    Date of consult: 4/7/2025    Referring Physician  Eliazar Bailey M.D.    Reason for Consultation  Acute pulmonary embolism    History of Presenting Illness  80 y.o. male who presented 4/7/2025 with complaints of 6 weeks of dyspnea.  He has a past medical history of adenocarcinoma of the right lung status post wedge resection in 2019 with recurrence to the bone in 2021 treated with palliative radiation and tagrisso.  Oncology history further notable for metastatic melanoma of the left leg in 2016.  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      Recently hospitalized at [facility] for shortness of breath found to be influenza A +, treated with 5 days of Tamiflu.  Treated for suspected superimposed bacterial pneumonia as well.  CT at that time showed a right pleural effusion which thoracentesis on 3/20 demonstrated metastatic adenocarcinoma consistent with lung origin.    Over the last couple days has gotten worsening shortness of breath.  CT angiogram of the chest in the ED demonstrated bilateral pulmonary emboli.  Heart rate 106 on admission, now down to under 100.  Slightly hypertensive.  Requiring 4 L to maintain adequate saturations.  Hemoglobin 12.8.  Platelets WNL.  RSV positive.  CTA personally reviewed showing bilateral lobar and segmental pulmonary emboli with elevated RV/LV ratio.  Volume loss/consolidation within the left lower lobe and right greater than left pleural effusion.    TTE, troponin, and BNP pending    Code Status  Full Code    Review of Systems  Review of Systems   Constitutional:  Positive for malaise/fatigue and weight loss.   Respiratory:  Positive for shortness of breath.      Past Medical History   has a past medical history of Adrenal insufficiency (HCC), secondary (11/25/2020), Arrhythmia, Arthritis, Atherosclerosis of both carotid arteries, mild (11/25/2020), BPH (benign prostatic  hyperplasia) (11/25/2020), Bronchitis, Cancer (HCC), Carcinoma in situ of respiratory system, CKD (chronic kidney disease) stage 3, GFR 30-59 ml/min (11/25/2020), Colostomy present (HCC) (11/25/2020), Diverticulosis of colon (11/25/2020), GERD (gastroesophageal reflux disease) (11/25/2020), High cholesterol, History of atrial tachycardia (11/25/2020), History of malignant melanoma, April 2016 (11/25/2020), History of shingles (11/25/2020), History of stroke, subcortical infarct on MRI April 2019 (11/25/2020), Hyperlipidemia (11/25/2020), Hypertension, Hypothyroid (11/25/2020), Indigestion, Lung cancer (HCC), adenocarcinoma Aug. 2019 (11/25/2020), and Pain.    Surgical History   has a past surgical history that includes other (Left, 2016); other (Left, 2017); other (Right, 2019); and reid by laparoscopy (2/26/2021).    Family History  family history includes Cancer in his brother, brother, father, mother, and sister.    Social History   reports that he has never smoked. He has never used smokeless tobacco. He reports that he does not currently use alcohol after a past usage of about 1.8 oz of alcohol per week. He reports that he does not currently use drugs.    Medications  Home Medications       Reviewed by Eliazar Bailey M.D. (Physician) on 04/07/25 at 1409  Med List Status: Complete     Medication Last Dose Status   acetaminophen (TYLENOL) 500 MG Tab 3/17/2025 Active   albuterol 108 (90 Base) MCG/ACT Aero Soln inhalation aerosol 4/6/2025 Active   amLODIPine (NORVASC) 5 MG Tab 4/6/2025 Active   amoxicillin-clavulanate (AUGMENTIN) 875-125 MG Tab 3/30/2025 Active   Ascorbic Acid (VITAMIN C PO) 3/18/2025 Active   atorvastatin (LIPITOR) 20 MG Tab 4/6/2025 Active   benzonatate (TESSALON) 100 MG Cap 3/27/2025 Active   Cyanocobalamin (B-12 SL) 4/6/2025 Active   doxycycline (VIBRAMYCIN) 100 MG Tab 3/16/2025 Active   fentaNYL (DURAGESIC) 12 MCG/HR PATCH 72 HR Not Taking Active   fentaNYL (DURAGESIC) 25 MCG/HR PATCH 72 HR  "4/5/2025 Active   folic acid (FOLVITE) 1 MG Tab 4/6/2025 Active   gabapentin (NEURONTIN) 100 MG Cap 4/6/2025 Active   guaiFENesin ER (MUCINEX) 600 MG TABLET SR 12 HR New Rx Active   HYDROcodone homatropine 5-1.5 mg/5 mL Solution 4/6/2025 Active   hydrocortisone (CORTEF) 10 MG Tab 4/6/2025 Active   liothyronine (CYTOMEL) 5 MCG Tab 4/6/2025 Active   MAGNESIUM PO 4/6/2025 Active   naproxen (ALEVE) 220 MG tablet 3/16/2025 Active   NEEDLE, DISP, 25 G (BD DISP NEEDLES) 25G X 5/8\" Misc  Active   omeprazole (PRILOSEC) 40 MG delayed-release capsule 4/6/2025 Active   potassium chloride (KLOR-CON) 8 MEQ tablet 4/6/2025 Active   predniSONE (DELTASONE) 10 MG Tab 3/24/2025 Active   silodosin (RAPAFLO) 8 MG Cap capsule 4/6/2025 Active   tadalafil (CIALIS) 20 MG tablet 3/24/2025 Active   TAGRISSO 80 MG Tab 4/7/2025 Active   tamsulosin (FLOMAX) 0.4 MG capsule 4/6/2025 Active   testosterone cypionate (DEPO-TESTOSTERONE) 200 MG/ML Solution injection 3/23/2025 Active   TIROSINT 125 MCG Cap 4/6/2025 Active                  Audit from Redirected Encounters    **Home medications have not yet been reviewed for this encounter**       Current Facility-Administered Medications   Medication Dose Route Frequency Provider Last Rate Last Admin    heparin infusion 25,000 units in 500 mL 0.45% NACL  0-30 Units/kg/hr Intravenous Continuous Eliazar Bailey M.D. 25.3 mL/hr at 04/07/25 1331 18 Units/kg/hr at 04/07/25 1331    heparin injection 2,800 Units  40 Units/kg Intravenous PRN Eliazar Bailey M.D.        albuterol inhaler 2 Puff  2 Puff Inhalation Q4HRS PRN Eliazar Bailey M.D.        amLODIPine (Norvasc) tablet 5 mg  5 mg Oral QAM Eliazar Bailey M.D.        atorvastatin (Lipitor) tablet 20 mg  20 mg Oral Q EVENING Eliazar Bailey M.D.        fentaNYL (Duragesic) 25 MCG/HR 1 Patch  1 Patch Transdermal Q72HRS Eliazar Bailey M.D.        [START ON 4/8/2025] folic acid (Folvite) tablet 1 mg  1 mg Oral DAILY Eliazar Bailey M.D.        gabapentin " (Neurontin) capsule 100 mg  100 mg Oral Q EVENING Eliazar Bailey M.D.        guaiFENesin ER (Mucinex) tablet 600 mg  600 mg Oral Q12HRS Eliazar Bailey M.D.        hydrocortisone (Cortef) tablet 20 mg  20 mg Oral BID Eliazar Bailey M.D.        liothyronine (Cytomel) tablet 10 mcg  10 mcg Oral DAILY Eliazar Bailey M.D.        omeprazole (PriLOSEC) capsule 40 mg  40 mg Oral DAILY Eliazar Bailey M.D.        potassium chloride SA (Kdur) tablet 20 mEq  20 mEq Oral DAILY Eliazar Bailey M.D.        tamsulosin (Flomax) capsule 0.4 mg  0.4 mg Oral BID Eliazar Bailey M.D.        levothyroxine (Synthroid) tablet 125 mcg  125 mcg Oral AM ES Eliazar Bailey M.D.        Respiratory Therapy Consult   Nebulization Continuous RT Eliazar Bailey M.D.        acetaminophen (Tylenol) tablet 650 mg  650 mg Oral Q6HRS PRN Eliazar Bailey M.D.        Pharmacy Consult Request ...Pain Management Review 1 Each  1 Each Other PHARMACY TO DOSE Eliazar Bailey M.D.        oxyCODONE immediate-release (Roxicodone) tablet 5 mg  5 mg Oral Q3HRS PRN Eliazar Bailey M.D.        Or    oxyCODONE immediate release (Roxicodone) tablet 10 mg  10 mg Oral Q3HRS PRN Eliazar Bailey M.D.        Or    HYDROmorphone (Dilaudid) injection 0.5 mg  0.5 mg Intravenous Q3HRS PRN Eliazar Bailey M.D.        labetalol (Normodyne/Trandate) injection 10 mg  10 mg Intravenous Q4HRS PRN Eliazar Bailey M.D.        ondansetron (Zofran) syringe/vial injection 4 mg  4 mg Intravenous Q4HRS PRN Eliazar Bailey M.D.        Or    ondansetron (Zofran ODT) dispertab 4 mg  4 mg Oral Q4HRS PRN Eliazar Bailey M.D.        senna-docusate (Pericolace Or Senokot S) 8.6-50 MG per tablet 2 Tablet  2 Tablet Oral Q EVENING Eliazar Bailey M.D.        And    polyethylene glycol/lytes (Miralax) Packet 1 Packet  1 Packet Oral QDAY PRN Eliazar Bailey M.D.        vitamin D3 (Cholecalciferol) tablet 1,000 Units  1,000 Units Oral DAILY Eliazar Bailey M.D.         Current Outpatient Medications   Medication Sig  Dispense Refill    doxycycline (VIBRAMYCIN) 100 MG Tab Take 100 mg by mouth 2 times a day. Pt started on 3/10/2025 for 14 day course, pt only took until 3/16/2025 switched over to AUGMENTIN 875-125MG on 3/17/2025      amoxicillin-clavulanate (AUGMENTIN) 875-125 MG Tab Take 1 Tablet by mouth 2 times a day. Pt started on 3/17/2025 for 14 day course   PRESCRIPTION COURSE WAS COMPLETED      predniSONE (DELTASONE) 10 MG Tab Take 10 mg by mouth every day. Pt started on 3/11/2025 for 14 day course   Takes 6 tablets (60MG) for 5 days  Takes 4 tablets (40MG) for 4 days  Takes 2 tablets (20MG) for 3 days  Takes 1 tablet (10MG) for 2 days   PRESCRIPTION COURSE WAS COMPLETED      HYDROcodone homatropine 5-1.5 mg/5 mL Solution Take 5 mL by mouth every 6 hours as needed (cough) for up to 7 days. 140 mL 0    albuterol 108 (90 Base) MCG/ACT Aero Soln inhalation aerosol Inhale 2 Puffs by mouth every four hours as needed for Shortness of Breath for up to 30 days. 8.5 g 0    benzonatate (TESSALON) 100 MG Cap Take 1 Capsule by mouth 3 times a day as needed for Cough. 60 Capsule 0    fentaNYL (DURAGESIC) 25 MCG/HR PATCH 72 HR Place 1 Patch on the skin every 72 hours for 15 days. 5 Patch 0    guaiFENesin ER (MUCINEX) 600 MG TABLET SR 12 HR Take 1 Tablet by mouth every 12 hours for 15 days. 30 Tablet 0    folic acid (FOLVITE) 1 MG Tab Take 1 mg by mouth every day.      gabapentin (NEURONTIN) 100 MG Cap Take 100 mg by mouth every evening.      hydrocortisone (CORTEF) 10 MG Tab Take 20 mg by mouth 2 times a day. Pt is taken 2 tablets (20MG) BID      MAGNESIUM PO Take 2 Capsules by mouth every evening. (OTC)      omeprazole (PRILOSEC) 40 MG delayed-release capsule Take 1 Capsule by mouth every day. 90 Capsule 3    atorvastatin (LIPITOR) 20 MG Tab Take 1 Tablet by mouth every day. 90 Tablet 0    tamsulosin (FLOMAX) 0.4 MG capsule Take 2 Capsules by mouth every day. (Patient taking differently: Take 0.4 mg by mouth 2 times a day.) 180 Capsule  "3    Cyanocobalamin (B-12 SL) Place 1 Tablet under the tongue every day.      potassium chloride (KLOR-CON) 8 MEQ tablet Take 2 Tablets by mouth every day. 180 Tablet 3    silodosin (RAPAFLO) 8 MG Cap capsule Take 8 mg by mouth 1/2 hour after breakfast.      amLODIPine (NORVASC) 5 MG Tab Take 1 Tablet by mouth every morning. 100 Tablet 3    liothyronine (CYTOMEL) 5 MCG Tab Take 10 mcg by mouth every day.      TIROSINT 125 MCG Cap Take 125 mcg by mouth every morning on an empty stomach. 90 Capsule 3    TAGRISSO 80 MG Tab Take 80 mg by mouth every day.      fentaNYL (DURAGESIC) 12 MCG/HR PATCH 72 HR Place 1 Patch on the skin every 72 hours for 15 days. (Patient not taking: Reported on 4/7/2025) 5 Patch 0    Ascorbic Acid (VITAMIN C PO) Take 1 Tablet by mouth see administration instructions. Pt takes sporidially      acetaminophen (TYLENOL) 500 MG Tab Take 1,000 mg by mouth every 6 hours as needed. Indications: Pain      naproxen (ALEVE) 220 MG tablet Take 220 mg by mouth 2 times a day as needed. Indications: Pain      NEEDLE, DISP, 25 G (BD DISP NEEDLES) 25G X 5/8\" Misc Use as directed for testosterone injections every 7 days 12 Each 3    tadalafil (CIALIS) 20 MG tablet Take 20 mg by mouth 1 time a day as needed for Erectile Dysfunction. for erectile dysfunction      testosterone cypionate (DEPO-TESTOSTERONE) 200 MG/ML Solution injection Inject 124 mg into the shoulder, thigh, or buttocks every 7 days. On Sunday, pt injects 0.62ML         Allergies  Allergies   Allergen Reactions    Gramineae Pollens Itching and Shortness of Breath     Itchy eyes, red face    Cat Hair Extract Hives and Itching    Horse Allergy Hives    Other Environmental     Pollen Extract Runny Nose and Itching     .       Vital Signs last 24 hours  Temp:  [36.7 °C (98 °F)] 36.7 °C (98 °F)  Pulse:  [] 95  Resp:  [21-32] 27  BP: (129-167)/(67-87) 167/77  SpO2:  [85 %-93 %] 92 %    Physical Exam  Physical Exam  Vitals and nursing note reviewed. "   Constitutional:       General: He is not in acute distress.     Appearance: He is well-developed. He is ill-appearing. He is not diaphoretic.      Comments: Very pleasant   HENT:      Nose: Nose normal.      Mouth/Throat:      Pharynx: No oropharyngeal exudate.   Eyes:      General: No scleral icterus.        Right eye: No discharge.         Left eye: No discharge.      Conjunctiva/sclera: Conjunctivae normal.      Pupils: Pupils are equal, round, and reactive to light.   Neck:      Thyroid: No thyromegaly.      Vascular: No JVD.      Trachea: No tracheal deviation.   Cardiovascular:      Rate and Rhythm: Normal rate and regular rhythm.      Heart sounds: Normal heart sounds. No murmur heard.  Pulmonary:      Effort: Respiratory distress present.      Breath sounds: Normal breath sounds. No stridor. No wheezing or rales.   Abdominal:      General: There is no distension.      Palpations: Abdomen is soft.      Tenderness: There is no abdominal tenderness. There is no guarding.   Musculoskeletal:         General: No tenderness. Normal range of motion.      Cervical back: Neck supple.      Right lower leg: Edema (trace) present.      Left lower leg: Edema (trace) present.   Lymphadenopathy:      Cervical: No cervical adenopathy.   Skin:     General: Skin is warm and dry.      Capillary Refill: Capillary refill takes less than 2 seconds.      Coloration: Skin is not pale.      Findings: No erythema.   Neurological:      Mental Status: He is alert and oriented to person, place, and time.      Sensory: No sensory deficit.      Motor: No abnormal muscle tone.      Coordination: Coordination normal.      Deep Tendon Reflexes: Reflexes normal.   Psychiatric:         Behavior: Behavior normal.         Thought Content: Thought content normal.         Judgment: Judgment normal.       Fluids  No intake or output data in the 24 hours ending 04/07/25 1519    Laboratory  Recent Results (from the past 48 hours)   Lactic Acid     Collection Time: 04/07/25 11:00 AM   Result Value Ref Range    Lactic Acid 2.9 (H) 0.5 - 2.0 mmol/L   Lactic Acid    Collection Time: 04/07/25 11:00 AM   Result Value Ref Range    Lactic Acid RR 0.5 - 2.0 mmol/L   Complete Metabolic Panel    Collection Time: 04/07/25 11:00 AM   Result Value Ref Range    Sodium 136 135 - 145 mmol/L    Potassium 3.4 (L) 3.6 - 5.5 mmol/L    Chloride 102 96 - 112 mmol/L    Co2 21 20 - 33 mmol/L    Anion Gap 13.0 7.0 - 16.0    Glucose 118 (H) 65 - 99 mg/dL    Bun 20 8 - 22 mg/dL    Creatinine 1.60 (H) 0.50 - 1.40 mg/dL    Calcium 8.5 8.5 - 10.5 mg/dL    Correct Calcium 9.1 8.5 - 10.5 mg/dL    AST(SGOT) 24 12 - 45 U/L    ALT(SGPT) 12 2 - 50 U/L    Alkaline Phosphatase 99 30 - 99 U/L    Total Bilirubin 0.7 0.1 - 1.5 mg/dL    Albumin 3.3 3.2 - 4.9 g/dL    Total Protein 7.6 6.0 - 8.2 g/dL    Globulin 4.3 (H) 1.9 - 3.5 g/dL    A-G Ratio 0.8 g/dL   Blood Culture - Draw one from central line and one from peripheral site    Collection Time: 04/07/25 11:00 AM    Specimen: Line; Blood   Result Value Ref Range    Significant Indicator NEG     Source BLD     Site LINE     Culture Result       No Growth  Note: Blood cultures are incubated for 5 days and  are monitored continuously.Positive blood cultures  are called to the RN and reported as soon as  they are identified.  Blood culture testing and Gram stain, if indicated, are  performed at [facility],  [address] [place] [address].  Positive blood  cultures are sent to [facility],  [address] [place] [address], for organism identification  and susceptibility testing.     D-DIMER    Collection Time: 04/07/25 11:00 AM   Result Value Ref Range    D-Dimer 10.85 (H) 0.00 - 0.50 ug/mL (FEU)   ESTIMATED GFR    Collection Time: 04/07/25 11:00 AM   Result Value Ref Range    GFR (CKD-EPI) 44 (A) >60 mL/min/1.73 m 2   CBC WITH DIFFERENTIAL    Collection Time: 04/07/25 11:00 AM   Result Value Ref Range    WBC  12.0 (H) 4.8 - 10.8 K/uL    RBC 4.35 (L) 4.70 - 6.10 M/uL    Hemoglobin 12.8 (L) 14.0 - 18.0 g/dL    Hematocrit 39.4 (L) 42.0 - 52.0 %    MCV 90.6 81.4 - 97.8 fL    MCH 29.4 27.0 - 33.0 pg    MCHC 32.5 32.3 - 36.5 g/dL    RDW 56.0 (H) 35.9 - 50.0 fL    Platelet Count 347 164 - 446 K/uL    MPV 8.8 (L) 9.0 - 12.9 fL    Neutrophils-Polys 89.60 (H) 44.00 - 72.00 %    Lymphocytes 6.90 (L) 22.00 - 41.00 %    Monocytes 1.70 0.00 - 13.40 %    Eosinophils 0.00 0.00 - 6.90 %    Basophils 0.00 0.00 - 1.80 %    Nucleated RBC 0.00 0.00 - 0.20 /100 WBC    Neutrophils (Absolute) 10.86 (H) 1.82 - 7.42 K/uL    Lymphs (Absolute) 0.83 (L) 1.00 - 4.80 K/uL    Monos (Absolute) 0.20 0.00 - 0.85 K/uL    Eos (Absolute) 0.00 0.00 - 0.51 K/uL    Baso (Absolute) 0.00 0.00 - 0.12 K/uL    NRBC (Absolute) 0.00 K/uL    Anisocytosis 1+     Macrocytosis 1+    DIFFERENTIAL MANUAL    Collection Time: 04/07/25 11:00 AM   Result Value Ref Range    Bands-Stabs 0.90 0.00 - 10.00 %    Myelocytes 0.90 %    Manual Diff Status PERFORMED    PLATELET ESTIMATE    Collection Time: 04/07/25 11:00 AM   Result Value Ref Range    Plt Estimation Normal    MORPHOLOGY    Collection Time: 04/07/25 11:00 AM   Result Value Ref Range    RBC Morphology Present     Poikilocytosis 2+     Ovalocytes 1+     Echinocytes 2+    APTT    Collection Time: 04/07/25 11:00 AM   Result Value Ref Range    APTT 25.6 24.7 - 36.0 sec   Prothrombin Time    Collection Time: 04/07/25 11:00 AM   Result Value Ref Range    PT 12.6 12.0 - 14.6 sec    INR 0.91 0.87 - 1.13   Heparin Anti-Xa    Collection Time: 04/07/25 11:00 AM   Result Value Ref Range    Heparin Xa (UFH) <0.10 IU/mL   Blood Culture - Draw one from central line and one from peripheral site    Collection Time: 04/07/25 11:15 AM    Specimen: Peripheral; Blood   Result Value Ref Range    Significant Indicator NEG     Source BLD     Site PERIPHERAL     Culture Result       No Growth  Note: Blood cultures are incubated for 5 days and  are  monitored continuously.Positive blood cultures  are called to the RN and reported as soon as  they are identified.  Blood culture testing and Gram stain, if indicated, are  performed at [facility],  [address] [place] [address].  Positive blood  cultures are sent to [facility],  [address] [place] [address], for organism identification  and susceptibility testing.     CoV-2, Flu A/B, And RSV by PCR ([employer])    Collection Time: 04/07/25 11:55 AM    Specimen: Respirate   Result Value Ref Range    Influenza virus A RNA Negative Negative    Influenza virus B, PCR Negative Negative    RSV, PCR POSITIVE (A) Negative    SARS-CoV-2 by PCR NotDetected     SARS-CoV-2 Source Nasal Swab    Urinalysis    Collection Time: 04/07/25 12:50 PM    Specimen: Blood   Result Value Ref Range    Color Yellow     Character Clear     Specific Gravity 1.016 <1.035    Ph 6.0 5.0 - 8.0    Glucose Negative Negative mg/dL    Ketones Negative Negative mg/dL    Protein Negative Negative mg/dL    Bilirubin Negative Negative    Urobilinogen, Urine 1.0 <=1.0 EU/dL    Nitrite Negative Negative    Leukocyte Esterase Negative Negative    Occult Blood Negative Negative    Micro Urine Req see below    Lactic Acid    Collection Time: 04/07/25  1:15 PM   Result Value Ref Range    Lactic Acid 4.6 (HH) 0.5 - 2.0 mmol/L     Imaging  CT-CTA CHEST PULMONARY ARTERY W/ RECONS   Final Result      1.  Bilateral lobar and segmental pulmonary emboli. RV/LV ratio is elevated at 1.3 consistent with a component of right heart strain.      2.  Again seen volume loss and consolidation within the left lower lobe and atelectasis within the right lower lobe with bilateral, right greater than left pleural effusions.      3.  Atherosclerotic vascular calcification.      4.  Fatty liver.      5.  This was discussed with MIGNON URRUTIA at 12:56 PM on 4/7/2025.      EC-ECHOCARDIOGRAM COMPLETE W/O CONT    (Results Pending)        Assessment/Plan    #Stage IV lung adenocarcinoma of the left lung complicated by bone metastasis and malignant pleural effusion  #RSV pneumonia  #Acute bilateral pulmonary emboli  #Recent influenza A pneumonia  #Acute hypoxemic respiratory failure due to above  #History of metastatic melanoma in remission    -- no indication for systemic lytics, cont hep gtt x 24-36hrs and then transition to DOAC indefinitely due to underlying malignancy as hypercoagulable risk factor  -- f/u BNP, trop, TTE  -- supportive care for RSV  -- hold immunotherapy   -- Titrate supplemental O2 to maintain saturations 88-92%    Discussed patient condition and risk of morbidity and/or mortality with Hospitalist, RN, and Patient.      This note was generated using voice recognition software which has a chance of producing errors of grammar and content.  I have made every reasonable attempt to find and correct any errors, but it should be expected that some may not be found prior to finalization of this note.  __________  Alex Kohler MD  Pulmonary and Critical Care Medicine  Formerly [facility]

## 2025-04-07 NOTE — ED TRIAGE NOTES
"Bartolo Berrios [facility]  80 y.o.  Chief Complaint   Patient presents with    Shortness of Breath     Pt BIB EMS from home.  Pt was recently hospitalized for the same thing.  He has been home \"a little over a week\".  Pt's  called EMS today due to concerns with his SOB.  Pt received 2 nebs PTA and a dose of solu-medrol PTA.      On arrival to the ER, pt is noted to be SOB with strong cough.  Pt asking for oxygen.  Pt is 85% on R/A.  Placed on 4L O2 and sats come up to 97%.    "

## 2025-04-07 NOTE — ED NOTES
Medication history reviewed with pt. Med rec is complete.  Allergies reviewed, per pt    Pt reports that he will have his wife bring in his TAGRISSO 80MG if he gets admitted     Pt reports that he did not  his GUAIFENESIN ER 600MG that was prescribed to him on 3/26/2025.      Pt reports that he has not used the FENTANYL 12MCG/HR PATCHES, he is using the 25MCG/HR patches.     Pt reports that he is not taking FAMOTIDINE 20MG, DICYCLOMINE 20MG, LOSARTAN 100MG, or ZOFRAN 4MG in the last 30 days or longer.     Pt started DOXYCYCLINE 100MG on 3/10/2025 for 14 day course, pt only took until 3/16/2025 was switched to AUGMENTIN 875-125MG. Pt started AUGMENTIN 875-125MG on 3/17/2025 for 14 day course, pt took last dose on 3/18/2025 at 2200     Pt is not on any anticoagulants      Dispense history available in EPIC? YES

## 2025-04-07 NOTE — FLOWSHEET NOTE
04/07/25 1116   Events/Summary/Plan   Events/Summary/Plan ER SVN given   Vital Signs   Pulse (!) 105   Respiration (!) 26   Pulse Oximetry 92 %   $ Pulse Oximetry (Spot Check) Yes   Respiratory Assessment   Respiratory Pattern Within Normal Limits   Level of Consciousness Alert   Chest Exam   Work Of Breathing / Effort Mild   Breath Sounds   RUL Breath Sounds Clear   RML Breath Sounds Clear   RLL Breath Sounds Fine Crackles;Diminished   SLOAN Breath Sounds Clear   LLL Breath Sounds Clear;Diminished   Oxygen   O2 (LPM) 4   O2 Delivery Device Silicone Nasal Cannula

## 2025-04-07 NOTE — ASSESSMENT & PLAN NOTE
Critical care/pulmonary consulted, procedure recommendation.    Patient was started on heparin drip for 36 hours, will switch to oral Eliquis 10 mg p.o. twice daily for 7 days followed by 5 mg p.o. twice daily  Echocardiogram reviewed, noted to have normal EF, right ventricle is dilated normal right ventricle systolic function  --Provide oxygen as needed

## 2025-04-07 NOTE — H&P
Hospital Medicine History & Physical Note    Date of Service  4/7/2025    Primary Care Physician  Lesly Munoz M.D.    Consultants  pulmonary    Specialist Names: Dr. Alex Kohler    Code Status  Full Code    Chief Complaint  Chief Complaint   Patient presents with    Shortness of Breath       History of Presenting Illness  Bartolo Berrios [facility] is a 80 y.o. male who presented 4/7/2025 with shortness of breath.  The patient's shortness of breath been ongoing for about 2 months with known adenocarcinoma of the lung.  The patient previously had bilateral pleural effusions which have been drained.  The patient suddenly over the past couple days has gotten much worse on his shortness of breath so he came to the hospital today.  The patient is evaluated and found to have bilateral pulmonary emboli.  The patient at this point will be placed on IV heparin with monitoring of PTT levels.  The patient at this point will also be placed on oxygen support by nasal cannula.  Patient will be given pain management on top of his chronic pain management with fentanyl.  Patient will be evaluated with an echocardiogram for right heart strain.  I have asked pulmonology to consult on the patient's case as well.  Patient's lactic acid is elevated 9 will trend lactic acid levels..    I discussed the plan of care with patient, bedside RN, , pharmacy, and emergency room physician Dr. Derrick Croft, pulmonologist Dr. Alex Kohler .    Review of Systems  Review of Systems   Constitutional:  Positive for malaise/fatigue. Negative for chills, diaphoresis and fever.   HENT: Negative.     Eyes: Negative.  Negative for double vision.   Respiratory:  Positive for cough and shortness of breath. Negative for hemoptysis and wheezing.    Cardiovascular: Negative.  Negative for chest pain, palpitations and leg swelling.   Gastrointestinal: Negative.  Negative for abdominal pain, blood in stool, constipation, diarrhea, heartburn,  nausea and vomiting.   Genitourinary: Negative.  Negative for frequency, hematuria and urgency.   Musculoskeletal: Negative.  Negative for joint pain.   Skin: Negative.  Negative for itching and rash.   Neurological: Negative.  Negative for dizziness, focal weakness, seizures, loss of consciousness and headaches.   Endo/Heme/Allergies: Negative.  Does not bruise/bleed easily.   Psychiatric/Behavioral: Negative.  Negative for suicidal ideas. The patient is not nervous/anxious.    All other systems reviewed and are negative.      Past Medical History   has a past medical history of Adrenal insufficiency ([facility]), secondary (11/25/2020), Arrhythmia, Arthritis, Atherosclerosis of both carotid arteries, mild (11/25/2020), BPH (benign prostatic hyperplasia) (11/25/2020), Bronchitis, Cancer ([facility]), Carcinoma in situ of respiratory system, CKD (chronic kidney disease) stage 3, GFR 30-59 ml/min (11/25/2020), Colostomy present ([facility]) (11/25/2020), Diverticulosis of colon (11/25/2020), GERD (gastroesophageal reflux disease) (11/25/2020), High cholesterol, History of atrial tachycardia (11/25/2020), History of malignant melanoma, April 2016 (11/25/2020), History of shingles (11/25/2020), History of stroke, subcortical infarct on MRI April 2019 (11/25/2020), Hyperlipidemia (11/25/2020), Hypertension, Hypothyroid (11/25/2020), Indigestion, Lung cancer ([facility]), adenocarcinoma Aug. 2019 (11/25/2020), and Pain.    Surgical History   has a past surgical history that includes other (Left, 2016); other (Left, 2017); other (Right, 2019); and reid by laparoscopy (2/26/2021).     Family History  family history includes Cancer in his brother, brother, father, mother, and sister.   Family history reviewed with patient. There is no family history that is pertinent to the chief complaint.     Social History   reports that he has never smoked. He has never used smokeless tobacco. He reports that he does not currently use alcohol after a past usage  "of about 1.8 oz of alcohol per week. He reports that he does not currently use drugs.    Allergies  Allergies   Allergen Reactions    Gramineae Pollens Itching and Shortness of Breath     Itchy eyes, red face    Cat Hair Extract Hives and Itching    Horse Allergy Hives    Other Environmental     Pollen Extract Runny Nose and Itching     .       Medications  Prior to Admission Medications   Prescriptions Last Dose Informant Patient Reported? Taking?   Ascorbic Acid (VITAMIN C PO) 3/18/2025 Patient Yes No   Sig: Take 1 Tablet by mouth see administration instructions. Pt takes sporidially   Cyanocobalamin (B-12 SL) 4/6/2025 Morning Patient Yes Yes   Sig: Place 1 Tablet under the tongue every day.   HYDROcodone homatropine 5-1.5 mg/5 mL Solution 4/6/2025 Evening Patient No Yes   Sig: Take 5 mL by mouth every 6 hours as needed (cough) for up to 7 days.   MAGNESIUM PO 4/6/2025 Evening Patient Yes Yes   Sig: Take 2 Capsules by mouth every evening. (OTC)   NEEDLE, DISP, 25 G (BD DISP NEEDLES) 25G X 5/8\" Misc  Patient No No   Sig: Use as directed for testosterone injections every 7 days   TAGRISSO 80 MG Tab 4/7/2025 at  8:00 AM Patient Yes Yes   Sig: Take 80 mg by mouth every day.   TIROSINT 125 MCG Cap 4/6/2025 Morning Patient Yes Yes   Sig: Take 125 mcg by mouth every morning on an empty stomach.   acetaminophen (TYLENOL) 500 MG Tab 3/17/2025 Patient Yes No   Sig: Take 1,000 mg by mouth every 6 hours as needed. Indications: Pain   albuterol 108 (90 Base) MCG/ACT Aero Soln inhalation aerosol 4/6/2025 Evening Patient No Yes   Sig: Inhale 2 Puffs by mouth every four hours as needed for Shortness of Breath for up to 30 days.   amLODIPine (NORVASC) 5 MG Tab 4/6/2025 Morning Patient No Yes   Sig: Take 1 Tablet by mouth every morning.   amoxicillin-clavulanate (AUGMENTIN) 875-125 MG Tab 3/30/2025 Patient Yes Yes   Sig: Take 1 Tablet by mouth 2 times a day. Pt started on 3/17/2025 for 14 day course   PRESCRIPTION COURSE WAS " COMPLETED   atorvastatin (LIPITOR) 20 MG Tab 4/6/2025 Evening Patient No Yes   Sig: Take 1 Tablet by mouth every day.   benzonatate (TESSALON) 100 MG Cap 3/27/2025 Patient No Yes   Sig: Take 1 Capsule by mouth 3 times a day as needed for Cough.   doxycycline (VIBRAMYCIN) 100 MG Tab 3/16/2025 Patient Yes Yes   Sig: Take 100 mg by mouth 2 times a day. Pt started on 3/10/2025 for 14 day course, pt only took until 3/16/2025 switched over to AUGMENTIN 875-125MG on 3/17/2025   fentaNYL (DURAGESIC) 12 MCG/HR PATCH 72 HR Not Taking Patient No No   Sig: Place 1 Patch on the skin every 72 hours for 15 days.   Patient not taking: Reported on 4/7/2025   fentaNYL (DURAGESIC) 25 MCG/HR PATCH 72 HR 4/5/2025 Patient No Yes   Sig: Place 1 Patch on the skin every 72 hours for 15 days.   folic acid (FOLVITE) 1 MG Tab 4/6/2025 Morning Patient Yes Yes   Sig: Take 1 mg by mouth every day.   gabapentin (NEURONTIN) 100 MG Cap 4/6/2025 Evening Patient Yes Yes   Sig: Take 100 mg by mouth every evening.   guaiFENesin ER (MUCINEX) 600 MG TABLET SR 12 HR New Rx Patient No Yes   Sig: Take 1 Tablet by mouth every 12 hours for 15 days.   hydrocortisone (CORTEF) 10 MG Tab 4/6/2025 Evening Patient Yes Yes   Sig: Take 20 mg by mouth 2 times a day. Pt is taken 2 tablets (20MG) BID   liothyronine (CYTOMEL) 5 MCG Tab 4/6/2025 Morning Patient Yes Yes   Sig: Take 10 mcg by mouth every day.   naproxen (ALEVE) 220 MG tablet 3/16/2025 Patient Yes No   Sig: Take 220 mg by mouth 2 times a day as needed. Indications: Pain   omeprazole (PRILOSEC) 40 MG delayed-release capsule 4/6/2025 Morning Patient No Yes   Sig: Take 1 Capsule by mouth every day.   potassium chloride (KLOR-CON) 8 MEQ tablet 4/6/2025 Morning Patient No Yes   Sig: Take 2 Tablets by mouth every day.   predniSONE (DELTASONE) 10 MG Tab 3/24/2025 Patient Yes Yes   Sig: Take 10 mg by mouth every day. Pt started on 3/11/2025 for 14 day course   Takes 6 tablets (60MG) for 5 days  Takes 4 tablets (40MG)  for 4 days  Takes 2 tablets (20MG) for 3 days  Takes 1 tablet (10MG) for 2 days   PRESCRIPTION COURSE WAS COMPLETED   silodosin (RAPAFLO) 8 MG Cap capsule 4/6/2025 Morning Patient Yes Yes   Sig: Take 8 mg by mouth 1/2 hour after breakfast.   tadalafil (CIALIS) 20 MG tablet 3/24/2025 Patient Yes No   Sig: Take 20 mg by mouth 1 time a day as needed for Erectile Dysfunction. for erectile dysfunction   tamsulosin (FLOMAX) 0.4 MG capsule 4/6/2025 Evening Patient No Yes   Sig: Take 2 Capsules by mouth every day.   Patient taking differently: Take 0.4 mg by mouth 2 times a day.   testosterone cypionate (DEPO-TESTOSTERONE) 200 MG/ML Solution injection 3/23/2025 Patient Yes No   Sig: Inject 124 mg into the shoulder, thigh, or buttocks every 7 days. On Sunday, pt injects 0.62ML      Facility-Administered Medications: None       Physical Exam  Temp:  [36.7 °C (98 °F)] 36.7 °C (98 °F)  Pulse:  [] 95  Resp:  [21-32] 27  BP: (129-167)/(67-87) 167/77  SpO2:  [85 %-93 %] 92 %  Blood Pressure : (!) 167/77   Temperature: 36.7 °C (98 °F)   Pulse: 95   Respiration: (!) 27   Pulse Oximetry: 92 %       Physical Exam  Vitals and nursing note reviewed. Exam conducted with a chaperone present.   Constitutional:       General: He is not in acute distress.     Appearance: Normal appearance. He is well-developed and underweight. He is ill-appearing. He is not toxic-appearing or diaphoretic.   HENT:      Head: Normocephalic and atraumatic.      Right Ear: External ear normal.      Left Ear: External ear normal.      Nose: Nose normal.      Mouth/Throat:      Mouth: Mucous membranes are dry.      Pharynx: Oropharynx is clear.   Eyes:      General:         Right eye: No discharge.         Left eye: No discharge.      Extraocular Movements: Extraocular movements intact.      Conjunctiva/sclera: Conjunctivae normal.      Pupils: Pupils are equal, round, and reactive to light.   Neck:      Thyroid: No thyromegaly.      Vascular: No JVD.    Cardiovascular:      Rate and Rhythm: Normal rate and regular rhythm.      Pulses: Normal pulses.      Heart sounds: Normal heart sounds. No murmur heard.     No friction rub.   Pulmonary:      Effort: Pulmonary effort is normal. Tachypnea present.      Breath sounds: Decreased air movement present. Examination of the right-middle field reveals decreased breath sounds. Examination of the left-middle field reveals decreased breath sounds. Examination of the right-lower field reveals decreased breath sounds. Examination of the left-lower field reveals decreased breath sounds. Decreased breath sounds present.   Chest:      Chest wall: No tenderness.   Abdominal:      General: Abdomen is flat. Bowel sounds are normal. There is no distension.      Palpations: Abdomen is soft. There is no mass.      Tenderness: There is no abdominal tenderness. There is no guarding or rebound.   Musculoskeletal:         General: Normal range of motion.      Cervical back: Normal range of motion and neck supple.      Right lower le+ Pitting Edema present.      Left lower le+ Pitting Edema present.   Lymphadenopathy:      Cervical: No cervical adenopathy.   Skin:     General: Skin is warm and dry.      Capillary Refill: Capillary refill takes more than 3 seconds.      Findings: No lesion or rash.   Neurological:      General: No focal deficit present.      Mental Status: He is alert and oriented to person, place, and time. Mental status is at baseline.      GCS: GCS eye subscore is 4. GCS verbal subscore is 5. GCS motor subscore is 6.      Cranial Nerves: No cranial nerve deficit.      Deep Tendon Reflexes: Reflexes are normal and symmetric.   Psychiatric:         Mood and Affect: Mood normal.         Behavior: Behavior normal.         Thought Content: Thought content normal.         Judgment: Judgment normal.         Laboratory:  Recent Labs     25  1100   WBC 12.0*   RBC 4.35*   HEMOGLOBIN 12.8*   HEMATOCRIT 39.4*   MCV  "90.6   MCH 29.4   MCHC 32.5   RDW 56.0*   PLATELETCT 347   MPV 8.8*     Recent Labs     04/07/25  1100   SODIUM 136   POTASSIUM 3.4*   CHLORIDE 102   CO2 21   GLUCOSE 118*   BUN 20   CREATININE 1.60*   CALCIUM 8.5     Recent Labs     04/07/25  1100   ALTSGPT 12   ASTSGOT 24   ALKPHOSPHAT 99   TBILIRUBIN 0.7   GLUCOSE 118*     Recent Labs     04/07/25  1100   APTT 25.6   INR 0.91     No results for input(s): \"NTPROBNP\" in the last 72 hours.      No results for input(s): \"TROPONINT\" in the last 72 hours.    Imaging:  CT-CTA CHEST PULMONARY ARTERY W/ RECONS   Final Result      1.  Bilateral lobar and segmental pulmonary emboli. RV/LV ratio is elevated at 1.3 consistent with a component of right heart strain.      2.  Again seen volume loss and consolidation within the left lower lobe and atelectasis within the right lower lobe with bilateral, right greater than left pleural effusions.      3.  Atherosclerotic vascular calcification.      4.  Fatty liver.      5.  This was discussed with MIGNON URRUTIA at 12:56 PM on 4/7/2025.      EC-ECHOCARDIOGRAM COMPLETE W/O CONT    (Results Pending)       X-Ray:  I have personally reviewed the images and compared with prior images.  EKG:  I have personally reviewed the images and compared with prior images.    Assessment/Plan:  Justification for Admission Status  I anticipate this patient will require at least two midnights for appropriate medical management, necessitating inpatient admission because patient has bilateral pulmonary emboli will require at least 48 hours of inpatient management with IV heparin for anticoagulation and this cannot be performed as an outpatient.    Patient will need a Telemetry bed on MEDICAL service .  The need is secondary to bilateral pulmonary emboli.    * Bilateral pulmonary embolism (HCC)- (present on admission)  Assessment & Plan  Patient has been having shortness of breath for the past couple of months according to him but now its gotten " significantly worse over the past few days. Today he comes into the emergency for evaluation.  Imaging studies revealed bilateral pulmonary emboli.  I have started patient on heparin drip  I have ordered an echocardiogram urgently  I discussed the case with critical care for now we are not going to give tPA.  I will continue on oxygen support to keep oxygen saturations above 90%  I have started him on morphine 3 mg now 3 mg every 3 hours as needed for severe pain.  I will monitor oxygen saturations while he is on narcotics.      Adenocarcinoma of lung (HCC), stage 4 ([facility] Oct. 2021)- (present on admission)  Assessment & Plan  Patient is currently on chemotherapy which I am going to hold for now given his pulmonary embolism.    Hypertension- (present on admission)  Assessment & Plan  I will optimize blood pressure management keep systolic blood pressure less than 140 diastolic under 90.  I will continue on Norvasc 5 mg daily, I will continue with labetalol as needed    Brain lesion- (present on admission)  Assessment & Plan  He does have brain and spreading of his adenocarcinoma the lung  Continue to monitor and at this point I am going to put him on seizure precautions.    Adrenal insufficiency (HCC), secondary- (present on admission)  Assessment & Plan  I will continue him on adrenal support with hydrocortisone 20 mg twice daily  I will obtain an ACTH level  Cortisol level will be abnormal due to him being on iatrogenic steroid support    Macrocytic anemia- (present on admission)  Assessment & Plan  I will check a B12 level  I will continue to monitor H&H if drops below 7/21 I will transfuse him    Chronic pain- (present on admission)  Assessment & Plan  For chronic pain patient will remain on a fentanyl patch and 25 mcg/hr  I will continue him on oxycodone as needed, I will also continue on hydromorphone as needed    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)  Assessment &  Plan  I will continue him on Flomax 0.4 mg daily patient at home also uses Rapaflo 8 mg daily however this is nonformulary so pharmacy has discontinued it.    Vitamin D deficiency- (present on admission)  Assessment & Plan  I will continue on vitamin D supplementation 1000 units daily    Hyperlipidemia- (present on admission)  Assessment & Plan  I will continue him on Lipitor 20 NIGHTLY  I will obtain a fasting lipid panel  Lab Results   Component Value Date/Time    CHOLSTRLTOT 227 (H) 12/03/2024 08:50 AM     (H) 12/03/2024 08:50 AM    HDL 61 12/03/2024 08:50 AM    TRIGLYCERIDE 136 12/03/2024 08:50 AM           GERD (gastroesophageal reflux disease)- (present on admission)  Assessment & Plan  I will continue on omeprazole 40 mg daily    Obstructive sleep apnea- (present on admission)  Assessment & Plan  I will continue with nocturnal oxygen support    Chronic kidney disease, stage 3a- (present on admission)  Assessment & Plan  I will monitor renal functions and avoid nephrotoxic medications    Hypothyroid- (present on admission)  Assessment & Plan  I will continue on Synthroid supplementation with levothyroxine 125 mcg daily and Cytomel 10 mcg daily  Most recent TSH is 2.090 from 4 months ago with a T4 of 7.5 and a T3 of 2.96.  Currently no changes are indicated in his Synthroid supplementation.        VTE prophylaxis: SCDs/TEDs and therapeutic anticoagulation with heparin drip

## 2025-04-07 NOTE — ASSESSMENT & PLAN NOTE
Patient is currently on chemotherapy   Holding for now given his RSV infection, and acute pulmonary embolism.

## 2025-04-07 NOTE — ASSESSMENT & PLAN NOTE
For chronic pain patient will remain on a fentanyl patch and 25 mcg/hr  I will continue him on oxycodone as needed, I will also continue on hydromorphone as needed

## 2025-04-08 ENCOUNTER — APPOINTMENT (OUTPATIENT)
Dept: CARDIOLOGY | Facility: MEDICAL CENTER | Age: 80
DRG: 175 | End: 2025-04-08
Attending: HOSPITALIST
Payer: MEDICARE

## 2025-04-08 LAB
ANION GAP SERPL CALC-SCNC: 9 MMOL/L (ref 7–16)
BUN SERPL-MCNC: 19 MG/DL (ref 8–22)
CALCIUM SERPL-MCNC: 8.2 MG/DL (ref 8.5–10.5)
CHLORIDE SERPL-SCNC: 108 MMOL/L (ref 96–112)
CHOLEST SERPL-MCNC: 147 MG/DL (ref 100–199)
CO2 SERPL-SCNC: 24 MMOL/L (ref 20–33)
CREAT SERPL-MCNC: 1.22 MG/DL (ref 0.5–1.4)
ERYTHROCYTE [DISTWIDTH] IN BLOOD BY AUTOMATED COUNT: 57.5 FL (ref 35.9–50)
GFR SERPLBLD CREATININE-BSD FMLA CKD-EPI: 61 ML/MIN/1.73 M 2
GLUCOSE SERPL-MCNC: 129 MG/DL (ref 65–99)
HCT VFR BLD AUTO: 35 % (ref 42–52)
HDLC SERPL-MCNC: 57 MG/DL
HGB BLD-MCNC: 11.3 G/DL (ref 14–18)
LACTATE SERPL-SCNC: 0.7 MMOL/L (ref 0.5–2)
LACTATE SERPL-SCNC: 1.2 MMOL/L (ref 0.5–2)
LDLC SERPL CALC-MCNC: 79 MG/DL
LV EJECT FRACT  99904: 66
LV EJECT FRACT MOD 2C 99903: 69.35
LV EJECT FRACT MOD 4C 99902: 61.64
LV EJECT FRACT MOD BP 99901: 65.82
MCH RBC QN AUTO: 29.7 PG (ref 27–33)
MCHC RBC AUTO-ENTMCNC: 32.3 G/DL (ref 32.3–36.5)
MCV RBC AUTO: 91.9 FL (ref 81.4–97.8)
PLATELET # BLD AUTO: 287 K/UL (ref 164–446)
PMV BLD AUTO: 9.2 FL (ref 9–12.9)
POTASSIUM SERPL-SCNC: 3.8 MMOL/L (ref 3.6–5.5)
RBC # BLD AUTO: 3.81 M/UL (ref 4.7–6.1)
SODIUM SERPL-SCNC: 141 MMOL/L (ref 135–145)
TRIGL SERPL-MCNC: 57 MG/DL (ref 0–149)
TROPONIN T SERPL-MCNC: 36 NG/L (ref 6–19)
UFH PPP CHRO-ACNC: 0.62 IU/ML
UFH PPP CHRO-ACNC: 0.74 IU/ML
UFH PPP CHRO-ACNC: 1.04 IU/ML
WBC # BLD AUTO: 11.6 K/UL (ref 4.8–10.8)

## 2025-04-08 PROCEDURE — 93306 TTE W/DOPPLER COMPLETE: CPT | Mod: 26 | Performed by: INTERNAL MEDICINE

## 2025-04-08 PROCEDURE — 770020 HCHG ROOM/CARE - TELE (206)

## 2025-04-08 PROCEDURE — 93306 TTE W/DOPPLER COMPLETE: CPT

## 2025-04-08 PROCEDURE — 700111 HCHG RX REV CODE 636 W/ 250 OVERRIDE (IP): Performed by: HOSPITALIST

## 2025-04-08 PROCEDURE — 99233 SBSQ HOSP IP/OBS HIGH 50: CPT | Performed by: HOSPITALIST

## 2025-04-08 PROCEDURE — A9270 NON-COVERED ITEM OR SERVICE: HCPCS | Performed by: HOSPITALIST

## 2025-04-08 PROCEDURE — 80048 BASIC METABOLIC PNL TOTAL CA: CPT

## 2025-04-08 PROCEDURE — 84484 ASSAY OF TROPONIN QUANT: CPT

## 2025-04-08 PROCEDURE — 85027 COMPLETE CBC AUTOMATED: CPT

## 2025-04-08 PROCEDURE — 99233 SBSQ HOSP IP/OBS HIGH 50: CPT | Performed by: INTERNAL MEDICINE

## 2025-04-08 PROCEDURE — 700102 HCHG RX REV CODE 250 W/ 637 OVERRIDE(OP): Performed by: HOSPITALIST

## 2025-04-08 PROCEDURE — 80061 LIPID PANEL: CPT

## 2025-04-08 PROCEDURE — 83605 ASSAY OF LACTIC ACID: CPT

## 2025-04-08 PROCEDURE — 82024 ASSAY OF ACTH: CPT

## 2025-04-08 PROCEDURE — 36415 COLL VENOUS BLD VENIPUNCTURE: CPT

## 2025-04-08 PROCEDURE — 94760 N-INVAS EAR/PLS OXIMETRY 1: CPT

## 2025-04-08 PROCEDURE — 85520 HEPARIN ASSAY: CPT

## 2025-04-08 RX ORDER — GUAIFENESIN/DEXTROMETHORPHAN 100-10MG/5
10 SYRUP ORAL EVERY 6 HOURS PRN
Status: DISCONTINUED | OUTPATIENT
Start: 2025-04-08 | End: 2025-04-10 | Stop reason: HOSPADM

## 2025-04-08 RX ORDER — GUAIFENESIN/DEXTROMETHORPHAN 100-10MG/5
10 SYRUP ORAL ONCE
Status: COMPLETED | OUTPATIENT
Start: 2025-04-08 | End: 2025-04-08

## 2025-04-08 RX ORDER — BENZONATATE 100 MG/1
100 CAPSULE ORAL EVERY 4 HOURS PRN
Status: DISCONTINUED | OUTPATIENT
Start: 2025-04-08 | End: 2025-04-10 | Stop reason: HOSPADM

## 2025-04-08 RX ADMIN — POTASSIUM CHLORIDE 20 MEQ: 1500 TABLET, EXTENDED RELEASE ORAL at 05:11

## 2025-04-08 RX ADMIN — AMLODIPINE BESYLATE 5 MG: 5 TABLET ORAL at 05:12

## 2025-04-08 RX ADMIN — Medication 1000 UNITS: at 05:12

## 2025-04-08 RX ADMIN — GUAIFENESIN SYRUP AND DEXTROMETHORPHAN 10 ML: 100; 10 SYRUP ORAL at 22:07

## 2025-04-08 RX ADMIN — GUAIFENESIN 600 MG: 600 TABLET, EXTENDED RELEASE ORAL at 17:23

## 2025-04-08 RX ADMIN — TAMSULOSIN HYDROCHLORIDE 0.4 MG: 0.4 CAPSULE ORAL at 17:23

## 2025-04-08 RX ADMIN — LIOTHYRONINE SODIUM 10 MCG: 5 TABLET ORAL at 05:18

## 2025-04-08 RX ADMIN — GABAPENTIN 100 MG: 100 CAPSULE ORAL at 17:22

## 2025-04-08 RX ADMIN — HYDROCORTISONE 20 MG: 10 TABLET ORAL at 17:22

## 2025-04-08 RX ADMIN — OXYCODONE HYDROCHLORIDE 5 MG: 5 TABLET ORAL at 19:24

## 2025-04-08 RX ADMIN — OMEPRAZOLE 40 MG: 20 CAPSULE, DELAYED RELEASE ORAL at 05:12

## 2025-04-08 RX ADMIN — LEVOTHYROXINE SODIUM 125 MCG: 0.12 TABLET ORAL at 05:12

## 2025-04-08 RX ADMIN — ATORVASTATIN CALCIUM 20 MG: 20 TABLET, FILM COATED ORAL at 17:21

## 2025-04-08 RX ADMIN — HYDROCORTISONE 20 MG: 10 TABLET ORAL at 05:11

## 2025-04-08 RX ADMIN — GUAIFENESIN 600 MG: 600 TABLET, EXTENDED RELEASE ORAL at 05:12

## 2025-04-08 RX ADMIN — FOLIC ACID 1 MG: 1 TABLET ORAL at 05:12

## 2025-04-08 RX ADMIN — HEPARIN SODIUM 16 UNITS/KG/HR: 5000 INJECTION, SOLUTION INTRAVENOUS at 08:33

## 2025-04-08 RX ADMIN — GUAIFENESIN SYRUP AND DEXTROMETHORPHAN 10 ML: 100; 10 SYRUP ORAL at 11:50

## 2025-04-08 RX ADMIN — TAMSULOSIN HYDROCHLORIDE 0.4 MG: 0.4 CAPSULE ORAL at 05:12

## 2025-04-08 ASSESSMENT — ENCOUNTER SYMPTOMS
FLANK PAIN: 0
WEIGHT LOSS: 1
FEVER: 0
COUGH: 1
EYE REDNESS: 0
CHILLS: 0
MYALGIAS: 0
BRUISES/BLEEDS EASILY: 0
FOCAL WEAKNESS: 0
EYE DISCHARGE: 0
ABDOMINAL PAIN: 0
SPUTUM PRODUCTION: 0
SHORTNESS OF BREATH: 1
STRIDOR: 0
VOMITING: 0
NERVOUS/ANXIOUS: 0

## 2025-04-08 ASSESSMENT — PAIN DESCRIPTION - PAIN TYPE
TYPE: ACUTE PAIN

## 2025-04-08 ASSESSMENT — FIBROSIS 4 INDEX: FIB4 SCORE: 1.88

## 2025-04-08 NOTE — CARE PLAN
Problem: Pain - Standard  Goal: Alleviation of pain or a reduction in pain to the patient’s comfort goal  Outcome: Progressing  Pt has reported no pain during this RN's shift.      Problem: Knowledge Deficit - Standard  Goal: Patient and family/care givers will demonstrate understanding of plan of care, disease process/condition, diagnostic tests and medications  Outcome: Progressing     Problem: Hemodynamics  Goal: Patient's hemodynamics, fluid balance and neurologic status will be stable or improve  Outcome: Progressing       Problem: Fluid Volume  Goal: Fluid volume balance will be maintained  Outcome: Progressing     Problem: Urinary - Renal Perfusion  Goal: Ability to achieve and maintain adequate renal perfusion and functioning will improve  Outcome: Progressing     Problem: Respiratory  Goal: Patient will achieve/maintain optimum respiratory ventilation and gas exchange  Outcome: Progressing  Pt is on 1L via nasal cannula     Problem: Physical Regulation  Goal: Diagnostic test results will improve  Outcome: Progressing  Goal: Signs and symptoms of infection will decrease  Outcome: Progressing     Problem: Fall Risk  Goal: Patient will remain free from falls  Outcome: Progressing  Pt's bed is in lowest position with wheels locked. Pt has bedside table and belongings within reach.      The patient is Stable - Low risk of patient condition declining or worsening    Shift Goals  Clinical Goals: Manage pain, heparin drip, monitor VS  Patient Goals: Sleep and eat dinner  Family Goals: POC updates    Progress made toward(s) clinical / shift goals:    Pt's reported no pain during this RN's shift. Pt has been using urinal during this RN's shift. Pt uses call light appropriately.     Patient is not progressing towards the following goals:

## 2025-04-08 NOTE — CARE PLAN
The patient is Stable - Low risk of patient condition declining or worsening    Shift Goals  Clinical Goals: Manage pain, heparin drip, monitor VS  Patient Goals: Sleep and eat dinner  Family Goals: POC updates    Progress made toward(s) clinical / shift goals: Pt is AO x4. Pt is on 5L NC. Pt has been voiding throughout this night. Pt has had no complaints of pain. Pt educated on importance of using call light.   Problem: Fall Risk  Goal: Patient will remain free from falls  Outcome: Progressing   Pt heparin drip was rate verified three times during this shift. VSS. Call light within reach, bed in lowest position, and bed alarm is armed.      Problem: Pain - Standard  Goal: Alleviation of pain or a reduction in pain to the patient’s comfort goal  Outcome: Progressing       Patient is not progressing towards the following goals:

## 2025-04-08 NOTE — DISCHARGE PLANNING
Care Transition Team Assessment    LSW met with pt at bedside to complete assessment. Pt verified the information on the face sheet. Pt lives with his wife in a multilevel home and has good DC support including his family and /house keepers. Pt is independent with all ADLs/IADLs, drives independently, and does not use any DME at baseline. Pt reported he has ACP documents and his wife, Mariella Forrest [phone] is DPOA. No ACP documents on file. Pt is self employed as a . No financial, SA, or MH concerns at this time.    Information Source  Orientation Level: Oriented X4  Information Given By: Patient  Informant's Name: Kevin Forrest  Who is responsible for making decisions for patient? : Patient    Readmission Evaluation  Is this a readmission?: Yes - unplanned readmission    Elopement Risk  Legal Hold: No  Ambulatory or Self Mobile in Wheelchair: Yes  Disoriented: No  Psychiatric Symptoms: None  History of Wandering: No  Elopement this Admit: No  Vocalizing Wanting to Leave: No  Displays Behaviors, Body Language Wanting to Leave: No-Not at Risk for Elopement  Elopement Risk: Not at Risk for Elopement    Interdisciplinary Discharge Planning  Patient or legal guardian wants to designate a caregiver: Yes  Caregiver name: Mariella Forrest  Caregiver contact info: [phone]    Discharge Preparedness  What is your plan after discharge?: Home with help  What are your discharge supports?: Child, Spouse  Prior Functional Level: Ambulatory, Drives Self, Independent with Activities of Daily Living, Independent with Medication Management  Difficulity with ADLs: None  Difficulity with IADLs: None    Functional Assesment  Prior Functional Level: Ambulatory, Drives Self, Independent with Activities of Daily Living, Independent with Medication Management    Finances  Financial Barriers to Discharge: No  Prescription Coverage: Yes    Vision / Hearing Impairment  Vision Impairment :  No  Hearing Impairment : Yes  Hearing Impairment: Both Ears, Hearing Device(s) Available  Does Pt Need Special Equipment for the Hearing Impaired?: Yes-But Does not Need for Facility to Arrange Equipment    Advance Directive  Advance Directive?: Living Will, DPOA for Health Care  Durable Power of  Name and Contact : Mariella Forrest [phone]    Domestic Abuse  Have you ever been the victim of abuse or violence?: No  Possible Abuse/Neglect Reported to:: Not Applicable    Psychological Assessment  History of Substance Abuse: None  History of Psychiatric Problems: No  Non-compliant with Treatment: No  Newly Diagnosed Illness: No    Discharge Risks or Barriers  Discharge risks or barriers?: No  Patient risk factors: Readmission    Anticipated Discharge Information  Discharge Disposition: Discharged to home/self care (01)  Discharge Address: [address] [place] [address] [place] [address]  Discharge Contact Phone Number: [phone]

## 2025-04-08 NOTE — PROGRESS NOTES
Tele shift summary  Rhythm: NRS with BBB  Rate: 76-93  Ectopy: (F) PAC, (R) PVC,  Measurement: .16/.13/.41

## 2025-04-08 NOTE — PROGRESS NOTES
Bedside report received from Aleshia raza RN. Patient awake and alert in bed AOx4. Pt on 6L NC. Pt is on a heparin drip and rate was verified bedside. Pt has no complaints of pain at this time. Pt was hungry and given dinner. Bed is locked and in low position with bed alarm on. Reviewed plan of care with patient. Call light within reach. No other needs at this time.

## 2025-04-08 NOTE — PROGRESS NOTES
Telemetry shift summary    Rhythm: SR/ST with BBB, peaky T's  HR: 66-99  Ectopy: R/F-PAC, R/O-PVC, JT V1 AVR    Measurements: .20 / .14 / .45    Normal values  Rhythm SR  HR   Measurements: 0.12-0.20/0.08-0.10/0.30-0.52

## 2025-04-08 NOTE — PROGRESS NOTES
Rapid Response Summary     Rapid response called at 1833 for: Chest Pain , increased O2 demand    VS: Tachypneic  and Hypertensive  (See Vitals Flowsheet)  Additional info: patient complaining of chest pain with ST elevation on EKG  MD Paged: Lynn  Interventions:    Imaging/Tests: EKG    Labs: Troponin   Medications:  pain meds   Other: N/A  Disposition: Improved with rapid response team interventions. Primary RN updated on plan of care. Transfer not indicated at this time.

## 2025-04-08 NOTE — PROGRESS NOTES
Pulmonary Progress Note    Date of admission  4/7/2025    Chief Complaint  80 y.o. male admitted 4/7/2025 with respiratory failure due to PE.    Hospital Course  80 y.o. male who presented 4/7/2025 with complaints of 6 weeks of dyspnea.  He has a past medical history of adenocarcinoma of the right lung status post wedge resection in 2019 with recurrence to the bone in 2021 treated with palliative radiation and tagrisso.  Oncology history further notable for metastatic melanoma of the left leg in 2016.  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       Recently hospitalized at [facility] for shortness of breath found to be influenza A +, treated with 5 days of Tamiflu.  Treated for suspected superimposed bacterial pneumonia as well.  CT at that time showed a right pleural effusion which thoracentesis on 3/20 demonstrated metastatic adenocarcinoma consistent with lung origin.     Over the last couple days has gotten worsening shortness of breath.  CT angiogram of the chest in the ED demonstrated bilateral pulmonary emboli.  Heart rate 106 on admission, now down to under 100.  Slightly hypertensive.  Requiring 4 L to maintain adequate saturations.  Hemoglobin 12.8.  Platelets WNL.  RSV positive.  CTA personally reviewed showing bilateral lobar and segmental pulmonary emboli with elevated RV/LV ratio.  Volume loss/consolidation within the left lower lobe and right greater than left pleural effusion.     Interval Problem Update  Reviewed last 24 hour events:  RRT called yesterday evening for chest pain  SBP stable 140s  Tachycardia resolved  Improving O2 reqs 6L -> 2L  RSV+  Hb stable  Lactic acid normalized  Cardiac enzymes downtrending  TTE: RV dilated with normal function, RVSP 35. Normal LVSF    Review of Systems  Review of Systems   Constitutional:  Positive for malaise/fatigue and weight loss.   Respiratory:  Positive for cough and  shortness of breath. Negative for sputum production.       Vital Signs for last 24 hours   Temp:  [36.4 °C (97.5 °F)-36.8 °C (98.3 °F)] 36.7 °C (98 °F)  Pulse:  [] 81  Resp:  [16-42] 16  BP: (121-177)/(61-94) 143/80  SpO2:  [91 %-96 %] 95 %    Physical Exam   Physical Exam  Vitals and nursing note reviewed.   Constitutional:       General: He is not in acute distress.     Appearance: He is well-developed. He is ill-appearing. He is not diaphoretic.      Comments: Very pleasant   HENT:      Nose: Nose normal.      Mouth/Throat:      Pharynx: No oropharyngeal exudate.   Eyes:      General: No scleral icterus.        Right eye: No discharge.         Left eye: No discharge.      Conjunctiva/sclera: Conjunctivae normal.      Pupils: Pupils are equal, round, and reactive to light.   Neck:      Thyroid: No thyromegaly.      Vascular: No JVD.      Trachea: No tracheal deviation.   Cardiovascular:      Rate and Rhythm: Normal rate and regular rhythm.      Heart sounds: Normal heart sounds. No murmur heard.  Pulmonary:      Effort: No respiratory distress.      Breath sounds: Normal breath sounds. No stridor. No wheezing or rales.      Comments: Nonproductive cough  Abdominal:      General: There is no distension.      Palpations: Abdomen is soft.      Tenderness: There is no abdominal tenderness. There is no guarding.   Musculoskeletal:         General: No tenderness. Normal range of motion.      Cervical back: Neck supple.      Right lower leg: Edema (trace) present.      Left lower leg: Edema (trace) present.   Lymphadenopathy:      Cervical: No cervical adenopathy.   Skin:     General: Skin is warm and dry.      Capillary Refill: Capillary refill takes less than 2 seconds.      Coloration: Skin is not pale.      Findings: No erythema.   Neurological:      Mental Status: He is alert and oriented to person, place, and time.      Sensory: No sensory deficit.      Motor: No abnormal muscle tone.      Coordination:  Coordination normal.      Deep Tendon Reflexes: Reflexes normal.   Psychiatric:         Behavior: Behavior normal.         Thought Content: Thought content normal.         Judgment: Judgment normal.       Medications  Current Facility-Administered Medications   Medication Dose Route Frequency Provider Last Rate Last Admin    benzonatate (Tessalon) capsule 100 mg  100 mg Oral Q4HRS PRN Brooklynn Benson M.D.        guaiFENesin dextromethorphan (Robitussin DM) 100-10 MG/5ML syrup 10 mL  10 mL Oral Q6HRS PRN Brooklynn Benson M.D.        heparin infusion 25,000 units in 500 mL 0.45% NACL  0-30 Units/kg/hr Intravenous Continuous Eliazar Bailey M.D. 18.3 mL/hr at 04/08/25 1136 13 Units/kg/hr at 04/08/25 1136    heparin injection 2,800 Units  40 Units/kg Intravenous PRN Eliazar Bailey M.D.        albuterol inhaler 2 Puff  2 Puff Inhalation Q4HRS PRN Eliazar Bailey M.D.        amLODIPine (Norvasc) tablet 5 mg  5 mg Oral QAM DAGOBERTO LizarragaD.   5 mg at 04/08/25 0512    atorvastatin (Lipitor) tablet 20 mg  20 mg Oral Q EVENING DAGOBERTO LizarragaD.   20 mg at 04/07/25 1821    fentaNYL (Duragesic) 25 MCG/HR 1 Patch  1 Patch Transdermal Q72HRS Eliazar Bailey M.D.        folic acid (Folvite) tablet 1 mg  1 mg Oral DAILY DAGOBERTO LizarragaD.   1 mg at 04/08/25 0512    gabapentin (Neurontin) capsule 100 mg  100 mg Oral Q EVENING DAGOBERTO LizarragaDRenzo        guaiFENesin ER (Mucinex) tablet 600 mg  600 mg Oral Q12HRS DAGOBERTO LizarragaD.   600 mg at 04/08/25 0512    hydrocortisone (Cortef) tablet 20 mg  20 mg Oral BID LevROCK Maldonado.D.   20 mg at 04/08/25 0511    liothyronine (Cytomel) tablet 10 mcg  10 mcg Oral DAILY LevROCK Maldonado.D.   10 mcg at 04/08/25 0518    omeprazole (PriLOSEC) capsule 40 mg  40 mg Oral DAILY LevROCK Maldonado.D.   40 mg at 04/08/25 0512    potassium chloride SA (Kdur) tablet 20 mEq  20 mEq Oral DAILY Eliazar Bailey M.D.   20 mEq at 04/08/25 0511    tamsulosin (Flomax) capsule 0.4 mg  0.4 mg Oral BID Eliazar  JORGE ALBERTO Bailey   0.4 mg at 04/08/25 0512    levothyroxine (Synthroid) tablet 125 mcg  125 mcg Oral AM ES Eliazar Bailey M.D.   125 mcg at 04/08/25 0512    Respiratory Therapy Consult   Nebulization Continuous RT Eliazar Bailey M.D.        acetaminophen (Tylenol) tablet 650 mg  650 mg Oral Q6HRS PRN Eliazar Bailey M.D.        Pharmacy Consult Request ...Pain Management Review 1 Each  1 Each Other PHARMACY TO DOSE Eliazar Bailey M.D.        oxyCODONE immediate-release (Roxicodone) tablet 5 mg  5 mg Oral Q3HRS PRN Eliazar Bailey M.D.   5 mg at 04/07/25 1855    Or    oxyCODONE immediate release (Roxicodone) tablet 10 mg  10 mg Oral Q3HRS PRN Eliazar Bailey M.D.        Or    HYDROmorphone (Dilaudid) injection 0.5 mg  0.5 mg Intravenous Q3HRS PRN Eliazar Bailey M.D.        labetalol (Normodyne/Trandate) injection 10 mg  10 mg Intravenous Q4HRS PRN Eliazar Bailey M.D.        ondansetron (Zofran) syringe/vial injection 4 mg  4 mg Intravenous Q4HRS PRN Eliazar Bailey M.D.        Or    ondansetron (Zofran ODT) dispertab 4 mg  4 mg Oral Q4HRS PRN Eliazar Bailey M.D.        senna-docusate (Pericolace Or Senokot S) 8.6-50 MG per tablet 2 Tablet  2 Tablet Oral Q EVENING Eliazar Bailey M.D.        And    polyethylene glycol/lytes (Miralax) Packet 1 Packet  1 Packet Oral QDAY PRN Eliazar Bailey M.D.        vitamin D3 (Cholecalciferol) tablet 1,000 Units  1,000 Units Oral DAILY Eliazar Bailey M.D.   1,000 Units at 04/08/25 0512     Fluids    Intake/Output Summary (Last 24 hours) at 4/8/2025 1442  Last data filed at 4/8/2025 1200  Gross per 24 hour   Intake 300 ml   Output 1300 ml   Net -1000 ml     Laboratory          Recent Labs     04/07/25  1100 04/08/25  0214   SODIUM 136 141   POTASSIUM 3.4* 3.8   CHLORIDE 102 108   CO2 21 24   BUN 20 19   CREATININE 1.60* 1.22   CALCIUM 8.5 8.2*     Recent Labs     04/07/25  1100 04/08/25 0214   ALTSGPT 12  --    ASTSGOT 24  --    ALKPHOSPHAT 99  --    TBILIRUBIN 0.7  --    GLUCOSE 118* 129*      Recent Labs     04/07/25  1100 04/08/25  0214   WBC 12.0* 11.6*   NEUTSPOLYS 89.60*  --    LYMPHOCYTES 6.90*  --    MONOCYTES 1.70  --    EOSINOPHILS 0.00  --    BASOPHILS 0.00  --    ASTSGOT 24  --    ALTSGPT 12  --    ALKPHOSPHAT 99  --    TBILIRUBIN 0.7  --      Recent Labs     04/07/25  1100 04/08/25  0214   RBC 4.35* 3.81*   HEMOGLOBIN 12.8* 11.3*   HEMATOCRIT 39.4* 35.0*   PLATELETCT 347 287   PROTHROMBTM 12.6  --    APTT 25.6  --    INR 0.91  --      Imaging  CT:    Reviewed  Echo:   Reviewed    Assessment/Plan    #Stage IV lung adenocarcinoma of the left lung complicated by bone metastasis and malignant pleural effusion  #RSV pneumonia  #Acute bilateral pulmonary emboli  #Recent influenza A pneumonia  #Acute hypoxemic respiratory failure due to above  #History of metastatic melanoma in remission     -- no indication for systemic lytics, cont hep gtt x 24-36hrs and then transition to DOAC indefinitely due to underlying malignancy as hypercoagulable risk factor  -- supportive care for RSV  -- hold immunotherapy   -- Titrate supplemental O2 to maintain saturations 88-92%    I have performed a physical exam and reviewed and updated ROS and Plan today (4/8/2025). In review of yesterday's note (4/7/2025), there are no changes except as documented above.     This note was generated using voice recognition software which has a chance of producing errors of grammar and content.  I have made every reasonable attempt to find and correct any errors, but it should be expected that some may not be found prior to finalization of this note.  __________  Alex Kohler MD  Pulmonary and Critical Care Medicine  Formerly [facility]

## 2025-04-08 NOTE — PROGRESS NOTES
4 Eyes Skin Assessment Completed by RHODA Steel and RHODA Monk.    Head WDL  Ears Redness and Blanching  Nose WDL  Mouth WDL  Neck WDL  Breast/Chest WDL  Shoulder Blades WDL  Spine WDL  (R) Arm/Elbow/Hand WDL  (L) Arm/Elbow/Hand WDL  Abdomen WDL  Groin WDL  Scrotum/Coccyx/Buttocks Redness and Blanching  (R) Leg WDL  (L) Leg WDL  (R) Heel/Foot/Toe WDL  (L) Heel/Foot/Toe WDL          Devices In Places Tele Box, Pulse Ox, and Nasal Cannula      Interventions In Place Gray Ear Foams, NC W/Ear Foams, Pillows, and Heels Loaded W/Pillows    Possible Skin Injury No    Pictures Uploaded Into Epic N/A  Wound Consult Placed N/A  RN Wound Prevention Protocol Ordered No

## 2025-04-08 NOTE — PROGRESS NOTES
Pt arrived to the floor from the ED via gurney. Ambulated from gurney to bed, accompanied by NAM. Tele monitor placed and vitals taken. History, allergies, and med rec reviewed with patient and admission profile completed.     Pt oriented to unit and POC discussed.Welcome folder is available at bedside and reviewed with pt. Whiteboard has been updated and filled out appropriately. Pt instructed in call light use and encouraged to call for any questions, needs or concerns prior to getting out of bed. Fall precautions are in place and patient declines any further needs at this time. Bed is locked and in lowest position with the bed alarm on.

## 2025-04-08 NOTE — PROGRESS NOTES
Hospital Medicine Daily Progress Note    Date of Service  4/8/2025    Chief Complaint  Bartolo Berrios [facility] is a 80 y.o. male admitted 4/7/2025 with shortness of breath    Hospital Course  Bartolo Berrios [facility] is a 80 y.o. male with a past medical history of metastatic lung cancer, chronic kidney disease and hypertension admitted 4/7/2025 with shortness of breath.  Was found to have pulmonary embolism in addition to RSV pneumonia.  He started on therapeutic anticoagulation with heparin drip.    Interval Problem Update  Heart rate 70s.  Requiring 4 L of oxygen to achieve adequate saturation, this has improved compared to yesterday where he was requiring 6.  Continue therapeutic anticoagulation with heparin drip.  I reviewed echo done today, at shows a preserved ejection fraction, the right ventricle is dilated. Normal right ventricular systolic function.  Patient has a lot of cough   I have discussed this patient's plan of care and discharge plan at IDT rounds today with Case Management, Nursing, Nursing leadership, and other members of the IDT team.    Consultants/Specialty  Pulmonary / critical care    Code Status  Full Code    Disposition  Not medically cleared.  Still has hypoxemic respiratory failure.  Therapeutic anticoagulation.  Echo.  Anticipate discharge in 2 days.    Review of Systems  Review of Systems   Constitutional:  Positive for malaise/fatigue. Negative for chills and fever.   Eyes:  Negative for discharge and redness.   Respiratory:  Positive for cough and shortness of breath. Negative for stridor.    Cardiovascular:  Negative for chest pain and leg swelling.   Gastrointestinal:  Negative for abdominal pain and vomiting.   Genitourinary:  Negative for flank pain.   Musculoskeletal:  Negative for myalgias.   Skin: Negative.    Neurological:  Negative for focal weakness.   Endo/Heme/Allergies:  Does not bruise/bleed easily.   Psychiatric/Behavioral:  The patient is not nervous/anxious.        Physical Exam  Temp:  [36.6 °C (97.9 °F)-36.8 °C (98.3 °F)] 36.7 °C (98.1 °F)  Pulse:  [71-87] 87  Resp:  [16-20] 18  BP: (121-177)/(61-93) 144/73  SpO2:  [92 %-97 %] 93 %    Physical Exam  Constitutional:       General: He is not in acute distress.     Appearance: He is not ill-appearing or diaphoretic.   HENT:      Head: Atraumatic.      Right Ear: External ear normal.      Left Ear: External ear normal.      Nose: No congestion or rhinorrhea.      Mouth/Throat:      Mouth: Mucous membranes are moist.   Eyes:      General: No scleral icterus.        Right eye: No discharge.         Left eye: No discharge.      Pupils: Pupils are equal, round, and reactive to light.   Cardiovascular:      Rate and Rhythm: Normal rate and regular rhythm.   Pulmonary:      Breath sounds: Wheezing and rales present.      Comments: Requiring 4 L of oxygen to achieve adequate saturation.  Reduced air entry bilaterally.  Scattered wheeze.  Is able to speak in full sentences  Abdominal:      General: There is no distension.   Musculoskeletal:      Cervical back: Neck supple. No rigidity. No muscular tenderness.      Right lower leg: No edema.      Left lower leg: No edema.   Skin:     Coloration: Skin is not jaundiced or pale.   Neurological:      Mental Status: He is alert and oriented to person, place, and time.      Coordination: Coordination normal.   Psychiatric:         Mood and Affect: Mood normal.         Behavior: Behavior normal.       Fluids    Intake/Output Summary (Last 24 hours) at 4/8/2025 1954  Last data filed at 4/8/2025 1913  Gross per 24 hour   Intake 800 ml   Output 2350 ml   Net -1550 ml      Laboratory  Recent Labs     04/07/25  1100 04/08/25  0214   WBC 12.0* 11.6*   RBC 4.35* 3.81*   HEMOGLOBIN 12.8* 11.3*   HEMATOCRIT 39.4* 35.0*   MCV 90.6 91.9   MCH 29.4 29.7   MCHC 32.5 32.3   RDW 56.0* 57.5*   PLATELETCT 347 287   MPV 8.8* 9.2     Recent Labs     04/07/25  1100 04/08/25  0214   SODIUM 136 141   POTASSIUM  3.4* 3.8   CHLORIDE 102 108   CO2 21 24   GLUCOSE 118* 129*   BUN 20 19   CREATININE 1.60* 1.22   CALCIUM 8.5 8.2*     Recent Labs     04/07/25  1100   APTT 25.6   INR 0.91         Recent Labs     04/08/25  0214   TRIGLYCERIDE 57   HDL 57   LDL 79     Imaging  EC-ECHOCARDIOGRAM COMPLETE W/O CONT   Final Result      CT-CTA CHEST PULMONARY ARTERY W/ RECONS   Final Result      1.  Bilateral lobar and segmental pulmonary emboli. RV/LV ratio is elevated at 1.3 consistent with a component of right heart strain.      2.  Again seen volume loss and consolidation within the left lower lobe and atelectasis within the right lower lobe with bilateral, right greater than left pleural effusions.      3.  Atherosclerotic vascular calcification.      4.  Fatty liver.      5.  This was discussed with MIGNON URRUTIA at 12:56 PM on 4/7/2025.         Assessment/Plan  * Bilateral pulmonary embolism (HCC)- (present on admission)  Assessment & Plan  Echocardiography ordered to rule out cardiac strain    Continue systemic anticoagulation with heparin drip   Oxygen as needed, Respiratory protocol, Incentive spirometry  Critical care / pulmonary, consulted, appreciate recommendations     Adenocarcinoma of lung (HCC), stage 4 ([facility] Oct. 2021)- (present on admission)  Assessment & Plan  Patient is currently on chemotherapy   Holding for now given his RSV infection, and acute pulmonary embolism.    Macrocytic anemia- (present on admission)  Assessment & Plan  Continue to monitor hemoglobin. I will order a follow-up CBC.  Transfuse for a hemoglobin of less than or equal to 7.      Hypertension- (present on admission)  Assessment & Plan  Resume amlodipine with hold parameters.  Will start labetalol with hold parameters    Chronic pain- (present on admission)  Assessment & Plan  For chronic pain patient will remain on a fentanyl patch and 25 mcg/hr  I will continue him on oxycodone as needed, I will also continue on hydromorphone as needed      Brain lesion- (present on admission)  Assessment & Plan  He does have brain and spreading of his adenocarcinoma the lung   Seizure precautions.     Adrenal insufficiency (HCC), secondary- (present on admission)  Assessment & Plan  I will continue him on adrenal support with hydrocortisone 20 mg twice daily  Consider using stress dose steroids based on blood pressure and clinical course     Chronic kidney disease, stage 3a- (present on admission)  Assessment & Plan  Avoid/minimize nephrotoxins as much as possible, renally dose medications, monitor inputs and outputs     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)  Assessment & Plan  Resume tamsulosin    Vitamin D deficiency- (present on admission)  Assessment & Plan  I will continue on vitamin D supplementation 1000 units daily     Hyperlipidemia- (present on admission)  Assessment & Plan  Cardiac diet.  Resume atorvastatin    GERD (gastroesophageal reflux disease)- (present on admission)  Assessment & Plan  I will continue on omeprazole 40 mg daily     Obstructive sleep apnea- (present on admission)  Assessment & Plan  I will continue with nocturnal oxygen support     Hypothyroid- (present on admission)  Assessment & Plan  Resume home thyroid medications       VTE prophylaxis: SCDs, heparin drip    Total time spent is 52 minutes.  This included review of patient chart since admission, face-to-face interview, physical examination, lab review and analysis.  In addition, I spoke with patient and nurse, charge nurse, pharmacy, case management during discharge planning IDT rounds.

## 2025-04-09 LAB
ANION GAP SERPL CALC-SCNC: 10 MMOL/L (ref 7–16)
BACTERIA UR CULT: NORMAL
BUN SERPL-MCNC: 21 MG/DL (ref 8–22)
CALCIUM SERPL-MCNC: 8.4 MG/DL (ref 8.5–10.5)
CHLORIDE SERPL-SCNC: 105 MMOL/L (ref 96–112)
CO2 SERPL-SCNC: 23 MMOL/L (ref 20–33)
CREAT SERPL-MCNC: 1.28 MG/DL (ref 0.5–1.4)
ERYTHROCYTE [DISTWIDTH] IN BLOOD BY AUTOMATED COUNT: 57.8 FL (ref 35.9–50)
GFR SERPLBLD CREATININE-BSD FMLA CKD-EPI: 57 ML/MIN/1.73 M 2
GLUCOSE SERPL-MCNC: 88 MG/DL (ref 65–99)
HCT VFR BLD AUTO: 36.6 % (ref 42–52)
HGB BLD-MCNC: 11.8 G/DL (ref 14–18)
MCH RBC QN AUTO: 29.4 PG (ref 27–33)
MCHC RBC AUTO-ENTMCNC: 32.2 G/DL (ref 32.3–36.5)
MCV RBC AUTO: 91.3 FL (ref 81.4–97.8)
PLATELET # BLD AUTO: 292 K/UL (ref 164–446)
PMV BLD AUTO: 9.1 FL (ref 9–12.9)
POTASSIUM SERPL-SCNC: 3.7 MMOL/L (ref 3.6–5.5)
RBC # BLD AUTO: 4.01 M/UL (ref 4.7–6.1)
SIGNIFICANT IND 70042: NORMAL
SITE SITE: NORMAL
SODIUM SERPL-SCNC: 138 MMOL/L (ref 135–145)
SOURCE SOURCE: NORMAL
UFH PPP CHRO-ACNC: 0.46 IU/ML
WBC # BLD AUTO: 11.5 K/UL (ref 4.8–10.8)

## 2025-04-09 PROCEDURE — 85027 COMPLETE CBC AUTOMATED: CPT

## 2025-04-09 PROCEDURE — 770020 HCHG ROOM/CARE - TELE (206)

## 2025-04-09 PROCEDURE — 94760 N-INVAS EAR/PLS OXIMETRY 1: CPT

## 2025-04-09 PROCEDURE — 36415 COLL VENOUS BLD VENIPUNCTURE: CPT

## 2025-04-09 PROCEDURE — 700102 HCHG RX REV CODE 250 W/ 637 OVERRIDE(OP): Performed by: HOSPITALIST

## 2025-04-09 PROCEDURE — 85520 HEPARIN ASSAY: CPT

## 2025-04-09 PROCEDURE — 99232 SBSQ HOSP IP/OBS MODERATE 35: CPT | Performed by: INTERNAL MEDICINE

## 2025-04-09 PROCEDURE — 80048 BASIC METABOLIC PNL TOTAL CA: CPT

## 2025-04-09 PROCEDURE — A9270 NON-COVERED ITEM OR SERVICE: HCPCS | Performed by: HOSPITALIST

## 2025-04-09 PROCEDURE — 99233 SBSQ HOSP IP/OBS HIGH 50: CPT | Performed by: HOSPITALIST

## 2025-04-09 RX ORDER — BISACODYL 10 MG
10 SUPPOSITORY, RECTAL RECTAL DAILY
Status: DISCONTINUED | OUTPATIENT
Start: 2025-04-09 | End: 2025-04-10 | Stop reason: HOSPADM

## 2025-04-09 RX ADMIN — GUAIFENESIN 600 MG: 600 TABLET, EXTENDED RELEASE ORAL at 16:45

## 2025-04-09 RX ADMIN — BENZONATATE 100 MG: 100 CAPSULE ORAL at 12:00

## 2025-04-09 RX ADMIN — Medication 1000 UNITS: at 04:55

## 2025-04-09 RX ADMIN — FOLIC ACID 1 MG: 1 TABLET ORAL at 04:55

## 2025-04-09 RX ADMIN — HYDROCORTISONE 20 MG: 10 TABLET ORAL at 04:56

## 2025-04-09 RX ADMIN — POTASSIUM CHLORIDE 20 MEQ: 1500 TABLET, EXTENDED RELEASE ORAL at 04:55

## 2025-04-09 RX ADMIN — LEVOTHYROXINE SODIUM 125 MCG: 0.12 TABLET ORAL at 04:55

## 2025-04-09 RX ADMIN — GUAIFENESIN SYRUP AND DEXTROMETHORPHAN 10 ML: 100; 10 SYRUP ORAL at 10:08

## 2025-04-09 RX ADMIN — APIXABAN 10 MG: 5 TABLET, FILM COATED ORAL at 11:57

## 2025-04-09 RX ADMIN — HYDROCORTISONE 20 MG: 10 TABLET ORAL at 16:45

## 2025-04-09 RX ADMIN — GUAIFENESIN SYRUP AND DEXTROMETHORPHAN 10 ML: 100; 10 SYRUP ORAL at 16:45

## 2025-04-09 RX ADMIN — AMLODIPINE BESYLATE 5 MG: 5 TABLET ORAL at 04:55

## 2025-04-09 RX ADMIN — TAMSULOSIN HYDROCHLORIDE 0.4 MG: 0.4 CAPSULE ORAL at 04:55

## 2025-04-09 RX ADMIN — BENZONATATE 100 MG: 100 CAPSULE ORAL at 04:11

## 2025-04-09 RX ADMIN — ATORVASTATIN CALCIUM 20 MG: 20 TABLET, FILM COATED ORAL at 16:45

## 2025-04-09 RX ADMIN — GUAIFENESIN 600 MG: 600 TABLET, EXTENDED RELEASE ORAL at 04:55

## 2025-04-09 RX ADMIN — GABAPENTIN 100 MG: 100 CAPSULE ORAL at 16:46

## 2025-04-09 RX ADMIN — OMEPRAZOLE 40 MG: 20 CAPSULE, DELAYED RELEASE ORAL at 04:55

## 2025-04-09 RX ADMIN — LIOTHYRONINE SODIUM 10 MCG: 5 TABLET ORAL at 04:56

## 2025-04-09 RX ADMIN — APIXABAN 10 MG: 5 TABLET, FILM COATED ORAL at 16:45

## 2025-04-09 RX ADMIN — TAMSULOSIN HYDROCHLORIDE 0.4 MG: 0.4 CAPSULE ORAL at 16:51

## 2025-04-09 ASSESSMENT — COGNITIVE AND FUNCTIONAL STATUS - GENERAL
CLIMB 3 TO 5 STEPS WITH RAILING: A LITTLE
DAILY ACTIVITIY SCORE: 24
SUGGESTED CMS G CODE MODIFIER MOBILITY: CJ
WALKING IN HOSPITAL ROOM: A LITTLE
SUGGESTED CMS G CODE MODIFIER DAILY ACTIVITY: CH
MOBILITY SCORE: 22

## 2025-04-09 ASSESSMENT — ENCOUNTER SYMPTOMS
FOCAL WEAKNESS: 0
HEARTBURN: 0
BRUISES/BLEEDS EASILY: 0
COUGH: 1
NAUSEA: 0
VOMITING: 0
WEIGHT LOSS: 1
SHORTNESS OF BREATH: 1
NERVOUS/ANXIOUS: 0
SPUTUM PRODUCTION: 0
MYALGIAS: 0
FLANK PAIN: 0

## 2025-04-09 ASSESSMENT — PAIN DESCRIPTION - PAIN TYPE
TYPE: ACUTE PAIN

## 2025-04-09 NOTE — PROGRESS NOTES
Late entry:  Bedside report received from Milvia raza RN. Patient awake and alert in bed AOx4. Pt on 1L NC. Pt has strong, dry, unproductive cough. Pt had complaint of 5/10 pain and was administered medication per the MAR. Pt water was refilled and urinal was emptied. Bed is locked and in low position with bed alarm on. Reviewed plan of care with patient. Call light within reach. No other needs at this time.

## 2025-04-09 NOTE — PROGRESS NOTES
Hospital Medicine Daily Progress Note    Date of Service  4/9/2025    Chief Complaint  Bartolo Berrios [facility] is a 80 y.o. male admitted 4/7/2025 with shortness of breath    Hospital Course  Bartolo Berrios [facility] is a 80 y.o. male with a past medical history of metastatic lung cancer, chronic kidney disease and hypertension admitted 4/7/2025 with shortness of breath.  Was found to have pulmonary embolism in addition to RSV pneumonia.  He started on therapeutic anticoagulation with heparin drip.    Interval Problem Update  Patient is alert, awake, answering questions appropriately, vital sign has been reviewed, heart rate 85-88     Patient is very hard of hearing patient from heparin drip to Eliquis  Will obtain home health  - Monitor hemoglobin hematocrit-  --reviewed, noted to have echo preserved ejection fraction, the right ventricle is dilated. Normal right ventricular systolic function.  Patient has a lot of cough, this is likely secondary to his lung cancer      I have discussed this patient's plan of care and discharge plan at IDT rounds today with Case Management, Nursing, Nursing leadership, and other members of the IDT team.    Consultants/Specialty  Pulmonary / critical care    Code Status  Full Code    Disposition  Not medically cleared.  Still has hypoxemic respiratory failure.  Therapeutic anticoagulation.  Echo.  Anticipate discharge in 2 days.    Review of Systems  Review of Systems   Constitutional:  Positive for malaise/fatigue.   HENT:  Negative for congestion.    Respiratory:  Positive for cough and shortness of breath.    Cardiovascular:  Negative for chest pain and leg swelling.   Gastrointestinal:  Negative for heartburn, nausea and vomiting.   Genitourinary:  Negative for flank pain.   Musculoskeletal:  Negative for myalgias.   Neurological:  Negative for focal weakness.   Endo/Heme/Allergies:  Does not bruise/bleed easily.   Psychiatric/Behavioral:  The patient is not  nervous/anxious.       Physical Exam  Temp:  [36.7 °C (98 °F)-37 °C (98.6 °F)] 36.7 °C (98 °F)  Pulse:  [76-88] 88  Resp:  [18] 18  BP: (135-168)/() 147/81  SpO2:  [91 %-93 %] 93 %    Physical Exam  HENT:      Head: Normocephalic and atraumatic.   Eyes:      Pupils: Pupils are equal, round, and reactive to light.   Cardiovascular:      Rate and Rhythm: Normal rate and regular rhythm.   Pulmonary:      Breath sounds: Wheezing and rales present.      Comments: Scattered wheeze, decreased breath sounds at bases  Abdominal:      General: There is no distension.   Musculoskeletal:      Cervical back: Neck supple. No rigidity. No muscular tenderness.      Right lower leg: No edema.      Left lower leg: No edema.   Skin:     Coloration: Skin is not jaundiced or pale.   Neurological:      Mental Status: He is alert and oriented to person, place, and time.      Coordination: Coordination normal.   Psychiatric:         Mood and Affect: Mood normal.         Behavior: Behavior normal.       Fluids    Intake/Output Summary (Last 24 hours) at 4/9/2025 1533  Last data filed at 4/9/2025 0740  Gross per 24 hour   Intake 350 ml   Output 1845 ml   Net -1495 ml      Laboratory  Recent Labs     04/07/25  1100 04/08/25  0214 04/09/25  0106   WBC 12.0* 11.6* 11.5*   RBC 4.35* 3.81* 4.01*   HEMOGLOBIN 12.8* 11.3* 11.8*   HEMATOCRIT 39.4* 35.0* 36.6*   MCV 90.6 91.9 91.3   MCH 29.4 29.7 29.4   MCHC 32.5 32.3 32.2*   RDW 56.0* 57.5* 57.8*   PLATELETCT 347 287 292   MPV 8.8* 9.2 9.1     Recent Labs     04/07/25  1100 04/08/25  0214 04/09/25  0106   SODIUM 136 141 138   POTASSIUM 3.4* 3.8 3.7   CHLORIDE 102 108 105   CO2 21 24 23   GLUCOSE 118* 129* 88   BUN 20 19 21   CREATININE 1.60* 1.22 1.28   CALCIUM 8.5 8.2* 8.4*     Recent Labs     04/07/25  1100   APTT 25.6   INR 0.91         Recent Labs     04/08/25  0214   TRIGLYCERIDE 57   HDL 57   LDL 79     Imaging  EC-ECHOCARDIOGRAM COMPLETE W/O CONT   Final Result      CT-CTA CHEST  PULMONARY ARTERY W/ RECONS   Final Result      1.  Bilateral lobar and segmental pulmonary emboli. RV/LV ratio is elevated at 1.3 consistent with a component of right heart strain.      2.  Again seen volume loss and consolidation within the left lower lobe and atelectasis within the right lower lobe with bilateral, right greater than left pleural effusions.      3.  Atherosclerotic vascular calcification.      4.  Fatty liver.      5.  This was discussed with MIGNON URRUTIA at 12:56 PM on 4/7/2025.         Assessment/Plan  * Bilateral pulmonary embolism (HCC)- (present on admission)  Assessment & Plan  Critical care/pulmonary consulted, procedure recommendation.    Patient was started on heparin drip for 36 hours, will switch to oral Eliquis 10 mg p.o. twice daily for 7 days followed by 5 mg p.o. twice daily  Echocardiogram reviewed, noted to have normal EF, right ventricle is dilated normal right ventricle systolic function  --Provide oxygen as needed    Macrocytic anemia- (present on admission)  Assessment & Plan  Monitor hemoglobin hematocrit, if less than 7, transfuse    Adenocarcinoma of lung (HCC), stage 4 ([facility] Oct. 2021)- (present on admission)  Assessment & Plan  Patient is currently on chemotherapy   Holding for now given his RSV infection, and acute pulmonary embolism.    Hypertension- (present on admission)  Assessment & Plan  Resume amlodipine with hold parameters.  Will start labetalol with hold parameters    Chronic pain- (present on admission)  Assessment & Plan  For chronic pain patient will remain on a fentanyl patch and 25 mcg/hr  I will continue him on oxycodone as needed, I will also continue on hydromorphone as needed     Brain lesion- (present on admission)  Assessment & Plan  He does have brain and spreading of his adenocarcinoma the lung   Seizure precautions.     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)  Assessment & Plan  Resume tamsulosin    Vitamin D  deficiency- (present on admission)  Assessment & Plan  I will continue on vitamin D supplementation 1000 units daily     Adrenal insufficiency (HCC), secondary- (present on admission)  Assessment & Plan  Will  adrenal support with hydrocortisone 20 mg twice daily  Will continue pepcd    Hyperlipidemia- (present on admission)  Assessment & Plan  Cardiac diet.  Resume atorvastatin    GERD (gastroesophageal reflux disease)- (present on admission)  Assessment & Plan  Continue PPI    Obstructive sleep apnea- (present on admission)  Assessment & Plan  I will continue with nocturnal oxygen support     Chronic kidney disease, stage 3a- (present on admission)  Assessment & Plan  Avoid/minimize nephrotoxins as much as possible, renally dose medications, monitor inputs and outputs     Hypothyroid- (present on admission)  Assessment & Plan  Resume home thyroid medications       VTE prophylaxis:  eliquis    Total time spent is 53  minutes.  This included review of patient chart since admission, face-to-face interview, physical examination, lab review and analysis.  In addition, I spoke with patient and nurse, charge nurse, pharmacy, case management during discharge planning IDT rounds.

## 2025-04-09 NOTE — PROGRESS NOTES
Telemetry shift summary    Rhythm: SR  HR: 74-85  Ectopy: R-PAC    Measurements: .20 / .08 / .45    Normal values  Rhythm SR  HR   Measurements: 0.12-0.20/0.08-0.10/0.30-0.52

## 2025-04-09 NOTE — CARE PLAN
The patient is Stable - Low risk of patient condition declining or worsening    Shift Goals  Clinical Goals: Monitor O2, IS, mobilize  Patient Goals: Feel better  Family Goals: BUDDY    Progress made toward(s) clinical / shift goals:      Problem: Pain - Standard  Goal: Alleviation of pain or a reduction in pain to the patient’s comfort goal  Outcome: Progressing  Note: Patient reporting no pain this shift.      Problem: Knowledge Deficit - Standard  Goal: Patient and family/care givers will demonstrate understanding of plan of care, disease process/condition, diagnostic tests and medications  Outcome: Progressing  Note: Patient updated on POC, all questions answered. Coached on using IS.       Patient is not progressing towards the following goals:

## 2025-04-09 NOTE — FACE TO FACE
Face to Face Supporting Documentation - Home Health    The encounter with this patient was in whole or in part the primary reason for home health admission.    Date of encounter:   Patient:                    MRN:                       YOB: 2023  Bartolo Berrios [facility]  [id number]  1945     Home health to see patient for:  Skilled Nursing care for assessment, interventions & education, Physical Therapy evaluation and treatment, and Occupational therapy evaluation and treatment    Skilled need for:  Medication Management medication management     Skilled nursing interventions to include:  Comment: medical management     Homebound status evidenced by:  Need the aid of supportive devices such as crutches, canes, wheelchairs or walkers. Leaving home requires a considerable and taxing effort. There is a normal inability to leave the home.    Community Physician to provide follow up care: Lesly Munoz M.D.     Optional Interventions? No      I certify the face to face encounter for this home health care referral meets the CMS requirements and the encounter/clinical assessment with the patient was, in whole, or in part, for the medical condition(s) listed above, which is the primary reason for home health care. Based on my clinical findings: the service(s) are medically necessary, support the need for home health care, and the homebound criteria are met.  I certify that this patient has had a face to face encounter by myself.  Kaelyn Epps M.D. - NPI: [id number]

## 2025-04-09 NOTE — PROGRESS NOTES
Pulmonary Progress Note    Date of admission  4/7/2025    Chief Complaint  80 y.o. male admitted 4/7/2025 with respiratory failure due to PE.    Hospital Course  80 y.o. male who presented 4/7/2025 with complaints of 6 weeks of dyspnea.  He has a past medical history of adenocarcinoma of the right lung status post wedge resection in 2019 with recurrence to the bone in 2021 treated with palliative radiation and tagrisso.  Oncology history further notable for metastatic melanoma of the left leg in 2016.  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       Recently hospitalized at [facility] for shortness of breath found to be influenza A +, treated with 5 days of Tamiflu.  Treated for suspected superimposed bacterial pneumonia as well.  CT at that time showed a right pleural effusion which thoracentesis on 3/20 demonstrated metastatic adenocarcinoma consistent with lung origin.     Over the last couple days has gotten worsening shortness of breath.  CT angiogram of the chest in the ED demonstrated bilateral pulmonary emboli.  Heart rate 106 on admission, now down to under 100.  Slightly hypertensive.  Requiring 4 L to maintain adequate saturations.  Hemoglobin 12.8.  Platelets WNL.  RSV positive.  CTA personally reviewed showing bilateral lobar and segmental pulmonary emboli with elevated RV/LV ratio.  Volume loss/consolidation within the left lower lobe and right greater than left pleural effusion.     Interval Problem Update  Reviewed last 24 hour events:  No acute events overnight   Still complianing of chest tightness but O2 reqs improving, 6 -> 1L  SBP stable 140-150ss  Tachycardia resolved  RSV+  Hb stable  Lactic acid normalized  Cardiac enzymes downtrending  TTE: RV dilated with normal function, RVSP 35. Normal LVSF    Review of Systems  Review of Systems   Constitutional:  Positive for malaise/fatigue and weight loss.   Respiratory:   Positive for cough and shortness of breath. Negative for sputum production.       Vital Signs for last 24 hours   Temp:  [36.7 °C (98 °F)-37 °C (98.6 °F)] 37 °C (98.6 °F)  Pulse:  [76-87] 85  Resp:  [16-18] 18  BP: (135-168)/() 159/83  SpO2:  [91 %-97 %] 91 %    Physical Exam   Physical Exam  Vitals and nursing note reviewed.   Constitutional:       General: He is not in acute distress.     Appearance: He is well-developed. He is ill-appearing. He is not diaphoretic.      Comments: Very pleasant   HENT:      Nose: Nose normal.      Mouth/Throat:      Pharynx: No oropharyngeal exudate.   Eyes:      General: No scleral icterus.        Right eye: No discharge.         Left eye: No discharge.      Conjunctiva/sclera: Conjunctivae normal.      Pupils: Pupils are equal, round, and reactive to light.   Neck:      Thyroid: No thyromegaly.      Vascular: No JVD.      Trachea: No tracheal deviation.   Cardiovascular:      Rate and Rhythm: Normal rate and regular rhythm.      Heart sounds: Normal heart sounds. No murmur heard.  Pulmonary:      Effort: No respiratory distress.      Breath sounds: No stridor. Wheezing present. No rales.      Comments: Nonproductive cough  Abdominal:      General: There is no distension.      Palpations: Abdomen is soft.      Tenderness: There is no abdominal tenderness. There is no guarding.   Musculoskeletal:         General: No tenderness. Normal range of motion.      Cervical back: Neck supple.      Right lower leg: Edema (trace) present.      Left lower leg: Edema (trace) present.   Lymphadenopathy:      Cervical: No cervical adenopathy.   Skin:     General: Skin is warm and dry.      Capillary Refill: Capillary refill takes less than 2 seconds.      Coloration: Skin is not pale.      Findings: No erythema.   Neurological:      Mental Status: He is alert and oriented to person, place, and time.      Sensory: No sensory deficit.      Motor: No abnormal muscle tone.      Coordination:  Coordination normal.      Deep Tendon Reflexes: Reflexes normal.   Psychiatric:         Behavior: Behavior normal.         Thought Content: Thought content normal.         Judgment: Judgment normal.       Medications  Current Facility-Administered Medications   Medication Dose Route Frequency Provider Last Rate Last Admin    bisacodyl (Dulcolax) suppository 10 mg  10 mg Rectal DAILY Kaelyn Epps M.D.        magnesium hydroxide (Milk Of Magnesia) suspension 15 mL  15 mL Oral QDAY PRN Kaelyn Epps M.D.        benzonatate (Tessalon) capsule 100 mg  100 mg Oral Q4HRS PRN Brooklynn Benson M.D.   100 mg at 04/09/25 0411    guaiFENesin dextromethorphan (Robitussin DM) 100-10 MG/5ML syrup 10 mL  10 mL Oral Q6HRS PRN Brooklynn Benson M.D.   10 mL at 04/08/25 2207    heparin infusion 25,000 units in 500 mL 0.45% NACL  0-30 Units/kg/hr Intravenous Continuous Eliazar Bailey M.D. 18.3 mL/hr at 04/09/25 0658 13 Units/kg/hr at 04/09/25 0658    heparin injection 2,800 Units  40 Units/kg Intravenous PRN Eliazar Bailey M.D.        albuterol inhaler 2 Puff  2 Puff Inhalation Q4HRS PRN Eliazar Bailey M.D.        amLODIPine (Norvasc) tablet 5 mg  5 mg Oral QAM Eliazar Bailey M.D.   5 mg at 04/09/25 0455    atorvastatin (Lipitor) tablet 20 mg  20 mg Oral Q EVENING DAGOBERTO LizarragaDRenzo   20 mg at 04/08/25 1721    fentaNYL (Duragesic) 25 MCG/HR 1 Patch  1 Patch Transdermal Q72HRS Eliazar Bailey M.D.        folic acid (Folvite) tablet 1 mg  1 mg Oral DAILY DAGOBERTO LizarragaDRenzo   1 mg at 04/09/25 0455    gabapentin (Neurontin) capsule 100 mg  100 mg Oral Q EVENING DAGOBERTO LizarragaDRenzo   100 mg at 04/08/25 1722    guaiFENesin ER (Mucinex) tablet 600 mg  600 mg Oral Q12HRS Eliazar Bailey M.D.   600 mg at 04/09/25 0455    hydrocortisone (Cortef) tablet 20 mg  20 mg Oral BID Eliazar Bailey M.D.   20 mg at 04/09/25 0456    liothyronine (Cytomel) tablet 10 mcg  10 mcg Oral DAILY Eliazar Bailey M.D.   10 mcg at 04/09/25 0456    omeprazole  (PriLOSEC) capsule 40 mg  40 mg Oral DAILY Eliazar Bailey M.D.   40 mg at 04/09/25 0455    potassium chloride SA (Kdur) tablet 20 mEq  20 mEq Oral DAILY Eliazar Bailey M.D.   20 mEq at 04/09/25 0455    tamsulosin (Flomax) capsule 0.4 mg  0.4 mg Oral BID Eliazar Bailey M.D.   0.4 mg at 04/09/25 0455    levothyroxine (Synthroid) tablet 125 mcg  125 mcg Oral AM ES Eliazar Bailey M.D.   125 mcg at 04/09/25 0455    Respiratory Therapy Consult   Nebulization Continuous RT Eliazar Bailey M.D.        acetaminophen (Tylenol) tablet 650 mg  650 mg Oral Q6HRS PRN Eliazar Bailey M.D.        Pharmacy Consult Request ...Pain Management Review 1 Each  1 Each Other PHARMACY TO DOSE Eliazar Bailey M.D.        oxyCODONE immediate-release (Roxicodone) tablet 5 mg  5 mg Oral Q3HRS PRN Eliazar Bailey M.D.   5 mg at 04/08/25 1924    Or    oxyCODONE immediate release (Roxicodone) tablet 10 mg  10 mg Oral Q3HRS PRN Eliazar Bailey M.D.        Or    HYDROmorphone (Dilaudid) injection 0.5 mg  0.5 mg Intravenous Q3HRS PRN Eliazar Bailey M.D.        labetalol (Normodyne/Trandate) injection 10 mg  10 mg Intravenous Q4HRS PRN Eliazar Bailey M.D.        ondansetron (Zofran) syringe/vial injection 4 mg  4 mg Intravenous Q4HRS PRN Eliazar Bailey M.D.        Or    ondansetron (Zofran ODT) dispertab 4 mg  4 mg Oral Q4HRS PRN Eliazar Bailey M.D.        senna-docusate (Pericolace Or Senokot S) 8.6-50 MG per tablet 2 Tablet  2 Tablet Oral Q EVENING Eliazar Bailey M.D.        And    polyethylene glycol/lytes (Miralax) Packet 1 Packet  1 Packet Oral QDAY PRN Eliazar Bailey M.D.        vitamin D3 (Cholecalciferol) tablet 1,000 Units  1,000 Units Oral DAILY Eliazar Bailey M.D.   1,000 Units at 04/09/25 0455     Fluids    Intake/Output Summary (Last 24 hours) at 4/9/2025 0909  Last data filed at 4/9/2025 0740  Gross per 24 hour   Intake 650 ml   Output 2395 ml   Net -1745 ml     Laboratory          Recent Labs     04/07/25  1100 04/08/25  0214 04/09/25  0106    SODIUM 136 141 138   POTASSIUM 3.4* 3.8 3.7   CHLORIDE 102 108 105   CO2 21 24 23   BUN 20 19 21   CREATININE 1.60* 1.22 1.28   CALCIUM 8.5 8.2* 8.4*     Recent Labs     04/07/25  1100 04/08/25  0214 04/09/25  0106   ALTSGPT 12  --   --    ASTSGOT 24  --   --    ALKPHOSPHAT 99  --   --    TBILIRUBIN 0.7  --   --    GLUCOSE 118* 129* 88     Recent Labs     04/07/25  1100 04/08/25  0214 04/09/25  0106   WBC 12.0* 11.6* 11.5*   NEUTSPOLYS 89.60*  --   --    LYMPHOCYTES 6.90*  --   --    MONOCYTES 1.70  --   --    EOSINOPHILS 0.00  --   --    BASOPHILS 0.00  --   --    ASTSGOT 24  --   --    ALTSGPT 12  --   --    ALKPHOSPHAT 99  --   --    TBILIRUBIN 0.7  --   --      Recent Labs     04/07/25  1100 04/08/25 0214 04/09/25  0106   RBC 4.35* 3.81* 4.01*   HEMOGLOBIN 12.8* 11.3* 11.8*   HEMATOCRIT 39.4* 35.0* 36.6*   PLATELETCT 347 287 292   PROTHROMBTM 12.6  --   --    APTT 25.6  --   --    INR 0.91  --   --      Imaging  CT:    Reviewed  Echo:   Reviewed    Assessment/Plan    #Stage IV lung adenocarcinoma of the left lung complicated by bone metastasis and malignant pleural effusion  #RSV pneumonia  #Acute bilateral pulmonary emboli  #Recent influenza A pneumonia  #Acute hypoxemic respiratory failure due to above  #History of metastatic melanoma in remission     -- transition to DOAC indefinitely due to underlying malignancy as hypercoagulable risk factor  -- supportive care for RSV  -- hold immunotherapy   -- Titrate supplemental O2 to maintain saturations 88-92%, ambulatory O2 walk test prior to discharge    I have performed a physical exam and reviewed and updated ROS and Plan today (4/9/2025). In review of yesterday's note (4/8/2025), there are no changes except as documented above.     This note was generated using voice recognition software which has a chance of producing errors of grammar and content.  I have made every reasonable attempt to find and correct any errors, but it should be expected that some may  not be found prior to finalization of this note.  __________  Alex Kohler MD  Pulmonary and Critical Care Medicine  [facility]

## 2025-04-09 NOTE — CARE PLAN
The patient is Stable - Low risk of patient condition declining or worsening    Shift Goals  Clinical Goals: Heparin dip, manage pain, monitor O2 sat  Patient Goals: Sleep and feel better  Family Goals: BUDDY    Progress made toward(s) clinical / shift goals: Pt is AO x4. Pt had heparin drip rate verified three times during this shift. Pt was on 1L NC and he remained >90% 02 sat during this shift. Pt had medication administered for pain per the MAR and the six rights for 5/10 pain. VSS. Call light within reach, bed in lowest position and locked, and bed alarm is armed.      Problem: Pain - Standard  Goal: Alleviation of pain or a reduction in pain to the patient’s comfort goal  Outcome: Progressing     Problem: Fall Risk  Goal: Patient will remain free from falls  Outcome: Progressing     Problem: Physical Regulation  Goal: Signs and symptoms of infection will decrease  Outcome: Progressing       Patient is not progressing towards the following goals:

## 2025-04-10 VITALS
HEART RATE: 76 BPM | OXYGEN SATURATION: 95 % | HEIGHT: 72 IN | SYSTOLIC BLOOD PRESSURE: 137 MMHG | RESPIRATION RATE: 17 BRPM | DIASTOLIC BLOOD PRESSURE: 90 MMHG | WEIGHT: 151.24 LBS | BODY MASS INDEX: 20.48 KG/M2 | TEMPERATURE: 98.4 F

## 2025-04-10 LAB
ACTH PLAS-MCNC: <1.5 PG/ML (ref 7.2–63.3)
ALBUMIN SERPL BCP-MCNC: 3.1 G/DL (ref 3.2–4.9)
ANION GAP SERPL CALC-SCNC: 9 MMOL/L (ref 7–16)
BUN SERPL-MCNC: 21 MG/DL (ref 8–22)
CALCIUM ALBUM COR SERPL-MCNC: 9.3 MG/DL (ref 8.5–10.5)
CALCIUM SERPL-MCNC: 8.6 MG/DL (ref 8.5–10.5)
CHLORIDE SERPL-SCNC: 104 MMOL/L (ref 96–112)
CO2 SERPL-SCNC: 24 MMOL/L (ref 20–33)
CREAT SERPL-MCNC: 1.31 MG/DL (ref 0.5–1.4)
ERYTHROCYTE [DISTWIDTH] IN BLOOD BY AUTOMATED COUNT: 57.3 FL (ref 35.9–50)
GFR SERPLBLD CREATININE-BSD FMLA CKD-EPI: 56 ML/MIN/1.73 M 2
GLUCOSE SERPL-MCNC: 138 MG/DL (ref 65–99)
HCT VFR BLD AUTO: 37.8 % (ref 42–52)
HGB BLD-MCNC: 12.2 G/DL (ref 14–18)
MCH RBC QN AUTO: 29.3 PG (ref 27–33)
MCHC RBC AUTO-ENTMCNC: 32.3 G/DL (ref 32.3–36.5)
MCV RBC AUTO: 90.6 FL (ref 81.4–97.8)
PHOSPHATE SERPL-MCNC: 2.6 MG/DL (ref 2.5–4.5)
PLATELET # BLD AUTO: 286 K/UL (ref 164–446)
PMV BLD AUTO: 9.2 FL (ref 9–12.9)
POTASSIUM SERPL-SCNC: 3.5 MMOL/L (ref 3.6–5.5)
RBC # BLD AUTO: 4.17 M/UL (ref 4.7–6.1)
SODIUM SERPL-SCNC: 137 MMOL/L (ref 135–145)
WBC # BLD AUTO: 8.1 K/UL (ref 4.8–10.8)

## 2025-04-10 PROCEDURE — 80069 RENAL FUNCTION PANEL: CPT

## 2025-04-10 PROCEDURE — 700102 HCHG RX REV CODE 250 W/ 637 OVERRIDE(OP): Performed by: HOSPITALIST

## 2025-04-10 PROCEDURE — 85027 COMPLETE CBC AUTOMATED: CPT

## 2025-04-10 PROCEDURE — A9270 NON-COVERED ITEM OR SERVICE: HCPCS | Performed by: HOSPITALIST

## 2025-04-10 PROCEDURE — 700111 HCHG RX REV CODE 636 W/ 250 OVERRIDE (IP): Performed by: HOSPITALIST

## 2025-04-10 PROCEDURE — 36415 COLL VENOUS BLD VENIPUNCTURE: CPT

## 2025-04-10 PROCEDURE — 99239 HOSP IP/OBS DSCHRG MGMT >30: CPT | Performed by: HOSPITALIST

## 2025-04-10 RX ORDER — POLYETHYLENE GLYCOL 3350 17 G/17G
17 POWDER, FOR SOLUTION ORAL
Qty: 30 EACH | Refills: 0 | Status: SHIPPED | OUTPATIENT
Start: 2025-04-10 | End: 2025-05-10

## 2025-04-10 RX ORDER — AMOXICILLIN 250 MG
2 CAPSULE ORAL EVERY EVENING
Qty: 30 TABLET | Refills: 0 | Status: SHIPPED | OUTPATIENT
Start: 2025-04-10

## 2025-04-10 RX ADMIN — Medication 1000 UNITS: at 05:57

## 2025-04-10 RX ADMIN — POTASSIUM CHLORIDE 20 MEQ: 1500 TABLET, EXTENDED RELEASE ORAL at 05:56

## 2025-04-10 RX ADMIN — FOLIC ACID 1 MG: 1 TABLET ORAL at 05:57

## 2025-04-10 RX ADMIN — HYDROCORTISONE 20 MG: 10 TABLET ORAL at 05:54

## 2025-04-10 RX ADMIN — LIOTHYRONINE SODIUM 10 MCG: 5 TABLET ORAL at 05:54

## 2025-04-10 RX ADMIN — LEVOTHYROXINE SODIUM 125 MCG: 0.12 TABLET ORAL at 05:54

## 2025-04-10 RX ADMIN — BENZONATATE 100 MG: 100 CAPSULE ORAL at 01:45

## 2025-04-10 RX ADMIN — TAMSULOSIN HYDROCHLORIDE 0.4 MG: 0.4 CAPSULE ORAL at 05:57

## 2025-04-10 RX ADMIN — LABETALOL HYDROCHLORIDE 10 MG: 5 INJECTION, SOLUTION INTRAVENOUS at 05:59

## 2025-04-10 RX ADMIN — GUAIFENESIN 600 MG: 600 TABLET, EXTENDED RELEASE ORAL at 05:54

## 2025-04-10 RX ADMIN — APIXABAN 10 MG: 5 TABLET, FILM COATED ORAL at 05:56

## 2025-04-10 RX ADMIN — OMEPRAZOLE 40 MG: 20 CAPSULE, DELAYED RELEASE ORAL at 06:35

## 2025-04-10 RX ADMIN — AMLODIPINE BESYLATE 5 MG: 5 TABLET ORAL at 05:57

## 2025-04-10 ASSESSMENT — FIBROSIS 4 INDEX: FIB4 SCORE: 1.889509971893320684

## 2025-04-10 ASSESSMENT — PAIN DESCRIPTION - PAIN TYPE: TYPE: ACUTE PAIN

## 2025-04-10 NOTE — PROGRESS NOTES
Received report from nightshift RHODA Elizabeth. Patient resting in bed, in no distress. Call light within reach, no needs at this time.

## 2025-04-10 NOTE — PROGRESS NOTES
Telemetry Shift Summary     Rhythm SR with BBB  HR Range 75-89  Ectopy fPAC/rPVC  Measurements  0.16/0.18/0.42     Normal Values  Rhythm SR  HR Range    Measurements 0.12-0.20 / 0.06-0.10  / 0.30-0.52

## 2025-04-10 NOTE — DISCHARGE PLANNING
Received Choice Form at: 7083  Agency/Facility Name: [facility] Referral per Choice Form at: 0222

## 2025-04-10 NOTE — DISCHARGE PLANNING
Received Choice Form at:   Agency/Facility Name:  [facility], Florencio , Ryan GARCIA  Sent Referral per Choice Form at: 8022

## 2025-04-10 NOTE — PROGRESS NOTES
Telemetry Shift Summary     Rhythm: SR/ST with a 1st degree heart block  Rate:   Measurements: 0.21/0.11/0.46  Ectopy (reported by Monitor Tech): rPAC/PVC     Normal Values  Rhythm: Sinus  HR:   Measurements: 0.12-0.20/0.06-0.10/0.30-0.52

## 2025-04-10 NOTE — PROGRESS NOTES
Report received from RHODA Guadalupe. Patient in bed resting, floor clear of hazards, bed locked in lowest position, bed alarm on. Introduced self to patient. Patient alert, pain 2/10 chest/back. Breathing appears even and unlabored. Given patient water bottle, patient reports no further needs at this time.

## 2025-04-10 NOTE — DISCHARGE PLANNING
Case Management Discharge Planning    Admission Date: 4/7/2025  GMLOS: 3.8  ALOS: 3    6-Clicks ADL Score: 24  6-Clicks Mobility Score: 22      Anticipated Discharge Dispo: Discharge Disposition: Discharged to home/self care (01)  Discharge Address: [address] [place], [place] [address] [place] [address]  Discharge Contact Phone Number: [phone]    DME Needed: Yes    DME Ordered: Yes    Action(s) Taken:     0930: Spoke to pt and pt's daughter-in-law Luz Maria at bedside. Choice from completed for FWW from [facility] and HH with 1)Dorothy 2)[facility] 3)[employer]. Choice forms faxed to [facility]. Bedside RN to deliver FWW to pt's bedside.    1017: Spoke to [facility] pharmacy for Eliquis price check. Per pharmacist, insurance rejected prescription as it is written. They will not approve for 2 tablets, 2 times a day. MD notified via Voalte. MD to correct prescription.     1145: Per chart, [facility] accepted. Pt has discharged home. No changes to Eliquis prescription noted at this time.     Escalations Completed: None    Medically Clear: Yes    Next Steps: DC home today with [facility] and FWW from [facility].    Barriers to Discharge: None

## 2025-04-10 NOTE — CARE PLAN
The patient is Watcher - Medium risk of patient condition declining or worsening    Shift Goals  Clinical Goals: Monitor oxygen and respiratory status, sleep environment, safety  Patient Goals: Rest and sleep  Family Goals: BUDDY    Progress made toward(s) clinical / shift goals: Patient able to sleep comfortably during most of shift. Given medication for coughing to help with sleep and kept room dark and quiet with floor lights on and bed alarm on. Implemented SZR precautions per MD note. Patient urinating, pain constant but barely noticeable, declines pain intervention aside from rest. VSS during shift.    Problem: Respiratory  Goal: Patient will achieve/maintain optimum respiratory ventilation and gas exchange  Outcome: Progressing     Problem: Fall Risk  Goal: Patient will remain free from falls  Outcome: Progressing     Problem: Knowledge Deficit - Standard  Goal: Patient and family/care givers will demonstrate understanding of plan of care, disease process/condition, diagnostic tests and medications  Outcome: Progressing     Problem: Hemodynamics  Goal: Patient's hemodynamics, fluid balance and neurologic status will be stable or improve  Outcome: Progressing      Patient is not progressing towards the following goals:

## 2025-04-13 NOTE — HOSPITAL COURSE
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 Oncology history further notable for metastatic melanoma of the left leg in 2016.    He was recently discharged from the hospital on 3/26/2025 after recent treatment for influenza, and bacterial pneumonia with Tamiflu and antibiotics.  To the ER on 4/7/2025 with a complaint of shortness of breath.  CT of the chest consistent bilateral pulmonary embolism, he was started on heparin drip, pulmonary was consulted, who stated, patient noted to have elevated RV/LV ratio.  Of note he was also found to have RSV pneumonia.    Pulmonary started the patient on heparin drip and recommended transitioning to DOAC after 36 hours.  Patient was started on Eliquis on/9/2024, pulmonary recommended discharging the patient.  9/2024 on Eliquis.  Patient will continue Eliquis 10 mg p.o. twice daily for prophy 5 mg 7 days followed by 5 mg p.o. twice daily.    Patient was obtained, patient is doing well without oxygen.  At this time patient medically stable to be discharged home  Patient will follow-up with pulmonary as an outpatient.  For all other chronic medical condition, patient will resume his home medication

## 2025-04-13 NOTE — DISCHARGE SUMMARY
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 Oncology history further notable for metastatic melanoma of the left leg in 2016.    He was recently discharged from the hospital on 3/26/2025 after recent treatment for influenza, and bacterial pneumonia with Tamiflu and antibiotics.  To the ER on 4/7/2025 with a complaint of shortness of breath.  CT of the chest consistent bilateral pulmonary embolism, he was started on heparin drip, pulmonary was consulted, who stated, patient noted to have elevated RV/LV ratio.  Of note he was also found to have RSV pneumonia.    Pulmonary started the patient on heparin drip and recommended transitioning to DOAC after 36 hours.  Patient was started on Eliquis on/9/2024, pulmonary recommended discharging the patient. 4/10/2025 on Eliquis.  Patient will continue Eliquis 10 mg p.o. twice daily for  7 days followed by 5 mg p.o. twice daily.    Patient was obtained, patient is doing well without oxygen.  At this time patient medically stable to be discharged home    Patient will follow-up with pulmonary as an outpatient.  For all other chronic medical condition, patient will resume his home medication.    Therefore, he is discharged in fair and stable condition to home with organized home healthcare and close outpatient follow-up.    The patient met 2-midnight criteria for an inpatient stay at the time of discharge.    Discharge Date  4/10/2025    FOLLOW UP ITEMS POST DISCHARGE  Lesly Munoz,  M.D.  Pulmonary physician    DISCHARGE DIAGNOSES  Principal Problem:    Bilateral pulmonary embolism (HCC) (POA: Yes)  Active Problems:    Hypothyroid (POA: Yes)    Chronic kidney disease, stage 3a (POA: Yes)    Obstructive sleep apnea (POA: Yes)    GERD (gastroesophageal reflux disease) (POA: Yes)    Hyperlipidemia (POA: Yes)    Adrenal insufficiency (HCC), secondary (POA: Yes)    Vitamin D deficiency (POA: Yes)    BPH with obstruction/lower urinary tract symptoms (POA: Yes)    Brain lesion (POA: Yes)    Chronic pain (POA: Yes)    Hypertension (POA: Yes)    Adenocarcinoma of lung (HCC), stage 4 ([facility] Oct. 2021) (POA: Yes)    Macrocytic anemia (POA: Yes)  Resolved Problems:    * No resolved hospital problems. *      FOLLOW UP  Future Appointments   Date Time Provider Department Center   4/29/2025 10:00 AM [facility]     [address]  [facility] [address]  [phone]        Lesly Munoz M.D.  [address]  [facility] [address]  [phone]            MEDICATIONS ON DISCHARGE     Medication List        START taking these medications        Instructions   apixaban 5mg Tabs  Start taking on: April 10, 2025  Commonly known as: Eliquis   Take 2 Tablets by mouth 2 times a day for 6 days, THEN 1 Tablet 2 times a day for 90 days.     polyethylene glycol/lytes Pack  Commonly known as: Miralax   Take 1 Packet by mouth 1 time a day as needed (if no bowel movement in last 2 days) for up to 30 days.  Dose: 17 g     senna-docusate 8.6-50 MG Tabs  Commonly known as: Pericolace Or Senokot S   Take 2 Tablets by mouth every evening.  Dose: 2 Tablet     vitamin D 1000 UNIT Tabs  Commonly known as: Cholecalciferol   Take 1 Tablet by mouth every day.  Dose: 1,000 Units            CHANGE how you take these medications        Instructions   tamsulosin 0.4 MG capsule  What changed:   how much to take  when to take this  Commonly known as: Flomax   Take 2 Capsules by  "mouth every day.  Dose: 0.8 mg            CONTINUE taking these medications        Instructions   acetaminophen 500 MG Tabs  Commonly known as: Tylenol   Take 1,000 mg by mouth every 6 hours as needed. Indications: Pain  Dose: 1,000 mg     albuterol 108 (90 Base) MCG/ACT Aers inhalation aerosol   Inhale 2 Puffs by mouth every four hours as needed for Shortness of Breath for up to 30 days.  Dose: 2 Puff     amLODIPine 5 MG Tabs  Commonly known as: Norvasc   Take 1 Tablet by mouth every morning.  Dose: 5 mg     atorvastatin 20 MG Tabs  Commonly known as: Lipitor   Take 1 Tablet by mouth every day.  Dose: 20 mg     B-12 SL   Place 1 Tablet under the tongue every day.  Dose: 1 Tablet     BD Disp Needles 25G X 5/8\" Misc  Generic drug: NEEDLE (DISP) 25 G   Use as directed for testosterone injections every 7 days     benzonatate 100 MG Caps  Commonly known as: Tessalon   Take 1 Capsule by mouth 3 times a day as needed for Cough.  Dose: 100 mg     folic acid 1 MG Tabs  Commonly known as: Folvite   Take 1 mg by mouth every day.  Dose: 1 mg     gabapentin 100 MG Caps  Commonly known as: Neurontin   Take 100 mg by mouth every evening.  Dose: 100 mg     hydrocortisone 10 MG Tabs  Commonly known as: Cortef   Take 20 mg by mouth 2 times a day. Pt is taken 2 tablets (20MG) BID  Dose: 20 mg     liothyronine 5 MCG Tabs  Commonly known as: Cytomel   Take 10 mcg by mouth every day.  Dose: 10 mcg     MAGNESIUM PO   Take 2 Capsules by mouth every evening. (OTC)  Dose: 2 Capsule     omeprazole 40 MG delayed-release capsule  Commonly known as: PriLOSEC   Take 1 Capsule by mouth every day.  Dose: 40 mg     potassium chloride 8 MEQ tablet  Commonly known as: Klor-Con   Take 2 Tablets by mouth every day.  Dose: 16 mEq     silodosin 8 MG Caps capsule  Commonly known as: Rapaflo   Take 8 mg by mouth 1/2 hour after breakfast.  Dose: 8 mg     tadalafil 20 MG tablet   Take 20 mg by mouth 1 time a day as needed for Erectile Dysfunction. for " erectile dysfunction  Dose: 20 mg     Tagrisso 80 MG Tabs  Generic drug: Osimertinib Mesylate   Take 80 mg by mouth every day.  Dose: 80 mg     testosterone cypionate 200 MG/ML injection  Commonly known as: Depo-Testosterone   Inject 124 mg into the shoulder, thigh, or buttocks every 7 days. On Sunday, pt injects 0.62ML  Dose: 124 mg     Tirosint 125 MCG Caps  Generic drug: Levothyroxine Sodium   Take 125 mcg by mouth every morning on an empty stomach.  Dose: 125 mcg     VITAMIN C PO   Take 1 Tablet by mouth see administration instructions. Pt takes sporidially  Dose: 1 Tablet            STOP taking these medications      Aleve 220 MG tablet  Generic drug: naproxen     amoxicillin-clavulanate 875-125 MG Tabs  Commonly known as: Augmentin     doxycycline 100 MG Tabs  Commonly known as: Vibramycin     fentaNYL 12 MCG/HR Pt72  Commonly known as: Duragesic     predniSONE 10 MG Tabs  Commonly known as: Deltasone            ASK your doctor about these medications        Instructions   fentaNYL 25 MCG/HR Pt72  Commonly known as: Duragesic  Ask about: Should I take this medication?   Place 1 Patch on the skin every 72 hours for 15 days.  Dose: 1 Patch     guaiFENesin  MG Tb12  Commonly known as: Mucinex  Ask about: Should I take this medication?   Take 1 Tablet by mouth every 12 hours for 15 days.  Dose: 600 mg     HYDROcodone homatropine 5-1.5 mg/5 mL Soln  Ask about: Should I take this medication?   Take 5 mL by mouth every 6 hours as needed (cough) for up to 7 days.  Dose: 5 mL              Allergies  Allergies   Allergen Reactions    Gramineae Pollens Itching and Shortness of Breath     Itchy eyes, red face    Cat Hair Extract Hives and Itching    Horse Allergy Hives    Other Environmental     Pollen Extract Runny Nose and Itching     .       DIET  No orders of the defined types were placed in this encounter.      ACTIVITY  As tolerated.  Weight bearing as  tolerated    CONSULTATIONS  Pulm  PROCEDURES  None    LABORATORY  Lab Results   Component Value Date    SODIUM 137 04/10/2025    POTASSIUM 3.5 (L) 04/10/2025    CHLORIDE 104 04/10/2025    CO2 24 04/10/2025    GLUCOSE 138 (H) 04/10/2025    BUN 21 04/10/2025    CREATININE 1.31 04/10/2025        Lab Results   Component Value Date    WBC 8.1 04/10/2025    HEMOGLOBIN 12.2 (L) 04/10/2025    HEMATOCRIT 37.8 (L) 04/10/2025    PLATELETCT 286 04/10/2025        Total time of the discharge process exceeds 39 minutes.

## 2025-04-15 LAB
FUNGUS SPEC CULT: NORMAL
FUNGUS SPEC FUNGUS STN: NORMAL
SIGNIFICANT IND 70042: NORMAL
SITE SITE: NORMAL
SOURCE SOURCE: NORMAL

## 2025-04-16 NOTE — Clinical Note
REFERRAL APPROVAL NOTICE         Sent on April 16, 2025                   [facility]  [address] [place] [address]                   Dear Mr. Forrest,    After a careful review of the medical information and benefit coverage, [facility] has processed your referral. See below for additional details.    If applicable, you must be actively enrolled with your insurance for coverage of the authorized service. If you have any questions regarding your coverage, please contact your insurance directly.    REFERRAL INFORMATION   Referral #:  [id number]  Referred-To Department    Referred-By Provider:  Pulmonary and Sleep Medicine    Kaelyn Epps M.D.   Pulmonary/sleep [facility]      [address]   [place] [address]  [phone] [address] [facility] [address]  [place] [address]  [phone]    Referral Start Date:  04/10/2025  Referral End Date:   04/11/2026           SCHEDULING  If you do not already have an appointment, please call [phone] to make an appointment.   MORE INFORMATION  As a reminder, [facility] ownership has changed, meaning this location is now owned and operated by [facility]. As such, we want to clarify that our patients should expect to receive two separate bills for the services received at [facility] - one representing the [facility] facility fees as the owner of the establishment, and the other to represent the physician's services and subsequent fees. You can speak with your insurance carrier for a pricing estimate by calling the customer service number on the back of your card and ask about charges for a hospital outpatient visit.  If you do not already have a [employer] account, sign up at: "[email or web address]  You can access your medical information, make appointments, see lab results, billing  information, and more.  If you have questions regarding this referral, please contact  the [facility] department at:             [phone]. Monday - Friday 7:30AM - 5:00PM.      Sincerely,  [facility]

## 2025-04-22 DIAGNOSIS — G47.09 SECONDARY INSOMNIA: ICD-10-CM

## 2025-04-22 RX ORDER — TRAZODONE HYDROCHLORIDE 100 MG/1
100 TABLET ORAL NIGHTLY PRN
Qty: 30 TABLET | Refills: 3 | Status: SHIPPED | OUTPATIENT
Start: 2025-04-22

## 2025-04-29 ENCOUNTER — HOSPITAL ENCOUNTER (OUTPATIENT)
Dept: RADIOLOGY | Facility: MEDICAL CENTER | Age: 80
End: 2025-04-29
Payer: MEDICARE

## 2025-04-29 DIAGNOSIS — J32.8 OTHER CHRONIC SINUSITIS: ICD-10-CM

## 2025-04-29 PROCEDURE — 70450 CT HEAD/BRAIN W/O DYE: CPT

## 2025-04-30 ENCOUNTER — OFFICE VISIT (OUTPATIENT)
Dept: INTERNAL MEDICINE | Facility: IMAGING CENTER | Age: 80
End: 2025-04-30
Payer: MEDICARE

## 2025-04-30 VITALS
OXYGEN SATURATION: 97 % | TEMPERATURE: 98.6 F | HEIGHT: 72 IN | HEART RATE: 85 BPM | WEIGHT: 148 LBS | RESPIRATION RATE: 17 BRPM | SYSTOLIC BLOOD PRESSURE: 122 MMHG | BODY MASS INDEX: 20.05 KG/M2 | DIASTOLIC BLOOD PRESSURE: 60 MMHG

## 2025-04-30 DIAGNOSIS — Z79.01 ANTICOAGULATED: ICD-10-CM

## 2025-04-30 DIAGNOSIS — J45.41 MODERATE PERSISTENT REACTIVE AIRWAY DISEASE WITH ACUTE EXACERBATION: ICD-10-CM

## 2025-04-30 DIAGNOSIS — R05.3 CHRONIC COUGH: ICD-10-CM

## 2025-04-30 DIAGNOSIS — Z09 HOSPITAL DISCHARGE FOLLOW-UP: ICD-10-CM

## 2025-04-30 DIAGNOSIS — I26.99 BILATERAL PULMONARY EMBOLISM (HCC): ICD-10-CM

## 2025-04-30 DIAGNOSIS — C34.90 ADENOCARCINOMA OF LUNG, UNSPECIFIED LATERALITY (HCC): ICD-10-CM

## 2025-04-30 PROBLEM — J45.901 REACTIVE AIRWAY DISEASE WITH ACUTE EXACERBATION: Status: ACTIVE | Noted: 2025-04-30

## 2025-04-30 PROCEDURE — 3078F DIAST BP <80 MM HG: CPT | Performed by: FAMILY MEDICINE

## 2025-04-30 PROCEDURE — 3074F SYST BP LT 130 MM HG: CPT | Performed by: FAMILY MEDICINE

## 2025-04-30 PROCEDURE — 99214 OFFICE O/P EST MOD 30 MIN: CPT | Performed by: FAMILY MEDICINE

## 2025-04-30 RX ORDER — ALBUTEROL SULFATE 90 UG/1
2 INHALANT RESPIRATORY (INHALATION) EVERY 4 HOURS PRN
Qty: 8 G | Refills: 3 | Status: ON HOLD | OUTPATIENT
Start: 2025-04-30

## 2025-04-30 RX ORDER — HYDROCODONE BITARTRATE AND HOMATROPINE METHYLBROMIDE ORAL SOLUTION 5; 1.5 MG/5ML; MG/5ML
5 LIQUID ORAL 4 TIMES DAILY PRN
Qty: 140 ML | Refills: 0 | Status: SHIPPED | OUTPATIENT
Start: 2025-04-30 | End: 2025-05-07

## 2025-04-30 RX ORDER — BUDESONIDE AND FORMOTEROL FUMARATE DIHYDRATE 80; 4.5 UG/1; UG/1
2 AEROSOL RESPIRATORY (INHALATION) 2 TIMES DAILY
Qty: 10.2 G | Refills: 3 | Status: SHIPPED | OUTPATIENT
Start: 2025-04-30 | End: 2025-05-23

## 2025-04-30 ASSESSMENT — FIBROSIS 4 INDEX: FIB4 SCORE: 1.889509971893320684

## 2025-05-01 PROBLEM — Z79.01 ANTICOAGULATED: Status: ACTIVE | Noted: 2025-05-01

## 2025-05-01 PROBLEM — R07.9 LEFT-SIDED CHEST PAIN: Status: RESOLVED | Noted: 2025-03-21 | Resolved: 2025-05-01

## 2025-05-01 NOTE — PROGRESS NOTES
Chief Complaint   Patient presents with    Cough     Cough continues - dry.     Hospital Follow-up       HPI:  Patient is a 80 y.o. male established patient who presents today for a follow up appointment. He reports ongoing dry, hacking cough lingering from recent hospital stay, and he reports generalized chest tightness/ mild wheeze with exertion. He denies overt shortness of breath, denies sputum production, is not requiring supplemental oxygen, and denies associated fevers. Recent diarrhea has resolved, and he has an appointment with Dr. Corral next week for further oncology management.     Patient Active Problem List    Diagnosis Date Noted    Anticoagulated 05/01/2025    Reactive airway disease with acute exacerbation 04/30/2025    Bilateral pulmonary embolism (HCC) 04/07/2025    Pneumonia 03/19/2025    Pleural effusion 03/19/2025    History of nonmelanoma skin cancer 12/17/2024    Hyperglycemia 12/10/2024    Peripheral neuropathy due to chemotherapy (HCC) 10/08/2024    Disordered sleep 10/08/2024    Macrocytic anemia 03/06/2024    Androgen deficiency 04/27/2022    Malignant neoplasm metastatic to bone (HCC) 10/06/2021    Chronic pain 06/30/2021    Hypothyroid 11/25/2020    Obstructive sleep apnea 11/25/2020    GERD (gastroesophageal reflux disease) 11/25/2020    Hyperlipidemia 11/25/2020    Adrenal insufficiency (HCC), secondary 11/25/2020    History of stroke, subcortical infarct on MRI April 2019 11/25/2020    Diverticulosis of colon 11/25/2020    History of shingles 11/25/2020    Vitamin D deficiency 11/25/2020    History of malignant melanoma, April 2016 11/25/2020    History of atrial tachycardia 11/25/2020    BPH with obstruction/lower urinary tract symptoms 11/25/2020    Elevated PSA 11/25/2020    Atherosclerosis of both carotid arteries, mild 11/25/2020    Chronic kidney disease, stage 3a     Sensorineural hearing loss of both ears 06/08/2020    History of radiation therapy 01/30/2020    Premature  atrial contractions 09/19/2019    Adenocarcinoma of lung (HCC), stage 4 ([facility] Oct. 2021) 08/26/2019    Brain lesion 06/26/2019    H/O cardiac radiofrequency ablation 06/11/2019    Left renal atrophy 12/14/2018    Long term (current) use of systemic steroids 12/11/2018    Right foot drop 11/16/2018    Right peroneal nerve palsy 11/16/2018    Anxiety 07/28/2016    Family history of coronary artery disease 01/08/2016    Family history of early CAD 01/08/2016    Hypertension 01/08/2016       Past medical, surgical, family, and social history was reviewed and updated in Epic chart by me today.     Medications and allergies reviewed and updated in Epic chart by me today.     ROS:  Pertinent positives listed above in HPI. All other systems have been reviewed and are negative.    PE:   /60 (BP Location: Left arm, Patient Position: Sitting, BP Cuff Size: Adult)   Pulse 85   Temp 37 °C (98.6 °F) (Temporal)   Resp 17   Ht 1.829 m (6')   Wt 67.1 kg (148 lb)   SpO2 97%   BMI 20.07 kg/m²   Vital signs reviewed with patient.     Gen: Well developed; thin; no acute distress; non toxic appearance   HEENT: Normocephalic; atraumatic; PEERLA b/l; sclera clear b/l; b/l external auditory canals WNL; b/l TM WNL; nares patent; oropharynx clear; oral mucosa moist; tongue midline; dentition adequate   Neck: No adenopathy; no thyromegaly  CV: Regular rate and rhythm; S1/ S2 present; no murmur, gallop or rub noted  Pulm: No respiratory distress; clear to ascultation b/l; no wheezing or stridor noted b/l; dry hacking cough present   Extremities: ankle edema b/l LE extremities/ no clubbing nor cyanosis noted  Skin: Warm and dry; no rashes noted   Neuro: No focal deficits noted   Psych: AAOx4; mood and affect are appropriate    A/P:  1. Hospital discharge follow-up  Patient was most recently admitted to [facility] 4/7/25 - 4/10/25 for evaluation and treatment of acute illnesses, and I have reviewed all pertinent records prior to our  visit today.     2. Moderate persistent reactive airway disease with acute exacerbation  Patient is currently exhibiting symptoms of reactive airway disease exacerbated by recent RSV infection, recurrence of lung cancer, and acute bilateral pulmonary emboli. Recommend patient start Symbicort BID use and Albuterol inhaler PRN for symptom management. New RX sent to pharmacy and will follow. I do not feel chest imaging is indicated at this time, considering recent inpatient imaging and PET scan completed this week.   - budesonide-formoterol (SYMBICORT) 80-4.5 MCG/ACT Aerosol; Inhale 2 Puffs 2 times a day.  Dispense: 10.2 g; Refill: 3  - albuterol 108 (90 Base) MCG/ACT Aero Soln inhalation aerosol; Inhale 2 Puffs every four hours as needed for Shortness of Breath.  Dispense: 8 g; Refill: 3    3. Chronic cough  Patient has lingering dry hacking cough from recent inpatient stay - refer to #2. In addition to inhaler use, patient can continue PRN prescription cough syrup for management. He reports ongoing benefits of medication use, denies related side effects, and uses this controlled medication in a safe manner.  reviewed today and no concerns noted. Pt is well versed in safe use of controlled medication, and benefit of use outweighs risk at this time. New RX sent to pharmacy.   - HYDROcodone homatropine 5-1.5 mg/5 mL Solution; Take 5 mL by mouth 4 times a day as needed (cough) for up to 7 days.  Dispense: 140 mL; Refill: 0    4. Bilateral pulmonary embolism (HCC)  Patient was diagnosed with bilateral lobar and segmental pulmonary emboli on CTA chest done 4/7/25. He is tolerating Eliquis 5 mg BID use. Room air sat: 97% today and patient denies associated shortness of breath    5. Anticoagulated  Patient is tolerating Eliquis 5 mg BID use well and denies medication related side effects.     6. Adenocarcinoma of lung, unspecified laterality (HCC)  Patient has recurrent adenocarcinoma of the lung noted during recent  hospital stay and has upcoming appointment with Dr. Corral next week to review PET scan done this week/ discuss treatment options.

## 2025-05-05 DIAGNOSIS — I10 ESSENTIAL HYPERTENSION: ICD-10-CM

## 2025-05-06 ENCOUNTER — TELEPHONE (OUTPATIENT)
Dept: CARDIOLOGY | Facility: MEDICAL CENTER | Age: 80
End: 2025-05-06
Payer: MEDICARE

## 2025-05-06 RX ORDER — AMLODIPINE BESYLATE 5 MG/1
5 TABLET ORAL EVERY MORNING
Qty: 90 TABLET | Refills: 0 | Status: SHIPPED
Start: 2025-05-06 | End: 2025-05-14 | Stop reason: SDUPTHER

## 2025-05-06 NOTE — TELEPHONE ENCOUNTER
[facility] refill for Amlodipine updated to 90 days per protocol and sent to preferred pharmacy. Last OV 8/21/23. . Needs [facility]. Reminders sent via [employer].     Patient had multiple hospital admissions since last OV, latest for bilateral PE. Current labs done between 4/7/25 and 4/10/25.    To AB.  Kindly review and approve if appropriate.  Thank you.    To Scheduling.  Kindly contact patient to schedule next annual visit. Thank you very much!

## 2025-05-06 NOTE — TELEPHONE ENCOUNTER
Is the patient due for a refill? No    Was the patient seen the last 15 months? No    Date of last office visit: 08/21/2023    Does the patient have an upcoming appointment?  No       Provider to refill:AB    Does the patient have [facility] and need 100-day supply? (This applies to ALL medications) Patient does not have SCP

## 2025-05-07 ENCOUNTER — HOSPITAL ENCOUNTER (OUTPATIENT)
Dept: RADIOLOGY | Facility: MEDICAL CENTER | Age: 80
End: 2025-05-07
Attending: INTERNAL MEDICINE
Payer: MEDICARE

## 2025-05-09 ENCOUNTER — HOSPITAL ENCOUNTER (OUTPATIENT)
Dept: RADIOLOGY | Facility: MEDICAL CENTER | Age: 80
End: 2025-05-09
Attending: NURSE PRACTITIONER
Payer: MEDICARE

## 2025-05-09 ENCOUNTER — HOSPITAL ENCOUNTER (OUTPATIENT)
Dept: RADIOLOGY | Facility: MEDICAL CENTER | Age: 80
End: 2025-05-09
Attending: INTERNAL MEDICINE
Payer: MEDICARE

## 2025-05-09 DIAGNOSIS — J90 PLEURAL EFFUSION: ICD-10-CM

## 2025-05-09 DIAGNOSIS — E83.59 TUMORAL CALCINOSIS: ICD-10-CM

## 2025-05-09 DIAGNOSIS — J95.811 POSTPROCEDURAL PNEUMOTHORAX: ICD-10-CM

## 2025-05-09 DIAGNOSIS — C34.11 MALIGNANT NEOPLASM OF UPPER LOBE OF RIGHT LUNG (HCC): ICD-10-CM

## 2025-05-09 DIAGNOSIS — C43.72 MALIGNANT MELANOMA OF LEFT LOWER EXTREMITY INCLUDING HIP (HCC): ICD-10-CM

## 2025-05-09 PROCEDURE — 4410569 US-THORACENTESIS PUNCTURE

## 2025-05-09 PROCEDURE — 71045 X-RAY EXAM CHEST 1 VIEW: CPT

## 2025-05-13 NOTE — TELEPHONE ENCOUNTER
AB    Caller: Bartolo Berrios [facility]     Topic/issue: Patient is calling to check the status of the medication refill for the Amlodipine.    Callback Number: [phone]     Thank you,  Mala CAMACHO

## 2025-05-14 DIAGNOSIS — I10 ESSENTIAL HYPERTENSION: ICD-10-CM

## 2025-05-14 RX ORDER — AMLODIPINE BESYLATE 5 MG/1
5 TABLET ORAL EVERY MORNING
Qty: 90 TABLET | Refills: 0 | Status: ON HOLD | OUTPATIENT
Start: 2025-05-14

## 2025-05-14 NOTE — TELEPHONE ENCOUNTER
AB    Received request via: Patient    Was the patient seen in the last year in this department? Yes    Does the patient have an active prescription (recently filled or refills available) for medication(s) requested? No HAS 2 DAYS LEFT. PLEASE SEE MESSAGE ON 05/05/2025    Pharmacy Name: [facility]     Does the patient have [facility] and need 100-day supply? (This applies to ALL medications) Patient does not have SCP    Thank You   Latoya BRIDGES

## 2025-05-15 ENCOUNTER — HOSPITAL ENCOUNTER (OUTPATIENT)
Dept: RADIOLOGY | Facility: MEDICAL CENTER | Age: 80
End: 2025-05-15
Attending: INTERNAL MEDICINE
Payer: MEDICARE

## 2025-05-15 DIAGNOSIS — C34.11 MALIGNANT NEOPLASM OF UPPER LOBE OF RIGHT LUNG (HCC): ICD-10-CM

## 2025-05-15 PROCEDURE — 71046 X-RAY EXAM CHEST 2 VIEWS: CPT

## 2025-05-16 LAB
FINAL REPORT Q0603: NORMAL
PRELIMINARY RPT Q0601: NORMAL
RHODAMINE-AURAMINE STN SPEC: NORMAL

## 2025-05-17 ENCOUNTER — HOSPITAL ENCOUNTER (OUTPATIENT)
Dept: RADIOLOGY | Facility: MEDICAL CENTER | Age: 80
End: 2025-05-17
Attending: INTERNAL MEDICINE
Payer: MEDICARE

## 2025-05-17 DIAGNOSIS — T45.1X5S NEUTROPENIA ASSOCIATED WITH MUCOSITIS DUE TO ANTINEOPLASTIC THERAPY (HCC): ICD-10-CM

## 2025-05-17 DIAGNOSIS — Z71.2 PERSON CONSULTING FOR EXPLANATION OF EXAMINATION OR TEST FINDING: ICD-10-CM

## 2025-05-17 DIAGNOSIS — C79.51 SECONDARY MALIGNANT NEOPLASM OF BONE AND BONE MARROW (HCC): ICD-10-CM

## 2025-05-17 DIAGNOSIS — Z79.899 OTHER LONG TERM (CURRENT) DRUG THERAPY: ICD-10-CM

## 2025-05-17 DIAGNOSIS — R42 DIZZINESS AND GIDDINESS: ICD-10-CM

## 2025-05-17 DIAGNOSIS — C79.52 SECONDARY MALIGNANT NEOPLASM OF BONE AND BONE MARROW (HCC): ICD-10-CM

## 2025-05-17 DIAGNOSIS — C43.72 MALIGNANT MELANOMA OF LEFT LOWER EXTREMITY INCLUDING HIP (HCC): ICD-10-CM

## 2025-05-17 DIAGNOSIS — Z11.59 ENCOUNTER FOR SCREENING FOR OTHER VIRAL DISEASES: ICD-10-CM

## 2025-05-17 DIAGNOSIS — K12.31 NEUTROPENIA ASSOCIATED WITH MUCOSITIS DUE TO ANTINEOPLASTIC THERAPY (HCC): ICD-10-CM

## 2025-05-17 DIAGNOSIS — C34.11 MALIGNANT NEOPLASM OF UPPER LOBE OF RIGHT LUNG (HCC): ICD-10-CM

## 2025-05-17 DIAGNOSIS — E83.59 OTHER DISORDERS OF CALCIUM METABOLISM: ICD-10-CM

## 2025-05-17 DIAGNOSIS — N39.0 RECURRENT URINARY TRACT INFECTION: ICD-10-CM

## 2025-05-17 DIAGNOSIS — K56.7 ILEUS (HCC): ICD-10-CM

## 2025-05-17 DIAGNOSIS — N18.9 SCLEROSING GLOMERULONEPHRITIS: ICD-10-CM

## 2025-05-17 DIAGNOSIS — I95.9 SEVERE HYPOTENSION: ICD-10-CM

## 2025-05-17 DIAGNOSIS — Z51.11 ENCOUNTER FOR ANTINEOPLASTIC CHEMOTHERAPY: ICD-10-CM

## 2025-05-17 DIAGNOSIS — D70.1 NEUTROPENIA ASSOCIATED WITH MUCOSITIS DUE TO ANTINEOPLASTIC THERAPY (HCC): ICD-10-CM

## 2025-05-17 PROCEDURE — 700117 HCHG RX CONTRAST REV CODE 255: Performed by: INTERNAL MEDICINE

## 2025-05-17 PROCEDURE — 71260 CT THORAX DX C+: CPT

## 2025-05-17 RX ADMIN — IOHEXOL 100 ML: 350 INJECTION, SOLUTION INTRAVENOUS at 10:45

## 2025-05-20 ENCOUNTER — APPOINTMENT (OUTPATIENT)
Dept: RADIOLOGY | Facility: MEDICAL CENTER | Age: 80
End: 2025-05-20
Attending: INTERNAL MEDICINE
Payer: MEDICARE

## 2025-05-23 DIAGNOSIS — J45.41 MODERATE PERSISTENT REACTIVE AIRWAY DISEASE WITH ACUTE EXACERBATION: ICD-10-CM

## 2025-05-23 RX ORDER — DILTIAZEM HYDROCHLORIDE 60 MG/1
2 TABLET, FILM COATED ORAL 2 TIMES DAILY
Qty: 30.6 EACH | Refills: 1 | Status: ON HOLD | OUTPATIENT
Start: 2025-05-23

## 2025-05-28 ENCOUNTER — APPOINTMENT (OUTPATIENT)
Dept: RADIOLOGY | Facility: MEDICAL CENTER | Age: 80
DRG: 180 | End: 2025-05-28
Attending: HOSPITALIST
Payer: MEDICARE

## 2025-05-28 ENCOUNTER — HOSPITAL ENCOUNTER (INPATIENT)
Facility: MEDICAL CENTER | Age: 80
End: 2025-05-28
Attending: EMERGENCY MEDICINE | Admitting: HOSPITALIST
Payer: MEDICARE

## 2025-05-28 ENCOUNTER — APPOINTMENT (OUTPATIENT)
Dept: RADIOLOGY | Facility: MEDICAL CENTER | Age: 80
DRG: 180 | End: 2025-05-28
Attending: EMERGENCY MEDICINE
Payer: MEDICARE

## 2025-05-28 DIAGNOSIS — R06.00 DYSPNEA, UNSPECIFIED TYPE: ICD-10-CM

## 2025-05-28 DIAGNOSIS — R07.89 CHEST DISCOMFORT: ICD-10-CM

## 2025-05-28 DIAGNOSIS — J91.0 MALIGNANT PLEURAL EFFUSION: Primary | ICD-10-CM

## 2025-05-28 DIAGNOSIS — J90 PLEURAL EFFUSION: ICD-10-CM

## 2025-05-28 DIAGNOSIS — R05.3 CHRONIC COUGH: ICD-10-CM

## 2025-05-28 DIAGNOSIS — H10.31 ACUTE BACTERIAL CONJUNCTIVITIS OF RIGHT EYE: ICD-10-CM

## 2025-05-28 DIAGNOSIS — I10 HYPERTENSION, UNSPECIFIED TYPE: ICD-10-CM

## 2025-05-28 DIAGNOSIS — C34.90 MALIGNANT NEOPLASM OF LUNG, UNSPECIFIED LATERALITY, UNSPECIFIED PART OF LUNG (HCC): ICD-10-CM

## 2025-05-28 LAB
ALBUMIN SERPL BCP-MCNC: 3.7 G/DL (ref 3.2–4.9)
ALBUMIN/GLOB SERPL: 0.9 G/DL
ALP SERPL-CCNC: 71 U/L (ref 30–99)
ALT SERPL-CCNC: 7 U/L (ref 2–50)
ANION GAP SERPL CALC-SCNC: 9 MMOL/L (ref 7–16)
ANISOCYTOSIS BLD QL SMEAR: ABNORMAL
AST SERPL-CCNC: 19 U/L (ref 12–45)
BASOPHILS # BLD AUTO: 0.3 % (ref 0–1.8)
BASOPHILS # BLD: 0.03 K/UL (ref 0–0.12)
BILIRUB SERPL-MCNC: 0.7 MG/DL (ref 0.1–1.5)
BUN SERPL-MCNC: 19 MG/DL (ref 8–22)
CALCIUM ALBUM COR SERPL-MCNC: 9.3 MG/DL (ref 8.5–10.5)
CALCIUM SERPL-MCNC: 9.1 MG/DL (ref 8.5–10.5)
CHLORIDE SERPL-SCNC: 106 MMOL/L (ref 96–112)
CO2 SERPL-SCNC: 22 MMOL/L (ref 20–33)
CREAT SERPL-MCNC: 1.4 MG/DL (ref 0.5–1.4)
EKG IMPRESSION: NORMAL
EOSINOPHIL # BLD AUTO: 0.03 K/UL (ref 0–0.51)
EOSINOPHIL NFR BLD: 0.3 % (ref 0–6.9)
ERYTHROCYTE [DISTWIDTH] IN BLOOD BY AUTOMATED COUNT: 66.1 FL (ref 35.9–50)
GFR SERPLBLD CREATININE-BSD FMLA CKD-EPI: 51 ML/MIN/1.73 M 2
GLOBULIN SER CALC-MCNC: 3.9 G/DL (ref 1.9–3.5)
GLUCOSE SERPL-MCNC: 93 MG/DL (ref 65–99)
HCT VFR BLD AUTO: 38.1 % (ref 42–52)
HGB BLD-MCNC: 12.4 G/DL (ref 14–18)
IMM GRANULOCYTES # BLD AUTO: 0.07 K/UL (ref 0–0.11)
IMM GRANULOCYTES NFR BLD AUTO: 0.7 % (ref 0–0.9)
INR PPP: 1.05 (ref 0.87–1.13)
LYMPHOCYTES # BLD AUTO: 0.63 K/UL (ref 1–4.8)
LYMPHOCYTES NFR BLD: 6.5 % (ref 22–41)
MCH RBC QN AUTO: 31.1 PG (ref 27–33)
MCHC RBC AUTO-ENTMCNC: 32.5 G/DL (ref 32.3–36.5)
MCV RBC AUTO: 95.5 FL (ref 81.4–97.8)
MONOCYTES # BLD AUTO: 0.69 K/UL (ref 0–0.85)
MONOCYTES NFR BLD AUTO: 7.1 % (ref 0–13.4)
NEUTROPHILS # BLD AUTO: 8.29 K/UL (ref 1.82–7.42)
NEUTROPHILS NFR BLD: 85.1 % (ref 44–72)
NRBC # BLD AUTO: 0 K/UL
NRBC BLD-RTO: 0 /100 WBC (ref 0–0.2)
NT-PROBNP SERPL IA-MCNC: 112 PG/ML (ref 0–125)
OVALOCYTES BLD QL SMEAR: NORMAL
PLATELET # BLD AUTO: 288 K/UL (ref 164–446)
PLATELET BLD QL SMEAR: NORMAL
PMV BLD AUTO: 8.9 FL (ref 9–12.9)
POIKILOCYTOSIS BLD QL SMEAR: NORMAL
POTASSIUM SERPL-SCNC: 4.5 MMOL/L (ref 3.6–5.5)
PROT SERPL-MCNC: 7.6 G/DL (ref 6–8.2)
PROTHROMBIN TIME: 14.1 SEC (ref 12–14.6)
RBC # BLD AUTO: 3.99 M/UL (ref 4.7–6.1)
RBC BLD AUTO: PRESENT
SODIUM SERPL-SCNC: 137 MMOL/L (ref 135–145)
TROPONIN T SERPL-MCNC: 25 NG/L (ref 6–19)
WBC # BLD AUTO: 9.7 K/UL (ref 4.8–10.8)

## 2025-05-28 PROCEDURE — 80053 COMPREHEN METABOLIC PANEL: CPT

## 2025-05-28 PROCEDURE — 770020 HCHG ROOM/CARE - TELE (206)

## 2025-05-28 PROCEDURE — 32554 ASPIRATE PLEURA W/O IMAGING: CPT

## 2025-05-28 PROCEDURE — 96376 TX/PRO/DX INJ SAME DRUG ADON: CPT

## 2025-05-28 PROCEDURE — 71045 X-RAY EXAM CHEST 1 VIEW: CPT

## 2025-05-28 PROCEDURE — 700111 HCHG RX REV CODE 636 W/ 250 OVERRIDE (IP): Mod: JZ | Performed by: HOSPITALIST

## 2025-05-28 PROCEDURE — A9270 NON-COVERED ITEM OR SERVICE: HCPCS | Performed by: HOSPITALIST

## 2025-05-28 PROCEDURE — 93005 ELECTROCARDIOGRAM TRACING: CPT | Mod: TC | Performed by: EMERGENCY MEDICINE

## 2025-05-28 PROCEDURE — 96374 THER/PROPH/DIAG INJ IV PUSH: CPT

## 2025-05-28 PROCEDURE — 87205 SMEAR GRAM STAIN: CPT

## 2025-05-28 PROCEDURE — 96375 TX/PRO/DX INJ NEW DRUG ADDON: CPT

## 2025-05-28 PROCEDURE — 84484 ASSAY OF TROPONIN QUANT: CPT

## 2025-05-28 PROCEDURE — 85610 PROTHROMBIN TIME: CPT

## 2025-05-28 PROCEDURE — 700111 HCHG RX REV CODE 636 W/ 250 OVERRIDE (IP): Mod: JZ | Performed by: EMERGENCY MEDICINE

## 2025-05-28 PROCEDURE — 99223 1ST HOSP IP/OBS HIGH 75: CPT | Mod: 25,AI | Performed by: HOSPITALIST

## 2025-05-28 PROCEDURE — 700102 HCHG RX REV CODE 250 W/ 637 OVERRIDE(OP): Performed by: HOSPITALIST

## 2025-05-28 PROCEDURE — 32555 ASPIRATE PLEURA W/ IMAGING: CPT | Performed by: HOSPITALIST

## 2025-05-28 PROCEDURE — 99285 EMERGENCY DEPT VISIT HI MDM: CPT

## 2025-05-28 PROCEDURE — 87070 CULTURE OTHR SPECIMN AEROBIC: CPT

## 2025-05-28 PROCEDURE — 0W993ZZ DRAINAGE OF RIGHT PLEURAL CAVITY, PERCUTANEOUS APPROACH: ICD-10-PCS | Performed by: HOSPITALIST

## 2025-05-28 PROCEDURE — 85025 COMPLETE CBC W/AUTO DIFF WBC: CPT

## 2025-05-28 PROCEDURE — 36415 COLL VENOUS BLD VENIPUNCTURE: CPT

## 2025-05-28 PROCEDURE — 93005 ELECTROCARDIOGRAM TRACING: CPT | Mod: TC

## 2025-05-28 PROCEDURE — 700117 HCHG RX CONTRAST REV CODE 255: Performed by: EMERGENCY MEDICINE

## 2025-05-28 PROCEDURE — 83880 ASSAY OF NATRIURETIC PEPTIDE: CPT

## 2025-05-28 PROCEDURE — 71275 CT ANGIOGRAPHY CHEST: CPT

## 2025-05-28 PROCEDURE — 94760 N-INVAS EAR/PLS OXIMETRY 1: CPT

## 2025-05-28 RX ORDER — MORPHINE SULFATE 4 MG/ML
2 INJECTION INTRAVENOUS
Status: DISCONTINUED | OUTPATIENT
Start: 2025-05-28 | End: 2025-05-28

## 2025-05-28 RX ORDER — TRAZODONE HYDROCHLORIDE 50 MG/1
100 TABLET ORAL NIGHTLY PRN
Status: DISCONTINUED | OUTPATIENT
Start: 2025-05-28 | End: 2025-05-29

## 2025-05-28 RX ORDER — ALBUTEROL SULFATE 90 UG/1
2 INHALANT RESPIRATORY (INHALATION)
Status: DISCONTINUED | OUTPATIENT
Start: 2025-05-28 | End: 2025-06-04 | Stop reason: HOSPADM

## 2025-05-28 RX ORDER — OXYCODONE HYDROCHLORIDE 5 MG/1
5 TABLET ORAL
Refills: 0 | Status: DISCONTINUED | OUTPATIENT
Start: 2025-05-28 | End: 2025-05-29

## 2025-05-28 RX ORDER — ONDANSETRON 2 MG/ML
4 INJECTION INTRAMUSCULAR; INTRAVENOUS ONCE
Status: COMPLETED | OUTPATIENT
Start: 2025-05-28 | End: 2025-05-28

## 2025-05-28 RX ORDER — LEVOTHYROXINE SODIUM 125 UG/1
125 TABLET ORAL
Status: DISCONTINUED | OUTPATIENT
Start: 2025-05-29 | End: 2025-06-04 | Stop reason: HOSPADM

## 2025-05-28 RX ORDER — SILODOSIN 8 MG/1
8 CAPSULE ORAL
Status: DISCONTINUED | OUTPATIENT
Start: 2025-05-28 | End: 2025-05-28

## 2025-05-28 RX ORDER — OMEPRAZOLE 20 MG/1
40 CAPSULE, DELAYED RELEASE ORAL DAILY
Status: DISCONTINUED | OUTPATIENT
Start: 2025-05-29 | End: 2025-06-04 | Stop reason: HOSPADM

## 2025-05-28 RX ORDER — TAMSULOSIN HYDROCHLORIDE 0.4 MG/1
0.4 CAPSULE ORAL 2 TIMES DAILY
Status: DISCONTINUED | OUTPATIENT
Start: 2025-05-28 | End: 2025-06-04 | Stop reason: HOSPADM

## 2025-05-28 RX ORDER — AMLODIPINE BESYLATE 5 MG/1
5 TABLET ORAL EVERY MORNING
Status: DISCONTINUED | OUTPATIENT
Start: 2025-05-29 | End: 2025-06-04 | Stop reason: HOSPADM

## 2025-05-28 RX ORDER — ATORVASTATIN CALCIUM 20 MG/1
20 TABLET, FILM COATED ORAL EVERY EVENING
Status: DISCONTINUED | OUTPATIENT
Start: 2025-05-28 | End: 2025-06-04 | Stop reason: HOSPADM

## 2025-05-28 RX ORDER — ONDANSETRON 2 MG/ML
4 INJECTION INTRAMUSCULAR; INTRAVENOUS EVERY 4 HOURS PRN
Status: DISCONTINUED | OUTPATIENT
Start: 2025-05-28 | End: 2025-06-04 | Stop reason: HOSPADM

## 2025-05-28 RX ORDER — LABETALOL HYDROCHLORIDE 5 MG/ML
10 INJECTION, SOLUTION INTRAVENOUS EVERY 4 HOURS PRN
Status: DISCONTINUED | OUTPATIENT
Start: 2025-05-28 | End: 2025-06-04 | Stop reason: HOSPADM

## 2025-05-28 RX ORDER — HYDROMORPHONE HYDROCHLORIDE 1 MG/ML
1 INJECTION, SOLUTION INTRAMUSCULAR; INTRAVENOUS; SUBCUTANEOUS
Status: DISCONTINUED | OUTPATIENT
Start: 2025-05-28 | End: 2025-05-29

## 2025-05-28 RX ORDER — LIOTHYRONINE SODIUM 5 UG/1
10 TABLET ORAL DAILY
Status: DISCONTINUED | OUTPATIENT
Start: 2025-05-29 | End: 2025-06-04 | Stop reason: HOSPADM

## 2025-05-28 RX ORDER — ONDANSETRON 4 MG/1
4 TABLET, ORALLY DISINTEGRATING ORAL EVERY 4 HOURS PRN
Status: DISCONTINUED | OUTPATIENT
Start: 2025-05-28 | End: 2025-06-04 | Stop reason: HOSPADM

## 2025-05-28 RX ORDER — HYDROCORTISONE 10 MG/1
20 TABLET ORAL 2 TIMES DAILY
Status: DISCONTINUED | OUTPATIENT
Start: 2025-05-28 | End: 2025-06-04 | Stop reason: HOSPADM

## 2025-05-28 RX ORDER — GABAPENTIN 100 MG/1
100 CAPSULE ORAL EVERY EVENING
Status: DISCONTINUED | OUTPATIENT
Start: 2025-05-28 | End: 2025-05-28

## 2025-05-28 RX ORDER — OXYCODONE HYDROCHLORIDE 5 MG/1
2.5 TABLET ORAL
Refills: 0 | Status: DISCONTINUED | OUTPATIENT
Start: 2025-05-28 | End: 2025-05-29

## 2025-05-28 RX ORDER — POLYETHYLENE GLYCOL 3350 17 G/17G
1 POWDER, FOR SOLUTION ORAL
Status: DISCONTINUED | OUTPATIENT
Start: 2025-05-28 | End: 2025-06-04 | Stop reason: HOSPADM

## 2025-05-28 RX ORDER — VITAMIN B COMPLEX
1000 TABLET ORAL DAILY
Status: DISCONTINUED | OUTPATIENT
Start: 2025-05-29 | End: 2025-06-04 | Stop reason: HOSPADM

## 2025-05-28 RX ORDER — ACETAMINOPHEN 325 MG/1
650 TABLET ORAL EVERY 6 HOURS PRN
Status: DISCONTINUED | OUTPATIENT
Start: 2025-05-28 | End: 2025-06-02

## 2025-05-28 RX ORDER — AMOXICILLIN 250 MG
2 CAPSULE ORAL EVERY EVENING
Status: DISCONTINUED | OUTPATIENT
Start: 2025-05-28 | End: 2025-06-04 | Stop reason: HOSPADM

## 2025-05-28 RX ORDER — BUDESONIDE AND FORMOTEROL FUMARATE DIHYDRATE 80; 4.5 UG/1; UG/1
2 AEROSOL RESPIRATORY (INHALATION) 2 TIMES DAILY
Status: DISCONTINUED | OUTPATIENT
Start: 2025-05-28 | End: 2025-05-28

## 2025-05-28 RX ORDER — HYDROMORPHONE HYDROCHLORIDE 1 MG/ML
1 INJECTION, SOLUTION INTRAMUSCULAR; INTRAVENOUS; SUBCUTANEOUS
Status: DISCONTINUED | OUTPATIENT
Start: 2025-05-28 | End: 2025-05-28

## 2025-05-28 RX ADMIN — APIXABAN 5 MG: 5 TABLET, FILM COATED ORAL at 21:30

## 2025-05-28 RX ADMIN — ATORVASTATIN CALCIUM 20 MG: 20 TABLET, FILM COATED ORAL at 21:30

## 2025-05-28 RX ADMIN — HYDROCORTISONE 20 MG: 10 TABLET ORAL at 21:30

## 2025-05-28 RX ADMIN — TAMSULOSIN HYDROCHLORIDE 0.4 MG: 0.4 CAPSULE ORAL at 21:30

## 2025-05-28 RX ADMIN — HYDROMORPHONE HYDROCHLORIDE 1 MG: 1 INJECTION, SOLUTION INTRAMUSCULAR; INTRAVENOUS; SUBCUTANEOUS at 17:12

## 2025-05-28 RX ADMIN — ONDANSETRON 4 MG: 2 INJECTION INTRAMUSCULAR; INTRAVENOUS at 17:12

## 2025-05-28 RX ADMIN — IOHEXOL 75 ML: 350 INJECTION, SOLUTION INTRAVENOUS at 19:15

## 2025-05-28 RX ADMIN — HYDROMORPHONE HYDROCHLORIDE 1 MG: 1 INJECTION, SOLUTION INTRAMUSCULAR; INTRAVENOUS; SUBCUTANEOUS at 21:29

## 2025-05-28 RX ADMIN — LIDOCAINE HYDROCHLORIDE 20 ML: 10 INJECTION, SOLUTION INFILTRATION; PERINEURAL at 21:00

## 2025-05-28 ASSESSMENT — ENCOUNTER SYMPTOMS
PALPITATIONS: 0
BLOOD IN STOOL: 0
MYALGIAS: 1
ABDOMINAL PAIN: 0
EYES NEGATIVE: 1
NERVOUS/ANXIOUS: 0
WHEEZING: 0
DIAPHORESIS: 0
HEADACHES: 1
DIZZINESS: 0
LOSS OF CONSCIOUSNESS: 0
DOUBLE VISION: 0
SHORTNESS OF BREATH: 1
CONSTIPATION: 0
HEARTBURN: 0
NAUSEA: 0
BRUISES/BLEEDS EASILY: 0
GASTROINTESTINAL NEGATIVE: 1
COUGH: 1
CHILLS: 0
FOCAL WEAKNESS: 0
VOMITING: 0
DIARRHEA: 0
HEMOPTYSIS: 0
FEVER: 0
SEIZURES: 0
WEIGHT LOSS: 1
BACK PAIN: 1
PSYCHIATRIC NEGATIVE: 1

## 2025-05-28 ASSESSMENT — COPD QUESTIONNAIRES
HAVE YOU SMOKED AT LEAST 100 CIGARETTES IN YOUR ENTIRE LIFE: NO/DON'T KNOW
DO YOU EVER COUGH UP ANY MUCUS OR PHLEGM?: NO/ONLY WITH OCCASIONAL COLDS OR INFECTIONS
COPD SCREENING SCORE: 3
DURING THE PAST 4 WEEKS HOW MUCH DID YOU FEEL SHORT OF BREATH: NONE/LITTLE OF THE TIME

## 2025-05-28 ASSESSMENT — FIBROSIS 4 INDEX: FIB4 SCORE: 1.889509971893320684

## 2025-05-28 NOTE — ED PROVIDER NOTES
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 Drained 1mo ago, believes to need again.     Sent by MD     His Onc.     Cough     EXTERNAL RECORDS REVIEWED  Known history of malignancy, established with Dr. Johnson, Dr. Phillips from [place], had note from 5/16 from [facility] cancer [place] with Dr. Nilam Subramanian, at that time the patient has known lung cancer complicated past medical history from melanoma resected in 2016, pelvic mets with chemotherapy eventually complicated by side effects including adrenal insufficiency and hypothyroidism.  He has had wedge resection of right lower lung mass that was adenocarcinoma.  In 2019 he had lytic metastases to L5.  He had local SBRT.  He had also been on immunotherapy in 2020 and had restaging in the pelvis with sacral mets in 2021.  He had had other spinal metastases, he had repeat gene testing and had been on 7 cycles of chemotherapy and immunotherapy.  He had been hospitalized with pneumonia earlier this year and developed malignant right pleural effusions.  At that time restaging scan showed 3 new bone lesions and the recommendations from the physician pointing towards switching therapy regiments with clinical trials.    Prior hospitalization note shows hypertension/hypothyroidism, CKD stage III and recent admission a month ago with shortness of breath, CT showed bilateral pulmonary embolism, on a heparin drip with transition to DOAC.    HPI/ROS  LIMITATION TO HISTORY   Select: : None  OUTSIDE HISTORIAN(S):  none    Bartolo Berrios [facility] is a 80 y.o. male who presents to the emergency room accompanied by his wife for concerns regarding worsening shortness of breath, worsening exertional dyspnea and generally not feeling very well.  Patient has a known history of chronic lung disease/cancer status post a lung resection with complex past medical history as documented  above.  He had been in the hospital several times over the last several months with initial pneumonia viral illness and eventually was diagnosed with a bilateral PE and had been started on blood thinners.  He had had a scan on 5/17 that showed a large effusion and reports that in the last week he has had escalating amounts of profound shortness of breath, painful coughing episodes with no hemoptysis.  He just feels like he cannot walk further than 4 to 5 feet.  He has not noticed any new leg swelling but has had some issues with edema in the past.  He has been adherent to his medications and has not started any new immunotherapy currently.  He is slated to start a drug trial shortly.  He denies any recent fevers.  When speaking slowly and calmly he is able to get his sentences out without feeling profoundly short of breath however any amount of effort does make him profoundly tired and dyspneic.  At rest he is without any hypoxia, he has no blood pressure changes, he denies any lightheadedness or dizziness.  No nausea or vomiting.    PAST MEDICAL HISTORY   has a past medical history of Adrenal insufficiency (HCC), secondary (11/25/2020), Arrhythmia, Arthritis, Atherosclerosis of both carotid arteries, mild (11/25/2020), BPH (benign prostatic hyperplasia) (11/25/2020), Bronchitis, Cancer ([facility]), Carcinoma in situ of respiratory system, CKD (chronic kidney disease) stage 3, GFR 30-59 ml/min (11/25/2020), Colostomy present (HCC) (11/25/2020), Diverticulosis of colon (11/25/2020), GERD (gastroesophageal reflux disease) (11/25/2020), High cholesterol, History of atrial tachycardia (11/25/2020), History of malignant melanoma, April 2016 (11/25/2020), History of shingles (11/25/2020), History of stroke, subcortical infarct on MRI April 2019 (11/25/2020), Hyperlipidemia (11/25/2020), Hypertension, Hypothyroid (11/25/2020), Indigestion, Lung cancer (HCC), adenocarcinoma Aug. 2019 (11/25/2020), and Pain.    SURGICAL HISTORY    "has a past surgical history that includes other (Left, 2016); other (Left, 2017); other (Right, 2019); and reid by laparoscopy (2/26/2021).    FAMILY HISTORY  Family History   Problem Relation Age of Onset    Cancer Mother         Lung- bone    Cancer Father         Colon?    Cancer Sister         Lung    Cancer Brother         Lung    Cancer Brother         Brain     SOCIAL HISTORY  Social History     Tobacco Use    Smoking status: Never    Smokeless tobacco: Never   Vaping Use    Vaping status: Never Used   Substance and Sexual Activity    Alcohol use: Not Currently     Alcohol/week: 1.8 oz     Types: 3 Glasses of wine per week     Comment: Occasionally    Drug use: Not Currently     Comment: THC edibles    Sexual activity: Not on file     CURRENT MEDICATIONS  Home Medications       Reviewed by Trent Mendoza (Pharmacy Tech) on 05/28/25 at 1704  Med List Status: Unable to Obtain     Medication Last Dose Status   acetaminophen (TYLENOL) 500 MG Tab  Active   albuterol 108 (90 Base) MCG/ACT Aero Soln inhalation aerosol  Active   amLODIPine (NORVASC) 5 MG Tab  Active   apixaban (ELIQUIS) 5mg Tab  Active   Ascorbic Acid (VITAMIN C PO)  Active   atorvastatin (LIPITOR) 20 MG Tab  Active   benzonatate (TESSALON) 100 MG Cap  Active   Cyanocobalamin (B-12 SL)  Active   folic acid (FOLVITE) 1 MG Tab  Active   gabapentin (NEURONTIN) 100 MG Cap  Active   hydrocortisone (CORTEF) 10 MG Tab  Active   liothyronine (CYTOMEL) 5 MCG Tab  Active   MAGNESIUM PO  Active   NEEDLE, DISP, 25 G (BD DISP NEEDLES) 25G X 5/8\" Misc  Active   omeprazole (PRILOSEC) 40 MG delayed-release capsule  Active   potassium chloride (KLOR-CON) 8 MEQ tablet  Active   senna-docusate (PERICOLACE OR SENOKOT S) 8.6-50 MG Tab  Active   silodosin (RAPAFLO) 8 MG Cap capsule  Active   SYMBICORT 80-4.5 MCG/ACT Aerosol  Active   tadalafil (CIALIS) 20 MG tablet  Active   TAGRISSO 80 MG Tab  Active   tamsulosin (FLOMAX) 0.4 MG capsule  Active   testosterone " cypionate (DEPO-TESTOSTERONE) 200 MG/ML Solution injection  Active   TIROSINT 125 MCG Cap  Active   traZODone (DESYREL) 100 MG Tab  Active   vitamin D3 (CHOLECALCIFEROL) 1000 UNIT Tab  Active                  Audit from Redirected Encounters    **Home medications have not yet been reviewed for this encounter**       ALLERGIES  Allergies[1]    PHYSICAL EXAM  VITAL SIGNS: /72   Pulse 87   Temp 37.3 °C (99.2 °F) (Temporal)   Resp (!) 22   Ht 1.829 m (6')   Wt 73.6 kg (162 lb 4.1 oz)   SpO2 92%   BMI 22.01 kg/m²    Genl: M sitting in gurney uncomfortably, speaking clearly, appears slightly dyspneic but in no acute distress   Head: NC/AT   ENT: Mucous membranes moist, posterior pharynx clear, uvula midline, nares patent bilaterally   Eyes: Normal sclera, pupils equal round reactive to light  Neck: Supple, FROM, no LAD appreciated, no JVD  Pulmonary: Lungs are diminished from the mid upper lung field inferiorly on the right side.  Slightly rhonchorous lower left lung sounds.  No is  Chest: No TTP  CV:  RRR, no murmur appreciated  Abdomen: soft, NT/ND; no rebound/guarding  Musculoskeletal: Pain free ROM of the neck. Moving upper and lower extremities in spontaneous and coordinated fashion  Neuro: A&Ox4 (person, place, time, situation), speech fluent, gait not assessed, no focal deficits appreciated  Skin: No rash or lesions.  No pallor or jaundice.  No cyanosis.  Warm and dry.     EKG/LABS  Results for orders placed or performed during the hospital encounter of 25   EKG   Result Value Ref Range    Report       [facility] Emergency Dept.    Test Date:  2025  Pt Name:    [facility]          Department: [facility]  MRN:        [id number]                      Room:  Gender:     Male                         Technician: [id number]  :        1945                   Requested By:ER TRIAGE PROTOCOL  Order #:    [id number]                    Reading MD: Jacob Maloney  MD    Measurements  Intervals                                Axis  Rate:       81                           P:          72  KS:         176                          QRS:        66  QRSD:       140                          T:          17  QT:         423  QTc:        491    Interpretive Statements  Sinus rhythm, Rate of 81, normal KS borderline QTc 491 ms, there are  noticeable right bundle branch blocks, multiple chronic changes including  lateral EKG abnormalities, similar morphology to previous dated 4/7/2025.  Electronically Signed On 05- 16:48:28 PDT by Jacob Maloney MD     I have independently interpreted this EKG  Labs Reviewed   CBC WITH DIFFERENTIAL - Abnormal; Notable for the following components:       Result Value    RBC 3.99 (*)     Hemoglobin 12.4 (*)     Hematocrit 38.1 (*)     RDW 66.1 (*)     MPV 8.9 (*)     Neutrophils-Polys 85.10 (*)     Lymphocytes 6.50 (*)     Neutrophils (Absolute) 8.29 (*)     Lymphs (Absolute) 0.63 (*)     All other components within normal limits   COMP METABOLIC PANEL - Abnormal; Notable for the following components:    Globulin 3.9 (*)     All other components within normal limits   ESTIMATED GFR - Abnormal; Notable for the following components:    GFR (CKD-EPI) 51 (*)     All other components within normal limits   TROPONIN - Abnormal; Notable for the following components:    Troponin T 25 (*)     All other components within normal limits   PROTHROMBIN TIME   PLATELET ESTIMATE   MORPHOLOGY   PROBRAIN NATRIURETIC PEPTIDE, NT     RADIOLOGY/PROCEDURES   I have independently interpreted the diagnostic imaging associated with this visit and am waiting the final reading from the radiologist.   My preliminary interpretation is as follows: Right lower lung effusion.  Likely malignant.  Interval worsening. CT scan shows moderate right pleural effusion with no evidence of PE and trace findings on the left side.    Radiologist interpretation:  CT-CTA CHEST PULMONARY ARTERY W/  RECONS   Final Result      1.  No evidence of pulmonary embolism.   2.  Large right pleural effusion is increased from prior.   3.  Trace left pleural effusion.         DX-CHEST-PORTABLE (1 VIEW)   Final Result      Probably moderate right pleural effusion, mildly increased versus related to slightly worsening aeration.      DX-CHEST-PORTABLE (1 VIEW)    (Results Pending)     CT FINDINGS: 5/17/25  CT Chest:  Lungs: Decreasing left basilar airspace disease, consistent with resolving pulmonary infarction. There is persistent atelectasis at the right lung base.  Mediastinum/Adelina: No adenopathy.  Pleura: Increased large simple appearing right pleural effusion. Decreased trace simple left pleural effusion.  Cardiac: Heart normal in size. There is coronary artery calcification.  Vascular: Aorta is nonaneurysmal.  Soft tissues: Unremarkable.  Bones: Multiple chronic appearing thoracic compression fractures.     CT Abdomen:  Liver: No focal liver lesion  Spleen: Normal in size, without focal lesion  Adrenal glands: Normal  Pancreas: Normal  Gallbladder: Surgically absent  Biliary: No biliary duct dilation visualized.  Kidneys: Simple bilateral renal cysts. One millimeter nonobstructing right renal stone.  Vasculature: Nonaneurysmal  Lymph nodes: No adenopathy  Bowel: No evidence of obstruction or acute inflammation  Appendix:  Appendix appears normal  Peritoneum: No ascites  Pelvis:  Urinary bladder is grossly normal. Prostate is enlarged, 6.7 cm in diameter..  Musculoskeletal: Stable sclerotic lesions within the pelvis. For example, 2 cm left-sided sacral lesion.     IMPRESSION:     1.  Increased large right pleural effusion.  2.  Decreased trace left pleural effusion.  3.  Decreasing left basilar airspace disease, consistent with resolving pulmonary infarction.  4.  Stable sclerotic lesions within the pelvis. Most consistent with stable metastatic disease.  5.  Simple bilateral renal cysts.  6.  1 mm nonobstructing right  renal stone.  7.  Prostate enlargement.    COURSE & MEDICAL DECISION MAKING    ASSESSMENT, COURSE AND PLAN  Care Narrative: Presents the emergency room for symptoms as described above.  The patient has a known history of lung malignancy with some recurrent pleural effusions but had presented with some worsening dyspnea and has had some complications since he has been on initiation of blood thinners.  He is not having gross hemoptysis, no evidence of bleeding and does not describe overall constitutional complaints consistent with symptomatic anemia.  IV access was established, labs electrolytes in addition to cardiac enzymes were obtained.  Chest x-ray was initially visualized and showed what looks like a somewhat developed right-sided pleural effusion with likely left small pleural effusion.  With the patient on blood thinners I was concerned nonaffiliated for PE with the possibility of other complex loculations, possibility of bleeding or other erosive changes because of the malignancy.  CT scan of the chest did not show any PE and appears that this is an isolated malignant effusion.  His lab work showed stable to chronic anemia with no leukocytosis, no electrolyte abnormalities, kidney injury or LFT abnormalities.  He has a detectable troponin but no substantial BNP elevation.  As the patient will require a tap but is on blood thinners I do believe it is in his best interest to have these blood thinner stopped, hold him overnight for thoracentesis.    Conferred with the on-call overnight pulmonologist to be happy to see him tomorrow morning.  I spoke with the hospitalist will admit the patient in guarded condition.    FINAL DIAGNOSIS  1. Malignant pleural effusion    2. Chest discomfort    3. Dyspnea, unspecified type    4. Chronic cough    5. Hypertension, unspecified type      Electronically signed by: Haider James M.D., 5/28/2025 4:43 PM       [1]   Allergies  Allergen Reactions    Gramineae Pollens Itching  and Shortness of Breath     Itchy eyes, red face    Cat Hair Extract Hives and Itching    Horse Allergy Hives    Other Environmental     Pollen Extract Runny Nose and Itching     .

## 2025-05-28 NOTE — ED TRIAGE NOTES
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 Drained 1mo ago, believes to need again.     Sent by MD     His Onc.     Cough     Blood Pressure : 122/72, Pulse: 87, Respiration: (!) 22, Temperature: 37.3 °C (99.2 °F), Height: 182.9 cm (6'), Weight: 73.6 kg (162 lb 4.1 oz), BMI (Calculated): 22.01, BSA (Calculated): 1.9, Pulse Oximetry: 92 %, O2 Delivery Device: None - Room Air

## 2025-05-29 ENCOUNTER — APPOINTMENT (OUTPATIENT)
Dept: RADIOLOGY | Facility: MEDICAL CENTER | Age: 80
DRG: 180 | End: 2025-05-29
Payer: MEDICARE

## 2025-05-29 PROBLEM — I24.89 DEMAND ISCHEMIA (HCC): Status: ACTIVE | Noted: 2025-05-29

## 2025-05-29 PROBLEM — J93.9 PNEUMOTHORAX: Status: ACTIVE | Noted: 2025-05-29

## 2025-05-29 LAB
ANION GAP SERPL CALC-SCNC: 6 MMOL/L (ref 7–16)
BUN SERPL-MCNC: 18 MG/DL (ref 8–22)
CALCIUM SERPL-MCNC: 8.4 MG/DL (ref 8.5–10.5)
CHLORIDE SERPL-SCNC: 104 MMOL/L (ref 96–112)
CO2 SERPL-SCNC: 25 MMOL/L (ref 20–33)
CREAT SERPL-MCNC: 1.39 MG/DL (ref 0.5–1.4)
ERYTHROCYTE [DISTWIDTH] IN BLOOD BY AUTOMATED COUNT: 67.5 FL (ref 35.9–50)
GFR SERPLBLD CREATININE-BSD FMLA CKD-EPI: 52 ML/MIN/1.73 M 2
GLUCOSE SERPL-MCNC: 105 MG/DL (ref 65–99)
GRAM STN SPEC: NORMAL
HCT VFR BLD AUTO: 33.1 % (ref 42–52)
HGB BLD-MCNC: 10.6 G/DL (ref 14–18)
MCH RBC QN AUTO: 31 PG (ref 27–33)
MCHC RBC AUTO-ENTMCNC: 32 G/DL (ref 32.3–36.5)
MCV RBC AUTO: 96.8 FL (ref 81.4–97.8)
PLATELET # BLD AUTO: 249 K/UL (ref 164–446)
PMV BLD AUTO: 8.8 FL (ref 9–12.9)
POTASSIUM SERPL-SCNC: 4.3 MMOL/L (ref 3.6–5.5)
RBC # BLD AUTO: 3.42 M/UL (ref 4.7–6.1)
SIGNIFICANT IND 70042: NORMAL
SITE SITE: NORMAL
SODIUM SERPL-SCNC: 135 MMOL/L (ref 135–145)
SOURCE SOURCE: NORMAL
VIT B12 SERPL-MCNC: 186 PG/ML (ref 211–911)
WBC # BLD AUTO: 10 K/UL (ref 4.8–10.8)

## 2025-05-29 PROCEDURE — 97161 PT EVAL LOW COMPLEX 20 MIN: CPT

## 2025-05-29 PROCEDURE — A9270 NON-COVERED ITEM OR SERVICE: HCPCS | Performed by: INTERNAL MEDICINE

## 2025-05-29 PROCEDURE — 97535 SELF CARE MNGMENT TRAINING: CPT

## 2025-05-29 PROCEDURE — 71045 X-RAY EXAM CHEST 1 VIEW: CPT

## 2025-05-29 PROCEDURE — 82607 VITAMIN B-12: CPT

## 2025-05-29 PROCEDURE — 700102 HCHG RX REV CODE 250 W/ 637 OVERRIDE(OP): Performed by: INTERNAL MEDICINE

## 2025-05-29 PROCEDURE — 85027 COMPLETE CBC AUTOMATED: CPT

## 2025-05-29 PROCEDURE — 80048 BASIC METABOLIC PNL TOTAL CA: CPT

## 2025-05-29 PROCEDURE — A9270 NON-COVERED ITEM OR SERVICE: HCPCS | Performed by: HOSPITALIST

## 2025-05-29 PROCEDURE — 97165 OT EVAL LOW COMPLEX 30 MIN: CPT

## 2025-05-29 PROCEDURE — 36415 COLL VENOUS BLD VENIPUNCTURE: CPT

## 2025-05-29 PROCEDURE — 99222 1ST HOSP IP/OBS MODERATE 55: CPT | Performed by: STUDENT IN AN ORGANIZED HEALTH CARE EDUCATION/TRAINING PROGRAM

## 2025-05-29 PROCEDURE — 700102 HCHG RX REV CODE 250 W/ 637 OVERRIDE(OP): Performed by: HOSPITALIST

## 2025-05-29 PROCEDURE — 770020 HCHG ROOM/CARE - TELE (206)

## 2025-05-29 PROCEDURE — 700111 HCHG RX REV CODE 636 W/ 250 OVERRIDE (IP): Mod: JZ | Performed by: INTERNAL MEDICINE

## 2025-05-29 PROCEDURE — 94760 N-INVAS EAR/PLS OXIMETRY 1: CPT

## 2025-05-29 PROCEDURE — 700111 HCHG RX REV CODE 636 W/ 250 OVERRIDE (IP): Mod: JZ | Performed by: HOSPITALIST

## 2025-05-29 PROCEDURE — 99233 SBSQ HOSP IP/OBS HIGH 50: CPT | Performed by: INTERNAL MEDICINE

## 2025-05-29 RX ORDER — HYDROMORPHONE HYDROCHLORIDE 1 MG/ML
1 INJECTION, SOLUTION INTRAMUSCULAR; INTRAVENOUS; SUBCUTANEOUS EVERY 4 HOURS PRN
Status: DISCONTINUED | OUTPATIENT
Start: 2025-05-29 | End: 2025-06-02

## 2025-05-29 RX ORDER — TRAZODONE HYDROCHLORIDE 50 MG/1
100 TABLET ORAL
Status: DISCONTINUED | OUTPATIENT
Start: 2025-05-29 | End: 2025-06-04 | Stop reason: HOSPADM

## 2025-05-29 RX ORDER — CYANOCOBALAMIN 1000 UG/ML
1000 INJECTION, SOLUTION INTRAMUSCULAR; SUBCUTANEOUS
Status: DISCONTINUED | OUTPATIENT
Start: 2025-05-29 | End: 2025-06-04 | Stop reason: HOSPADM

## 2025-05-29 RX ORDER — OXYCODONE HYDROCHLORIDE 10 MG/1
10 TABLET ORAL
Refills: 0 | Status: DISCONTINUED | OUTPATIENT
Start: 2025-05-29 | End: 2025-06-02

## 2025-05-29 RX ORDER — HYDROMORPHONE HYDROCHLORIDE 2 MG/1
1 TABLET ORAL
Refills: 0 | Status: DISCONTINUED | OUTPATIENT
Start: 2025-05-29 | End: 2025-05-29

## 2025-05-29 RX ADMIN — HYDROCORTISONE 20 MG: 10 TABLET ORAL at 05:28

## 2025-05-29 RX ADMIN — SENNOSIDES AND DOCUSATE SODIUM 2 TABLET: 50; 8.6 TABLET ORAL at 16:39

## 2025-05-29 RX ADMIN — TAMSULOSIN HYDROCHLORIDE 0.4 MG: 0.4 CAPSULE ORAL at 16:40

## 2025-05-29 RX ADMIN — CYANOCOBALAMIN 1000 MCG: 1000 INJECTION, SOLUTION INTRAMUSCULAR at 15:00

## 2025-05-29 RX ADMIN — LEVOTHYROXINE SODIUM 125 MCG: 0.12 TABLET ORAL at 05:28

## 2025-05-29 RX ADMIN — HYDROMORPHONE HYDROCHLORIDE 1 MG: 2 TABLET ORAL at 17:22

## 2025-05-29 RX ADMIN — MOMETASONE FUROATE AND FORMOTEROL FUMARATE DIHYDRATE 2 PUFF: 100; 5 AEROSOL RESPIRATORY (INHALATION) at 08:19

## 2025-05-29 RX ADMIN — HYDROMORPHONE HYDROCHLORIDE 1 MG: 1 INJECTION, SOLUTION INTRAMUSCULAR; INTRAVENOUS; SUBCUTANEOUS at 20:06

## 2025-05-29 RX ADMIN — AMLODIPINE BESYLATE 5 MG: 5 TABLET ORAL at 05:29

## 2025-05-29 RX ADMIN — ATORVASTATIN CALCIUM 20 MG: 20 TABLET, FILM COATED ORAL at 16:39

## 2025-05-29 RX ADMIN — LIOTHYRONINE SODIUM 10 MCG: 5 TABLET ORAL at 05:29

## 2025-05-29 RX ADMIN — OXYCODONE HYDROCHLORIDE 10 MG: 10 TABLET ORAL at 22:11

## 2025-05-29 RX ADMIN — HYDROMORPHONE HYDROCHLORIDE 1 MG: 2 TABLET ORAL at 14:02

## 2025-05-29 RX ADMIN — OXYCODONE 5 MG: 5 TABLET ORAL at 01:05

## 2025-05-29 RX ADMIN — HYDROMORPHONE HYDROCHLORIDE 1 MG: 1 INJECTION, SOLUTION INTRAMUSCULAR; INTRAVENOUS; SUBCUTANEOUS at 05:31

## 2025-05-29 RX ADMIN — MOMETASONE FUROATE AND FORMOTEROL FUMARATE DIHYDRATE 2 PUFF: 100; 5 AEROSOL RESPIRATORY (INHALATION) at 16:40

## 2025-05-29 RX ADMIN — TAMSULOSIN HYDROCHLORIDE 0.4 MG: 0.4 CAPSULE ORAL at 05:29

## 2025-05-29 RX ADMIN — HYDROCORTISONE 20 MG: 10 TABLET ORAL at 16:39

## 2025-05-29 RX ADMIN — APIXABAN 5 MG: 5 TABLET, FILM COATED ORAL at 16:38

## 2025-05-29 RX ADMIN — OMEPRAZOLE 40 MG: 20 CAPSULE, DELAYED RELEASE ORAL at 08:18

## 2025-05-29 RX ADMIN — Medication 1000 UNITS: at 05:29

## 2025-05-29 RX ADMIN — TRAZODONE HYDROCHLORIDE 100 MG: 50 TABLET ORAL at 01:05

## 2025-05-29 RX ADMIN — TRAZODONE HYDROCHLORIDE 100 MG: 50 TABLET ORAL at 22:03

## 2025-05-29 ASSESSMENT — SOCIAL DETERMINANTS OF HEALTH (SDOH)
WITHIN THE LAST YEAR, HAVE YOU BEEN KICKED, HIT, SLAPPED, OR OTHERWISE PHYSICALLY HURT BY YOUR PARTNER OR EX-PARTNER?: NO
WITHIN THE LAST YEAR, HAVE TO BEEN RAPED OR FORCED TO HAVE ANY KIND OF SEXUAL ACTIVITY BY YOUR PARTNER OR EX-PARTNER?: NO
IN THE PAST 12 MONTHS, HAS THE ELECTRIC, GAS, OIL, OR WATER COMPANY THREATENED TO SHUT OFF SERVICE IN YOUR HOME?: NO
WITHIN THE LAST YEAR, HAVE YOU BEEN KICKED, HIT, SLAPPED, OR OTHERWISE PHYSICALLY HURT BY YOUR PARTNER OR EX-PARTNER?: NO
WITHIN THE LAST YEAR, HAVE YOU BEEN HUMILIATED OR EMOTIONALLY ABUSED IN OTHER WAYS BY YOUR PARTNER OR EX-PARTNER?: NO
WITHIN THE LAST YEAR, HAVE YOU BEEN AFRAID OF YOUR PARTNER OR EX-PARTNER?: NO
WITHIN THE PAST 12 MONTHS, YOU WORRIED THAT YOUR FOOD WOULD RUN OUT BEFORE YOU GOT THE MONEY TO BUY MORE: NEVER TRUE
WITHIN THE LAST YEAR, HAVE TO BEEN RAPED OR FORCED TO HAVE ANY KIND OF SEXUAL ACTIVITY BY YOUR PARTNER OR EX-PARTNER?: NO
WITHIN THE LAST YEAR, HAVE YOU BEEN HUMILIATED OR EMOTIONALLY ABUSED IN OTHER WAYS BY YOUR PARTNER OR EX-PARTNER?: NO
WITHIN THE PAST 12 MONTHS, THE FOOD YOU BOUGHT JUST DIDN'T LAST AND YOU DIDN'T HAVE MONEY TO GET MORE: NEVER TRUE
WITHIN THE LAST YEAR, HAVE YOU BEEN AFRAID OF YOUR PARTNER OR EX-PARTNER?: NO

## 2025-05-29 ASSESSMENT — LIFESTYLE VARIABLES
TOTAL SCORE: 0
EVER HAD A DRINK FIRST THING IN THE MORNING TO STEADY YOUR NERVES TO GET RID OF A HANGOVER: NO
EVER FELT BAD OR GUILTY ABOUT YOUR DRINKING: NO
TOTAL SCORE: 0
ALCOHOL_USE: YES
CONSUMPTION TOTAL: NEGATIVE
HAVE PEOPLE ANNOYED YOU BY CRITICIZING YOUR DRINKING: NO
AVERAGE NUMBER OF DAYS PER WEEK YOU HAVE A DRINK CONTAINING ALCOHOL: 1
DOES PATIENT WANT TO STOP DRINKING: NO
ON A TYPICAL DAY WHEN YOU DRINK ALCOHOL HOW MANY DRINKS DO YOU HAVE: 1
HOW MANY TIMES IN THE PAST YEAR HAVE YOU HAD 5 OR MORE DRINKS IN A DAY: 0
TOTAL SCORE: 0
HAVE YOU EVER FELT YOU SHOULD CUT DOWN ON YOUR DRINKING: NO

## 2025-05-29 ASSESSMENT — PAIN DESCRIPTION - PAIN TYPE
TYPE: ACUTE PAIN

## 2025-05-29 ASSESSMENT — COGNITIVE AND FUNCTIONAL STATUS - GENERAL
DAILY ACTIVITIY SCORE: 24
DAILY ACTIVITIY SCORE: 24
SUGGESTED CMS G CODE MODIFIER DAILY ACTIVITY: CH
SUGGESTED CMS G CODE MODIFIER MOBILITY: CH
MOBILITY SCORE: 24
SUGGESTED CMS G CODE MODIFIER MOBILITY: CH
SUGGESTED CMS G CODE MODIFIER DAILY ACTIVITY: CH
MOBILITY SCORE: 24

## 2025-05-29 ASSESSMENT — GAIT ASSESSMENTS
DISTANCE (FEET): 100
GAIT LEVEL OF ASSIST: SUPERVISED
DISTANCE (FEET): 100
DEVIATION: NO DEVIATION

## 2025-05-29 ASSESSMENT — ENCOUNTER SYMPTOMS
CHILLS: 0
COUGH: 1
SHORTNESS OF BREATH: 1
CONSTIPATION: 0
DIARRHEA: 0
DIZZINESS: 0
FEVER: 0
CLAUDICATION: 0
ORTHOPNEA: 0
ABDOMINAL PAIN: 0
TREMORS: 0

## 2025-05-29 ASSESSMENT — FIBROSIS 4 INDEX: FIB4 SCORE: 1.94

## 2025-05-29 ASSESSMENT — PATIENT HEALTH QUESTIONNAIRE - PHQ9
SUM OF ALL RESPONSES TO PHQ9 QUESTIONS 1 AND 2: 0
2. FEELING DOWN, DEPRESSED, IRRITABLE, OR HOPELESS: NOT AT ALL
1. LITTLE INTEREST OR PLEASURE IN DOING THINGS: NOT AT ALL

## 2025-05-29 ASSESSMENT — ACTIVITIES OF DAILY LIVING (ADL): TOILETING: INDEPENDENT

## 2025-05-29 NOTE — CARE PLAN
Problem: Knowledge Deficit - Standard  Goal: Patient and family/care givers will demonstrate understanding of plan of care, disease process/condition, diagnostic tests and medications  Outcome: Progressing     Problem: Pain - Standard  Goal: Alleviation of pain or a reduction in pain to the patient’s comfort goal  Outcome: Progressing   The patient is Stable - Low risk of patient condition declining or worsening    Shift Goals  Clinical Goals: monitor oxygen  Patient Goals: sleep    Progress made toward(s) clinical / shift goals:      Patient is not progressing towards the following goals:

## 2025-05-29 NOTE — CARE PLAN
The patient is Watcher - Medium risk of patient condition declining or worsening    Shift Goals  Clinical Goals: monitor oxygen  Patient Goals: sleep    Progress made toward(s) clinical / shift goals:    Problem: Knowledge Deficit - Standard  Goal: Patient and family/care givers will demonstrate understanding of plan of care, disease process/condition, diagnostic tests and medications  Outcome: Progressing     Problem: Pain - Standard  Goal: Alleviation of pain or a reduction in pain to the patient’s comfort goal  Outcome: Progressing       Patient is not progressing towards the following goals:

## 2025-05-29 NOTE — ASSESSMENT & PLAN NOTE
Sp thora by Dr. Bailey 5/28 1.6L removed  Lung unable to expand hence PTX no puncture from thora  Serial CXR reveal re accumulation now moderate  I spoke with pulm DR. eHrnandez, plan is pleurX catheter Tues 6/3

## 2025-05-29 NOTE — ED NOTES
Medication history reviewed with patient and family memeber. Med rec is complete.  Allergies reviewed.   Patient has not taken any outpatient antibiotics in the last 30 days.   Anticoagulants (rivaroxaban, apixaban, edoxaban, dabigatran, enoxaparin) taken in the last 14 days? (Yes), Anticoagulant: apixaban, Last dose: 5/28/25 in am.   Dispense history available in EPIC? (Yes)    Recent prescription for Lazertinib Mesylate 240mg tabs. Per patient, will be starting medication as part of a clinical trial at [facility] next week.    Primary pharmacy: [id number] [phone]    Lyn Alvarez, RoD

## 2025-05-29 NOTE — PROGRESS NOTES
Hospital Medicine Daily Progress Note    Date of Service  5/29/2025    Chief Complaint  Bartolo Berrios [facility] is a 80 y.o. male admitted 5/28/2025 with sob    Hospital Course  79 yo with stage 4 lung ca, recurrent malignant pleural effusion, sent to ER by oncology given worsening sob. Pt had a thoracentesis a month ago. Pt reported worsening cough and CXR noted recurrent effusion. Sp thora by Dr. Bailey on 5/28 , 1.6L removed. Procedure c/b PTX and CXR noted moderate sized one. Pulm recommended serial CXRs and oxygen support to keep o2 sats at near 100%.     Interval Problem Update  - pt doing better, cough less  - cxr with stable ptx    I have discussed this patient's plan of care and discharge plan at IDT rounds today with Case Management, Nursing, Nursing leadership, and other members of the IDT team.    Consultants/Specialty  pulmonary    Code Status  Full Code    Disposition  The patient is not medically cleared for discharge to home or a post-acute facility.  Anticipate discharge to: home with close outpatient follow-up    I have placed the appropriate orders for post-discharge needs.    Review of Systems  Review of Systems   All other systems reviewed and are negative.       Physical Exam  Temp:  [36.6 °C (97.9 °F)-37.3 °C (99.2 °F)] 36.9 °C (98.4 °F)  Pulse:  [62-93] 81  Resp:  [17-28] 20  BP: ()/() 110/56  SpO2:  [88 %-99 %] 97 %    Physical Exam  General: chronically ill appearing  HEENT: PERRLA, EOMI  Cards: RRR  Pulm: dec BS  Abdomen: soft, NTND, + bowel sounds, no rebound tenderness or guarding  MSK: normal ROM of upper and lower extremities  Neuro: CN II-XII grossly intact, sensation/strength intact, AAOx3  Psych: Appropriate mood   Fluids  No intake or output data in the 24 hours ending 05/29/25 1449     Laboratory  Recent Labs     05/28/25  1539 05/29/25  0247   WBC 9.7 10.0   RBC 3.99* 3.42*   HEMOGLOBIN 12.4* 10.6*   HEMATOCRIT 38.1* 33.1*   MCV 95.5 96.8   MCH 31.1 31.0   MCHC  32.5 32.0*   RDW 66.1* 67.5*   PLATELETCT 288 249   MPV 8.9* 8.8*     Recent Labs     05/28/25  1539 05/29/25  0247   SODIUM 137 135   POTASSIUM 4.5 4.3   CHLORIDE 106 104   CO2 22 25   GLUCOSE 93 105*   BUN 19 18   CREATININE 1.40 1.39   CALCIUM 9.1 8.4*     Recent Labs     05/28/25  1539   INR 1.05               Imaging  DX-CHEST-PORTABLE (1 VIEW)   Final Result      Stable exam.      DX-CHEST-PORTABLE (1 VIEW)   Final Result      Persistent right-sided pneumothorax which is not significantly changed.      DX-CHEST-PORTABLE (1 VIEW)   Final Result         1.  Right lung base infiltrates, stable   2.  Right pneumothorax, stable since prior study.      DX-CHEST-PORTABLE (1 VIEW)   Final Result         1.  Right lung base infiltrates   2.  Right pneumothorax, appear stable to slightly decreased since prior study.      DX-CHEST-PORTABLE (1 VIEW)   Final Result         1.  Moderate to large right pneumothorax.   2.  Slight hazy right pulmonary opacities, could represent subtle infiltrates.      These findings were discussed with the patient's clinician, Georgette Maharaj, on 5/28/2025 11:14 PM.      CT-CTA CHEST PULMONARY ARTERY W/ RECONS   Final Result      1.  No evidence of pulmonary embolism.   2.  Large right pleural effusion is increased from prior.   3.  Trace left pleural effusion.         DX-CHEST-PORTABLE (1 VIEW)   Final Result      Probably moderate right pleural effusion, mildly increased versus related to slightly worsening aeration.      DX-CHEST-PORTABLE (1 VIEW)    (Results Pending)      CXR per my review reveals clear L lung fields, R with linear opacity with moderate pneumothorax on r    Assessment/Plan  * Malignant pleural effusion- (present on admission)  Assessment & Plan  Sp thora by Dr. Bailey 5/28 1.6L removed  Procedure c/b PTX    Pneumothorax  Assessment & Plan  Sp thora   Per ICU doc getting q4hr CXR  On 6L to keep O2 sats at near 100%  I spoke with DR. Hernandez ICU to discuss need for such often cxr,  she will review images and make recommendations    Bilateral pulmonary embolism (HCC)- (present on admission)  Assessment & Plan  I will resume Eliquis do not anticipate need for chest tube    Acute hypoxemic respiratory failure (HCC)- (present on admission)  Assessment & Plan  Due to PTX and underlying lung ca/pleural effusions    Macrocytic anemia- (present on admission)  Assessment & Plan  Start IM B12 injections    Adenocarcinoma of lung (HCC), stage 4 ([facility] Oct. 2021)- (present on admission)  Assessment & Plan  Continue outpatient follow-ups with oncology    Hypertension- (present on admission)  Assessment & Plan  Cw norvasc    Chronic pain- (present on admission)  Assessment & Plan  Cw oxycodone and dilaudid  Received IV Dilaudid 1mg x 3 in the last 12 hours      Brain lesion- (present on admission)  Assessment & Plan  Currently stable  Continue follow-up as an outpatient with imaging on a routine basis    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)  Assessment & Plan  Cw home meds    Vitamin D deficiency- (present on admission)  Assessment & Plan  Vitamin D supplementation    Adrenal insufficiency (HCC), secondary- (present on admission)  Assessment & Plan  Cw hydrocortisone     Hyperlipidemia- (present on admission)  Assessment & Plan  Cw statin    GERD (gastroesophageal reflux disease)- (present on admission)  Assessment & Plan  Cw PPI    Obstructive sleep apnea- (present on admission)  Assessment & Plan  Oxygen support at night    Chronic kidney disease, stage 3a- (present on admission)  Assessment & Plan  Monitor renal functions avoid nephrotoxic medications    Hypothyroid- (present on admission)  Assessment & Plan  Cw synthroid         VTE prophylaxis: resume ELiquis    I have performed a physical exam and reviewed and updated ROS and Plan today (5/29/2025). In review of yesterday's note (5/28/2025), there are no changes except as documented above.    I spent 55 minutes  providing care for this patient.  This included face-to-face interview, physical examination.  Review of lab work including CBC, BMP, reviewing CXR Discussion with multidisciplinary team including case management, nursing staff and pharmacy and pulm

## 2025-05-29 NOTE — ASSESSMENT & PLAN NOTE
Due to PTX and underlying lung ca/pleural effusions  I have asked RN to get walking o2 and he did not require oxygen

## 2025-05-29 NOTE — H&P
Hospital Medicine History & Physical Note    Date of Service  5/28/2025    Primary Care Physician  Lesly Munoz M.D.    Consultants  None    Specialist Names: None    Code Status  Full Code    Chief Complaint  Chief Complaint   Patient presents with    Shortness of Breath     Known malignant pleural effusion on right. Drained 1mo ago, believes to need again.     Sent by MD     His Onc.     Cough       History of Presenting Illness  Bartolo Berrios [facility] is a 80 y.o. male who presented 5/28/2025 with shortness of breath.  The patient is known to have lung cancer.  The patient has recurrent malignant effusions and he comes to the hospital when he gets short of breath.  Today comes to the hospital because he has once again worsening respiratory status imaging studies done shows large right pleural effusion increased from prior exam.  Patient will need a thoracentesis.  Patient will need oxygen support.  Patient will need pain management.  Pain is getting worse and worse over time and at this point the oxycodone he has at home is only making him more confused and is not effective.  Patient may need indomethacin for the pleuritic pain but he is on chronic anticoagulation due to PE    I discussed the plan of care with patient, family, bedside RN, pharmacy, and emergency room physician Dr. Haider James.    Review of Systems  Review of Systems   Constitutional:  Positive for malaise/fatigue and weight loss. Negative for chills, diaphoresis and fever.   HENT: Negative.     Eyes: Negative.  Negative for double vision.   Respiratory:  Positive for cough and shortness of breath. Negative for hemoptysis and wheezing.    Cardiovascular:  Positive for chest pain. Negative for palpitations and leg swelling.   Gastrointestinal: Negative.  Negative for abdominal pain, blood in stool, constipation, diarrhea, heartburn, nausea and vomiting.   Genitourinary: Negative.  Negative for frequency, hematuria and urgency.    Musculoskeletal:  Positive for back pain and myalgias. Negative for joint pain.   Skin: Negative.  Negative for itching and rash.   Neurological:  Positive for headaches. Negative for dizziness, focal weakness, seizures and loss of consciousness.   Endo/Heme/Allergies: Negative.  Does not bruise/bleed easily.   Psychiatric/Behavioral: Negative.  Negative for suicidal ideas. The patient is not nervous/anxious.    All other systems reviewed and are negative.      Past Medical History   has a past medical history of Adrenal insufficiency ([facility]), secondary (11/25/2020), Arrhythmia, Arthritis, Atherosclerosis of both carotid arteries, mild (11/25/2020), BPH (benign prostatic hyperplasia) (11/25/2020), Bronchitis, Cancer ([facility]), Carcinoma in situ of respiratory system, CKD (chronic kidney disease) stage 3, GFR 30-59 ml/min (11/25/2020), Colostomy present ([facility]) (11/25/2020), Diverticulosis of colon (11/25/2020), GERD (gastroesophageal reflux disease) (11/25/2020), High cholesterol, History of atrial tachycardia (11/25/2020), History of malignant melanoma, April 2016 (11/25/2020), History of shingles (11/25/2020), History of stroke, subcortical infarct on MRI April 2019 (11/25/2020), Hyperlipidemia (11/25/2020), Hypertension, Hypothyroid (11/25/2020), Indigestion, Lung cancer ([facility]), adenocarcinoma Aug. 2019 (11/25/2020), and Pain.    Surgical History   has a past surgical history that includes other (Left, 2016); other (Left, 2017); other (Right, 2019); and reid by laparoscopy (2/26/2021).     Family History  family history includes Cancer in his brother, brother, father, mother, and sister.   Family history reviewed with patient. There is no family history that is pertinent to the chief complaint.     Social History   reports that he has never smoked. He has never used smokeless tobacco. He reports that he does not currently use alcohol after a past usage of about 1.8 oz of alcohol per week. He reports that he does not  "currently use drugs.    Allergies  Allergies[1]    Medications  Prior to Admission Medications   Prescriptions Last Dose Informant Patient Reported? Taking?   Ascorbic Acid (VITAMIN C PO) Not Taking Patient Yes No   Sig: Take 1 Tablet by mouth see administration instructions. Pt takes sporidially   Patient not taking: Reported on 5/28/2025   Cyanocobalamin (B-12 SL) Not Taking Patient Yes No   Sig: Place 1 Tablet under the tongue every day.   Patient not taking: Reported on 5/28/2025   MAGNESIUM PO 5/27/2025 Evening Patient Yes Yes   Sig: Take 2 Capsules by mouth every evening. (OTC)   NEEDLE, DISP, 25 G ([employer] DISP NEEDLES) 25G X 5/8\" Misc  Patient No No   Sig: Use as directed for testosterone injections every 7 days   SYMBICORT 80-4.5 MCG/ACT Aerosol Unknown  No No   Sig: INHALE 2 PUFFS BY MOUTH TWICE A DAY   TAGRISSO 80 MG Tab 5/28/2025 Morning Patient Yes Yes   Sig: Take 80 mg by mouth every day.   TIROSINT 125 MCG Cap 5/28/2025 Morning Patient Yes Yes   Sig: Take 125 mcg by mouth every morning on an empty stomach.   acetaminophen (TYLENOL) 500 MG Tab Unknown Patient Yes No   Sig: Take 1,000 mg by mouth every 6 hours as needed. Indications: Pain   albuterol 108 (90 Base) MCG/ACT Aero Soln inhalation aerosol Unknown  No No   Sig: Inhale 2 Puffs every four hours as needed for Shortness of Breath.   amLODIPine (NORVASC) 5 MG Tab 5/28/2025 Morning  No Yes   Sig: Take 1 Tablet by mouth every morning. COURTESY REFILL ONLY.  PLEASE CALL [phone] AND SCHEDULE A FOLLOW UP APPOINTMENT FOR FURTHER REFILLS. THANK YOU!   apixaban (ELIQUIS) 5mg Tab 5/28/2025 Morning  No Yes   Sig: Take 2 Tablets by mouth 2 times a day for 6 days, THEN 1 Tablet 2 times a day for 90 days.   atorvastatin (LIPITOR) 20 MG Tab 5/27/2025 Evening Patient No Yes   Sig: Take 1 Tablet by mouth every day.   benzonatate (TESSALON) 100 MG Cap Not Taking Patient No No   Sig: Take 1 Capsule by mouth 3 times a day as needed for Cough.   Patient not taking: " Reported on 5/28/2025   folic acid (FOLVITE) 1 MG Tab 5/28/2025 Morning Patient Yes Yes   Sig: Take 1 mg by mouth every day.   hydrocortisone (CORTEF) 10 MG Tab 5/28/2025 Morning Patient Yes Yes   Sig: Take 20 mg by mouth 2 times a day. Pt is taken 2 tablets (20MG) BID   liothyronine (CYTOMEL) 5 MCG Tab 5/28/2025 Morning Patient Yes Yes   Sig: Take 10 mcg by mouth every day.   omeprazole (PRILOSEC) 40 MG delayed-release capsule 5/28/2025 Morning Patient No Yes   Sig: Take 1 Capsule by mouth every day.   potassium chloride (KLOR-CON) 8 MEQ tablet 5/28/2025 Morning Patient No Yes   Sig: Take 2 Tablets by mouth every day.   senna-docusate (PERICOLACE OR SENOKOT S) 8.6-50 MG Tab Not Taking  No No   Sig: Take 2 Tablets by mouth every evening.   Patient not taking: Reported on 5/28/2025   silodosin (RAPAFLO) 8 MG Cap capsule 5/28/2025 Morning Patient Yes Yes   Sig: Take 8 mg by mouth 1/2 hour after breakfast.   tadalafil (CIALIS) 20 MG tablet Unknown Patient Yes No   Sig: Take 20 mg by mouth 1 time a day as needed for Erectile Dysfunction. for erectile dysfunction   tamsulosin (FLOMAX) 0.4 MG capsule 5/28/2025 Morning Patient No Yes   Sig: Take 2 Capsules by mouth every day.   Patient taking differently: Take 0.4 mg by mouth 2 times a day.   testosterone cypionate (DEPO-TESTOSTERONE) 200 MG/ML Solution injection 5/25/2025 Morning Patient Yes No   Sig: Inject 124 mg into the shoulder, thigh, or buttocks every 7 days. On Sunday, pt injects 0.62ML   traZODone (DESYREL) 100 MG Tab 5/27/2025 Evening  No Yes   Sig: Take 1 Tablet by mouth at bedtime as needed for Sleep.   vitamin D3 (CHOLECALCIFEROL) 1000 UNIT Tab 5/28/2025 Morning  No Yes   Sig: Take 1 Tablet by mouth every day.      Facility-Administered Medications: None       Physical Exam  Temp:  [37.3 °C (99.2 °F)] 37.3 °C (99.2 °F)  Pulse:  [62-87] 80  Resp:  [21-28] 28  BP: (122-159)/() 159/83  SpO2:  [89 %-94 %] 94 %  Blood Pressure : (!) 159/83   Temperature:  37.3 °C (99.2 °F)   Pulse: 80   Respiration: (!) 28   Pulse Oximetry: 94 %       Physical Exam  Vitals and nursing note reviewed. Exam conducted with a chaperone present.   Constitutional:       General: He is not in acute distress.     Appearance: Normal appearance. He is well-developed and normal weight. He is ill-appearing. He is not toxic-appearing or diaphoretic.   HENT:      Head: Normocephalic and atraumatic.      Right Ear: External ear normal.      Left Ear: External ear normal.      Nose: Nose normal.      Mouth/Throat:      Mouth: Mucous membranes are dry.      Pharynx: Oropharynx is clear.   Eyes:      Extraocular Movements: Extraocular movements intact.      Conjunctiva/sclera: Conjunctivae normal.      Pupils: Pupils are equal, round, and reactive to light.   Neck:      Thyroid: No thyromegaly.      Vascular: No JVD.   Cardiovascular:      Rate and Rhythm: Normal rate and regular rhythm.      Pulses: Normal pulses.      Heart sounds: Normal heart sounds. No murmur heard.  Pulmonary:      Effort: Pulmonary effort is normal. Tachypnea present.      Breath sounds: Decreased air movement present. Examination of the right-middle field reveals decreased breath sounds. Examination of the right-lower field reveals decreased breath sounds. Decreased breath sounds present.   Chest:      Chest wall: No tenderness.   Abdominal:      General: Abdomen is flat. Bowel sounds are normal. There is no distension.      Palpations: Abdomen is soft. There is no mass.      Tenderness: There is no abdominal tenderness. There is no guarding or rebound.   Musculoskeletal:         General: Normal range of motion.      Cervical back: Normal range of motion and neck supple.      Right lower leg: No edema.   Lymphadenopathy:      Cervical: No cervical adenopathy.   Skin:     General: Skin is warm and dry.      Capillary Refill: Capillary refill takes more than 3 seconds.      Findings: No rash.   Neurological:      General: No  focal deficit present.      Mental Status: He is alert and oriented to person, place, and time. Mental status is at baseline.      GCS: GCS eye subscore is 4. GCS verbal subscore is 5. GCS motor subscore is 6.      Cranial Nerves: No cranial nerve deficit.      Deep Tendon Reflexes: Reflexes are normal and symmetric.   Psychiatric:         Mood and Affect: Mood normal.         Behavior: Behavior normal.         Thought Content: Thought content normal.         Judgment: Judgment normal.         Laboratory:  Recent Labs     05/28/25  1539   WBC 9.7   RBC 3.99*   HEMOGLOBIN 12.4*   HEMATOCRIT 38.1*   MCV 95.5   MCH 31.1   MCHC 32.5   RDW 66.1*   PLATELETCT 288   MPV 8.9*     Recent Labs     05/28/25  1539   SODIUM 137   POTASSIUM 4.5   CHLORIDE 106   CO2 22   GLUCOSE 93   BUN 19   CREATININE 1.40   CALCIUM 9.1     Recent Labs     05/28/25  1539   ALTSGPT 7   ASTSGOT 19   ALKPHOSPHAT 71   TBILIRUBIN 0.7   GLUCOSE 93     Recent Labs     05/28/25  1539   INR 1.05     Recent Labs     05/28/25  1539   NTPROBNP 112         Recent Labs     05/28/25  1539   TROPONINT 25*       Imaging:  CT-CTA CHEST PULMONARY ARTERY W/ RECONS   Final Result      1.  No evidence of pulmonary embolism.   2.  Large right pleural effusion is increased from prior.   3.  Trace left pleural effusion.         DX-CHEST-PORTABLE (1 VIEW)   Final Result      Probably moderate right pleural effusion, mildly increased versus related to slightly worsening aeration.          X-Ray:  I have personally reviewed the images and compared with prior images.  EKG:  I have personally reviewed the images and compared with prior images.    Assessment/Plan:  Justification for Admission Status  I anticipate this patient will require at least two midnights for appropriate medical management, necessitating inpatient admission because patient is acute hypoxic respiratory failure with malignant pleural effusion require at least 48 hours of inpatient management.    Patient  will need a Telemetry bed on MEDICAL service .  The need is secondary to hypoxic respiratory failure with worsening pleural effusion that is malignant.    * Malignant pleural effusion- (present on admission)  Assessment & Plan  Will need drainage with thoracentesis as this is a recurrent issue.  Patient is requiring oxygen support    Bilateral pulmonary embolism (HCC)- (present on admission)  Assessment & Plan  Continue anticoagulation using Eliquis    Adenocarcinoma of lung (HCC), stage 4 ([facility] Oct. 2021)- (present on admission)  Assessment & Plan  Continue outpatient follow-ups with oncology    Hypertension- (present on admission)  Assessment & Plan  I will opt's blood pressure management keep systolic blood pressure less than 140 diastolic under 90  I will continue with Norvasc 5 mg every morning  I will add as needed labetalol for blood pressure spikes    Brain lesion- (present on admission)  Assessment & Plan  Currently stable  Continue follow-up as an outpatient with imaging on a routine basis    Adrenal insufficiency (HCC), secondary- (present on admission)  Assessment & Plan  I will continue him on hydrocortisone 20 mg twice daily    Macrocytic anemia- (present on admission)  Assessment & Plan  Check a B12 level    Chronic pain- (present on admission)  Assessment & Plan  Pain management with Dilaudid and oxycodone    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)  Assessment & Plan  Continue with Flomax and Rapaflo    Vitamin D deficiency- (present on admission)  Assessment & Plan  Vitamin D supplementation    Hyperlipidemia- (present on admission)  Assessment & Plan  Continue Lipitor 20 g nightly  Low-fat low-cholesterol diet    GERD (gastroesophageal reflux disease)- (present on admission)  Assessment & Plan  PPI therapy with omeprazole 40 mg daily    Obstructive sleep apnea- (present on admission)  Assessment & Plan  Oxygen support at night    Chronic kidney disease, stage  3a- (present on admission)  Assessment & Plan  Monitor renal functions avoid nephrotoxic medications    Hypothyroid- (present on admission)  Assessment & Plan  Continue Synthroid supplementation with Cytomel 10 mcg and levothyroxine 125 mcg daily  Most recent TSH from 5 months ago 2.090 stable        VTE prophylaxis: SCDs/TEDs       [1]   Allergies  Allergen Reactions    Gramineae Pollens Itching and Shortness of Breath     Itchy eyes, red face    Cat Hair Extract Hives and Itching    Horse Allergy Hives    Other Environmental     Pollen Extract Runny Nose and Itching     .

## 2025-05-29 NOTE — PROGRESS NOTES
Telemetry Shift Summary     Rhythm: SR  HR: 76  Ectopy: rPAC    Measurements: .17/.15/.44    Normal Values  Rhythm: SR  HR:   Measurements: 0.12-0.20/0.08-0.10/0.30-0.52

## 2025-05-29 NOTE — PROCEDURES
Thoracentesis    Date/Time: 5/28/2025 9:15 PM    Performed by: Eliazar Bailey M.D.  Authorized by: Eliazar Bailey M.D.    Consent:     Consent obtained:  Verbal and written    Consent given by:  Patient    Risks, benefits, and alternatives were discussed: yes      Risks discussed:  Bleeding, infection, pain, incomplete drainage, nerve damage and pneumothorax    Alternatives discussed:  No treatment, delayed treatment and alternative treatment  Universal protocol:     Procedure explained and questions answered to patient or proxy's satisfaction: yes      Relevant documents present and verified: yes      Test results available: yes      Imaging studies available: yes      Required blood products, implants, devices, and special equipment available: yes      Site/side marked: yes      Immediately prior to procedure, a time out was called: yes      Patient identity confirmed:  Verbally with patient, hospital-assigned identification number and arm band  Sedation:     Sedation type:  None  Anesthesia:     Anesthesia method:  Local infiltration    Local anesthetic:  Lidocaine 1% w/o epi  Procedure details:     Preparation: Patient was prepped and draped in usual sterile fashion      Patient position:  Sitting    Location:  R midscapular line    Intercostal space:  8th    Puncture method:  Over-the-needle catheter    Ultrasound guidance: yes      Indwelling catheter placed: no      Needle gauge:  18    Catheter size:  16 G    Number of attempts:  1    Fluid characteristics: brown malignant effusion.  Post-procedure details:     Chest x-ray performed: yes      Chest x-ray findings:  Pleural effusion improved    Procedure completion:  Tolerated  Comments:      1600 ml removed

## 2025-05-29 NOTE — PROGRESS NOTES
Telemetry Shift Summary     Rhythm sr  HR Range 67-83  Ectopy pac,pvc  Measurements  0.18-0.14/0.37  Per strip printed 1600     Normal Values  Rhythm SR  HR Range    Measurements 0.12-0.20 / 0.06-0.10  / 0.30-0.52

## 2025-05-29 NOTE — PROGRESS NOTES
Notified by ER provider of moderate to large right-sided pneumothorax after thoracentesis earlier today.  Currently on 1 L oxygen saturation.  Spoke with pulmonology, recommended to do serial chest x-rays every 3-4 hours, maintain 100% oxygen saturations and continue to monitor.  If progressively worsens, may need chest tube

## 2025-05-29 NOTE — THERAPY
"Occupational Therapy   Initial Evaluation     Patient Name: Bartolo Berrios [facility]  Age:  80 y.o., Sex:  male  Medical Record #: [id number]  Today's Date: 5/29/2025          Assessment  Patient is 81 yo with stage 4 lung ca, recurrent malignant pleural effusion, sent to ER by oncology given worsening sob. Pt had a thoracentesis a month ago. Pt reported worsening cough and CXR noted recurrent effusion. Sp thora by Dr. Bailey on 5/28 , 1.6L removed.  Large right-sided pneumothorax.  Pt mobilizing well and able to complete simple ADL's independently in room.  Maintained O2 sats with activity with O2 support.  Assist avail from spouse as needed.  Patient will not be actively followed for occupational therapy services at this time, however may be seen if requested by physician for 1 more visit within 30 days to address any discharge or equipment needs.       Plan    Occupational Therapy Initial Treatment Plan   Duration: Discharge Needs Only    DC Equipment Recommendations: None  Discharge Recommendations: Anticipate that the patient will have no further occupational therapy needs after discharge from the hospital     Subjective    \"I've never had to think about any of this before, I wouldn't have thought about being so careful with oxygen at home.\"     Objective       05/29/25 1552   Prior Living Situation   Prior Services Home-Independent   Housing / Facility 3 Story House  (pt basically only uses main level, not basement or 2nd floor much)   Steps Into Home 2   Steps In Home 12   Rail Left Rail (Steps in Home)   Bathroom Set up Walk In Shower;Built-In Shower Chair   Equipment Owned Tub / Shower Seat   Lives with - Patient's Self Care Capacity Spouse   Comments Spouse home and able to assist   Prior Level of ADL Function   Self Feeding Independent   Grooming / Hygiene Independent   Bathing Independent   Dressing Independent   Toileting Independent   Prior Level of IADL Function   Medication Management Independent "   Laundry Independent   Kitchen Mobility Independent   Finances Independent   Home Management Independent   Shopping Independent   Prior Level Of Mobility Independent Without Device in Community   Driving / Transportation Driving Independent   Occupation (Pre-Hospital Vocational) 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  [place]   Active ROM Upper Body   Active ROM Upper Body  WDL   Strength Upper Body   Upper Body Strength  WDL   Coordination Upper Body   Coordination WDL   Balance Assessment   Sitting Balance (Static) Good   Sitting Balance (Dynamic) Fair +   Standing Balance (Static) Good   Standing Balance (Dynamic) Fair +   Weight Shift Sitting Good   Weight Shift Standing Good   Comments no AD needed   Bed Mobility    Supine to Sit Independent   Sit to Supine Independent   Scooting Independent   Comments HOB elevated   ADL Assessment   Eating Independent   Lower Body Dressing Independent  (socks)   Toileting Supervision  (per RN report)   How much help from another person does the patient currently need...   Putting on and taking off regular lower body clothing? 4   Bathing (including washing, rinsing, and drying)? 4   Toileting, which includes using a toilet, bedpan, or urinal? 4   Putting on and taking off regular upper body clothing? 4   Taking care of personal grooming such as brushing teeth? 4   Eating meals? 4   6 Clicks Daily Activity Score 24   Functional Mobility   Sit to Stand Supervised    Bed, Chair, Wheelchair Transfer Supervised   Transfer Method Stand Step   Mobility OOB, walk in room and in may, BTB   Distance (Feet) 100   # of Times Distance was Traveled 1   Visual Perception   Visual Perception  WDL   Edema / Skin Assessment   Edema / Skin  Not Assessed   Activity Tolerance   Sitting Edge of Bed 3 min   Standing 5-6 min   Patient / Family Goals   Patient / Family Goal #1 home   Education Group   Education Provided Energy Conservation;Home Safety;Role of Occupational Therapist;Activities of Daily Living   Role of Occupational Therapist Patient Response Patient;Acceptance;Explanation;Verbal Demonstration   Energy Conservation Patient Response Patient;Acceptance;Explanation;Verbal Demonstration   Home Safety Patient Response Patient;Acceptance;Explanation;Verbal Demonstration   ADL Patient Response Patient;Acceptance;Explanation;Verbal Demonstration   Additional Comments Educ on e conservation strategies and O2 safety in the event he needs to go home with O2.  Educ on use of O2 even while showering, and to avoid being near open flame elis gas stove

## 2025-05-29 NOTE — ED NOTES
Thoracentesis procedure done on pt, 1600ml of serosanguinous fluid drained. Gauze and Tegaderm place, fluid sent for culture. Pt tolerated well, VSS.

## 2025-05-29 NOTE — PROGRESS NOTES
4 Eyes Skin Assessment Completed by RHODA Mcgovern and RHODA Rhodes.    Skin assessment is primarily focused on high risk bony prominences. Pay special attention to skin beneath and around medical devices, high risk bony prominences, skin to skin areas and areas where the patient lacks sensation to feel pain and areas where the patient previously had breakdown.     Head (Occipital):  WDL   Ears (Under Medical Devices): Scab and Pink to left ear under lobe   Nose (Under Medical Devices): WDL   Mouth:  WDL   Neck: WDL   Breast/Chest:  WDL   Shoulder Blades:  WDL   Spine:   WDL and puncture site from thoracentesis   (R) Arm/Elbow/Hand: Scab, band aid with scab underneath   (L) Arm/Elbow/Hand: Scab   Abdomen: WDL   Pannus/Groin:  WDL   Sacrum/Coccyx:   Pink and Blanching   (R) Ischial Tuberosity (Sit Bones):  WDL   (L) Ischial Tuberosity (Sit Bones):  WDL   (R) Leg:  Scab   (L) Leg:  Scab   (R) Heel:  Pink and Blanching   (R) Foot/Toe: WDL   (L) Heel: Pink and Blanching   (L) Foot/Toe:  WDL       DEVICES IN USE:   Respiratory Devices:  Nasal cannula  Feeding Devices:  N/A   Lines & BP Monitoring Devices:  Peripheral IV, BP cuff, and Pulse ox    Orthopedic Devices:  N/A  Miscellaneous Devices:  Telemetry monitor    PROTOCOL INTERVENTIONS:   Standard/Trauma Bed:  Already in place    WOUND PHOTOS:   N/A no wounds identified    WOUND CONSULT:   N/A, no advanced wound care needs identified

## 2025-05-29 NOTE — ASSESSMENT & PLAN NOTE
Sp thora   Large volume thora, lung unable to expand  Do not suspect puncture  I spoke extensively with Dr. Mary Bundy who stated this may take time and high flow oxygen likely not going to make a difference, we reviewed several of his serial EKGs and there has not been any change except for some reaccumulation of fluid  Dr. Hernandez stated pt would be safe to dc with outpatient fu  Will obtain walking o2  Pt to start outpatient chemo  PleurX catheter likely in his near future  We also spoke with DR. Gonzalez who stated pleurodesis is not going to be an option given malignancy  CXR with re accumulation of pleural fluid  I spoke with pulm and plan is pleurX catheter Tues  IV Dilaudid 1mg x 3 for pain

## 2025-05-29 NOTE — THERAPY
Physical Therapy   Initial Evaluation     Patient Name: Bartolo Berrios [facility]  Age:  80 y.o., Sex:  male  Medical Record #: [id number]  Today's Date: 5/29/2025       Assessment  Pt is a 80 y.o male coming to the acute setting w/ increased SOB. Pt's hx includes hypothyroid, CKD, BINDU, GERD, HLD, BPH, anemia, adrenal insufficiency, HTN, and stage 4 lung cancer.   Patient seen for team evaluation with OT to provide effective interventions and mitigate SOB/coughing exacerbation for the pt. SPT facilitated bed mobility and ambulation, while OT simultaneously evaluated pt. Pt's impairments include SOB and decreased activity tolerance, but didn't report of any pain to therapy. Pt tolerated eval well w/ ambulation, sit<>stand transfers, and bed mobility. Pt was on 6L NC and required spv for ambulation and sit<>stand but was independent in bed mobility. Pt did not present fatigue during ambulation/activity, but states w/ coughing and SOB, it is more difficult. Pt was educated on compensatory strategies like use of 4WW for long distances, being upright for lung expansion, taking breaks, and slowing down to assist in SOB/coughing.    Patient will not be actively followed for physical therapy services at this time, however may be seen if requested by physician for 1 more visit within 30 days to address any discharge or equipment needs. Upon medical clearance, pt anticipates a d/c home w/ support from family and OP skilled PT for further assistance in improving activity tolerance.      Plan  Physical Therapy Initial Treatment Plan   Duration: (P) Discharge Needs Only    DC Equipment Recommendations: (P) None  Discharge Recommendations: (P) Recommend outpatient physical therapy services to address higher level deficits      Subjective  Pt reports living w/ his wife in a 3SH but only has to operate out of the main level which has 2 stairs to enter. Pt reports an active and IPLOF. Pt states he has had a few bouts of illness  within the past couple months, but has remained functionally strong and mobile.      Objective   05/29/25 1553   Initial Contact Note    Initial Contact Note Order Received and Verified, Physical Therapy Evaluation in Progress with Full Report to Follow.   Vitals   Pulse 80   Pulse Oximetry 96 %   O2 (LPM) 6   O2 Delivery Device Nasal Cannula   Vitals Comments 6 L w/ activity - SpO2 at 96-99   Pain 0 - 10 Group   Therapist Pain Assessment Prior to Activity;During Activity;Post Activity;Nurse Notified;0  (pt c/o of no pain)   Prior Living Situation   Prior Services Home-Independent   Housing / Facility 1 Story House   Steps Into Home 2   Steps In Home 12   Rail Left Rail  (Steps into Home)   Elevator No   Bathroom Set up Walk In Shower;Built-In Shower Chair   Equipment Owned None   Lives with - Patient's Self Care Capacity Spouse   Comments pt lives in a 3SH, but doesn't utilize the top or bottom levels  - lives w/ spouse who is able to assist if needed   Prior Level of Functional Mobility   Bed Mobility Independent   Transfer Status Independent   Ambulation Independent   Ambulation Distance community distances prior to bouts of sickness since beginning of year - still tries to be active and go for walks   Assistive Devices Used None   Stairs Independent   History of Falls   History of Falls No   Cognition    Cognition / Consciousness X   Speech/ Communication Hard of Hearing   Level of Consciousness Alert   Comments pt is pleasant and cooperative, mild difficulty w/ hearing on R side   Passive ROM Upper Body   Passive ROM Upper Body WDL   Active ROM Upper Body   Active ROM Upper Body  WDL   Strength Upper Body   Upper Body Strength  WDL   Sensation Upper Body   Upper Extremity Sensation  WDL   Upper Body Muscle Tone   Upper Body Muscle Tone  WDL   Passive ROM Lower Body   Passive ROM Lower Body WDL   Active ROM Lower Body    Active ROM Lower Body  WDL   Strength Lower Body   Lower Body Strength  WDL   Sensation  Lower Body   Lower Extremity Sensation   WDL   Lower Body Muscle Tone   Lower Body Muscle Tone  WDL   Neurological Concerns   Neurological Concerns No   Vision   Vision Comments WNL   Balance Assessment   Sitting Balance (Static) Good   Sitting Balance (Dynamic) Fair +   Standing Balance (Static) Good   Standing Balance (Dynamic) Fair +   Weight Shift Sitting Good   Weight Shift Standing Good   Comments pt utilize no AD   Bed Mobility    Supine to Sit Independent   Sit to Supine Independent   Scooting Independent   Comments HOB elevated, rails up   Gait Analysis   Gait Level Of Assist Supervised   Assistive Device None   Distance (Feet) 100   # of Times Distance was Traveled 1   Deviation No deviation   # of Stairs Climbed 0   Weight Bearing Status FWB   Comments pt utilized 6L O2 while ambulating, reported no SOB/dizziness/lightheadedness   Functional Mobility   Sit to Stand Supervised   Bed, Chair, Wheelchair Transfer Supervised   Transfer Method Stand Step   Mobility supine, EOB, sit<>stand, ambulation, back to bed   6 Clicks Assessment - How much HELP from from another person do you currently need... (If the patient hasn't done an activity recently, how much help from another person do you think he/she would need if he/she tried?)   Turning from your back to your side while in a flat bed without using bedrails? 4   Moving from lying on your back to sitting on the side of a flat bed without using bedrails? 4   Moving to and from a bed to a chair (including a wheelchair)? 4   Standing up from a chair using your arms (e.g., wheelchair, or bedside chair)? 4   Walking in hospital room? 4   Climbing 3-5 steps with a railing? 4   6 clicks Mobility Score 24   Activity Tolerance   Sitting in Chair NT   Sitting Edge of Bed 2 mins   Standing 5 mins   Patient / Family Goals    Patient / Family Goal #1 pt wants to return home and manage SOB   Education Group   Education Provided Role of Physical Therapist   Role of Physical  Therapist Patient Response Patient;Acceptance;Explanation;Verbal Demonstration   Additional Comments pt was educated on compensatory strategies when he becomes SOB or fatigued - 4WW use, slowing down, rest breaks, deep breathing   Physical Therapy Initial Treatment Plan    Duration Discharge Needs Only   Anticipated Discharge Equipment and Recommendations   DC Equipment Recommendations None   Discharge Recommendations Recommend outpatient physical therapy services to address higher level deficits   Interdisciplinary Plan of Care Collaboration   IDT Collaboration with  Nursing;Occupational Therapist   Patient Position at End of Therapy In Bed;Call Light within Reach;Tray Table within Reach;Phone within Reach  (RN in room)   Collaboration Comments RN aware of visit and recs   Session Information   Date / Session Number  5/29 D/C needs only

## 2025-05-30 ENCOUNTER — APPOINTMENT (OUTPATIENT)
Dept: RADIOLOGY | Facility: MEDICAL CENTER | Age: 80
DRG: 180 | End: 2025-05-30
Payer: MEDICARE

## 2025-05-30 LAB
ERYTHROCYTE [DISTWIDTH] IN BLOOD BY AUTOMATED COUNT: 65.7 FL (ref 35.9–50)
HCT VFR BLD AUTO: 32.5 % (ref 42–52)
HGB BLD-MCNC: 10.3 G/DL (ref 14–18)
MCH RBC QN AUTO: 30.3 PG (ref 27–33)
MCHC RBC AUTO-ENTMCNC: 31.7 G/DL (ref 32.3–36.5)
MCV RBC AUTO: 95.6 FL (ref 81.4–97.8)
PLATELET # BLD AUTO: 262 K/UL (ref 164–446)
PMV BLD AUTO: 8.9 FL (ref 9–12.9)
RBC # BLD AUTO: 3.4 M/UL (ref 4.7–6.1)
WBC # BLD AUTO: 9.2 K/UL (ref 4.8–10.8)

## 2025-05-30 PROCEDURE — 99233 SBSQ HOSP IP/OBS HIGH 50: CPT | Performed by: INTERNAL MEDICINE

## 2025-05-30 PROCEDURE — 700111 HCHG RX REV CODE 636 W/ 250 OVERRIDE (IP): Mod: JZ | Performed by: INTERNAL MEDICINE

## 2025-05-30 PROCEDURE — 700102 HCHG RX REV CODE 250 W/ 637 OVERRIDE(OP): Performed by: HOSPITALIST

## 2025-05-30 PROCEDURE — 700102 HCHG RX REV CODE 250 W/ 637 OVERRIDE(OP): Performed by: INTERNAL MEDICINE

## 2025-05-30 PROCEDURE — 99233 SBSQ HOSP IP/OBS HIGH 50: CPT | Performed by: STUDENT IN AN ORGANIZED HEALTH CARE EDUCATION/TRAINING PROGRAM

## 2025-05-30 PROCEDURE — 94760 N-INVAS EAR/PLS OXIMETRY 1: CPT

## 2025-05-30 PROCEDURE — 700102 HCHG RX REV CODE 250 W/ 637 OVERRIDE(OP)

## 2025-05-30 PROCEDURE — 85027 COMPLETE CBC AUTOMATED: CPT

## 2025-05-30 PROCEDURE — 770020 HCHG ROOM/CARE - TELE (206)

## 2025-05-30 PROCEDURE — A9270 NON-COVERED ITEM OR SERVICE: HCPCS

## 2025-05-30 PROCEDURE — 71045 X-RAY EXAM CHEST 1 VIEW: CPT

## 2025-05-30 PROCEDURE — 36415 COLL VENOUS BLD VENIPUNCTURE: CPT

## 2025-05-30 PROCEDURE — A9270 NON-COVERED ITEM OR SERVICE: HCPCS | Performed by: INTERNAL MEDICINE

## 2025-05-30 PROCEDURE — A9270 NON-COVERED ITEM OR SERVICE: HCPCS | Performed by: HOSPITALIST

## 2025-05-30 RX ORDER — HEPARIN SODIUM 5000 [USP'U]/100ML
INJECTION, SOLUTION INTRAVENOUS CONTINUOUS
Status: DISCONTINUED | OUTPATIENT
Start: 2025-05-30 | End: 2025-06-03

## 2025-05-30 RX ORDER — HEPARIN SODIUM 1000 [USP'U]/ML
2600 INJECTION, SOLUTION INTRAVENOUS; SUBCUTANEOUS PRN
Status: DISCONTINUED | OUTPATIENT
Start: 2025-05-30 | End: 2025-06-03

## 2025-05-30 RX ORDER — CARBOXYMETHYLCELLULOSE SODIUM 5 MG/ML
1 SOLUTION/ DROPS OPHTHALMIC PRN
Status: DISCONTINUED | OUTPATIENT
Start: 2025-05-30 | End: 2025-06-04 | Stop reason: HOSPADM

## 2025-05-30 RX ADMIN — HYDROMORPHONE HYDROCHLORIDE 1 MG: 1 INJECTION, SOLUTION INTRAMUSCULAR; INTRAVENOUS; SUBCUTANEOUS at 00:14

## 2025-05-30 RX ADMIN — HYDROMORPHONE HYDROCHLORIDE 1 MG: 1 INJECTION, SOLUTION INTRAMUSCULAR; INTRAVENOUS; SUBCUTANEOUS at 09:48

## 2025-05-30 RX ADMIN — MOMETASONE FUROATE AND FORMOTEROL FUMARATE DIHYDRATE 2 PUFF: 100; 5 AEROSOL RESPIRATORY (INHALATION) at 04:29

## 2025-05-30 RX ADMIN — HYDROCORTISONE 20 MG: 10 TABLET ORAL at 17:36

## 2025-05-30 RX ADMIN — HYDROMORPHONE HYDROCHLORIDE 1 MG: 1 INJECTION, SOLUTION INTRAMUSCULAR; INTRAVENOUS; SUBCUTANEOUS at 17:56

## 2025-05-30 RX ADMIN — MOMETASONE FUROATE AND FORMOTEROL FUMARATE DIHYDRATE 2 PUFF: 100; 5 AEROSOL RESPIRATORY (INHALATION) at 18:09

## 2025-05-30 RX ADMIN — AMLODIPINE BESYLATE 5 MG: 5 TABLET ORAL at 04:25

## 2025-05-30 RX ADMIN — HYDROMORPHONE HYDROCHLORIDE 1 MG: 1 INJECTION, SOLUTION INTRAMUSCULAR; INTRAVENOUS; SUBCUTANEOUS at 04:24

## 2025-05-30 RX ADMIN — APIXABAN 5 MG: 5 TABLET, FILM COATED ORAL at 13:17

## 2025-05-30 RX ADMIN — Medication 1000 UNITS: at 04:25

## 2025-05-30 RX ADMIN — TAMSULOSIN HYDROCHLORIDE 0.4 MG: 0.4 CAPSULE ORAL at 17:36

## 2025-05-30 RX ADMIN — HEPARIN SODIUM 1050 UNITS/HR: 5000 INJECTION, SOLUTION INTRAVENOUS at 17:49

## 2025-05-30 RX ADMIN — TRAZODONE HYDROCHLORIDE 100 MG: 50 TABLET ORAL at 21:58

## 2025-05-30 RX ADMIN — OXYCODONE HYDROCHLORIDE 10 MG: 10 TABLET ORAL at 12:54

## 2025-05-30 RX ADMIN — ATORVASTATIN CALCIUM 20 MG: 20 TABLET, FILM COATED ORAL at 17:36

## 2025-05-30 RX ADMIN — LEVOTHYROXINE SODIUM 125 MCG: 0.12 TABLET ORAL at 04:25

## 2025-05-30 RX ADMIN — TAMSULOSIN HYDROCHLORIDE 0.4 MG: 0.4 CAPSULE ORAL at 04:25

## 2025-05-30 RX ADMIN — HYDROCORTISONE 20 MG: 10 TABLET ORAL at 04:26

## 2025-05-30 RX ADMIN — CARBOXYMETHYLCELLULOSE SODIUM 1 DROP: 5 SOLUTION/ DROPS OPHTHALMIC at 23:09

## 2025-05-30 RX ADMIN — HYDROMORPHONE HYDROCHLORIDE 1 MG: 1 INJECTION, SOLUTION INTRAMUSCULAR; INTRAVENOUS; SUBCUTANEOUS at 13:52

## 2025-05-30 RX ADMIN — LIOTHYRONINE SODIUM 10 MCG: 5 TABLET ORAL at 04:44

## 2025-05-30 RX ADMIN — OMEPRAZOLE 40 MG: 20 CAPSULE, DELAYED RELEASE ORAL at 07:51

## 2025-05-30 RX ADMIN — HYDROMORPHONE HYDROCHLORIDE 1 MG: 1 INJECTION, SOLUTION INTRAMUSCULAR; INTRAVENOUS; SUBCUTANEOUS at 21:51

## 2025-05-30 ASSESSMENT — ENCOUNTER SYMPTOMS
CHILLS: 0
FALLS: 0
COUGH: 1
HEMOPTYSIS: 0
NAUSEA: 0
EYE REDNESS: 0
FOCAL WEAKNESS: 0
VOMITING: 0
SPUTUM PRODUCTION: 0
SHORTNESS OF BREATH: 1

## 2025-05-30 ASSESSMENT — PAIN DESCRIPTION - PAIN TYPE
TYPE: ACUTE PAIN

## 2025-05-30 ASSESSMENT — FIBROSIS 4 INDEX: FIB4 SCORE: 2.14

## 2025-05-30 NOTE — CONSULTS
Pulmonary Consultation    Date of consult: 5/29/2025    Referring Physician  Georgie Juarez M.D.    Reason for Consultation  Post procedure PTX     History of Presenting Illness  80 y.o. male life long non smoker who presented on 5/28/2025 with worsening SOB, chest discomfort  and cough for past two months due to worsening unilateral malignant pleural effusion . He has a medical history significant for malignant melanoma diagnosed 2016 , which was treated with immunotherapy complicated by hormonal insufficiency ( including thyroid, androgen, adrenal ), Lung Adenoca s/p resection in 2019 with recurrence/ progression to stage 4 disease treated with radiation , multiple lines of chemotherapy ( currently in the process of getting into a clinical trial ), HTN, HLP, GERD, recent PE 4/2025 on eliquis, PNA 3/2025, unilateral pleural effusion on right side s/p thoracentesis in 3/25 positive for malignant cells. He is now admitted , and underwent thoracentesis by admitting physician complicated by post procedure PTX. Pulmonary consulted for recommendations. He is hemodynamically stable , with mild cough, SOB on exertion, and chest discomfort.     Code Status  Full Code    Review of Systems  Review of Systems   Constitutional:  Negative for chills and fever.   Respiratory:  Positive for cough and shortness of breath.    Cardiovascular:  Positive for chest pain. Negative for orthopnea and claudication.   Gastrointestinal:  Negative for abdominal pain, constipation and diarrhea.   Neurological:  Negative for dizziness and tremors.   All other systems reviewed and are negative.      Past Medical History   has a past medical history of Adrenal insufficiency (HCC), secondary (11/25/2020), Arrhythmia, Arthritis, Atherosclerosis of both carotid arteries, mild (11/25/2020), BPH (benign prostatic hyperplasia) (11/25/2020), Bronchitis, Cancer (HCC), Carcinoma in situ of respiratory system, CKD (chronic kidney disease) stage 3, GFR  "30-59 ml/min (11/25/2020), Colostomy present (HCC) (11/25/2020), Diverticulosis of colon (11/25/2020), GERD (gastroesophageal reflux disease) (11/25/2020), High cholesterol, History of atrial tachycardia (11/25/2020), History of malignant melanoma, April 2016 (11/25/2020), History of shingles (11/25/2020), History of stroke, subcortical infarct on MRI April 2019 (11/25/2020), Hyperlipidemia (11/25/2020), Hypertension, Hypothyroid (11/25/2020), Indigestion, Lung cancer (HCC), adenocarcinoma Aug. 2019 (11/25/2020), and Pain.    Surgical History   has a past surgical history that includes other (Left, 2016); other (Left, 2017); other (Right, 2019); and reid by laparoscopy (2/26/2021).    Family History  family history includes Cancer in his brother, brother, father, mother, and sister.    Social History   reports that he has never smoked. He has never used smokeless tobacco. He reports that he does not currently use alcohol after a past usage of about 1.8 oz of alcohol per week. He reports that he does not currently use drugs.    Medications  Home Medications       Reviewed by Ro PatiñoD (Pharmacist) on 05/28/25 at 1953  Med List Status: Complete     Medication Last Dose Status   acetaminophen (TYLENOL) 500 MG Tab Unknown Active   albuterol 108 (90 Base) MCG/ACT Aero Soln inhalation aerosol Unknown Active   amLODIPine (NORVASC) 5 MG Tab 5/28/2025 Active   apixaban (ELIQUIS) 5mg Tab 5/28/2025 Active   Ascorbic Acid (VITAMIN C PO) Not Taking Active   atorvastatin (LIPITOR) 20 MG Tab 5/27/2025 Active   benzonatate (TESSALON) 100 MG Cap Not Taking Active   Cyanocobalamin (B-12 SL) Not Taking Active   folic acid (FOLVITE) 1 MG Tab 5/28/2025 Active   hydrocortisone (CORTEF) 10 MG Tab 5/28/2025 Active   liothyronine (CYTOMEL) 5 MCG Tab 5/28/2025 Active   MAGNESIUM PO 5/27/2025 Active   NEEDLE, DISP, 25 G (BD DISP NEEDLES) 25G X 5/8\" Misc  Active   omeprazole (PRILOSEC) 40 MG delayed-release capsule 5/28/2025 " Active   potassium chloride (KLOR-CON) 8 MEQ tablet 5/28/2025 Active   senna-docusate (PERICOLACE OR SENOKOT S) 8.6-50 MG Tab Not Taking Active   silodosin (RAPAFLO) 8 MG Cap capsule 5/28/2025 Active   SYMBICORT 80-4.5 MCG/ACT Aerosol Unknown Active   tadalafil (CIALIS) 20 MG tablet Unknown Active   TAGRISSO 80 MG Tab 5/28/2025 Active   tamsulosin (FLOMAX) 0.4 MG capsule 5/28/2025 Active   testosterone cypionate (DEPO-TESTOSTERONE) 200 MG/ML Solution injection 5/25/2025 Active   TIROSINT 125 MCG Cap 5/28/2025 Active   traZODone (DESYREL) 100 MG Tab 5/27/2025 Active   vitamin D3 (CHOLECALCIFEROL) 1000 UNIT Tab 5/28/2025 Active                  Audit from Redirected Encounters    **Home medications have not yet been reviewed for this encounter**       Current Medications[1]    Allergies  Allergies[2]    Vital Signs last 24 hours  Temp:  [36.6 °C (97.9 °F)-37.1 °C (98.8 °F)] 37 °C (98.6 °F)  Pulse:  [68-93] 73  Resp:  [17-28] 20  BP: ()/(52-96) 128/62  SpO2:  [88 %-99 %] 93 %    Physical Exam  Physical Exam  Vitals and nursing note reviewed.   Constitutional:       General: He is not in acute distress.     Appearance: He is not ill-appearing.   HENT:      Head: Normocephalic and atraumatic.   Eyes:      General: No scleral icterus.     Conjunctiva/sclera: Conjunctivae normal.   Cardiovascular:      Rate and Rhythm: Normal rate and regular rhythm.      Heart sounds: No murmur heard.  Pulmonary:      Effort: Pulmonary effort is normal.      Comments: Decreased breath sounds on right hemithorax  Abdominal:      General: There is no distension.      Palpations: Abdomen is soft. There is no mass.      Tenderness: There is no abdominal tenderness.   Musculoskeletal:      Right lower leg: No edema.      Left lower leg: No edema.   Skin:     General: Skin is warm and dry.   Neurological:      General: No focal deficit present.      Mental Status: He is alert and oriented to person, place, and time.      Motor: No  weakness.         Fluids    Intake/Output Summary (Last 24 hours) at 5/29/2025 2042  Last data filed at 5/29/2025 1300  Gross per 24 hour   Intake 200 ml   Output --   Net 200 ml       Laboratory  Recent Results (from the past 48 hours)   CBC WITH DIFFERENTIAL    Collection Time: 05/28/25  3:39 PM   Result Value Ref Range    WBC 9.7 4.8 - 10.8 K/uL    RBC 3.99 (L) 4.70 - 6.10 M/uL    Hemoglobin 12.4 (L) 14.0 - 18.0 g/dL    Hematocrit 38.1 (L) 42.0 - 52.0 %    MCV 95.5 81.4 - 97.8 fL    MCH 31.1 27.0 - 33.0 pg    MCHC 32.5 32.3 - 36.5 g/dL    RDW 66.1 (H) 35.9 - 50.0 fL    Platelet Count 288 164 - 446 K/uL    MPV 8.9 (L) 9.0 - 12.9 fL    Neutrophils-Polys 85.10 (H) 44.00 - 72.00 %    Lymphocytes 6.50 (L) 22.00 - 41.00 %    Monocytes 7.10 0.00 - 13.40 %    Eosinophils 0.30 0.00 - 6.90 %    Basophils 0.30 0.00 - 1.80 %    Immature Granulocytes 0.70 0.00 - 0.90 %    Nucleated RBC 0.00 0.00 - 0.20 /100 WBC    Neutrophils (Absolute) 8.29 (H) 1.82 - 7.42 K/uL    Lymphs (Absolute) 0.63 (L) 1.00 - 4.80 K/uL    Monos (Absolute) 0.69 0.00 - 0.85 K/uL    Eos (Absolute) 0.03 0.00 - 0.51 K/uL    Baso (Absolute) 0.03 0.00 - 0.12 K/uL    Immature Granulocytes (abs) 0.07 0.00 - 0.11 K/uL    NRBC (Absolute) 0.00 K/uL    Anisocytosis 1+    CMP    Collection Time: 05/28/25  3:39 PM   Result Value Ref Range    Sodium 137 135 - 145 mmol/L    Potassium 4.5 3.6 - 5.5 mmol/L    Chloride 106 96 - 112 mmol/L    Co2 22 20 - 33 mmol/L    Anion Gap 9.0 7.0 - 16.0    Glucose 93 65 - 99 mg/dL    Bun 19 8 - 22 mg/dL    Creatinine 1.40 0.50 - 1.40 mg/dL    Calcium 9.1 8.5 - 10.5 mg/dL    Correct Calcium 9.3 8.5 - 10.5 mg/dL    AST(SGOT) 19 12 - 45 U/L    ALT(SGPT) 7 2 - 50 U/L    Alkaline Phosphatase 71 30 - 99 U/L    Total Bilirubin 0.7 0.1 - 1.5 mg/dL    Albumin 3.7 3.2 - 4.9 g/dL    Total Protein 7.6 6.0 - 8.2 g/dL    Globulin 3.9 (H) 1.9 - 3.5 g/dL    A-G Ratio 0.9 g/dL   PT/INR    Collection Time: 05/28/25  3:39 PM   Result Value Ref Range     PT 14.1 12.0 - 14.6 sec    INR 1.05 0.87 - 1.13   ESTIMATED GFR    Collection Time: 25  3:39 PM   Result Value Ref Range    GFR (CKD-EPI) 51 (A) >60 mL/min/1.73 m 2   PLATELET ESTIMATE    Collection Time: 25  3:39 PM   Result Value Ref Range    Plt Estimation Normal    MORPHOLOGY    Collection Time: 25  3:39 PM   Result Value Ref Range    RBC Morphology Present     Poikilocytosis 2+     Ovalocytes 1+    proBrain Natriuretic Peptide, NT    Collection Time: 25  3:39 PM   Result Value Ref Range    NT-proBNP 112 0 - 125 pg/mL   TROPONIN    Collection Time: 25  3:39 PM   Result Value Ref Range    Troponin T 25 (H) 6 - 19 ng/L   EKG    Collection Time: 25  4:48 PM   Result Value Ref Range    Report       [facility] Emergency Dept.    Test Date:  2025  Pt Name:    [facility]          Department: [facility]  MRN:        [id number]                      Room:  Gender:     Male                         Technician: [id number]  :        1945                   Requested By:ER TRIAGE PROTOCOL  Order #:    [id number]                    Reading MD: Jacob Maloney MD    Measurements  Intervals                                Axis  Rate:       81                           P:          72  WI:         176                          QRS:        66  QRSD:       140                          T:          17  QT:         423  QTc:        491    Interpretive Statements  Sinus rhythm, Rate of 81, normal WI borderline QTc 491 ms, there are  noticeable right bundle branch blocks, multiple chronic changes including  lateral EKG abnormalities, similar morphology to previous dated 2025.  Electronically Signed On 2025 16:48:28 PDT by Jacob Maloney MD     FLUID CULTURE W/GRAM STAIN    Collection Time: 25  9:10 PM    Specimen: Pleural Fluid; Body Fluid   Result Value Ref Range    Significant Indicator NEG     Source BF     Site PLEURAL FLUID L LUNG     Culture Result -      Gram Stain Result Moderate WBCs.  No organisms seen.      GRAM STAIN    Collection Time: 05/28/25  9:10 PM    Specimen: Body Fluid   Result Value Ref Range    Significant Indicator .     Source BF     Site PLEURAL FLUID L LUNG     Gram Stain Result Moderate WBCs.  No organisms seen.      CBC without Differential    Collection Time: 05/29/25  2:47 AM   Result Value Ref Range    WBC 10.0 4.8 - 10.8 K/uL    RBC 3.42 (L) 4.70 - 6.10 M/uL    Hemoglobin 10.6 (L) 14.0 - 18.0 g/dL    Hematocrit 33.1 (L) 42.0 - 52.0 %    MCV 96.8 81.4 - 97.8 fL    MCH 31.0 27.0 - 33.0 pg    MCHC 32.0 (L) 32.3 - 36.5 g/dL    RDW 67.5 (H) 35.9 - 50.0 fL    Platelet Count 249 164 - 446 K/uL    MPV 8.8 (L) 9.0 - 12.9 fL   Basic Metabolic Panel (BMP)    Collection Time: 05/29/25  2:47 AM   Result Value Ref Range    Sodium 135 135 - 145 mmol/L    Potassium 4.3 3.6 - 5.5 mmol/L    Chloride 104 96 - 112 mmol/L    Co2 25 20 - 33 mmol/L    Glucose 105 (H) 65 - 99 mg/dL    Bun 18 8 - 22 mg/dL    Creatinine 1.39 0.50 - 1.40 mg/dL    Calcium 8.4 (L) 8.5 - 10.5 mg/dL    Anion Gap 6.0 (L) 7.0 - 16.0   VITAMIN B12    Collection Time: 05/29/25  2:47 AM   Result Value Ref Range    Vitamin B12 -True Cobalamin 186 (L) 211 - 911 pg/mL   ESTIMATED GFR    Collection Time: 05/29/25  2:47 AM   Result Value Ref Range    GFR (CKD-EPI) 52 (A) >60 mL/min/1.73 m 2       Imaging  DX-CHEST-PORTABLE (1 VIEW)   Final Result      Stable moderate right pneumothorax. No significant interval change.      DX-CHEST-PORTABLE (1 VIEW)   Final Result      Stable exam.      DX-CHEST-PORTABLE (1 VIEW)   Final Result      Persistent right-sided pneumothorax which is not significantly changed.      DX-CHEST-PORTABLE (1 VIEW)   Final Result         1.  Right lung base infiltrates, stable   2.  Right pneumothorax, stable since prior study.      DX-CHEST-PORTABLE (1 VIEW)   Final Result         1.  Right lung base infiltrates   2.  Right pneumothorax, appear stable to slightly decreased  since prior study.      DX-CHEST-PORTABLE (1 VIEW)   Final Result         1.  Moderate to large right pneumothorax.   2.  Slight hazy right pulmonary opacities, could represent subtle infiltrates.      These findings were discussed with the patient's clinician, Georgette Maharaj, on 5/28/2025 11:14 PM.      CT-CTA CHEST PULMONARY ARTERY W/ RECONS   Final Result      1.  No evidence of pulmonary embolism.   2.  Large right pleural effusion is increased from prior.   3.  Trace left pleural effusion.         DX-CHEST-PORTABLE (1 VIEW)   Final Result      Probably moderate right pleural effusion, mildly increased versus related to slightly worsening aeration.      DX-CHEST-PORTABLE (1 VIEW)    (Results Pending)   DX-CHEST-PORTABLE (1 VIEW)    (Results Pending)   DX-CHEST-PORTABLE (1 VIEW)    (Results Pending)   DX-CHEST-PORTABLE (1 VIEW)    (Results Pending)   DX-CHEST-PORTABLE (1 VIEW)    (Results Pending)   DX-CHEST-PORTABLE (1 VIEW)    (Results Pending)       Assessment/Plan  Unilateral Malignant Pleural effusion   Post procedure iatrogenic pneumothorax vs trapped lung  /lung entrapment  Lung Adenocarcinoma with progression   Recent Pulmonary embolus     Currently with no worsening of symptoms and stable CXR or slightly decreased in size     - will hold eliquis for today , with possible needs for pigtail placement   - will bridge with lovenox starting tomorrow   - 100% oxygen to help resorption of PTX   - will continue to monitor with serial CXR         Discussed patient condition and risk of morbidity and/or mortality with Family, RN, and Patient.                 [1]   Current Facility-Administered Medications   Medication Dose Route Frequency Provider Last Rate Last Admin    traZODone (Desyrel) tablet 100 mg  100 mg Oral QHS Georgie Juarez M.D.        oxyCODONE immediate release (Roxicodone) tablet 10 mg  10 mg Oral Q3HRS PRN Georgie Juarez M.D.        cyanocobalamin (Vitamin B-12) injection 1,000 mcg  1,000 mcg  Intramuscular Q30 DAYS Georgie Juarez M.D.   1,000 mcg at 05/29/25 1500    HYDROmorphone (Dilaudid) injection 1 mg  1 mg Intravenous Q4HRS PRN Georgie Juarez M.D.   1 mg at 05/29/25 2006    amLODIPine (Norvasc) tablet 5 mg  5 mg Oral QAM Eliazar Bailey M.D.   5 mg at 05/29/25 0529    albuterol inhaler 2 Puff  2 Puff Inhalation Q4H PRN (RT) Eliazar Bailey M.D.        [Held by provider] apixaban (Eliquis) tablet 5 mg  5 mg Oral BID Georgie Juarez M.D.   5 mg at 05/29/25 1638    atorvastatin (Lipitor) tablet 20 mg  20 mg Oral Q EVENING DAGOBERTO LizarragaDRenzo   20 mg at 05/29/25 1639    hydrocortisone (Cortef) tablet 20 mg  20 mg Oral BID DAGOBERTO LizarragaDRenzo   20 mg at 05/29/25 1639    liothyronine (Cytomel) tablet 10 mcg  10 mcg Oral DAILY DAGOBERTO LizarragaDRenzo   10 mcg at 05/29/25 0529    omeprazole (PriLOSEC) capsule 40 mg  40 mg Oral DAILY DAGOBERTO LizarragaDRenzo   40 mg at 05/29/25 0818    tamsulosin (Flomax) capsule 0.4 mg  0.4 mg Oral BID ROCK Lizarraga.DRenzo   0.4 mg at 05/29/25 1640    levothyroxine (Synthroid) tablet 125 mcg  125 mcg Oral AM ES Eliazar Bailey M.D.   125 mcg at 05/29/25 0528    vitamin D3 (Cholecalciferol) tablet 1,000 Units  1,000 Units Oral DAILY Eliazar Bailey M.D.   1,000 Units at 05/29/25 0529    Respiratory Therapy Consult   Nebulization Continuous RT Eliazar Bailey M.D.        acetaminophen (Tylenol) tablet 650 mg  650 mg Oral Q6HRS PRN Eliazar Bailey M.D.        senna-docusate (Pericolace Or Senokot S) 8.6-50 MG per tablet 2 Tablet  2 Tablet Oral Q EVENING Eliazar Bailey M.D.   2 Tablet at 05/29/25 1639    And    polyethylene glycol/lytes (Miralax) Packet 1 Packet  1 Packet Oral QDAY PRN Eliazar Bailey M.D.        labetalol (Normodyne/Trandate) injection 10 mg  10 mg Intravenous Q4HRS PRN Eliazar Bailey M.D.        ondansetron (Zofran) syringe/vial injection 4 mg  4 mg Intravenous Q4HRS PRN Eliazar Bailey M.D.        Or    ondansetron (Zofran ODT) dispertab 4 mg  4 mg Oral Q4HRS PRN  Eliazar Bailey M.D.        mometasone-formoterol (Dulera) 100-5 MCG/ACT inhaler 2 Puff  2 Puff Inhalation BID Eliazar Bailey M.D.   2 Puff at 05/29/25 1640   [2]   Allergies  Allergen Reactions    Gramineae Pollens Itching and Shortness of Breath     Itchy eyes, red face    Cat Hair Extract Hives and Itching    Horse Allergy Hives    Other Environmental     Pollen Extract Runny Nose and Itching     .

## 2025-05-30 NOTE — PROGRESS NOTES
Telemetry Shift Summary     Rhythm: SR  HR: 63-96  Ectopy: rPAC, rPVC    Measurements: .19/.13/.44    Normal Values  Rhythm: SR  HR:   Measurements: 0.12-0.20/0.08-0.10/0.30-0.52

## 2025-05-30 NOTE — PROGRESS NOTES
Hospital Medicine Daily Progress Note    Date of Service  5/30/2025    Chief Complaint  Bartolo Berrios [facility] is a 80 y.o. male admitted 5/28/2025 with sob    Hospital Course  79 yo with stage 4 lung ca, recurrent malignant pleural effusion, sent to ER by oncology given worsening sob. Pt had a thoracentesis a month ago. Pt reported worsening cough and CXR noted recurrent effusion. Sp thora by Dr. Bailey on 5/28 , 1.6L removed. Procedure c/b PTX and CXR noted moderate sized one. Initially put on high flow oxygen to keep o2 sats up. Pulm re evaluated and PTX is likely result of lungs not being able to expand into the space post large volume thora. Serial CXR noted some re accumulation of fluid in pleural space. Pt is on RA.     Interval Problem Update  - pt is better on RA, coughing still  - encouraged ambulation  - seen with pulm I also reviewed all imaging with pulm, pt likely will eventually fill up that space with fluid , high flow oxygen likely not making a difference  - spoke with dr briseno as well with pulm , no indication for any procedures, pleurX catheter would be next step  - IV dilaudid 1mg x 4 today for pain    I have discussed this patient's plan of care and discharge plan at IDT rounds today with Case Management, Nursing, Nursing leadership, and other members of the IDT team.    Consultants/Specialty  pulmonary    Code Status  Full Code    Disposition  The patient is medically cleared for discharge to home or a post-acute facility.  Anticipate discharge to: home with close outpatient follow-up    I have placed the appropriate orders for post-discharge needs.    Review of Systems  Review of Systems   All other systems reviewed and are negative.       Physical Exam  Temp:  [36.8 °C (98.2 °F)-37.4 °C (99.4 °F)] 37.4 °C (99.4 °F)  Pulse:  [65-83] 83  Resp:  [18-20] 18  BP: (103-143)/(56-70) 103/56  SpO2:  [92 %-100 %] 93 %    Physical Exam  General: chronically ill appearing  HEENT: XANDER  EOMI  Cards: RRR  Pulm: dec BS  Abdomen: soft, NTND, + bowel sounds, no rebound tenderness or guarding  MSK: normal ROM of upper and lower extremities  Neuro: CN II-XII grossly intact, sensation/strength intact, AAOx3  Psych: Appropriate mood   Fluids    Intake/Output Summary (Last 24 hours) at 5/30/2025 1452  Last data filed at 5/29/2025 2000  Gross per 24 hour   Intake 240 ml   Output --   Net 240 ml        Laboratory  Recent Labs     05/28/25  1539 05/29/25  0247 05/30/25  0125   WBC 9.7 10.0 9.2   RBC 3.99* 3.42* 3.40*   HEMOGLOBIN 12.4* 10.6* 10.3*   HEMATOCRIT 38.1* 33.1* 32.5*   MCV 95.5 96.8 95.6   MCH 31.1 31.0 30.3   MCHC 32.5 32.0* 31.7*   RDW 66.1* 67.5* 65.7*   PLATELETCT 288 249 262   MPV 8.9* 8.8* 8.9*     Recent Labs     05/28/25  1539 05/29/25  0247   SODIUM 137 135   POTASSIUM 4.5 4.3   CHLORIDE 106 104   CO2 22 25   GLUCOSE 93 105*   BUN 19 18   CREATININE 1.40 1.39   CALCIUM 9.1 8.4*     Recent Labs     05/28/25  1539   INR 1.05               Imaging  DX-CHEST-PORTABLE (1 VIEW)   Final Result      Similar to slightly worsened moderate right pneumothorax      DX-CHEST-PORTABLE (1 VIEW)   Final Result      Unchanged right basilar pneumothorax.      DX-CHEST-PORTABLE (1 VIEW)   Final Result         1.  Right lung base infiltrates, stable   2.  Right pneumothorax, slightly decreased since prior study.   3.  Small layering right pleural effusion, stable   4.  Atherosclerosis      DX-CHEST-PORTABLE (1 VIEW)   Final Result         1.  Right lung base infiltrates, stable   2.  Right pneumothorax, stable since prior study.   3.  Atherosclerosis      DX-CHEST-PORTABLE (1 VIEW)   Final Result         1.  Right lung base infiltrates, stable   2.  Right pneumothorax, stable since prior study.   3.  Atherosclerosis      DX-CHEST-PORTABLE (1 VIEW)   Final Result      Stable moderate right pneumothorax. No significant interval change.      DX-CHEST-PORTABLE (1 VIEW)   Final Result      Stable exam.       DX-CHEST-PORTABLE (1 VIEW)   Final Result      Persistent right-sided pneumothorax which is not significantly changed.      DX-CHEST-PORTABLE (1 VIEW)   Final Result         1.  Right lung base infiltrates, stable   2.  Right pneumothorax, stable since prior study.      DX-CHEST-PORTABLE (1 VIEW)   Final Result         1.  Right lung base infiltrates   2.  Right pneumothorax, appear stable to slightly decreased since prior study.      DX-CHEST-PORTABLE (1 VIEW)   Final Result         1.  Moderate to large right pneumothorax.   2.  Slight hazy right pulmonary opacities, could represent subtle infiltrates.      These findings were discussed with the patient's clinician, Georgette Maharaj, on 5/28/2025 11:14 PM.      CT-CTA CHEST PULMONARY ARTERY W/ RECONS   Final Result      1.  No evidence of pulmonary embolism.   2.  Large right pleural effusion is increased from prior.   3.  Trace left pleural effusion.         DX-CHEST-PORTABLE (1 VIEW)   Final Result      Probably moderate right pleural effusion, mildly increased versus related to slightly worsening aeration.         CXR per my review reveals clear L lung fields, R with linear opacity with moderate pneumothorax on r    Assessment/Plan  * Malignant pleural effusion- (present on admission)  Assessment & Plan  Sp thora by Dr. Bailey 5/28 1.6L removed  Procedure complicated by PTX related to inability of lung to expand into the space post large volume thora    Demand ischemia (HCC)  Assessment & Plan  Noted  2/2 hpyoxia    Pneumothorax  Assessment & Plan  Sp thora   Large volume thora, lung unable to expand  Do not suspect puncture  I spoke extensively with Dr. Mary Bundy who stated this may take time and high flow oxygen likely not going to make a difference, we reviewed several of his serial EKGs and there has not been any change except for some reaccumulation of fluid  Dr. Hernandez stated pt would be safe to dc with outpatient fu  Will obtain walking o2  Pt to start  outpatient chemo  PleurX catheter likely in his near future  We also spoke with DR. Gonzalez who stated pleurodesis is not going to be an option given malignancy      Bilateral pulmonary embolism (HCC)- (present on admission)  Assessment & Plan  Continue eliquis no plans for procedures    Acute hypoxemic respiratory failure (HCC)- (present on admission)  Assessment & Plan  Due to PTX and underlying lung ca/pleural effusions  I have asked RN to get walking o2    Macrocytic anemia- (present on admission)  Assessment & Plan  Start IM B12 injections    Adenocarcinoma of lung (HCC), stage 4 ([facility] Oct. 2021)- (present on admission)  Assessment & Plan  Continue outpatient follow-ups with oncology  Pt has a plan for outpatient chemo on thurs    Hypertension- (present on admission)  Assessment & Plan  Cw norvasc    Chronic pain- (present on admission)  Assessment & Plan  Received IV dilaudid 1mg x 4  today  Cw oxy prn for moderate pain      Brain lesion- (present on admission)  Assessment & Plan  Currently stable  Continue follow-up as an outpatient with imaging on a routine basis    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)  Assessment & Plan  Cw home meds    Vitamin D deficiency- (present on admission)  Assessment & Plan  Vitamin D supplementation    Adrenal insufficiency (HCC), secondary- (present on admission)  Assessment & Plan  Cw hydrocortisone    Hyperlipidemia- (present on admission)  Assessment & Plan  Cw statin    GERD (gastroesophageal reflux disease)- (present on admission)  Assessment & Plan  Cw ppi    Obstructive sleep apnea- (present on admission)  Assessment & Plan    Oxygen support at night    Chronic kidney disease, stage 3a- (present on admission)  Assessment & Plan  Monitor renal functions avoid nephrotoxic medications    Hypothyroid- (present on admission)  Assessment & Plan  Cw synthroid         VTE prophylaxis: resume ELiquis    I have performed a physical exam and  reviewed and updated ROS and Plan today (5/30/2025). In review of yesterday's note (5/29/2025), there are no changes except as documented above.    I spent 60 minutes providing care for this patient.  This included face-to-face interview, physical examination.  Review of lab work including CBC, BMP, reviewing CXR Discussion with multidisciplinary team including case management, nursing staff and pharmacy, pulm and surgery

## 2025-05-30 NOTE — CARE PLAN
The patient is Watcher - Medium risk of patient condition declining or worsening    Shift Goals  Clinical Goals: Monitor VS, perform activity with comfort  Patient Goals: Updates, pain control  Family Goals: improve resp status    Progress made toward(s) clinical / shift goals:    Problem: Knowledge Deficit - Standard  Goal: Patient and family/care givers will demonstrate understanding of plan of care, disease process/condition, diagnostic tests and medications  Outcome: Progressing     Problem: Pain - Standard  Goal: Alleviation of pain or a reduction in pain to the patient’s comfort goal  Outcome: Progressing       Patient is not progressing towards the following goals:

## 2025-05-30 NOTE — CARE PLAN
The patient is Watcher - Medium risk of patient condition declining or worsening    Shift Goals  Clinical Goals: monitor oxygen, nonrebreather  Patient Goals: rest, comfort, pain control  Family Goals: improve resp status    Progress made toward(s) clinical / shift goals:    Problem: Knowledge Deficit - Standard  Goal: Patient and family/care givers will demonstrate understanding of plan of care, disease process/condition, diagnostic tests and medications  Outcome: Progressing     Problem: Pain - Standard  Goal: Alleviation of pain or a reduction in pain to the patient’s comfort goal  Outcome: Progressing       Patient is not progressing towards the following goals:

## 2025-05-30 NOTE — DISCHARGE PLANNING
note:  Discussed during IDT and per MD, pt possibly needs a pleurex catheter. Needs fluid to reaccumulate. Needs home O2 so MD will place an order after the walking test is done. As of 1405, there is no home O2 yet.

## 2025-05-30 NOTE — PROGRESS NOTES
Pulmonary Critical Care Progress Note    Date of admission  5/28/2025    Chief Complaint/Hospital Course  80 y.o. male history of right lung adenocarcinoma status post wedge resection in 2019 with recurrence of the bone in 2021 and malignant pleural effusion diagnosed in 3/2025, pulmonary embolism diagnosed 4/2025 admitted 5/28/2025 with pleural effusion.  Patient states that he has had about 3 thoracenteses so far since his diagnosis back in 3/2025.  He is set to start chemotherapy on Thursday with Dr. Corral.   In the ER, he was noted to have a large right pleural effusion underwent thoracentesis with removal of 1.6 L of brown fluid.  Postprocedure, patient was noted to have a pneumothorax and he was placed on 100% FiO2 and had every 4 hours chest x-rays which did not show any significant change in his pneumothorax.    5/30: Took him off 100% FiO2.  On room air, his SpO2 is anywhere from 90-94%.  He states that his dyspnea is much improved after the thoracentesis.  He had pretty significant coughing before and after thoracentesis but that is also improving now. Patient and his wife are anxious about going home because he gets really symptomatic.       Review of Systems  Review of Systems   Constitutional:  Positive for malaise/fatigue. Negative for chills.   HENT:  Negative for congestion.    Eyes:  Negative for redness.   Respiratory:  Positive for cough and shortness of breath. Negative for hemoptysis and sputum production.    Cardiovascular:  Negative for chest pain.   Gastrointestinal:  Negative for nausea and vomiting.   Musculoskeletal:  Negative for falls.   Neurological:  Negative for focal weakness.        Vital Signs for last 24 hours   Temp:  [36.8 °C (98.2 °F)-37.4 °C (99.4 °F)] 37.4 °C (99.4 °F)  Pulse:  [65-83] 83  Resp:  [18-20] 18  BP: (103-143)/(56-70) 103/56  SpO2:  [92 %-100 %] 93 %    Hemodynamic parameters for last 24 hours       Respiratory Information for the last 24 hours       Physical  Exam   Physical Exam  Vitals and nursing note reviewed.   Constitutional:       General: He is not in acute distress.     Appearance: Normal appearance. He is not ill-appearing.   HENT:      Head: Normocephalic.      Mouth/Throat:      Mouth: Mucous membranes are moist.   Eyes:      Conjunctiva/sclera: Conjunctivae normal.   Cardiovascular:      Rate and Rhythm: Normal rate and regular rhythm.   Pulmonary:      Effort: Pulmonary effort is normal. No respiratory distress.   Abdominal:      Palpations: Abdomen is soft.   Musculoskeletal:         General: No deformity.   Skin:     General: Skin is warm and dry.   Neurological:      Mental Status: He is alert. Mental status is at baseline.   Psychiatric:         Mood and Affect: Mood normal.       Medications  Current Medications[1]    Fluids    Intake/Output Summary (Last 24 hours) at 5/30/2025 1303  Last data filed at 5/29/2025 2000  Gross per 24 hour   Intake 240 ml   Output --   Net 240 ml       Laboratory          Recent Labs     05/28/25 1539 05/29/25 0247   SODIUM 137 135   POTASSIUM 4.5 4.3   CHLORIDE 106 104   CO2 22 25   BUN 19 18   CREATININE 1.40 1.39   CALCIUM 9.1 8.4*     Recent Labs     05/28/25 1539 05/29/25 0247   ALTSGPT 7  --    ASTSGOT 19  --    ALKPHOSPHAT 71  --    TBILIRUBIN 0.7  --    GLUCOSE 93 105*     Recent Labs     05/28/25 1539 05/29/25 0247 05/30/25  0125   WBC 9.7 10.0 9.2   NEUTSPOLYS 85.10*  --   --    LYMPHOCYTES 6.50*  --   --    MONOCYTES 7.10  --   --    EOSINOPHILS 0.30  --   --    BASOPHILS 0.30  --   --    ASTSGOT 19  --   --    ALTSGPT 7  --   --    ALKPHOSPHAT 71  --   --    TBILIRUBIN 0.7  --   --      Recent Labs     05/28/25 1539 05/29/25 0247 05/30/25  0125   RBC 3.99* 3.42* 3.40*   HEMOGLOBIN 12.4* 10.6* 10.3*   HEMATOCRIT 38.1* 33.1* 32.5*   PLATELETCT 288 249 262   PROTHROMBTM 14.1  --   --    INR 1.05  --   --        Imaging  Reviewed     Assessment/Plan    Lung adenocarcinoma   Metastatic pleural effusion dx  3/2025 - c/f non-expandable MPE at this time  S/p 3 thoracentesis now with the most recent one result in this large R hydropnuemothorax. I suspect that it's from lung entrapment rather than acute injury to the lung from thoracentesis since he has had this effusion for months now and has active malignancy so likely that the lung is inflamed/fibrosed and not expanding. CXRs seem to wax and wane when compared and not definitively improving or worsening overall despite being on 100% Fio2. Patient's Spo2 is around 90-94% on room air and he is able to walk around and talk w/significant improved symptoms compared to pre-thoracentesis.  - discussed case with Dr. Gonzalez who doesn't recommend surgical intervention w/this lung entrapment  - discussed w/patient that since he's stable/improved clinically, no indication for acute intervention with chest tube and we can monitor until the fluid builds up again and consider pleurX catheter for his non-expandable MPE. He understands and is agreeable w/pleurX if it will help his symptoms. Patient and his wife are very worried about going home because he's had so many complications of his malignancy recently and it has been very difficult/scary for his wife to care for him and know when to bring him in. CXR shows that the fluid is already reaccumulating so we will monitor with daily XRs and consider timing of intervention based no these and clinical picture  - patient has appt to start chemotherapy on Thursday with Dr. Ellis Shearer MD  Pulmonary and Critical Care Medicine  [facility]         [1]   Current Facility-Administered Medications   Medication Dose Route Frequency Provider Last Rate Last Admin    traZODone (Desyrel) tablet 100 mg  100 mg Oral QHS Georgie Juarez M.D.   100 mg at 05/29/25 2203    oxyCODONE immediate release (Roxicodone) tablet 10 mg  10 mg Oral Q3HRS PRN Georgie Juarez M.D.   10 mg at 05/30/25 1254    cyanocobalamin (Vitamin  B-12) injection 1,000 mcg  1,000 mcg Intramuscular Q30 DAYS Georgie Juarez M.D.   1,000 mcg at 05/29/25 1500    HYDROmorphone (Dilaudid) injection 1 mg  1 mg Intravenous Q4HRS PRN Georgie Juarez M.D.   1 mg at 05/30/25 0948    amLODIPine (Norvasc) tablet 5 mg  5 mg Oral QAM Eliazar Bailey M.D.   5 mg at 05/30/25 0425    albuterol inhaler 2 Puff  2 Puff Inhalation Q4H PRN (RT) Eliazar Bailey M.D.        [Held by provider] apixaban (Eliquis) tablet 5 mg  5 mg Oral BID Georgie Juarez M.D.   5 mg at 05/29/25 1638    atorvastatin (Lipitor) tablet 20 mg  20 mg Oral Q EVENING Eliazar Bailey M.D.   20 mg at 05/29/25 1639    hydrocortisone (Cortef) tablet 20 mg  20 mg Oral BID Eliazar Bailey M.D.   20 mg at 05/30/25 0426    liothyronine (Cytomel) tablet 10 mcg  10 mcg Oral DAILY Eliazar Bailey M.D.   10 mcg at 05/30/25 0444    omeprazole (PriLOSEC) capsule 40 mg  40 mg Oral DAILY Eliazar Bailey M.D.   40 mg at 05/30/25 0751    tamsulosin (Flomax) capsule 0.4 mg  0.4 mg Oral BID Eliazar Bailey M.D.   0.4 mg at 05/30/25 0425    levothyroxine (Synthroid) tablet 125 mcg  125 mcg Oral AM ES Eliazar Bailey M.D.   125 mcg at 05/30/25 0425    vitamin D3 (Cholecalciferol) tablet 1,000 Units  1,000 Units Oral DAILY Eliazar Bailey M.D.   1,000 Units at 05/30/25 0425    Respiratory Therapy Consult   Nebulization Continuous RT Eliazar Bailey M.D.        acetaminophen (Tylenol) tablet 650 mg  650 mg Oral Q6HRS PRN Eliazar Bailey M.D.        senna-docusate (Pericolace Or Senokot S) 8.6-50 MG per tablet 2 Tablet  2 Tablet Oral Q EVENING Eliazar Bailey M.D.   2 Tablet at 05/29/25 1639    And    polyethylene glycol/lytes (Miralax) Packet 1 Packet  1 Packet Oral QDAY PRN Eliazar Bailey M.D.        labetalol (Normodyne/Trandate) injection 10 mg  10 mg Intravenous Q4HRS PRN Eliazar Bailey M.D.        ondansetron (Zofran) syringe/vial injection 4 mg  4 mg Intravenous Q4HRS PRN Eliazar Bailey M.D.        Or    ondansetron (Zofran ODT)  dispertab 4 mg  4 mg Oral Q4HRS PRN Eliazar Bailey M.D.        mometasone-formoterol (Dulera) 100-5 MCG/ACT inhaler 2 Puff  2 Puff Inhalation BID Eliazar Bailey M.D.   2 Puff at 05/30/25 0426

## 2025-05-31 ENCOUNTER — APPOINTMENT (OUTPATIENT)
Dept: RADIOLOGY | Facility: MEDICAL CENTER | Age: 80
DRG: 180 | End: 2025-05-31
Attending: STUDENT IN AN ORGANIZED HEALTH CARE EDUCATION/TRAINING PROGRAM
Payer: MEDICARE

## 2025-05-31 VITALS
HEIGHT: 72 IN | BODY MASS INDEX: 19.47 KG/M2 | DIASTOLIC BLOOD PRESSURE: 67 MMHG | SYSTOLIC BLOOD PRESSURE: 144 MMHG | TEMPERATURE: 99 F | HEART RATE: 90 BPM | RESPIRATION RATE: 18 BRPM | OXYGEN SATURATION: 96 % | WEIGHT: 143.74 LBS

## 2025-05-31 LAB
APTT PPP: 144.6 SEC (ref 24.7–36)
APTT PPP: 44.9 SEC (ref 24.7–36)
APTT PPP: 49.3 SEC (ref 24.7–36)
BACTERIA FLD AEROBE CULT: NORMAL
GRAM STN SPEC: NORMAL
SIGNIFICANT IND 70042: NORMAL
SITE SITE: NORMAL
SOURCE SOURCE: NORMAL

## 2025-05-31 PROCEDURE — A9270 NON-COVERED ITEM OR SERVICE: HCPCS | Performed by: INTERNAL MEDICINE

## 2025-05-31 PROCEDURE — 94760 N-INVAS EAR/PLS OXIMETRY 1: CPT

## 2025-05-31 PROCEDURE — 700111 HCHG RX REV CODE 636 W/ 250 OVERRIDE (IP): Mod: JZ | Performed by: INTERNAL MEDICINE

## 2025-05-31 PROCEDURE — 700102 HCHG RX REV CODE 250 W/ 637 OVERRIDE(OP): Performed by: HOSPITALIST

## 2025-05-31 PROCEDURE — A9270 NON-COVERED ITEM OR SERVICE: HCPCS

## 2025-05-31 PROCEDURE — 85730 THROMBOPLASTIN TIME PARTIAL: CPT | Mod: 91

## 2025-05-31 PROCEDURE — A9270 NON-COVERED ITEM OR SERVICE: HCPCS | Performed by: HOSPITALIST

## 2025-05-31 PROCEDURE — 700102 HCHG RX REV CODE 250 W/ 637 OVERRIDE(OP): Performed by: INTERNAL MEDICINE

## 2025-05-31 PROCEDURE — 700102 HCHG RX REV CODE 250 W/ 637 OVERRIDE(OP)

## 2025-05-31 PROCEDURE — 36415 COLL VENOUS BLD VENIPUNCTURE: CPT

## 2025-05-31 PROCEDURE — 99233 SBSQ HOSP IP/OBS HIGH 50: CPT | Performed by: STUDENT IN AN ORGANIZED HEALTH CARE EDUCATION/TRAINING PROGRAM

## 2025-05-31 PROCEDURE — 770020 HCHG ROOM/CARE - TELE (206)

## 2025-05-31 PROCEDURE — 99233 SBSQ HOSP IP/OBS HIGH 50: CPT | Performed by: INTERNAL MEDICINE

## 2025-05-31 PROCEDURE — 71045 X-RAY EXAM CHEST 1 VIEW: CPT

## 2025-05-31 RX ORDER — BENZONATATE 100 MG/1
100 CAPSULE ORAL 3 TIMES DAILY PRN
Status: DISCONTINUED | OUTPATIENT
Start: 2025-05-31 | End: 2025-06-04 | Stop reason: HOSPADM

## 2025-05-31 RX ADMIN — HYDROMORPHONE HYDROCHLORIDE 1 MG: 1 INJECTION, SOLUTION INTRAMUSCULAR; INTRAVENOUS; SUBCUTANEOUS at 20:20

## 2025-05-31 RX ADMIN — HYDROMORPHONE HYDROCHLORIDE 1 MG: 1 INJECTION, SOLUTION INTRAMUSCULAR; INTRAVENOUS; SUBCUTANEOUS at 06:05

## 2025-05-31 RX ADMIN — OXYCODONE HYDROCHLORIDE 10 MG: 10 TABLET ORAL at 09:03

## 2025-05-31 RX ADMIN — HYDROCORTISONE 20 MG: 10 TABLET ORAL at 17:31

## 2025-05-31 RX ADMIN — MOMETASONE FUROATE AND FORMOTEROL FUMARATE DIHYDRATE 2 PUFF: 100; 5 AEROSOL RESPIRATORY (INHALATION) at 17:32

## 2025-05-31 RX ADMIN — TRAZODONE HYDROCHLORIDE 100 MG: 50 TABLET ORAL at 21:49

## 2025-05-31 RX ADMIN — AMLODIPINE BESYLATE 5 MG: 5 TABLET ORAL at 04:58

## 2025-05-31 RX ADMIN — CARBOXYMETHYLCELLULOSE SODIUM 1 DROP: 5 SOLUTION/ DROPS OPHTHALMIC at 22:00

## 2025-05-31 RX ADMIN — HEPARIN SODIUM 1250 UNITS/HR: 5000 INJECTION, SOLUTION INTRAVENOUS at 20:30

## 2025-05-31 RX ADMIN — CARBOXYMETHYLCELLULOSE SODIUM 1 DROP: 5 SOLUTION/ DROPS OPHTHALMIC at 06:11

## 2025-05-31 RX ADMIN — CARBOXYMETHYLCELLULOSE SODIUM 1 DROP: 5 SOLUTION/ DROPS OPHTHALMIC at 12:00

## 2025-05-31 RX ADMIN — TAMSULOSIN HYDROCHLORIDE 0.4 MG: 0.4 CAPSULE ORAL at 17:32

## 2025-05-31 RX ADMIN — HYDROMORPHONE HYDROCHLORIDE 1 MG: 1 INJECTION, SOLUTION INTRAMUSCULAR; INTRAVENOUS; SUBCUTANEOUS at 01:48

## 2025-05-31 RX ADMIN — HYDROCORTISONE 20 MG: 10 TABLET ORAL at 04:58

## 2025-05-31 RX ADMIN — ATORVASTATIN CALCIUM 20 MG: 20 TABLET, FILM COATED ORAL at 17:32

## 2025-05-31 RX ADMIN — Medication 1000 UNITS: at 04:58

## 2025-05-31 RX ADMIN — HEPARIN SODIUM 2600 UNITS: 1000 INJECTION, SOLUTION INTRAVENOUS; SUBCUTANEOUS at 01:45

## 2025-05-31 RX ADMIN — HEPARIN SODIUM 2600 UNITS: 1000 INJECTION, SOLUTION INTRAVENOUS; SUBCUTANEOUS at 17:27

## 2025-05-31 RX ADMIN — TAMSULOSIN HYDROCHLORIDE 0.4 MG: 0.4 CAPSULE ORAL at 04:58

## 2025-05-31 RX ADMIN — BENZONATATE 100 MG: 100 CAPSULE ORAL at 21:49

## 2025-05-31 RX ADMIN — HYDROMORPHONE HYDROCHLORIDE 1 MG: 1 INJECTION, SOLUTION INTRAMUSCULAR; INTRAVENOUS; SUBCUTANEOUS at 13:34

## 2025-05-31 RX ADMIN — LEVOTHYROXINE SODIUM 125 MCG: 0.12 TABLET ORAL at 04:58

## 2025-05-31 RX ADMIN — MOMETASONE FUROATE AND FORMOTEROL FUMARATE DIHYDRATE 2 PUFF: 100; 5 AEROSOL RESPIRATORY (INHALATION) at 06:05

## 2025-05-31 RX ADMIN — LIOTHYRONINE SODIUM 10 MCG: 5 TABLET ORAL at 04:57

## 2025-05-31 RX ADMIN — OXYCODONE HYDROCHLORIDE 10 MG: 10 TABLET ORAL at 04:57

## 2025-05-31 RX ADMIN — OMEPRAZOLE 40 MG: 20 CAPSULE, DELAYED RELEASE ORAL at 08:48

## 2025-05-31 RX ADMIN — OXYCODONE HYDROCHLORIDE 10 MG: 10 TABLET ORAL at 17:32

## 2025-05-31 RX ADMIN — POLYETHYLENE GLYCOL 3350 1 PACKET: 17 POWDER, FOR SOLUTION ORAL at 09:04

## 2025-05-31 RX ADMIN — SENNOSIDES AND DOCUSATE SODIUM 2 TABLET: 50; 8.6 TABLET ORAL at 17:31

## 2025-05-31 ASSESSMENT — PAIN DESCRIPTION - PAIN TYPE
TYPE: ACUTE PAIN
TYPE: ACUTE PAIN;CHRONIC PAIN
TYPE: ACUTE PAIN

## 2025-05-31 NOTE — PROGRESS NOTES
0745: Lab sydnee lab for aPTT from patient at bedside.   0812: Lab called with a critical value.  --  2 RN verified pausing of heparin, per aPTT protocol, noted in MAR.   --  Heparin resumed with lowered dose per aPTT protocol, noted in MAR    Followed aPTT protocol after next Lab draw

## 2025-05-31 NOTE — PROGRESS NOTES
Koki from Lab called with critical result of 144.6 seconds of PTT at 0812. Critical lab result read back to Koki.   Dr. Juarez notified of critical lab result at 0830.  Critical lab result read back by Dr. Juarez.

## 2025-05-31 NOTE — PROGRESS NOTES
Assumed care and received report from RHODA Luna. Patient awake and answering questions during bedside, change of shift report. Patient's call light and tray table within reach. Patient's whiteboard updated.

## 2025-05-31 NOTE — CARE PLAN
The patient is Stable - Low risk of patient condition declining or worsening    Shift Goals  Clinical Goals: Monitor labs and vitals  Patient Goals: rest and comfort  Family Goals: improve resp status    Progress made toward(s) clinical / shift goals: Patient assessed and consulted at bedside by pulmonologist. Patient's family at bedside. Patient assessed and consulted with hospitalist at bedside. Patient continued on heparin drip.     Problem: Knowledge Deficit - Standard  Goal: Patient and family/care givers will demonstrate understanding of plan of care, disease process/condition, diagnostic tests and medications  Outcome: Progressing  Note: Patient verbalizes understanding of plan of care and barriers to discharge. Patient asks appropriate questions about plan of care.       Problem: Pain - Standard  Goal: Alleviation of pain or a reduction in pain to the patient’s comfort goal  Outcome: Progressing  Note: Patient verbalizes pain using 0-10 scale. Patient uses pharmacological and non-pharmacological methods of pain management.         Patient is not progressing towards the following goals:

## 2025-05-31 NOTE — CARE PLAN
The patient is Watcher - Medium risk of patient condition declining or worsening    Shift Goals  Clinical Goals: Monitor VS, perform activity with comfort  Patient Goals: Updates, pain control  Family Goals: improve resp status    Progress made toward(s) clinical / shift goals:    Problem: Knowledge Deficit - Standard  Goal: Patient and family/care givers will demonstrate understanding of plan of care, disease process/condition, diagnostic tests and medications  Outcome: Progressing       Patient is not progressing towards the following goals:      Problem: Pain - Standard  Goal: Alleviation of pain or a reduction in pain to the patient’s comfort goal  Outcome: Not Progressing

## 2025-05-31 NOTE — PROGRESS NOTES
Hospital Medicine Daily Progress Note    Date of Service  5/31/2025    Chief Complaint  Bartolo Berrios [facility] is a 80 y.o. male admitted 5/28/2025 with sob    Hospital Course  79 yo with stage 4 lung ca, recurrent malignant pleural effusion, sent to ER by oncology given worsening sob. Pt had a thoracentesis a month ago. Pt reported worsening cough and CXR noted recurrent effusion. Sp thora by Dr. Bailey on 5/28 , 1.6L removed. Procedure c/b PTX and CXR noted moderate sized one. Initially put on high flow oxygen to keep o2 sats up. Pulm re evaluated and PTX is likely result of lungs not being able to expand into the space post large volume thora. Serial CXR noted some re accumulation of fluid in pleural space. Pt is on RA.     Interval Problem Update  - pt feels well  - again put on high flow overnight but satting ok  - cxr with moderate effusion now  - I spkoe with pulm plan is pleurX cath Tues  - iv dilaudid 1mg x 3 today for pain    I have discussed this patient's plan of care and discharge plan at IDT rounds today with Case Management, Nursing, Nursing leadership, and other members of the IDT team.    Consultants/Specialty  pulmonary    Code Status  Full Code    Disposition  The patient is not medically cleared for discharge to home or a post-acute facility.  Anticipate discharge to: home with close outpatient follow-up    I have placed the appropriate orders for post-discharge needs.    Review of Systems  Review of Systems   All other systems reviewed and are negative.       Physical Exam  Temp:  [36.6 °C (97.8 °F)-37.6 °C (99.6 °F)] 36.6 °C (97.8 °F)  Pulse:  [77-85] 85  Resp:  [18-20] 18  BP: (130-148)/(63-82) 132/63  SpO2:  [94 %-98 %] 94 %    Physical Exam  General: chronically ill appearing  HEENT: PERRLA, EOMI  Cards: RRR  Pulm: dec BS L>R  Abdomen: soft, NTND, + bowel sounds, no rebound tenderness or guarding  MSK: normal ROM of upper and lower extremities  Neuro: CN II-XII grossly intact,  sensation/strength intact, AAOx3  Psych: Appropriate mood   Fluids  No intake or output data in the 24 hours ending 05/31/25 1341       Laboratory  Recent Labs     05/28/25  1539 05/29/25  0247 05/30/25  0125   WBC 9.7 10.0 9.2   RBC 3.99* 3.42* 3.40*   HEMOGLOBIN 12.4* 10.6* 10.3*   HEMATOCRIT 38.1* 33.1* 32.5*   MCV 95.5 96.8 95.6   MCH 31.1 31.0 30.3   MCHC 32.5 32.0* 31.7*   RDW 66.1* 67.5* 65.7*   PLATELETCT 288 249 262   MPV 8.9* 8.8* 8.9*     Recent Labs     05/28/25  1539 05/29/25  0247   SODIUM 137 135   POTASSIUM 4.5 4.3   CHLORIDE 106 104   CO2 22 25   GLUCOSE 93 105*   BUN 19 18   CREATININE 1.40 1.39   CALCIUM 9.1 8.4*     Recent Labs     05/28/25  1539 05/31/25  0020 05/31/25  0747   APTT  --  49.3* 144.6*   INR 1.05  --   --                Imaging  DX-CHEST-LIMITED (1 VIEW)   Final Result      Stable moderate right pleural effusion and 3 cm right apical pneumothorax.      DX-CHEST-PORTABLE (1 VIEW)   Final Result      Similar to slightly worsened moderate right pneumothorax      DX-CHEST-PORTABLE (1 VIEW)   Final Result      Unchanged right basilar pneumothorax.      DX-CHEST-PORTABLE (1 VIEW)   Final Result         1.  Right lung base infiltrates, stable   2.  Right pneumothorax, slightly decreased since prior study.   3.  Small layering right pleural effusion, stable   4.  Atherosclerosis      DX-CHEST-PORTABLE (1 VIEW)   Final Result         1.  Right lung base infiltrates, stable   2.  Right pneumothorax, stable since prior study.   3.  Atherosclerosis      DX-CHEST-PORTABLE (1 VIEW)   Final Result         1.  Right lung base infiltrates, stable   2.  Right pneumothorax, stable since prior study.   3.  Atherosclerosis      DX-CHEST-PORTABLE (1 VIEW)   Final Result      Stable moderate right pneumothorax. No significant interval change.      DX-CHEST-PORTABLE (1 VIEW)   Final Result      Stable exam.      DX-CHEST-PORTABLE (1 VIEW)   Final Result      Persistent right-sided pneumothorax which is  not significantly changed.      DX-CHEST-PORTABLE (1 VIEW)   Final Result         1.  Right lung base infiltrates, stable   2.  Right pneumothorax, stable since prior study.      DX-CHEST-PORTABLE (1 VIEW)   Final Result         1.  Right lung base infiltrates   2.  Right pneumothorax, appear stable to slightly decreased since prior study.      DX-CHEST-PORTABLE (1 VIEW)   Final Result         1.  Moderate to large right pneumothorax.   2.  Slight hazy right pulmonary opacities, could represent subtle infiltrates.      These findings were discussed with the patient's clinician, Georgette Maharaj, on 5/28/2025 11:14 PM.      CT-CTA CHEST PULMONARY ARTERY W/ RECONS   Final Result      1.  No evidence of pulmonary embolism.   2.  Large right pleural effusion is increased from prior.   3.  Trace left pleural effusion.         DX-CHEST-PORTABLE (1 VIEW)   Final Result      Probably moderate right pleural effusion, mildly increased versus related to slightly worsening aeration.         CXR per my review reveals clear L lung fields, R with linear opacity with moderate pneumothorax on r    Assessment/Plan  * Malignant pleural effusion- (present on admission)  Assessment & Plan  Sp thora by Dr. Bailey 5/28 1.6L removed  Lung unable to expand hence PTX no puncture from thora  Serial CXR reveal re accumulation now moderate  I spoke with pulm DR. Hernandez, plan is pleurX catheter Tues 6/3    Demand ischemia (HCC)  Assessment & Plan  Noted  2/2 hpyoxia    Pneumothorax  Assessment & Plan  Sp thora   Large volume thora, lung unable to expand  Do not suspect puncture  I spoke extensively with Dr. Hernandez Puldelmar who stated this may take time and high flow oxygen likely not going to make a difference, we reviewed several of his serial EKGs and there has not been any change except for some reaccumulation of fluid  Dr. Hernandez stated pt would be safe to dc with outpatient fu  Will obtain walking o2  Pt to start outpatient chemo  PleurX catheter likely  in his near future  We also spoke with DR. Gonzalez who stated pleurodesis is not going to be an option given malignancy  CXR with re accumulation of pleural fluid  I spoke with pulm and plan is pleurX catheter Tues  IV Dilaudid 1mg x 3 for pain      Bilateral pulmonary embolism (HCC)- (present on admission)  Assessment & Plan  Eliquis on hold  Hep gtt to bridge    Acute hypoxemic respiratory failure (HCC)- (present on admission)  Assessment & Plan  Due to PTX and underlying lung ca/pleural effusions  I have asked RN to get walking o2 and he did not require oxygen    Macrocytic anemia- (present on admission)  Assessment & Plan  Start IM B12 injections q30 days    Adenocarcinoma of lung (HCC), stage 4 ([facility] Oct. 2021)- (present on admission)  Assessment & Plan  Continue outpatient follow-ups with oncology  Pt has a plan for outpatient chemo on thurs    Hypertension- (present on admission)  Assessment & Plan  Cw norvasc    Chronic pain- (present on admission)  Assessment & Plan  Iv dilaudid 1mg x 3 today  Oxy prn         Brain lesion- (present on admission)  Assessment & Plan  Currently stable  Continue follow-up as an outpatient with imaging on a routine basis    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)  Assessment & Plan  Cw home meds    Vitamin D deficiency- (present on admission)  Assessment & Plan  Vitamin D supplementation    Adrenal insufficiency (HCC), secondary- (present on admission)  Assessment & Plan  Cw hydrocortisone    Hyperlipidemia- (present on admission)  Assessment & Plan  Cw statin    GERD (gastroesophageal reflux disease)- (present on admission)  Assessment & Plan  Cw ppi    Obstructive sleep apnea- (present on admission)  Assessment & Plan  oxygen    Chronic kidney disease, stage 3a- (present on admission)  Assessment & Plan  Monitor renal functions avoid nephrotoxic medications    Hypothyroid- (present on admission)  Assessment & Plan  Cw synthroid         VTE  prophylaxis: hep gtt    I have performed a physical exam and reviewed and updated ROS and Plan today (5/31/2025). In review of yesterday's note (5/30/2025), there are no changes except as documented above.    I spent 45 minutes providing care for this patient.  This included face-to-face interview, physical examination.  Review of lab work including CBC, BMP, reviewing CXR Discussion with multidisciplinary team including case management, nursing staff and pharmacy, pulm

## 2025-05-31 NOTE — PROGRESS NOTES
Telemetry Shift Summary     Rhythm: SR  HR: 72-84  Ectopy: rPAC    Measurements: .20/.14/.41    Normal Values  Rhythm: SR  HR:   Measurements: 0.12-0.20/0.08-0.10/0.30-0.52

## 2025-06-01 ENCOUNTER — APPOINTMENT (OUTPATIENT)
Dept: RADIOLOGY | Facility: MEDICAL CENTER | Age: 80
DRG: 180 | End: 2025-06-01
Attending: INTERNAL MEDICINE
Payer: MEDICARE

## 2025-06-01 LAB
ANION GAP SERPL CALC-SCNC: 7 MMOL/L (ref 7–16)
APTT PPP: 113.9 SEC (ref 24.7–36)
APTT PPP: 66.7 SEC (ref 24.7–36)
APTT PPP: 93.3 SEC (ref 24.7–36)
APTT PPP: 94.7 SEC (ref 24.7–36)
BACTERIA FLD AEROBE CULT: NORMAL
BUN SERPL-MCNC: 16 MG/DL (ref 8–22)
CALCIUM SERPL-MCNC: 8.5 MG/DL (ref 8.5–10.5)
CHLORIDE SERPL-SCNC: 106 MMOL/L (ref 96–112)
CO2 SERPL-SCNC: 26 MMOL/L (ref 20–33)
CREAT SERPL-MCNC: 1.18 MG/DL (ref 0.5–1.4)
ERYTHROCYTE [DISTWIDTH] IN BLOOD BY AUTOMATED COUNT: 66.5 FL (ref 35.9–50)
GFR SERPLBLD CREATININE-BSD FMLA CKD-EPI: 63 ML/MIN/1.73 M 2
GLUCOSE SERPL-MCNC: 107 MG/DL (ref 65–99)
GRAM STN SPEC: NORMAL
HCT VFR BLD AUTO: 31.7 % (ref 42–52)
HGB BLD-MCNC: 10.2 G/DL (ref 14–18)
MCH RBC QN AUTO: 30.9 PG (ref 27–33)
MCHC RBC AUTO-ENTMCNC: 32.2 G/DL (ref 32.3–36.5)
MCV RBC AUTO: 96.1 FL (ref 81.4–97.8)
PLATELET # BLD AUTO: 247 K/UL (ref 164–446)
PMV BLD AUTO: 9.1 FL (ref 9–12.9)
POTASSIUM SERPL-SCNC: 4.1 MMOL/L (ref 3.6–5.5)
RBC # BLD AUTO: 3.3 M/UL (ref 4.7–6.1)
SIGNIFICANT IND 70042: NORMAL
SITE SITE: NORMAL
SODIUM SERPL-SCNC: 139 MMOL/L (ref 135–145)
SOURCE SOURCE: NORMAL
UFH PPP CHRO-ACNC: 0.87 IU/ML
WBC # BLD AUTO: 9.3 K/UL (ref 4.8–10.8)

## 2025-06-01 PROCEDURE — 85027 COMPLETE CBC AUTOMATED: CPT

## 2025-06-01 PROCEDURE — 85520 HEPARIN ASSAY: CPT

## 2025-06-01 PROCEDURE — 71045 X-RAY EXAM CHEST 1 VIEW: CPT

## 2025-06-01 PROCEDURE — A9270 NON-COVERED ITEM OR SERVICE: HCPCS

## 2025-06-01 PROCEDURE — 99233 SBSQ HOSP IP/OBS HIGH 50: CPT | Performed by: INTERNAL MEDICINE

## 2025-06-01 PROCEDURE — 770020 HCHG ROOM/CARE - TELE (206)

## 2025-06-01 PROCEDURE — A9270 NON-COVERED ITEM OR SERVICE: HCPCS | Performed by: INTERNAL MEDICINE

## 2025-06-01 PROCEDURE — 80048 BASIC METABOLIC PNL TOTAL CA: CPT

## 2025-06-01 PROCEDURE — 36415 COLL VENOUS BLD VENIPUNCTURE: CPT

## 2025-06-01 PROCEDURE — 700111 HCHG RX REV CODE 636 W/ 250 OVERRIDE (IP): Performed by: INTERNAL MEDICINE

## 2025-06-01 PROCEDURE — 85730 THROMBOPLASTIN TIME PARTIAL: CPT | Mod: 91

## 2025-06-01 PROCEDURE — 700102 HCHG RX REV CODE 250 W/ 637 OVERRIDE(OP)

## 2025-06-01 PROCEDURE — 700102 HCHG RX REV CODE 250 W/ 637 OVERRIDE(OP): Performed by: INTERNAL MEDICINE

## 2025-06-01 PROCEDURE — 94760 N-INVAS EAR/PLS OXIMETRY 1: CPT

## 2025-06-01 PROCEDURE — 700102 HCHG RX REV CODE 250 W/ 637 OVERRIDE(OP): Performed by: HOSPITALIST

## 2025-06-01 PROCEDURE — A9270 NON-COVERED ITEM OR SERVICE: HCPCS | Performed by: HOSPITALIST

## 2025-06-01 RX ADMIN — HYDROMORPHONE HYDROCHLORIDE 1 MG: 1 INJECTION, SOLUTION INTRAMUSCULAR; INTRAVENOUS; SUBCUTANEOUS at 19:49

## 2025-06-01 RX ADMIN — MOMETASONE FUROATE AND FORMOTEROL FUMARATE DIHYDRATE 2 PUFF: 100; 5 AEROSOL RESPIRATORY (INHALATION) at 17:44

## 2025-06-01 RX ADMIN — HYDROCORTISONE 20 MG: 10 TABLET ORAL at 17:44

## 2025-06-01 RX ADMIN — AMLODIPINE BESYLATE 5 MG: 5 TABLET ORAL at 05:19

## 2025-06-01 RX ADMIN — ATORVASTATIN CALCIUM 20 MG: 20 TABLET, FILM COATED ORAL at 17:44

## 2025-06-01 RX ADMIN — TAMSULOSIN HYDROCHLORIDE 0.4 MG: 0.4 CAPSULE ORAL at 05:20

## 2025-06-01 RX ADMIN — TAMSULOSIN HYDROCHLORIDE 0.4 MG: 0.4 CAPSULE ORAL at 17:44

## 2025-06-01 RX ADMIN — OXYCODONE HYDROCHLORIDE 10 MG: 10 TABLET ORAL at 17:44

## 2025-06-01 RX ADMIN — HYDROMORPHONE HYDROCHLORIDE 1 MG: 1 INJECTION, SOLUTION INTRAMUSCULAR; INTRAVENOUS; SUBCUTANEOUS at 02:58

## 2025-06-01 RX ADMIN — Medication 1000 UNITS: at 05:20

## 2025-06-01 RX ADMIN — MOMETASONE FUROATE AND FORMOTEROL FUMARATE DIHYDRATE 2 PUFF: 100; 5 AEROSOL RESPIRATORY (INHALATION) at 05:20

## 2025-06-01 RX ADMIN — OMEPRAZOLE 40 MG: 20 CAPSULE, DELAYED RELEASE ORAL at 08:08

## 2025-06-01 RX ADMIN — CARBOXYMETHYLCELLULOSE SODIUM 1 DROP: 5 SOLUTION/ DROPS OPHTHALMIC at 17:46

## 2025-06-01 RX ADMIN — LEVOTHYROXINE SODIUM 125 MCG: 0.12 TABLET ORAL at 05:20

## 2025-06-01 RX ADMIN — HYDROCORTISONE 20 MG: 10 TABLET ORAL at 05:20

## 2025-06-01 RX ADMIN — TRAZODONE HYDROCHLORIDE 100 MG: 50 TABLET ORAL at 21:33

## 2025-06-01 RX ADMIN — HYDROMORPHONE HYDROCHLORIDE 1 MG: 1 INJECTION, SOLUTION INTRAMUSCULAR; INTRAVENOUS; SUBCUTANEOUS at 08:09

## 2025-06-01 RX ADMIN — HYDROMORPHONE HYDROCHLORIDE 1 MG: 1 INJECTION, SOLUTION INTRAMUSCULAR; INTRAVENOUS; SUBCUTANEOUS at 16:18

## 2025-06-01 RX ADMIN — BENZONATATE 100 MG: 100 CAPSULE ORAL at 21:33

## 2025-06-01 RX ADMIN — HYDROMORPHONE HYDROCHLORIDE 1 MG: 1 INJECTION, SOLUTION INTRAMUSCULAR; INTRAVENOUS; SUBCUTANEOUS at 12:29

## 2025-06-01 RX ADMIN — OXYCODONE HYDROCHLORIDE 10 MG: 10 TABLET ORAL at 22:02

## 2025-06-01 RX ADMIN — OXYCODONE HYDROCHLORIDE 10 MG: 10 TABLET ORAL at 06:16

## 2025-06-01 RX ADMIN — HEPARIN SODIUM 1150 UNITS/HR: 5000 INJECTION, SOLUTION INTRAVENOUS at 17:42

## 2025-06-01 RX ADMIN — LIOTHYRONINE SODIUM 10 MCG: 5 TABLET ORAL at 05:20

## 2025-06-01 ASSESSMENT — ENCOUNTER SYMPTOMS
NAUSEA: 0
FOCAL WEAKNESS: 0
VOMITING: 0
SHORTNESS OF BREATH: 1
EYE REDNESS: 0
HEMOPTYSIS: 0
CHILLS: 0
FALLS: 0
COUGH: 1
SPUTUM PRODUCTION: 0

## 2025-06-01 ASSESSMENT — PAIN DESCRIPTION - PAIN TYPE
TYPE: ACUTE PAIN;CHRONIC PAIN
TYPE: ACUTE PAIN;CHRONIC PAIN
TYPE: ACUTE PAIN
TYPE: ACUTE PAIN;CHRONIC PAIN
TYPE: ACUTE PAIN
TYPE: ACUTE PAIN;CHRONIC PAIN

## 2025-06-01 NOTE — PROGRESS NOTES
Assumed care and received report from RHODA Ruth. Patient awake and answering questions during bedside, change of shift report. Patient's call light and tray table within reach. Patient's whiteboard updated.

## 2025-06-01 NOTE — HOSPITAL COURSE
Kevin [facility] has a history of stage IV lung cancer and recurrent malignant pleural effusion.  He presented to the emergency room on 5/28/2025 with shortness of breath and cough.  Thoracentesis was performed on 5/28/2025 with 1.6 L removed.  Patient did develop postoperative pneumothorax.  Pulmonary evaluated the patient and suspects that pneumothorax is a result of trapped lung.  Pleurx catheter is planned

## 2025-06-01 NOTE — PROGRESS NOTES
Telemetry Shift Summary     Rhythm: SR  HR Range:72-91  Ectopy: rPVC/oPAC  Measurements: 0.20/0.09/0.42    Normal Values  Measurements: 0.12- 0.20 / 0.08-0.10 / 0.30-0.52

## 2025-06-01 NOTE — PROGRESS NOTES
Hospital Medicine Daily Progress Note    Date of Service  6/1/2025    Chief Complaint  Bartolo Berrios [facility] is a 80 y.o. male admitted 5/28/2025 with sob    Hospital Course  79 yo with stage 4 lung ca, recurrent malignant pleural effusion, sent to ER by oncology given worsening sob. Pt had a thoracentesis a month ago. Pt reported worsening cough and CXR noted recurrent effusion. Sp thora by Dr. Bailey on 5/28 , 1.6L removed. Procedure c/b PTX and CXR noted moderate sized one. Initially put on high flow oxygen to keep o2 sats up. Pulm re evaluated and PTX is likely result of lungs not being able to expand into the space post large volume thora. Serial CXR noted some re accumulation of fluid in pleural space. Pt is on RA but bordering hypoxia. Plan is pleurX catheter on 6/3 then home on 6/5 for chemo on 6/6.    Interval Problem Update  - pt feels more cough today as well as R lung pain  - will repeat cxr to evaluate the fluid/ptx  - I spoke with pulm, chemo is moved to thurs  - pt still getting iv dilaudid , 1mg x 2 today    I have discussed this patient's plan of care and discharge plan at IDT rounds today with Case Management, Nursing, Nursing leadership, and other members of the IDT team.    Consultants/Specialty  pulmonary    Code Status  Full Code    Disposition  The patient is not medically cleared for discharge to home or a post-acute facility.  Anticipate discharge to: home with close outpatient follow-up    I have placed the appropriate orders for post-discharge needs.    Review of Systems  Review of Systems   All other systems reviewed and are negative.       Physical Exam  Temp:  [36.6 °C (97.8 °F)-37.7 °C (99.8 °F)] 37.7 °C (99.8 °F)  Pulse:  [] 100  Resp:  [17-18] 17  BP: (121-171)/(60-93) 171/93  SpO2:  [90 %-96 %] 94 %    Physical Exam  General: chronically ill appearing  HEENT: PERRLA, EOMI  Cards: RRR  Pulm: dec BS L>R  Abdomen: soft, NTND, + bowel sounds, no rebound tenderness or  guarding  MSK: normal ROM of upper and lower extremities  Neuro: CN II-XII grossly intact, sensation/strength intact, AAOx3  Psych: Appropriate mood   Fluids    Intake/Output Summary (Last 24 hours) at 6/1/2025 1354  Last data filed at 6/1/2025 0830  Gross per 24 hour   Intake 240 ml   Output --   Net 240 ml          Laboratory  Recent Labs     05/30/25  0125 06/01/25  0004   WBC 9.2 9.3   RBC 3.40* 3.30*   HEMOGLOBIN 10.3* 10.2*   HEMATOCRIT 32.5* 31.7*   MCV 95.6 96.1   MCH 30.3 30.9   MCHC 31.7* 32.2*   RDW 65.7* 66.5*   PLATELETCT 262 247   MPV 8.9* 9.1     Recent Labs     06/01/25  0004   SODIUM 139   POTASSIUM 4.1   CHLORIDE 106   CO2 26   GLUCOSE 107*   BUN 16   CREATININE 1.18   CALCIUM 8.5     Recent Labs     06/01/25  0004 06/01/25  0648 06/01/25  1318   APTT 94.7* 113.9* 93.3*               Imaging  DX-CHEST-PORTABLE (1 VIEW)   Final Result      1.  Interval decrease in size of the right apical pneumothorax, now measuring 2.1 cm (previously 3 cm).   2.  Stable moderate right pleural effusion.   3.  The remainder is stable.      DX-CHEST-LIMITED (1 VIEW)   Final Result      Stable moderate right pleural effusion and 3 cm right apical pneumothorax.      DX-CHEST-PORTABLE (1 VIEW)   Final Result      Similar to slightly worsened moderate right pneumothorax      DX-CHEST-PORTABLE (1 VIEW)   Final Result      Unchanged right basilar pneumothorax.      DX-CHEST-PORTABLE (1 VIEW)   Final Result         1.  Right lung base infiltrates, stable   2.  Right pneumothorax, slightly decreased since prior study.   3.  Small layering right pleural effusion, stable   4.  Atherosclerosis      DX-CHEST-PORTABLE (1 VIEW)   Final Result         1.  Right lung base infiltrates, stable   2.  Right pneumothorax, stable since prior study.   3.  Atherosclerosis      DX-CHEST-PORTABLE (1 VIEW)   Final Result         1.  Right lung base infiltrates, stable   2.  Right pneumothorax, stable since prior study.   3.  Atherosclerosis       DX-CHEST-PORTABLE (1 VIEW)   Final Result      Stable moderate right pneumothorax. No significant interval change.      DX-CHEST-PORTABLE (1 VIEW)   Final Result      Stable exam.      DX-CHEST-PORTABLE (1 VIEW)   Final Result      Persistent right-sided pneumothorax which is not significantly changed.      DX-CHEST-PORTABLE (1 VIEW)   Final Result         1.  Right lung base infiltrates, stable   2.  Right pneumothorax, stable since prior study.      DX-CHEST-PORTABLE (1 VIEW)   Final Result         1.  Right lung base infiltrates   2.  Right pneumothorax, appear stable to slightly decreased since prior study.      DX-CHEST-PORTABLE (1 VIEW)   Final Result         1.  Moderate to large right pneumothorax.   2.  Slight hazy right pulmonary opacities, could represent subtle infiltrates.      These findings were discussed with the patient's clinician, Georgette Maharaj, on 5/28/2025 11:14 PM.      CT-CTA CHEST PULMONARY ARTERY W/ RECONS   Final Result      1.  No evidence of pulmonary embolism.   2.  Large right pleural effusion is increased from prior.   3.  Trace left pleural effusion.         DX-CHEST-PORTABLE (1 VIEW)   Final Result      Probably moderate right pleural effusion, mildly increased versus related to slightly worsening aeration.         CXR per my review reveals clear L lung fields, R with linear opacity with moderate pneumothorax on r    Assessment/Plan  * Malignant pleural effusion- (present on admission)  Assessment & Plan  Sp thora by Dr. Bailey 5/28 1.6L removed  Lung unable to expand hence PTX no puncture from thora  Serial CXR reveal re accumulation now moderate  I spoke with pulm DR. Hernandez, plan is pleurX catheter Tues 6/3  Spke with Dr. Hernandez, she has moved patient's chemo to Thurs after discussion with Dr. Corral    Demand ischemia (HCC)  Assessment & Plan  Noted  2/2 hpyoxia    Pneumothorax  Assessment & Plan  Sp thora   Large volume thora, lung unable to expand  Do not suspect puncture  I  spoke extensively with Dr. Mary Bundy who stated this may take time and high flow oxygen likely not going to make a difference, we reviewed several of his serial EKGs and there has not been any change except for some reaccumulation of fluid  Dr. Hernandez stated pt would be safe to dc with outpatient fu  Will obtain walking o2  Pt to start outpatient chemo  PleurX catheter likely in his near future  We also spoke with DR. Gonzalez who stated pleurodesis is not going to be an option given malignancy  CXR with re accumulation of pleural fluid  I spoke with pulm and plan is pleurX catheter Tues  IV dialudid 1mg x 2 today, continue prn    Bilateral pulmonary embolism (HCC)- (present on admission)  Assessment & Plan  Eliquis on hold  Cw hep gtt    Acute hypoxemic respiratory failure (HCC)- (present on admission)  Assessment & Plan  Due to PTX and underlying lung ca/pleural effusions  Pt having more cough today and R sided pain  Fluid is accumulating  Repeat walking o2  Repeat CXR    Macrocytic anemia- (present on admission)  Assessment & Plan  Start IM B12 injections q30 days    Adenocarcinoma of lung (HCC), stage 4 ([facility] Oct[facility] 2021)- (present on admission)  Assessment & Plan  Continue outpatient follow-ups with oncology  Pt has a plan for outpatient chemo on thurs    Hypertension- (present on admission)  Assessment & Plan  Cw norvasc    Chronic pain- (present on admission)  Assessment & Plan  IV dilaudid 1mg x 2 this AM        Brain lesion- (present on admission)  Assessment & Plan  Currently stable  Continue follow-up as an outpatient with imaging on a routine basis    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)  Assessment & Plan  Cw home meds    Vitamin D deficiency- (present on admission)  Assessment & Plan  Vitamin D supplementation    Adrenal insufficiency (HCC), secondary- (present on admission)  Assessment & Plan  Cw cortef    Hyperlipidemia- (present on admission)  Assessment &  Plan  Cw statin    GERD (gastroesophageal reflux disease)- (present on admission)  Assessment & Plan  Cw ppi    Obstructive sleep apnea- (present on admission)  Assessment & Plan  oxygen    Chronic kidney disease, stage 3a- (present on admission)  Assessment & Plan  Monitor renal functions avoid nephrotoxic medications    Hypothyroid- (present on admission)  Assessment & Plan  Cw synthroid         VTE prophylaxis: hep gtt    I have performed a physical exam and reviewed and updated ROS and Plan today (6/1/2025). In review of yesterday's note (5/31/2025), there are no changes except as documented above.    I spent 40 minutes providing care for this patient.  This included face-to-face interview, physical examination.  Review of lab work including CBC, BMP, reviewing CXR Discussion with multidisciplinary team including case management, nursing staff and pharmacy, pulm

## 2025-06-01 NOTE — PROGRESS NOTES
Telemetry Shift Summary     Rhythm: SR  Rate: 82-95  Measurements: .18/.12/.46  Ectopy (reported by Monitor Tech): none     Normal Values  Rhythm: Sinus  HR:   Measurements: 0.12-0.20/0.06-0.10/0.30-0.52

## 2025-06-01 NOTE — CARE PLAN
The patient is Stable - Low risk of patient condition declining or worsening    Shift Goals  Clinical Goals: heparin drip, monitor labs, maintain oxygenation, pain management  Patient Goals: pain management  Family Goals: improve resp status    Progress made toward(s) clinical / shift goals:    Problem: Knowledge Deficit - Standard  Goal: Patient and family/care givers will demonstrate understanding of plan of care, disease process/condition, diagnostic tests and medications  Outcome: Progressing  Note: Patient education done on treatment plan, education on heparin protocol and timing of blood draws to which he verbalized understanding.      Problem: Pain - Standard  Goal: Alleviation of pain or a reduction in pain to the patient’s comfort goal  Outcome: Progressing  Note: Patient reports to having pain to his right and upper chest, meds were given as per orders, patients pain is as a result of by coughing and cough meds were given to help with comfort.     Problem: Fall Risk  Goal: Patient will remain free from falls  Outcome: Progressing  Note: Patient on assessment is a high fall rist, call light placed within reach, which patient call appropriately, bed locked and in lowest position, bed alarm activated       Patient is not progressing towards the following goals:

## 2025-06-01 NOTE — CARE PLAN
The patient is Stable - Low risk of patient condition declining or worsening    Shift Goals  Clinical Goals: Monitor labs and vitals  Patient Goals: rest and comfort  Family Goals: improve resp status    Progress made toward(s) clinical / shift goals: Patient assessed and consulted with MD at bedside. Patient continued on a heparin drip. Patient's family at bedside. Patient had an xray at bedside.     Problem: Knowledge Deficit - Standard  Goal: Patient and family/care givers will demonstrate understanding of plan of care, disease process/condition, diagnostic tests and medications  Outcome: Progressing  Note: Patient verbalizes understanding of plan of care and barriers to discharge. Patient asks appropriate questions about plan of care.       Problem: Pain - Standard  Goal: Alleviation of pain or a reduction in pain to the patient’s comfort goal  Outcome: Progressing  Note: Patient verbalizes pain using 0-10 scale. Patient uses pharmacological and non-pharmacological methods of pain management.         Patient is not progressing towards the following goals:

## 2025-06-01 NOTE — PROGRESS NOTES
Dima from Lab called with critical result of aPTT of 113.9 seconds at 0722. Critical lab result read back to Dima.   Dr. Juarez notified of critical lab result at 0750.  Critical lab result read back by Dr. Juarez.

## 2025-06-01 NOTE — PROGRESS NOTES
Pulmonary Critical Care Progress Note    Date of admission  5/28/2025    Chief Complaint/Hospital Course  80 y.o. male history of right lung adenocarcinoma status post wedge resection in 2019 with recurrence of the bone in 2021 and malignant pleural effusion diagnosed in 3/2025, pulmonary embolism diagnosed 4/2025 admitted 5/28/2025 with pleural effusion.  Patient states that he has had about 3 thoracenteses so far since his diagnosis back in 3/2025.  He is set to start chemotherapy on Thursday with Dr. Corral.   In the ER, he was noted to have a large right pleural effusion underwent thoracentesis with removal of 1.6 L of brown fluid.  Postprocedure, patient was noted to have a pneumothorax and he was placed on 100% FiO2 and had every 4 hours chest x-rays which did not show any significant change in his pneumothorax.    5/30: Took him off 100% FiO2.  On room air, his SpO2 is anywhere from 90-94%.  He states that his dyspnea is much improved after the thoracentesis.  He had pretty significant coughing before and after thoracentesis but that is also improving now. Patient and his wife are anxious about going home because he gets really symptomatic.   5/31: pleural effusion increasing in size. Patient feels same.       Review of Systems  Review of Systems   Constitutional:  Positive for malaise/fatigue. Negative for chills.   HENT:  Negative for congestion.    Eyes:  Negative for redness.   Respiratory:  Positive for cough and shortness of breath. Negative for hemoptysis and sputum production.    Cardiovascular:  Negative for chest pain.   Gastrointestinal:  Negative for nausea and vomiting.   Musculoskeletal:  Negative for falls.   Neurological:  Negative for focal weakness.        Vital Signs for last 24 hours   Temp:  [36.6 °C (97.8 °F)-37.7 °C (99.8 °F)] 37.7 °C (99.8 °F)  Pulse:  [] 100  Resp:  [17-18] 17  BP: (121-171)/(60-93) 171/93  SpO2:  [90 %-96 %] 94 %    Hemodynamic parameters for last 24 hours        Respiratory Information for the last 24 hours       Physical Exam   Physical Exam  Vitals and nursing note reviewed.   Constitutional:       General: He is not in acute distress.     Appearance: Normal appearance. He is not ill-appearing.   HENT:      Head: Normocephalic.      Mouth/Throat:      Mouth: Mucous membranes are moist.   Eyes:      Conjunctiva/sclera: Conjunctivae normal.   Cardiovascular:      Rate and Rhythm: Normal rate and regular rhythm.   Pulmonary:      Effort: Pulmonary effort is normal. No respiratory distress.   Abdominal:      Palpations: Abdomen is soft.   Musculoskeletal:         General: No deformity.   Skin:     General: Skin is warm and dry.   Neurological:      Mental Status: He is alert. Mental status is at baseline.   Psychiatric:         Mood and Affect: Mood normal.         Medications  Current Medications[1]    Fluids    Intake/Output Summary (Last 24 hours) at 6/1/2025 1210  Last data filed at 6/1/2025 0830  Gross per 24 hour   Intake 240 ml   Output --   Net 240 ml       Laboratory          Recent Labs     06/01/25  0004   SODIUM 139   POTASSIUM 4.1   CHLORIDE 106   CO2 26   BUN 16   CREATININE 1.18   CALCIUM 8.5     Recent Labs     06/01/25  0004   GLUCOSE 107*     Recent Labs     05/30/25  0125 06/01/25  0004   WBC 9.2 9.3     Recent Labs     05/30/25  0125 05/31/25  0020 05/31/25  1409 06/01/25  0004 06/01/25  0648   RBC 3.40*  --   --  3.30*  --    HEMOGLOBIN 10.3*  --   --  10.2*  --    HEMATOCRIT 32.5*  --   --  31.7*  --    PLATELETCT 262  --   --  247  --    APTT  --    < > 44.9* 94.7* 113.9*    < > = values in this interval not displayed.       Imaging  Reviewed     Assessment/Plan    Lung adenocarcinoma   Metastatic pleural effusion dx 3/2025 - c/f non-expandable MPE at this time  S/p 3 thoracentesis now with the most recent one result in this large R hydropnuemothorax. I suspect that it's from lung entrapment rather than acute injury to the lung from thoracentesis  since he has had this effusion for months now and has active malignancy so likely that the lung is inflamed/fibrosed and not expanding. CXRs seem to wax and wane when compared and not definitively improving or worsening overall despite being on 100% Fio2. Patient's Spo2 is around 90-94% on room air and he is able to walk around and talk w/significant improved symptoms compared to pre-thoracentesis.  - discussed case with Dr. Gonzalez who doesn't recommend surgical intervention w/this lung entrapment  - since effusion is increasing, he will likely have a good enough pocket for pleurX placement so scheduled him for OR for pleurX on Tuesday around 4pm with Dr. Davison. Patient and wife agreeable with plan  - patient states he thought his chemo was on Thursday but it's actually on Wed - I spoke with Dr. Corral who is agreeable moving his chemotherapy to Thursday-Per staff will reach out to patient to make the appropriate changes.  This way patient and his wife can get the appropriate teaching/education for the Pleurx catheter prior to discharge.        Soha Shearer MD  Pulmonary and Critical Care Medicine  [facility]         [1]   Current Facility-Administered Medications   Medication Dose Route Frequency Provider Last Rate Last Admin    benzonatate (Tessalon) capsule 100 mg  100 mg Oral TID PRN Mitesh Gutierrez, D.O.   100 mg at 05/31/25 2149    heparin injection 2,600 Units  2,600 Units Intravenous PRN Georgie Juarez M.D.   2,600 Units at 05/31/25 1727    And    heparin infusion 25,000 Units in 500 mL 0.45% NACL   Intravenous Continuous Georgie Juarez M.D. 23 mL/hr at 06/01/25 0736 1,150 Units/hr at 06/01/25 0736    carboxymethylcellulose (Refresh Tears) 0.5 % ophthalmic drops 1 Drop  1 Drop Right Eye PRN Mitesh Gutierrez, D.O.   1 Drop at 05/31/25 2200    traZODone (Desyrel) tablet 100 mg  100 mg Oral QHS Georgie Juarez M.D.   100 mg at 05/31/25 2149    oxyCODONE immediate release  (Roxicodone) tablet 10 mg  10 mg Oral Q3HRS PRN Georgie Juarez M.D.   10 mg at 06/01/25 0616    cyanocobalamin (Vitamin B-12) injection 1,000 mcg  1,000 mcg Intramuscular Q30 DAYS Georgie Juarez M.D.   1,000 mcg at 05/29/25 1500    HYDROmorphone (Dilaudid) injection 1 mg  1 mg Intravenous Q4HRS PRN Georgie Juarez M.D.   1 mg at 06/01/25 0809    amLODIPine (Norvasc) tablet 5 mg  5 mg Oral QAM Eliazar Bailey M.D.   5 mg at 06/01/25 0519    albuterol inhaler 2 Puff  2 Puff Inhalation Q4H PRN (RT) Eliazar Bailey M.D.        atorvastatin (Lipitor) tablet 20 mg  20 mg Oral Q EVENING Eliazar Bailey M.D.   20 mg at 05/31/25 1732    hydrocortisone (Cortef) tablet 20 mg  20 mg Oral BID Eliazar Bailey M.D.   20 mg at 06/01/25 0520    liothyronine (Cytomel) tablet 10 mcg  10 mcg Oral DAILY Eliazar Bailey M.D.   10 mcg at 06/01/25 0520    omeprazole (PriLOSEC) capsule 40 mg  40 mg Oral DAILY Eliazar Bailey M.D.   40 mg at 06/01/25 0808    tamsulosin (Flomax) capsule 0.4 mg  0.4 mg Oral BID Eliazar Bailey M.D.   0.4 mg at 06/01/25 0520    levothyroxine (Synthroid) tablet 125 mcg  125 mcg Oral AM ES Eliazar Bailey M.D.   125 mcg at 06/01/25 0520    vitamin D3 (Cholecalciferol) tablet 1,000 Units  1,000 Units Oral DAILY Eliazar Bailey M.D.   1,000 Units at 06/01/25 0520    Respiratory Therapy Consult   Nebulization Continuous RT Eliazar Bailey M.D.        acetaminophen (Tylenol) tablet 650 mg  650 mg Oral Q6HRS PRN Eliazar Bailey M.D.        senna-docusate (Pericolace Or Senokot S) 8.6-50 MG per tablet 2 Tablet  2 Tablet Oral Q EVENING Eliazar Bailey M.D.   2 Tablet at 05/31/25 1731    And    polyethylene glycol/lytes (Miralax) Packet 1 Packet  1 Packet Oral QDAY PRN Eliazar Bailey M.D.   1 Packet at 05/31/25 0904    labetalol (Normodyne/Trandate) injection 10 mg  10 mg Intravenous Q4HRS PRN Eliazar Bailey M.D.        ondansetron (Zofran) syringe/vial injection 4 mg  4 mg Intravenous Q4HRS PRN Eliazar Bailey M.D.         Or    ondansetron (Zofran ODT) dispertab 4 mg  4 mg Oral Q4HRS PRN Eliazar Bailey M.D.        mometasone-formoterol (Dulera) 100-5 MCG/ACT inhaler 2 Puff  2 Puff Inhalation BID Eliazar Bailey M.D.   2 Puff at 06/01/25 0520

## 2025-06-02 DIAGNOSIS — E78.2 MIXED HYPERLIPIDEMIA: ICD-10-CM

## 2025-06-02 PROBLEM — H10.31 ACUTE BACTERIAL CONJUNCTIVITIS OF RIGHT EYE: Status: ACTIVE | Noted: 2025-06-02

## 2025-06-02 LAB
APTT PPP: 69.5 SEC (ref 24.7–36)
UFH PPP CHRO-ACNC: 0.63 IU/ML

## 2025-06-02 PROCEDURE — A9270 NON-COVERED ITEM OR SERVICE: HCPCS | Performed by: INTERNAL MEDICINE

## 2025-06-02 PROCEDURE — 85520 HEPARIN ASSAY: CPT

## 2025-06-02 PROCEDURE — 99233 SBSQ HOSP IP/OBS HIGH 50: CPT | Performed by: INTERNAL MEDICINE

## 2025-06-02 PROCEDURE — 700101 HCHG RX REV CODE 250: Performed by: INTERNAL MEDICINE

## 2025-06-02 PROCEDURE — 36415 COLL VENOUS BLD VENIPUNCTURE: CPT

## 2025-06-02 PROCEDURE — 85730 THROMBOPLASTIN TIME PARTIAL: CPT

## 2025-06-02 PROCEDURE — 700102 HCHG RX REV CODE 250 W/ 637 OVERRIDE(OP): Performed by: HOSPITALIST

## 2025-06-02 PROCEDURE — 700111 HCHG RX REV CODE 636 W/ 250 OVERRIDE (IP): Mod: JZ | Performed by: INTERNAL MEDICINE

## 2025-06-02 PROCEDURE — 94760 N-INVAS EAR/PLS OXIMETRY 1: CPT

## 2025-06-02 PROCEDURE — 99231 SBSQ HOSP IP/OBS SF/LOW 25: CPT | Performed by: INTERNAL MEDICINE

## 2025-06-02 PROCEDURE — 700111 HCHG RX REV CODE 636 W/ 250 OVERRIDE (IP): Performed by: INTERNAL MEDICINE

## 2025-06-02 PROCEDURE — A9270 NON-COVERED ITEM OR SERVICE: HCPCS | Performed by: HOSPITALIST

## 2025-06-02 PROCEDURE — 770020 HCHG ROOM/CARE - TELE (206)

## 2025-06-02 PROCEDURE — 700102 HCHG RX REV CODE 250 W/ 637 OVERRIDE(OP): Performed by: INTERNAL MEDICINE

## 2025-06-02 RX ORDER — ACETAMINOPHEN 500 MG
1000 TABLET ORAL EVERY 6 HOURS PRN
Status: DISCONTINUED | OUTPATIENT
Start: 2025-06-07 | End: 2025-06-04 | Stop reason: HOSPADM

## 2025-06-02 RX ORDER — CIPROFLOXACIN HYDROCHLORIDE 3.5 MG/ML
1 SOLUTION/ DROPS TOPICAL
Status: DISCONTINUED | OUTPATIENT
Start: 2025-06-02 | End: 2025-06-02

## 2025-06-02 RX ORDER — OXYCODONE HCL 20 MG/1
20 TABLET, FILM COATED, EXTENDED RELEASE ORAL EVERY 12 HOURS
Refills: 0 | Status: DISCONTINUED | OUTPATIENT
Start: 2025-06-02 | End: 2025-06-04 | Stop reason: HOSPADM

## 2025-06-02 RX ORDER — CIPROFLOXACIN HYDROCHLORIDE 3.5 MG/ML
1 SOLUTION/ DROPS TOPICAL
Status: DISCONTINUED | OUTPATIENT
Start: 2025-06-04 | End: 2025-06-02

## 2025-06-02 RX ORDER — ACETAMINOPHEN 500 MG
1000 TABLET ORAL EVERY 6 HOURS
Status: DISCONTINUED | OUTPATIENT
Start: 2025-06-02 | End: 2025-06-04 | Stop reason: HOSPADM

## 2025-06-02 RX ORDER — KETOROLAC TROMETHAMINE 15 MG/ML
15 INJECTION, SOLUTION INTRAMUSCULAR; INTRAVENOUS EVERY 6 HOURS
Status: DISCONTINUED | OUTPATIENT
Start: 2025-06-02 | End: 2025-06-04 | Stop reason: HOSPADM

## 2025-06-02 RX ORDER — CIPROFLOXACIN HYDROCHLORIDE 3.5 MG/ML
1 SOLUTION/ DROPS TOPICAL
Status: DISCONTINUED | OUTPATIENT
Start: 2025-06-02 | End: 2025-06-04 | Stop reason: HOSPADM

## 2025-06-02 RX ORDER — LIDOCAINE 4 G/G
1 PATCH TOPICAL EVERY 24 HOURS
Status: DISCONTINUED | OUTPATIENT
Start: 2025-06-02 | End: 2025-06-04 | Stop reason: HOSPADM

## 2025-06-02 RX ORDER — HYDROMORPHONE HYDROCHLORIDE 1 MG/ML
2 INJECTION, SOLUTION INTRAMUSCULAR; INTRAVENOUS; SUBCUTANEOUS
Status: DISCONTINUED | OUTPATIENT
Start: 2025-06-02 | End: 2025-06-04 | Stop reason: HOSPADM

## 2025-06-02 RX ORDER — HYDROMORPHONE HYDROCHLORIDE 1 MG/ML
1.5 INJECTION, SOLUTION INTRAMUSCULAR; INTRAVENOUS; SUBCUTANEOUS
Status: DISCONTINUED | OUTPATIENT
Start: 2025-06-02 | End: 2025-06-02

## 2025-06-02 RX ORDER — GABAPENTIN 100 MG/1
100 CAPSULE ORAL 3 TIMES DAILY
Status: DISCONTINUED | OUTPATIENT
Start: 2025-06-02 | End: 2025-06-02

## 2025-06-02 RX ORDER — ATORVASTATIN CALCIUM 20 MG/1
20 TABLET, FILM COATED ORAL
Qty: 90 TABLET | Refills: 3 | Status: SHIPPED | OUTPATIENT
Start: 2025-06-02

## 2025-06-02 RX ADMIN — OXYCODONE HYDROCHLORIDE 10 MG: 10 TABLET ORAL at 02:32

## 2025-06-02 RX ADMIN — HYDROMORPHONE HYDROCHLORIDE 1 MG: 1 INJECTION, SOLUTION INTRAMUSCULAR; INTRAVENOUS; SUBCUTANEOUS at 10:58

## 2025-06-02 RX ADMIN — HYDROCORTISONE 20 MG: 10 TABLET ORAL at 17:15

## 2025-06-02 RX ADMIN — TAMSULOSIN HYDROCHLORIDE 0.4 MG: 0.4 CAPSULE ORAL at 17:15

## 2025-06-02 RX ADMIN — OMEPRAZOLE 40 MG: 20 CAPSULE, DELAYED RELEASE ORAL at 08:26

## 2025-06-02 RX ADMIN — HEPARIN SODIUM 1150 UNITS/HR: 5000 INJECTION, SOLUTION INTRAVENOUS at 14:02

## 2025-06-02 RX ADMIN — OXYCODONE HYDROCHLORIDE 10 MG: 10 TABLET ORAL at 08:26

## 2025-06-02 RX ADMIN — AMLODIPINE BESYLATE 5 MG: 5 TABLET ORAL at 05:48

## 2025-06-02 RX ADMIN — CIPROFLOXACIN HYDROCHLORIDE 1 DROP: 3 SOLUTION/ DROPS OPHTHALMIC at 15:26

## 2025-06-02 RX ADMIN — Medication 1000 UNITS: at 06:18

## 2025-06-02 RX ADMIN — CIPROFLOXACIN HYDROCHLORIDE 1 DROP: 3 SOLUTION/ DROPS OPHTHALMIC at 17:16

## 2025-06-02 RX ADMIN — HYDROMORPHONE HYDROCHLORIDE 1 MG: 1 INJECTION, SOLUTION INTRAMUSCULAR; INTRAVENOUS; SUBCUTANEOUS at 05:48

## 2025-06-02 RX ADMIN — OXYCODONE HYDROCHLORIDE 20 MG: 20 TABLET, FILM COATED, EXTENDED RELEASE ORAL at 17:15

## 2025-06-02 RX ADMIN — ACETAMINOPHEN 1000 MG: 500 TABLET, FILM COATED ORAL at 17:15

## 2025-06-02 RX ADMIN — ACETAMINOPHEN 1000 MG: 500 TABLET, FILM COATED ORAL at 12:31

## 2025-06-02 RX ADMIN — CIPROFLOXACIN HYDROCHLORIDE 1 DROP: 3 SOLUTION/ DROPS OPHTHALMIC at 22:19

## 2025-06-02 RX ADMIN — KETOROLAC TROMETHAMINE 15 MG: 15 INJECTION, SOLUTION INTRAMUSCULAR; INTRAVENOUS at 20:31

## 2025-06-02 RX ADMIN — LEVOTHYROXINE SODIUM 125 MCG: 0.12 TABLET ORAL at 05:48

## 2025-06-02 RX ADMIN — LIOTHYRONINE SODIUM 10 MCG: 5 TABLET ORAL at 05:48

## 2025-06-02 RX ADMIN — TRAZODONE HYDROCHLORIDE 100 MG: 50 TABLET ORAL at 22:18

## 2025-06-02 RX ADMIN — ATORVASTATIN CALCIUM 20 MG: 20 TABLET, FILM COATED ORAL at 17:15

## 2025-06-02 RX ADMIN — LIDOCAINE 1 PATCH: 4 PATCH TOPICAL at 20:33

## 2025-06-02 RX ADMIN — HYDROMORPHONE HYDROCHLORIDE 2 MG: 1 INJECTION, SOLUTION INTRAMUSCULAR; INTRAVENOUS; SUBCUTANEOUS at 19:05

## 2025-06-02 RX ADMIN — HYDROCORTISONE 20 MG: 10 TABLET ORAL at 05:48

## 2025-06-02 RX ADMIN — HYDROMORPHONE HYDROCHLORIDE 2 MG: 1 INJECTION, SOLUTION INTRAMUSCULAR; INTRAVENOUS; SUBCUTANEOUS at 13:59

## 2025-06-02 RX ADMIN — CARBOXYMETHYLCELLULOSE SODIUM 1 DROP: 5 SOLUTION/ DROPS OPHTHALMIC at 06:00

## 2025-06-02 RX ADMIN — MOMETASONE FUROATE AND FORMOTEROL FUMARATE DIHYDRATE 2 PUFF: 100; 5 AEROSOL RESPIRATORY (INHALATION) at 08:36

## 2025-06-02 RX ADMIN — HYDROMORPHONE HYDROCHLORIDE 2 MG: 1 INJECTION, SOLUTION INTRAMUSCULAR; INTRAVENOUS; SUBCUTANEOUS at 22:19

## 2025-06-02 RX ADMIN — MOMETASONE FUROATE AND FORMOTEROL FUMARATE DIHYDRATE 2 PUFF: 100; 5 AEROSOL RESPIRATORY (INHALATION) at 17:17

## 2025-06-02 RX ADMIN — CARBOXYMETHYLCELLULOSE SODIUM 1 DROP: 5 SOLUTION/ DROPS OPHTHALMIC at 08:32

## 2025-06-02 RX ADMIN — TAMSULOSIN HYDROCHLORIDE 0.4 MG: 0.4 CAPSULE ORAL at 05:48

## 2025-06-02 ASSESSMENT — PAIN DESCRIPTION - PAIN TYPE
TYPE: ACUTE PAIN
TYPE: ACUTE PAIN;CHRONIC PAIN
TYPE: ACUTE PAIN
TYPE: ACUTE PAIN;CHRONIC PAIN
TYPE: ACUTE PAIN
TYPE: ACUTE PAIN;CHRONIC PAIN
TYPE: CHRONIC PAIN

## 2025-06-02 ASSESSMENT — FIBROSIS 4 INDEX: FIB4 SCORE: 2.267786838055363363

## 2025-06-02 NOTE — CARE PLAN
The patient is Stable - Low risk of patient condition declining or worsening    Shift Goals  Clinical Goals: monitor labs APTT, safety, pain management  Patient Goals: pain management, comfort  Family Goals: improve resp status    Progress made toward(s) clinical / shift goals:    Problem: Pain - Standard  Goal: Alleviation of pain or a reduction in pain to the patient’s comfort goal  Outcome: Not Progressing       Patient is not progressing towards the following goals:      Problem: Pain - Standard  Goal: Alleviation of pain or a reduction in pain to the patient’s comfort goal  Outcome: Not Progressing

## 2025-06-02 NOTE — FACE TO FACE
"Face to Face Note  -  Durable Medical Equipment    Georgie Juarez M.D. - NPI: [id number]  I certify that this patient is under my care and that they had a durable medical equipment(DME)face to face encounter by myself that meets the physician DME face-to-face encounter requirements with this patient on:    Date of encounter:   Patient:                    MRN:                       YOB: 2023  Bartolo Berrios [facility]  [id number]  1945     The encounter with the patient was in whole, or in part, for the following medical condition, which is the primary reason for durable medical equipment:  Other - cancer    I certify that, based on my findings, the following durable medical equipment is medically necessary:    Oxygen   HOME O2 Saturation Measurements:(Values must be present for Home Oxygen orders)  Room air sat at rest: 94  Room air sat with amb: 86  With liters of O2: 2, O2 sat at rest with O2: 97  With Liters of O2: 2, O2 sat with amb with O2 : 92  Is the patient mobile?: Yes  If patient feels more short of breath, they can go up to 6 liters per minute and contact healthcare provider.    Supporting Symptoms: The patient requires supplemental oxygen, as the following interventions have been tried with limited or no improvement: \"Bronchodilators and/or steroid inhalers, \"Ambulation with oximetry, and \"Incentive spirometry.    My Clinical findings support the need for the above equipment due to:  Hypoxia  "

## 2025-06-02 NOTE — CARE PLAN
Problem: Knowledge Deficit - Standard  Goal: Patient and family/care givers will demonstrate understanding of plan of care, disease process/condition, diagnostic tests and medications  Outcome: Progressing     Problem: Pain - Standard  Goal: Alleviation of pain or a reduction in pain to the patient’s comfort goal  Outcome: Progressing   The patient is Stable - Low risk of patient condition declining or worsening    Shift Goals  Clinical Goals: pain control, monitor oxygen saturation  Patient Goals: pain control  Family Goals: improve resp status    Progress made toward(s) clinical / shift goals:  updated pt om plan of care, pending procedure, pt reporting 7/10 pain in right chest/side. Medicated per MAR    Patient is not progressing towards the following goals:

## 2025-06-02 NOTE — PROGRESS NOTES
Hospital Medicine Daily Progress Note    Date of Service  6/2/2025    Chief Complaint  Bartolo Berrios [facility] is a 80 y.o. male admitted 5/28/2025 with sob    Hospital Course  79 yo with stage 4 lung ca, recurrent malignant pleural effusion, sent to ER by oncology given worsening sob. Pt had a thoracentesis a month ago. Pt reported worsening cough and CXR noted recurrent effusion. Sp thora by Dr. Bailey on 5/28 , 1.6L removed. Procedure c/b PTX and CXR noted moderate sized one. Initially put on high flow oxygen to keep o2 sats up. Pulm re evaluated and PTX is likely result of lungs not being able to expand into the space post large volume thora. Serial CXR noted some re accumulation of fluid in pleural space. Pt is on RA but bordering hypoxia. Plan is pleurX catheter on 6/3 then home on 6/5 for chemo on 6/6.    PleurX tomorrow at 3PM, heparin gtt to be off 6 hours prior.    Interval Problem Update  - still coughing sob  - walking o2 not done, asked today's bedside RN to obtain it, pt wants DME home o2   - itchy eyes but R side now with discharge, crusting  - IV dilaudid 1mg x 7 in past 24 hours  - oxycodone 10mg not helping much  - gabapentin hasn't worked well in the past  - previously did have fentanyl patch states didn't do much  - I will order tylenol 1g tid and inc dilaudid dose to 2mg q3hr prn to stay ahead of pain as he's at moderate to severe pain at all times it is at site of malignant effusion  - I discussed with dr Morales, hep gtt off 6 hours prior to procedure which is at 3pm tomorrow    I have discussed this patient's plan of care and discharge plan at IDT rounds today with Case Management, Nursing, Nursing leadership, and other members of the IDT team.    Consultants/Specialty  pulmonary    Code Status  Full Code    Disposition  The patient is not medically cleared for discharge to home or a post-acute facility.  Anticipate discharge to: home with close outpatient follow-up    I have placed the  appropriate orders for post-discharge needs.    Review of Systems  Review of Systems   All other systems reviewed and are negative.       Physical Exam  Temp:  [36.8 °C (98.2 °F)-37.6 °C (99.6 °F)] 37.2 °C (98.9 °F)  Pulse:  [73-88] 83  Resp:  [16-20] 18  BP: (113-148)/(58-78) 135/67  SpO2:  [93 %-98 %] 96 %    Physical Exam  General: chronically ill appearing  HEENT:  eyes with clear drainage, R eye with crusting and pus like drainage  Cards: RRR  Pulm: dec BS L>R  Abdomen: soft, NTND, + bowel sounds, no rebound tenderness or guarding  MSK: normal ROM of upper and lower extremities  Neuro: CN II-XII grossly intact, sensation/strength intact, AAOx3  Psych: Appropriate mood   Fluids  No intake or output data in the 24 hours ending 06/02/25 1257         Laboratory  Recent Labs     06/01/25  0004   WBC 9.3   RBC 3.30*   HEMOGLOBIN 10.2*   HEMATOCRIT 31.7*   MCV 96.1   MCH 30.9   MCHC 32.2*   RDW 66.5*   PLATELETCT 247   MPV 9.1     Recent Labs     06/01/25  0004   SODIUM 139   POTASSIUM 4.1   CHLORIDE 106   CO2 26   GLUCOSE 107*   BUN 16   CREATININE 1.18   CALCIUM 8.5     Recent Labs     06/01/25  1318 06/01/25  1912 06/02/25  0527   APTT 93.3* 66.7* 69.5*               Imaging  DX-CHEST-PORTABLE (1 VIEW)   Final Result      1.  Interval decrease in size of the right apical pneumothorax, now measuring 2.1 cm (previously 3 cm).   2.  Stable moderate right pleural effusion.   3.  The remainder is stable.      DX-CHEST-LIMITED (1 VIEW)   Final Result      Stable moderate right pleural effusion and 3 cm right apical pneumothorax.      DX-CHEST-PORTABLE (1 VIEW)   Final Result      Similar to slightly worsened moderate right pneumothorax      DX-CHEST-PORTABLE (1 VIEW)   Final Result      Unchanged right basilar pneumothorax.      DX-CHEST-PORTABLE (1 VIEW)   Final Result         1.  Right lung base infiltrates, stable   2.  Right pneumothorax, slightly decreased since prior study.   3.  Small layering right pleural  effusion, stable   4.  Atherosclerosis      DX-CHEST-PORTABLE (1 VIEW)   Final Result         1.  Right lung base infiltrates, stable   2.  Right pneumothorax, stable since prior study.   3.  Atherosclerosis      DX-CHEST-PORTABLE (1 VIEW)   Final Result         1.  Right lung base infiltrates, stable   2.  Right pneumothorax, stable since prior study.   3.  Atherosclerosis      DX-CHEST-PORTABLE (1 VIEW)   Final Result      Stable moderate right pneumothorax. No significant interval change.      DX-CHEST-PORTABLE (1 VIEW)   Final Result      Stable exam.      DX-CHEST-PORTABLE (1 VIEW)   Final Result      Persistent right-sided pneumothorax which is not significantly changed.      DX-CHEST-PORTABLE (1 VIEW)   Final Result         1.  Right lung base infiltrates, stable   2.  Right pneumothorax, stable since prior study.      DX-CHEST-PORTABLE (1 VIEW)   Final Result         1.  Right lung base infiltrates   2.  Right pneumothorax, appear stable to slightly decreased since prior study.      DX-CHEST-PORTABLE (1 VIEW)   Final Result         1.  Moderate to large right pneumothorax.   2.  Slight hazy right pulmonary opacities, could represent subtle infiltrates.      These findings were discussed with the patient's clinician, Georgette Maharaj, on 5/28/2025 11:14 PM.      CT-CTA CHEST PULMONARY ARTERY W/ RECONS   Final Result      1.  No evidence of pulmonary embolism.   2.  Large right pleural effusion is increased from prior.   3.  Trace left pleural effusion.         DX-CHEST-PORTABLE (1 VIEW)   Final Result      Probably moderate right pleural effusion, mildly increased versus related to slightly worsening aeration.         CXR per my review reveals clear L lung fields, R with linear opacity with moderate pneumothorax on r    Assessment/Plan  * Malignant pleural effusion- (present on admission)  Assessment & Plan  Sp thora by Dr. Bailey 5/28 1.6L removed  Lung unable to expand hence PTX no puncture from thora  Serial CXR  reveal re accumulation now moderate  I spoke with pulm DR. Hernandez, plan is pleurX catheter Tues 6/3 by Dr. Davison likely around 3PM  I spoke with DR. Davison, HOLD heparin gtt 6 hours prior, PleurX is at 3pm. Will hold heparin 9am    Acute bacterial conjunctivitis of right eye  Assessment & Plan  Start cipro eye drops  1 drop q2hr x 2 days then 1 drop q4hr x 5 days , starting 6/2    Demand ischemia (HCC)  Assessment & Plan  Noted  2/2 hpyoxia    Pneumothorax  Assessment & Plan  Sp thora   Large volume thora, lung unable to expand  Do not suspect puncture  I spoke extensively with Dr. Hernandez Puldelmar who stated this may take time and high flow oxygen likely not going to make a difference, we reviewed several of his serial EKGs and there has not been any change except for some reaccumulation of fluid  Dr. Hernandez stated pt would be safe to dc with outpatient fu  Will obtain walking o2  Pt to start outpatient chemo  PleurX catheter likely in his near future  We also spoke with DR. Gonzalez who stated pleurodesis is not going to be an option given malignancy  CXR with re accumulation of pleural fluid  I spoke with pulm and plan is pleurX catheter Tues  IV dialudid 1mg x 2 today, continue prn    Bilateral pulmonary embolism (HCC)- (present on admission)  Assessment & Plan  Eliquis on hold  Cw hep gtt off at [place]  I reached out to Dr. Davison to clarify timing of heparin gtt , to be shut off at 9am (procedure is 3PM)    Acute hypoxemic respiratory failure (HCC)- (present on admission)  Assessment & Plan  Due to PTX and underlying lung ca/pleural effusions  Pt having more cough today and R sided pain  Fluid is accumulating  Repeat CXR w moderate effusion, stable, PTX improved as expected  I have asked RN again for walking o2 awaiting results    Macrocytic anemia- (present on admission)  Assessment & Plan  Start IM B12 injections q30 days    Adenocarcinoma of lung (HCC), stage 4 ([facility] Oct. 2021)- (present on admission)  Assessment &  Plan  Continue outpatient follow-ups with oncology  Pt has a plan for outpatient chemo on thurs by DR. Bautista    Hypertension- (present on admission)  Assessment & Plan  Cw norvasc    Chronic pain- (present on admission)  Assessment & Plan  2/ R sided malignant effusion  Anticipate pain with pleurX catheter as well  IV dilaudid 1mg x 7 total last 24 hours, x 2 this AM  Patient states dilaudid brings it down to about a 5 then creeps up to severe pain fast  I will increase dilaudid to 2mg q3hr PRN, increase oxy to 15mg q4nr prn  Pt is getting bowel regimen, last BM evening of 6/1        Brain lesion- (present on admission)  Assessment & Plan  Currently stable  Continue follow-up as an outpatient with imaging on a routine basis    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)  Assessment & Plan  Cw home meds    Vitamin D deficiency- (present on admission)  Assessment & Plan  Vitamin D supplementation    Adrenal insufficiency (HCC), secondary- (present on admission)  Assessment & Plan  Cw cortef    Hyperlipidemia- (present on admission)  Assessment & Plan  Cw statin    GERD (gastroesophageal reflux disease)- (present on admission)  Assessment & Plan  Cw ppi    Obstructive sleep apnea- (present on admission)  Assessment & Plan  oxygen    Chronic kidney disease, stage 3a- (present on admission)  Assessment & Plan  Monitor renal functions avoid nephrotoxic medications    Hypothyroid- (present on admission)  Assessment & Plan  Cw synthroid         VTE prophylaxis: hep gtt    I have performed a physical exam and reviewed and updated ROS and Plan today (6/2/2025). In review of yesterday's note (6/1/2025), there are no changes except as documented above.    I spent 55 minutes providing care for this patient.  This included face-to-face interview, physical examination.  Review of lab work including CBC, BMP,Discussion with multidisciplinary team including case management, nursing staff and pharmacy,  pulm     ADDENDUM: I spoke with Dr. Davison, they will do procedure Weds 7:30AM. Hep gtt off at midnight. Family will get all teaching same day and he can dc Weds to get chemo Thurs.

## 2025-06-02 NOTE — ASSESSMENT & PLAN NOTE
Cipro eyedrops   [Arthralgia] : no arthralgia [Joint Pain] : joint pain [Joint Stiffness] : no joint stiffness [Joint Swelling] : no joint swelling [Negative] : Heme/Lymph

## 2025-06-02 NOTE — DISCHARGE PLANNING
Received Choice Form at: 1026  Agency/Facility Name: [facility]  Outcome: Scanned to media only, orders needed

## 2025-06-02 NOTE — DISCHARGE PLANNING
Met with wife. Pt was on the phone.   Pt is independent with ADLs and IADLs. Pt normally goes for long walks. Does not use any assistive devices. He has no home O2 and wife signed the choice list. She has no preference. She wants to make sure that home O2 equipment is delivered to their home because she has back problems. Explained that DME company will deliver a tank to pt at bedside so he can go home with it.     PCP is Dr. Lesly Munoz  Pharmacy is [facility].    Care Transition Team Assessment    Information Source  Orientation Level: Oriented X4  Information Given By: Patient  Who is responsible for making decisions for patient? : Patient    Readmission Evaluation  Is this a readmission?: No    Elopement Risk  Legal Hold: No  Ambulatory or Self Mobile in Wheelchair: Yes  Disoriented: No  Psychiatric Symptoms: None  History of Wandering: No  Elopement this Admit: No  Vocalizing Wanting to Leave: No  Displays Behaviors, Body Language Wanting to Leave: No-Not at Risk for Elopement  Elopement Risk: Not at Risk for Elopement    Interdisciplinary Discharge Planning  Does Admitting Nurse Feel This Could be a Complex Discharge?: No  Lives with - Patient's Self Care Capacity: Spouse  Patient or legal guardian wants to designate a caregiver: No  Support Systems: Spouse / Significant Other  Housing / Facility: [address]  Do You Take your Prescribed Medications Regularly: Yes  Able to Return to Previous ADL's: Yes  Mobility Issues: No  Prior Services: Home-Independent  Patient Prefers to be Discharged to:: home  Assistance Needed: Yes    Discharge Preparedness  What is your plan after discharge?: Home with help  What are your discharge supports?: Child, Spouse  Prior Functional Level: Ambulatory, Drives Self, Independent with Activities of Daily Living, Independent with Medication Management  Difficulity with ADLs: None  Difficulity with IADLs: None    Functional Assesment  Prior Functional Level: Ambulatory,  Drives Self, Independent with Activities of Daily Living, Independent with Medication Management    Finances  Financial Barriers to Discharge: No  Prescription Coverage: Yes    Vision / Hearing Impairment  Vision Impairment : No  Hearing Impairment : Yes  Hearing Impairment: Both Ears  Does Pt Need Special Equipment for the Hearing Impaired?: No    Values / Beliefs / Concerns  Values / Beliefs Concerns : No    Advance Directive  Advance Directive?: None    Domestic Abuse  Have you ever been the victim of abuse or violence?: No  Possible Abuse/Neglect Reported to:: Not Applicable         Discharge Risks or Barriers  Discharge risks or barriers?: Post-acute placement / services  Patient risk factors: Cognitive / sensory / physical deficit    Anticipated Discharge Information  Discharge Disposition: Discharged to home/self care (01)

## 2025-06-02 NOTE — DIETARY
Nutrition Services: Poor PO intake recorded in flowsheets   Day 5 of admit. Bartolo Berrios [facility] is a 80 y.o. male with admitting DX of Malignant pleural effusion [J91.0]    Pt with good intake of 25-50% to % o9n 5/29 and 5/31. Intake now recorded as 0 and < 25-50% (per CNA). RD met w/ pt and dtr in his room. Dtr said that family has been bringing in food for pt and intake has been good. Food from outside is appropriate. Pt is receiving a regular diet.     Current diet order:   Regular diet        Plan/ Recommendations:      Encourage intake of meals, goal for >50% consumption from meals and/or supplements  Document intake of all meals as % taken in ADLs to provide interdisciplinary communication across all shifts. Please record intake of food from outside  Monitor weight.  Nutrition rep available to see patient for ongoing meal and snack preferences.  Obtain supplement order per RD as needed.      RD following

## 2025-06-02 NOTE — PROGRESS NOTES
Telemetry Shift Summary     Rhythm: SR with BBB  HR: 79-96  Ectopy: rPAC, rPVC    Measurements: 0.188/0.123/0.402    Normal Values  Rhythm: SR  HR:   Measurements: 0.12-0.20/0.08-0.10/0.30-0.52

## 2025-06-02 NOTE — PROGRESS NOTES
Telemetry Shift Summary     Rhythm: SR with BBB  HR Range:69-97  Ectopy: r-fPAC/rPVC  Measurements: 0.19/0.14/0.44    Normal Values  Measurements: 0.12- 0.20 / 0.08-0.10 / 0.30-0.52

## 2025-06-03 ENCOUNTER — APPOINTMENT (OUTPATIENT)
Dept: RADIOLOGY | Facility: MEDICAL CENTER | Age: 80
DRG: 180 | End: 2025-06-03
Attending: INTERNAL MEDICINE
Payer: MEDICARE

## 2025-06-03 ENCOUNTER — ANESTHESIA EVENT (OUTPATIENT)
Dept: SURGERY | Facility: MEDICAL CENTER | Age: 80
DRG: 180 | End: 2025-06-03
Payer: MEDICARE

## 2025-06-03 LAB
APTT PPP: 100.1 SEC (ref 24.7–36)
APTT PPP: 64 SEC (ref 24.7–36)
INR PPP: 0.98 (ref 0.87–1.13)
PROTHROMBIN TIME: 13.3 SEC (ref 12–14.6)
UFH PPP CHRO-ACNC: 0.33 IU/ML
UFH PPP CHRO-ACNC: 0.69 IU/ML
UFH PPP CHRO-ACNC: 0.74 IU/ML

## 2025-06-03 PROCEDURE — 85520 HEPARIN ASSAY: CPT

## 2025-06-03 PROCEDURE — 36415 COLL VENOUS BLD VENIPUNCTURE: CPT

## 2025-06-03 PROCEDURE — 700101 HCHG RX REV CODE 250: Performed by: INTERNAL MEDICINE

## 2025-06-03 PROCEDURE — A9270 NON-COVERED ITEM OR SERVICE: HCPCS | Performed by: HOSPITALIST

## 2025-06-03 PROCEDURE — 94760 N-INVAS EAR/PLS OXIMETRY 1: CPT

## 2025-06-03 PROCEDURE — 71045 X-RAY EXAM CHEST 1 VIEW: CPT

## 2025-06-03 PROCEDURE — A9270 NON-COVERED ITEM OR SERVICE: HCPCS | Performed by: INTERNAL MEDICINE

## 2025-06-03 PROCEDURE — 700102 HCHG RX REV CODE 250 W/ 637 OVERRIDE(OP): Performed by: HOSPITALIST

## 2025-06-03 PROCEDURE — 99233 SBSQ HOSP IP/OBS HIGH 50: CPT | Performed by: HOSPITALIST

## 2025-06-03 PROCEDURE — 700111 HCHG RX REV CODE 636 W/ 250 OVERRIDE (IP): Mod: JZ | Performed by: INTERNAL MEDICINE

## 2025-06-03 PROCEDURE — 700102 HCHG RX REV CODE 250 W/ 637 OVERRIDE(OP): Performed by: INTERNAL MEDICINE

## 2025-06-03 PROCEDURE — 770020 HCHG ROOM/CARE - TELE (206)

## 2025-06-03 PROCEDURE — 85610 PROTHROMBIN TIME: CPT

## 2025-06-03 PROCEDURE — 700111 HCHG RX REV CODE 636 W/ 250 OVERRIDE (IP): Performed by: HOSPITALIST

## 2025-06-03 PROCEDURE — 85730 THROMBOPLASTIN TIME PARTIAL: CPT | Mod: 91

## 2025-06-03 RX ORDER — HEPARIN SODIUM 5000 [USP'U]/100ML
0-30 INJECTION, SOLUTION INTRAVENOUS CONTINUOUS
Status: DISPENSED | OUTPATIENT
Start: 2025-06-03 | End: 2025-06-04

## 2025-06-03 RX ORDER — HEPARIN SODIUM 1000 [USP'U]/ML
40 INJECTION, SOLUTION INTRAVENOUS; SUBCUTANEOUS PRN
Status: ACTIVE | OUTPATIENT
Start: 2025-06-03 | End: 2025-06-04

## 2025-06-03 RX ADMIN — HYDROMORPHONE HYDROCHLORIDE 2 MG: 1 INJECTION, SOLUTION INTRAMUSCULAR; INTRAVENOUS; SUBCUTANEOUS at 13:11

## 2025-06-03 RX ADMIN — AMLODIPINE BESYLATE 5 MG: 5 TABLET ORAL at 05:32

## 2025-06-03 RX ADMIN — CIPROFLOXACIN HYDROCHLORIDE 1 DROP: 3 SOLUTION/ DROPS OPHTHALMIC at 18:38

## 2025-06-03 RX ADMIN — KETOROLAC TROMETHAMINE 15 MG: 15 INJECTION, SOLUTION INTRAMUSCULAR; INTRAVENOUS at 00:39

## 2025-06-03 RX ADMIN — ACETAMINOPHEN 1000 MG: 500 TABLET, FILM COATED ORAL at 00:39

## 2025-06-03 RX ADMIN — CIPROFLOXACIN HYDROCHLORIDE 1 DROP: 3 SOLUTION/ DROPS OPHTHALMIC at 05:31

## 2025-06-03 RX ADMIN — ACETAMINOPHEN 1000 MG: 500 TABLET, FILM COATED ORAL at 05:32

## 2025-06-03 RX ADMIN — Medication 1000 UNITS: at 05:32

## 2025-06-03 RX ADMIN — MOMETASONE FUROATE AND FORMOTEROL FUMARATE DIHYDRATE 2 PUFF: 100; 5 AEROSOL RESPIRATORY (INHALATION) at 05:32

## 2025-06-03 RX ADMIN — HYDROMORPHONE HYDROCHLORIDE 2 MG: 1 INJECTION, SOLUTION INTRAMUSCULAR; INTRAVENOUS; SUBCUTANEOUS at 19:59

## 2025-06-03 RX ADMIN — ACETAMINOPHEN 1000 MG: 500 TABLET, FILM COATED ORAL at 18:38

## 2025-06-03 RX ADMIN — CIPROFLOXACIN HYDROCHLORIDE 1 DROP: 3 SOLUTION/ DROPS OPHTHALMIC at 21:35

## 2025-06-03 RX ADMIN — LIDOCAINE 1 PATCH: 4 PATCH TOPICAL at 20:00

## 2025-06-03 RX ADMIN — KETOROLAC TROMETHAMINE 15 MG: 15 INJECTION, SOLUTION INTRAMUSCULAR; INTRAVENOUS at 05:31

## 2025-06-03 RX ADMIN — TRAZODONE HYDROCHLORIDE 100 MG: 50 TABLET ORAL at 21:04

## 2025-06-03 RX ADMIN — ATORVASTATIN CALCIUM 20 MG: 20 TABLET, FILM COATED ORAL at 18:38

## 2025-06-03 RX ADMIN — SENNOSIDES AND DOCUSATE SODIUM 2 TABLET: 50; 8.6 TABLET ORAL at 18:38

## 2025-06-03 RX ADMIN — HYDROCORTISONE 20 MG: 10 TABLET ORAL at 18:38

## 2025-06-03 RX ADMIN — TAMSULOSIN HYDROCHLORIDE 0.4 MG: 0.4 CAPSULE ORAL at 05:31

## 2025-06-03 RX ADMIN — Medication 1 DROP: at 21:05

## 2025-06-03 RX ADMIN — KETOROLAC TROMETHAMINE 15 MG: 15 INJECTION, SOLUTION INTRAMUSCULAR; INTRAVENOUS at 18:38

## 2025-06-03 RX ADMIN — OMEPRAZOLE 40 MG: 20 CAPSULE, DELAYED RELEASE ORAL at 08:23

## 2025-06-03 RX ADMIN — LEVOTHYROXINE SODIUM 125 MCG: 0.12 TABLET ORAL at 05:31

## 2025-06-03 RX ADMIN — MOMETASONE FUROATE AND FORMOTEROL FUMARATE DIHYDRATE 2 PUFF: 100; 5 AEROSOL RESPIRATORY (INHALATION) at 18:38

## 2025-06-03 RX ADMIN — CIPROFLOXACIN HYDROCHLORIDE 1 DROP: 3 SOLUTION/ DROPS OPHTHALMIC at 14:48

## 2025-06-03 RX ADMIN — KETOROLAC TROMETHAMINE 15 MG: 15 INJECTION, SOLUTION INTRAMUSCULAR; INTRAVENOUS at 12:12

## 2025-06-03 RX ADMIN — TAMSULOSIN HYDROCHLORIDE 0.4 MG: 0.4 CAPSULE ORAL at 18:38

## 2025-06-03 RX ADMIN — OXYCODONE HYDROCHLORIDE 20 MG: 20 TABLET, FILM COATED, EXTENDED RELEASE ORAL at 05:31

## 2025-06-03 RX ADMIN — ACETAMINOPHEN 1000 MG: 500 TABLET, FILM COATED ORAL at 12:12

## 2025-06-03 RX ADMIN — LIOTHYRONINE SODIUM 10 MCG: 5 TABLET ORAL at 05:32

## 2025-06-03 RX ADMIN — CIPROFLOXACIN HYDROCHLORIDE 1 DROP: 3 SOLUTION/ DROPS OPHTHALMIC at 09:55

## 2025-06-03 RX ADMIN — HYDROMORPHONE HYDROCHLORIDE 2 MG: 1 INJECTION, SOLUTION INTRAMUSCULAR; INTRAVENOUS; SUBCUTANEOUS at 08:33

## 2025-06-03 RX ADMIN — HEPARIN SODIUM 1050 UNITS/KG/HR: 5000 INJECTION, SOLUTION INTRAVENOUS at 12:18

## 2025-06-03 RX ADMIN — HYDROMORPHONE HYDROCHLORIDE 2 MG: 1 INJECTION, SOLUTION INTRAMUSCULAR; INTRAVENOUS; SUBCUTANEOUS at 03:20

## 2025-06-03 RX ADMIN — HYDROCORTISONE 20 MG: 10 TABLET ORAL at 05:32

## 2025-06-03 RX ADMIN — HYDROMORPHONE HYDROCHLORIDE 2 MG: 1 INJECTION, SOLUTION INTRAMUSCULAR; INTRAVENOUS; SUBCUTANEOUS at 16:14

## 2025-06-03 ASSESSMENT — PAIN DESCRIPTION - PAIN TYPE
TYPE: ACUTE PAIN

## 2025-06-03 ASSESSMENT — ENCOUNTER SYMPTOMS
SHORTNESS OF BREATH: 1
PALPITATIONS: 0
ORTHOPNEA: 0
COUGH: 1
CHILLS: 0
HEMOPTYSIS: 0
SPUTUM PRODUCTION: 0
VOMITING: 0
DIZZINESS: 0
NAUSEA: 0

## 2025-06-03 ASSESSMENT — FIBROSIS 4 INDEX: FIB4 SCORE: 2.267786838055363363

## 2025-06-03 NOTE — PROGRESS NOTES
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  Also PE April 2025.  Admitted with right-sided pleural effusion and requiring recurrent thoracentesis.  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  This is likely due to the malignancy.  Patient is going to get chemotherapy on Thursday.  Plan is to pursue Pleurx catheter for symptom control while going on active treatment.  Spent 35 minutes with the patient this evening discussing risks and benefits of Pleurx catheter  Indication for Pleurx catheter  Decision to do the Pleurx catheter Wednesday morning at 7:30 AM.  All questions answered      Addendum 7:13 pm    Received message to call patient's wife  Wife called at 7:12 pm   Spoke to his daughter most of the time  She expressed that she is annoyed with the plan for the pleurx tube placement.  She stated that the pain will be better with the tube in place  We discussed entrapped lung due to malignancy  Discussed malignant pleural effusions and that the lung may not inflate  Reviewed that the pleural effusion itself is not life threatening but the cancer is life threatening  The tube is for comfort and emphasized that  They are in agreement to proceed Wednesday morning    Called pharmacy on call to review his pain medications  We will add lidocaine patch  Added toradol

## 2025-06-03 NOTE — PROGRESS NOTES
Hospital Medicine Daily Progress Note    Date of Service  6/3/2025    Chief Complaint  Bartolo Berrios [facility] is a 80 y.o. male admitted 5/28/2025 with shortness of breath and cough    Hospital Course  Kevin Forrest has a history of stage IV lung cancer and recurrent malignant pleural effusion.  He presented to the emergency room on 5/28/2025 with shortness of breath and cough.  Thoracentesis was performed on 5/28/2025 with 1.6 L removed.  Patient did develop postoperative pneumothorax.  Pulmonary evaluated the patient and suspects that pneumothorax is a result of trapped lung.  Pleurx catheter is planned      Interval Problem Update  Patient seen and examined, family at the bedside  Patient states that he continues to have right-sided pain, overall improving with multimodal pain control, still requiring intravenous Dilaudid intermittently  On heparin drip, no epistaxis, no signs of overt GI bleeding  We discussed potential treatment with Pleurx catheter at length  He has discussed the case with oncology at [facility] as well as personal physician in [place]  He is agreeable to proceeding with Pleurx catheter placement  Case discussed with Dr. Davison from pulmonary    I have discussed this patient's plan of care and discharge plan at IDT rounds today with Case Management, Nursing, Nursing leadership, and other members of the IDT team.    Consultants/Specialty  pulonary    Code Status  Full Code    Disposition  The patient is not medically cleared for discharge to home or a post-acute facility.  Anticipate discharge to: home with close outpatient follow-up    I have placed the appropriate orders for post-discharge needs.    Review of Systems  Review of Systems   Constitutional:  Negative for chills and malaise/fatigue.   Respiratory:  Positive for cough and shortness of breath. Negative for hemoptysis and sputum production.    Cardiovascular:  Positive for chest pain. Negative for palpitations and  orthopnea.   Gastrointestinal:  Negative for nausea and vomiting.   Skin:  Negative for itching and rash.   Neurological:  Negative for dizziness.   All other systems reviewed and are negative.       Physical Exam  Temp:  [36.9 °C (98.4 °F)-37.5 °C (99.5 °F)] 37.5 °C (99.5 °F)  Pulse:  [71-89] 89  Resp:  [18-19] 19  BP: (112-147)/(62-73) 147/73  SpO2:  [95 %-99 %] 95 %    Physical Exam  Constitutional:       General: He is not in acute distress.     Appearance: Normal appearance. He is normal weight.   HENT:      Right Ear: External ear normal.      Left Ear: External ear normal.      Nose: Nose normal.   Eyes:      Extraocular Movements: Extraocular movements intact.   Cardiovascular:      Rate and Rhythm: Normal rate and regular rhythm.      Pulses: Normal pulses.   Pulmonary:      Effort: Pulmonary effort is normal.      Comments: Decreased breath sounds on the right  Abdominal:      General: Abdomen is flat. There is no distension.      Palpations: Abdomen is soft.   Musculoskeletal:         General: Normal range of motion.   Skin:     General: Skin is warm and dry.   Neurological:      General: No focal deficit present.      Mental Status: He is alert and oriented to person, place, and time.      Cranial Nerves: No cranial nerve deficit.   Psychiatric:         Mood and Affect: Mood normal.         Behavior: Behavior normal.         Fluids  No intake or output data in the 24 hours ending 06/03/25 1449     Laboratory  Recent Labs     06/01/25  0004   WBC 9.3   RBC 3.30*   HEMOGLOBIN 10.2*   HEMATOCRIT 31.7*   MCV 96.1   MCH 30.9   MCHC 32.2*   RDW 66.5*   PLATELETCT 247   MPV 9.1     Recent Labs     06/01/25  0004   SODIUM 139   POTASSIUM 4.1   CHLORIDE 106   CO2 26   GLUCOSE 107*   BUN 16   CREATININE 1.18   CALCIUM 8.5     Recent Labs     06/02/25  0527 06/03/25  0604 06/03/25  1127   APTT 69.5* 100.1* 64.0*   INR  --   --  0.98               Imaging  DX-CHEST-PORTABLE (1 VIEW)   Final Result      1.   Increased, now 4 cm right apical pneumothorax.   2.  Stable moderate right pleural effusion and right basilar airspace disease.      DX-CHEST-PORTABLE (1 VIEW)   Final Result      1.  Interval decrease in size of the right apical pneumothorax, now measuring 2.1 cm (previously 3 cm).   2.  Stable moderate right pleural effusion.   3.  The remainder is stable.      DX-CHEST-LIMITED (1 VIEW)   Final Result      Stable moderate right pleural effusion and 3 cm right apical pneumothorax.      DX-CHEST-PORTABLE (1 VIEW)   Final Result      Similar to slightly worsened moderate right pneumothorax      DX-CHEST-PORTABLE (1 VIEW)   Final Result      Unchanged right basilar pneumothorax.      DX-CHEST-PORTABLE (1 VIEW)   Final Result         1.  Right lung base infiltrates, stable   2.  Right pneumothorax, slightly decreased since prior study.   3.  Small layering right pleural effusion, stable   4.  Atherosclerosis      DX-CHEST-PORTABLE (1 VIEW)   Final Result         1.  Right lung base infiltrates, stable   2.  Right pneumothorax, stable since prior study.   3.  Atherosclerosis      DX-CHEST-PORTABLE (1 VIEW)   Final Result         1.  Right lung base infiltrates, stable   2.  Right pneumothorax, stable since prior study.   3.  Atherosclerosis      DX-CHEST-PORTABLE (1 VIEW)   Final Result      Stable moderate right pneumothorax. No significant interval change.      DX-CHEST-PORTABLE (1 VIEW)   Final Result      Stable exam.      DX-CHEST-PORTABLE (1 VIEW)   Final Result      Persistent right-sided pneumothorax which is not significantly changed.      DX-CHEST-PORTABLE (1 VIEW)   Final Result         1.  Right lung base infiltrates, stable   2.  Right pneumothorax, stable since prior study.      DX-CHEST-PORTABLE (1 VIEW)   Final Result         1.  Right lung base infiltrates   2.  Right pneumothorax, appear stable to slightly decreased since prior study.      DX-CHEST-PORTABLE (1 VIEW)   Final Result         1.  Moderate  to large right pneumothorax.   2.  Slight hazy right pulmonary opacities, could represent subtle infiltrates.      These findings were discussed with the patient's clinician, Georgette Maharaj, on 5/28/2025 11:14 PM.      CT-CTA CHEST PULMONARY ARTERY W/ RECONS   Final Result      1.  No evidence of pulmonary embolism.   2.  Large right pleural effusion is increased from prior.   3.  Trace left pleural effusion.         DX-CHEST-PORTABLE (1 VIEW)   Final Result      Probably moderate right pleural effusion, mildly increased versus related to slightly worsening aeration.           Assessment/Plan  * Malignant pleural effusion- (present on admission)  Assessment & Plan  6/3:  Pleurx catheter Wednesday    Pneumothorax  Assessment & Plan  Status post thoracentesis  Large volume thora, lung unable to expand  Do not suspect puncture  Thoracic surgery did not recommend pleurodesis    6/3:  Plan for Pleurx catheter Wednesday    Bilateral pulmonary embolism (HCC)- (present on admission)  Assessment & Plan  6/3:  Continue heparin drip  Rate of heparin drip will be adjusted based on serial factor Xa measurements to ensure adequate anticoagulation and to avoid potential complications of bleeding  Resume Eliquis at appropriate interval post Pleurx catheter      Adenocarcinoma of lung (HCC), stage 4 ([facility] Oct. 2021)- (present on admission)  Assessment & Plan  Outpatient follow-up with oncology    Chronic pain- (present on admission)  Assessment & Plan  6/3:  Cancer related pain  At times pain is severe, requiring intravenous Dilaudid  Continue oxycodone CR, oxycodone IR, Tylenol, and Toradol      Acute bacterial conjunctivitis of right eye- (present on admission)  Assessment & Plan  Cipro eyedrops    Acute hypoxemic respiratory failure (HCC)- (present on admission)  Assessment & Plan  6/3:  Home oxygen has been arranged    Demand ischemia (HCC)- (present on admission)  Assessment & Plan  Likely related to hypoxia    Macrocytic  anemia- (present on admission)  Assessment & Plan  B12 injections q30 days    Hypertension- (present on admission)  Assessment & Plan  Amlodipine    Brain lesion- (present on admission)  Assessment & Plan  Currently stable  Continue follow-up as an outpatient with imaging on a routine basis    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)  Assessment & Plan  Tamsulosin    Adrenal insufficiency (HCC), secondary- (present on admission)  Assessment & Plan  Cortef    Hyperlipidemia- (present on admission)  Assessment & Plan  Atorvastatin    GERD (gastroesophageal reflux disease)- (present on admission)  Assessment & Plan  Omeprazole    Obstructive sleep apnea- (present on admission)  Assessment & Plan  oxygen    Chronic kidney disease, stage 3a- (present on admission)  Assessment & Plan  Monitor renal functions avoid nephrotoxic medications    Hypothyroid- (present on admission)  Assessment & Plan  Synthroid         VTE prophylaxis:    therapeutic anticoagulation with weight-based heparin gtt, pharmacy to adjust PRN      I have performed a physical exam and reviewed and updated ROS and Plan today (6/3/2025). In review of yesterday's note (6/2/2025), there are no changes except as documented above.

## 2025-06-03 NOTE — CARE PLAN
The patient is Stable - Low risk of patient condition declining or worsening    Shift Goals  Clinical Goals: wean O2, pain management, heparin drip, monitor vitals, monitor labs  Patient Goals: sleep  Family Goals: updates    Progress made toward(s) clinical / shift goals:        Problem: Knowledge Deficit - Standard  Goal: Patient and family/care givers will demonstrate understanding of plan of care, disease process/condition, diagnostic tests and medications  Description: Target End Date:  1-3 days or as soon as patient condition allowsDocument in Patient Education1.  Patient and family/caregiver oriented to unit, equipment, visitation policy and means for communicating concern2.  Complete/review Learning Assessment3.  Assess knowledge level of disease process/condition, treatment plan, diagnostic tests and medications4.  Explain disease process/condition, treatment plan, diagnostic tests and medications  Outcome: Progressing     Problem: Pain - Standard  Goal: Alleviation of pain or a reduction in pain to the patient’s comfort goal  Description: Target End Date:  Prior to discharge or change in level of careDocument on Vitals flowsheet1.  Document pain using the appropriate pain scale per order or unit policy2.  Educate and implement non-pharmacologic comfort measures (i.e. relaxation, distraction, massage, cold/heat therapy, etc.)3.  Pain management medications as ordered4.  Reassess pain after pain med administration per policy5.  If opiods administered assess patient's response to pain medication is appropriate per POSS sedation scale6.  Follow pain management plan developed in collaboration with patient and interdisciplinary team (including palliative care or pain specialists if applicable)  Outcome: Progressing     Problem: Fall Risk  Goal: Patient will remain free from falls  Description: Target End Date:  Prior to discharge or change in level of careDocument interventions on the [facility] Fall Risk  Assessment1.  Assess for fall risk factors2.  Implement fall precautions  Outcome: Progressing       Patient is not progressing towards the following goals:

## 2025-06-03 NOTE — CARE PLAN
The patient is Stable - Low risk of patient condition declining or worsening    Shift Goals  Clinical Goals: Pain management, heparin gtt  Patient Goals: Pain control, updates  Family Goals: Updates    Progress made toward(s) clinical / shift goals:    Problem: Knowledge Deficit - Standard  Goal: Patient and family/care givers will demonstrate understanding of plan of care, disease process/condition, diagnostic tests and medications  Description: Target End Date:  1-3 days or as soon as patient condition allowsDocument in Patient Education1.  Patient and family/caregiver oriented to unit, equipment, visitation policy and means for communicating concern2.  Complete/review Learning Assessment3.  Assess knowledge level of disease process/condition, treatment plan, diagnostic tests and medications4.  Explain disease process/condition, treatment plan, diagnostic tests and medications  Outcome: Progressing     Problem: Fall Risk  Goal: Patient will remain free from falls  Description: Target End Date:  Prior to discharge or change in level of careDocument interventions on the [facility] Fall Risk Assessment1.  Assess for fall risk factors2.  Implement fall precautions  Outcome: Progressing       Patient is not progressing towards the following goals:

## 2025-06-03 NOTE — PROGRESS NOTES
Monitor Summary  Rhythm: Normal Sinus Rhythm and Sinus Tachycardia  Rate: 76 - 101  Ectopy: PVCs, PACs  .18 / .14 / .28

## 2025-06-03 NOTE — PROGRESS NOTES
Assumed patient care, report received from RHODA Miller. Pt A&O x4, currently verbalizes pain, will medicate per MAR. POC discussed with patient, verbalizes understanding. Patient resting in bed with call light within reach, bed in lowest locked position.

## 2025-06-03 NOTE — PROGRESS NOTES
Telemetry Shift Summary     Rhythm: SR/ST   HR Range:   Ectopy: rPVCs, oPACs  Measurements: .16/.13/.40    Normal Values   Rhythm: SR  HR Range:   Measurements: 0.12-0.20/0.06-0.10/0.30-0.52

## 2025-06-03 NOTE — FACE TO FACE
Face to Face Supporting Documentation - Home Health    The encounter with this patient was in whole or in part the primary reason for home health admission.    Date of encounter:   Patient:                    MRN:                       YOB: 2023  Bartolo Berrios [facility]  [id number]  1945     Home health to see patient for:  Skilled Nursing care for assessment, interventions & education, Physical Therapy evaluation and treatment, and Occupational therapy evaluation and treatment    Skilled need for:  New Onset Medical Diagnosis lung cancer    Skilled nursing interventions to include:  Line/Drain/Airway education and care and Comment: medication management, pleurex catheter managment    Homebound status evidenced by:  Needs the assistance of another person in order to leave the home. Leaving home requires a considerable and taxing effort. There is a normal inability to leave the home.    Community Physician to provide follow up care: Lesly Munoz M.D.     Optional Interventions? No      I certify the face to face encounter for this home health care referral meets the CMS requirements and the encounter/clinical assessment with the patient was, in whole, or in part, for the medical condition(s) listed above, which is the primary reason for home health care. Based on my clinical findings: the service(s) are medically necessary, support the need for home health care, and the homebound criteria are met.  I certify that this patient has had a face to face encounter by myself.  Toro Wilson M.D. - NPI: [id number]

## 2025-06-03 NOTE — DISCHARGE PLANNING
Case Management Discharge Planning    Admission Date: 5/28/2025  GMLOS: 4.8  ALOS: 6    6-Clicks ADL Score: 24  6-Clicks Mobility Score: 24      Anticipated Discharge Dispo: Discharge Disposition: Discharged to home/self care (01)    DME Needed: Yes    DME Ordered: Yes    Action(s) Taken:     RNCM attended IDT rounds and reviewed chart. Bedside RN confirmed home O2 has been delivered by [employer].    Escalations Completed: None    Medically Clear: No    Next Steps: Follow-up with medical team to discuss DC needs and barriers.    Barriers to Discharge: Medical clearance

## 2025-06-03 NOTE — PROGRESS NOTES
Telemetry Shift Summary     Rhythm: SR with a BBB  Rate: 78-92  Measurements: 0.20/0.14/0.40  Ectopy (reported by Monitor Tech): r/fPAC, rPVC     Normal Values  Rhythm: Sinus  HR:   Measurements: 0.12-0.20/0.06-0.10/0.30-0.52

## 2025-06-04 ENCOUNTER — APPOINTMENT (OUTPATIENT)
Dept: RADIOLOGY | Facility: MEDICAL CENTER | Age: 80
DRG: 180 | End: 2025-06-04
Attending: INTERNAL MEDICINE
Payer: MEDICARE

## 2025-06-04 ENCOUNTER — ANESTHESIA (OUTPATIENT)
Dept: SURGERY | Facility: MEDICAL CENTER | Age: 80
DRG: 180 | End: 2025-06-04
Payer: MEDICARE

## 2025-06-04 VITALS
HEART RATE: 87 BPM | WEIGHT: 141.76 LBS | DIASTOLIC BLOOD PRESSURE: 75 MMHG | BODY MASS INDEX: 19.2 KG/M2 | SYSTOLIC BLOOD PRESSURE: 137 MMHG | RESPIRATION RATE: 16 BRPM | OXYGEN SATURATION: 96 % | TEMPERATURE: 97.4 F | HEIGHT: 72 IN

## 2025-06-04 PROBLEM — I24.89 DEMAND ISCHEMIA (HCC): Status: RESOLVED | Noted: 2025-05-29 | Resolved: 2025-06-04

## 2025-06-04 PROBLEM — J93.9 PNEUMOTHORAX: Status: RESOLVED | Noted: 2025-05-29 | Resolved: 2025-06-04

## 2025-06-04 LAB
ANION GAP SERPL CALC-SCNC: 9 MMOL/L (ref 7–16)
BUN SERPL-MCNC: 15 MG/DL (ref 8–22)
CALCIUM SERPL-MCNC: 8.3 MG/DL (ref 8.5–10.5)
CHLORIDE SERPL-SCNC: 107 MMOL/L (ref 96–112)
CO2 SERPL-SCNC: 25 MMOL/L (ref 20–33)
CREAT SERPL-MCNC: 1.25 MG/DL (ref 0.5–1.4)
ERYTHROCYTE [DISTWIDTH] IN BLOOD BY AUTOMATED COUNT: 65.1 FL (ref 35.9–50)
GFR SERPLBLD CREATININE-BSD FMLA CKD-EPI: 59 ML/MIN/1.73 M 2
GLUCOSE SERPL-MCNC: 122 MG/DL (ref 65–99)
HCT VFR BLD AUTO: 34.3 % (ref 42–52)
HGB BLD-MCNC: 10.8 G/DL (ref 14–18)
MCH RBC QN AUTO: 30.4 PG (ref 27–33)
MCHC RBC AUTO-ENTMCNC: 31.5 G/DL (ref 32.3–36.5)
MCV RBC AUTO: 96.6 FL (ref 81.4–97.8)
PLATELET # BLD AUTO: 266 K/UL (ref 164–446)
PMV BLD AUTO: 9.1 FL (ref 9–12.9)
POTASSIUM SERPL-SCNC: 3.7 MMOL/L (ref 3.6–5.5)
RBC # BLD AUTO: 3.55 M/UL (ref 4.7–6.1)
SODIUM SERPL-SCNC: 141 MMOL/L (ref 135–145)
UFH PPP CHRO-ACNC: 0.31 IU/ML
WBC # BLD AUTO: 7.9 K/UL (ref 4.8–10.8)

## 2025-06-04 PROCEDURE — 700102 HCHG RX REV CODE 250 W/ 637 OVERRIDE(OP): Performed by: ANESTHESIOLOGY

## 2025-06-04 PROCEDURE — 160015 HCHG STAT PREOP MINUTES: Performed by: STUDENT IN AN ORGANIZED HEALTH CARE EDUCATION/TRAINING PROGRAM

## 2025-06-04 PROCEDURE — 160035 HCHG PACU - 1ST 60 MINS PHASE I: Performed by: STUDENT IN AN ORGANIZED HEALTH CARE EDUCATION/TRAINING PROGRAM

## 2025-06-04 PROCEDURE — A9270 NON-COVERED ITEM OR SERVICE: HCPCS | Performed by: ANESTHESIOLOGY

## 2025-06-04 PROCEDURE — 71045 X-RAY EXAM CHEST 1 VIEW: CPT

## 2025-06-04 PROCEDURE — 80048 BASIC METABOLIC PNL TOTAL CA: CPT

## 2025-06-04 PROCEDURE — 160009 HCHG ANES TIME/MIN: Performed by: STUDENT IN AN ORGANIZED HEALTH CARE EDUCATION/TRAINING PROGRAM

## 2025-06-04 PROCEDURE — 85520 HEPARIN ASSAY: CPT

## 2025-06-04 PROCEDURE — 99239 HOSP IP/OBS DSCHRG MGMT >30: CPT | Performed by: HOSPITALIST

## 2025-06-04 PROCEDURE — 160002 HCHG RECOVERY MINUTES (STAT): Performed by: STUDENT IN AN ORGANIZED HEALTH CARE EDUCATION/TRAINING PROGRAM

## 2025-06-04 PROCEDURE — 160048 HCHG OR STATISTICAL LEVEL 1-5: Performed by: STUDENT IN AN ORGANIZED HEALTH CARE EDUCATION/TRAINING PROGRAM

## 2025-06-04 PROCEDURE — 0W9930Z DRAINAGE OF RIGHT PLEURAL CAVITY WITH DRAINAGE DEVICE, PERCUTANEOUS APPROACH: ICD-10-PCS | Performed by: INTERNAL MEDICINE

## 2025-06-04 PROCEDURE — 700111 HCHG RX REV CODE 636 W/ 250 OVERRIDE (IP): Mod: JZ | Performed by: INTERNAL MEDICINE

## 2025-06-04 PROCEDURE — 36415 COLL VENOUS BLD VENIPUNCTURE: CPT

## 2025-06-04 PROCEDURE — 160039 HCHG SURGERY MINUTES - EA ADDL 1 MIN LEVEL 3: Performed by: STUDENT IN AN ORGANIZED HEALTH CARE EDUCATION/TRAINING PROGRAM

## 2025-06-04 PROCEDURE — 700101 HCHG RX REV CODE 250: Performed by: STUDENT IN AN ORGANIZED HEALTH CARE EDUCATION/TRAINING PROGRAM

## 2025-06-04 PROCEDURE — 160036 HCHG PACU - EA ADDL 30 MINS PHASE I: Performed by: STUDENT IN AN ORGANIZED HEALTH CARE EDUCATION/TRAINING PROGRAM

## 2025-06-04 PROCEDURE — 700111 HCHG RX REV CODE 636 W/ 250 OVERRIDE (IP): Performed by: STUDENT IN AN ORGANIZED HEALTH CARE EDUCATION/TRAINING PROGRAM

## 2025-06-04 PROCEDURE — 700102 HCHG RX REV CODE 250 W/ 637 OVERRIDE(OP): Performed by: INTERNAL MEDICINE

## 2025-06-04 PROCEDURE — 700102 HCHG RX REV CODE 250 W/ 637 OVERRIDE(OP): Performed by: HOSPITALIST

## 2025-06-04 PROCEDURE — 700102 HCHG RX REV CODE 250 W/ 637 OVERRIDE(OP): Performed by: STUDENT IN AN ORGANIZED HEALTH CARE EDUCATION/TRAINING PROGRAM

## 2025-06-04 PROCEDURE — 160028 HCHG SURGERY MINUTES - 1ST 30 MINS LEVEL 3: Performed by: STUDENT IN AN ORGANIZED HEALTH CARE EDUCATION/TRAINING PROGRAM

## 2025-06-04 PROCEDURE — 85027 COMPLETE CBC AUTOMATED: CPT

## 2025-06-04 PROCEDURE — 32550 INSERT PLEURAL CATH: CPT | Performed by: INTERNAL MEDICINE

## 2025-06-04 PROCEDURE — A9270 NON-COVERED ITEM OR SERVICE: HCPCS | Performed by: HOSPITALIST

## 2025-06-04 PROCEDURE — 700105 HCHG RX REV CODE 258: Performed by: INTERNAL MEDICINE

## 2025-06-04 PROCEDURE — 700111 HCHG RX REV CODE 636 W/ 250 OVERRIDE (IP): Mod: JZ | Performed by: STUDENT IN AN ORGANIZED HEALTH CARE EDUCATION/TRAINING PROGRAM

## 2025-06-04 PROCEDURE — A9270 NON-COVERED ITEM OR SERVICE: HCPCS | Performed by: STUDENT IN AN ORGANIZED HEALTH CARE EDUCATION/TRAINING PROGRAM

## 2025-06-04 PROCEDURE — A9270 NON-COVERED ITEM OR SERVICE: HCPCS | Performed by: INTERNAL MEDICINE

## 2025-06-04 PROCEDURE — 99233 SBSQ HOSP IP/OBS HIGH 50: CPT | Mod: 25 | Performed by: INTERNAL MEDICINE

## 2025-06-04 RX ORDER — EPHEDRINE SULFATE 50 MG/ML
INJECTION, SOLUTION INTRAVENOUS PRN
Status: DISCONTINUED | OUTPATIENT
Start: 2025-06-04 | End: 2025-06-04 | Stop reason: SURG

## 2025-06-04 RX ORDER — HALOPERIDOL 5 MG/ML
1 INJECTION INTRAMUSCULAR
Status: DISCONTINUED | OUTPATIENT
Start: 2025-06-04 | End: 2025-06-04 | Stop reason: HOSPADM

## 2025-06-04 RX ORDER — ONDANSETRON 2 MG/ML
4 INJECTION INTRAMUSCULAR; INTRAVENOUS
Status: DISCONTINUED | OUTPATIENT
Start: 2025-06-04 | End: 2025-06-04 | Stop reason: HOSPADM

## 2025-06-04 RX ORDER — OXYCODONE HYDROCHLORIDE 5 MG/1
5 TABLET ORAL
Refills: 0 | Status: COMPLETED | OUTPATIENT
Start: 2025-06-04 | End: 2025-06-04

## 2025-06-04 RX ORDER — DIPHENHYDRAMINE HYDROCHLORIDE 50 MG/ML
12.5 INJECTION, SOLUTION INTRAMUSCULAR; INTRAVENOUS
Status: DISCONTINUED | OUTPATIENT
Start: 2025-06-04 | End: 2025-06-04 | Stop reason: HOSPADM

## 2025-06-04 RX ORDER — HYDROMORPHONE HYDROCHLORIDE 1 MG/ML
0.2 INJECTION, SOLUTION INTRAMUSCULAR; INTRAVENOUS; SUBCUTANEOUS
Status: DISCONTINUED | OUTPATIENT
Start: 2025-06-04 | End: 2025-06-04 | Stop reason: HOSPADM

## 2025-06-04 RX ORDER — OXYCODONE HCL 5 MG/5 ML
5 SOLUTION, ORAL ORAL
Status: COMPLETED | OUTPATIENT
Start: 2025-06-04 | End: 2025-06-04

## 2025-06-04 RX ORDER — ONDANSETRON 2 MG/ML
INJECTION INTRAMUSCULAR; INTRAVENOUS PRN
Status: DISCONTINUED | OUTPATIENT
Start: 2025-06-04 | End: 2025-06-04 | Stop reason: SURG

## 2025-06-04 RX ORDER — MORPHINE SULFATE 15 MG/1
15 TABLET, FILM COATED, EXTENDED RELEASE ORAL EVERY 12 HOURS
Qty: 14 TABLET | Refills: 0 | Status: SHIPPED | OUTPATIENT
Start: 2025-06-04 | End: 2025-06-11

## 2025-06-04 RX ORDER — LABETALOL HYDROCHLORIDE 5 MG/ML
5 INJECTION, SOLUTION INTRAVENOUS
Status: DISCONTINUED | OUTPATIENT
Start: 2025-06-04 | End: 2025-06-04 | Stop reason: HOSPADM

## 2025-06-04 RX ORDER — HYDROMORPHONE HYDROCHLORIDE 1 MG/ML
0.4 INJECTION, SOLUTION INTRAMUSCULAR; INTRAVENOUS; SUBCUTANEOUS
Status: DISCONTINUED | OUTPATIENT
Start: 2025-06-04 | End: 2025-06-04 | Stop reason: HOSPADM

## 2025-06-04 RX ORDER — SODIUM CHLORIDE, SODIUM LACTATE, POTASSIUM CHLORIDE, CALCIUM CHLORIDE 600; 310; 30; 20 MG/100ML; MG/100ML; MG/100ML; MG/100ML
INJECTION, SOLUTION INTRAVENOUS CONTINUOUS
Status: ACTIVE | OUTPATIENT
Start: 2025-06-04 | End: 2025-06-04

## 2025-06-04 RX ORDER — LIDOCAINE HYDROCHLORIDE 20 MG/ML
INJECTION, SOLUTION EPIDURAL; INFILTRATION; INTRACAUDAL; PERINEURAL PRN
Status: DISCONTINUED | OUTPATIENT
Start: 2025-06-04 | End: 2025-06-04 | Stop reason: SURG

## 2025-06-04 RX ORDER — MIDAZOLAM HYDROCHLORIDE 1 MG/ML
INJECTION INTRAMUSCULAR; INTRAVENOUS PRN
Status: DISCONTINUED | OUTPATIENT
Start: 2025-06-04 | End: 2025-06-04 | Stop reason: SURG

## 2025-06-04 RX ORDER — EPHEDRINE SULFATE 50 MG/ML
5 INJECTION, SOLUTION INTRAVENOUS
Status: DISCONTINUED | OUTPATIENT
Start: 2025-06-04 | End: 2025-06-04 | Stop reason: HOSPADM

## 2025-06-04 RX ORDER — CIPROFLOXACIN HYDROCHLORIDE 3.5 MG/ML
1 SOLUTION/ DROPS TOPICAL EVERY 4 HOURS
Qty: 3.5 ML | Refills: 0 | Status: ACTIVE | OUTPATIENT
Start: 2025-06-04 | End: 2025-06-09

## 2025-06-04 RX ORDER — OXYCODONE HCL 5 MG/5 ML
5 SOLUTION, ORAL ORAL ONCE
Refills: 0 | Status: COMPLETED | OUTPATIENT
Start: 2025-06-04 | End: 2025-06-04

## 2025-06-04 RX ORDER — HYDRALAZINE HYDROCHLORIDE 20 MG/ML
5 INJECTION INTRAMUSCULAR; INTRAVENOUS
Status: DISCONTINUED | OUTPATIENT
Start: 2025-06-04 | End: 2025-06-04 | Stop reason: HOSPADM

## 2025-06-04 RX ORDER — OXYCODONE HCL 5 MG/5 ML
10 SOLUTION, ORAL ORAL
Status: COMPLETED | OUTPATIENT
Start: 2025-06-04 | End: 2025-06-04

## 2025-06-04 RX ORDER — ALBUTEROL SULFATE 5 MG/ML
2.5 SOLUTION RESPIRATORY (INHALATION)
Status: DISCONTINUED | OUTPATIENT
Start: 2025-06-04 | End: 2025-06-04 | Stop reason: HOSPADM

## 2025-06-04 RX ORDER — HYDROMORPHONE HYDROCHLORIDE 2 MG/1
2-4 TABLET ORAL EVERY 4 HOURS PRN
Qty: 30 TABLET | Refills: 0 | Status: SHIPPED | OUTPATIENT
Start: 2025-06-04 | End: 2025-06-11

## 2025-06-04 RX ORDER — HYDROMORPHONE HYDROCHLORIDE 1 MG/ML
0.1 INJECTION, SOLUTION INTRAMUSCULAR; INTRAVENOUS; SUBCUTANEOUS
Status: DISCONTINUED | OUTPATIENT
Start: 2025-06-04 | End: 2025-06-04 | Stop reason: HOSPADM

## 2025-06-04 RX ADMIN — ACETAMINOPHEN 1000 MG: 500 TABLET, FILM COATED ORAL at 16:57

## 2025-06-04 RX ADMIN — CIPROFLOXACIN HYDROCHLORIDE 1 DROP: 3 SOLUTION/ DROPS OPHTHALMIC at 16:57

## 2025-06-04 RX ADMIN — OXYCODONE HYDROCHLORIDE 20 MG: 20 TABLET, FILM COATED, EXTENDED RELEASE ORAL at 16:57

## 2025-06-04 RX ADMIN — AMLODIPINE BESYLATE 5 MG: 5 TABLET ORAL at 04:52

## 2025-06-04 RX ADMIN — ATORVASTATIN CALCIUM 20 MG: 20 TABLET, FILM COATED ORAL at 16:57

## 2025-06-04 RX ADMIN — PROPOFOL 200 MCG/KG/MIN: 10 INJECTION, EMULSION INTRAVENOUS at 07:52

## 2025-06-04 RX ADMIN — HYDROMORPHONE HYDROCHLORIDE 2 MG: 1 INJECTION, SOLUTION INTRAMUSCULAR; INTRAVENOUS; SUBCUTANEOUS at 15:13

## 2025-06-04 RX ADMIN — KETOROLAC TROMETHAMINE 15 MG: 15 INJECTION, SOLUTION INTRAMUSCULAR; INTRAVENOUS at 04:54

## 2025-06-04 RX ADMIN — OXYCODONE HYDROCHLORIDE 5 MG: 5 SOLUTION ORAL at 10:32

## 2025-06-04 RX ADMIN — EPHEDRINE SULFATE 5 MG: 50 INJECTION, SOLUTION INTRAVENOUS at 08:18

## 2025-06-04 RX ADMIN — OXYCODONE HYDROCHLORIDE 20 MG: 20 TABLET, FILM COATED, EXTENDED RELEASE ORAL at 04:53

## 2025-06-04 RX ADMIN — MOMETASONE FUROATE AND FORMOTEROL FUMARATE DIHYDRATE 2 PUFF: 100; 5 AEROSOL RESPIRATORY (INHALATION) at 04:53

## 2025-06-04 RX ADMIN — EPHEDRINE SULFATE 5 MG: 50 INJECTION, SOLUTION INTRAVENOUS at 09:10

## 2025-06-04 RX ADMIN — FENTANYL CITRATE 25 MCG: 50 INJECTION, SOLUTION INTRAMUSCULAR; INTRAVENOUS at 10:31

## 2025-06-04 RX ADMIN — LEVOTHYROXINE SODIUM 125 MCG: 0.12 TABLET ORAL at 04:53

## 2025-06-04 RX ADMIN — FENTANYL CITRATE 25 MCG: 50 INJECTION, SOLUTION INTRAMUSCULAR; INTRAVENOUS at 08:08

## 2025-06-04 RX ADMIN — KETOROLAC TROMETHAMINE 15 MG: 15 INJECTION, SOLUTION INTRAMUSCULAR; INTRAVENOUS at 00:06

## 2025-06-04 RX ADMIN — FENTANYL CITRATE 25 MCG: 50 INJECTION, SOLUTION INTRAMUSCULAR; INTRAVENOUS at 10:38

## 2025-06-04 RX ADMIN — HYDROCORTISONE 20 MG: 10 TABLET ORAL at 16:57

## 2025-06-04 RX ADMIN — CIPROFLOXACIN HYDROCHLORIDE 1 DROP: 3 SOLUTION/ DROPS OPHTHALMIC at 15:11

## 2025-06-04 RX ADMIN — EPHEDRINE SULFATE 5 MG: 50 INJECTION, SOLUTION INTRAVENOUS at 08:26

## 2025-06-04 RX ADMIN — PROPOFOL 20 MG: 10 INJECTION, EMULSION INTRAVENOUS at 07:53

## 2025-06-04 RX ADMIN — TAMSULOSIN HYDROCHLORIDE 0.4 MG: 0.4 CAPSULE ORAL at 16:57

## 2025-06-04 RX ADMIN — TAMSULOSIN HYDROCHLORIDE 0.4 MG: 0.4 CAPSULE ORAL at 04:53

## 2025-06-04 RX ADMIN — EPHEDRINE SULFATE 5 MG: 50 INJECTION, SOLUTION INTRAVENOUS at 08:34

## 2025-06-04 RX ADMIN — ONDANSETRON 4 MG: 2 INJECTION INTRAMUSCULAR; INTRAVENOUS at 07:47

## 2025-06-04 RX ADMIN — HYDROCORTISONE 20 MG: 10 TABLET ORAL at 04:52

## 2025-06-04 RX ADMIN — LIOTHYRONINE SODIUM 10 MCG: 5 TABLET ORAL at 04:52

## 2025-06-04 RX ADMIN — SODIUM CHLORIDE, POTASSIUM CHLORIDE, SODIUM LACTATE AND CALCIUM CHLORIDE: 600; 310; 30; 20 INJECTION, SOLUTION INTRAVENOUS at 07:45

## 2025-06-04 RX ADMIN — FENTANYL CITRATE 25 MCG: 50 INJECTION, SOLUTION INTRAMUSCULAR; INTRAVENOUS at 07:50

## 2025-06-04 RX ADMIN — MOMETASONE FUROATE AND FORMOTEROL FUMARATE DIHYDRATE 2 PUFF: 100; 5 AEROSOL RESPIRATORY (INHALATION) at 16:58

## 2025-06-04 RX ADMIN — Medication 1000 UNITS: at 04:52

## 2025-06-04 RX ADMIN — FENTANYL CITRATE 25 MCG: 50 INJECTION, SOLUTION INTRAMUSCULAR; INTRAVENOUS at 07:47

## 2025-06-04 RX ADMIN — ACETAMINOPHEN 1000 MG: 500 TABLET, FILM COATED ORAL at 04:52

## 2025-06-04 RX ADMIN — OXYCODONE HYDROCHLORIDE 5 MG: 5 SOLUTION ORAL at 11:38

## 2025-06-04 RX ADMIN — CIPROFLOXACIN HYDROCHLORIDE 1 DROP: 3 SOLUTION/ DROPS OPHTHALMIC at 04:53

## 2025-06-04 RX ADMIN — ACETAMINOPHEN 1000 MG: 500 TABLET, FILM COATED ORAL at 00:06

## 2025-06-04 RX ADMIN — LIDOCAINE HYDROCHLORIDE 70 MG: 20 INJECTION, SOLUTION EPIDURAL; INFILTRATION; INTRACAUDAL; PERINEURAL at 07:53

## 2025-06-04 RX ADMIN — FENTANYL CITRATE 25 MCG: 50 INJECTION, SOLUTION INTRAMUSCULAR; INTRAVENOUS at 08:04

## 2025-06-04 RX ADMIN — SENNOSIDES AND DOCUSATE SODIUM 2 TABLET: 50; 8.6 TABLET ORAL at 16:57

## 2025-06-04 RX ADMIN — MIDAZOLAM HYDROCHLORIDE 1 MG: 1 INJECTION, SOLUTION INTRAMUSCULAR; INTRAVENOUS at 07:47

## 2025-06-04 RX ADMIN — HYDROMORPHONE HYDROCHLORIDE 0.2 MG: 1 INJECTION, SOLUTION INTRAMUSCULAR; INTRAVENOUS; SUBCUTANEOUS at 11:30

## 2025-06-04 ASSESSMENT — COGNITIVE AND FUNCTIONAL STATUS - GENERAL
MOBILITY SCORE: 24
HELP NEEDED FOR BATHING: TOTAL
DRESSING REGULAR UPPER BODY CLOTHING: TOTAL
SUGGESTED CMS G CODE MODIFIER MOBILITY: CH
DRESSING REGULAR LOWER BODY CLOTHING: TOTAL
TOILETING: TOTAL
DAILY ACTIVITIY SCORE: 6
SUGGESTED CMS G CODE MODIFIER DAILY ACTIVITY: CN
EATING MEALS: TOTAL
PERSONAL GROOMING: TOTAL

## 2025-06-04 ASSESSMENT — PAIN DESCRIPTION - PAIN TYPE
TYPE: ACUTE PAIN

## 2025-06-04 ASSESSMENT — FIBROSIS 4 INDEX: FIB4 SCORE: 2.11

## 2025-06-04 ASSESSMENT — ENCOUNTER SYMPTOMS
CHILLS: 0
EYE REDNESS: 0
FOCAL WEAKNESS: 0
SHORTNESS OF BREATH: 1
NAUSEA: 0
SPUTUM PRODUCTION: 0
VOMITING: 0
HEMOPTYSIS: 0
FALLS: 0
COUGH: 1

## 2025-06-04 NOTE — OR NURSING
1012 PT RECEIVED IN PACU, REPORT RECEIVED.   VSS, RESP SPONT, EVEN, NON LABORED.    1020 CALL TO XRAY.     1021 PT REPORTS R LATER CHEST PAIN 6/10,  SEE MAR    1130 PT REPORTS R CHEST PAIN 6/10, DISCUSS ANALGESIA. NEW ORDERS SEE MAR.VSS    1142 PT REPORTS PAIN 3/10. VSS    1200 PT MEETS CRITERIA FOR TRANSFER TO ROOM.

## 2025-06-04 NOTE — FACE TO FACE
"Face to Face Note  -  Durable Medical Equipment    Toro Wilson M.D. - NPI: [id number]  I certify that this patient is under my care and that they had a durable medical equipment(DME)face to face encounter by myself that meets the physician DME face-to-face encounter requirements with this patient on:    Date of encounter:   Patient:                    MRN:                       YOB: 2023  Bartolo Berrios [facility]  [id number]  1945     The encounter with the patient was in whole, or in part, for the following medical condition, which is the primary reason for durable medical equipment:  Other - lung cancer    I certify that, based on my findings, the following durable medical equipment is medically necessary:    Oxygen   HOME O2 Saturation Measurements:(Values must be present for Home Oxygen orders)  Room air sat at rest: 94  Room air sat with amb: 86  With liters of O2: 2, O2 sat at rest with O2: 97  With Liters of O2: 2, O2 sat with amb with O2 : 92  Is the patient mobile?: Yes  If patient feels more short of breath, they can go up to 6 liters per minute and contact healthcare provider.    Supporting Symptoms: The patient requires supplemental oxygen, as the following interventions have been tried with limited or no improvement: \"Ambulation with oximetry and \"Incentive spirometry.    My Clinical findings support the need for the above equipment due to:  Hypoxia  "

## 2025-06-04 NOTE — DISCHARGE PLANNING
Received Choice Form at: 6166  Agency/Facility Name: [facility]   Sent Referral per Choice Form at: [phone]

## 2025-06-04 NOTE — ANESTHESIA TIME REPORT
Anesthesia Start and Stop Event Times       Date Time Event    6/4/2025 0711 Ready for Procedure     0745 Anesthesia Start     1017 Anesthesia Stop          Responsible Staff  06/04/25      Name Role Begin End    Nereyda Young M.D. Anesth 0745 1017          Overtime Reason:  no overtime (within assigned shift)    Comments:

## 2025-06-04 NOTE — DISCHARGE PLANNING
Received Choice Form at: 6823  Agency/Facility Name: [facility]  Sent Referral per Choice Form at: 1400

## 2025-06-04 NOTE — PROGRESS NOTES
Pulmonary Critical Care Progress Note    Date of admission  5/28/2025    Chief Complaint/Hospital Course  80 y.o. male history of right lung adenocarcinoma status post wedge resection in 2019 with recurrence of the bone in 2021 and malignant pleural effusion diagnosed in 3/2025, pulmonary embolism diagnosed 4/2025 admitted 5/28/2025 with pleural effusion.  Patient states that he has had about 3 thoracenteses so far since his diagnosis back in 3/2025.  He is set to start chemotherapy on Thursday with Dr. Corral.   In the ER, he was noted to have a large right pleural effusion underwent thoracentesis with removal of 1.6 L of brown fluid.  Postprocedure, patient was noted to have a pneumothorax and he was placed on 100% FiO2 and had every 4 hours chest x-rays which did not show any significant change in his pneumothorax.    5/30: Took him off 100% FiO2.  On room air, his SpO2 is anywhere from 90-94%.  He states that his dyspnea is much improved after the thoracentesis.  He had pretty significant coughing before and after thoracentesis but that is also improving now. Patient and his wife are anxious about going home because he gets really symptomatic.   5/31: pleural effusion increasing in size. Patient feels same.     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 Negative for chills.   HENT:  Negative for congestion.    Eyes:  Negative for redness.   Respiratory:  Positive for cough and shortness of breath. Negative for hemoptysis and sputum production.    Cardiovascular:  Negative for chest pain.   Gastrointestinal:  Negative for nausea and vomiting.   Musculoskeletal:  Negative for falls.   Neurological:  Negative for focal weakness.        Vital Signs for last 24 hours   Temp:  [36.7 °C (98.1 °F)-37.5 °C (99.5 °F)] 37.3 °C (99.1 °F)  Pulse:   [73-91] 91  Resp:  [16-19] 16  BP: (111-157)/(62-80) 157/80  SpO2:  [94 %-98 %] 97 %    Hemodynamic parameters for last 24 hours       Respiratory Information for the last 24 hours       Physical Exam   Physical Exam  Vitals and nursing note reviewed.   Constitutional:       General: He is not in acute distress.     Appearance: Normal appearance. He is not ill-appearing.   HENT:      Head: Normocephalic.      Mouth/Throat:      Mouth: Mucous membranes are moist.   Eyes:      Conjunctiva/sclera: Conjunctivae normal.   Cardiovascular:      Rate and Rhythm: Normal rate and regular rhythm.   Pulmonary:      Effort: Pulmonary effort is normal. No respiratory distress.   Abdominal:      Palpations: Abdomen is soft.   Musculoskeletal:         General: No deformity.   Skin:     General: Skin is warm and dry.   Neurological:      Mental Status: He is alert. Mental status is at baseline.   Psychiatric:         Mood and Affect: Mood normal.         Medications  Current Medications[1]    Fluids  No intake or output data in the 24 hours ending 06/04/25 0727      Laboratory          Recent Labs     06/04/25  0022   SODIUM 141   POTASSIUM 3.7   CHLORIDE 107   CO2 25   BUN 15   CREATININE 1.25   CALCIUM 8.3*     Recent Labs     06/04/25  0022   GLUCOSE 122*     Recent Labs     06/04/25  0022   WBC 7.9     Recent Labs     06/02/25  0527 06/03/25  0604 06/03/25  1127 06/04/25  0022   RBC  --   --   --  3.55*   HEMOGLOBIN  --   --   --  10.8*   HEMATOCRIT  --   --   --  34.3*   PLATELETCT  --   --   --  266   PROTHROMBTM  --   --  13.3  --    APTT 69.5* 100.1* 64.0*  --    INR  --   --  0.98  --        Imaging  Reviewed     Assessment/Plan    Lung adenocarcinoma   Metastatic pleural effusion dx 3/2025 - c/f non-expandable MPE at this time  S/p 3 thoracentesis now with the most recent one result in this large R hydropnuemothorax. Suspicion is trapped lung from  malignancy  Heparin held since midnight  CXR this am moderate pleural  effusion  Indwelling cathter in pleural space d/w pt risks and bnefits explained  Pt has agreed to proceed         [1]   Current Facility-Administered Medications   Medication Dose Route Frequency Provider Last Rate Last Admin    lactated ringers infusion   Intravenous Continuous Dawna Mackey M.D.        [MAR Hold] naphazoline-pheniramine (Opcon-A) 0.027-0.315 % ophthalmic solution 1 Drop  1 Drop Both Eyes TID PRN Toro Wilson M.D.   1 Drop at 06/03/25 2105    [MAR Hold] acetaminophen (Tylenol) tablet 1,000 mg  1,000 mg Oral Q6HRS Georgie Juarez M.D.   1,000 mg at 06/04/25 0452    Followed by    [MAR Hold] acetaminophen (Tylenol) tablet 1,000 mg  1,000 mg Oral Q6HRS PRN Georgie Juarez M.D.        [MAR Hold] HYDROmorphone (Dilaudid) injection 2 mg  2 mg Intravenous Q3HRS PRN Georgie Juarez M.D.   2 mg at 06/03/25 1959    [MAR Hold] oxyCODONE immediate-release (Roxicodone) tablet 15 mg  15 mg Oral Q3HRS PRN Georgie Juarez M.D.        [MAR Hold] ciprofloxacin (Ciloxin) 0.3 % ophthalmic solution 1 Drop  1 Drop Right Eye Q4HRS WHILE AWAKE Georgie Juarez M.D.   1 Drop at 06/04/25 0453    [MAR Hold] oxyCODONE CR (OxyCONTIN) tablet 20 mg  20 mg Oral Q12HRS Georgie Juarez M.D.   20 mg at 06/04/25 0453    [MAR Hold] ketorolac (Toradol) 15 MG/ML injection 15 mg  15 mg Intravenous Q6HRS Dawna Mackey M.D.   15 mg at 06/04/25 0454    [MAR Hold] lidocaine (Asperflex) 4 % patch 1 Patch  1 Patch Transdermal Q24HR Dawna Mackey M.D.   1 Patch at 06/03/25 2000    [MAR Hold] benzonatate (Tessalon) capsule 100 mg  100 mg Oral TID PRN Mitesh Gutierrez D.O.   100 mg at 06/01/25 2133    [MAR Hold] carboxymethylcellulose (Refresh Tears) 0.5 % ophthalmic drops 1 Drop  1 Drop Right Eye PRN Mitesh Gutierrez D.O.   1 Drop at 06/02/25 0832    [MAR Hold] traZODone (Desyrel) tablet 100 mg  100 mg Oral QHS Georgie Juarez M.D.   100 mg at 06/03/25 2104    [MAR Hold] cyanocobalamin (Vitamin  B-12) injection 1,000 mcg  1,000 mcg Intramuscular Q30 DAYS Georgie Juarez M.D.   1,000 mcg at 05/29/25 1500    [MAR Hold] amLODIPine (Norvasc) tablet 5 mg  5 mg Oral QAM Eliazar Bailey M.D.   5 mg at 06/04/25 0452    [MAR Hold] albuterol inhaler 2 Puff  2 Puff Inhalation Q4H PRN (RT) Eliazar Bailey M.D.        [MAR Hold] atorvastatin (Lipitor) tablet 20 mg  20 mg Oral Q EVENING Eliazar Bailey M.D.   20 mg at 06/03/25 1838    [MAR Hold] hydrocortisone (Cortef) tablet 20 mg  20 mg Oral BID Eliazar Bailey M.D.   20 mg at 06/04/25 0452    [MAR Hold] liothyronine (Cytomel) tablet 10 mcg  10 mcg Oral DAILY Eliazar Bailey M.D.   10 mcg at 06/04/25 0452    [MAR Hold] omeprazole (PriLOSEC) capsule 40 mg  40 mg Oral DAILY Eliazar Bailey M.D.   40 mg at 06/03/25 0823    [MAR Hold] tamsulosin (Flomax) capsule 0.4 mg  0.4 mg Oral BID Eliazar Bailey M.D.   0.4 mg at 06/04/25 0453    [MAR Hold] levothyroxine (Synthroid) tablet 125 mcg  125 mcg Oral AM ES Eliazar Bailey M.D.   125 mcg at 06/04/25 0453    [MAR Hold] vitamin D3 (Cholecalciferol) tablet 1,000 Units  1,000 Units Oral DAILY Eliazar Bailey M.D.   1,000 Units at 06/04/25 0452    [MAR Hold] Respiratory Therapy Consult   Nebulization Continuous RT Eliazar Bailey M.D.        [MAR Hold] senna-docusate (Pericolace Or Senokot S) 8.6-50 MG per tablet 2 Tablet  2 Tablet Oral Q EVENING Eliazar Bailey M.D.   2 Tablet at 06/03/25 1838    And    [MAR Hold] polyethylene glycol/lytes (Miralax) Packet 1 Packet  1 Packet Oral QDAY PRN Eliazar Bailey M.D.   1 Packet at 05/31/25 0904    [MAR Hold] labetalol (Normodyne/Trandate) injection 10 mg  10 mg Intravenous Q4HRS PRN Eliazar Bailey M.D.        [MAR Hold] ondansetron (Zofran) syringe/vial injection 4 mg  4 mg Intravenous Q4HRS PRN Eliazar Bailey M.D.        Or    [MAR Hold] ondansetron (Zofran ODT) dispertab 4 mg  4 mg Oral Q4HRS PRN Eliazar Bailey M.D.        [MAR Hold] mometasone-formoterol (Dulera) 100-5 MCG/ACT inhaler 2 Puff   2 Puff Inhalation BID Eliazar Bailey M.D.   2 Puff at 06/04/25 0940

## 2025-06-04 NOTE — ANESTHESIA POSTPROCEDURE EVALUATION
Patient: Bartolo Berrios [facility]    Procedure Summary       Date: 06/04/25 Room / Location:  OR  / SURGERY [facility]    Anesthesia Start: 0745 Anesthesia Stop: 1017    Procedure: THORACENTESIS, WITH PLEURAL CATHETER INSERTION, WITH IMAGING GUIDANCE (Chest) Diagnosis: (malignant pleural effusion)    Surgeons: Soha Shearer M.D. Responsible Provider: Nereyda Young M.D.    Anesthesia Type: MAC, general ASA Status: 3            Final Anesthesia Type: MAC, general  Last vitals  BP   Blood Pressure : (!) 149/79    Temp   36 °C (96.8 °F)    Pulse   76   Resp   14    SpO2   100 %      Anesthesia Post Evaluation    Patient location during evaluation: PACU  Patient participation: complete - patient participated  Level of consciousness: awake and alert    Airway patency: patent  Anesthetic complications: no  Cardiovascular status: hemodynamically stable  Respiratory status: acceptable  Hydration status: euvolemic    PONV: none          There were no known notable events for this encounter.     Nurse Pain Score: 6 (NPRS)

## 2025-06-04 NOTE — DISCHARGE SUMMARY
Discharge Summary    CHIEF COMPLAINT ON ADMISSION  Chief Complaint   Patient presents with    Shortness of Breath     Known malignant pleural effusion on right. Drained 1mo ago, believes to need again.     Sent by MD     His Onc.     Cough       Reason for Admission  Chest Pressure; Cough; Shortness o*     Admission Date  5/28/2025    CODE STATUS  Full Code    HPI & HOSPITAL COURSE  This is a 80 y.o. male here with shortness of breath     [facility] has a history of stage IV lung cancer and recurrent malignant pleural effusion.  He presented to the emergency room on 5/28/2025 with shortness of breath, chest pain and cough.  Thoracentesis was performed on 5/28/2025 with 1.6 L removed.      After the procedure the, the patient did experience an improvement in his symptoms with decreased discomfort and improved breathing.  Postoperative imaging did show evidence of a pneumothorax.  After further review this was felt to be related to a trapped lung rather than a perforation of the lung.    The palate patient developed recurrence of symptoms with discomfort and difficulty breathing.  Serial imaging showed recurrence of pleural effusion.  After additional discussion, decision was made to proceed with a Pleurx catheter.  Pleurx catheter was placed on 6/4/2025.  The patient has been educated on management of the catheter.    Patient can be discharged home today, he has follow-up with oncology tomorrow    He will be followed by home health and the family is looking into private caregivers    With guards to the patient's pain.  The patient has relatively severe discomfort related to malignancy.  During this hospital stay he was started on long-acting oxycodone and required 1 mg of Dilaudid essentially every 6 hours to control the pain.  He does report that his discomfort is improved after placement of Pleurx catheter.  I have prescribed 7 days of long-acting MS Contin with p.o. Dilaudid for breakthrough pain. Future  pain medication will need to be prescribed either by his primary care physician or oncology    Resume Eliquis tonight for history of bilateral pulmonary embolism    Therefore, he is discharged in fair and stable condition to home with organized home healthcare and close outpatient follow-up.    The patient met 2-midnight criteria for an inpatient stay at the time of discharge.    Discharge Date  6/4/2025    FOLLOW UP ITEMS POST DISCHARGE  Follow-up with PCP, follow-up with oncology    DISCHARGE DIAGNOSES  Principal Problem:    Malignant pleural effusion (POA: Yes)  Active Problems:    Chronic pain (POA: Yes)    Adenocarcinoma of lung (HCC), stage 4 ([facility] Oct. 2021) (POA: Yes)    Bilateral pulmonary embolism (HCC) (POA: Yes)    Acute hypoxemic respiratory failure (HCC) (POA: Yes)    Acute bacterial conjunctivitis of right eye (POA: Yes)    Hypothyroid (POA: Yes)    Chronic kidney disease, stage 3a (POA: Yes)    Obstructive sleep apnea (POA: Yes)    GERD (gastroesophageal reflux disease) (POA: Yes)    Hyperlipidemia (POA: Yes)    Adrenal insufficiency (HCC), secondary (POA: Yes)    BPH with obstruction/lower urinary tract symptoms (POA: Yes)    Brain lesion (POA: Yes)    Hypertension (POA: Yes)    Macrocytic anemia (POA: Yes)  Resolved Problems:    Pneumothorax (POA: No)    Demand ischemia (HCC) (POA: Yes)      FOLLOW UP  Future Appointments   Date Time Provider Department Center   6/6/2025  8:00 AM [facility] OPIR US 2 SMSP LANA Dotson     No follow-up provider specified.    MEDICATIONS ON DISCHARGE     Medication List        START taking these medications        Instructions   ciprofloxacin 0.3 % Soln  Commonly known as: Ciloxin   Administer 1 Drop into both eyes every 4 hours for 5 days.  Dose: 1 Drop     HYDROmorphone 2 MG Tabs  Commonly known as: Dilaudid   Take 1-2 Tablets by mouth every four hours as needed for Severe Pain for up to 7 days.  Dose: 2-4 mg     morphine ER 15 MG Tbcr tablet  Commonly known as: Ms  "Contin   Take 1 Tablet by mouth every 12 hours for 7 days.  Dose: 15 mg            CHANGE how you take these medications        Instructions   atorvastatin 20 MG Tabs  What changed: when to take this  Commonly known as: Lipitor   Take 1 Tablet by mouth at bedtime.  Dose: 20 mg            CONTINUE taking these medications        Instructions   acetaminophen 500 MG Tabs  Commonly known as: Tylenol   Take 1,000 mg by mouth every 6 hours as needed. Indications: Pain  Dose: 1,000 mg     albuterol 108 (90 Base) MCG/ACT Aers inhalation aerosol   Doctor's comments: Give albuterol that is patient or insurance preference please  Inhale 2 Puffs every four hours as needed for Shortness of Breath.  Dose: 2 Puff     amLODIPine 5 MG Tabs  Commonly known as: Norvasc   Take 1 Tablet by mouth every morning. COURTESY REFILL ONLY.  PLEASE CALL [phone] AND SCHEDULE A FOLLOW UP APPOINTMENT FOR FURTHER REFILLS. THANK YOU!  Dose: 5 mg     apixaban 5mg Tabs  Start taking on: April 10, 2025  Commonly known as: Eliquis   Take 2 Tablets by mouth 2 times a day for 6 days, THEN 1 Tablet 2 times a day for 90 days.     B-12 SL   Place 1 Tablet under the tongue every day.  Dose: 1 Tablet     BD Disp Needles 25G X 5/8\" Misc  Generic drug: NEEDLE (DISP) 25 G   Use as directed for testosterone injections every 7 days     folic acid 1 MG Tabs  Commonly known as: Folvite   Take 1 mg by mouth every day.  Dose: 1 mg     hydrocortisone 10 MG Tabs  Commonly known as: Cortef   Take 20 mg by mouth 2 times a day. Pt is taken 2 tablets (20MG) BID  Dose: 20 mg     liothyronine 5 MCG Tabs  Commonly known as: Cytomel   Take 10 mcg by mouth every day.  Dose: 10 mcg     MAGNESIUM PO   Take 2 Capsules by mouth every evening. (OTC)  Dose: 2 Capsule     omeprazole 40 MG delayed-release capsule  Commonly known as: PriLOSEC   Take 1 Capsule by mouth every day.  Dose: 40 mg     potassium chloride 8 MEQ tablet  Commonly known as: Klor-Con   Take 2 Tablets by mouth " every day.  Dose: 16 mEq     silodosin 8 MG Caps capsule  Commonly known as: Rapaflo   Take 8 mg by mouth 1/2 hour after breakfast.  Dose: 8 mg     Symbicort 80-4.5 MCG/ACT Aero  Generic drug: budesonide-formoterol   INHALE 2 PUFFS BY MOUTH TWICE A DAY  Dose: 2 Puff     tadalafil 20 MG tablet   Take 20 mg by mouth 1 time a day as needed for Erectile Dysfunction. for erectile dysfunction  Dose: 20 mg     Tagrisso 80 MG Tabs  Generic drug: Osimertinib Mesylate   Take 80 mg by mouth every day.  Dose: 80 mg     tamsulosin 0.4 MG capsule  Commonly known as: Flomax   Take 2 Capsules by mouth every day.  Dose: 0.8 mg     testosterone cypionate 200 MG/ML injection  Commonly known as: Depo-Testosterone   Inject 124 mg into the shoulder, thigh, or buttocks every 7 days. On Sunday, pt injects 0.62ML  Dose: 124 mg     Tirosint 125 MCG Caps  Generic drug: Levothyroxine Sodium   Take 125 mcg by mouth every morning on an empty stomach.  Dose: 125 mcg     traZODone 100 MG Tabs  Commonly known as: Desyrel   Take 1 Tablet by mouth at bedtime as needed for Sleep.  Dose: 100 mg     VITAMIN C PO   Take 1 Tablet by mouth see administration instructions. Pt takes sporidially  Dose: 1 Tablet     vitamin D 1000 UNIT Tabs  Commonly known as: Cholecalciferol   Take 1 Tablet by mouth every day.  Dose: 1,000 Units            ASK your doctor about these medications        Instructions   benzonatate 100 MG Caps  Commonly known as: Tessalon   Take 1 Capsule by mouth 3 times a day as needed for Cough.  Dose: 100 mg     senna-docusate 8.6-50 MG Tabs  Commonly known as: Pericolace Or Senokot S   Take 2 Tablets by mouth every evening.  Dose: 2 Tablet              Allergies  Allergies[1]    DIET  Orders Placed This Encounter   Procedures    Diet NPO Restrict to: Sips with Medications     Standing Status:   Standing     Number of Occurrences:   8     Diet NPO Restrict to::   Sips with Medications [3]       ACTIVITY  As tolerated.  Weight bearing as  tolerated    CONSULTATIONS  Pulmonary    PROCEDURES    CT scan of chest 5/28/2025:  IMPRESSION:     1.  No evidence of pulmonary embolism.  2.  Large right pleural effusion is increased from prior.  3.  Trace left pleural effusion.    Pleurx catheter placement 6/4/2025:  Procedure Note: Intrapleural Catheter Placement for Malignant Pleural Effusion    Date of Procedure: 6/4/2025    Procedure Performed By: Dawna Mackey MD  Indication: Symptomatic malignant pleural effusion (e.g., dyspnea, recurrent effusion)  Consent: Informed consent was obtained. The procedure, risks (bleeding, infection, pneumothorax), benefits, and alternatives were discussed in detail with the patient, who expressed understanding and agreed to proceed.    Procedure Summary:    The patient was positioned in the semi - recumbant position with arms supported.     Time-out was performed to confirm correct patient, procedure, and site (laterality: Right     Sedation: See anesthesia note    The hemithorax was prepped and draped in a sterile fashion.    Local anesthesia was achieved using 1% lidocaine without epinephrine to the skin, subcutaneous tissue, and periosteum.    Under real-time ultrasound guidance and flouroscopy, the appropriate intercostal space ([e.g., 6th or 7th intercostal space, midclavical line]) was identified.    A small incision was made, and a blunt dissection was performed down to the pleural space.    A guidewire was inserted using the Seldinger technique and the tract was dilated.    A tunneled intrapleural catheter (PleurX®) was advanced over the wire and positioned with the fenestrated portion in the pleural space. Position noted on fluoroscopy    The catheter was tunneled subcutaneously and the exit site was created [~5-10 cm from insertion site].    The catheter was secured with sutures and a sterile dressing applied.    1300 volume of pleural fluid drained    Pleural fluid was sent for cytology and chemistry (as  indicated).    A post-procedure chest X-ray was obtained to evaluate for  catheter placement and as expected the trapped lung was noted similar to 5/28/2025 cxr with cathter in place    Estimated Blood Loss: < 2 cc  Complications: None  Disposition: Patient tolerated the procedure well and returned to floor in stable condition.    Plan:    Continue intermittent drainage of pleural effusion at home       Bedside teaching done with family    Monitor for signs of infection, catheter dysfunction, or re-accumulation.    Follow-up with oncology/pulmonology as scheduled. Pulmonary to call     LABORATORY  Lab Results   Component Value Date    SODIUM 141 06/04/2025    POTASSIUM 3.7 06/04/2025    CHLORIDE 107 06/04/2025    CO2 25 06/04/2025    GLUCOSE 122 (H) 06/04/2025    BUN 15 06/04/2025    CREATININE 1.25 06/04/2025        Lab Results   Component Value Date    WBC 7.9 06/04/2025    HEMOGLOBIN 10.8 (L) 06/04/2025    HEMATOCRIT 34.3 (L) 06/04/2025    PLATELETCT 266 06/04/2025        Total time of the discharge process exceeds 38 minutes.       [1]   Allergies  Allergen Reactions    Gramineae Pollens Itching and Shortness of Breath     Itchy eyes, red face    Cat Hair Extract Hives and Itching    Horse Allergy Hives    Other Environmental     Pollen Extract Runny Nose and Itching     .

## 2025-06-04 NOTE — DISCHARGE PLANNING
Case Management Discharge Planning    Admission Date: 5/28/2025  GMLOS: 4.8  ALOS: 7    6-Clicks ADL Score: 24  6-Clicks Mobility Score: 24      Anticipated Discharge Dispo: Discharge Disposition: Discharged to home/self care (01)    DME Needed: Yes    DME Ordered: Yes    Action(s) Taken:     Spoke to pt's wife Mariella, daughter Vivian, and friend Zuleima. Choice from completed for [facility]  and faxed to DPA. Caregiver list also provided to family as requested.    RNCM requested updated O2 order as [facility] requires order placed within 48 hours of DC.    1530: Per DPA, [facility]  declined due to Pleurex catheter.    1541: Spoke to pt's wife Mariella and daughter Vivian via phone call. Choice from completed for 1)[facility] 2)[facility] and faxed to DPA.    Escalations Completed: Long Length of Stay Committee     Medically Clear: No    Next Steps:     Barriers to Discharge: Medical clearance

## 2025-06-04 NOTE — PROGRESS NOTES
Telemetry Shift Summary     Rhythm: SR with a BBB  Rate: 61-95  Measurements: 0.20/0.14/0.37  Ectopy (reported by Monitor Tech): r/o/fPAC, rPVC     Normal Values  Rhythm: Sinus  HR:   Measurements: 0.12-0.20/0.06-0.10/0.30-0.52

## 2025-06-04 NOTE — PROCEDURES
Procedure Note: Intrapleural Catheter Placement for Malignant Pleural Effusion    Date of Procedure: 6/4/2025    Procedure Performed By: Dawna Mackey MD  Indication: Symptomatic malignant pleural effusion (e.g., dyspnea, recurrent effusion)  Consent: Informed consent was obtained. The procedure, risks (bleeding, infection, pneumothorax), benefits, and alternatives were discussed in detail with the patient, who expressed understanding and agreed to proceed.    Procedure Summary:    The patient was positioned in the semi - recumbant position with arms supported.     Time-out was performed to confirm correct patient, procedure, and site (laterality: Right    Sedation: See anesthesia note    The hemithorax was prepped and draped in a sterile fashion.    Local anesthesia was achieved using 1% lidocaine without epinephrine to the skin, subcutaneous tissue, and periosteum.    Under real-time ultrasound guidance and flouroscopy, the appropriate intercostal space ([e.g., 6th or 7th intercostal space, midclavical line]) was identified.    A small incision was made, and a blunt dissection was performed down to the pleural space.    A guidewire was inserted using the Seldinger technique and the tract was dilated.    A tunneled intrapleural catheter (PleurX®) was advanced over the wire and positioned with the fenestrated portion in the pleural space. Position noted on fluoroscopy    The catheter was tunneled subcutaneously and the exit site was created [~5-10 cm from insertion site].    The catheter was secured with sutures and a sterile dressing applied.    1300 volume of pleural fluid drained    Pleural fluid was sent for cytology and chemistry (as indicated).    A post-procedure chest X-ray was obtained to evaluate for  catheter placement and as expected the trapped lung was noted similar to 5/28/2025 cxr with cathter in place    Estimated Blood Loss: < 2 cc  Complications: None  Disposition: Patient tolerated the  procedure well and returned to floor in stable condition.    Plan:    Continue intermittent drainage of pleural effusion at home      Bedside teaching done with family    Monitor for signs of infection, catheter dysfunction, or re-accumulation.    Follow-up with oncology/pulmonology as scheduled. Pulmonary to call

## 2025-06-04 NOTE — CARE PLAN
The patient is Stable - Low risk of patient condition declining or worsening    Shift Goals  Clinical Goals: heparin gtt, pain management, monitor O2, monitor vitals and labs  Patient Goals: sleep  Family Goals: Updates    Progress made toward(s) clinical / shift goals:        Problem: Knowledge Deficit - Standard  Goal: Patient and family/care givers will demonstrate understanding of plan of care, disease process/condition, diagnostic tests and medications  Description: Target End Date:  1-3 days or as soon as patient condition allowsDocument in Patient Education1.  Patient and family/caregiver oriented to unit, equipment, visitation policy and means for communicating concern2.  Complete/review Learning Assessment3.  Assess knowledge level of disease process/condition, treatment plan, diagnostic tests and medications4.  Explain disease process/condition, treatment plan, diagnostic tests and medications  Outcome: Progressing     Problem: Pain - Standard  Goal: Alleviation of pain or a reduction in pain to the patient’s comfort goal  Description: Target End Date:  Prior to discharge or change in level of careDocument on Vitals flowsheet1.  Document pain using the appropriate pain scale per order or unit policy2.  Educate and implement non-pharmacologic comfort measures (i.e. relaxation, distraction, massage, cold/heat therapy, etc.)3.  Pain management medications as ordered4.  Reassess pain after pain med administration per policy5.  If opiods administered assess patient's response to pain medication is appropriate per POSS sedation scale6.  Follow pain management plan developed in collaboration with patient and interdisciplinary team (including palliative care or pain specialists if applicable)  Outcome: Progressing     Problem: Fall Risk  Goal: Patient will remain free from falls  Description: Target End Date:  Prior to discharge or change in level of careDocument interventions on the [facility] Fall Risk  Assessment1.  Assess for fall risk factors2.  Implement fall precautions  Outcome: Progressing       Patient is not progressing towards the following goals:

## 2025-06-04 NOTE — ANESTHESIA PREPROCEDURE EVALUATION
Case: [id number] Date/Time: 06/04/25 0735    Procedure: THORACENTESIS, WITH PLEURAL CATHETER INSERTION, WITH IMAGING GUIDANCE (Chest)    Anesthesia type: MAC    Location:  OR  / SURGERY [facility]    Surgeons: Soha Shearer M.D.            80M h/o HTN, HL, h/o cardiac ablation, h/o CVA (subcortical on MRI April 2019), GERD, CKD3, hypothyroidism, brain lesions, adrenal insufficiency    metastatic lung ca (s/p wedge resection 2019, malignant pleural effusion, PTX, acute hypoxemic respiratory failure requiring O2), BINDU, b/l PE on heparin drip, chronic ca pain    He presented to the emergency room on 5/28/2025 with shortness of breath and cough. Thoracentesis was performed on 5/28/2025 with 1.6 L removed. Patient did develop postoperative pneumothorax. Pulmonary evaluated the patient and suspects that pneumothorax is a result of trapped lung. Pleurx catheter is planned     Multimodal pain control,   Acetaminophen, toradol  Oxycodone PO 20mg  intermittent IV dilaudid 2mg q3h    On heparin drip, 2L O2      Relevant Problems   ANESTHESIA   (positive) Obstructive sleep apnea      PULMONARY   (positive) Pneumonia      CARDIAC   (positive) Atherosclerosis of both carotid arteries, mild   (positive) Bilateral pulmonary embolism (HCC)   (positive) Hypertension   (positive) Premature atrial contractions      GI   (positive) GERD (gastroesophageal reflux disease)         (positive) Chronic kidney disease, stage 3a   (positive) Left renal atrophy      ENDO   (positive) Hypothyroid      Other   (positive) Acute hypoxemic respiratory failure (HCC)   (positive) Adenocarcinoma of lung (HCC), stage 4 ([facility] Oct. 2021)   (positive) Adrenal insufficiency (HCC), secondary   (positive) H/O cardiac radiofrequency ablation   (positive) History of stroke, subcortical infarct on MRI April 2019   (positive) Hyperlipidemia   (positive) Malignant pleural effusion   (positive) Pneumothorax   (positive) Reactive airway disease with  acute exacerbation       Physical Exam    Airway   Mallampati: II  TM distance: >3 FB  Neck ROM: full       Cardiovascular - normal exam  Rhythm: regular  Rate: normal    (-) murmur     Dental - normal exam           Pulmonary (+) decreased breath sounds     Abdominal    Neurological - normal exam                   Anesthesia Plan    ASA 3   ASA physical status 3 criteria: respiratory insufficiency, a thrombophilic disease requiring anticoagulation and CVA or TIA - history (> 3 months)    Plan - MAC               Induction: intravenous    Postoperative Plan: Postoperative administration of opioids is intended.    Pertinent diagnostic labs and testing reviewed    Informed Consent:    Anesthetic plan and risks discussed with patient.    Use of blood products discussed with: patient whom consented to blood products.

## 2025-06-04 NOTE — CARE PLAN
The patient is Stable - Low risk of patient condition declining or worsening    Shift Goals  Clinical Goals: Post ops  Patient Goals: Go home  Family Goals: Updates    Progress made toward(s) clinical / shift goals:    Problem: Knowledge Deficit - Standard  Goal: Patient and family/care givers will demonstrate understanding of plan of care, disease process/condition, diagnostic tests and medications  Description: Target End Date:  1-3 days or as soon as patient condition allowsDocument in Patient Education1.  Patient and family/caregiver oriented to unit, equipment, visitation policy and means for communicating concern2.  Complete/review Learning Assessment3.  Assess knowledge level of disease process/condition, treatment plan, diagnostic tests and medications4.  Explain disease process/condition, treatment plan, diagnostic tests and medications  Outcome: Progressing     Problem: Fall Risk  Goal: Patient will remain free from falls  Description: Target End Date:  Prior to discharge or change in level of careDocument interventions on the [facility] Fall Risk Assessment1.  Assess for fall risk factors2.  Implement fall precautions  Outcome: Progressing       Patient is not progressing towards the following goals:

## 2025-06-05 ENCOUNTER — TELEPHONE (OUTPATIENT)
Dept: HOME HEALTH SERVICES | Facility: HOME HEALTHCARE | Age: 80
End: 2025-06-05
Payer: MEDICARE

## 2025-06-05 DIAGNOSIS — J91.0 MALIGNANT PLEURAL EFFUSION: Primary | ICD-10-CM

## 2025-06-05 NOTE — DISCHARGE PLANNING
ATTN: Case Management  RE: Referral for Home Health    As of 6/5/2025, we have accepted the Home Health referral for the patient listed above.    A [facility]  will contact the patient within 48 hours. If you have any questions or concerns regarding the patient’s transition to Home Health, please do not hesitate to contact us at [phone].      We look forward to collaborating with you,  [facility] Team

## 2025-06-05 NOTE — TELEPHONE ENCOUNTER
Patient's wife had to cancel visit today, as patient has an important oncology appt this afternoon

## 2025-06-06 ENCOUNTER — HOME CARE VISIT (OUTPATIENT)
Dept: HOME HEALTH SERVICES | Facility: HOME HEALTHCARE | Age: 80
End: 2025-06-06
Payer: MEDICARE

## 2025-06-06 ENCOUNTER — TELEPHONE (OUTPATIENT)
Dept: SLEEP MEDICINE | Facility: MEDICAL CENTER | Age: 80
End: 2025-06-06

## 2025-06-06 VITALS
SYSTOLIC BLOOD PRESSURE: 144 MMHG | HEIGHT: 72 IN | DIASTOLIC BLOOD PRESSURE: 63 MMHG | TEMPERATURE: 98.5 F | WEIGHT: 153 LBS | OXYGEN SATURATION: 98 % | BODY MASS INDEX: 20.72 KG/M2 | RESPIRATION RATE: 16 BRPM | HEART RATE: 83 BPM

## 2025-06-06 PROCEDURE — 665001 SOC-HOME HEALTH

## 2025-06-06 PROCEDURE — 665998 HH PPS REVENUE CREDIT

## 2025-06-06 PROCEDURE — 665999 HH PPS REVENUE DEBIT

## 2025-06-06 PROCEDURE — G0180 MD CERTIFICATION HHA PATIENT: HCPCS | Performed by: FAMILY MEDICINE

## 2025-06-06 PROCEDURE — G0299 HHS/HOSPICE OF RN EA 15 MIN: HCPCS

## 2025-06-06 SDOH — ECONOMIC STABILITY: HOUSING INSECURITY: EVIDENCE OF SMOKING MATERIAL: 0

## 2025-06-06 ASSESSMENT — FIBROSIS 4 INDEX: FIB4 SCORE: 2.11

## 2025-06-06 ASSESSMENT — ENCOUNTER SYMPTOMS
PAIN LOCATION: RUQ
PAIN LOCATION - PAIN FREQUENCY: FREQUENT
EYE DISCHARGE: 1
FATIGUE: 1
COUGH: 1
PAIN LOCATION - PAIN SEVERITY: 5/10
PAIN LOCATION - EXACERBATING FACTORS: PRESSURE, ACTIVITY
NAUSEA: PATIENT DENIES RECENT OR CURRENT NAUSEA
BOWEL PATTERN NORMAL: 1
DRY SKIN: 1
DYSPNEA ACTIVITY LEVEL: AFTER AMBULATING LESS THAN 10 FT
FATIGUES EASILY: 1
PAIN LOCATION - PAIN FREQUENCY: FREQUENT
HIGHEST PAIN SEVERITY IN PAST 24 HOURS: 9/10
PAIN LOCATION - RELIEVING FACTORS: MEDICATION
PAIN LOCATION - RELIEVING FACTORS: MEDICATION, REST
LOWEST PAIN SEVERITY IN PAST 24 HOURS: 4/10
RESPIRATORY PAIN: 1
PAIN: 1
SUBJECTIVE PAIN PROGRESSION: WAXING AND WANING
PAIN LOCATION - PAIN QUALITY: SHARP/ACHY
COUGH CHARACTERISTICS: DRY
SHORTNESS OF BREATH: 1
STOOL FREQUENCY: DAILY
VOMITING: PATIENT DENIES RECENT EPISODES OF EMESIS
PAIN SEVERITY GOAL: 3/10
LAST BOWEL MOVEMENT: 67362
COUGH CHARACTERISTICS: STRONG
COUGH CHARACTERISTICS: NON-PRODUCTIVE
DEBILITATING PAIN: 1
PAIN LOCATION - PAIN SEVERITY: 5/10

## 2025-06-06 ASSESSMENT — ACTIVITIES OF DAILY LIVING (ADL): OASIS_M1830: 03

## 2025-06-07 DIAGNOSIS — C34.90 ADENOCARCINOMA OF LUNG, UNSPECIFIED LATERALITY (HCC): Primary | ICD-10-CM

## 2025-06-07 DIAGNOSIS — G89.3 CANCER RELATED PAIN: ICD-10-CM

## 2025-06-07 PROCEDURE — 665999 HH PPS REVENUE DEBIT

## 2025-06-07 PROCEDURE — 665998 HH PPS REVENUE CREDIT

## 2025-06-07 NOTE — TELEPHONE ENCOUNTER
Received call from wife regarding pain  They inadvertently stopped morphine and he is having breakthrough pain  Reviewed the prescriptions as written for morphine ER scheduled, dilaudid breakthrough  ER precautions provided  All questions answered    __________  Alex Kohler MD  Pulmonary and Critical Care Medicine  [facility]

## 2025-06-08 ENCOUNTER — HOME CARE VISIT (OUTPATIENT)
Dept: HOME HEALTH SERVICES | Facility: HOME HEALTHCARE | Age: 80
End: 2025-06-08
Payer: MEDICARE

## 2025-06-08 PROCEDURE — 665998 HH PPS REVENUE CREDIT

## 2025-06-08 PROCEDURE — 665999 HH PPS REVENUE DEBIT

## 2025-06-08 NOTE — Clinical Note
REFERRAL APPROVAL NOTICE         Sent on June 7, 2025                   [facility]  [address] [place] [address]                   Dear Mr. Forrest,    After a careful review of the medical information and benefit coverage, [facility] has processed your referral. See below for additional details.    If applicable, you must be actively enrolled with your insurance for coverage of the authorized service. If you have any questions regarding your coverage, please contact your insurance directly.    REFERRAL INFORMATION   Referral #:  [id number]  Referred-To Department    Referred-By Provider:  Hematology Oncology    Lesly Munoz M.D.   Oncology [facility]      [address]  [address]  [place] [address]  [phone] [address]  [address]  [place] [id number] [phone]    Referral Start Date:  06/07/2025  Referral End Date:   06/07/2026           SCHEDULING  If you do not already have an appointment, please call [phone] to make an appointment.   MORE INFORMATION  As a reminder, [facility] ownership has changed, meaning this location is now owned and operated by [facility]. As such, we want to clarify that our patients should expect to receive two separate bills for the services received at [facility] - one representing the [facility] facility fees as the owner of the establishment, and the other to represent the physician's services and subsequent fees. You can speak with your insurance carrier for a pricing estimate by calling the customer service number on the back of your card and ask about charges for a hospital outpatient visit.  If you do not already have a [employer] account, sign up at: [email or web address]  You can access your medical information, make appointments, see lab results, billing information, and more.  If you have questions regarding this referral, please contact  the [facility] Referrals department at:             [phone].  Monday - Friday 7:30AM - 5:00PM.      Sincerely,  [facility]

## 2025-06-09 ENCOUNTER — PATIENT MESSAGE (OUTPATIENT)
Dept: SLEEP MEDICINE | Facility: MEDICAL CENTER | Age: 80
End: 2025-06-09
Payer: MEDICARE

## 2025-06-09 ENCOUNTER — HOME CARE VISIT (OUTPATIENT)
Dept: HOME HEALTH SERVICES | Facility: HOME HEALTHCARE | Age: 80
End: 2025-06-09
Payer: MEDICARE

## 2025-06-09 ENCOUNTER — DOCUMENTATION (OUTPATIENT)
Dept: MEDICAL GROUP | Facility: PHYSICIAN GROUP | Age: 80
End: 2025-06-09
Payer: MEDICARE

## 2025-06-09 ENCOUNTER — HOSPITAL ENCOUNTER (OUTPATIENT)
Facility: MEDICAL CENTER | Age: 80
End: 2025-06-09
Attending: NURSE PRACTITIONER
Payer: MEDICARE

## 2025-06-09 VITALS
OXYGEN SATURATION: 95 % | SYSTOLIC BLOOD PRESSURE: 140 MMHG | DIASTOLIC BLOOD PRESSURE: 76 MMHG | HEART RATE: 82 BPM | RESPIRATION RATE: 16 BRPM | TEMPERATURE: 98.2 F

## 2025-06-09 PROCEDURE — 87340 HEPATITIS B SURFACE AG IA: CPT | Mod: GA

## 2025-06-09 PROCEDURE — G0299 HHS/HOSPICE OF RN EA 15 MIN: HCPCS

## 2025-06-09 PROCEDURE — 86704 HEP B CORE ANTIBODY TOTAL: CPT | Mod: GA

## 2025-06-09 PROCEDURE — 86706 HEP B SURFACE ANTIBODY: CPT | Mod: GA

## 2025-06-09 PROCEDURE — 665998 HH PPS REVENUE CREDIT

## 2025-06-09 PROCEDURE — 86803 HEPATITIS C AB TEST: CPT

## 2025-06-09 PROCEDURE — 665999 HH PPS REVENUE DEBIT

## 2025-06-09 ASSESSMENT — ENCOUNTER SYMPTOMS
HIGHEST PAIN SEVERITY IN PAST 24 HOURS: 5/10
NAUSEA: DENIES
SUBJECTIVE PAIN PROGRESSION: UNCHANGED
PAIN LOCATION: RUQ
VOMITING: DENIES
PAIN SEVERITY GOAL: 0/10
PAIN: 1
PAIN LOCATION - PAIN DURATION: CHRONIC
PAIN LOCATION - PAIN SEVERITY: 5/10
BOWEL PATTERN NORMAL: 1
PAIN LOCATION - RELIEVING FACTORS: REST
PAIN LOCATION - PAIN QUALITY: ACHE
PAIN LOCATION - PAIN FREQUENCY: INTERMITTENT
LOWEST PAIN SEVERITY IN PAST 24 HOURS: 0/10

## 2025-06-09 NOTE — PROGRESS NOTES
Medication chart review for [facility] services    Received referral from [facility].   Medications reviewed  compared with discharge summary if available.  Discharge summary date, if applicable:   6/4/25    Current medication list per [facility]     Medication list one, patient is currently taking    Current Outpatient Medications:     losartan, 100 mg, Oral, DAILY    morphine ER, 15 mg, Oral, Q12HRS    HYDROmorphone, 2-4 mg, Oral, Q4HRS PRN    ciprofloxacin, 1 Drop, Both Eyes, Q4HRS    atorvastatin, 20 mg, Oral, QHS    Symbicort, 2 Puff, Inhalation, BID    amLODIPine, 5 mg, Oral, QAM    albuterol, 2 Puff, Inhalation, Q4HRS PRN    traZODone, 100 mg, Oral, HS PRN (Patient not taking: Reported on 6/6/2025)    vitamin D3, 1,000 Units, Oral, DAILY    senna-docusate, 2 Tablet, Oral, Q EVENING (Patient not taking: Reported on 5/28/2025)    apixaban, Take 2 Tablets by mouth 2 times a day for 6 days, THEN 1 Tablet 2 times a day for 90 days.    benzonatate, 100 mg, Oral, TID PRN (Patient not taking: Reported on 5/28/2025)    Ascorbic Acid (VITAMIN C PO), 1 Tablet, Oral, See Admin Instructions (Patient not taking: Reported on 5/28/2025)    folic acid, 1 mg, Oral, DAILY    hydrocortisone, 20 mg, Oral, BID    MAGNESIUM PO, 2 Capsule, Oral, Q EVENING    acetaminophen, 1,000 mg, Oral, Q6HRS PRN    omeprazole, 40 mg, Oral, DAILY    tamsulosin, 0.8 mg, Oral, DAILY (Patient taking differently: 0.4 mg, Oral, 2 TIMES DAILY)    Cyanocobalamin (B-12 SL), 1 Tablet, Sublingual, DAILY (Patient not taking: Reported on 5/28/2025)    potassium chloride, 16 mEq, Oral, QDAY    BD Disp Needles, Use as directed for testosterone injections every 7 days    liothyronine, 10 mcg, Oral, DAILY    tadalafil, 1 Tablet, Oral, QDAY PRN    testosterone cypionate, 124 mg, Intramuscular, Q7 DAYS      Medication list two, drugs that the patient has been prescribed or recommended to take by their healthcare provider on discharge summary  Medication  "List          START taking these medications         Instructions   ciprofloxacin 0.3 % Soln  Commonly known as: Ciloxin    Administer 1 Drop into both eyes every 4 hours for 5 days.  Dose: 1 Drop      HYDROmorphone 2 MG Tabs  Commonly known as: Dilaudid    Take 1-2 Tablets by mouth every four hours as needed for Severe Pain for up to 7 days.  Dose: 2-4 mg      morphine ER 15 MG Tbcr tablet  Commonly known as: Ms Contin    Take 1 Tablet by mouth every 12 hours for 7 days.  Dose: 15 mg                CHANGE how you take these medications         Instructions   atorvastatin 20 MG Tabs  What changed: when to take this  Commonly known as: Lipitor    Take 1 Tablet by mouth at bedtime.  Dose: 20 mg                CONTINUE taking these medications         Instructions   acetaminophen 500 MG Tabs  Commonly known as: Tylenol    Take 1,000 mg by mouth every 6 hours as needed. Indications: Pain  Dose: 1,000 mg      albuterol 108 (90 Base) MCG/ACT Aers inhalation aerosol    Doctor's comments: Give albuterol that is patient or insurance preference please  Inhale 2 Puffs every four hours as needed for Shortness of Breath.  Dose: 2 Puff      amLODIPine 5 MG Tabs  Commonly known as: Norvasc    Take 1 Tablet by mouth every morning. COURTESY REFILL ONLY.  PLEASE CALL [phone] AND SCHEDULE A FOLLOW UP APPOINTMENT FOR FURTHER REFILLS. THANK YOU!  Dose: 5 mg      apixaban 5mg Tabs  Start taking on: April 10, 2025  Commonly known as: Eliquis    Take 2 Tablets by mouth 2 times a day for 6 days, THEN 1 Tablet 2 times a day for 90 days.      B-12 SL    Place 1 Tablet under the tongue every day.  Dose: 1 Tablet      BD Disp Needles 25G X 5/8\" Misc  Generic drug: NEEDLE (DISP) 25 G    Use as directed for testosterone injections every 7 days      folic acid 1 MG Tabs  Commonly known as: Folvite    Take 1 mg by mouth every day.  Dose: 1 mg      hydrocortisone 10 MG Tabs  Commonly known as: Cortef    Take 20 mg by mouth 2 times a day. Pt is " taken 2 tablets (20MG) BID  Dose: 20 mg      liothyronine 5 MCG Tabs  Commonly known as: Cytomel    Take 10 mcg by mouth every day.  Dose: 10 mcg      MAGNESIUM PO    Take 2 Capsules by mouth every evening. (OTC)  Dose: 2 Capsule      omeprazole 40 MG delayed-release capsule  Commonly known as: PriLOSEC    Take 1 Capsule by mouth every day.  Dose: 40 mg      potassium chloride 8 MEQ tablet  Commonly known as: Klor-Con    Take 2 Tablets by mouth every day.  Dose: 16 mEq      silodosin 8 MG Caps capsule  Commonly known as: Rapaflo    Take 8 mg by mouth 1/2 hour after breakfast.  Dose: 8 mg      Symbicort 80-4.5 MCG/ACT Aero  Generic drug: budesonide-formoterol    INHALE 2 PUFFS BY MOUTH TWICE A DAY  Dose: 2 Puff      tadalafil 20 MG tablet    Take 20 mg by mouth 1 time a day as needed for Erectile Dysfunction. for erectile dysfunction  Dose: 20 mg      Tagrisso 80 MG Tabs  Generic drug: Osimertinib Mesylate    Take 80 mg by mouth every day.  Dose: 80 mg      tamsulosin 0.4 MG capsule  Commonly known as: Flomax    Take 2 Capsules by mouth every day.  Dose: 0.8 mg      testosterone cypionate 200 MG/ML injection  Commonly known as: Depo-Testosterone    Inject 124 mg into the shoulder, thigh, or buttocks every 7 days. On Sunday, pt injects 0.62ML  Dose: 124 mg      Tirosint 125 MCG Caps  Generic drug: Levothyroxine Sodium    Take 125 mcg by mouth every morning on an empty stomach.  Dose: 125 mcg      traZODone 100 MG Tabs  Commonly known as: Desyrel    Take 1 Tablet by mouth at bedtime as needed for Sleep.  Dose: 100 mg      VITAMIN C PO    Take 1 Tablet by mouth see administration instructions. Pt takes sporidially  Dose: 1 Tablet      vitamin D 1000 UNIT Tabs  Commonly known as: Cholecalciferol    Take 1 Tablet by mouth every day.  Dose: 1,000 Units                ASK your doctor about these medications         Instructions   benzonatate 100 MG Caps  Commonly known as: Tessalon    Take 1 Capsule by mouth 3 times a day  as needed for Cough.  Dose: 100 mg      senna-docusate 8.6-50 MG Tabs  Commonly known as: Pericolace Or Senokot S    Take 2 Tablets by mouth every evening.  Dose: 2 Tablet          Allergies[1]    Labs     Lab Results   Component Value Date/Time    SODIUM 141 06/04/2025 12:22 AM    POTASSIUM 3.7 06/04/2025 12:22 AM    CHLORIDE 107 06/04/2025 12:22 AM    CO2 25 06/04/2025 12:22 AM    GLUCOSE 122 (H) 06/04/2025 12:22 AM    BUN 15 06/04/2025 12:22 AM    CREATININE 1.25 06/04/2025 12:22 AM    BUNCREATRAT 17 11/05/2020 10:00 AM     Lab Results   Component Value Date/Time    ALKPHOSPHAT 71 05/28/2025 03:39 PM    ASTSGOT 19 05/28/2025 03:39 PM    ALTSGPT 7 05/28/2025 03:39 PM    TBILIRUBIN 0.7 05/28/2025 03:39 PM    INR 0.98 06/03/2025 11:27 AM    ALBUMIN 3.7 05/28/2025 03:39 PM        Assessment for clinically significant drug interactions, drug omissions/additions, duplicative therapies.            CC   Lesly Munoz M.D.  [address]  [place] [address]  Fax: [phone]    [facility]  Phone [phone] fax [phone]    This note was created using voice recognition software (Dragon). The accuracy of the dictation is limited by the abilities of the software. I have reviewed the note prior to signing, however some errors in grammar and context are still possible. If you have any questions related to this note please do not hesitate to contact our office.       No follow-ups on file.       [1]   Allergies  Allergen Reactions    Gramineae Pollens Itching and Shortness of Breath     Itchy eyes, red face    Cat Hair Extract Hives and Itching    Horse Allergy Hives    Other Environmental     Pollen Extract Runny Nose and Itching     .

## 2025-06-09 NOTE — CASE COMMUNICATION
On Call:  Pt's Cg Jorge L called concerned about some dry drainage near his catheter site. When asked if it was wet, or the dressing was moist/saturated, he denied that it was. He will be seen by Ericka tomorrow afternoon. When asked if He wanted an earlier appointment, he stated that 12 tomorrow is fine. Encouraged to call back if the dressing was went, leaking or saturated. Jorge L voiced understanding.  Thank you,  Madiha

## 2025-06-10 ENCOUNTER — HOME CARE VISIT (OUTPATIENT)
Dept: HOME HEALTH SERVICES | Facility: HOME HEALTHCARE | Age: 80
End: 2025-06-10
Payer: MEDICARE

## 2025-06-10 ENCOUNTER — HOSPITAL ENCOUNTER (OUTPATIENT)
Dept: RADIOLOGY | Facility: MEDICAL CENTER | Age: 80
End: 2025-06-10
Attending: INTERNAL MEDICINE
Payer: MEDICARE

## 2025-06-10 DIAGNOSIS — J91.0 MALIGNANT PLEURAL EFFUSION: ICD-10-CM

## 2025-06-10 PROCEDURE — 71046 X-RAY EXAM CHEST 2 VIEWS: CPT

## 2025-06-10 PROCEDURE — 665999 HH PPS REVENUE DEBIT

## 2025-06-10 PROCEDURE — 665998 HH PPS REVENUE CREDIT

## 2025-06-11 ENCOUNTER — HOME CARE VISIT (OUTPATIENT)
Dept: HOME HEALTH SERVICES | Facility: HOME HEALTHCARE | Age: 80
End: 2025-06-11
Payer: MEDICARE

## 2025-06-11 VITALS
HEART RATE: 82 BPM | SYSTOLIC BLOOD PRESSURE: 140 MMHG | DIASTOLIC BLOOD PRESSURE: 76 MMHG | OXYGEN SATURATION: 96 % | RESPIRATION RATE: 16 BRPM | TEMPERATURE: 98.3 F

## 2025-06-11 PROCEDURE — 665998 HH PPS REVENUE CREDIT

## 2025-06-11 PROCEDURE — G0299 HHS/HOSPICE OF RN EA 15 MIN: HCPCS

## 2025-06-11 PROCEDURE — 665999 HH PPS REVENUE DEBIT

## 2025-06-11 ASSESSMENT — ENCOUNTER SYMPTOMS
PAIN: 1
SUBJECTIVE PAIN PROGRESSION: UNCHANGED
BOWEL PATTERN NORMAL: 1
PAIN SEVERITY GOAL: 0/10
PAIN LOCATION - PAIN DURATION: CHRONIC
PAIN LOCATION - PAIN SEVERITY: 4/10
COUGH CHARACTERISTICS: NON-PRODUCTIVE
LAST BOWEL MOVEMENT: 67367
NAUSEA: DENIES
VOMITING: DENIES
LOWEST PAIN SEVERITY IN PAST 24 HOURS: 3/10
PAIN LOCATION: RUQ
COUGH: 1
PAIN LOCATION - PAIN QUALITY: ACHE
PAIN LOCATION - PAIN FREQUENCY: CONSTANT
HIGHEST PAIN SEVERITY IN PAST 24 HOURS: 9/10
PAIN LOCATION - EXACERBATING FACTORS: PRESSURE

## 2025-06-12 PROCEDURE — 665999 HH PPS REVENUE DEBIT

## 2025-06-12 PROCEDURE — 665998 HH PPS REVENUE CREDIT

## 2025-06-13 ENCOUNTER — OFFICE VISIT (OUTPATIENT)
Dept: SLEEP MEDICINE | Facility: MEDICAL CENTER | Age: 80
End: 2025-06-13
Attending: STUDENT IN AN ORGANIZED HEALTH CARE EDUCATION/TRAINING PROGRAM
Payer: MEDICARE

## 2025-06-13 ENCOUNTER — HOME CARE VISIT (OUTPATIENT)
Dept: HOME HEALTH SERVICES | Facility: HOME HEALTHCARE | Age: 80
End: 2025-06-13
Payer: MEDICARE

## 2025-06-13 VITALS
TEMPERATURE: 98.3 F | OXYGEN SATURATION: 92 % | SYSTOLIC BLOOD PRESSURE: 132 MMHG | RESPIRATION RATE: 18 BRPM | DIASTOLIC BLOOD PRESSURE: 70 MMHG | HEART RATE: 91 BPM

## 2025-06-13 VITALS
WEIGHT: 154 LBS | BODY MASS INDEX: 20.86 KG/M2 | HEART RATE: 80 BPM | SYSTOLIC BLOOD PRESSURE: 110 MMHG | RESPIRATION RATE: 24 BRPM | OXYGEN SATURATION: 98 % | HEIGHT: 72 IN | DIASTOLIC BLOOD PRESSURE: 62 MMHG

## 2025-06-13 DIAGNOSIS — J91.0 MALIGNANT PLEURAL EFFUSION: Primary | ICD-10-CM

## 2025-06-13 DIAGNOSIS — C34.90 ADENOCARCINOMA OF LUNG, UNSPECIFIED LATERALITY (HCC): ICD-10-CM

## 2025-06-13 DIAGNOSIS — J96.11 CHRONIC HYPOXIC RESPIRATORY FAILURE (HCC): ICD-10-CM

## 2025-06-13 PROCEDURE — 665999 HH PPS REVENUE DEBIT

## 2025-06-13 PROCEDURE — 665998 HH PPS REVENUE CREDIT

## 2025-06-13 PROCEDURE — 3078F DIAST BP <80 MM HG: CPT | Performed by: STUDENT IN AN ORGANIZED HEALTH CARE EDUCATION/TRAINING PROGRAM

## 2025-06-13 PROCEDURE — 3074F SYST BP LT 130 MM HG: CPT | Performed by: STUDENT IN AN ORGANIZED HEALTH CARE EDUCATION/TRAINING PROGRAM

## 2025-06-13 PROCEDURE — G0299 HHS/HOSPICE OF RN EA 15 MIN: HCPCS

## 2025-06-13 PROCEDURE — 99213 OFFICE O/P EST LOW 20 MIN: CPT | Performed by: STUDENT IN AN ORGANIZED HEALTH CARE EDUCATION/TRAINING PROGRAM

## 2025-06-13 RX ORDER — DOXYCYCLINE 100 MG/1
100 CAPSULE ORAL 2 TIMES DAILY
COMMUNITY
Start: 2025-06-06 | End: 2025-07-09

## 2025-06-13 RX ORDER — DEXAMETHASONE 4 MG/1
4 TABLET ORAL
COMMUNITY
Start: 2025-06-05 | End: 2025-06-28

## 2025-06-13 ASSESSMENT — ENCOUNTER SYMPTOMS
FATIGUE: 1
PAIN LOCATION - PAIN SEVERITY: 2/10
PAIN SEVERITY GOAL: 0/10
LAST BOWEL MOVEMENT: 67369
LOWEST PAIN SEVERITY IN PAST 24 HOURS: 2/10
PAIN LOCATION - PAIN FREQUENCY: CONSTANT
HIGHEST PAIN SEVERITY IN PAST 24 HOURS: 7/10
PAIN: 1
PAIN LOCATION - EXACERBATING FACTORS: DEEP BREATHS
SUBJECTIVE PAIN PROGRESSION: UNCHANGED

## 2025-06-13 ASSESSMENT — FIBROSIS 4 INDEX: FIB4 SCORE: 2.11

## 2025-06-13 NOTE — PROGRESS NOTES
Pulmonary Clinic - Follow-up Visit     Date of Service: 6/13    Reason for Consult: Follow-up TPC for MPE    HPI:   Bartolo Berrios [facility] is a very pleasant 80 y.o. male R lung adenocarcinoma s/p wedge resection in 2019 with recent recurrence (metastatic lesions in lumbar spine and left rib), malignant pleural effusion diagnosed 3/2025, PE diagnosed 4/2025 and recurrent MPE for which he was admitted 5/28 and underwent TPC placement on 6/4/25 with Dr Davison. He was noted to have some trapped lung following TPC placement. Patient presents to clinic today for follow-up of his malignant pleural effusion with TPC in place.     Patient is here today in clinic with his wife. Since discharge home they have been draining the fluid via TPC every 2 to 3 days. They have a home health nurse coming to the house to help them with this. The tube has had no issues with draining. There is no purulence, erythema or significant pain around the tube site. With each episode of drainage, they are able to get out anywhere from 600 to 1500 ccs.     We reviewed recent CXR 6/10/25  It shows decreased size in the pneumothorax, but a larger pleural fluid collection compared to xray done on 6/4/25 right after the tube was placed.     I changed the dressing today in clinic     Relevant Imaging:   CXR 6/10/25 with decreased size of Ptx, increased size of effusion  CXR 6/4/25 following TPC placement showed large Ptx with small pleural effusion      Past Medical History[1]    Past Surgical History[2]    Social History     Socioeconomic History    Marital status:      Spouse name: Not on file    Number of children: Not on file    Years of education: Not on file    Highest education level: Not on file   Occupational History    Not on file   Tobacco Use    Smoking status: Never    Smokeless tobacco: Never   Vaping Use    Vaping status: Never Used   Substance and Sexual Activity    Alcohol use: Not Currently     Alcohol/week: 1.8 oz      Types: 3 Glasses of wine per week     Comment: Occasionally    Drug use: Not Currently     Comment: THC edibles    Sexual activity: Not on file   Other Topics Concern    Not on file   Social History Narrative    Not on file     Social Drivers of Health     Financial Resource Strain: Low Risk  (5/11/2024)    Received from [facility]    Financial Resource Strain     Difficulty of Paying Living Expenses: Not on file     Access to Reliable Phone: Not on file   Food Insecurity: No Food Insecurity (5/29/2025)    Hunger Vital Sign     Worried About Running Out of Food in the Last Year: Never true     Ran Out of Food in the Last Year: Never true   Transportation Needs: No Transportation Needs (5/29/2025)    PRAPARE - Transportation     Lack of Transportation (Medical): No     Lack of Transportation (Non-Medical): No   Physical Activity: Not on file   Stress: Not on file   Social Connections: Feeling Socially Integrated (6/6/2025)    OASIS : Social Isolation     Frequency of experiencing loneliness or isolation: Never   Intimate Partner Violence: Not At Risk (5/29/2025)    Humiliation, Afraid, Rape, and Kick questionnaire     Fear of Current or Ex-Partner: No     Emotionally Abused: No     Physically Abused: No     Sexually Abused: No   Housing Stability: Low Risk  (5/29/2025)    Housing Stability Vital Sign     Unable to Pay for Housing in the Last Year: No     Number of Times Moved in the Last Year: 0     Homeless in the Last Year: No          Family History   Problem Relation Age of Onset    Cancer Mother         Lung- bone    Cancer Father         Colon?    Cancer Sister         Lung    Cancer Brother         Lung    Cancer Brother         Brain       Medications Ordered Prior to Encounter[3]    Allergies: Gramineae pollens, Cat hair extract, Horse allergy, Other environmental, and Pollen extract      ROS:   Review of Systems   Respiratory:  Positive for shortness of breath.        Vitals:  There were no  vitals taken for this visit.    Physical Exam:  Physical Exam  Constitutional:       Comments: Elderly male sitting upright on O2 in NAD   Cardiovascular:      Rate and Rhythm: Normal rate and regular rhythm.   Pulmonary:      Comments: R TPC in place, tube entry site C/D/I  No purulence, erythema or edema at tube site  Musculoskeletal:      Right lower leg: No edema.      Left lower leg: No edema.   Neurological:      General: No focal deficit present.      Mental Status: He is oriented to person, place, and time.           Assessment/Plan:  Bartolo Berrios [facility] is a very pleasant 80 y.o. male R lung adenocarcinoma s/p wedge resection in 2019 with recent recurrence (metastatic lesions in lumbar spine and left rib), malignant pleural effusion diagnosed 3/2025, PE diagnosed 4/2025 and recurrent MPE for which he was admitted 5/28 and underwent TPC placement on 6/4/25 with Dr Davison. He was noted to have some trapped lung following TPC placement. Patient presents to clinic today for follow-up of his malignant pleural effusion with TPC in place.     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  - TPC is functioning well without any signs of infection, occlusion  - Patient has no significant pain at tube site and is not interested in a nerve block  - They are draining effusion every 2-3 days with anywhere from 600-1500 mL out   - Follow-up Xray on 6/10 shows decreasing Ptx - we discussed possibility of pleurodesis and improvement in trapped lung going forward  - Spoke with Dr Davison who would like patient to be seen monthly for the next few months with q1 month xrays as well       Time spent in record review prior to patient arrival, reviewing results, and in face-to-face encounter totaled 36 min, excluding any procedures if performed.  __________  Darrell Anthony MD  Pulmonary and Critical Care Medicine  Formerly [facility]         [1]   Past  "Medical History:  Diagnosis Date    Adrenal insufficiency (HCC), secondary 11/25/2020    Arrhythmia     Arthritis     Atherosclerosis of both carotid arteries, mild 11/25/2020    BPH (benign prostatic hyperplasia) 11/25/2020    Bronchitis     a\" very long time ago\"    Cancer (HCC)     lung cancer, melanoma    Carcinoma in situ of respiratory system     CKD (chronic kidney disease) stage 3, GFR 30-59 ml/min 11/25/2020    Colostomy present (HCC) 11/25/2020    Diverticulosis of colon 11/25/2020    GERD (gastroesophageal reflux disease) 11/25/2020    High cholesterol     History of atrial tachycardia 11/25/2020    Ablation 2017    History of malignant melanoma, April 2016 11/25/2020    History of shingles 11/25/2020    History of stroke, subcortical infarct on MRI April 2019 11/25/2020    Hyperlipidemia 11/25/2020    Hypertension     Hypothyroid 11/25/2020    Indigestion     Lung cancer (HCC), adenocarcinoma Aug. 2019 11/25/2020    Metastasis to L5 (biopsy Dec. 2019)    Pain     back   [2]   Past Surgical History:  Procedure Laterality Date    WA THORACENTESIS NEEDLE/CATH PLEURA W/IMAGING  6/4/2025    Procedure: THORACENTESIS, WITH PLEURAL CATHETER INSERTION, WITH IMAGING GUIDANCE;  Surgeon: Soha Shearer M.D.;  Location: SURGERY [facility];  Service: Pulmonary    CECILE BY LAPAROSCOPY  2/26/2021    Procedure: CHOLECYSTECTOMY, LAPAROSCOPIC;  Surgeon: Davey Oneal M.D.;  Location: SURGERY [facility];  Service: General    OTHER Right 2019    LOWER LOBE OF LUNG REMOVED    OTHER Left 2017    GROIN    OTHER Left 2016    TAMAYO   [3]   Current Outpatient Medications on File Prior to Visit   Medication Sig Dispense Refill    losartan (COZAAR) 100 MG Tab Take 100 mg by mouth every day. Indications: High Blood Pressure      atorvastatin (LIPITOR) 20 MG Tab Take 1 Tablet by mouth at bedtime. 90 Tablet 3    SYMBICORT 80-4.5 MCG/ACT Aerosol INHALE 2 PUFFS BY MOUTH TWICE A DAY 30.6 Each 1    amLODIPine (NORVASC) 5 MG Tab " Take 1 Tablet by mouth every morning. COURTESY REFILL ONLY.  PLEASE CALL [phone] AND SCHEDULE A FOLLOW UP APPOINTMENT FOR FURTHER REFILLS. THANK YOU! 90 Tablet 0    albuterol 108 (90 Base) MCG/ACT Aero Soln inhalation aerosol Inhale 2 Puffs every four hours as needed for Shortness of Breath. 8 g 3    traZODone (DESYREL) 100 MG Tab Take 1 Tablet by mouth at bedtime as needed for Sleep. (Patient not taking: Reported on 6/6/2025) 30 Tablet 3    vitamin D3 (CHOLECALCIFEROL) 1000 UNIT Tab Take 1 Tablet by mouth every day. 30 Tablet 0    senna-docusate (PERICOLACE OR SENOKOT S) 8.6-50 MG Tab Take 2 Tablets by mouth every evening. (Patient not taking: Reported on 5/28/2025) 30 Tablet 0    apixaban (ELIQUIS) 5mg Tab Take 2 Tablets by mouth 2 times a day for 6 days, THEN 1 Tablet 2 times a day for 90 days. 204 Tablet 0    benzonatate (TESSALON) 100 MG Cap Take 1 Capsule by mouth 3 times a day as needed for Cough. (Patient not taking: Reported on 5/28/2025) 60 Capsule 0    Ascorbic Acid (VITAMIN C PO) Take 1 Tablet by mouth see administration instructions. Pt takes sporidially   Indications: Supplement (Patient not taking: Reported on 5/28/2025)      folic acid (FOLVITE) 1 MG Tab Take 1 mg by mouth every day. Indications: Anemia due to Presence of Abnormally Large Red Blood Cells      hydrocortisone (CORTEF) 10 MG Tab Take 20 mg by mouth 2 times a day. Pt is taken 2 tablets (20MG) BID  Indications: Decreased Function of the Adrenal Gland      MAGNESIUM PO Take 2 Capsules by mouth every evening. (OTC)  Indications: supplement      acetaminophen (TYLENOL) 500 MG Tab Take 1,000 mg by mouth every 6 hours as needed. Indications: Pain      omeprazole (PRILOSEC) 40 MG delayed-release capsule Take 1 Capsule by mouth every day. 90 Capsule 3    tamsulosin (FLOMAX) 0.4 MG capsule Take 2 Capsules by mouth every day. (Patient taking differently: Take 0.4 mg by mouth 2 times a day.) 180 Capsule 3    Cyanocobalamin (B-12 SL) Place 1  "Tablet under the tongue every day. Indications: Supplement (Patient not taking: Reported on 5/28/2025)      potassium chloride (KLOR-CON) 8 MEQ tablet Take 2 Tablets by mouth every day. 180 Tablet 3    NEEDLE, DISP, 25 G (BD DISP NEEDLES) 25G X 5/8\" Misc Use as directed for testosterone injections every 7 days 12 Each 3    liothyronine (CYTOMEL) 5 MCG Tab Take 10 mcg by mouth every day. Indications: Hypothyroidism not due to Hormone Abnormality      tadalafil (CIALIS) 20 MG tablet Take 1 Tablet by mouth 1 time a day as needed for Erectile Dysfunction. for erectile dysfunction  Indications: Erectile Dysfunction      testosterone cypionate (DEPO-TESTOSTERONE) 200 MG/ML Solution injection Inject 124 mg into the shoulder, thigh, or buttocks every 7 days. On Sunday, pt injects 0.62ML   Indications: Deficient Activity of the Testis, Pituitary Hormone Deficiency       No current facility-administered medications on file prior to visit.     "

## 2025-06-14 PROCEDURE — 665998 HH PPS REVENUE CREDIT

## 2025-06-14 PROCEDURE — 665999 HH PPS REVENUE DEBIT

## 2025-06-14 ASSESSMENT — ENCOUNTER SYMPTOMS: SHORTNESS OF BREATH: 1

## 2025-06-15 PROCEDURE — 665998 HH PPS REVENUE CREDIT

## 2025-06-15 PROCEDURE — 665999 HH PPS REVENUE DEBIT

## 2025-06-16 ENCOUNTER — HOME CARE VISIT (OUTPATIENT)
Dept: HOME HEALTH SERVICES | Facility: HOME HEALTHCARE | Age: 80
End: 2025-06-16
Payer: MEDICARE

## 2025-06-16 PROCEDURE — 665999 HH PPS REVENUE DEBIT

## 2025-06-16 PROCEDURE — 665998 HH PPS REVENUE CREDIT

## 2025-06-17 PROCEDURE — 665998 HH PPS REVENUE CREDIT

## 2025-06-17 PROCEDURE — 665999 HH PPS REVENUE DEBIT

## 2025-06-18 ENCOUNTER — HOME CARE VISIT (OUTPATIENT)
Dept: HOME HEALTH SERVICES | Facility: HOME HEALTHCARE | Age: 80
End: 2025-06-18
Payer: MEDICARE

## 2025-06-18 VITALS
HEART RATE: 81 BPM | TEMPERATURE: 98.2 F | DIASTOLIC BLOOD PRESSURE: 62 MMHG | RESPIRATION RATE: 16 BRPM | SYSTOLIC BLOOD PRESSURE: 128 MMHG | OXYGEN SATURATION: 97 %

## 2025-06-18 PROCEDURE — 665999 HH PPS REVENUE DEBIT

## 2025-06-18 PROCEDURE — G0299 HHS/HOSPICE OF RN EA 15 MIN: HCPCS

## 2025-06-18 PROCEDURE — 665998 HH PPS REVENUE CREDIT

## 2025-06-18 SDOH — ECONOMIC STABILITY: HOUSING INSECURITY: EVIDENCE OF SMOKING MATERIAL: 0

## 2025-06-18 ASSESSMENT — ENCOUNTER SYMPTOMS
LOWEST PAIN SEVERITY IN PAST 24 HOURS: 6/10
FATIGUE: 1
MUSCLE WEAKNESS: 1
PAIN LOCATION - EXACERBATING FACTORS: DEEP BREATHS
COUGH: 1
PAIN SEVERITY GOAL: 0/10
HIGHEST PAIN SEVERITY IN PAST 24 HOURS: 2/10
PAIN: 1
SUBJECTIVE PAIN PROGRESSION: GRADUALLY IMPROVING
COUGH CHARACTERISTICS: DRY
FATIGUES EASILY: 1
PAIN LOCATION - PAIN FREQUENCY: FREQUENT
LAST BOWEL MOVEMENT: 67374
PAIN LOCATION - PAIN SEVERITY: 2/10

## 2025-06-19 ENCOUNTER — HOME CARE VISIT (OUTPATIENT)
Dept: HOME HEALTH SERVICES | Facility: HOME HEALTHCARE | Age: 80
End: 2025-06-19
Payer: MEDICARE

## 2025-06-19 PROCEDURE — 665999 HH PPS REVENUE DEBIT

## 2025-06-19 PROCEDURE — 665998 HH PPS REVENUE CREDIT

## 2025-06-19 PROCEDURE — G0151 HHCP-SERV OF PT,EA 15 MIN: HCPCS

## 2025-06-20 ENCOUNTER — APPOINTMENT (OUTPATIENT)
Dept: RADIOLOGY | Facility: MEDICAL CENTER | Age: 80
End: 2025-06-20
Attending: INTERNAL MEDICINE
Payer: MEDICARE

## 2025-06-20 ENCOUNTER — APPOINTMENT (OUTPATIENT)
Dept: RADIOLOGY | Facility: MEDICAL CENTER | Age: 80
End: 2025-06-20
Attending: STUDENT IN AN ORGANIZED HEALTH CARE EDUCATION/TRAINING PROGRAM
Payer: MEDICARE

## 2025-06-20 ENCOUNTER — HOSPITAL ENCOUNTER (EMERGENCY)
Facility: MEDICAL CENTER | Age: 80
End: 2025-06-20
Attending: STUDENT IN AN ORGANIZED HEALTH CARE EDUCATION/TRAINING PROGRAM
Payer: MEDICARE

## 2025-06-20 ENCOUNTER — HOME CARE VISIT (OUTPATIENT)
Dept: HOME HEALTH SERVICES | Facility: HOME HEALTHCARE | Age: 80
End: 2025-06-20
Payer: MEDICARE

## 2025-06-20 VITALS
OXYGEN SATURATION: 95 % | TEMPERATURE: 98.3 F | RESPIRATION RATE: 18 BRPM | SYSTOLIC BLOOD PRESSURE: 120 MMHG | DIASTOLIC BLOOD PRESSURE: 50 MMHG | HEART RATE: 86 BPM

## 2025-06-20 VITALS
OXYGEN SATURATION: 100 % | HEART RATE: 72 BPM | BODY MASS INDEX: 21.29 KG/M2 | WEIGHT: 157.19 LBS | RESPIRATION RATE: 20 BRPM | SYSTOLIC BLOOD PRESSURE: 139 MMHG | HEIGHT: 72 IN | TEMPERATURE: 99.4 F | DIASTOLIC BLOOD PRESSURE: 75 MMHG

## 2025-06-20 DIAGNOSIS — L76.82 POSTOPERATIVE COMPLICATION OF SKIN INVOLVING DRAINAGE FROM SURGICAL WOUND: Primary | ICD-10-CM

## 2025-06-20 LAB
ALBUMIN SERPL BCP-MCNC: 2.6 G/DL (ref 3.2–4.9)
ALBUMIN/GLOB SERPL: 0.8 G/DL
ALP SERPL-CCNC: 109 U/L (ref 30–99)
ALT SERPL-CCNC: 17 U/L (ref 2–50)
ANION GAP SERPL CALC-SCNC: 8 MMOL/L (ref 7–16)
AST SERPL-CCNC: 25 U/L (ref 12–45)
BASOPHILS # BLD AUTO: 0.2 % (ref 0–1.8)
BASOPHILS # BLD: 0.02 K/UL (ref 0–0.12)
BILIRUB SERPL-MCNC: 0.6 MG/DL (ref 0.1–1.5)
BUN SERPL-MCNC: 25 MG/DL (ref 8–22)
CALCIUM ALBUM COR SERPL-MCNC: 9 MG/DL (ref 8.5–10.5)
CALCIUM SERPL-MCNC: 7.9 MG/DL (ref 8.5–10.5)
CHLORIDE SERPL-SCNC: 109 MMOL/L (ref 96–112)
CO2 SERPL-SCNC: 22 MMOL/L (ref 20–33)
CREAT SERPL-MCNC: 1.25 MG/DL (ref 0.5–1.4)
EOSINOPHIL # BLD AUTO: 0.04 K/UL (ref 0–0.51)
EOSINOPHIL NFR BLD: 0.3 % (ref 0–6.9)
ERYTHROCYTE [DISTWIDTH] IN BLOOD BY AUTOMATED COUNT: 58.4 FL (ref 35.9–50)
GFR SERPLBLD CREATININE-BSD FMLA CKD-EPI: 59 ML/MIN/1.73 M 2
GLOBULIN SER CALC-MCNC: 3.1 G/DL (ref 1.9–3.5)
GLUCOSE SERPL-MCNC: 141 MG/DL (ref 65–99)
HCT VFR BLD AUTO: 36.1 % (ref 42–52)
HGB BLD-MCNC: 11.5 G/DL (ref 14–18)
IMM GRANULOCYTES # BLD AUTO: 0.18 K/UL (ref 0–0.11)
IMM GRANULOCYTES NFR BLD AUTO: 1.5 % (ref 0–0.9)
LYMPHOCYTES # BLD AUTO: 0.64 K/UL (ref 1–4.8)
LYMPHOCYTES NFR BLD: 5.2 % (ref 22–41)
MCH RBC QN AUTO: 30.4 PG (ref 27–33)
MCHC RBC AUTO-ENTMCNC: 31.9 G/DL (ref 32.3–36.5)
MCV RBC AUTO: 95.5 FL (ref 81.4–97.8)
MONOCYTES # BLD AUTO: 0.99 K/UL (ref 0–0.85)
MONOCYTES NFR BLD AUTO: 8.1 % (ref 0–13.4)
NEUTROPHILS # BLD AUTO: 10.36 K/UL (ref 1.82–7.42)
NEUTROPHILS NFR BLD: 84.7 % (ref 44–72)
NRBC # BLD AUTO: 0 K/UL
NRBC BLD-RTO: 0 /100 WBC (ref 0–0.2)
PLATELET # BLD AUTO: 261 K/UL (ref 164–446)
PMV BLD AUTO: 9.9 FL (ref 9–12.9)
POTASSIUM SERPL-SCNC: 3.8 MMOL/L (ref 3.6–5.5)
PROT SERPL-MCNC: 5.7 G/DL (ref 6–8.2)
RBC # BLD AUTO: 3.78 M/UL (ref 4.7–6.1)
SODIUM SERPL-SCNC: 139 MMOL/L (ref 135–145)
WBC # BLD AUTO: 12.2 K/UL (ref 4.8–10.8)

## 2025-06-20 PROCEDURE — 700111 HCHG RX REV CODE 636 W/ 250 OVERRIDE (IP): Performed by: STUDENT IN AN ORGANIZED HEALTH CARE EDUCATION/TRAINING PROGRAM

## 2025-06-20 PROCEDURE — 71045 X-RAY EXAM CHEST 1 VIEW: CPT

## 2025-06-20 PROCEDURE — 80053 COMPREHEN METABOLIC PANEL: CPT

## 2025-06-20 PROCEDURE — 85025 COMPLETE CBC W/AUTO DIFF WBC: CPT

## 2025-06-20 PROCEDURE — 665998 HH PPS REVENUE CREDIT

## 2025-06-20 PROCEDURE — A9270 NON-COVERED ITEM OR SERVICE: HCPCS | Performed by: STUDENT IN AN ORGANIZED HEALTH CARE EDUCATION/TRAINING PROGRAM

## 2025-06-20 PROCEDURE — 36415 COLL VENOUS BLD VENIPUNCTURE: CPT

## 2025-06-20 PROCEDURE — G0299 HHS/HOSPICE OF RN EA 15 MIN: HCPCS

## 2025-06-20 PROCEDURE — 99284 EMERGENCY DEPT VISIT MOD MDM: CPT

## 2025-06-20 PROCEDURE — 700102 HCHG RX REV CODE 250 W/ 637 OVERRIDE(OP): Performed by: STUDENT IN AN ORGANIZED HEALTH CARE EDUCATION/TRAINING PROGRAM

## 2025-06-20 PROCEDURE — 665999 HH PPS REVENUE DEBIT

## 2025-06-20 RX ORDER — ACETAMINOPHEN 500 MG
1000 TABLET ORAL ONCE
Status: COMPLETED | OUTPATIENT
Start: 2025-06-20 | End: 2025-06-20

## 2025-06-20 RX ADMIN — ACETAMINOPHEN 1000 MG: 500 TABLET ORAL at 17:32

## 2025-06-20 RX ADMIN — LIDOCAINE HYDROCHLORIDE 10 ML: 10 INJECTION, SOLUTION INFILTRATION; PERINEURAL at 18:15

## 2025-06-20 ASSESSMENT — ENCOUNTER SYMPTOMS
PAIN: 1
COUGH CHARACTERISTICS: DRY
COUGH CHARACTERISTICS: NON-PRODUCTIVE
COUGH: 1
MUSCLE WEAKNESS: 1
PAIN LOCATION - PAIN QUALITY: ACHE
HIGHEST PAIN SEVERITY IN PAST 24 HOURS: 5/10
BOWEL PATTERN NORMAL: 1
POOR JUDGMENT: 1
PAIN LOCATION - PAIN DURATION: ONGOING
SHORTNESS OF BREATH: 1
LOWEST PAIN SEVERITY IN PAST 24 HOURS: 1/10
COUGH CHARACTERISTICS: WEAK
SUBJECTIVE PAIN PROGRESSION: GRADUALLY IMPROVING
LAST BOWEL MOVEMENT: 67376
PAIN LOCATION - PAIN FREQUENCY: FREQUENT
PAIN SEVERITY GOAL: 2/10
PAIN LOCATION: CHEST
STOOL FREQUENCY: DAILY
PAIN LOCATION - PAIN SEVERITY: 2/10

## 2025-06-20 ASSESSMENT — FIBROSIS 4 INDEX: FIB4 SCORE: 2.11

## 2025-06-20 NOTE — ED PROVIDER NOTES
ED Provider Note    CHIEF COMPLAINT  Chief Complaint   Patient presents with    Wound Check     Sent to ED by JOSE RN for oozing from right thoracentesis site. Here recently and was admitted for about a week. Required a right thoracentesis at that time.   Arrives on 2L home O2.        EXTERNAL RECORDS REVIEWED  Outpatient Notes telemedicine visit on 6/18/2025 for malignant neoplasm metastatic to the lung.  Patient also admitted on 6/4/2025 for shortness of breath, chest pain, cough.  Patient found to have a malignant pleural effusion, 1.6 L was removed.    HPI/ROS  LIMITATION TO HISTORY   Select: : None  OUTSIDE HISTORIAN(S):    Bartolo Berrios [facility] is a 80 y.o. male who presents with oozing from the right thoracentesis site.  Patient reports that he was told by his nurse to come to the hospital for drainage.  Patient denies worsening shortness of breath, fevers, bodies, chills, nausea and vomiting.  Patient denies chest pain.  Patient had a thoracentesis conducted on 6/4 and has been having drainage from his tube drained but now there is new drainage around the tube.  Patient is on baseline 2 L nasal cannula.    PAST MEDICAL HISTORY   has a past medical history of Adrenal insufficiency (HCC), secondary (11/25/2020), Arrhythmia, Arthritis, Atherosclerosis of both carotid arteries, mild (11/25/2020), BPH (benign prostatic hyperplasia) (11/25/2020), Bronchitis, Cancer (HCC), Carcinoma in situ of respiratory system, CKD (chronic kidney disease) stage 3, GFR 30-59 ml/min (11/25/2020), Colostomy present (HCC) (11/25/2020), Diverticulosis of colon (11/25/2020), GERD (gastroesophageal reflux disease) (11/25/2020), High cholesterol, History of atrial tachycardia (11/25/2020), History of malignant melanoma, April 2016 (11/25/2020), History of shingles (11/25/2020), History of stroke, subcortical infarct on MRI April 2019 (11/25/2020), Hyperlipidemia (11/25/2020), Hypertension, Hypothyroid (11/25/2020), Indigestion, Lung  "cancer (HCC), adenocarcinoma Aug. 2019 (11/25/2020), and Pain.    SURGICAL HISTORY   has a past surgical history that includes other (Left, 2016); other (Left, 2017); other (Right, 2019); reid by laparoscopy (2/26/2021); and thoracentesis needle/cath pleura w/imaging (6/4/2025).    FAMILY HISTORY  Family History   Problem Relation Age of Onset    Cancer Mother         Lung- bone    Cancer Father         Colon?    Cancer Sister         Lung    Cancer Brother         Lung    Cancer Brother         Brain       SOCIAL HISTORY  Social History     Tobacco Use    Smoking status: Never    Smokeless tobacco: Never   Vaping Use    Vaping status: Never Used   Substance and Sexual Activity    Alcohol use: Not Currently     Alcohol/week: 1.8 oz     Types: 3 Glasses of wine per week     Comment: Occasionally    Drug use: Not Currently     Comment: THC edibles    Sexual activity: Not on file       CURRENT MEDICATIONS  Home Medications       Reviewed by Sanya Pineda R.N. (Registered Nurse) on 06/20/25 at 1525  Med List Status: Not Addressed     Medication Last Dose Status   acetaminophen (TYLENOL) 500 MG Tab  Active   albuterol 108 (90 Base) MCG/ACT Aero Soln inhalation aerosol  Active   amLODIPine (NORVASC) 5 MG Tab  Active   apixaban (ELIQUIS) 5mg Tab  Active   Ascorbic Acid (VITAMIN C PO)  Active   atorvastatin (LIPITOR) 20 MG Tab  Active   benzonatate (TESSALON) 100 MG Cap  Active   Cyanocobalamin (B-12 SL)  Active   dexamethasone (DECADRON) 4 MG Tab  Active   doxycycline (VIBRAMYCIN) 100 MG Cap  Active   folic acid (FOLVITE) 1 MG Tab  Active   hydrocortisone (CORTEF) 10 MG Tab  Active   liothyronine (CYTOMEL) 5 MCG Tab  Active   losartan (COZAAR) 100 MG Tab  Active   MAGNESIUM PO  Active   NEEDLE, DISP, 25 G (BD DISP NEEDLES) 25G X 5/8\" Misc  Active   omeprazole (PRILOSEC) 40 MG delayed-release capsule  Active   potassium chloride (KLOR-CON) 8 MEQ tablet  Active   senna-docusate (PERICOLACE OR SENOKOT S) 8.6-50 MG Tab  " Active   SYMBICORT 80-4.5 MCG/ACT Aerosol  Active   tadalafil (CIALIS) 20 MG tablet  Active   tamsulosin (FLOMAX) 0.4 MG capsule  Active   testosterone cypionate (DEPO-TESTOSTERONE) 200 MG/ML Solution injection  Active   traZODone (DESYREL) 100 MG Tab  Active   vitamin D3 (CHOLECALCIFEROL) 1000 UNIT Tab  Active                  Audit from Redirected Encounters    **Home medications have not yet been reviewed for this encounter**         ALLERGIES  Allergies[1]    PHYSICAL EXAM  VITAL SIGNS: /65   Pulse 75   Temp 36.6 °C (97.9 °F) (Temporal)   Resp 19   Ht 1.829 m (6')   Wt 71.3 kg (157 lb 3 oz)   SpO2 99%   BMI 21.32 kg/m²    Vitals and nursing note reviewed.   Constitutional:       Comments: Patient is lying in bed supine, pleasant, conversant, speaking in complete sentences   HENT:      Head: Normocephalic and atraumatic.   Eyes:      Extraocular Movements: Extraocular movements intact.      Conjunctiva/sclera: Conjunctivae normal.      Pupils: Pupils are equal, round, and reactive to light.   Cardiovascular:      Pulses: Normal pulses.      Comments: HR 80  Pulmonary:      Effort: Pulmonary effort is normal. No respiratory distress.  Baseline 2 L nasal cannula  Musculoskeletal:      Patient has some mild discharge around the tube on the right lower anterior chest  No surrounding erythema or fluctuance or tenderness general: No swelling. Normal range of motion.      Cervical back: Normal range of motion. No rigidity.   Skin:     General: Skin is warm and dry.      Capillary Refill: Capillary refill takes less than 2 seconds.   Neurological:      Mental Status: Alert.       RADIOLOGY/PROCEDURES   I have independently interpreted the diagnostic imaging associated with this visit and am waiting the final reading from the radiologist.   My preliminary interpretation is as follows: Right pleural effusion    Radiologist interpretation:  DX-CHEST-LIMITED (1 VIEW)   Final Result      Right chest tube in  place. No appreciable pneumothorax.          COURSE & MEDICAL DECISION MAKING    ASSESSMENT, COURSE AND PLAN  Care Narrative: CBC to evaluate for acute anemia and leukocytosis.  CMP to evaluate for acute electrolyte abnormality, acute kidney injury, acute liver failure or dysfunction.  Chest x-ray to evaluate for acute cardiopulmonary process such as pneumonia, pulmonary edema, pneumothorax.    Electronically signed by: Don Mishra M.D., 6/20/2025 4:48 PM    My leukocytosis, no acute anemia, no evidence of neutropenia.  No evidence of acute kidney injury.  Corrected calcium within normal limits.  No evidence of acute liver failure or acute electrolyte abnormality.  I have spoken with Dr. Kohler of pulmonology who recommends cinching the tube tight with a new suture.  This has been done.  This is a dissolvable suture.  Patient will follow-up with Dr. Davison as an outpatient.  Patient denies fever body aches or chills.  Vital signs within normal limits.    Repeat physical exam benign.  I doubt any serious emergency process at this time.  Patient and/or family, friends given strict return precautions for worsening symptoms and care instructions. They have demonstrated understanding of discharge instructions through teach back mechanism. Advised PCP follow-up in 1-2 days.  Patient/family/friend expresses understanding and agrees to plan.    This dictation has been created using voice recognition software. I am continuously working with the software to minimize the number of voice recognition errors and I have made every attempt to manually correct the errors within my dictation. However errors  related to this voice recognition software may still exist and should be interpreted within the appropriate context.     Electronically signed by: Don Mishra M.D., 6/20/2025 6:22 PM    DISPOSITION AND DISCUSSIONS  I have discussed management of the patient with the following physicians and GERA's:    Jose F    Escalation of care considered, and ultimately not performed:acute inpatient care management, however at this time, the patient is most appropriate for outpatient management    Decision tools and prescription drugs considered including, but not limited to: Antibiotics given the abscess bacterial effect.    FINAL DIAGNOSIS  1. Postoperative complication of skin involving drainage from surgical wound         Electronically signed by: Don Mishra M.D., 6/20/2025 4:39 PM           [1]   Allergies  Allergen Reactions    Gramineae Pollens Itching and Shortness of Breath     Itchy eyes, red face    Cat Hair Extract Hives and Itching    Horse Allergy Hives    Other Environmental     Pollen Extract Runny Nose and Itching     .

## 2025-06-21 VITALS
TEMPERATURE: 98.1 F | RESPIRATION RATE: 16 BRPM | HEART RATE: 79 BPM | SYSTOLIC BLOOD PRESSURE: 110 MMHG | DIASTOLIC BLOOD PRESSURE: 60 MMHG | OXYGEN SATURATION: 97 %

## 2025-06-21 PROCEDURE — 665999 HH PPS REVENUE DEBIT

## 2025-06-21 PROCEDURE — 665998 HH PPS REVENUE CREDIT

## 2025-06-21 SDOH — ECONOMIC STABILITY: HOUSING INSECURITY: EVIDENCE OF SMOKING MATERIAL: 0

## 2025-06-21 ASSESSMENT — ENCOUNTER SYMPTOMS
PAIN LOCATION - PAIN DURATION: PERSISTENT
PAIN SEVERITY GOAL: 0/10
LOWEST PAIN SEVERITY IN PAST 24 HOURS: 2/10
SUBJECTIVE PAIN PROGRESSION: WAXING AND WANING
PAIN LOCATION - EXACERBATING FACTORS: PHYSICAL ACTIVITY
PAIN LOCATION - RELIEVING FACTORS: REST AND PAIN MEDS
PAIN LOCATION - PAIN FREQUENCY: CONSTANT
HIGHEST PAIN SEVERITY IN PAST 24 HOURS: 5/10
PAIN: 1
ARTHRALGIAS: 1
PAIN LOCATION - PAIN QUALITY: SHARP
MUSCLE WEAKNESS: 1
PAIN LOCATION - PAIN SEVERITY: 2/10

## 2025-06-21 ASSESSMENT — ACTIVITIES OF DAILY LIVING (ADL)
GROOMING_WITHIN_DEFINED_LIMITS: 1
AMBULATION ASSISTANCE: SUPERVISION
AMBULATION ASSISTANCE: 1
BATHING_COMMENTS: SEE OT EVAL
FEEDING_WITHIN_DEFINED_LIMITS: 1
PHYSICAL TRANSFERS ASSESSED: 1
CURRENT_FUNCTION: INDEPENDENT

## 2025-06-21 ASSESSMENT — PATIENT HEALTH QUESTIONNAIRE - PHQ9: CLINICAL INTERPRETATION OF PHQ2 SCORE: 0

## 2025-06-21 NOTE — ED NOTES
Seen and examined by erp-orders recvd and reviewed withpt. Saline lock with blood draw upon completion of pcxr. Pt med per request with 1000mg po tylenol for h/a

## 2025-06-21 NOTE — PROGRESS NOTES
Patient presented to ER due to drainage around pleurex catheter insertion site    Still having drainage through tube  Pain controlled  No fevers, no purulent drainage  Started chemotherapy  Discussed with Dr Mishra ERP, recommend overstitch insertion site with absorbable suture. Advised patient to drain daily or BID in short term to keep pleural space dry. Expectation is that effusion will reduce/resolve with chemotherapy and will hopefully autopleurodeise, at which time we can consider tube removal. Will arrange for f/u in pulmonary clinic in 1-2 weeks. Plan of care discussed with Dr Mishra.  __________  Alex Kohler MD  Pulmonary and Critical Care Medicine  [facility]

## 2025-06-21 NOTE — ED NOTES
Dc instructions reviewed with pt and wife. Aware of need to f/u with pulm on monday, return sooner with concerns

## 2025-06-21 NOTE — DISCHARGE INSTRUCTIONS
Future Appointments   Date Time Provider Department Center   6/23/2025 To Be Determined Ericka Li R.N. [facility] None   6/26/2025 To Be Determined Candace Antonio R.N. [facility] None   6/26/2025 10:30 AM Radha Telles OT [facility] None   [facility] None   6/30/2025 To Be Determined Caty Benítez R.N. [facility] None   7/3/2025 To Be Determined Caty Benítez R.N. [facility] None   7/5/2025 To Be Determined [facility] TEAM [facility] None

## 2025-06-22 PROCEDURE — 665999 HH PPS REVENUE DEBIT

## 2025-06-22 PROCEDURE — 665998 HH PPS REVENUE CREDIT

## 2025-06-23 ENCOUNTER — HOME CARE VISIT (OUTPATIENT)
Dept: HOME HEALTH SERVICES | Facility: HOME HEALTHCARE | Age: 80
End: 2025-06-23
Payer: MEDICARE

## 2025-06-23 VITALS
DIASTOLIC BLOOD PRESSURE: 60 MMHG | HEART RATE: 87 BPM | SYSTOLIC BLOOD PRESSURE: 120 MMHG | RESPIRATION RATE: 16 BRPM | TEMPERATURE: 99.3 F | OXYGEN SATURATION: 93 %

## 2025-06-23 PROCEDURE — G0299 HHS/HOSPICE OF RN EA 15 MIN: HCPCS

## 2025-06-23 PROCEDURE — 665999 HH PPS REVENUE DEBIT

## 2025-06-23 PROCEDURE — 665998 HH PPS REVENUE CREDIT

## 2025-06-23 ASSESSMENT — ENCOUNTER SYMPTOMS
PAIN LOCATION - RELIEVING FACTORS: REST
PAIN SEVERITY GOAL: 0/10
NAUSEA: DENIES
PAIN: 1
PAIN LOCATION: RIGHT CHEST
SUBJECTIVE PAIN PROGRESSION: GRADUALLY IMPROVING
BOWEL PATTERN NORMAL: 1
VOMITING: DENIES
PAIN LOCATION - PAIN FREQUENCY: INTERMITTENT
PAIN LOCATION - PAIN QUALITY: ACHE
PAIN LOCATION - PAIN DURATION: ACUTE
HIGHEST PAIN SEVERITY IN PAST 24 HOURS: 2/10
PAIN LOCATION - PAIN SEVERITY: 2/10
LAST BOWEL MOVEMENT: 67379
LOWEST PAIN SEVERITY IN PAST 24 HOURS: 0/10

## 2025-06-23 ASSESSMENT — PATIENT HEALTH QUESTIONNAIRE - PHQ9: CLINICAL INTERPRETATION OF PHQ2 SCORE: 0

## 2025-06-24 ENCOUNTER — OFFICE VISIT (OUTPATIENT)
Dept: SLEEP MEDICINE | Facility: MEDICAL CENTER | Age: 80
End: 2025-06-24
Payer: MEDICARE

## 2025-06-24 VITALS
HEART RATE: 67 BPM | BODY MASS INDEX: 21.4 KG/M2 | SYSTOLIC BLOOD PRESSURE: 116 MMHG | OXYGEN SATURATION: 99 % | HEIGHT: 72 IN | WEIGHT: 158 LBS | DIASTOLIC BLOOD PRESSURE: 56 MMHG

## 2025-06-24 DIAGNOSIS — J91.0 MALIGNANT PLEURAL EFFUSION: Primary | ICD-10-CM

## 2025-06-24 DIAGNOSIS — C34.90 ADENOCARCINOMA OF LUNG, UNSPECIFIED LATERALITY (HCC): ICD-10-CM

## 2025-06-24 PROCEDURE — 665998 HH PPS REVENUE CREDIT

## 2025-06-24 PROCEDURE — 99213 OFFICE O/P EST LOW 20 MIN: CPT

## 2025-06-24 PROCEDURE — 665999 HH PPS REVENUE DEBIT

## 2025-06-24 RX ORDER — HYDROMORPHONE HYDROCHLORIDE 2 MG/1
TABLET ORAL
COMMUNITY
Start: 2025-06-11

## 2025-06-24 RX ORDER — MORPHINE SULFATE 15 MG/1
15 TABLET, FILM COATED, EXTENDED RELEASE ORAL 2 TIMES DAILY
COMMUNITY
Start: 2025-06-11

## 2025-06-24 RX ORDER — LAZERTINIB 240 MG/1
1 TABLET, FILM COATED ORAL DAILY
COMMUNITY
Start: 2025-05-20

## 2025-06-24 RX ORDER — BENZONATATE 100 MG/1
200 CAPSULE ORAL 3 TIMES DAILY PRN
Qty: 90 CAPSULE | Refills: 3 | Status: SHIPPED | OUTPATIENT
Start: 2025-06-24

## 2025-06-24 ASSESSMENT — FIBROSIS 4 INDEX: FIB4 SCORE: 1.81

## 2025-06-24 NOTE — PROGRESS NOTES
Pulmonary Clinic follow up    Date of Service: 6/24/2025     Reason for follow up:  Follow-Up (Malignant pleural effusion. Last seen 06/13/25/) and Results (CXR 6/20/25)    History of Present Illness:     Bartolo Berrios [facility] is a 80 y.o. 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 Patient had a pleur-x catheter placed on 6/4/2025 with Dr. 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  His sequential chest x-rays since then have showed resolution of trapped lung and continued improvement in pleural fluid noted.  He is currently draining his Pleurx catheter Monday Wednesday Friday with home health nurse, getting 8 to 900 mL per drain.  He does feel that with this amount of fluid draining, he does experience some dizziness and minor hypotension.  He had concerns of leaking catheter over the weekend, but since draining and changing dressing it is now dry and intact.  Drainage is serosanguineous, and his O2 needs are decreasing.  There are no signs and symptoms of infection noted on exam, and they deny any purulent drainage when evaluating the fluid at home.  No fever, chills.    Review of Systems   Constitutional:  Negative for chills, fever and weight loss.   Respiratory:  Positive for shortness of breath. Negative for cough, hemoptysis, sputum production, wheezing and stridor.    Cardiovascular:  Negative for chest pain, palpitations and leg swelling.   Gastrointestinal:  Negative for heartburn.   Skin:  Negative for rash.   Neurological:  Negative for dizziness.   Endo/Heme/Allergies:  Positive for environmental allergies.   Psychiatric/Behavioral:  Negative for depression.        Medications Ordered Prior to Encounter[1]    Social History[2]     Past Medical History[3]    Past Surgical History[4]    Gramineae pollens, Cat hair extract, Horse allergy,  Other environmental, and Pollen extract    Family History   Problem Relation Age of Onset    Cancer Mother         Lung- bone    Cancer Father         Colon?    Cancer Sister         Lung    Cancer Brother         Lung    Cancer Brother         Brain       /56 (BP Location: Left arm, Patient Position: Sitting, BP Cuff Size: Adult)   Pulse 67   Ht 1.829 m (6')   Wt 71.7 kg (158 lb)   SpO2 99%      Physical Exam  Constitutional:       General: He is not in acute distress.  HENT:      Head: Normocephalic.      Nose: Nose normal.      Mouth/Throat:      Mouth: Mucous membranes are moist.   Eyes:      Conjunctiva/sclera: Conjunctivae normal.   Cardiovascular:      Rate and Rhythm: Normal rate and regular rhythm.      Heart sounds: No murmur heard.  Pulmonary:      Effort: Prolonged expiration present. No respiratory distress.      Breath sounds: Examination of the right-lower field reveals decreased breath sounds. Decreased breath sounds present. No wheezing.   Abdominal:      General: There is no distension.   Musculoskeletal:      Right lower leg: No edema.      Left lower leg: No edema.   Skin:     General: Skin is warm and dry.      Capillary Refill: Capillary refill takes less than 2 seconds.      Nails: There is no clubbing.      Comments: (+) Pleurx catheter noted to right lateral chest area, dressing is clean dry intact without signs symptoms of infection or drainage at this time   Neurological:      General: No focal deficit present.      Mental Status: He is alert and oriented to person, place, and time.   Psychiatric:         Mood and Affect: Mood normal.       Results:    CXR 6/10/2025:    COMPARISON: 6/10/2025     FINDINGS:  Right chest tube in place.  Patchy right basilar opacities  Small right pleural effusion. No appreciable pneumothorax.  Stable cardiopericardial silhouette.     IMPRESSION:     Right chest tube in place. No appreciable pneumothorax.    Echocardiogram  4/7/2025:    CONCLUSIONS  Normal left ventricular systolic function.   No evidence of valvular abnormality based on Doppler evaluation.   Estimated right ventricular systolic pressure is 35 mmHg.  Normal aortic root for body surface area. The ascending aorta diameter   is 3.4 cm.     Vaccinations:    Encourage annual covid and flu vaccines  Encourage pneumonia and RSV vaccines     Assessment & Plan  Malignant pleural effusion    Patient's volumes have been decreasing over time, but he would benefit from Pleurx drainage daily instead of Monday Wednesday Friday to prevent leaking over the weekend.  The goal is to decrease the amount of volume daily, for more hemodynamic stability and patient tolerance.  We discussed checking his blood pressure prior to and after Pleurx drainage.  If patient does experience hypotension or dizziness, goal is to decrease daily drainage amount until he is able to tolerate.  Continue to monitor for signs and symptoms of infection.  His chest x-ray is promising, sequential scans do show continued improvement.    We will follow-up when short-term interval in 1 week with repeat chest x-ray to evaluate for leaking.    Orders:    benzonatate (TESSALON) 100 MG Cap; Take 2 Capsules by mouth 3 times a day as needed for Cough. Do not chew. Swallow whole.    DX-CHEST-2 VIEWS; Future    DME Other    Adenocarcinoma of lung, unspecified laterality (HCC)    See above    Orders:    benzonatate (TESSALON) 100 MG Cap; Take 2 Capsules by mouth 3 times a day as needed for Cough. Do not chew. Swallow whole.      Return in about 1 week (around 7/1/2025), or if symptoms worsen or fail to improve, for f/u with Brandon on pleur-x, review CXR.     This note was generated using voice recognition software which has a chance of producing errors of grammar and possibly content.  I have made every reasonable attempt to find and correct any obvious errors, but it should be expected that some may not be found prior to  finalization of this note.    Time spent in record review prior to patient arrival, reviewing results, and in face-to-face encounter totaled 39 min, excluding any procedures if performed.    Brandon MAST  [facility] Pulmonary Medicine       [1]   Current Outpatient Medications on File Prior to Visit   Medication Sig Dispense Refill    HYDROmorphone (DILAUDID) 2 MG Tab TAKE 1 BY MOUTH EVERY 6 HOURS 4 TIMES A DAY AS NEEDED FOR PAIN      morphine ER (MS CONTIN) 15 MG Tab CR tablet Take 15 mg by mouth 2 times a day.      LAZCLUZE 240 MG Tab every day.      doxycycline (VIBRAMYCIN) 100 MG Cap Take 100 mg by mouth 2 times a day.      losartan (COZAAR) 100 MG Tab Take 100 mg by mouth every day. Indications: High Blood Pressure      atorvastatin (LIPITOR) 20 MG Tab Take 1 Tablet by mouth at bedtime. 90 Tablet 3    amLODIPine (NORVASC) 5 MG Tab Take 1 Tablet by mouth every morning. COURTESY REFILL ONLY.  PLEASE CALL [phone] AND SCHEDULE A FOLLOW UP APPOINTMENT FOR FURTHER REFILLS. THANK YOU! 90 Tablet 0    albuterol 108 (90 Base) MCG/ACT Aero Soln inhalation aerosol Inhale 2 Puffs every four hours as needed for Shortness of Breath. 8 g 3    traZODone (DESYREL) 100 MG Tab Take 1 Tablet by mouth at bedtime as needed for Sleep. 30 Tablet 3    vitamin D3 (CHOLECALCIFEROL) 1000 UNIT Tab Take 1 Tablet by mouth every day. 30 Tablet 0    apixaban (ELIQUIS) 5mg Tab Take 2 Tablets by mouth 2 times a day for 6 days, THEN 1 Tablet 2 times a day for 90 days. (Patient taking differently:  1 Tablet 2 times a day for 90 days.) 204 Tablet 0    hydrocortisone (CORTEF) 10 MG Tab Take 20 mg by mouth 2 times a day. Pt is taken 2 tablets (20MG) BID  Indications: Decreased Function of the Adrenal Gland      MAGNESIUM PO Take 2 Capsules by mouth every evening. (OTC)  Indications: supplement      acetaminophen (TYLENOL) 500 MG Tab Take 1,000 mg by mouth every 6 hours as needed. Indications: Pain      omeprazole (PRILOSEC) 40 MG  "delayed-release capsule Take 1 Capsule by mouth every day. 90 Capsule 3    potassium chloride (KLOR-CON) 8 MEQ tablet Take 2 Tablets by mouth every day. 180 Tablet 3    NEEDLE, DISP, 25 G (BD DISP NEEDLES) 25G X 5/8\" Misc Use as directed for testosterone injections every 7 days 12 Each 3    liothyronine (CYTOMEL) 5 MCG Tab Take 10 mcg by mouth every day. Indications: Hypothyroidism not due to Hormone Abnormality      tadalafil (CIALIS) 20 MG tablet Take 1 Tablet by mouth 1 time a day as needed for Erectile Dysfunction. for erectile dysfunction  Indications: Erectile Dysfunction      dexamethasone (DECADRON) 4 MG Tab Take 4 mg by mouth.      SYMBICORT 80-4.5 MCG/ACT Aerosol INHALE 2 PUFFS BY MOUTH TWICE A DAY (Patient not taking: Reported on 6/24/2025) 30.6 Each 1    senna-docusate (PERICOLACE OR SENOKOT S) 8.6-50 MG Tab Take 2 Tablets by mouth every evening. (Patient not taking: Reported on 5/28/2025) 30 Tablet 0    Ascorbic Acid (VITAMIN C PO) Take 1 Tablet by mouth see administration instructions. Pt takes sporidially   Indications: Supplement (Patient not taking: Reported on 5/28/2025)      folic acid (FOLVITE) 1 MG Tab Take 1 mg by mouth every day. Indications: Anemia due to Presence of Abnormally Large Red Blood Cells (Patient not taking: Reported on 6/13/2025)      tamsulosin (FLOMAX) 0.4 MG capsule Take 2 Capsules by mouth every day. (Patient not taking: Reported on 6/13/2025) 180 Capsule 3    Cyanocobalamin (B-12 SL) Place 1 Tablet under the tongue every day. Indications: Supplement (Patient not taking: Reported on 5/28/2025)      testosterone cypionate (DEPO-TESTOSTERONE) 200 MG/ML Solution injection Inject 124 mg into the shoulder, thigh, or buttocks every 7 days. On Sunday, pt injects 0.62ML   Indications: Deficient Activity of the Testis, Pituitary Hormone Deficiency (Patient not taking: Reported on 6/24/2025)       No current facility-administered medications on file prior to visit.   [2]   Social " "History  Tobacco Use    Smoking status: Never    Smokeless tobacco: Never   Vaping Use    Vaping status: Never Used   Substance Use Topics    Alcohol use: Not Currently     Alcohol/week: 1.8 oz     Types: 3 Glasses of wine per week     Comment: Occasionally    Drug use: Not Currently     Comment: THC edibles   [3]   Past Medical History:  Diagnosis Date    Adrenal insufficiency (HCC), secondary 11/25/2020    Arrhythmia     Arthritis     Atherosclerosis of both carotid arteries, mild 11/25/2020    BPH (benign prostatic hyperplasia) 11/25/2020    Bronchitis     a\" very long time ago\"    Cancer (HCC)     lung cancer, melanoma    Carcinoma in situ of respiratory system     CKD (chronic kidney disease) stage 3, GFR 30-59 ml/min 11/25/2020    Colostomy present (HCC) 11/25/2020    Diverticulosis of colon 11/25/2020    GERD (gastroesophageal reflux disease) 11/25/2020    High cholesterol     History of atrial tachycardia 11/25/2020    Ablation 2017    History of malignant melanoma, April 2016 11/25/2020    History of shingles 11/25/2020    History of stroke, subcortical infarct on MRI April 2019 11/25/2020    Hyperlipidemia 11/25/2020    Hypertension     Hypothyroid 11/25/2020    Indigestion     Lung cancer (HCC), adenocarcinoma Aug. 2019 11/25/2020    Metastasis to L5 (biopsy Dec. 2019)    Pain     back   [4]   Past Surgical History:  Procedure Laterality Date    NM THORACENTESIS NEEDLE/CATH PLEURA W/IMAGING  6/4/2025    Procedure: THORACENTESIS, WITH PLEURAL CATHETER INSERTION, WITH IMAGING GUIDANCE;  Surgeon: Soha Shearer M.D.;  Location: SURGERY [facility];  Service: Pulmonary    CECILE BY LAPAROSCOPY  2/26/2021    Procedure: CHOLECYSTECTOMY, LAPAROSCOPIC;  Surgeon: Davey Oneal M.D.;  Location: SURGERY [facility];  Service: General    OTHER Right 2019    LOWER LOBE OF LUNG REMOVED    OTHER Left 2017    GROIN    OTHER Left 2016    SHIN     "

## 2025-06-25 ENCOUNTER — HOSPITAL ENCOUNTER (OUTPATIENT)
Facility: MEDICAL CENTER | Age: 80
End: 2025-06-25
Attending: INTERNAL MEDICINE
Payer: MEDICARE

## 2025-06-25 LAB — 25(OH)D3 SERPL-MCNC: 49 NG/ML (ref 30–100)

## 2025-06-25 PROCEDURE — 82306 VITAMIN D 25 HYDROXY: CPT

## 2025-06-25 PROCEDURE — 665998 HH PPS REVENUE CREDIT

## 2025-06-25 PROCEDURE — 665999 HH PPS REVENUE DEBIT

## 2025-06-25 ASSESSMENT — ENCOUNTER SYMPTOMS
SHORTNESS OF BREATH: 1
DEPRESSION: 0
HEARTBURN: 0
CHILLS: 0
STRIDOR: 0
FEVER: 0
PALPITATIONS: 0
DIZZINESS: 0
WEIGHT LOSS: 0
SPUTUM PRODUCTION: 0
WHEEZING: 0
HEMOPTYSIS: 0
COUGH: 0

## 2025-06-26 ENCOUNTER — HOME CARE VISIT (OUTPATIENT)
Dept: HOME HEALTH SERVICES | Facility: HOME HEALTHCARE | Age: 80
End: 2025-06-26
Payer: MEDICARE

## 2025-06-26 VITALS
HEART RATE: 88 BPM | DIASTOLIC BLOOD PRESSURE: 60 MMHG | SYSTOLIC BLOOD PRESSURE: 112 MMHG | RESPIRATION RATE: 16 BRPM | TEMPERATURE: 98.2 F | OXYGEN SATURATION: 98 %

## 2025-06-26 PROCEDURE — 665999 HH PPS REVENUE DEBIT

## 2025-06-26 PROCEDURE — 665998 HH PPS REVENUE CREDIT

## 2025-06-26 PROCEDURE — G0299 HHS/HOSPICE OF RN EA 15 MIN: HCPCS

## 2025-06-26 PROCEDURE — G0152 HHCP-SERV OF OT,EA 15 MIN: HCPCS

## 2025-06-26 SDOH — ECONOMIC STABILITY: HOUSING INSECURITY: EVIDENCE OF SMOKING MATERIAL: 0

## 2025-06-26 ASSESSMENT — ENCOUNTER SYMPTOMS
LAST BOWEL MOVEMENT: 67381
MUSCLE WEAKNESS: 1
NAUSEA: DENIES
DENIES PAIN: 1
VOMITING: DENIES
STOOL FREQUENCY: LESS THAN DAILY
BOWEL PATTERN NORMAL: 1

## 2025-06-26 ASSESSMENT — PATIENT HEALTH QUESTIONNAIRE - PHQ9: CLINICAL INTERPRETATION OF PHQ2 SCORE: 0

## 2025-06-26 NOTE — ASSESSMENT & PLAN NOTE
See above    Orders:    benzonatate (TESSALON) 100 MG Cap; Take 2 Capsules by mouth 3 times a day as needed for Cough. Do not chew. Swallow whole.

## 2025-06-26 NOTE — ASSESSMENT & PLAN NOTE
Patient's volumes have been decreasing over time, but he would benefit from Pleurx drainage daily instead of Monday Wednesday Friday to prevent leaking over the weekend.  The goal is to decrease the amount of volume daily, for more hemodynamic stability and patient tolerance.  We discussed checking his blood pressure prior to and after Pleurx drainage.  If patient does experience hypotension or dizziness, goal is to decrease daily drainage amount until he is able to tolerate.  Continue to monitor for signs and symptoms of infection.  His chest x-ray is promising, sequential scans do show continued improvement.    We will follow-up when short-term interval in 1 week with repeat chest x-ray to evaluate for leaking.    Orders:    benzonatate (TESSALON) 100 MG Cap; Take 2 Capsules by mouth 3 times a day as needed for Cough. Do not chew. Swallow whole.    DX-CHEST-2 VIEWS; Future    DME Other

## 2025-06-27 PROCEDURE — 665998 HH PPS REVENUE CREDIT

## 2025-06-27 PROCEDURE — 665999 HH PPS REVENUE DEBIT

## 2025-06-28 ENCOUNTER — HOME CARE VISIT (OUTPATIENT)
Dept: HOME HEALTH SERVICES | Facility: HOME HEALTHCARE | Age: 80
End: 2025-06-28
Payer: MEDICARE

## 2025-06-28 VITALS
HEART RATE: 87 BPM | TEMPERATURE: 98.1 F | DIASTOLIC BLOOD PRESSURE: 50 MMHG | RESPIRATION RATE: 16 BRPM | OXYGEN SATURATION: 98 % | SYSTOLIC BLOOD PRESSURE: 104 MMHG

## 2025-06-28 PROCEDURE — 665998 HH PPS REVENUE CREDIT

## 2025-06-28 PROCEDURE — 665999 HH PPS REVENUE DEBIT

## 2025-06-28 PROCEDURE — G0299 HHS/HOSPICE OF RN EA 15 MIN: HCPCS

## 2025-06-28 SDOH — ECONOMIC STABILITY: HOUSING INSECURITY: EVIDENCE OF SMOKING MATERIAL: 0

## 2025-06-28 ASSESSMENT — ENCOUNTER SYMPTOMS
HYPOTENSION: 1
STOOL FREQUENCY: LESS THAN DAILY
MUSCLE WEAKNESS: 1
LAST BOWEL MOVEMENT: 67384
HIGHEST PAIN SEVERITY IN PAST 24 HOURS: 4/10
BOWEL PATTERN NORMAL: 1
LOWEST PAIN SEVERITY IN PAST 24 HOURS: 3/10
VOMITING: NO
PAIN LOCATION - RELIEVING FACTORS: PAIN MEDS
PAIN LOCATION - PAIN QUALITY: SORE
PAIN LOCATION - PAIN FREQUENCY: INTERMITTENT
PAIN SEVERITY GOAL: 5/10
PAIN LOCATION: RIGHT SIDE
PAIN LOCATION - PAIN SEVERITY: 3/10
PAIN LOCATION - EXACERBATING FACTORS: NO
NAUSEA: NO
SUBJECTIVE PAIN PROGRESSION: WAXING AND WANING
PAIN: 1
PAIN LOCATION - PAIN DURATION: COMES AND GOES

## 2025-06-29 VITALS
RESPIRATION RATE: 16 BRPM | OXYGEN SATURATION: 98 % | HEART RATE: 88 BPM | TEMPERATURE: 98.2 F | DIASTOLIC BLOOD PRESSURE: 60 MMHG | SYSTOLIC BLOOD PRESSURE: 112 MMHG

## 2025-06-29 PROCEDURE — 665999 HH PPS REVENUE DEBIT

## 2025-06-29 PROCEDURE — 665998 HH PPS REVENUE CREDIT

## 2025-06-29 ASSESSMENT — ACTIVITIES OF DAILY LIVING (ADL)
LAUNDRY ASSESSED: 1
FEEDING: INDEPENDENT
TOILETING: 1
HOUSEKEEPING ASSESSED: 1
BATHING ASSESSED: 1
LAUNDRY: INDEPENDENT
LIGHT HOUSEKEEPING: NEEDS ASSISTANCE
CURRENT_FUNCTION: INDEPENDENT
ORAL_CARE_CURRENT_FUNCTION: INDEPENDENT
DRESSING_UB_CURRENT_FUNCTION: INDEPENDENT
PREPARING MEALS: NEEDS ASSISTANCE
ORAL_CARE_ASSESSED: 1
GROOMING ASSESSED: 1
DRESSING_LB_CURRENT_FUNCTION: INDEPENDENT
GROOMING_WITHIN_DEFINED_LIMITS: 1
BATHING_CURRENT_FUNCTION: STAND BY ASSIST
PHYSICAL TRANSFERS ASSESSED: 1
GROOMING_CURRENT_FUNCTION: INDEPENDENT
FEEDING ASSESSED: 1
AMBULATION ASSISTANCE: 1
AMBULATION ASSISTANCE: INDEPENDENT
FEEDING_WITHIN_DEFINED_LIMITS: 1
TOILETING: INDEPENDENT

## 2025-06-29 ASSESSMENT — ENCOUNTER SYMPTOMS
PAIN LOCATION: RIGHT FLANK
PAIN SEVERITY GOAL: 0/10
PAIN LOCATION - PAIN FREQUENCY: FREQUENT
PAIN LOCATION - PAIN SEVERITY: 2/10
HIGHEST PAIN SEVERITY IN PAST 24 HOURS: 7/10
SUBJECTIVE PAIN PROGRESSION: UNCHANGED
PAIN: 1
PAIN LOCATION - RELIEVING FACTORS: MEDICATIONS
PAIN LOCATION - PAIN QUALITY: ACHING
PAIN LOCATION - PAIN DURATION: WEEKS
LOWEST PAIN SEVERITY IN PAST 24 HOURS: 0/10

## 2025-06-30 ENCOUNTER — HOME CARE VISIT (OUTPATIENT)
Dept: HOME HEALTH SERVICES | Facility: HOME HEALTHCARE | Age: 80
End: 2025-06-30
Payer: MEDICARE

## 2025-06-30 VITALS
SYSTOLIC BLOOD PRESSURE: 126 MMHG | RESPIRATION RATE: 16 BRPM | TEMPERATURE: 99.1 F | OXYGEN SATURATION: 98 % | DIASTOLIC BLOOD PRESSURE: 60 MMHG | HEART RATE: 79 BPM

## 2025-06-30 PROCEDURE — G0299 HHS/HOSPICE OF RN EA 15 MIN: HCPCS

## 2025-06-30 ASSESSMENT — ENCOUNTER SYMPTOMS
PAIN SEVERITY GOAL: 0/10
PAIN LOCATION - PAIN QUALITY: ACHE
PAIN LOCATION - PAIN FREQUENCY: CONSTANT
NAUSEA: DENIES
LAST BOWEL MOVEMENT: 67386
STOOL FREQUENCY: DAILY
SUBJECTIVE PAIN PROGRESSION: UNCHANGED
VOMITING: DENIES
BOWEL PATTERN NORMAL: 1
PAIN LOCATION - PAIN DURATION: CHRONIC
PAIN LOCATION - PAIN SEVERITY: 2/10
PAIN: 1
HIGHEST PAIN SEVERITY IN PAST 24 HOURS: 2/10
LOWEST PAIN SEVERITY IN PAST 24 HOURS: 1/10

## (undated) DEVICE — TUBE E-T HI-LO CUFF 7.5MM (10EA/PK)

## (undated) DEVICE — CANNULA O2 COMFORT SOFT EAR ADULT 7 FT TUBING (50/CA)

## (undated) DEVICE — TROCAR LAPSCP 100MM 12MM NTHRD - (6/BX)

## (undated) DEVICE — CHLORAPREP 26 ML APPLICATOR - ORANGE TINT(25/CA)

## (undated) DEVICE — APPICATOR HEMOSTAT ARISTA XL - FLEXITIP (10EA/CA)

## (undated) DEVICE — SUTURE GENERAL

## (undated) DEVICE — CLIP MED LG INTNL HRZN TI ESCP - (20/BX)

## (undated) DEVICE — GLOVE BIOGEL SZ 6.5 SURGICAL PF LTX (50PR/BX 4BX/CA)

## (undated) DEVICE — SET EXTENSION WITH 2 PORTS (48EA/CA) ***PART #2C8610 IS A SUBSTITUTE*****

## (undated) DEVICE — SODIUM CHL IRRIGATION 0.9% 1000ML (12EA/CA)

## (undated) DEVICE — TUBE E-T HI-LO CUFF 8.0MM (10EA/PK)

## (undated) DEVICE — SENSOR SPO2 NEO LNCS ADHESIVE (20/BX) SEE USER NOTES

## (undated) DEVICE — GLOVE BIOGEL SZ 8 SURGICAL PF LTX - (50PR/BX 4BX/CA)

## (undated) DEVICE — GLOVE, LITE (PAIR)

## (undated) DEVICE — TUBING INSUFFLATION - (10/BX)

## (undated) DEVICE — GOWN WARMING STANDARD FLEX - (30/CA)

## (undated) DEVICE — TUBE CONNECTING SUCTION - CLEAR PLASTIC STERILE 72 IN (50EA/CA)

## (undated) DEVICE — PROTECTOR ULNA NERVE - (36PR/CA)

## (undated) DEVICE — LACTATED RINGERS INJ 1000 ML - (14EA/CA 60CA/PF)

## (undated) DEVICE — KIT ANESTHESIA W/CIRCUIT & 3/LT BAG W/FILTER (20EA/CA)

## (undated) DEVICE — HUMID-VENT HEAT AND MOISTURE EXCHANGE- (50/BX)

## (undated) DEVICE — SUTURE 4-0 VICRYL PLUS FS-2 - 27 INCH (36/BX)

## (undated) DEVICE — TUBE E-T HI-LO CUFF 8.5MM (10EA/PK)

## (undated) DEVICE — NEPTUNE 4 PORT MANIFOLD - (20/PK)

## (undated) DEVICE — SENSOR OXIMETER ADULT SPO2 RD SET (20EA/BX)

## (undated) DEVICE — CATHETER IV SAFETY 20 GA X 1-1/4 (50/BX)

## (undated) DEVICE — CATHETER IV SAFETY 24 GA X 3/4 (50EA/BX)

## (undated) DEVICE — TROCAR 5X100 NON BLADED Z-TH - READ KII (6/BX)

## (undated) DEVICE — MASK ANESTHESIA ADULT  - (100/CA)

## (undated) DEVICE — HEMOSTAT ARISTA PWD 3 GRAM - (5/CA)

## (undated) DEVICE — CATHETER IV SAFETY 22 GA X 1 (50EA/BX)

## (undated) DEVICE — TUBING CLEARLINK DUO-VENT - C-FLO (48EA/CA)

## (undated) DEVICE — TUBE E-T HI-LO CUFF 7.0MM (10EA/PK)

## (undated) DEVICE — TROCAR,OPTICAL SEPARATOR 5X100

## (undated) DEVICE — SET SUCTION/IRRIGATION WITH DISPOSABLE TIP (6/CA )PART #0250-070-520 IS A SUB

## (undated) DEVICE — CANISTER SUCTION RIGID RED 1500CC (40EA/CA)

## (undated) DEVICE — SUCTION INSTRUMENT YANKAUER BULBOUS TIP W/O VENT (50EA/CA)

## (undated) DEVICE — TROCAR Z THREAD12MM OPTICAL - NON BLADED (6/BX)

## (undated) DEVICE — ELECTRODE DUAL RETURN W/ CORD - (50/PK)

## (undated) DEVICE — WATER IRRIGATION STERILE 1000ML (12EA/CA)

## (undated) DEVICE — Device

## (undated) DEVICE — HEAD HOLDER JUNIOR/ADULT

## (undated) DEVICE — ELECTRODE 850 FOAM ADHESIVE - HYDROGEL RADIOTRNSPRNT (50/PK)

## (undated) DEVICE — ELECTRODE 5MM LHK LAPSCP STERILE DISP- MEGADYNE  (5/CA)

## (undated) DEVICE — BAG SPONGE COUNT 10.25 X 32 - BLUE (250/CA)

## (undated) DEVICE — TUBE E-T HI-LO CUFF 6.5MM (10EA/BX)

## (undated) DEVICE — SUTURE 0 VICRYL PLUS CT-2 - 27 INCH (36/BX)

## (undated) DEVICE — GLOVE BIOGEL INDICATOR SZ 6.5 SURGICAL PF LTX - (50PR/BX 4BX/CA)

## (undated) DEVICE — COVER MAYO STAND X-LG - (22EA/CA)